# Patient Record
Sex: MALE | Race: WHITE | NOT HISPANIC OR LATINO | Employment: OTHER | URBAN - NONMETROPOLITAN AREA
[De-identification: names, ages, dates, MRNs, and addresses within clinical notes are randomized per-mention and may not be internally consistent; named-entity substitution may affect disease eponyms.]

---

## 2017-04-20 ENCOUNTER — HISTORY (OUTPATIENT)
Dept: FAMILY MEDICINE | Facility: OTHER | Age: 62
End: 2017-04-20

## 2017-04-20 ENCOUNTER — OFFICE VISIT - GICH (OUTPATIENT)
Dept: FAMILY MEDICINE | Facility: OTHER | Age: 62
End: 2017-04-20

## 2017-04-20 DIAGNOSIS — G35 MULTIPLE SCLEROSIS (H): ICD-10-CM

## 2017-04-20 DIAGNOSIS — F32.9 MAJOR DEPRESSIVE DISORDER, SINGLE EPISODE: ICD-10-CM

## 2017-04-20 ASSESSMENT — PATIENT HEALTH QUESTIONNAIRE - PHQ9: SUM OF ALL RESPONSES TO PHQ QUESTIONS 1-9: 14

## 2017-04-27 ENCOUNTER — AMBULATORY - GICH (OUTPATIENT)
Dept: RADIOLOGY | Facility: OTHER | Age: 62
End: 2017-04-27

## 2017-04-27 DIAGNOSIS — M85.80 OTHER SPECIFIED DISORDERS OF BONE DENSITY AND STRUCTURE, UNSPECIFIED SITE (CODE): ICD-10-CM

## 2017-04-27 DIAGNOSIS — E55.9 VITAMIN D DEFICIENCY: ICD-10-CM

## 2017-04-27 DIAGNOSIS — G35 MULTIPLE SCLEROSIS (H): ICD-10-CM

## 2017-05-08 ENCOUNTER — HISTORY (OUTPATIENT)
Dept: FAMILY MEDICINE | Facility: OTHER | Age: 62
End: 2017-05-08

## 2017-05-08 ENCOUNTER — HOSPITAL ENCOUNTER (OUTPATIENT)
Dept: RADIOLOGY | Facility: OTHER | Age: 62
End: 2017-05-08

## 2017-05-08 ENCOUNTER — OFFICE VISIT - GICH (OUTPATIENT)
Dept: FAMILY MEDICINE | Facility: OTHER | Age: 62
End: 2017-05-08

## 2017-05-08 DIAGNOSIS — F32.9 MAJOR DEPRESSIVE DISORDER, SINGLE EPISODE: ICD-10-CM

## 2017-05-08 DIAGNOSIS — G35 MULTIPLE SCLEROSIS (H): ICD-10-CM

## 2017-05-08 DIAGNOSIS — E55.9 VITAMIN D DEFICIENCY: ICD-10-CM

## 2017-05-08 DIAGNOSIS — M85.80 OTHER SPECIFIED DISORDERS OF BONE DENSITY AND STRUCTURE, UNSPECIFIED SITE (CODE): ICD-10-CM

## 2017-05-08 ASSESSMENT — PATIENT HEALTH QUESTIONNAIRE - PHQ9: SUM OF ALL RESPONSES TO PHQ QUESTIONS 1-9: 13

## 2017-05-11 ENCOUNTER — AMBULATORY - GICH (OUTPATIENT)
Dept: PHYSICAL THERAPY | Facility: OTHER | Age: 62
End: 2017-05-11

## 2017-05-11 DIAGNOSIS — G35 MULTIPLE SCLEROSIS (H): ICD-10-CM

## 2017-05-11 DIAGNOSIS — R29.898 OTHER SYMPTOMS AND SIGNS INVOLVING THE MUSCULOSKELETAL SYSTEM: ICD-10-CM

## 2017-05-11 DIAGNOSIS — R29.6 REPEATED FALLS: ICD-10-CM

## 2017-05-14 ENCOUNTER — TRANSFERRED RECORDS (OUTPATIENT)
Dept: HEALTH INFORMATION MANAGEMENT | Facility: OTHER | Age: 62
End: 2017-05-14

## 2017-05-16 ENCOUNTER — HOSPITAL ENCOUNTER (OUTPATIENT)
Dept: PHYSICAL THERAPY | Facility: OTHER | Age: 62
Setting detail: THERAPIES SERIES
End: 2017-05-16
Attending: GENERAL ACUTE CARE HOSPITAL

## 2017-05-16 DIAGNOSIS — R29.898 OTHER SYMPTOMS AND SIGNS INVOLVING THE MUSCULOSKELETAL SYSTEM: ICD-10-CM

## 2017-05-16 DIAGNOSIS — G35 MULTIPLE SCLEROSIS (H): ICD-10-CM

## 2017-05-16 DIAGNOSIS — R29.6 REPEATED FALLS: ICD-10-CM

## 2017-05-25 ENCOUNTER — HOSPITAL ENCOUNTER (OUTPATIENT)
Dept: PHYSICAL THERAPY | Facility: OTHER | Age: 62
Setting detail: THERAPIES SERIES
End: 2017-05-25

## 2017-05-31 ENCOUNTER — HOSPITAL ENCOUNTER (OUTPATIENT)
Dept: PHYSICAL THERAPY | Facility: OTHER | Age: 62
Setting detail: THERAPIES SERIES
End: 2017-05-31

## 2017-06-06 ENCOUNTER — HOSPITAL ENCOUNTER (OUTPATIENT)
Dept: PHYSICAL THERAPY | Facility: OTHER | Age: 62
Setting detail: THERAPIES SERIES
End: 2017-06-06

## 2017-06-06 ENCOUNTER — COMMUNICATION - GICH (OUTPATIENT)
Dept: FAMILY MEDICINE | Facility: OTHER | Age: 62
End: 2017-06-06

## 2017-06-06 ENCOUNTER — OFFICE VISIT - GICH (OUTPATIENT)
Dept: FAMILY MEDICINE | Facility: OTHER | Age: 62
End: 2017-06-06

## 2017-06-06 DIAGNOSIS — N30.00 ACUTE CYSTITIS WITHOUT HEMATURIA: ICD-10-CM

## 2017-06-06 DIAGNOSIS — R30.0 DYSURIA: ICD-10-CM

## 2017-06-06 LAB
BACTERIA URINE: ABNORMAL BACTERIA/HPF
BILIRUB UR QL: NEGATIVE
CLARITY, URINE: ABNORMAL CLARITY
COLOR UR: YELLOW COLOR
EPITHELIAL CELLS: ABNORMAL EPI/HPF
GLUCOSE URINE: NEGATIVE MG/DL
KETONES UR QL: NEGATIVE MG/DL
LEUKOCYTE ESTERASE URINE: ABNORMAL
NITRITE UR QL STRIP: POSITIVE
OCCULT BLOOD,URINE - HISTORICAL: ABNORMAL
PH UR: 6.5 [PH]
PROTEIN QUALITATIVE,URINE - HISTORICAL: NEGATIVE MG/DL
RBC - HISTORICAL: ABNORMAL /HPF
SP GR UR STRIP: 1.01
UROBILINOGEN,QUALITATIVE - HISTORICAL: NORMAL EU/DL
WBC - HISTORICAL: ABNORMAL /HPF

## 2017-06-07 ENCOUNTER — HOSPITAL ENCOUNTER (OUTPATIENT)
Dept: PHYSICAL THERAPY | Facility: OTHER | Age: 62
Setting detail: THERAPIES SERIES
End: 2017-06-07
Attending: GENERAL ACUTE CARE HOSPITAL

## 2017-06-07 DIAGNOSIS — R29.898 OTHER SYMPTOMS AND SIGNS INVOLVING THE MUSCULOSKELETAL SYSTEM: ICD-10-CM

## 2017-06-07 DIAGNOSIS — G35 MULTIPLE SCLEROSIS (H): ICD-10-CM

## 2017-06-08 ENCOUNTER — HOSPITAL ENCOUNTER (OUTPATIENT)
Dept: PHYSICAL THERAPY | Facility: OTHER | Age: 62
Setting detail: THERAPIES SERIES
End: 2017-06-08

## 2017-06-08 LAB — CULTURE - HISTORICAL: NORMAL

## 2017-06-13 ENCOUNTER — HOSPITAL ENCOUNTER (OUTPATIENT)
Dept: PHYSICAL THERAPY | Facility: OTHER | Age: 62
Setting detail: THERAPIES SERIES
End: 2017-06-13

## 2017-06-15 ENCOUNTER — COMMUNICATION - GICH (OUTPATIENT)
Dept: FAMILY MEDICINE | Facility: OTHER | Age: 62
End: 2017-06-15

## 2017-06-15 ENCOUNTER — HOSPITAL ENCOUNTER (OUTPATIENT)
Dept: PHYSICAL THERAPY | Facility: OTHER | Age: 62
Setting detail: THERAPIES SERIES
End: 2017-06-15

## 2017-06-15 DIAGNOSIS — M47.9 SPONDYLOSIS: ICD-10-CM

## 2017-06-19 ENCOUNTER — HOSPITAL ENCOUNTER (OUTPATIENT)
Dept: PHYSICAL THERAPY | Facility: OTHER | Age: 62
Setting detail: THERAPIES SERIES
End: 2017-06-19
Attending: FAMILY MEDICINE

## 2017-06-19 DIAGNOSIS — M47.9 SPONDYLOSIS: ICD-10-CM

## 2017-06-22 ENCOUNTER — HOSPITAL ENCOUNTER (OUTPATIENT)
Dept: PHYSICAL THERAPY | Facility: OTHER | Age: 62
Setting detail: THERAPIES SERIES
End: 2017-06-22

## 2017-06-26 ENCOUNTER — AMBULATORY - GICH (OUTPATIENT)
Dept: SCHEDULING | Facility: OTHER | Age: 62
End: 2017-06-26

## 2017-06-28 ENCOUNTER — HOSPITAL ENCOUNTER (OUTPATIENT)
Dept: PHYSICAL THERAPY | Facility: OTHER | Age: 62
Setting detail: THERAPIES SERIES
End: 2017-06-28

## 2017-06-30 ENCOUNTER — HOSPITAL ENCOUNTER (OUTPATIENT)
Dept: PHYSICAL THERAPY | Facility: OTHER | Age: 62
Setting detail: THERAPIES SERIES
End: 2017-06-30

## 2017-07-05 ENCOUNTER — HOSPITAL ENCOUNTER (OUTPATIENT)
Dept: PHYSICAL THERAPY | Facility: OTHER | Age: 62
Setting detail: THERAPIES SERIES
End: 2017-07-05

## 2017-07-06 ENCOUNTER — COMMUNICATION - GICH (OUTPATIENT)
Dept: FAMILY MEDICINE | Facility: OTHER | Age: 62
End: 2017-07-06

## 2017-07-07 ENCOUNTER — HOSPITAL ENCOUNTER (OUTPATIENT)
Dept: PHYSICAL THERAPY | Facility: OTHER | Age: 62
Setting detail: THERAPIES SERIES
End: 2017-07-07

## 2017-07-11 ENCOUNTER — HOSPITAL ENCOUNTER (OUTPATIENT)
Dept: PHYSICAL THERAPY | Facility: OTHER | Age: 62
Setting detail: THERAPIES SERIES
End: 2017-07-11

## 2017-07-11 ENCOUNTER — OFFICE VISIT - GICH (OUTPATIENT)
Dept: FAMILY MEDICINE | Facility: OTHER | Age: 62
End: 2017-07-11

## 2017-07-11 ENCOUNTER — COMMUNICATION - GICH (OUTPATIENT)
Dept: FAMILY MEDICINE | Facility: OTHER | Age: 62
End: 2017-07-11

## 2017-07-11 ENCOUNTER — HISTORY (OUTPATIENT)
Dept: FAMILY MEDICINE | Facility: OTHER | Age: 62
End: 2017-07-11

## 2017-07-11 DIAGNOSIS — F32.9 MAJOR DEPRESSIVE DISORDER, SINGLE EPISODE: ICD-10-CM

## 2017-07-11 DIAGNOSIS — I95.9 HYPOTENSION: ICD-10-CM

## 2017-07-11 DIAGNOSIS — I10 ESSENTIAL (PRIMARY) HYPERTENSION: ICD-10-CM

## 2017-07-11 ASSESSMENT — PATIENT HEALTH QUESTIONNAIRE - PHQ9: SUM OF ALL RESPONSES TO PHQ QUESTIONS 1-9: 12

## 2017-07-13 ENCOUNTER — HOSPITAL ENCOUNTER (OUTPATIENT)
Dept: PHYSICAL THERAPY | Facility: OTHER | Age: 62
Setting detail: THERAPIES SERIES
End: 2017-07-13

## 2017-07-18 ENCOUNTER — HOSPITAL ENCOUNTER (OUTPATIENT)
Dept: PHYSICAL THERAPY | Facility: OTHER | Age: 62
Setting detail: THERAPIES SERIES
End: 2017-07-18

## 2017-07-20 ENCOUNTER — HOSPITAL ENCOUNTER (OUTPATIENT)
Dept: PHYSICAL THERAPY | Facility: OTHER | Age: 62
Setting detail: THERAPIES SERIES
End: 2017-07-20

## 2017-07-26 ENCOUNTER — HOSPITAL ENCOUNTER (OUTPATIENT)
Dept: PHYSICAL THERAPY | Facility: OTHER | Age: 62
Setting detail: THERAPIES SERIES
End: 2017-07-26

## 2017-07-28 ENCOUNTER — HOSPITAL ENCOUNTER (OUTPATIENT)
Dept: PHYSICAL THERAPY | Facility: OTHER | Age: 62
Setting detail: THERAPIES SERIES
End: 2017-07-28

## 2017-08-01 ENCOUNTER — HOSPITAL ENCOUNTER (OUTPATIENT)
Dept: PHYSICAL THERAPY | Facility: OTHER | Age: 62
Setting detail: THERAPIES SERIES
End: 2017-08-01

## 2017-08-03 ENCOUNTER — HOSPITAL ENCOUNTER (OUTPATIENT)
Dept: PHYSICAL THERAPY | Facility: OTHER | Age: 62
Setting detail: THERAPIES SERIES
End: 2017-08-03

## 2017-08-07 ENCOUNTER — HOSPITAL ENCOUNTER (OUTPATIENT)
Dept: PHYSICAL THERAPY | Facility: OTHER | Age: 62
Setting detail: THERAPIES SERIES
End: 2017-08-07

## 2017-08-08 ENCOUNTER — OFFICE VISIT - GICH (OUTPATIENT)
Dept: FAMILY MEDICINE | Facility: OTHER | Age: 62
End: 2017-08-08

## 2017-08-08 ENCOUNTER — HISTORY (OUTPATIENT)
Dept: FAMILY MEDICINE | Facility: OTHER | Age: 62
End: 2017-08-08

## 2017-08-08 DIAGNOSIS — F32.9 MAJOR DEPRESSIVE DISORDER, SINGLE EPISODE: ICD-10-CM

## 2017-08-08 DIAGNOSIS — F33.1 MAJOR DEPRESSIVE DISORDER, RECURRENT, MODERATE (H): ICD-10-CM

## 2017-08-08 DIAGNOSIS — I10 ESSENTIAL (PRIMARY) HYPERTENSION: ICD-10-CM

## 2017-08-08 ASSESSMENT — PATIENT HEALTH QUESTIONNAIRE - PHQ9: SUM OF ALL RESPONSES TO PHQ QUESTIONS 1-9: 14

## 2017-08-14 ENCOUNTER — AMBULATORY - GICH (OUTPATIENT)
Dept: SCHEDULING | Facility: OTHER | Age: 62
End: 2017-08-14

## 2017-08-14 ENCOUNTER — COMMUNICATION - GICH (OUTPATIENT)
Dept: FAMILY MEDICINE | Facility: OTHER | Age: 62
End: 2017-08-14

## 2017-08-16 ENCOUNTER — HOSPITAL ENCOUNTER (OUTPATIENT)
Dept: PHYSICAL THERAPY | Facility: OTHER | Age: 62
Setting detail: THERAPIES SERIES
End: 2017-08-16

## 2017-08-23 ENCOUNTER — HOSPITAL ENCOUNTER (OUTPATIENT)
Dept: PHYSICAL THERAPY | Facility: OTHER | Age: 62
Setting detail: THERAPIES SERIES
End: 2017-08-23

## 2017-08-24 ENCOUNTER — AMBULATORY - GICH (OUTPATIENT)
Dept: SCHEDULING | Facility: OTHER | Age: 62
End: 2017-08-24

## 2017-08-25 ENCOUNTER — OFFICE VISIT - GICH (OUTPATIENT)
Dept: FAMILY MEDICINE | Facility: OTHER | Age: 62
End: 2017-08-25

## 2017-08-25 ENCOUNTER — HISTORY (OUTPATIENT)
Dept: FAMILY MEDICINE | Facility: OTHER | Age: 62
End: 2017-08-25

## 2017-08-25 DIAGNOSIS — N31.9 NEUROMUSCULAR DYSFUNCTION OF BLADDER: ICD-10-CM

## 2017-08-25 DIAGNOSIS — Z78.9 OTHER SPECIFIED HEALTH STATUS (CODE): ICD-10-CM

## 2017-08-25 DIAGNOSIS — R35.0 FREQUENCY OF MICTURITION: ICD-10-CM

## 2017-08-25 DIAGNOSIS — N39.0 URINARY TRACT INFECTION: ICD-10-CM

## 2017-08-25 LAB
AMORPHOUS - HISTORICAL: PRESENT
BACTERIA URINE: ABNORMAL BACTERIA/HPF
BILIRUB UR QL: NEGATIVE
CLARITY, URINE: CLEAR CLARITY
COLOR UR: YELLOW COLOR
EPITHELIAL CELLS: ABNORMAL EPI/HPF
GLUCOSE URINE: 100 MG/DL
KETONES UR QL: NEGATIVE MG/DL
LEUKOCYTE ESTERASE URINE: ABNORMAL
NITRITE UR QL STRIP: POSITIVE
OCCULT BLOOD,URINE - HISTORICAL: ABNORMAL
OTHER: ABNORMAL
PH UR: 6.5 [PH]
PROTEIN QUALITATIVE,URINE - HISTORICAL: NEGATIVE MG/DL
RBC - HISTORICAL: ABNORMAL /HPF
SP GR UR STRIP: 1.01
UROBILINOGEN,QUALITATIVE - HISTORICAL: NORMAL EU/DL
WBC - HISTORICAL: ABNORMAL /HPF

## 2017-08-27 LAB
CULTURE - HISTORICAL: ABNORMAL
CULTURE - HISTORICAL: ABNORMAL
SUSCEPTIBILITY RESULT - HISTORICAL: ABNORMAL

## 2017-09-04 ENCOUNTER — HISTORY (OUTPATIENT)
Dept: FAMILY MEDICINE | Facility: OTHER | Age: 62
End: 2017-09-04

## 2017-09-04 ENCOUNTER — HOSPITAL ENCOUNTER (OUTPATIENT)
Dept: RADIOLOGY | Facility: OTHER | Age: 62
End: 2017-09-04
Attending: NURSE PRACTITIONER

## 2017-09-04 ENCOUNTER — OFFICE VISIT - GICH (OUTPATIENT)
Dept: FAMILY MEDICINE | Facility: OTHER | Age: 62
End: 2017-09-04

## 2017-09-04 DIAGNOSIS — Z91.89 OTHER SPECIFIED PERSONAL RISK FACTORS, NOT ELSEWHERE CLASSIFIED: ICD-10-CM

## 2017-09-04 DIAGNOSIS — Z87.440 PERSONAL HISTORY OF URINARY INFECTION: ICD-10-CM

## 2017-09-04 LAB
BACTERIA URINE: ABNORMAL BACTERIA/HPF
BILIRUB UR QL: NEGATIVE
CLARITY, URINE: CLEAR CLARITY
COLOR UR: YELLOW COLOR
EPITHELIAL CELLS: ABNORMAL EPI/HPF
GLUCOSE URINE: NEGATIVE MG/DL
KETONES UR QL: NEGATIVE MG/DL
LEUKOCYTE ESTERASE URINE: NEGATIVE
NITRITE UR QL STRIP: POSITIVE
OCCULT BLOOD,URINE - HISTORICAL: NEGATIVE
PH UR: 7 [PH]
PROTEIN QUALITATIVE,URINE - HISTORICAL: NEGATIVE MG/DL
RBC - HISTORICAL: ABNORMAL /HPF
SP GR UR STRIP: 1.02
UROBILINOGEN,QUALITATIVE - HISTORICAL: NORMAL EU/DL
WBC - HISTORICAL: ABNORMAL /HPF

## 2017-09-07 ENCOUNTER — HISTORY (OUTPATIENT)
Dept: FAMILY MEDICINE | Facility: OTHER | Age: 62
End: 2017-09-07

## 2017-09-07 ENCOUNTER — OFFICE VISIT - GICH (OUTPATIENT)
Dept: FAMILY MEDICINE | Facility: OTHER | Age: 62
End: 2017-09-07

## 2017-09-07 ENCOUNTER — HOSPITAL ENCOUNTER (OUTPATIENT)
Dept: PHYSICAL THERAPY | Facility: OTHER | Age: 62
Setting detail: THERAPIES SERIES
End: 2017-09-07
Attending: NURSE PRACTITIONER

## 2017-09-07 ENCOUNTER — AMBULATORY - GICH (OUTPATIENT)
Dept: FAMILY MEDICINE | Facility: OTHER | Age: 62
End: 2017-09-07

## 2017-09-07 ENCOUNTER — COMMUNICATION - GICH (OUTPATIENT)
Dept: FAMILY MEDICINE | Facility: OTHER | Age: 62
End: 2017-09-07

## 2017-09-07 ENCOUNTER — AMBULATORY - GICH (OUTPATIENT)
Dept: PHYSICAL THERAPY | Facility: OTHER | Age: 62
End: 2017-09-07

## 2017-09-07 DIAGNOSIS — G35 MULTIPLE SCLEROSIS (H): ICD-10-CM

## 2017-09-07 DIAGNOSIS — F32.9 MAJOR DEPRESSIVE DISORDER, SINGLE EPISODE: ICD-10-CM

## 2017-09-07 DIAGNOSIS — N31.2 FLACCID NEUROPATHIC BLADDER, NOT ELSEWHERE CLASSIFIED: ICD-10-CM

## 2017-09-07 DIAGNOSIS — Z91.89 OTHER SPECIFIED PERSONAL RISK FACTORS, NOT ELSEWHERE CLASSIFIED: ICD-10-CM

## 2017-09-07 DIAGNOSIS — R06.02 SHORTNESS OF BREATH: ICD-10-CM

## 2017-09-07 DIAGNOSIS — R33.9 RETENTION OF URINE: ICD-10-CM

## 2017-09-07 LAB
ANION GAP - HISTORICAL: 9 (ref 5–18)
BUN SERPL-MCNC: 27 MG/DL (ref 7–25)
BUN/CREAT RATIO - HISTORICAL: 25
CALCIUM SERPL-MCNC: 9.7 MG/DL (ref 8.6–10.3)
CHLORIDE SERPLBLD-SCNC: 103 MMOL/L (ref 98–107)
CO2 SERPL-SCNC: 28 MMOL/L (ref 21–31)
CREAT SERPL-MCNC: 1.1 MG/DL (ref 0.7–1.3)
CULTURE - HISTORICAL: ABNORMAL
CULTURE - HISTORICAL: ABNORMAL
D-DIMER, QUANTITATIVE NG/ML - HISTORICAL: 482 NG/ML
GFR IF NOT AFRICAN AMERICAN - HISTORICAL: >60 ML/MIN/1.73M2
GLUCOSE SERPL-MCNC: 96 MG/DL (ref 70–105)
POTASSIUM SERPL-SCNC: 3.8 MMOL/L (ref 3.5–5.1)
PSA TOTAL (DIAGNOSTIC) - HISTORICAL: 0.2 NG/ML
SODIUM SERPL-SCNC: 140 MMOL/L (ref 133–143)

## 2017-09-07 ASSESSMENT — PATIENT HEALTH QUESTIONNAIRE - PHQ9: SUM OF ALL RESPONSES TO PHQ QUESTIONS 1-9: 11

## 2017-09-15 ENCOUNTER — HOSPITAL ENCOUNTER (OUTPATIENT)
Dept: RADIOLOGY | Facility: OTHER | Age: 62
End: 2017-09-15
Attending: NURSE PRACTITIONER

## 2017-09-15 DIAGNOSIS — G35 MULTIPLE SCLEROSIS (H): ICD-10-CM

## 2017-09-15 DIAGNOSIS — Z91.89 OTHER SPECIFIED PERSONAL RISK FACTORS, NOT ELSEWHERE CLASSIFIED: ICD-10-CM

## 2017-09-17 ENCOUNTER — TRANSFERRED RECORDS (OUTPATIENT)
Dept: HEALTH INFORMATION MANAGEMENT | Facility: OTHER | Age: 62
End: 2017-09-17

## 2017-09-18 ENCOUNTER — TRANSFERRED RECORDS (OUTPATIENT)
Dept: HEALTH INFORMATION MANAGEMENT | Facility: OTHER | Age: 62
End: 2017-09-18

## 2017-09-18 ENCOUNTER — AMBULATORY - GICH (OUTPATIENT)
Dept: PHYSICAL THERAPY | Facility: OTHER | Age: 62
End: 2017-09-18

## 2017-09-19 ENCOUNTER — HOSPITAL ENCOUNTER (OUTPATIENT)
Dept: PHYSICAL THERAPY | Facility: OTHER | Age: 62
Setting detail: THERAPIES SERIES
End: 2017-09-19

## 2017-09-19 DIAGNOSIS — R06.02 SHORTNESS OF BREATH: ICD-10-CM

## 2017-09-20 ENCOUNTER — HOSPITAL ENCOUNTER (OUTPATIENT)
Dept: RADIOLOGY | Facility: OTHER | Age: 62
End: 2017-09-20
Attending: FAMILY MEDICINE

## 2017-09-20 DIAGNOSIS — R06.02 SHORTNESS OF BREATH: ICD-10-CM

## 2017-09-21 ENCOUNTER — TRANSFERRED RECORDS (OUTPATIENT)
Dept: HEALTH INFORMATION MANAGEMENT | Facility: OTHER | Age: 62
End: 2017-09-21

## 2017-09-21 ENCOUNTER — AMBULATORY - GICH (OUTPATIENT)
Dept: FAMILY MEDICINE | Facility: OTHER | Age: 62
End: 2017-09-21

## 2017-09-21 ENCOUNTER — AMBULATORY - GICH (OUTPATIENT)
Dept: SCHEDULING | Facility: OTHER | Age: 62
End: 2017-09-21

## 2017-09-21 ENCOUNTER — HOSPITAL ENCOUNTER (OUTPATIENT)
Dept: PHYSICAL THERAPY | Facility: OTHER | Age: 62
Setting detail: THERAPIES SERIES
End: 2017-09-21

## 2017-09-22 ENCOUNTER — HOSPITAL ENCOUNTER (OUTPATIENT)
Dept: PHYSICAL THERAPY | Facility: OTHER | Age: 62
Setting detail: THERAPIES SERIES
End: 2017-09-22

## 2017-09-22 DIAGNOSIS — R06.02 SHORTNESS OF BREATH: ICD-10-CM

## 2017-09-25 ENCOUNTER — HOSPITAL ENCOUNTER (OUTPATIENT)
Dept: PHYSICAL THERAPY | Facility: OTHER | Age: 62
Setting detail: THERAPIES SERIES
End: 2017-09-25

## 2017-09-28 ENCOUNTER — HOSPITAL ENCOUNTER (OUTPATIENT)
Dept: PHYSICAL THERAPY | Facility: OTHER | Age: 62
Setting detail: THERAPIES SERIES
End: 2017-09-28

## 2017-10-02 ENCOUNTER — HOSPITAL ENCOUNTER (OUTPATIENT)
Dept: PHYSICAL THERAPY | Facility: OTHER | Age: 62
Setting detail: THERAPIES SERIES
End: 2017-10-02

## 2017-10-04 ENCOUNTER — TRANSFERRED RECORDS (OUTPATIENT)
Dept: HEALTH INFORMATION MANAGEMENT | Facility: OTHER | Age: 62
End: 2017-10-04

## 2017-10-04 ENCOUNTER — AMBULATORY - GICH (OUTPATIENT)
Dept: SCHEDULING | Facility: OTHER | Age: 62
End: 2017-10-04

## 2017-10-05 ENCOUNTER — HOSPITAL ENCOUNTER (OUTPATIENT)
Dept: PHYSICAL THERAPY | Facility: OTHER | Age: 62
Setting detail: THERAPIES SERIES
End: 2017-10-05

## 2017-10-05 ENCOUNTER — HISTORY (OUTPATIENT)
Dept: FAMILY MEDICINE | Facility: OTHER | Age: 62
End: 2017-10-05

## 2017-10-05 ENCOUNTER — OFFICE VISIT - GICH (OUTPATIENT)
Dept: FAMILY MEDICINE | Facility: OTHER | Age: 62
End: 2017-10-05

## 2017-10-05 DIAGNOSIS — I10 ESSENTIAL (PRIMARY) HYPERTENSION: ICD-10-CM

## 2017-10-05 DIAGNOSIS — G35 MULTIPLE SCLEROSIS (H): ICD-10-CM

## 2017-10-05 ASSESSMENT — PATIENT HEALTH QUESTIONNAIRE - PHQ9: SUM OF ALL RESPONSES TO PHQ QUESTIONS 1-9: 15

## 2017-10-09 ENCOUNTER — HOSPITAL ENCOUNTER (OUTPATIENT)
Dept: PHYSICAL THERAPY | Facility: OTHER | Age: 62
Setting detail: THERAPIES SERIES
End: 2017-10-09

## 2017-10-16 ENCOUNTER — HOSPITAL ENCOUNTER (OUTPATIENT)
Dept: PHYSICAL THERAPY | Facility: OTHER | Age: 62
Setting detail: THERAPIES SERIES
End: 2017-10-16

## 2017-10-19 ENCOUNTER — HOSPITAL ENCOUNTER (OUTPATIENT)
Dept: PHYSICAL THERAPY | Facility: OTHER | Age: 62
Setting detail: THERAPIES SERIES
End: 2017-10-19

## 2017-10-24 ENCOUNTER — HOSPITAL ENCOUNTER (OUTPATIENT)
Dept: PHYSICAL THERAPY | Facility: OTHER | Age: 62
Setting detail: THERAPIES SERIES
End: 2017-10-24

## 2017-10-24 ENCOUNTER — HISTORY (OUTPATIENT)
Dept: FAMILY MEDICINE | Facility: OTHER | Age: 62
End: 2017-10-24

## 2017-10-24 ENCOUNTER — OFFICE VISIT - GICH (OUTPATIENT)
Dept: FAMILY MEDICINE | Facility: OTHER | Age: 62
End: 2017-10-24

## 2017-10-24 DIAGNOSIS — R39.89 OTHER SYMPTOMS AND SIGNS INVOLVING THE GENITOURINARY SYSTEM: ICD-10-CM

## 2017-10-24 DIAGNOSIS — T83.511A INFECTION AND INFLAMMATORY REACTION DUE TO INDWELLING URETHRAL CATHETER, INITIAL ENCOUNTER (H): ICD-10-CM

## 2017-10-24 DIAGNOSIS — N39.0 URINARY TRACT INFECTION: ICD-10-CM

## 2017-10-24 LAB
BACTERIA URINE: ABNORMAL BACTERIA/HPF
BILIRUB UR QL: NEGATIVE
CLARITY, URINE: ABNORMAL CLARITY
COLOR UR: YELLOW COLOR
EPITHELIAL CELLS: ABNORMAL EPI/HPF
GLUCOSE URINE: NEGATIVE MG/DL
HYALINE CASTS: ABNORMAL /LPF
KETONES UR QL: NEGATIVE MG/DL
LEUKOCYTE ESTERASE URINE: NEGATIVE
NITRITE UR QL STRIP: POSITIVE
OCCULT BLOOD,URINE - HISTORICAL: ABNORMAL
PH UR: 6 [PH]
PROTEIN QUALITATIVE,URINE - HISTORICAL: NEGATIVE MG/DL
RBC - HISTORICAL: ABNORMAL /HPF
SP GR UR STRIP: 1.01
UROBILINOGEN,QUALITATIVE - HISTORICAL: NORMAL EU/DL
WBC - HISTORICAL: ABNORMAL /HPF

## 2017-10-26 LAB
CULTURE - HISTORICAL: ABNORMAL
CULTURE - HISTORICAL: ABNORMAL

## 2017-11-01 ENCOUNTER — HOSPITAL ENCOUNTER (OUTPATIENT)
Dept: PHYSICAL THERAPY | Facility: OTHER | Age: 62
Setting detail: THERAPIES SERIES
End: 2017-11-01

## 2017-11-03 ENCOUNTER — HOSPITAL ENCOUNTER (OUTPATIENT)
Dept: PHYSICAL THERAPY | Facility: OTHER | Age: 62
Setting detail: THERAPIES SERIES
End: 2017-11-03

## 2017-11-06 ENCOUNTER — HOSPITAL ENCOUNTER (OUTPATIENT)
Dept: PHYSICAL THERAPY | Facility: OTHER | Age: 62
Setting detail: THERAPIES SERIES
End: 2017-11-06

## 2017-11-08 ENCOUNTER — HOSPITAL ENCOUNTER (OUTPATIENT)
Dept: PHYSICAL THERAPY | Facility: OTHER | Age: 62
Setting detail: THERAPIES SERIES
End: 2017-11-08

## 2017-11-09 ENCOUNTER — AMBULATORY - GICH (OUTPATIENT)
Dept: SCHEDULING | Facility: OTHER | Age: 62
End: 2017-11-09

## 2017-11-09 ENCOUNTER — TRANSFERRED RECORDS (OUTPATIENT)
Dept: HEALTH INFORMATION MANAGEMENT | Facility: OTHER | Age: 62
End: 2017-11-09

## 2017-11-13 ENCOUNTER — HOSPITAL ENCOUNTER (OUTPATIENT)
Dept: PHYSICAL THERAPY | Facility: OTHER | Age: 62
Setting detail: THERAPIES SERIES
End: 2017-11-13

## 2017-11-15 ENCOUNTER — HOSPITAL ENCOUNTER (OUTPATIENT)
Dept: PHYSICAL THERAPY | Facility: OTHER | Age: 62
Setting detail: THERAPIES SERIES
End: 2017-11-15

## 2017-11-20 ENCOUNTER — HOSPITAL ENCOUNTER (OUTPATIENT)
Dept: PHYSICAL THERAPY | Facility: OTHER | Age: 62
Setting detail: THERAPIES SERIES
End: 2017-11-20

## 2017-12-27 NOTE — PROGRESS NOTES
"Patient Information     Patient Name MRN Sex Jamir Cooley [id number] Male 1955      Progress Notes by Kristy Early PT at 2017  3:52 PM     Author:  Kristy Early PT Service:  (none) Author Type:  PT- Physical Therapist     Filed:  2017  8:56 AM Date of Service:  2017  3:52 PM Status:  Signed     :  Kristy Early PT (PT- Physical Therapist)            [facility]  Outpatient PT - Re-certification Note        Date of Service: 2017    Visit #: 25 (1 of 10)     Patient Name: Jamir Gill   YOB: 1955   Referring MD/Provider: Dr. Dee Hook, Dr. John Polk  Diagnosis: Multiple sclerosis, bilateral leg weakness, osteoarthritis of cervical region  Treatment Diagnosis: MS, impaired mobility, bilateral leg weakness, impaired posture, impaired balance and endurance; impaired cervical mobility  Insurance:  Preferred One  Start of Care Date:  2017   Certification Dates: 2017    Re-Cert Due: Medicare/MA Re-Cert Due: 2017    Subjective    Patient reports he has been falling even with the walker in the house. Using the wheelchair most of the time. \"I've got 2 bruised hips\" from falling. Patient expressed frustration at his decline in independent mobility. \"I resisted the wheelchair for 25 years.\"   Doesn't drive anymore, may be interested in hand controls. \"I figured if I can't get in it, I shouldn't be driving.\"  Has OT coming to the house for safety eval and will be checking into getting the scooter.  The left leg is pretty weak now too. Not as bad as the right but definitely worse than before.    Woke up with a sore neck this morning- couldn't turn it either direction.    Pain today is 2-3 in the neck.     Objective  Patient arrived 10 mins late for appt today.    Patient using a wheelchair today.  Transfers WC to bed with min A. Takes 2-3 steps without support, then reaches for the bed.      Modified CTSIB:    Condition " 1: Trial 1= 30 seconds, Trial 2= Not tested   Condition 2: Trial 1= 10 seconds, Trial 2= Not tested   Condition 3: Trial 1= 28 seconds, Trial 2= Not tested   Condition 4: Trial 1= 0 seconds, Trial 2= Not tested      Observation/palpation:  C4-5 ERS left; right> left 1st rib hypomobility    ROM / Strength:                 Norms Left  Right Strength Left  Right   Hip Flexion 120    Hip Flexion 3+ 1   Hip Extension 15   Hip Extension     Hip Abduction 50   Hip Abduction     Hip External Rot. 60   Hip External Rot.     Hip Internal Rot. 40   Hip Internal Rot.     Knee Extension 0   Knee Extension 4 4-   Knee Flexion 135   Knee Flexion 4- 3+   Dorsiflexion 20   Dorsiflexion 3 3   Plantarflexion    Plantarflexion       Hip extension and abduction MMT deferred due to patient's difficulty rolling on treatment table.    Today's Intervention:    Supine muscle energy technique C4-5 ERS left   1st rib right muscle energy technique x3  soft tissue mobilization scalenes x5 mins  Upper trapezius stretching and cervical rotation, supine    Walk in hernandez with front wheeled walker x75 feet with contact guard assist     Home Exercise Program: *= added to HEP.    Access Code: [email or web address] URL: [email or web address] Date: 05/16/2017 Prepared by: Kristy Early   Exercises   Hooklying Clamshell with Resistance - 10-15 reps - 1-2 sets - 5 hold - 1x daily   Seated Hamstring Stretch - 1-2 sets - 30 second hold - 1x daily   Romberg Stance - 30 second hold - 2-3 sets - 1x daily   Romberg Stance with Eyes Closed - 2-3 sets - 20-30 second hold - 1x daily   Romberg Stance with Head Rotation - 2-3 sets - 30 second hold - 1x daily   Standing Romberg to 3/4 Tandem Stance - 2-3 sets - 20-30 second hold - 1x daily   Scapular Retraction with Resistance Advanced - 10-15 reps - 1-2 sets - 5 hold - 1x daily     Upper trapezius, scalene and levator stretches x30 sec each. bilateral added 6/19/2017        Assessment     Patient demonstrates moderate  decline in functional mobility compared to initial evaluation several months ago. Therapist recommends reconsidering getting a scooter for home use to prevent falls and improve mobility and energy conservation. Additional skilled physical therapy services required to improve cervical mobility, improve independent mobility and decrease fall risk. Patient has a progressive condition and is not safe with his usual mobility at this time.  Patient will benefit from continued skilled physical therapy services to address aforementioned impairments and return patient to previous level of functioning.    G Codes and Modifier taken from patient completing the Burrows and dynamic gait index: 6/7/17 (Discharged balance gcodes 6/19/2017)     Goal Primary G Code and Modifier:    The Patient's G Code Goal would be: Mobility    The Patient's Impairment, Limitation or Restriction Modifier goal would be best described as: CJ - 20% - 40% Impairment.      Discharge Primary G Code and Modifier:      The Patient's status upon Discharge is Mobility    The Patient's Impairment, Limitation or Restriction Modifier would be best described as CK - 40% - 60% Impairment.     Cervical codes updated 8/16/2017:    Current Primary G Code and Modifier:    Per the Patient's intake and/or assessment the Primary G Code is: Handling Objects .   The Patient's Impairment, Limitation or Restriction Modifier would be best described as: CI - 1% - 20% Impairment.     Goal Primary G Code and Modifier:    The Patient's G Code Goal would be: Handling Objects    The Patient's Impairment, Limitation or Restriction Modifier goal would be best described as: CI - 1% - 20% Impairment.      Goals:  Patient will be assessed for appropriate mobility device to promote maintaining his safety with mobility within one visit. Goal met 6/7/17      Short Term Goals: To be met in 4 weeks:   Following physical therapy intervention, the patient will be able to:    1. Patient will improve standing posture to minimize crouched gait pattern. In progress 9/19/2017   2. Patient will be independent in home exercise program for progression of strength and balance abilities. In progress 9/19/2017                                        3.   Patient will decrease falls to no more than 3 per week. Not met 9/19/2017       Long Term Goals: To be met in 8 weeks:   Following physical therapy intervention, the patient will be able to:   1. Increase MMT of hip flexors to 3+/5 so that pt able to lift leg independently into car. Not progressing.  2.   3.     New goals added 6/19/2017:      1.   Patient will have improved cervical range of motion by at least 5 degrees with minimal to no referred pain. 40% met 9/19/2017      2.   Patient will have less than 3/10 pain with normal activities of daily living. In progress 9/19/2017      3.   Patient will have improved postural endurance for ability to complete normal daily work routine including reaching to shoulder height without increase in symptoms. In progress 8/16/2017           5.   Patient will improve mCTSIB scores by 5 seconds for improved endurance for independent stance and improved stability. (New goal added 9/19/2017)     Plan     Treatment Plan / Targeted Outcomes:     Frequency:   16 additional visits      Duration of Treatment: 8 weeks     Plan of Care Due Date: Medicare/MA Re-Cert Due:  11/14/2017     Plan for next visit:  Gait and balance training, home exercise program development, transfers, independent mobility and strengthening. Cervical side gliding and mobilization as appropriate. Postural strengthening.        Student or PTA has been instructed in and demonstrates skills necessary to carry out above stated treatment plan: Yes     Thank you for your referral to [facility].  Please call with any questions, concerns or comments.  [phone]     Kristy Early, PT, DPT

## 2017-12-27 NOTE — PROGRESS NOTES
Patient Information     Patient Name MRN Sex     Jamir Gill [id number] Male 1955      Progress Notes signed by John Polk MD at 2017 10:03 AM      Author:  John Polk MD Service:  (none) Author Type:  Physician     Filed:  2017 10:03 AM Encounter Date:  2017 Status:  Signed     :  John Polk MD (Physician)            2017      JAMIR GILL  [address] [place] [address]      RE: JAMIR GILL  MR#: [id number]  : 1955        Dear Dr. Gibbs:    This letter is in regards to our mutual patient, Jamir Gill, date of birth 1955, who is a patient you follow with MS.      He and his wife and I had a good discussion today about his apparent downward trend in health over the last few months. His wife was quite certain that this was related to repeated urinary tract infections.  As you know, Jamir self-catheterizes. He did have, in , a urinalysis that showed multiple white cells, but culture showed probable contaminants.  He was treated at that time with Cipro, but again the culture was negative. Then on  he had a urine which showed again white cells, and he was treated again with Cipro, with Enterococcus faecalis being cultured.  He, of course, had no symptoms, other than possibly a flare in his MS that had been going on for months, but he had no improvement during this treatment. Then on  he had another urine culture, which showed a coag-neg staph, but no longer was there any enterococcus.  I thus pointed out to him and his wife that it was in the face of a possible infection with enterococcus and eradication of that bacteria that his symptoms did not improve, and thus it is unlikely, I believe, that urinary tract infections are the cause of his symptoms. We talked about colonization of the bladder with these different contaminants and bacteria that he has had without any real signs of bladder infection  other than possible worsening of his MS.      They wanted me to be sure to let you know this information. I appreciate your time and consideration, and please feel free to call me on my cellphone, [phone], if you have further questions or suggestions.    Yours truly,      MD ROSHNI HUNTLEY/izabela  D:09/07/2017 17:03:04  T:09/07/2017 23:02:08  VJID: [id number]  TJID: [id number]    cc:

## 2017-12-27 NOTE — PROGRESS NOTES
Patient Information     Patient Name MRN Sex Jamir Cooley [id number] Male 1955      Progress Notes by Holley Alvarez at 2017  8:58 AM     Author:  Holley Alvarez  Service:  (none) Author Type:  Other Clinical Staff     Filed:  2017  9:16 AM  Date of Service:  2017  8:58 AM Status:  Addendum     :  Holley Alvarez (Other Clinical Staff)        Related Notes: Original Note by Holley Alvarez (Other Clinical Staff) filed at 2017  8:58 AM            Falls Risk Criteria:    Age 65 and older or under age 4        Sensory deficits    Poor vision    Use of ambulatory aides    Impaired judgment    Unable to walk independently    Meets High Risk criteria for falls: yes               1.  Do you have dizziness or vertigo?    yes                    2.  Do you need help standing or walking?   yes                 3.  Have you fallen within the last 6 months?    yes           4.  Has the patient been fasting?      yes       If any risks are marked Yes, the following interventions are utilized:    Do not leave patient unattended     Assist patient in the dressing room and bathroom    Have ambulatory aides available throughout procedure    Involve patient s family if available

## 2017-12-27 NOTE — PROGRESS NOTES
"Patient Information     Patient Name MRN Sex Jamir Cooley [id number] Male 1955      Progress Notes by Kristy Early PT at 10/16/2017 10:33 AM     Author:  Kristy Early PT Service:  (none) Author Type:  PT- Physical Therapist     Filed:  10/16/2017 12:26 PM Date of Service:  10/16/2017 10:33 AM Status:  Signed     :  Kristy Early PT (PT- Physical Therapist)            [facility]  Outpatient PT - Daily Note        Date of Service: 10/16/2017    Visit #: 32 (8 of 10)     Patient Name: Jamir Gill   YOB: 1955   Referring MD/Provider: Dr. Dee Hook, Dr. John Polk  Diagnosis: Multiple sclerosis, bilateral leg weakness, osteoarthritis of cervical region  Treatment Diagnosis: MS, impaired mobility, bilateral leg weakness, impaired posture, impaired balance and endurance; impaired cervical mobility  Insurance:  Preferred One  Start of Care Date:  2017   Certification Dates: 2017    Re-Cert Due: Medicare/MA Re-Cert Due: 2017    Subjective    Patient reports he hopped right up into the truck today. Noticed an improvement in his mobility starting over the weekend. Did have another neck pain episode that lasted a couple hours, just worked on massaging it and it's feeling fine today.    Discussed home exercise program and progressing to the PEAK program in the next few weeks. Patient agreeable.     Objective  Patient presents ambulatory with his 4WW today.    Today's Intervention:  all exercises completed bilaterally and to fatigue. Patient instructed on all  for transition to the PEAK program.    Nustep level 6 seat 11 arms 10 x5 mins. No leaning to the left today.    Standing toe taps to 4\" box  Standing hip abduction with minimal use of railing for balance x12 bilateral     Review of new/modified home exercise program:  Seated marching hip flexion*  Seated core training: bilateral trunk rotation with arms crossed, sitting " unsupported *  Seated hamstring curls + LAQ *  Seated upper extremity overhead press no weights *  Seated upper extremity flexion overhead no weight *  Seated scapular sets + horizontal abduction with yellow theraband resistance *    Lumbar extension 30# 2 x10, legs 9, feet 11  Abduction 20# leg pads 2    Deferred:  Med x leg press 80# seat 21, back upright with pillow behind head. Patient needed assistance to get legs up on pedal    Home Exercise Program: *= added to HEP.    Access Code: [email or web address] URL: [email or web address] Date: 05/16/2017 Prepared by: Kristy Early   Exercises   Hooklying Clamshell with Resistance - 10-15 reps - 1-2 sets - 5 hold - 1x daily   Seated Hamstring Stretch - 1-2 sets - 30 second hold - 1x daily   Romberg Stance - 30 second hold - 2-3 sets - 1x daily   Romberg Stance with Eyes Closed - 2-3 sets - 20-30 second hold - 1x daily   Romberg Stance with Head Rotation - 2-3 sets - 30 second hold - 1x daily   Standing Romberg to 3/4 Tandem Stance - 2-3 sets - 20-30 second hold - 1x daily   Scapular Retraction with Resistance Advanced - 10-15 reps - 1-2 sets - 5 hold - 1x daily     Upper trapezius, scalene and levator stretches x30 sec each. bilateral added 6/19/2017   Access Code: [email or web address] URL: [email or web address] Date: 10/11/2017 Prepared by: Kristy Early   Exercises   Seated March - 10-15 reps - 1-2 sets - 5 seconds hold - 1x daily   Seated Hip Abduction with Resistance - 10-15 reps - 1-2 sets - 5 seconds hold - 1x daily   Seated Knee Flexion Slide - 15-20 reps - 1-2 sets - 5 hold - 1x daily   Seated Shoulder Flexion Full Range - 10-15 reps - 1-2 sets - 5 seconds hold - 1x daily   Seated Overhead Press - 10-15 reps - 1-2 sets - 5 seconds hold - 1x daily   Seated Shoulder Horizontal Abduction with Resistance - 10-15 reps - 1-2 sets - 5 seconds hold - 1x daily   Seated Trunk Rotation - 10-15 reps - 1-2 sets - 5 seconds hold - 1x daily          Assessment     Patient  demonstrates improved mobility with gait less crouched and good core control during therapeutic exercise today. Continues to tire quickly with low resistance, but progressing ability to perform standing tasks again. Recommend transition to partial independent exercise via the PEAK program and continuing physical therapy services to address gait and balance.    Patient will benefit from continued skilled physical therapy services to address aforementioned impairments and return patient to previous level of functioning.    G Codes and Modifier taken from patient completing the Burrows and dynamic gait index: 6/7/17 (Discharged balance gcodes 6/19/2017)     Goal Primary G Code and Modifier:    The Patient's G Code Goal would be: Mobility    The Patient's Impairment, Limitation or Restriction Modifier goal would be best described as: CJ - 20% - 40% Impairment.      Discharge Primary G Code and Modifier:      The Patient's status upon Discharge is Mobility    The Patient's Impairment, Limitation or Restriction Modifier would be best described as CK - 40% - 60% Impairment.     Cervical codes updated 8/16/2017:    Current Primary G Code and Modifier:    Per the Patient's intake and/or assessment the Primary G Code is: Handling Objects .   The Patient's Impairment, Limitation or Restriction Modifier would be best described as: CI - 1% - 20% Impairment.     Goal Primary G Code and Modifier:    The Patient's G Code Goal would be: Handling Objects    The Patient's Impairment, Limitation or Restriction Modifier goal would be best described as: CI - 1% - 20% Impairment.      Goals:  Patient will be assessed for appropriate mobility device to promote maintaining his safety with mobility within one visit. Goal met 6/7/17      Short Term Goals: To be met in 4 weeks:   Following physical therapy intervention, the patient will be able to:   1. Patient will improve standing posture to minimize crouched gait pattern.  In progress 9/19/2017   2. Patient will be independent in home exercise program for progression of strength and balance abilities. In progress 9/19/2017                                        3.   Patient will decrease falls to no more than 3 per week. Not met 9/19/2017       Long Term Goals: To be met in 8 weeks:   Following physical therapy intervention, the patient will be able to:   1. Increase MMT of hip flexors to 3+/5 so that pt able to lift leg independently into car. Not progressing.  2.   3.     New goals added 6/19/2017:      1.   Patient will have improved cervical range of motion by at least 5 degrees with minimal to no referred pain. 40% met 9/19/2017      2.   Patient will have less than 3/10 pain with normal activities of daily living. In progress 9/19/2017      3.   Patient will have improved postural endurance for ability to complete normal daily work routine including reaching to shoulder height without increase in symptoms. In progress 8/16/2017           5.   Patient will improve mCTSIB scores by 5 seconds for improved endurance for independent stance and improved stability. (New goal added 9/19/2017)     Plan     Treatment Plan / Targeted Outcomes:     Frequency:   16 additional visits      Duration of Treatment: 8 weeks     Plan of Care Due Date: Medicare/MA Re-Cert Due:  11/14/2017     Plan for next visit:  Gait and balance training, home exercise program development, transfers, independent mobility and strengthening. Cervical side gliding and mobilization as appropriate. Postural strengthening.        Student or PTA has been instructed in and demonstrates skills necessary to carry out above stated treatment plan: Yes     Thank you for your referral to [facility].  Please call with any questions, concerns or comments.  [phone]     Kristy Early, PT, DPT

## 2017-12-27 NOTE — PROGRESS NOTES
Patient Information     Patient Name MRN Sex     Jamir Gill [id number] Male 1955      Progress Notes by Mikala Grace NP at 2017  1:30 PM     Author:  Mikala Grace NP Service:  (none) Author Type:  PHYS- Nurse Practitioner     Filed:  2017  4:18 PM Encounter Date:  2017 Status:  Signed     :  Mikala Grace NP (PHYS- Nurse Practitioner)            HPI:    Jamir Gill is a 62 y.o. male who presents to clinic today for chest concern.    Feeling of rattling in lower lungs, started last night.  States this is a new sensation.  Still present today but is intermittent.  Denies any cough, no chest tightness or shortness of breath.  No pain with deep breathing.  Occasionally breathing harder after walking.  No fevers.  No nasal congestion.  Chronic fatigue, at baseline.  Difficulty swallowing at times from the MS.  Occasional choking on foods and liquids.  Globus sensation.  Currently on Cipro for UTI, last dose is tonight, requesting a repeat urine today to ensure the UTI has cleared up, states unsure if symptoms have improved or not as he has loss of sensation in the bladder and self caths.  No noted blood in urine.  No fevers or chills.  No nausea or vomiting.  Normal appetite, no change.  No diarrhea.  Struggles with constipation, take fiber pills daily.  Also takes Flomax daily.  No abdominal pain.  No back pain.  History of UTIs.   Has MS.          Past Medical History:     Diagnosis  Date     BPH (benign prostatic hypertrophy) with urinary obstruction      Cervical disc disorder      Cognitive decline      Hypertension      Multiple sclerosis (HC)      Osteoarthritis cervical spine      Osteopenia      Past Surgical History:      Procedure  Laterality Date     APPENDECTOMY      appendectomy       COLONOSCOPY      [place]- colitis       COLONOSCOPY SCREENING      with polyps resected       FRACTURE TREATMENT      ORIF left wrist fracture~Laser prostate  surgery--BPH~Colonoscopy 2010 with polyps resected       PROSTATECTOMY      Laser prostate surgery -- BPH       Social History     Substance Use Topics       Smoking status: Never Smoker     Smokeless tobacco: Never Used     Alcohol use No     Current Outpatient Prescriptions       Medication  Sig Dispense Refill     aspirin 81 mg tablet Take 81 mg by mouth once daily with a meal.         baclofen (LIORESAL) 10 mg tablet 1 am and noon, 2 pm  0     buPROPion (WELLBUTRIN SR) 200 mg Sustained-Release tablet 1 tablet 2 times daily. 180 tablet 3     cholecalciferol (VITAMIN D) 1,000 unit capsule Take 1 capsule by mouth 2 times daily.  0     ciprofloxacin HCl (CIPRO) 250 mg tablet Take 1 tablet by mouth 2 times daily for 10 days. 20 tablet 0     meclizine (ANTIVERT) 25 mg tablet Take 25 mg by mouth 2 times daily.         nortriptyline (PAMELOR) 75 mg capsule Take 150 mg by mouth once daily.  1     tamsulosin (FLOMAX) 0.4 mg capsule Take 2 capsules by mouth once daily after a meal. 2 tabs daily  0     No current facility-administered medications for this visit.      Medications have been reviewed by me and are current to the best of my knowledge and ability.    No Known Allergies    Past medical history, past surgical history, current medications and allergies reviewed and accurate to the best of my knowledge.        ROS:  Refer to HPI    /90  Pulse 88  Temp 97.1  F (36.2  C) (Tympanic)     EXAM:  General Appearance: Well appearing adult male, appropriate appearance for age. No acute distress  Head: normocephalic, atraumatic  Eyes: conjunctivae normal, no drainage  Orophayrnx: moist mucous membranes, posterior pharynx without erythema, tonsils without hypertrophy, no erythema, no exudates or petechiae, no post nasal drip seen.    Neck: supple without adenopathy  Respiratory: normal chest wall and respirations.  Normal effort.  Clear to auscultation bilaterally, no wheezing, crackles or rhonchi.  No increased work  of breathing.  No cough appreciated.  Cardiac: RRR with no murmurs  Musculoskeletal:  Ambulates well with use of walker.     Psychological: normal affect, alert and pleasant      Labs:  Results for orders placed or performed in visit on 09/04/17      URINALYSIS W REFLEX MICROSCOPIC IF POSITIVE      Result  Value Ref Range    COLOR                     Yellow Yellow Color    CLARITY                   Clear Clear Clarity    SPECIFIC GRAVITY,URINE    1.020 1.010, 1.015, 1.020, 1.025                    PH,URINE                  7.0 6.0, 7.0, 8.0, 5.5, 6.5, 7.5, 8.5                    UROBILINOGEN,QUALITATIVE  Normal Normal EU/dl    PROTEIN, URINE Negative Negative mg/dL    GLUCOSE, URINE Negative Negative mg/dL    KETONES,URINE             Negative Negative mg/dL    BILIRUBIN,URINE           Negative Negative                    OCCULT BLOOD,URINE        Negative Negative                    NITRITE                   Positive (A) Negative                    LEUKOCYTE ESTERASE        Negative Negative                   URINALYSIS MICROSCOPIC      Result  Value Ref Range    RBC None Seen 0-2, None Seen /HPF    WBC None Seen 0-2, 3-5, None Seen /HPF    BACTERIA                  Moderate (A) None Seen, Rare, Occasional, Few Bacteria/HPF    EPITHELIAL CELLS          None Seen None Seen, Few Epi/HPF         Xray:  Exam:XR CHEST 2 VIEWS PA AND LATERAL  History:  Aspiration pneumonia   Comparisons: 09/16/2009  Technique:   2 views   Findings: Is elevation right hemidiaphragm. This represents a change from 2009. There is some linear opacities at the right lung base represent atelectasis versus scarring the left lung is clear the heart is normal in size costophrenic angles are sharp  Impression: Elevated right hemidiaphragm with right basilar atelectasis or scarring  Electronically Signed By: Arnulfo Dong M.D. on 9/4/2017 3:02 PM      ASSESSMENT/PLAN:    ICD-10-CM    1. At risk for aspiration Z91.89 XR CHEST 2 VIEWS PA AND  LATERAL      AMB CONSULT TO SPEECH LANGUAGE PATHOLOGY   2. History of recurrent UTIs Z87.440 URINALYSIS W REFLEX MICROSCOPIC IF POSITIVE      URINALYSIS W REFLEX MICROSCOPIC IF POSITIVE      URINALYSIS MICROSCOPIC      URINALYSIS MICROSCOPIC      URINE CULTURE      nitrofurantoin macrocrystals/monohydrate (MACROBID) 100 mg capsule       Urinalysis - positive nitrite, moderate bacteria  Urine culture pending  Macrobid 100 mg BID x 7 days (currently on Cipro, instructed to discontinue)  Continue to straight cath as needed   Follow up if symptoms persist, worsen, or concerns    CXR completed and reviewed, slight patchiness in right lower lobe, right diaphragm higher, radiologist over read: Elevated right hemidiaphragm with right basilar atelectasis or scarring  Patient does not have any respiratory symptoms or fevers at this time so will not treat with antibiotics at this time   Referral to speech therapy for swallow eval due to aspiration risk   Follow up with PCP for further evaluation of abnormal CXR      Patient requests his visit and labs from 8/25/17 and today be sent to his neurologist.  Patient reports his neurologist - [facility] - Dr. Dee Gibbs  [address] [place] [address]      Patient Instructions   Macrobid twice daily x 7 days     Urine culture pending - will call if change in antibiotics is indicated    Will call with results of chest xray    Referral to speech therapy      Follow up with primary provider as needed

## 2017-12-27 NOTE — PROGRESS NOTES
Patient Information     Patient Name MRN Sex Jamir Cooley [id number] Male 1955      Progress Notes by Kristy Early PT at 2017  9:47 AM     Author:  Kristy Early PT  Service:  (none) Author Type:  PT- Physical Therapist     Filed:  2017  5:03 PM  Date of Service:  2017  9:47 AM Status:  Addendum     :  Kristy Early PT (PT- Physical Therapist)        Related Notes: Original Note by Kristy Early PT (PT- Physical Therapist) filed at 2017  3:51 PM            [facility]  Outpatient PT - Daily note        Date of Service: 2017    Visit #: 6     Patient Name: Jamir Gill   YOB: 1955   Referring MD/Provider: Dr. Dee Hook  Diagnosis: Multiple sclerosis, bilateral leg weakness   Treatment Diagnosis: MS, impaired mobility, bilateral leg weakness, impaired posture, impaired balance and endurance   Insurance:  Preferred One  Start of Care Date:  2017   Certification Dates: From: 17  Re-Cert Due: Medicare/[place] Re-Cert Due: 17    Visit count, certification dates and goals addended 2017   Subjective       Patient reports 1 fall this morning.     Objective  Today's Intervention:  all performed to muscle fatigue:  Nustep level 7, seat 10, arms 10 x3 mins    Toe taps to round side BOSU,Held to tolerance  Step up and over laterally foam square    Seated hamstring curls red left yellow right lower extremity to fatigue  LAQ with assist right lower extremity     Triceps extension 2#  TRX rows    Standing hip abduction, light use of rail. Improved right knee extension noted today  A/p balance on flat surface 1/2 foam with min to mod use of railing for balance. Encouraged hip strategy.    Lumbar extension 62# to fatigue  Abduction 30# to fatigue  Leg press 70# seat 22 to fatigue    Deferred:  TRX squats 2x10. Cues for nose over toes, bending at waist.  Standing hamstring curls no weight with TRX strap support 2 x10  each  Home Exercise Program: *= added to HEP.    Access Code: [email or web address] URL: [email or web address] Date: 05/16/2017 Prepared by: Kristy Early   Exercises   Hooklying Clamshell with Resistance - 10-15 reps - 1-2 sets - 5 hold - 1x daily   Seated Hamstring Stretch - 1-2 sets - 30 second hold - 1x daily   Romberg Stance - 30 second hold - 2-3 sets - 1x daily   Romberg Stance with Eyes Closed - 2-3 sets - 20-30 second hold - 1x daily   Romberg Stance with Head Rotation - 2-3 sets - 30 second hold - 1x daily   Standing Romberg to 3/4 Tandem Stance - 2-3 sets - 20-30 second hold - 1x daily   Scapular Retraction with Resistance Advanced - 10-15 reps - 1-2 sets - 5 hold - 1x daily        Assessment     Patient demonstrates difficulty with utilizing hip and ankle strategies due to coordination impairments caused by MS.     G Codes and Modifier taken from patient completing the Burrows and dynamic gait index: 6/7/17   Current Primary G Code and Modifier:                         Per the Patient's intake and/or assessment the Primary G Code is: Mobility .                        The Patient's Impairment, Limitation or Restriction Modifier would be best described as: CK - 40% - 60% Impairment.   Goal Primary G Code and Modifier:                         The Patient's G Code Goal would be: Mobility                         The Patient's Impairment, Limitation or Restriction Modifier goal would be best described as: CJ - 20% - 40% Impairment.                      Goals:  Patient will be assessed for appropriate mobility device to promote maintaining his safety with mobility within one visit. Goal met 6/7/17      Short Term Goals: To be met in 4 weeks:   Following physical therapy intervention, the patient will be able to:   1. Patient will improve standing posture to minimize crouched gait pattern. In progress 6/7/17   2. Patient will be independent in home exercise program for progression of strength and balance  abilities.                                                3.   Patient will decrease falls to no more than 3 per week. In progress 6/7/17       Long Term Goals: To be met in 8 weeks:   Following physical therapy intervention, the patient will be able to:   1. Increase MMT of hip flexors to 3+/5 so that pt able to lift leg independently into car.   2. Patient will improve dynamic gait index score by 5 points to decrease fall risk.        Plan     Treatment Plan / Targeted Outcomes:     Frequency:   16 visits     Duration of Treatment: 8 weeks     Plan of Care Due Date: Medicare/[place] Re-Cert Due:  08/02/17     Plan for next visit:  Gait and balance training, home exercise program development, floor transfer training and strengthening.        Student or PTA has been instructed in and demonstrates skills necessary to carry out above stated treatment plan: Yes     Thank you for your referral to [facility].  Please call with any questions, concerns or comments.  [phone]     Kristy Early, PT, DPT

## 2017-12-27 NOTE — PROGRESS NOTES
Patient Information     Patient Name MRN Sex Jamir Cooley [id number] Male 1955      Progress Notes by Naima Pope at 9/15/2017 12:32 PM     Author:  Naima Pope Service:  (none) Author Type:  Other Clinical Staff     Filed:  9/15/2017 12:32 PM Date of Service:  9/15/2017 12:32 PM Status:  Signed     :  Naima Pope (Other Clinical Staff)            Falls Risk Criteria:    Age 65 and older or under age 4        Sensory deficits    Poor vision    Use of ambulatory aides    Impaired judgment    Unable to walk independently    Meets High Risk criteria for falls:  Yes             1.  Do you have dizziness or vertigo?    no                    2.  Do you need help standing or walking?   yes                 3.  Have you fallen within the last 6 months?    no           4.  Has the patient been fasting?      no       If any risks are marked Yes, the following interventions are utilized:    Do not leave patient unattended     Assist patient in the dressing room and bathroom    Have ambulatory aides available throughout procedure    Involve patient s family if available

## 2017-12-27 NOTE — PROGRESS NOTES
Patient Information     Patient Name MRN Sex Jamir Cooley [id number] Male 1955      Progress Notes by Kristy Early PT at 2017 10:05 AM     Author:  Kristy Early PT Service:  (none) Author Type:  PT- Physical Therapist     Filed:  2017  3:05 PM Date of Service:  2017 10:05 AM Status:  Signed     :  Kristy Early PT (PT- Physical Therapist)            [facility]  Outpatient PT - Daily note        Date of Service: 2017    Visit #: 7     Patient Name: Jamir Gill   YOB: 1955   Referring MD/Provider: Dr. Dee Hook  Diagnosis: Multiple sclerosis, bilateral leg weakness   Treatment Diagnosis: MS, impaired mobility, bilateral leg weakness, impaired posture, impaired balance and endurance   Insurance:  [employer] One  Start of Care Date:  2017   Certification Dates: From: 17  Re-Cert Due: Medicare/MA Re-Cert Due: 17    Visit count, certification dates and goals addended 2017   Subjective       Patient reports neck issues have come back. right sided referred pain which had been fixed with prior bout of physical therapy about 4 years ago. Trying to contact his doctor to get a referral. States traction really helped take care of the pain.    Objective  Today's Intervention:  all performed to muscle fatigue:  Nustep level 6, seat 11, arms 10 x5 mins    Toe taps to round side BOSU,Held to tolerance    TRX squats 2x10. Cues for nose over toes, bending at waist.  Standing hamstring curls no weight with TRX strap support 2 x10 each  Snow angels 2# weights  Seated hamstring curls red left yellow right lower extremity to fatigue    Triceps extension 2# overhead x15 bilateral   Triceps press 60# x15    Standing hip abduction, light use of rail. Improved right knee extension noted today  A/p balance on flat surface 1/2 foam with min to mod use of railing for balance. Encouraged hip strategy.    Side stepping figure 8s around  4 cones    Lumbar extension 62# to fatigue  Abduction 30# to fatigue    Deferred:  Leg press 70# seat 22 to fatigue  LAQ with assist right lower extremity  Home Exercise Program: *= added to HEP.    Access Code: [email or web address] URL: [email or web address] Date: 05/16/2017 Prepared by: Kristy Early   Exercises   Hooklying Clamshell with Resistance - 10-15 reps - 1-2 sets - 5 hold - 1x daily   Seated Hamstring Stretch - 1-2 sets - 30 second hold - 1x daily   Romberg Stance - 30 second hold - 2-3 sets - 1x daily   Romberg Stance with Eyes Closed - 2-3 sets - 20-30 second hold - 1x daily   Romberg Stance with Head Rotation - 2-3 sets - 30 second hold - 1x daily   Standing Romberg to 3/4 Tandem Stance - 2-3 sets - 20-30 second hold - 1x daily   Scapular Retraction with Resistance Advanced - 10-15 reps - 1-2 sets - 5 hold - 1x daily        Assessment     Patient demonstrates improved knee extension during single leg or alternating lower extremity activities today.     G Codes and Modifier taken from patient completing the Burrows and dynamic gait index: 6/7/17   Current Primary G Code and Modifier:                         Per the Patient's intake and/or assessment the Primary G Code is: Mobility .                        The Patient's Impairment, Limitation or Restriction Modifier would be best described as: CK - 40% - 60% Impairment.   Goal Primary G Code and Modifier:                         The Patient's G Code Goal would be: Mobility                         The Patient's Impairment, Limitation or Restriction Modifier goal would be best described as: CJ - 20% - 40% Impairment.                      Goals:  Patient will be assessed for appropriate mobility device to promote maintaining his safety with mobility within one visit. Goal met 6/7/17      Short Term Goals: To be met in 4 weeks:   Following physical therapy intervention, the patient will be able to:   1. Patient will improve standing posture to  minimize crouched gait pattern. In progress 6/7/17   2. Patient will be independent in home exercise program for progression of strength and balance abilities.                                                3.   Patient will decrease falls to no more than 3 per week. In progress 6/7/17       Long Term Goals: To be met in 8 weeks:   Following physical therapy intervention, the patient will be able to:   1. Increase MMT of hip flexors to 3+/5 so that pt able to lift leg independently into car.   2. Patient will improve dynamic gait index score by 5 points to decrease fall risk.        Plan     Treatment Plan / Targeted Outcomes:     Frequency:   16 visits     Duration of Treatment: 8 weeks     Plan of Care Due Date: Medicare/[place] Re-Cert Due:  08/02/17     Plan for next visit:  Gait and balance training, home exercise program development, floor transfer training and strengthening.        Student or PTA has been instructed in and demonstrates skills necessary to carry out above stated treatment plan: Yes     Thank you for your referral to [facility].  Please call with any questions, concerns or comments.  [phone]     Kristy Early, PT, DPT

## 2017-12-27 NOTE — PROGRESS NOTES
Patient Information     Patient Name MRN Sex Jamir Cooley [id number] Male 1955      Progress Notes by John Polk MD at 2017  8:00 AM     Author:  John Polk MD Service:  (none) Author Type:  Physician     Filed:  2017  8:55 AM Encounter Date:  2017 Status:  Signed     :  John Polk MD (Physician)            Nursing Notes:   Antonella Julien  2017  8:23 AM  Signed  Patient presents to the clinic to talk about his medications.  He doesn't think the antidepressant is working.    He was a little dizzy walking back to the room, his BP was low when he first sat down but came back up a little after sitting for several minutes.    Antonella Julien LPN....................2017 8:17 AM    Jamir Gill is a 62 y.o. male who presents for   Chief Complaint     Patient presents with       Medication Management      Talk about changing meds     Blood Pressure      Low BP     HPI: Mr. Gill comes in for follow up depression; fluoxetine was no help. He stopped it. His neurologist suggested possibly using welbutrin as it will not interact as much with nortriptyline he takes for leg pain. He would like to try it. His BP is low today and this has happened before when on med and in summertime. His BP has been <100 systolic now and he gets very lightheaded and itn the past has been syncopal. He is encouraged to monitor BP daily. His weight is down 12 # in past month.   Past Medical History:     Diagnosis  Date     BPH (benign prostatic hypertrophy) with urinary obstruction      Cervical disc disorder      Cognitive decline      Hypertension      Multiple sclerosis (HC)      Osteoarthritis cervical spine      Osteopenia      Past Surgical History:      Procedure  Laterality Date     APPENDECTOMY      appendectomy       COLONOSCOPY      [place]- colitis       COLONOSCOPY SCREENING      with polyps resected       FRACTURE TREATMENT      ORIF left wrist  "fracture~Laser prostate surgery--BPH~Colonoscopy  with polyps resected       PROSTATECTOMY      Laser prostate surgery -- BPH       Family History       Problem   Relation Age of Onset     Cancer-prostate  Father      Other  Father      Alzheimer's/kidney failure       Other  Mother       with Parkinson's       Heart Disease  Mother      CHF for carditis       Good Health  Brother      Heart Disease  Brother      ASCAD with MI       Current Outpatient Prescriptions       Medication  Sig Dispense Refill     aspirin 81 mg tablet Take 81 mg by mouth once daily with a meal.         baclofen (LIORESAL) 10 mg tablet 1 am and noon, 2 pm  0     CALCIUM CARBONATE/VITAMIN D3 (CALCIUM 500 + D ORAL) Take 1 tablet by mouth 2 times daily.         cholecalciferol (VITAMIN D) 1,000 unit capsule Take 1 capsule by mouth 2 times daily.  0     Cranberry 400 mg capsule Take  by mouth.  0     FLUoxetine (SARAFEM) 20 mg tablet Take 1 tablet by mouth every morning. Take 1/2 tablet each krishna for 8 days- then 1 each day 30 tablet 6     fluticasone (50 mcg per actuation) nasal solution (FLONASE) USE 2 SPRAYS IN EACH NOSTRIL ONCE DAILY 1 Bottle 0     lisinopril (PRINIVIL; ZESTRIL) 10 mg tablet Take 10 mg by mouth once daily.       meclizine (ANTIVERT) 25 mg tablet Take 25 mg by mouth 2 times daily.         nortriptyline (PAMELOR) 75 mg capsule Take 150 mg by mouth once daily.  1     tamsulosin (FLOMAX) 0.4 mg capsule Take 2 capsules by mouth once daily after a meal. 2 tabs daily  0     No current facility-administered medications for this visit.      Medications have been reviewed by me and are current to the best of my knowledge and ability.    No Known Allergies      EXAM:   Vitals:      17 0817 17 0819   BP: (!) 88/52 102/70   Pulse:  96   Weight: 78.1 kg (172 lb 3.2 oz)    Height: 1.73 m (5' 8.11\")      General Appearance: Pleasant, alert, appropriate appearance for age. No acute distress  Chest/Respiratory Exam: Normal " chest wall and respirations. Clear to auscultation.  Cardiovascular Exam: Regular rate and rhythm. S1, S2, no murmur, click, gallop, or rubs.  ASSESSMENT AND PLAN:  1. Hypertension  Will stop lisinopril and have him monitor BP    2. Hypotension, unspecified hypotension type  3 depression start welbutrin  4 DJD/ spinal MRs looked at as these had been done relative to his MS- looking for plaques; 20 minute discussion of over all health related to this

## 2017-12-27 NOTE — PROGRESS NOTES
"Patient Information     Patient Name MRN Sex Jamir Cooley [id number] Male 1955      Progress Notes by Kristy Early PT at 2017  9:03 AM     Author:  Kristy Early PT Service:  (none) Author Type:  PT- Physical Therapist     Filed:  2017 12:58 PM Date of Service:  2017  9:03 AM Status:  Signed     :  Kristy Early PT (PT- Physical Therapist)            [facility]  Outpatient PT - Daily Note        Date of Service: 2017   Visit #: 23 (5 of 10)     Patient Name: Jamir Gill   YOB: 1955   Referring MD/Provider: Dr. Dee Hook, Dr. John Polk  Diagnosis: Multiple sclerosis, bilateral leg weakness, osteoarthritis of cervical region  Treatment Diagnosis: MS, impaired mobility, bilateral leg weakness, impaired posture, impaired balance and endurance; impaired cervical mobility  Insurance:  Preferred One  Start of Care Date:  2017   Certification Dates: From: 2017   Re-Cert Due: Medicare/MA Re-Cert Due: 17  Subjective    Patient reports the left knee is still healing from those falls last week. Denies getting too tired out after physical therapy sessions. \"Doesn't matter either way.\"      Objective  Patient using 4WW today.     Today's Intervention:  all performed to muscle fatigue:    Neuromuscular re-ed and therapeutic exercise:    Nustep level 6, seat 11, arms 10 x 5 minutes   Moving cones from knee height to counter on opposite side, feet on foam partial romberg    Med x:   Chest press 20# seat 3, 3 holes showing   Rows 50# chest 1  Lumbar extension 40# to fatigue 3x10 reps feet 9, knees 11    A/p balance on round surface 1/2 foam with min to mod use of railing for balance. Encouraged hip strategy. Min A by PT with gait belt x30 sec  Standing hip abduction, light to mod use of rail B x15 - more challenged to keep weight on R and lift L  Static balance 1 foot on 4\" step, held to tolerance bilateral   Foam: cone toe " taps to fatigue  Partial romberg with passing 2.2 # ball over shoulder    Deferred:  Step forward and retro on green peds wedge x2; lateral step up to the right x1  Lat pull 60# seat 3  Triceps 60# seat 4  Standing hamstring curls no weight with rail support  x10 R, x20 L  Seated and standing heel/toe raises x15 double leg   Sustained hold 1 foot on BOSU held to tolerance. Added head turns with right lower extremity up to increase challenge  Leg press 100# (seat 22 and pillow behind head) to fatigue 3x10  Snow tez overhead press 2# weights, seated 15-20 reps  Seated bent forward Y's 2# weight to fatigue  Hip rotation B each x5 IR/ER with knee hip and knee flexed at 90 deg and other leg on round boslter  LAQ B L x30 I, Rx20 with assist right lower extremity just holding upper leg up so he can freely swing lower leg  Side stepping figure 8s around 4 cones x2 each direction. modA x2 today with large LOB but improved coordination and upright stance, less use of single point cane for support  Abduction 30# to fatigue  Seated hamstring curls red left yellow right lower extremity to fatigue  Triceps extension 2# overhead x15 bilateral   Triceps press 60# x15   TRX squats 2x10. Cues for nose over toes, bending at waist.    Home Exercise Program: *= added to HEP.    Access Code: [email or web address] URL: [email or web address] Date: 05/16/2017 Prepared by: Kristy Early   Exercises   Hooklying Clamshell with Resistance - 10-15 reps - 1-2 sets - 5 hold - 1x daily   Seated Hamstring Stretch - 1-2 sets - 30 second hold - 1x daily   Romberg Stance - 30 second hold - 2-3 sets - 1x daily   Romberg Stance with Eyes Closed - 2-3 sets - 20-30 second hold - 1x daily   Romberg Stance with Head Rotation - 2-3 sets - 30 second hold - 1x daily   Standing Romberg to 3/4 Tandem Stance - 2-3 sets - 20-30 second hold - 1x daily   Scapular Retraction with Resistance Advanced - 10-15 reps - 1-2 sets - 5 hold - 1x daily     Upper trapezius,  scalene and levator stretches x30 sec each. bilateral added 6/19/2017        Assessment     Patient demonstrates progressive early fatigue in lower extremities with more frequent rest breaks needed. Able to progress upper extremity strengthening to include med x machines.    Patient will benefit from continued skilled physical therapy services to address aforementioned impairments and return patient to previous level of functioning.    G Codes and Modifier taken from patient completing the Burrows and dynamic gait index: 6/7/17 (Discharged balance gcodes 6/19/2017)     Goal Primary G Code and Modifier:    The Patient's G Code Goal would be: Mobility    The Patient's Impairment, Limitation or Restriction Modifier goal would be best described as: CJ - 20% - 40% Impairment.      Discharge Primary G Code and Modifier:      The Patient's status upon Discharge is Mobility    The Patient's Impairment, Limitation or Restriction Modifier would be best described as CK - 40% - 60% Impairment.     Cervical codes updated 7/20/2017:    Current Primary G Code and Modifier:    Per the Patient's intake and/or assessment the Primary G Code is: Handling Objects .   The Patient's Impairment, Limitation or Restriction Modifier would be best described as: CI - 1% - 20% Impairment.     Goal Primary G Code and Modifier:    The Patient's G Code Goal would be: Handling Objects    The Patient's Impairment, Limitation or Restriction Modifier goal would be best described as: CI - 1% - 20% Impairment.      Goals:  Patient will be assessed for appropriate mobility device to promote maintaining his safety with mobility within one visit. Goal met 6/7/17      Short Term Goals: To be met in 4 weeks:   Following physical therapy intervention, the patient will be able to:   1. Patient will improve standing posture to minimize crouched gait pattern. In progress 6/7/17   2. Patient will be independent in home exercise program for  progression of strength and balance abilities. In progress 7/20/2017                                                 3.   Patient will decrease falls to no more than 3 per week. In progress 6/7/17       Long Term Goals: To be met in 8 weeks:   Following physical therapy intervention, the patient will be able to:   1. Increase MMT of hip flexors to 3+/5 so that pt able to lift leg independently into car. Not progressing.  2. Patient will improve dynamic gait index score by 5 points to decrease fall risk. Not progressing    New goals added 6/19/2017:      1.   Patient will have improved cervical range of motion by at least 5 degrees with minimal to no referred pain. 80% met     2.   Patient will have less than 3/10 pain with normal activities of daily living. Met 7/20/2017      3.   Patient will have improved postural endurance for ability to complete normal daily work routine including reaching to shoulder height without increase in symptoms. 50% met     4.   Patient will obtain home cervical traction unit for self management of symptoms. - decided to hold off on obtaining unit at this time.        Plan     Treatment Plan / Targeted Outcomes:     Frequency:   16 visits      Duration of Treatment: 8 weeks     Plan of Care Due Date: Medicare/MA Re-Cert Due:  08/14/17     Plan for next visit:  Gait and balance training, home exercise program development, floor transfer training and strengthening.        Student or PTA has been instructed in and demonstrates skills necessary to carry out above stated treatment plan: Yes     Thank you for your referral to [facility].  Please call with any questions, concerns or comments.  [phone]     Kristy Early, PT, DPT

## 2017-12-27 NOTE — PROGRESS NOTES
Patient Information     Patient Name MRN Sex Jamir Cooley [id number] Male 1955      Progress Notes by Kristy Early PT at 2017  8:34 AM     Author:  Kristy Early PT  Service:  (none) Author Type:  PT- Physical Therapist     Filed:  2017 12:01 PM  Date of Service:  2017  8:34 AM Status:  Addendum     :  Kristy Early PT (PT- Physical Therapist)        Related Notes: Original Note by Kristy Early PT (PT- Physical Therapist) filed at 2017 11:54 AM            [facility]  Outpatient PT - Progress note        Date of Service: 2017    Visit #: 18 (9 of 10)     Patient Name: Jamir Gill   YOB: 1955   Referring MD/Provider: Dr. Dee Hook, Dr. John Polk  Diagnosis: Multiple sclerosis, bilateral leg weakness, osteoarthritis of cervical region  Treatment Diagnosis: MS, impaired mobility, bilateral leg weakness, impaired posture, impaired balance and endurance; impaired cervical mobility  Insurance:  Preferred One  Start of Care Date:  2017   Certification Dates: From: 2017   Re-Cert Due: Medicare/MA Re-Cert Due: 17  Subjective    Another bad day today. Patient reports significant difficulty getting into the truck. Had a mild return of right shoulder symptoms last night.    Objective  Patient using 4WW today.    Today's Intervention:  all performed to muscle fatigue:  Neuromuscular re-ed and therapeutic exercise:  Lumbar extension 40# to fatigue 3x10 reps feet 9, knees 11  Nustep level 6, seat 11, arms 10 x 5 minutes     A/p balance on round surface 1/2 foam with min to mod use of railing for balance. Encouraged hip strategy.  Partial romberg with passing 2.2 # ball over shoulder  Snow tez overhead press 2# weights, seated  Seated bent forward Y's 2# weight to fatigue  Seated scapular sets green theraband x15. Mod to max cues for form    Manual therapy:  Cervical side gliding left to right x3 mins  Soft  tissue mobility to upper trap and scalene and brachial plexus release x3 mins  muscle energy technique ERS C3 and 5 left, supine  muscle energy technique 1st rib right  left side lying levator muscle energy technique on right, reach and roll x10. Cues for scapular retraction    Deferred:  Standing hamstring curls no weight with TRX strap support 2 x10 each  Sustained hold 1 foot on BOSU held to tolerance. Added head turns with right lower extremity up to increase challenge  Toe taps to round side BOSU x20 alternating, CGA, mod use of 1 hand on rail  Abduction 30# to fatigue  LAQ with assist right lower extremity  Seated hamstring curls red left yellow right lower extremity to fatigue  Triceps extension 2# overhead x15 bilateral   Triceps press 60# x15   TRX squats 2x10. Cues for nose over toes, bending at waist.  Standing hip abduction, light to mod use of rail.  Leg press 100# (seat 22 and pillow behind head) to fatigue 3x10  Side stepping figure 8s around 4 cones x2 each direction. modA x2 today with large LOB but improved coordination and upright stance, less use of single point cane for support  Lateral step up and over airex foam, min to mod use of 1 hand on railing for support. x10 each way    Home Exercise Program: *= added to HEP.    Access Code: [email or web address] URL: [email or web address] Date: 05/16/2017 Prepared by: Kristy Early   Exercises   Hooklying Clamshell with Resistance - 10-15 reps - 1-2 sets - 5 hold - 1x daily   Seated Hamstring Stretch - 1-2 sets - 30 second hold - 1x daily   Romberg Stance - 30 second hold - 2-3 sets - 1x daily   Romberg Stance with Eyes Closed - 2-3 sets - 20-30 second hold - 1x daily   Romberg Stance with Head Rotation - 2-3 sets - 30 second hold - 1x daily   Standing Romberg to 3/4 Tandem Stance - 2-3 sets - 20-30 second hold - 1x daily   Scapular Retraction with Resistance Advanced - 10-15 reps - 1-2 sets - 5 hold - 1x daily     Upper trapezius, scalene and levator  stretches x30 sec each. bilateral added 6/19/2017        Assessment     Patient displaying increased lower extremity fatigue and lack of coordination today. Needed assistance to get into vehicle due to fatigue. Increased upper extremity muscular and joint restrictions due to more consistent use of walker with heavy upper extremity support needed recently. Cervical goals re-assessed at this time, but lacks progress on balance and lower extremity strength due to increase in MS symptoms.    Patient will benefit from continued skilled physical therapy services to address aforementioned impairments and return patient to previous level of functioning.    G Codes and Modifier taken from patient completing the Burrows and dynamic gait index: 6/7/17 (Discharged balance gcodes 6/19/2017)     Goal Primary G Code and Modifier:    The Patient's G Code Goal would be: Mobility    The Patient's Impairment, Limitation or Restriction Modifier goal would be best described as: CJ - 20% - 40% Impairment.      Discharge Primary G Code and Modifier:      The Patient's status upon Discharge is Mobility    The Patient's Impairment, Limitation or Restriction Modifier would be best described as CK - 40% - 60% Impairment.     Cervical codes updated 7/20/2017:    Current Primary G Code and Modifier:    Per the Patient's intake and/or assessment the Primary G Code is: Handling Objects .   The Patient's Impairment, Limitation or Restriction Modifier would be best described as: CI - 1% - 20% Impairment.     Goal Primary G Code and Modifier:    The Patient's G Code Goal would be: Handling Objects    The Patient's Impairment, Limitation or Restriction Modifier goal would be best described as: CI - 1% - 20% Impairment.      Goals:  Patient will be assessed for appropriate mobility device to promote maintaining his safety with mobility within one visit. Goal met 6/7/17      Short Term Goals: To be met in 4 weeks:   Following physical  therapy intervention, the patient will be able to:   1. Patient will improve standing posture to minimize crouched gait pattern. In progress 6/7/17   2. Patient will be independent in home exercise program for progression of strength and balance abilities. In progress 7/20/2017                                                 3.   Patient will decrease falls to no more than 3 per week. In progress 6/7/17       Long Term Goals: To be met in 8 weeks:   Following physical therapy intervention, the patient will be able to:   1. Increase MMT of hip flexors to 3+/5 so that pt able to lift leg independently into car. Not progressing.  2. Patient will improve dynamic gait index score by 5 points to decrease fall risk. Not progressing    New goals added 6/19/2017:      1.   Patient will have improved cervical range of motion by at least 5 degrees with minimal to no referred pain. 80% met     2.   Patient will have less than 3/10 pain with normal activities of daily living. Met 7/20/2017      3.   Patient will have improved postural endurance for ability to complete normal daily work routine including reaching to shoulder height without increase in symptoms. 50% met     4.   Patient will obtain home cervical traction unit for self management of symptoms. - decided to hold off on obtaining unit at this time.        Plan     Treatment Plan / Targeted Outcomes:     Frequency:   16 visits      Duration of Treatment: 8 weeks     Plan of Care Due Date: Medicare/[place] Re-Cert Due:  08/02/17     Plan for next visit:  Gait and balance training, home exercise program development, floor transfer training and strengthening.        Student or PTA has been instructed in and demonstrates skills necessary to carry out above stated treatment plan: Yes     Thank you for your referral to [facility].  Please call with any questions, concerns or comments.  [phone]     Kristy Early, PT, DPT

## 2017-12-27 NOTE — PROGRESS NOTES
"Patient Information     Patient Name MRN Sex Jamir Cooley [id number] Male 1955      Progress Notes by Kristy Early PT at 10/5/2017 10:33 AM     Author:  Kristy Early PT Service:  (none) Author Type:  PT- Physical Therapist     Filed:  10/5/2017 11:43 AM Date of Service:  10/5/2017 10:33 AM Status:  Signed     :  Kristy Early PT (PT- Physical Therapist)            [facility]  Outpatient PT - Daily Note        Date of Service: 10/5/2017    Visit #: 30 (6 of 10)     Patient Name: Jamir Gill   YOB: 1955   Referring MD/Provider: Dr. Dee Hook, Dr. John Polk  Diagnosis: Multiple sclerosis, bilateral leg weakness, osteoarthritis of cervical region  Treatment Diagnosis: MS, impaired mobility, bilateral leg weakness, impaired posture, impaired balance and endurance; impaired cervical mobility  Insurance:  Preferred One  Start of Care Date:  2017   Certification Dates: 2017    Re-Cert Due: Medicare/MA Re-Cert Due: 2017    Subjective    Patient reports getting into the truck was easier today and hasn't had any trouble with his neck since last visit.    Objective    Today's Intervention:  all exercises completed bilaterally and to fatigue  Nustep level 6 seat 11 arms 10 x5 mins. Less leaning to the left today.    Walk in [place] with front wheeled walker and contact guard assist 2x50 feet and 2 x20 feet. Gait became more crouched with fatigue.    Sit to stands from plinth with cues for sitting hips back and nose forward x15. Good eccentric lowering today    Seated hip abduction clams yellow theraband resistance *  Seated core training: rotation with bilateral upper extremities holding 6\" med ball; reaching forward/backward and then right and left while sitting on grey disc  Seated hamstring curls on slide board    Seated upper extremity overhead press 1# weights  Seated upper extremity triceps extension overhead 1# weights  Seated " scapular sets with yellow theraband resistance x2 sets    Med x leg press 80# seat 21, back upright with pillow behind head. Patient needed assistance to get legs up on pedal  Lumbar extension 20# 2 x10    Home Exercise Program: *= added to HEP.    Access Code: [email or web address] URL: [email or web address] Date: 05/16/2017 Prepared by: Kristy Early   Exercises   Hooklying Clamshell with Resistance - 10-15 reps - 1-2 sets - 5 hold - 1x daily   Seated Hamstring Stretch - 1-2 sets - 30 second hold - 1x daily   Romberg Stance - 30 second hold - 2-3 sets - 1x daily   Romberg Stance with Eyes Closed - 2-3 sets - 20-30 second hold - 1x daily   Romberg Stance with Head Rotation - 2-3 sets - 30 second hold - 1x daily   Standing Romberg to 3/4 Tandem Stance - 2-3 sets - 20-30 second hold - 1x daily   Scapular Retraction with Resistance Advanced - 10-15 reps - 1-2 sets - 5 hold - 1x daily     Upper trapezius, scalene and levator stretches x30 sec each. bilateral added 6/19/2017        Assessment     Patient demonstrates improved ability to self correct loss of balance when sitting unsupported today. Faster pace and improved steps during ambulation noted.    Patient will benefit from continued skilled physical therapy services to address aforementioned impairments and return patient to previous level of functioning.    G Codes and Modifier taken from patient completing the Burrows and dynamic gait index: 6/7/17 (Discharged balance gcodes 6/19/2017)     Goal Primary G Code and Modifier:    The Patient's G Code Goal would be: Mobility    The Patient's Impairment, Limitation or Restriction Modifier goal would be best described as: CJ - 20% - 40% Impairment.      Discharge Primary G Code and Modifier:      The Patient's status upon Discharge is Mobility    The Patient's Impairment, Limitation or Restriction Modifier would be best described as CK - 40% - 60% Impairment.     Cervical codes updated 8/16/2017:    Current  Primary G Code and Modifier:    Per the Patient's intake and/or assessment the Primary G Code is: Handling Objects .   The Patient's Impairment, Limitation or Restriction Modifier would be best described as: CI - 1% - 20% Impairment.     Goal Primary G Code and Modifier:    The Patient's G Code Goal would be: Handling Objects    The Patient's Impairment, Limitation or Restriction Modifier goal would be best described as: CI - 1% - 20% Impairment.      Goals:  Patient will be assessed for appropriate mobility device to promote maintaining his safety with mobility within one visit. Goal met 6/7/17      Short Term Goals: To be met in 4 weeks:   Following physical therapy intervention, the patient will be able to:   1. Patient will improve standing posture to minimize crouched gait pattern. In progress 9/19/2017   2. Patient will be independent in home exercise program for progression of strength and balance abilities. In progress 9/19/2017                                        3.   Patient will decrease falls to no more than 3 per week. Not met 9/19/2017       Long Term Goals: To be met in 8 weeks:   Following physical therapy intervention, the patient will be able to:   1. Increase MMT of hip flexors to 3+/5 so that pt able to lift leg independently into car. Not progressing.  2.   3.     New goals added 6/19/2017:      1.   Patient will have improved cervical range of motion by at least 5 degrees with minimal to no referred pain. 40% met 9/19/2017      2.   Patient will have less than 3/10 pain with normal activities of daily living. In progress 9/19/2017      3.   Patient will have improved postural endurance for ability to complete normal daily work routine including reaching to shoulder height without increase in symptoms. In progress 8/16/2017           5.   Patient will improve mCTSIB scores by 5 seconds for improved endurance for independent stance and improved stability. (New goal added  9/19/2017)     Plan     Treatment Plan / Targeted Outcomes:     Frequency:   16 additional visits      Duration of Treatment: 8 weeks     Plan of Care Due Date: Medicare/[place] Re-Cert Due:  11/14/2017     Plan for next visit:  Gait and balance training, home exercise program development, transfers, independent mobility and strengthening. Cervical side gliding and mobilization as appropriate. Postural strengthening.        Student or PTA has been instructed in and demonstrates skills necessary to carry out above stated treatment plan: Yes     Thank you for your referral to [facility].  Please call with any questions, concerns or comments.  [phone]     Kristy Early, PT, DPT

## 2017-12-27 NOTE — PROGRESS NOTES
Patient Information     Patient Name MRN Sex Jamir Cooley [id number] Male 1955      Progress Notes by Kristy Early PT at 2017 10:36 AM     Author:  Kristy Early PT Service:  (none) Author Type:  PT- Physical Therapist     Filed:  2017 12:57 PM Date of Service:  2017 10:36 AM Status:  Signed     :  Kristy Early PT (PT- Physical Therapist)            [facility]  Outpatient PT - Daily note        Date of Service: 2017     Visit # 39 (5 of 10)     Patient Name: Jamir Gill   YOB: 1955   Referring MD/Provider: Dr. Dee Hook, Dr. John Polk  Diagnosis: Multiple sclerosis, bilateral leg weakness, osteoarthritis of cervical region  Treatment Diagnosis: MS, impaired mobility, bilateral leg weakness, impaired posture, impaired balance and endurance; impaired cervical mobility  Insurance:  Preferred One  Start of Care Date:  2017   Certification Dates: 10/24/2017     Re-Cert Due: Medicare/MA Re-Cert Due: 2017    Subjective    Patient states he has had several good days in a row. Hasn't fallen that he can remember in the past 2 weeks. Using mostly his single point cane even when out in the community.    Objective    Patient presents ambulatory with his single point cane today.    Today's Intervention:  all exercises completed bilaterally and to fatigue.   Nustep level 6 seat seat 10 and arms 10 x7 mins    Seated LAQ with and without belt assist*  Seated thoracic extension black theraband resistance*    - Side stepping figure 8's around cones, no use of single point cane x1 each direction  - Sustained hold with one foot up on foam with head/body turns    1/2 foam roll:  - round surface - balancing   - flat surface - controlled rocking DF/PF with UEs as needed     Leg press 100# 3 x10 (seat back most upright, seat X22)  Lumbar extension 40# 1st set, 50# 2nd and 60# 3rd set 3 x10, knees 9, feet 11    Review of moderate  level home exercise program for transition to independence after Thanksgiving.    Deferred:  Step up on blue foam x 10 leading with each left and right, no hands, CGA  Side step up and over x8 each way, light touch on rail and CGA  Stance on Airex:  - EO, EC with manual perturbations x45-60 sec each, less pressure with EC  - hip hikes B x10 with hand on rail light touch  - alternating toe taps onto Airex     Home Exercise Program: *= added to HEP.    Access Code: [email or web address] URL: [email or web address] Date: 05/16/2017 Prepared by: Kristy Early   Exercises   Hooklying Clamshell with Resistance - 10-15 reps - 1-2 sets - 5 hold - 1x daily   Seated Hamstring Stretch - 1-2 sets - 30 second hold - 1x daily   Romberg Stance - 30 second hold - 2-3 sets - 1x daily   Romberg Stance with Eyes Closed - 2-3 sets - 20-30 second hold - 1x daily   Romberg Stance with Head Rotation - 2-3 sets - 30 second hold - 1x daily   Standing Romberg to 3/4 Tandem Stance - 2-3 sets - 20-30 second hold - 1x daily   Scapular Retraction with Resistance Advanced - 10-15 reps - 1-2 sets - 5 hold - 1x daily   Upper trapezius, scalene and levator stretches x30 sec each. bilateral added 6/19/2017   Access Code: [email or web address] URL: [email or web address] Date: 10/11/2017 Prepared by: Kristy Early   Exercises   Seated March - 10-15 reps - 1-2 sets - 5 seconds hold - 1x daily   Seated Hip Abduction with Resistance - 10-15 reps - 1-2 sets - 5 seconds hold - 1x daily   Seated Knee Flexion Slide - 15-20 reps - 1-2 sets - 5 hold - 1x daily   Seated Shoulder Flexion Full Range - 10-15 reps - 1-2 sets - 5 seconds hold - 1x daily   Seated Overhead Press - 10-15 reps - 1-2 sets - 5 seconds hold - 1x daily   Seated Shoulder Horizontal Abduction with Resistance - 10-15 reps - 1-2 sets - 5 seconds hold - 1x daily   Seated Trunk Rotation - 10-15 reps - 1-2 sets - 5 seconds hold - 1x daily   Access Code: [email or web address] URL:  [email or web address] Date: 11/10/2017 Prepared by: Kristy Early   Exercises   Standing Hip Abduction - 10-15 reps - 1-2 sets - 5 seconds hold - 1x daily   Standing March with Counter Support - 10-15 reps - 1-2 sets - 5 seconds hold - 1x daily   Seated Long Arc Quad with Strap - 10-15 reps - 1-2 sets - 5 seconds hold - 1x daily   Squat at Table - 10-15 reps - 1-2 sets - 5 seconds hold - 1x daily   Seated Thoracic Extension with Resistance - 10-15 reps - 1-2 sets - 5 seconds hold - 1x daily        Assessment     Patient demonstrates improved mobility with less restrictive assistive device. Improved posture and gait noted with less crouched posture and coordination of lower extremities during dynamic tasks. Patient would benefit from additional skilled physical therapy services to address aforementioned impairments and return patient to previous level of functioning. Patient would also benefit from obtaining a home cervical traction unit at this time to self manage recurring neck pain. He has had good resolution of his neck pain with traction in the clinic and will be transitioning to independent management within the next month.     Cervical codes updated 10/24/2017:    Goal Primary G Code and Modifier:    The Patient's G Code Goal would be: Handling Objects    The Patient's Impairment, Limitation or Restriction Modifier goal would be best described as: CI - 1% - 20% Impairment.      Discharge Primary G Code and Modifier:      The Patient's status upon Discharge is Handling Objects    The Patient's Impairment, Limitation or Restriction Modifier would be best described as CI - 1% - 20% Impairment.     G Codes and Modifier taken from patient completing the Burrows and dynamic gait index: 6/7/17 (Discharged balance gcodes 6/19/2017)     Goal Primary G Code and Modifier:    The Patient's G Code Goal would be: Mobility    The Patient's Impairment, Limitation or Restriction Modifier goal would be  best described as: CJ - 20% - 40% Impairment.      Discharge Primary G Code and Modifier:      The Patient's status upon Discharge is Mobility    The Patient's Impairment, Limitation or Restriction Modifier would be best described as CK - 40% - 60% Impairment.     G Codes and Modifier taken from patient completing the Burrows and dynamic gait index: 10/24/2017 (to be added next visit)    Current Primary G Code and Modifier:    Per the Patient's intake and/or assessment the Primary G Code is: Mobility .   The Patient's Impairment, Limitation or Restriction Modifier would be best described as: CK - 40% - 60% Impairment.     Goal Primary G Code and Modifier:    The Patient's G Code Goal would be: Mobility    The Patient's Impairment, Limitation or Restriction Modifier goal would be best described as: CJ - 20% - 40% Impairment.     Goals:  Patient will be assessed for appropriate mobility device to promote maintaining his safety with mobility within one visit. Goal met 6/7/17      Short Term Goals: To be met in 4 weeks:   Following physical therapy intervention, the patient will be able to:   1. Patient will improve standing posture to minimize crouched gait pattern. In progress 9/19/2017   2. Patient will be independent in home exercise program for progression of strength and balance abilities. In progress 9/19/2017                                        3.   Patient will decrease falls to no more than 3 per week. Not met 9/19/2017       Long Term Goals: To be met in 8 weeks:   Following physical therapy intervention, the patient will be able to:   1. Increase MMT of hip flexors to 3+/5 so that pt able to lift leg independently into car. In progress 10/24/2017   2.     New goals added 6/19/2017:      1.   Patient will have improved cervical range of motion by at least 5 degrees with minimal to no referred pain. Met 10/24/2017      2.   Patient will have less than 3/10 pain with normal activities of daily  living. Met for cervical goal 10/24/2017      3.   Patient will have improved postural endurance for ability to complete normal daily work routine including reaching to shoulder height without increase in symptoms. In progress 10/24/2017      4.   Patient will obtain home cervical traction unit for self management of symptoms. -in progress 10/24/2017      5.   Patient will improve mCTSIB scores by 5 seconds for improved endurance for independent stance and improved stability. In progress 10/24/2017      Plan     Treatment Plan / Targeted Outcomes:     Frequency:   8 additional visits      Duration of Treatment: 8 weeks     Plan of Care Due Date: Medicare/[place] Re-Cert Due:  12/19/2017     Plan for next visit:  Gait and balance training, home exercise program development, transfers, independent mobility and strengthening. Cervical side gliding and mobilization as appropriate. Postural strengthening.        Student or PTA has been instructed in and demonstrates skills necessary to carry out above stated treatment plan: Yes     Thank you for your referral to [facility].  Please call with any questions, concerns or comments.  [phone]

## 2017-12-27 NOTE — PROGRESS NOTES
"Patient Information     Patient Name MRN Sex Jamir Cooley [id number] Male 1955      Progress Notes by Urvashi Small at 11/3/2017 11:03 AM     Author:  Urvashi Small Service:  (none) Author Type:  PT- Physical Therapy Assistant     Filed:  11/3/2017  1:22 PM Date of Service:  11/3/2017 11:03 AM Status:  Signed     :  Urvashi Small (PT- Physical Therapy Assistant)            [facility]  Outpatient PT - Daily note        Date of Service: 11/3/2017     Visit # 36 (2 of 10)     Patient Name: Jamir Gill   YOB: 1955   Referring MD/Provider: Dr. Dee Hook, Dr. John Polk  Diagnosis: Multiple sclerosis, bilateral leg weakness, osteoarthritis of cervical region  Treatment Diagnosis: MS, impaired mobility, bilateral leg weakness, impaired posture, impaired balance and endurance; impaired cervical mobility  Insurance:  [employer] One  Start of Care Date:  2017   Certification Dates: 10/24/2017     Re-Cert Due: Medicare/[place] Re-Cert Due: 2017    Subjective    No pain currently. Reports that he is doing well - presents with cane, walker is in his car. Reports if he is having a good day at home, he may not use any AD.     Objective  Patient presents ambulatory with his single point cane today.    Today's Intervention:  all exercises completed bilaterally and to fatigue.   Nustep level 6 seat 11 arms 10 x5 mins progressed x6 minutes     Standing hip abduction and marching hip flexion    - PTA assist to stabilize pelvis when in R LE SLS     Seated hip abduction yellow band x15    Squats without upper extremity assist  Step up and over laterally and forward/retro blue foam   - added hip hikes on Airex    - alternating toe taps onto Airex   Sustained hold with one foot up on 4\" box with head turns   - EC without head turns     Leg press 90# 2 x10 progressed to 3 sets    Deferred:  Side stepping figure 8's around cones, no use of single point cane x1 " each direction  Lumbar extension 30# 3 x10, legs 9, feet 11       Home Exercise Program: *= added to HEP.    Access Code: [email or web address] URL: [email or web address] Date: 05/16/2017 Prepared by: Kristy Early   Exercises   Hooklying Clamshell with Resistance - 10-15 reps - 1-2 sets - 5 hold - 1x daily   Seated Hamstring Stretch - 1-2 sets - 30 second hold - 1x daily   Romberg Stance - 30 second hold - 2-3 sets - 1x daily   Romberg Stance with Eyes Closed - 2-3 sets - 20-30 second hold - 1x daily   Romberg Stance with Head Rotation - 2-3 sets - 30 second hold - 1x daily   Standing Romberg to 3/4 Tandem Stance - 2-3 sets - 20-30 second hold - 1x daily   Scapular Retraction with Resistance Advanced - 10-15 reps - 1-2 sets - 5 hold - 1x daily     Upper trapezius, scalene and levator stretches x30 sec each. bilateral added 6/19/2017   Access Code: [email or web address] URL: [email or web address] Date: 10/11/2017 Prepared by: Kristy Early   Exercises   Seated March - 10-15 reps - 1-2 sets - 5 seconds hold - 1x daily   Seated Hip Abduction with Resistance - 10-15 reps - 1-2 sets - 5 seconds hold - 1x daily   Seated Knee Flexion Slide - 15-20 reps - 1-2 sets - 5 hold - 1x daily   Seated Shoulder Flexion Full Range - 10-15 reps - 1-2 sets - 5 seconds hold - 1x daily   Seated Overhead Press - 10-15 reps - 1-2 sets - 5 seconds hold - 1x daily   Seated Shoulder Horizontal Abduction with Resistance - 10-15 reps - 1-2 sets - 5 seconds hold - 1x daily   Seated Trunk Rotation - 10-15 reps - 1-2 sets - 5 seconds hold - 1x daily          Assessment     Patient demonstrates improved mobility with less restrictive assistive device. Improved right lower extremity hip flexion strength noted today. Patient would benefit from additional skilled physical therapy services to address aforementioned impairments and return patient to previous level of functioning. Patient would also benefit from obtaining a home cervical traction unit at  this time to self manage recurring neck pain. He has had good resolution of his neck pain with traction in the clinic and will be transitioning to independent management within the next month.     Cervical codes updated 10/24/2017:    Goal Primary G Code and Modifier:    The Patient's G Code Goal would be: Handling Objects    The Patient's Impairment, Limitation or Restriction Modifier goal would be best described as: CI - 1% - 20% Impairment.      Discharge Primary G Code and Modifier:      The Patient's status upon Discharge is Handling Objects    The Patient's Impairment, Limitation or Restriction Modifier would be best described as CI - 1% - 20% Impairment.     G Codes and Modifier taken from patient completing the Burrows and dynamic gait index: 6/7/17 (Discharged balance gcodes 6/19/2017)     Goal Primary G Code and Modifier:    The Patient's G Code Goal would be: Mobility    The Patient's Impairment, Limitation or Restriction Modifier goal would be best described as: CJ - 20% - 40% Impairment.      Discharge Primary G Code and Modifier:      The Patient's status upon Discharge is Mobility    The Patient's Impairment, Limitation or Restriction Modifier would be best described as CK - 40% - 60% Impairment.     G Codes and Modifier taken from patient completing the Burrows and dynamic gait index: 10/24/2017 (to be added next visit)    Current Primary G Code and Modifier:    Per the Patient's intake and/or assessment the Primary G Code is: Mobility .   The Patient's Impairment, Limitation or Restriction Modifier would be best described as: CK - 40% - 60% Impairment.     Goal Primary G Code and Modifier:    The Patient's G Code Goal would be: Mobility    The Patient's Impairment, Limitation or Restriction Modifier goal would be best described as: CJ - 20% - 40% Impairment.     Goals:  Patient will be assessed for appropriate mobility device to promote maintaining his safety with mobility  within one visit. Goal met 6/7/17      Short Term Goals: To be met in 4 weeks:   Following physical therapy intervention, the patient will be able to:   1. Patient will improve standing posture to minimize crouched gait pattern. In progress 9/19/2017   2. Patient will be independent in home exercise program for progression of strength and balance abilities. In progress 9/19/2017                                        3.   Patient will decrease falls to no more than 3 per week. Not met 9/19/2017       Long Term Goals: To be met in 8 weeks:   Following physical therapy intervention, the patient will be able to:   1. Increase MMT of hip flexors to 3+/5 so that pt able to lift leg independently into car. In progress 10/24/2017   2.     New goals added 6/19/2017:      1.   Patient will have improved cervical range of motion by at least 5 degrees with minimal to no referred pain. Met 10/24/2017      2.   Patient will have less than 3/10 pain with normal activities of daily living. Met for cervical goal 10/24/2017      3.   Patient will have improved postural endurance for ability to complete normal daily work routine including reaching to shoulder height without increase in symptoms. In progress 10/24/2017      4.   Patient will obtain home cervical traction unit for self management of symptoms. -in progress 10/24/2017      5.   Patient will improve mCTSIB scores by 5 seconds for improved endurance for independent stance and improved stability. In progress 10/24/2017      Plan     Treatment Plan / Targeted Outcomes:     Frequency:   8 additional visits      Duration of Treatment: 8 weeks     Plan of Care Due Date: Medicare/[place] Re-Cert Due:  12/19/2017     Plan for next visit:  Gait and balance training, home exercise program development, transfers, independent mobility and strengthening. Cervical side gliding and mobilization as appropriate. Postural strengthening.        Student or PTA has been instructed in and  demonstrates skills necessary to carry out above stated treatment plan: Yes     Thank you for your referral to [facility].  Please call with any questions, concerns or comments.  [phone]

## 2017-12-27 NOTE — PROGRESS NOTES
Patient Information     Patient Name MRN Sex Jamir Cooley [id number] Male 1955      Progress Notes by Natty Castillo, PT at 2017  9:48 AM     Author:  Natty Castillo PT Service:  (none) Author Type:  PT- Physical Therapist     Filed:  2017 10:48 AM Date of Service:  2017  9:48 AM Status:  Signed     :  Natty Castillo PT (PT- Physical Therapist)            [facility]  Outpatient PT - Daily note        Date of Service: 2017    Visit #: 11 (2 of 10)     Patient Name: Jamir Gill   YOB: 1955   Referring MD/Provider: Dr. Dee Hook, Dr. John Polk  Diagnosis: Multiple sclerosis, bilateral leg weakness, osteoarthritis of cervical region  Treatment Diagnosis: MS, impaired mobility, bilateral leg weakness, impaired posture, impaired balance and endurance; impaired cervical mobility  Insurance:  Preferred One  Start of Care Date:  2017   Certification Dates: From: 2017   Re-Cert Due: Medicare/MA Re-Cert Due: 17    Subjective    Neck pain is 2-3 out 10 pain. Noticed R clavicle had come back up after last treatment, feel symmetrical by PT today.      Legs have a had a few bad days, last night they were painful, but did his best for balance during PT today.     Objective    Observation:   C3-4 left FRS; C4-5 FRS left     Today's Intervention:  all performed to muscle fatigue:  Neuromuscular re-ed and therapeutic exercise:  Toe taps to round side BOSU x20 alternating;   AirEx foam: sustained hold with head turns x1:00 minimal touches on rail, advanced to eyes closed with head turns x1:00 multiple light touches on rail  A/p balance on round surface 1/2 foam with min to mod use of railing for balance. Encouraged hip strategy.      Manual therapy:  muscle energy technique 2 x3 reps to R C3-4, C4-5, cervical FRS; supine, much improved after, no restrictions to left   Cervical side gliding left to right x3  mins  Suboccipital release x2:00  Soft tissue mobility and brachial plexus release x3 mins  Supine median nerve gliding x10 reps x2 sets, just did with R shoulder abd and L cervical side bending    Mechanical Traction: cervical traction performed 25 maximum weight, 10 minimum weight 60 seconds on, 20 seconds off for 10 minutes in supine position at 30 degrees of flexion.(4 steps up and down, rope speed 100%)    Additional Neuro/TE: (after traction as room was needed later)  now tez overhead press 2# weights, seated  Side stepping figure 8s around 4 cones x2 each direction. 2 self corrected loss of balance and 1 modA needed to prevent a fall.  lateral step up and over airex foam, min to mod use of 1 hand on railing for support. x8 each way    Nustep level 6, seat 11, arms 10 - on own at end 4-5 minutes      Deferred:  Abduction 30# to fatigue  LAQ with assist right lower extremity  Seated hamstring curls red left yellow right lower extremity to fatigue  Triceps extension 2# overhead x15 bilateral   Triceps press 60# x15  Standing hip abduction, light use of rail.   TRX squats 2x10. Cues for nose over toes, bending at waist.  Standing hamstring curls no weight with TRX strap support 2 x10 each  muscle energy technique right 1st rib, supine  Seated muscle energy technique thoracic FRS  Lumbar extension 64# to fatigue  Leg press 70# seat 22 to fatigue      Home Exercise Program: *= added to HEP.    Access Code: [email or web address] URL: [email or web address] Date: 05/16/2017 Prepared by: Kristy Early   Exercises   Hooklying Clamshell with Resistance - 10-15 reps - 1-2 sets - 5 hold - 1x daily   Seated Hamstring Stretch - 1-2 sets - 30 second hold - 1x daily   Romberg Stance - 30 second hold - 2-3 sets - 1x daily   Romberg Stance with Eyes Closed - 2-3 sets - 20-30 second hold - 1x daily   Romberg Stance with Head Rotation - 2-3 sets - 30 second hold - 1x daily   Standing Romberg to 3/4 Tandem Stance - 2-3 sets -  20-30 second hold - 1x daily   Scapular Retraction with Resistance Advanced - 10-15 reps - 1-2 sets - 5 hold - 1x daily     Upper trapezius, scalene and levator stretches x30 sec each. bilateral added 6/19/2017        Assessment     Patient demonstrates improved cervical mobility and decreased joint restrictions in Cspine and 1st rib. This improves with manual therapy and patient reports decreased pain following manual therapy and traction.    Patient will benefit from continued skilled physical therapy services to address aforementioned impairments and return patient to previous level of functioning.    G Codes and Modifier taken from patient completing the Burrows and dynamic gait index: 6/7/17 (Discharged balance gcodes 6/19/2017)     Goal Primary G Code and Modifier:    The Patient's G Code Goal would be: Mobility    The Patient's Impairment, Limitation or Restriction Modifier goal would be best described as: CJ - 20% - 40% Impairment.      Discharge Primary G Code and Modifier:      The Patient's status upon Discharge is Mobility    The Patient's Impairment, Limitation or Restriction Modifier would be best described as CK - 40% - 60% Impairment.     Cervical codes to be added next visit 6/22/2017:    Current Primary G Code and Modifier:    Per the Patient's intake and/or assessment the Primary G Code is: Handling Objects .   The Patient's Impairment, Limitation or Restriction Modifier would be best described as: CK - 40% - 60% Impairment.     Goal Primary G Code and Modifier:    The Patient's G Code Goal would be: Handling Objects    The Patient's Impairment, Limitation or Restriction Modifier goal would be best described as: CI - 1% - 20% Impairment.      Goals:  Patient will be assessed for appropriate mobility device to promote maintaining his safety with mobility within one visit. Goal met 6/7/17      Short Term Goals: To be met in 4 weeks:   Following physical therapy intervention, the  patient will be able to:   1. Patient will improve standing posture to minimize crouched gait pattern. In progress 6/7/17   2. Patient will be independent in home exercise program for progression of strength and balance abilities.                                                3.   Patient will decrease falls to no more than 3 per week. In progress 6/7/17       Long Term Goals: To be met in 8 weeks:   Following physical therapy intervention, the patient will be able to:   1. Increase MMT of hip flexors to 3+/5 so that pt able to lift leg independently into car.   2. Patient will improve dynamic gait index score by 5 points to decrease fall risk.    New goals added 6/19/2017:      1.   Patient will have improved cervical range of motion by at least 5 degrees with minimal to no referred pain.     2.   Patient will have less than 3/10 pain with normal activities of daily living.     3.   Patient will have improved postural endurance for ability to complete normal daily work routine including reaching to shoulder height without increase in symptoms.     4.   Patient will obtain home cervical traction unit for self management of symptoms.         Plan     Treatment Plan / Targeted Outcomes:     Frequency:   16 visits      Duration of Treatment: 8 weeks     Plan of Care Due Date: Medicare/MA Re-Cert Due:  08/02/17     Plan for next visit:  Gait and balance training, home exercise program development, floor transfer training and strengthening.        Student or PTA has been instructed in and demonstrates skills necessary to carry out above stated treatment plan: Yes     Thank you for your referral to [facility].  Please call with any questions, concerns or comments.  [phone]     Kristy Early, PT, DPT

## 2017-12-27 NOTE — PROGRESS NOTES
"Patient Information     Patient Name MRN Sex Jamir Cooley [id number] Male 1955      Progress Notes by Kristy Early PT at 2017  8:28 AM     Author:  Kristy Early PT Service:  (none) Author Type:  PT- Physical Therapist     Filed:  2017  4:03 PM Date of Service:  2017  8:28 AM Status:  Signed     :  Kristy Early PT (PT- Physical Therapist)            [facility]  Outpatient PT - Daily Note        Date of Service: 2017   Visit #: 24 (6 of 10)     Patient Name: Jamir Gill   YOB: 1955   Referring MD/Provider: Dr. Dee Hook, Dr. John Polk  Diagnosis: Multiple sclerosis, bilateral leg weakness, osteoarthritis of cervical region  Treatment Diagnosis: MS, impaired mobility, bilateral leg weakness, impaired posture, impaired balance and endurance; impaired cervical mobility  Insurance:  Preferred One  Start of Care Date:  2017   Certification Dates: From: 2017   Re-Cert Due: Medicare/MA Re-Cert Due: 17  Subjective    Patient reports things ar \"terrible\" today. States he fell 6 times yesterday, 4 of them he couldn't get up without crawling to a chair and needed help.     Also has had a return of neck pain, rates it 2/10 currently, this time down the left side and into arm. Had been a 4/10 pain rating the past few days. \"It's a mirror image of what I had on the right.\"      Objective  Patient using 4WW today.     Patient arrived 15 mins late for appt today.        Burrows Balance Test    1.  Sitting to Standin   4) able to stand without using hands and stabilize independently   3) able to stand independently using hands   2) able to stand using hands after several tries   1) needs minimal aid to stand or to stabilize   0) needs moderate or maximal assist to stand   2.  Standing Unsupported: 3   4) able to stand safely 2 minutes   3) able to stand 2 minutes with supervision   2) able to stand 30 seconds unsupported "    1) needs several tries to stand 30 seconds unsupported   0) unable to stand 30 seconds unassisted  3.  Sitting with Back Unsupported: 4   4) able to sit safely and securely 2 minutes   3) able to sit 2 minutes under supervision   2) able to sit 30 seconds   1) able to sit 10 seconds   0) unable to sit without support 10 seconds  4.  Standing to Sittin   4) sits safely with minimal use of hands   3) controls descent by using hands   2) uses back of legs against chair to control descent   1) sits independently but had uncontrolled descent   0) needs assistance to sit  5.  Transfers: 3   4) able to transfer safely with minor use of hands   3) able to transfer safely definite need of hands   2) able to transfer with verbal cuing and/or supervision   1) needs on person to assist   0) needs two people to assist or supervise to be safe  6.  Standing Unsupported with Eyes Closed: 3   4) able to stand 10 seconds safely   3) able to stand 10 seconds with supervision   2) able to stand 3 seconds   1) unable to keep eyes closed 3 seconds but stays safely   0) needs help to keep from falling  7.  Standing Unsupported with Feet Together: 1   4) able to pace feet together independently and stand 1 minute safely   3) able to place feet together independently and stand for 1 minute with  supervision   2) able to place feet together independently but unable to hold for 30 seconds   1) needs help to attain position but able to stand 15 seconds feet together   0) needs help to attain position and unable to hold for 15 seconds  8.  Reaching Forward: 3   4) can reach forward confidently 25 cm (10 inches)   3) can reach forward 12 cm safely (5 inches)   2) can reach forward 5 cm safely (2 inches)   1) reaches forward but needs supervision   0) loses balance while trying/requires external support  9.   Object from Floor: 0   4) able to  slipper safely and easily   3) able to  clipper but needs supervision   2)  unable to  but reaches 2-5 cm (1-2 inches) from slipper and keeps  balance    1) unable to  and needs supervision while trying   0) unable to try/needs assist to keep from losing balance or falling  10. Turning to Look Behind R & L Shoulder: 1   4) looks behind from both sides and weight shifts well   3) looks behind one side only other side shows less weight shift   2) turns sideways only but maintains balance   1) needs supervision while turning   0) needs assist to keep from losing balance or falling  11. Turn 360s: 1   4) able to turn 360 degrees safely in 4 seconds or less   3) able to turn 360 degrees safely one side only 4 seconds or less   2) able to turn 360 safely but slowly   1) needs close supervision or verbal cuing   0) needs assistance while turning  12.  Alternating Feet on Step Stool Unsupported: 0   4) able to stand independently and safely and complete 8 steps in 20 seconds   3) able to stand independently and complete 8 steps in greater than 20 seconds   2) able to complete 4 steps without aid with supervision   1) able to complete greater than 2 steps needs minimal assist   0) needs assistance to keep from falling/unable to try   13. Standing Unsupported one foot in front: 0   4) able to place foot tandem independently and hold 30 seconds   3) able to place foot ahead of other independently and hold 30 seconds   2) able to take small step independently and hold 30 seconds   1) needs help to step but can hold 15 seconds   0) loses balance while stepping or staning  14.  Standing on One Le   4) able to lift leg independently and hold for greater than 10 seconds   3) able to lift leg independently and hold 5-10 seconds   2) able to lift leg independently and hold equal or greater than 3 seconds   1) tries to lift leg unable to hold 3 seconds but remains standing independently   0) unable to try or needs assist to prevent fall  Total:        Assessment of Score:  52-56=  Normal         41-56= Low Fall Risk         21-40= Medium Fall Risk         0-20= High Fall Risk    Observation/palpation: C2 and C5 ERS left; right> left 1st rib hypomobility; good Tspine symmetry    Today's Intervention:    Supine muscle energy technique C2 and C5 ERS left   soft tissue mobilization scalenes x5 mins  Upper trapezius stretching, supine      Home Exercise Program: *= added to HEP.    Access Code: [email or web address] URL: [email or web address] Date: 05/16/2017 Prepared by: Kristy Early   Exercises   Hooklying Clamshell with Resistance - 10-15 reps - 1-2 sets - 5 hold - 1x daily   Seated Hamstring Stretch - 1-2 sets - 30 second hold - 1x daily   Romberg Stance - 30 second hold - 2-3 sets - 1x daily   Romberg Stance with Eyes Closed - 2-3 sets - 20-30 second hold - 1x daily   Romberg Stance with Head Rotation - 2-3 sets - 30 second hold - 1x daily   Standing Romberg to 3/4 Tandem Stance - 2-3 sets - 20-30 second hold - 1x daily   Scapular Retraction with Resistance Advanced - 10-15 reps - 1-2 sets - 5 hold - 1x daily     Upper trapezius, scalene and levator stretches x30 sec each. bilateral added 6/19/2017        Assessment     Patient demonstrates return of neck pain, this time on the left, likely due to repeated falls at home. Therapist recommends reconsidering getting a scooter for home use to prevent falls and improve mobility and energy conservation. Additional skilled physical therapy services required to improve cervical mobility and decrease fall risk. Patient has a progressive condition and is not safe with his usual mobility at this time.  Patient will benefit from continued skilled physical therapy services to address aforementioned impairments and return patient to previous level of functioning.    G Codes and Modifier taken from patient completing the Burrows and dynamic gait index: 6/7/17 (Discharged balance gcodes 6/19/2017)     Goal Primary G Code and Modifier:    The Patient's G Code  Goal would be: Mobility    The Patient's Impairment, Limitation or Restriction Modifier goal would be best described as: CJ - 20% - 40% Impairment.      Discharge Primary G Code and Modifier:      The Patient's status upon Discharge is Mobility    The Patient's Impairment, Limitation or Restriction Modifier would be best described as CK - 40% - 60% Impairment.     Cervical codes updated 8/16/2017:    Current Primary G Code and Modifier:    Per the Patient's intake and/or assessment the Primary G Code is: Handling Objects .   The Patient's Impairment, Limitation or Restriction Modifier would be best described as: CI - 1% - 20% Impairment.     Goal Primary G Code and Modifier:    The Patient's G Code Goal would be: Handling Objects    The Patient's Impairment, Limitation or Restriction Modifier goal would be best described as: CI - 1% - 20% Impairment.      Goals:  Patient will be assessed for appropriate mobility device to promote maintaining his safety with mobility within one visit. Goal met 6/7/17      Short Term Goals: To be met in 4 weeks:   Following physical therapy intervention, the patient will be able to:   1. Patient will improve standing posture to minimize crouched gait pattern. In progress 8/16/2017   2. Patient will be independent in home exercise program for progression of strength and balance abilities. In progress 8/16/2017                                                 3.   Patient will decrease falls to no more than 3 per week. Not met 8/16/2017       Long Term Goals: To be met in 8 weeks:   Following physical therapy intervention, the patient will be able to:   1. Increase MMT of hip flexors to 3+/5 so that pt able to lift leg independently into car. Not progressing.  2. Patient will improve Burrows balance score by 5 points to decrease fall risk. Not progressing    New goals added 6/19/2017:      1.   Patient will have improved cervical range of motion by at least 5 degrees  with minimal to no referred pain. 40% met 8/16/2017      2.   Patient will have less than 3/10 pain with normal activities of daily living. In progress 8/16/2017      3.   Patient will have improved postural endurance for ability to complete normal daily work routine including reaching to shoulder height without increase in symptoms. In progress 8/16/2017      4.   Patient will obtain home cervical traction unit for self management of symptoms. - decided to hold off on obtaining unit at this time.        Plan     Treatment Plan / Targeted Outcomes:     Frequency:   12 additional visits      Duration of Treatment: 8 weeks     Plan of Care Due Date: Medicare/MA Re-Cert Due:  10/11/17     Plan for next visit:  Gait and balance training, home exercise program development, floor transfer training and strengthening. Cervical side gliding and mobilization as appropriate. Postural strengthening.        Student or PTA has been instructed in and demonstrates skills necessary to carry out above stated treatment plan: Yes     Thank you for your referral to [facility].  Please call with any questions, concerns or comments.  [phone]     Kristy Early, PT, DPT

## 2017-12-27 NOTE — PROGRESS NOTES
Patient Information     Patient Name MRN Sex Jamir Faust [id number] Male 1955      Progress Notes by Kristy Early PT at 2017  9:54 AM     Author:  Kristy Early PT Service:  (none) Author Type:  PT- Physical Therapist     Filed:  2017  2:45 PM Date of Service:  2017  9:54 AM Status:  Signed     :  Kristy Early PT (PT- Physical Therapist)            [facility]  Outpatient PT - Daily note        Date of Service: 2017    Visit #: 4     Patient Name: Jamir Gill    YOB: 1955   Referring MD/Provider: Referring MD/Provider: Dee Hook [facility]   Diagnosis: Medical and Treatment Diagnosis:   MS (multiple sclerosis) (HC) [G35]       Bilateral leg weakness [R29.898]       Repeated falls [R29.6]       Treatment Diagnosis: impaired balance and gait, lower extremity weakness secondary to MS  Insurance:  Preferred One  Start of Care Date: Start of Service: 2017   Certification Dates: From: Start of Service: 2017    Re-Cert Due: Medicare/MA Re-Cert Due: 2017     Subjective       Patient reports nothing new.    Objective  Today's Intervention:    Nustep level 7, seat 10, arms 10 x5 mins    Toe taps to round side BOSU, then sustained hold with head turns and back foot on carpet. Held to tolerance    LAQ with belt assist right lower extremity     TRX squats 2x10. Cues for nose over toes, bending at waist.  Standing hamstring curls no weight with TRX strap support 2 x10 each    Standing hip abduction, light use of rail. Improved right knee extension noted today    A/p balance on flat surface 1/2 foam with min to mod use of railing for balance. Encouraged hip strategy.    Lumbar extension 62# to fatigue  Abduction 30# to fatigue    Deferred:  Leg press 70# seat 22 to fatigue  Home Exercise Program: *= added to HEP.    Access Code: [email or web address] URL: [email or web address] Date: 2017 Prepared by:  Kristy Early   Exercises   Hooklying Clamshell with Resistance - 10-15 reps - 1-2 sets - 5 hold - 1x daily   Seated Hamstring Stretch - 1-2 sets - 30 second hold - 1x daily   Romberg Stance - 30 second hold - 2-3 sets - 1x daily   Romberg Stance with Eyes Closed - 2-3 sets - 20-30 second hold - 1x daily   Romberg Stance with Head Rotation - 2-3 sets - 30 second hold - 1x daily   Standing Romberg to 3/4 Tandem Stance - 2-3 sets - 20-30 second hold - 1x daily   Scapular Retraction with Resistance Advanced - 10-15 reps - 1-2 sets - 5 hold - 1x daily        Assessment     Patient fatigues early and demonstrates right> left lower extremity weakness. Improved right terminal knee extension endurance noted during squats and single leg activities.    Goals:      Short Term Goals: To be met in 4 weeks:   Following physical therapy intervention, the patient will be able to:   1. Patient will improve standing posture to minimize crouched gait pattern.  2. Patient will be independent in home exercise program for progression of strength and balance abilities.        3.   Patient will decrease falls to no more than 3 per week.      Long Term Goals: To be met in 8 weeks:   Following physical therapy intervention, the patient will be able to:   1. Increase MMT of hip flexors to 3+/5 so that pt able to lift leg independently into car.   2. Patient will improve dynamic gait index score by 5 points to decrease fall risk.     G Codes and Modifier taken from patient completing the Burrows and dynamic gait index:  Done at [facility] on 5/16/2017   Initial Primary G Code and Modifier:                         Per the Patient's intake and/or assessment the Primary G Code is: Mobility .                        The Patient's Impairment, Limitation or Restriction Modifier would be best described as: CK - 40% - 60% Impairment.   Goal Primary G Code and Modifier:                         The Patient's G Code Goal would be: Mobility                          The Patient's Impairment, Limitation or Restriction Modifier goal would be best described as: CJ - 20% - 40% Impairment.         Plan     Treatment Plan / Targeted Outcomes:     Frequency:   16 visits     Duration of Treatment: 8 weeks     Plan of Care Due Date: Medicare/MA Re-Cert Due: 7/11/17     Plan for next visit:  Gait and balance training, home exercise program development, floor transfer training and strengthening.        Student or PTA has been instructed in and demonstrates skills necessary to carry out above stated treatment plan: Yes     Thank you for your referral to [facility].  Please call with any questions, concerns or comments.  [phone]     Kristy Early, PT, DPT

## 2017-12-27 NOTE — PROGRESS NOTES
"Patient Information     Patient Name MRN Sex Jamir Cooley [id number] Male 1955      Progress Notes by Kristy Early PT at 10/9/2017 10:35 AM     Author:  Kristy Early PT Service:  (none) Author Type:  PT- Physical Therapist     Filed:  10/9/2017 12:35 PM Date of Service:  10/9/2017 10:35 AM Status:  Signed     :  Kristy Early PT (PT- Physical Therapist)            [facility]  Outpatient PT - Daily Note        Date of Service: 10/9/2017    Visit #: 31 (7 of 10)     Patient Name: Jamir Gill   YOB: 1955   Referring MD/Provider: Dr. Dee Hook, Dr. John Polk  Diagnosis: Multiple sclerosis, bilateral leg weakness, osteoarthritis of cervical region  Treatment Diagnosis: MS, impaired mobility, bilateral leg weakness, impaired posture, impaired balance and endurance; impaired cervical mobility  Insurance:  Preferred One  Start of Care Date:  2017   Certification Dates: 2017    Re-Cert Due: Medicare/MA Re-Cert Due: 2017    Subjective    Patient reports getting into the truck was easier today and had one episode of pain for about an hour with his neck.     Patient wondering if coming in 2x/week is doing enough good. Discussed home exercise program and progressing to the [facility] program in the next few weeks. Patient agreeable.     Objective    Today's Intervention:  all exercises completed bilaterally and to fatigue  Nustep level 6 seat 11 arms 10 x5 mins. Less leaning to the left today.    Walk in hernandez with front wheeled walker and contact guard assist 2x50 feet and 2 x20 feet. Gait became more crouched with fatigue.    Sit to stands from plinth with cues for sitting hips back and nose forward x15. Good eccentric lowering today and no upper extremity use on walker for balance.    Standing marching with minimal use of walker for balance x12 bilateral   Seated core training: bilateral upper extremities holding 6\" med ball reaching into d2 " flexion while sitting on grey disc; marching hip flexion while sitting on disc  Seated hamstring curls yellow theraband     Seated upper extremity overhead press 1# weights  Seated upper extremity triceps extension overhead 1# weights  Seated scapular sets with yellow theraband resistance x2 sets    Med x leg press 80# seat 21, back upright with pillow behind head. Patient needed assistance to get legs up on pedal  Lumbar extension 20# 2 x10 (increase next visit)    Home Exercise Program: *= added to HEP.    Access Code: [email or web address] URL: [email or web address] Date: 05/16/2017 Prepared by: Kristy Early   Exercises   Hooklying Clamshell with Resistance - 10-15 reps - 1-2 sets - 5 hold - 1x daily   Seated Hamstring Stretch - 1-2 sets - 30 second hold - 1x daily   Romberg Stance - 30 second hold - 2-3 sets - 1x daily   Romberg Stance with Eyes Closed - 2-3 sets - 20-30 second hold - 1x daily   Romberg Stance with Head Rotation - 2-3 sets - 30 second hold - 1x daily   Standing Romberg to 3/4 Tandem Stance - 2-3 sets - 20-30 second hold - 1x daily   Scapular Retraction with Resistance Advanced - 10-15 reps - 1-2 sets - 5 hold - 1x daily     Upper trapezius, scalene and levator stretches x30 sec each. bilateral added 6/19/2017        Assessment     Patient demonstrates improved ability to self correct loss of balance when sitting unsupported today. Faster pace and improved steps during ambulation noted. Tolerates higher resistance for therapeutic exercise and increased ambulation distance with rest breaks.     Patient will benefit from continued skilled physical therapy services to address aforementioned impairments and return patient to previous level of functioning.    G Codes and Modifier taken from patient completing the Burrows and dynamic gait index: 6/7/17 (Discharged balance gcodes 6/19/2017)     Goal Primary G Code and Modifier:    The Patient's G Code Goal would be: Mobility    The Patient's  Impairment, Limitation or Restriction Modifier goal would be best described as: CJ - 20% - 40% Impairment.      Discharge Primary G Code and Modifier:      The Patient's status upon Discharge is Mobility    The Patient's Impairment, Limitation or Restriction Modifier would be best described as CK - 40% - 60% Impairment.     Cervical codes updated 8/16/2017:    Current Primary G Code and Modifier:    Per the Patient's intake and/or assessment the Primary G Code is: Handling Objects .   The Patient's Impairment, Limitation or Restriction Modifier would be best described as: CI - 1% - 20% Impairment.     Goal Primary G Code and Modifier:    The Patient's G Code Goal would be: Handling Objects    The Patient's Impairment, Limitation or Restriction Modifier goal would be best described as: CI - 1% - 20% Impairment.      Goals:  Patient will be assessed for appropriate mobility device to promote maintaining his safety with mobility within one visit. Goal met 6/7/17      Short Term Goals: To be met in 4 weeks:   Following physical therapy intervention, the patient will be able to:   1. Patient will improve standing posture to minimize crouched gait pattern. In progress 9/19/2017   2. Patient will be independent in home exercise program for progression of strength and balance abilities. In progress 9/19/2017                                        3.   Patient will decrease falls to no more than 3 per week. Not met 9/19/2017       Long Term Goals: To be met in 8 weeks:   Following physical therapy intervention, the patient will be able to:   1. Increase MMT of hip flexors to 3+/5 so that pt able to lift leg independently into car. Not progressing.  2.   3.     New goals added 6/19/2017:      1.   Patient will have improved cervical range of motion by at least 5 degrees with minimal to no referred pain. 40% met 9/19/2017      2.   Patient will have less than 3/10 pain with normal activities of daily living. In  progress 9/19/2017      3.   Patient will have improved postural endurance for ability to complete normal daily work routine including reaching to shoulder height without increase in symptoms. In progress 8/16/2017           5.   Patient will improve mCTSIB scores by 5 seconds for improved endurance for independent stance and improved stability. (New goal added 9/19/2017)     Plan     Treatment Plan / Targeted Outcomes:     Frequency:   16 additional visits      Duration of Treatment: 8 weeks     Plan of Care Due Date: Medicare/MA Re-Cert Due:  11/14/2017     Plan for next visit:  Gait and balance training, home exercise program development, transfers, independent mobility and strengthening. Cervical side gliding and mobilization as appropriate. Postural strengthening.        Student or PTA has been instructed in and demonstrates skills necessary to carry out above stated treatment plan: Yes     Thank you for your referral to [facility].  Please call with any questions, concerns or comments.  [phone]     Kristy Early, PT, DPT

## 2017-12-27 NOTE — PROGRESS NOTES
Patient Information     Patient Name MRN Sex Jamir Cooley [id number] Male 1955      Progress Notes by Kristy Early PT at 2017  8:03 AM     Author:  Kristy Early PT Service:  (none) Author Type:  PT- Physical Therapist     Filed:  2017 12:59 PM Date of Service:  2017  8:03 AM Status:  Signed     :  Kristy Early PT (PT- Physical Therapist)            [facility]  Outpatient PT - Re-certification note        Date of Service: 2017    Visit #: 9     Patient Name: Jamir Gill   YOB: 1955   Referring MD/Provider: Dr. Dee Hook, Dr. John Polk  Diagnosis: Multiple sclerosis, bilateral leg weakness, osteoarthritis of cervical region  Treatment Diagnosis: MS, impaired mobility, bilateral leg weakness, impaired posture, impaired balance and endurance; impaired cervical mobility  Insurance:  Preferred One  Start of Care Date:  2017   Certification Dates: From: 2017   Re-Cert Due: Medicare/MA Re-Cert Due: 17    Subjective       Patient reports that 4-5 years ago had been set up for neck surgery but doctor wanted to try traction therapy. Was seen here at [facility] for several visits and neck pain greatly improved until now. Now symptoms are from the neck into the shoulder and top of the arm. Pain can come on fast, like sticking a knife in it, but then the achy pain stays for up to an hour, then randomly recurs. Wakes him up at night. Always on the right side. No changes in pain with position changes.     Current pain is 2/10; at worst in last week 6/10    Objective    Cervical Range of Motion   ACTIVE Post tx: Normal   Flexion 45  45   Extension 32  60   Sidebend-R 36 38* 45   Sidebend-L 26 36 45   Rotation-R 60  60   Rotation-L 54  60   *less pain post treatment    Observation:  C2-3 left FRS; C4-5 FRS left; T1-2 FRS left   Hypomobile 1st rib right  Brachial plexus restriction, mild to moderate, right  Median nerve  restriction, mild to moderate, right     Today's Intervention:  all performed to muscle fatigue:  muscle energy technique 2 x3 reps each level as above, cervical FRS; supine    muscle energy technique 1st rib, supine    Seated muscle energy technique thoracic FRS    Intro to home exercise program neck stretches bilaterally    Nustep level 7, seat 11, arms 10 x5 mins    Advised ice for soreness    Home Exercise Program: *= added to HEP.    Access Code: [email or web address] URL: [email or web address] Date: 05/16/2017 Prepared by: Kristy Early   Exercises   Hooklying Clamshell with Resistance - 10-15 reps - 1-2 sets - 5 hold - 1x daily   Seated Hamstring Stretch - 1-2 sets - 30 second hold - 1x daily   Romberg Stance - 30 second hold - 2-3 sets - 1x daily   Romberg Stance with Eyes Closed - 2-3 sets - 20-30 second hold - 1x daily   Romberg Stance with Head Rotation - 2-3 sets - 30 second hold - 1x daily   Standing Romberg to 3/4 Tandem Stance - 2-3 sets - 20-30 second hold - 1x daily   Scapular Retraction with Resistance Advanced - 10-15 reps - 1-2 sets - 5 hold - 1x daily     Upper trapezius, scalene and levator stretches x30 sec each. bilateral added 6/19/2017        Assessment     Patient demonstrates poor posture due to MS symptoms which facilitate degenerative changes and moderate restrictions in cervical spine mobility which create referred pain into his neck and shoulder. Restrictions improve with muscle energy techniques and manual therapy. Patient reports neck feels looser and less pain with side bending. Patient would benefit from a home cervical traction unit due to chronicity of this issue. Patient will benefit from continued skilled physical therapy services to address aforementioned impairments and return patient to previous level of functioning.    G Codes and Modifier taken from patient completing the Burrows and dynamic gait index: 6/7/17 (Discharged balance gcodes 6/19/2017)     Goal Primary G Code and  Modifier:    The Patient's G Code Goal would be: Mobility    The Patient's Impairment, Limitation or Restriction Modifier goal would be best described as: CJ - 20% - 40% Impairment.      Discharge Primary G Code and Modifier:      The Patient's status upon Discharge is Mobility    The Patient's Impairment, Limitation or Restriction Modifier would be best described as CK - 40% - 60% Impairment.       Cervical codes to be added next visit    Current Primary G Code and Modifier:    Per the Patient's intake and/or assessment the Primary G Code is: Handling Objects .   The Patient's Impairment, Limitation or Restriction Modifier would be best described as: CK - 40% - 60% Impairment.     Goal Primary G Code and Modifier:    The Patient's G Code Goal would be: Handling Objects    The Patient's Impairment, Limitation or Restriction Modifier goal would be best described as: CI - 1% - 20% Impairment.      Goals:  Patient will be assessed for appropriate mobility device to promote maintaining his safety with mobility within one visit. Goal met 6/7/17      Short Term Goals: To be met in 4 weeks:   Following physical therapy intervention, the patient will be able to:   1. Patient will improve standing posture to minimize crouched gait pattern. In progress 6/7/17   2. Patient will be independent in home exercise program for progression of strength and balance abilities.                                                3.   Patient will decrease falls to no more than 3 per week. In progress 6/7/17       Long Term Goals: To be met in 8 weeks:   Following physical therapy intervention, the patient will be able to:   1. Increase MMT of hip flexors to 3+/5 so that pt able to lift leg independently into car.   2. Patient will improve dynamic gait index score by 5 points to decrease fall risk.    New goals added 6/19/2017:      1.   Patient will have improved cervical range of motion by at least 5 degrees with minimal  to no referred pain.     2.   Patient will have less than 3/10 pain with normal activities of daily living.     3.   Patient will have improved postural endurance for ability to complete normal daily work routine including reaching to shoulder height without increase in symptoms.     4.   Patient will obtain home cervical traction unit for self management of symptoms.         Plan     Treatment Plan / Targeted Outcomes:     Frequency:   16 visits      Duration of Treatment: 8 weeks     Plan of Care Due Date: Medicare/MA Re-Cert Due:  08/02/17     Plan for next visit:  Gait and balance training, home exercise program development, floor transfer training and strengthening.        Student or PTA has been instructed in and demonstrates skills necessary to carry out above stated treatment plan: Yes     Thank you for your referral to [facility].  Please call with any questions, concerns or comments.  [phone]     Kristy Early, PT, DPT

## 2017-12-27 NOTE — PROGRESS NOTES
Patient Information     Patient Name MRN Jamir Boudreaux [id number] Male 1955      Progress Notes by Natty Castillo PT at 2017  8:15 AM     Author:  Natty Castillo PT Service:  (none) Author Type:  PT- Physical Therapist     Filed:  2017  8:16 AM Date of Service:  2017  8:15 AM Status:  Signed     :  Natty Castillo PT (PT- Physical Therapist)            Pt called 10 minutes after he was suppose to be here for his appointment and said he just physically couldn't get in his truck, he cancelled his next appointments so that he can re-group and will call when he can get back into PT.

## 2017-12-27 NOTE — PROGRESS NOTES
Patient Information     Patient Name MRN Sex     Jamir Gill [id number] Male 1955      Progress Notes by John Polk MD at 2017  2:00 PM     Author:  John Polk MD Service:  (none) Author Type:  Physician     Filed:  2017  3:19 PM Encounter Date:  2017 Status:  Signed     :  John Polk MD (Physician)            Nursing Notes:   YashVirginie goins  2017  2:12 PM  Signed  He is here to follow up on his chest X-ray results and to follow up from [facility].  Virginie Berrios LPN..................2017   2:08 PM    Jamir Gill is a 62 y.o. male who presents for   Chief Complaint     Patient presents with       Follow Up      chest xray     HPI: Mr. Gill has noted a change in his breathing that has gradually started recently; he felt a certain odd sensation of noise with his breathing this last week a dn was seen in [facility] and CHEST RADIOGRAPH showed elevated R jennifer diaphragm. Afeb and no chest pain. No cough. No previous similar happenings. We discussed UTIs which look to be colonization- he has not improved with culture proven eradication. Will send letter to neurologist- Dr Dee Gibbs at [facility]/ we also discussed physical therapy, home OCCUPATIONAL THERAPY evaluation and incentive spirometer  Past Medical History:     Diagnosis  Date     BPH (benign prostatic hypertrophy) with urinary obstruction      Cervical disc disorder      Cognitive decline      Hypertension      Multiple sclerosis (HC)      Osteoarthritis cervical spine      Osteopenia      Past Surgical History:      Procedure  Laterality Date     APPENDECTOMY      appendectomy       COLONOSCOPY      [place]- colitis       COLONOSCOPY SCREENING      with polyps resected       FRACTURE TREATMENT      ORIF left wrist fracture~Laser prostate surgery--BPH~Colonoscopy  with polyps resected       PROSTATECTOMY      Laser prostate surgery -- BPH       Family  History       Problem   Relation Age of Onset     Cancer-prostate  Father      Other  Father      Alzheimer's/kidney failure       Other  Mother       with Parkinson's       Heart Disease  Mother      CHF for carditis       Good Health  Brother      Heart Disease  Brother      ASCAD with MI       Current Outpatient Prescriptions       Medication  Sig Dispense Refill     aspirin 81 mg tablet Take 81 mg by mouth once daily with a meal.         baclofen (LIORESAL) 10 mg tablet 1 am and noon, 2 pm  0     buPROPion (WELLBUTRIN SR) 200 mg Sustained-Release tablet 1 tablet 2 times daily. 180 tablet 3     calcium carbonate (CALCIUM ANTACID) 200 mg calcium (500 mg) chewable tablet Take 2.5 tablets by mouth 3 times daily with meals.  0     cholecalciferol (VITAMIN D) 1,000 unit capsule Take 1 capsule by mouth 2 times daily.  0     ferrous sulfate, 65 mg elemental, (IRON, FERROUS SULFATE,) tablet Take 0.5 tablets by mouth once daily with a meal.  0     lactobacillus comb no.10 (PROBIOTIC) 20 billion cell cap Take  by mouth.  0     meclizine (ANTIVERT) 25 mg tablet Take 25 mg by mouth 2 times daily.         nitrofurantoin macrocrystals/monohydrate (MACROBID) 100 mg capsule Take 1 capsule by mouth 2 times daily for 7 days. 14 capsule 0     nortriptyline (PAMELOR) 75 mg capsule Take 150 mg by mouth once daily.  1     psyllium (WAL-MUCIL FIBER) 0.52 gram capsule Take 3 capsules by mouth once daily.  0     tamsulosin (FLOMAX) 0.4 mg capsule Take 2 capsules by mouth once daily after a meal. 2 tabs daily  0     No current facility-administered medications for this visit.      Medications have been reviewed by me and are current to the best of my knowledge and ability.    No Known Allergies    EXAM:   Vitals:     17 1410   BP: 148/96   Pulse: 84   Weight: 75.3 kg (166 lb)     General Appearance: Pleasant, alert, appropriate appearance for age. No acute distress  Chest/Respiratory Exam: Normal chest wall and respirations.  Clear to auscultation.  Cardiovascular Exam: Regular rate and rhythm. S1, S2, no murmur, click, gallop, or rubs.  Gastrointestinal Exam: Soft, nontender, no abnormal masses or organomegaly.  ASSESSMENT AND PLAN:  1. Shortness of breath    - D-DIMER; Future  - CT CHEST W; Future     2 UTI- this is likely colonization- letter will be sent to neurologist

## 2017-12-27 NOTE — PROGRESS NOTES
Patient Information     Patient Name MRN Sex Jamir Cooley [id number] Male 1955      Progress Notes by Chante Dubois NP at 2017  2:45 PM     Author:  Chante Dubois NP Service:  (none) Author Type:  PHYS- Nurse Practitioner     Filed:  2017  5:32 PM Encounter Date:  2017 Status:  Signed     :  Chante Dubois NP (PHYS- Nurse Practitioner)            Nursing Notes:   Day Huerta  2017  2:53 PM  Signed  Patient presents to clinic with burning during urination.  Day HuertaLPN ....................  2017   2:52 PM    SUBJECTIVE:    Jamir Gill is a 62 y.o. male who presents for dysuria     Dysuria    This is a new problem. The current episode started yesterday. The problem occurs every urination. The problem has been unchanged. The quality of the pain is described as burning. The pain is moderate. There has been no fever. Associated symptoms include frequency, hesitancy and urgency. Pertinent negatives include no chills, discharge, flank pain, hematuria, nausea, sweats or vomiting. He has tried nothing for the symptoms. The treatment provided no relief. His past medical history is significant for catheterization and recurrent UTIs. HX of MS       Current Outpatient Prescriptions on File Prior to Visit       Medication  Sig Dispense Refill     aspirin 81 mg tablet Take 81 mg by mouth once daily with a meal.         baclofen (LIORESAL) 10 mg tablet 1 am and noon, 2 pm  0     CALCIUM CARBONATE/VITAMIN D3 (CALCIUM 500 + D ORAL) Take 1 tablet by mouth 2 times daily.         cholecalciferol (VITAMIN D) 1,000 unit capsule Take 1 capsule by mouth 2 times daily.  0     Cranberry 400 mg capsule Take  by mouth.  0     FLUoxetine (SARAFEM) 20 mg tablet Take 1 tablet by mouth every morning. Take 1/2 tablet each krishna for 8 days- then 1 each day 30 tablet 6     fluticasone (50 mcg per actuation) nasal solution (FLONASE) USE 2 SPRAYS IN EACH NOSTRIL ONCE DAILY 1 Bottle  "0     lisinopril (PRINIVIL; ZESTRIL) 10 mg tablet Take 10 mg by mouth once daily.       meclizine (ANTIVERT) 25 mg tablet Take 25 mg by mouth 2 times daily.         nortriptyline (PAMELOR) 75 mg capsule Take 150 mg by mouth once daily.  1     tamsulosin (FLOMAX) 0.4 mg capsule Take 2 capsules by mouth once daily after a meal. 2 tabs daily  0     No current facility-administered medications on file prior to visit.        REVIEW OF SYSTEMS:  Review of Systems   Constitutional: Negative for chills.   Gastrointestinal: Negative for nausea and vomiting.   Genitourinary: Positive for dysuria, frequency, hesitancy and urgency. Negative for flank pain and hematuria.       OBJECTIVE:  /78  Pulse 72  Temp 96.8  F (36  C) (Tympanic)  Resp 18  Ht 1.727 m (5' 8\")  Wt 83.6 kg (184 lb 3.2 oz)  BMI 28.01 kg/m2    EXAM:   Physical Exam   Constitutional: He is well-developed, well-nourished, and in no distress.   HENT:   Head: Normocephalic and atraumatic.   Eyes: Conjunctivae are normal.   Cardiovascular: Normal rate.    Pulmonary/Chest: Effort normal. No respiratory distress.   Abdominal: Soft. Bowel sounds are normal. There is no hepatosplenomegaly. There is tenderness in the suprapubic area. There is no rigidity, no rebound, no guarding and no CVA tenderness.   Skin: Skin is warm and dry. No rash noted.   Nursing note and vitals reviewed.    Results for orders placed or performed in visit on 06/06/17      URINALYSIS W REFLEX MICROSCOPIC IF POSITIVE      Result  Value Ref Range    COLOR                     Yellow Yellow Color    CLARITY                   Cloudy (A) Clear Clarity    SPECIFIC GRAVITY,URINE    1.015 1.010, 1.015, 1.020, 1.025                    PH,URINE                  6.5 6.0, 7.0, 8.0, 5.5, 6.5, 7.5, 8.5                    UROBILINOGEN,QUALITATIVE  Normal Normal EU/dl    PROTEIN, URINE Negative Negative mg/dL    GLUCOSE, URINE Negative Negative mg/dL    KETONES,URINE             Negative Negative mg/dL "    BILIRUBIN,URINE           Negative Negative                    OCCULT BLOOD,URINE        Trace (A) Negative                    NITRITE                   Positive (A) Negative                    LEUKOCYTE ESTERASE        Moderate (A) Negative                   URINALYSIS MICROSCOPIC      Result  Value Ref Range    RBC 0-2 0-2, None Seen /HPF    WBC 11-25 (A) 0-2, 3-5, None Seen /HPF    BACTERIA                  Many (A) None Seen, Rare, Occasional, Few Bacteria/HPF    EPITHELIAL CELLS          Few None Seen, Few Epi/HPF       ASSESSMENT/PLAN:    ICD-10-CM    1. Burning with urination R30.0 URINALYSIS W REFLEX MICROSCOPIC IF POSITIVE      URINALYSIS W REFLEX MICROSCOPIC IF POSITIVE      URINALYSIS MICROSCOPIC      URINALYSIS MICROSCOPIC   2. Acute cystitis without hematuria N30.00 ciprofloxacin HCl (CIPRO) 250 mg tablet      URINE CULTURE      URINE CULTURE        Plan:  Completed labs at today's visit U/A.  I personally reviewed the labs with the patient/parent at the visit. Abnormalities include + nitrates, + WBCs, + bacteria. CTX in process. Cipro as discussed. I explained my diagnostic considerations and recommendations to the patient, who voiced understanding and agreement with the treatment plan. All questions were answered. We discussed potential side effects of any prescribed or recommended therapies, as well as expectations for response to treatments. He was advised to contact our office if there is no improvement or worsening of conditions or symptoms.  If s/s worsen or persist, patient will either come back or follow up with PCP.         JESSICA QURESHI NP ....................  6/6/2017   5:01 PM

## 2017-12-27 NOTE — PROGRESS NOTES
Patient Information     Patient Name MRN Sex Jamir Cooley [id number] Male 1955      Progress Notes by Kristy Early PT at 2017  8:17 AM     Author:  Kristy Early PT Service:  (none) Author Type:  PT- Physical Therapist     Filed:  2017 10:30 AM Date of Service:  2017  8:17 AM Status:  Signed     :  Kristy Early PT (PT- Physical Therapist)            [facility]  Outpatient PT - Daily Note        Date of Service: 2017   Visit #: 21 (3 of 10)     Patient Name: Jamir Gill   YOB: 1955   Referring MD/Provider: Dr. Dee Hook, Dr. John Polk  Diagnosis: Multiple sclerosis, bilateral leg weakness, osteoarthritis of cervical region  Treatment Diagnosis: MS, impaired mobility, bilateral leg weakness, impaired posture, impaired balance and endurance; impaired cervical mobility  Insurance:  [employer]  Start of Care Date:  2017   Certification Dates: From: 2017   Re-Cert Due: Medicare/[place] Re-Cert Due: 17  Subjective      Jamir had a fall onto the left knee several times to the point of having a good scrape going there. Has been using the walker consistently. Fell twice in the house the past two days. Gets short of breath very easily now.    Discussed patient's mobility needs with both in- and outdoor ambulation. Patient is hesitant to get a scooter because he can't use it outside, and then once he gets to where he needs to go, I[place]e. Cabins or the dock, he wouldn't have the walker to use due to inability to transport it on the scooter. Discussed getting an indoor scooter to prevent falls inside the house, then possibly a golf cart for his outdoor activities.      Objective  Patient using 4WW today. Bandaid present over left knee.    Today's Intervention:  all performed to muscle fatigue:    Neuromuscular re-ed and therapeutic exercise:    Nustep level 6, seat 11, arms 10 x 6 minutes   Lateral step up and over airex foam,  min to mod use of 1 hand on railing for support. x10 each way  Leg press 100# (seat 22 and pillow behind head) to fatigue 3x10  Snow tez overhead press 2# weights, seated 15-20 reps  Lumbar extension 40# to fatigue 3x10 reps feet 9, knees 11  Seated bent forward Y's 2# weight to fatigue  Seated abduction green theraband around knees to fatigue    Standing hamstring curls no weight with rail support  x10 R, x20 L  Standing hip abduction, light to mod use of rail B x15 - more challenged to keep weight on R and lift L  Seated and standing heel/toe raises x15 double leg     Sustained hold 1 foot on BOSU held to tolerance. Added head turns with right lower extremity up to increase challenge  Toe taps to round side BOSU x20 alternating, CGA, mod use of 1 hand on rail    Deferred:  Partial romberg with passing 2.2 # ball over shoulder  Hip rotation B each x5 IR/ER with knee hip and knee flexed at 90 deg and other leg on round boslter  LAQ B L x30 I, Rx20 with assist right lower extremity just holding upper leg up so he can freely swing lower leg    Abduction 30# to fatigue  Seated hamstring curls red left yellow right lower extremity to fatigue  Triceps extension 2# overhead x15 bilateral   Triceps press 60# x15   TRX squats 2x10. Cues for nose over toes, bending at waist.  Side stepping figure 8s around 4 cones x2 each direction. modA x2 today with large LOB but improved coordination and upright stance, less use of single point cane for support  A/p balance on round surface 1/2 foam with min to mod use of railing for balance. Encouraged hip strategy. Min A by PT with gait belt x30 sec   - controlled DF/PF rocking on flat surface of 1/2 foam roll with light UE support and mod A by PT with gait belt x30 sec    Home Exercise Program: *= added to HEP.    Access Code: [email or web address] URL: [email or web address] Date: 05/16/2017 Prepared by: Kristy Early   Exercises   Hooklying Clamshell with Resistance - 10-15 reps -  1-2 sets - 5 hold - 1x daily   Seated Hamstring Stretch - 1-2 sets - 30 second hold - 1x daily   Romberg Stance - 30 second hold - 2-3 sets - 1x daily   Romberg Stance with Eyes Closed - 2-3 sets - 20-30 second hold - 1x daily   Romberg Stance with Head Rotation - 2-3 sets - 30 second hold - 1x daily   Standing Romberg to 3/4 Tandem Stance - 2-3 sets - 20-30 second hold - 1x daily   Scapular Retraction with Resistance Advanced - 10-15 reps - 1-2 sets - 5 hold - 1x daily     Upper trapezius, scalene and levator stretches x30 sec each. bilateral added 6/19/2017        Assessment     Patient demonstrates progressive early fatigue in lower extremities with more frequent rest breaks needed. Recommendations made for use of equipment such as getting a scooter for safety and energy conservation, but patient very hesitant to use a scooter.    Patient will benefit from continued skilled physical therapy services to address aforementioned impairments and return patient to previous level of functioning.    G Codes and Modifier taken from patient completing the Burrows and dynamic gait index: 6/7/17 (Discharged balance gcodes 6/19/2017)     Goal Primary G Code and Modifier:    The Patient's G Code Goal would be: Mobility    The Patient's Impairment, Limitation or Restriction Modifier goal would be best described as: CJ - 20% - 40% Impairment.      Discharge Primary G Code and Modifier:      The Patient's status upon Discharge is Mobility    The Patient's Impairment, Limitation or Restriction Modifier would be best described as CK - 40% - 60% Impairment.     Cervical codes updated 7/20/2017:    Current Primary G Code and Modifier:    Per the Patient's intake and/or assessment the Primary G Code is: Handling Objects .   The Patient's Impairment, Limitation or Restriction Modifier would be best described as: CI - 1% - 20% Impairment.     Goal Primary G Code and Modifier:    The Patient's G Code Goal would be: Handling  Objects    The Patient's Impairment, Limitation or Restriction Modifier goal would be best described as: CI - 1% - 20% Impairment.      Goals:  Patient will be assessed for appropriate mobility device to promote maintaining his safety with mobility within one visit. Goal met 6/7/17      Short Term Goals: To be met in 4 weeks:   Following physical therapy intervention, the patient will be able to:   1. Patient will improve standing posture to minimize crouched gait pattern. In progress 6/7/17   2. Patient will be independent in home exercise program for progression of strength and balance abilities. In progress 7/20/2017                                                 3.   Patient will decrease falls to no more than 3 per week. In progress 6/7/17       Long Term Goals: To be met in 8 weeks:   Following physical therapy intervention, the patient will be able to:   1. Increase MMT of hip flexors to 3+/5 so that pt able to lift leg independently into car. Not progressing.  2. Patient will improve dynamic gait index score by 5 points to decrease fall risk. Not progressing    New goals added 6/19/2017:      1.   Patient will have improved cervical range of motion by at least 5 degrees with minimal to no referred pain. 80% met     2.   Patient will have less than 3/10 pain with normal activities of daily living. Met 7/20/2017      3.   Patient will have improved postural endurance for ability to complete normal daily work routine including reaching to shoulder height without increase in symptoms. 50% met     4.   Patient will obtain home cervical traction unit for self management of symptoms. - decided to hold off on obtaining unit at this time.        Plan     Treatment Plan / Targeted Outcomes:     Frequency:   16 visits      Duration of Treatment: 8 weeks     Plan of Care Due Date: Medicare/MA Re-Cert Due:  08/14/17     Plan for next visit:  Gait and balance training, home exercise program development, floor  transfer training and strengthening.        Student or PTA has been instructed in and demonstrates skills necessary to carry out above stated treatment plan: Yes     Thank you for your referral to [facility].  Please call with any questions, concerns or comments.  [phone]     Kristy Early, PT, DPT

## 2017-12-27 NOTE — PROGRESS NOTES
Patient Information     Patient Name MRN Sex     Jamir Gill [id number] Male 1955      Progress Notes by John Polk MD at 10/5/2017  2:15 PM     Author:  John Polk MD Service:  (none) Author Type:  Physician     Filed:  10/5/2017  3:31 PM Encounter Date:  10/5/2017 Status:  Signed     :  John Polk MD (Physician)            Nursing Notes:   Antonella Julien  10/5/2017  2:30 PM  Signed  Patient presents to the clinic for a follow up from a CT of his chest and MRI.  Antonella Julien LPN....................10/5/2017 2:19 PM    Jamir Gill is a 62 y.o. male who presents for   Chief Complaint     Patient presents with       Follow Up      CT and x-rays on chest     HPI: Mr. Gill comes in for follow up abnormal CT chest and MRI; each showed some pleural effusion which has been present on previous scan as well as ? Nodule . He also is interested in getting his own wheelchair and needs assessment for type of wheelchair that would best suit him. He has had chest 8 mm and 2 mm nodule that will be watched in 6 month; he has pleural effusion. We discussed deeper insp/ exp and using incentive spirometer and consider pummeling  Past Medical History:     Diagnosis  Date     BPH (benign prostatic hypertrophy) with urinary obstruction      Cervical disc disorder      Cognitive decline      Hypertension      Multiple sclerosis (HC)      Osteoarthritis cervical spine      Osteopenia      Past Surgical History:      Procedure  Laterality Date     APPENDECTOMY      appendectomy       COLONOSCOPY      [place]- colitis       COLONOSCOPY SCREENING      with polyps resected       FRACTURE TREATMENT      ORIF left wrist fracture~Laser prostate surgery--BPH~Colonoscopy  with polyps resected       PROSTATECTOMY      Laser prostate surgery -- BPH       Family History       Problem   Relation Age of Onset     Cancer-prostate  Father      Other  Father      Alzheimer's/kidney failure        Other  Mother       with Parkinson's       Heart Disease  Mother      CHF for carditis       Good Health  Brother      Heart Disease  Brother      ASCAD with MI       Current Outpatient Prescriptions       Medication  Sig Dispense Refill     aspirin 81 mg tablet Take 81 mg by mouth once daily with a meal.         baclofen (LIORESAL) 10 mg tablet 1 am and noon, 2 pm  0     buPROPion (WELLBUTRIN SR) 200 mg Sustained-Release tablet Take 200 mg by mouth once daily.       calcium carbonate (CALCIUM ANTACID) 200 mg calcium (500 mg) chewable tablet Take 2.5 tablets by mouth 3 times daily with meals.  0     calcium polycarbophil (FIBERCON) 625 mg tablet Take 625 mg by mouth once daily.       cholecalciferol (VITAMIN D) 1,000 unit capsule Take 1 capsule by mouth 2 times daily.  0     ferrous sulfate, 65 mg elemental, (IRON, FERROUS SULFATE,) tablet Take 0.5 tablets by mouth once daily with a meal.  0     lactobacillus comb no.10 (PROBIOTIC) 20 billion cell cap Take  by mouth.  0     meclizine (ANTIVERT) 25 mg tablet Take 25 mg by mouth 2 times daily.         nortriptyline (PAMELOR) 75 mg capsule Take 150 mg by mouth once daily.  1     psyllium (WAL-MUCIL FIBER) 0.52 gram capsule Take 3 capsules by mouth once daily.  0     tamsulosin (FLOMAX) 0.4 mg capsule Take 2 capsules by mouth once daily after a meal. 2 tabs daily  0     venlafaxine (EFFEXOR XR) 150 mg Extended-Release capsule Take 1 capsule by mouth once daily with a meal. 90 capsule 0     No current facility-administered medications for this visit.      Medications have been reviewed by me and are current to the best of my knowledge and ability.    No Known Allergies     EXAM:   Vitals:     10/05/17 1427   BP: (!) 154/102   Pulse: 67   SpO2: 92%       ASSESSMENT AND PLAN:  1. MULTIPLE SCLEROSIS    - PT EVAL AND TREAT; Future  - Wheel Chair; Wheelchair: unsure type, to be determined by P.T.  Dispense: 1 Device; Refill: 0    2. Hypertension  watch     3 chest-  repeat CT in 6 mo

## 2017-12-28 NOTE — PROGRESS NOTES
Patient Information     Patient Name MRN Sex Jamir Cooley [id number] Male 1955      Progress Notes by Kristy Early PT at 2017  9:33 AM     Author:  Kristy Early PT Service:  (none) Author Type:  PT- Physical Therapist     Filed:  2017 12:16 PM Date of Service:  2017  9:33 AM Status:  Signed     :  Kristy Early PT (PT- Physical Therapist)            [facility]  Outpatient PT - Daily note        Date of Service: 2017    Visit #: 17 (8 of 10)     Patient Name: Jamir Gill   YOB: 1955   Referring MD/Provider: Dr. Dee Hook, Dr. John Polk  Diagnosis: Multiple sclerosis, bilateral leg weakness, osteoarthritis of cervical region  Treatment Diagnosis: MS, impaired mobility, bilateral leg weakness, impaired posture, impaired balance and endurance; impaired cervical mobility  Insurance:  Preferred One  Start of Care Date:  2017   Certification Dates: From: 2017   Re-Cert Due: Medicare/MA Re-Cert Due: 17  Subjective    Bad leg day today. The past 3 days have been the worst he's had so far. The neck has been feeling good. Much better sleeping. 80-90% better overall with that. No issues with upper extremity pain since last visit.    Objective  Patient using 4WW today.    Today's Intervention:  all performed to muscle fatigue:  Neuromuscular re-ed and therapeutic exercise:  Lumbar extension 40# to fatigue 3x10 reps feet 9, knees 11  Nustep level 6, seat 11, arms 10 x 5 minutes   UBE level 60 x2 mins, forward and retro    Partial tandem with passing 2.2 # ball over shoulder  Snow tez overhead press 2# weights, seated  Side stepping figure 8s around 4 cones x2 each direction. modA x2 today with large LOB but improved coordination and upright stance, less use of single point cane for support  Lateral step up and over airex foam, min to mod use of 1 hand on railing for support. x10 each way    Manual therapy:  Cervical  side gliding left to right x3 mins  Soft tissue mobility to upper trap and scalene and brachial plexus release x3 mins  muscle energy technique ERS C4 left, C0-1 extension and left to right glide, supine  Suboccipital release x30 sec    Deferred:  Standing hamstring curls no weight with TRX strap support 2 x10 each  Sustained hold 1 foot on BOSU held to tolerance. Added head turns with right lower extremity up to increase challenge  A/p balance on round surface 1/2 foam with min to mod use of railing for balance. Encouraged hip strategy.  Toe taps to round side BOSU x20 alternating, CGA, mod use of 1 hand on rail  Abduction 30# to fatigue  LAQ with assist right lower extremity  Seated hamstring curls red left yellow right lower extremity to fatigue  Triceps extension 2# overhead x15 bilateral   Triceps press 60# x15   TRX squats 2x10. Cues for nose over toes, bending at waist.  Standing hip abduction, light to mod use of rail.  Leg press 100# (seat 22 and pillow behind head) to fatigue 3x10    Home Exercise Program: *= added to HEP.    Access Code: [email or web address] URL: [email or web address] Date: 05/16/2017 Prepared by: Kristy Early   Exercises   Hooklying Clamshell with Resistance - 10-15 reps - 1-2 sets - 5 hold - 1x daily   Seated Hamstring Stretch - 1-2 sets - 30 second hold - 1x daily   Romberg Stance - 30 second hold - 2-3 sets - 1x daily   Romberg Stance with Eyes Closed - 2-3 sets - 20-30 second hold - 1x daily   Romberg Stance with Head Rotation - 2-3 sets - 30 second hold - 1x daily   Standing Romberg to 3/4 Tandem Stance - 2-3 sets - 20-30 second hold - 1x daily   Scapular Retraction with Resistance Advanced - 10-15 reps - 1-2 sets - 5 hold - 1x daily     Upper trapezius, scalene and levator stretches x30 sec each. bilateral added 6/19/2017        Assessment     Patient demonstrates improved cervical and soft tissue restrictions and what is present does improve very rapidly with manual therapy.  Patient has had resolution of upper extremity pain. lower extremity endurance very limited today, but patient does well compensating for coordination issues with feet catching.    Patient will benefit from continued skilled physical therapy services to address aforementioned impairments and return patient to previous level of functioning.    G Codes and Modifier taken from patient completing the Burrows and dynamic gait index: 6/7/17 (Discharged balance gcodes 6/19/2017)     Goal Primary G Code and Modifier:    The Patient's G Code Goal would be: Mobility    The Patient's Impairment, Limitation or Restriction Modifier goal would be best described as: CJ - 20% - 40% Impairment.      Discharge Primary G Code and Modifier:      The Patient's status upon Discharge is Mobility    The Patient's Impairment, Limitation or Restriction Modifier would be best described as CK - 40% - 60% Impairment.     Cervical codes to be added next visit 6/22/2017:    Current Primary G Code and Modifier:    Per the Patient's intake and/or assessment the Primary G Code is: Handling Objects .   The Patient's Impairment, Limitation or Restriction Modifier would be best described as: CK - 40% - 60% Impairment.     Goal Primary G Code and Modifier:    The Patient's G Code Goal would be: Handling Objects    The Patient's Impairment, Limitation or Restriction Modifier goal would be best described as: CI - 1% - 20% Impairment.      Goals:  Patient will be assessed for appropriate mobility device to promote maintaining his safety with mobility within one visit. Goal met 6/7/17      Short Term Goals: To be met in 4 weeks:   Following physical therapy intervention, the patient will be able to:   1. Patient will improve standing posture to minimize crouched gait pattern. In progress 6/7/17   2. Patient will be independent in home exercise program for progression of strength and balance abilities.                                                 3.   Patient will decrease falls to no more than 3 per week. In progress 6/7/17       Long Term Goals: To be met in 8 weeks:   Following physical therapy intervention, the patient will be able to:   1. Increase MMT of hip flexors to 3+/5 so that pt able to lift leg independently into car.   2. Patient will improve dynamic gait index score by 5 points to decrease fall risk.    New goals added 6/19/2017:      1.   Patient will have improved cervical range of motion by at least 5 degrees with minimal to no referred pain.     2.   Patient will have less than 3/10 pain with normal activities of daily living.     3.   Patient will have improved postural endurance for ability to complete normal daily work routine including reaching to shoulder height without increase in symptoms.     4.   Patient will obtain home cervical traction unit for self management of symptoms. - decided to hold off on obtaining unit at this time.        Plan     Treatment Plan / Targeted Outcomes:     Frequency:   16 visits      Duration of Treatment: 8 weeks     Plan of Care Due Date: Medicare/MA Re-Cert Due:  08/02/17     Plan for next visit:  Gait and balance training, home exercise program development, floor transfer training and strengthening.        Student or PTA has been instructed in and demonstrates skills necessary to carry out above stated treatment plan: Yes     Thank you for your referral to [facility].  Please call with any questions, concerns or comments.  [phone]     Kristy Early PT, DPT/ ANGELIQUE IgnacioP

## 2017-12-28 NOTE — TELEPHONE ENCOUNTER
Patient Information     Patient Name MRN Sex Jamir Cooley [id number] Male 1955      Telephone Encounter by Berta Duff RN at 2017  1:25 PM     Author:  Berta Duff RN Service:  (none) Author Type:  NURS- Registered Nurse     Filed:  2017  1:30 PM Encounter Date:  2017 Status:  Signed     :  Berta Duff RN (NURS- Registered Nurse)            Patient PHQ is above goal. Limited refill and will route to PCP for follow up recommendation.      Depression-in adults 18 and over  Serotonin/Norepinephrine Reuptake Inhibitors    Office visit in the past 12 months or as indicated in chart.  Should have clinic visit 1-2 months after initial prescription.    Last visit with KEVEN GILLIAM was on: 2017 in [facility]  Next visit with KEVEN GILLIAM is on: No future appointment listed with this provider  Next visit with Family Practice is on: No future appointment listed in this department    Max refills 12 months from last office visit or per providers notes.    PHQ Depression Screening 2017   Date of PHQ exam (doc flow) 2017   1. Lack of interest/pleasure 3 - Nearly every day 1 - Several days   2. Feeling down/depressed 3 - Nearly every day 3 - Nearly every day   PHQ-2 TOTAL SCORE 6 4   3. Trouble sleeping 0 - Not at all 0 - Not at all   4. Decreased energy 3 - Nearly every day 1 - Several days   5. Appetite change 0 - Not at all 0 - Not at all   6. Feelings of failure 3 - Nearly every day 2 - More than half the days   7. Trouble concentrating 0 - Not at all 1 - Several days   8. Activity level 2 - More than half the days 3 - Nearly every day   9. Hurting yourself 0 - Not at all 0 - Not at all   PHQ-9 TOTAL SCORE 14 11   PHQ-9 Severity Level moderate moderate   Functional Impairment - extremely difficult   Some recent data might be hidden       Prescription refilled per RN Medication Refill Policy....................  Berta Duff RN ....................  9/11/2017   1:28 PM

## 2017-12-28 NOTE — PROGRESS NOTES
Patient Information     Patient Name MRN Sex Jamir Cooley [id number] Male 1955      Progress Notes by Urvashi Small at 2017 10:35 AM     Author:  Urvashi Small  Service:  (none) Author Type:  PT- Physical Therapy Assistant     Filed:  2017 11:54 AM  Date of Service:  2017 10:35 AM Status:  Addendum     :  Urvashi Small (PT- Physical Therapy Assistant)        Related Notes: Original Note by Urvashi Small (PT- Physical Therapy Assistant) filed at 2017 11:53 AM            [facility]  Outpatient PT - Daily note        Date of Service: 2017     Visit # 37 (3 of 10)     Patient Name: Jamir Gill   YOB: 1955   Referring MD/Provider: Dr. Dee Hook, Dr. John Polk  Diagnosis: Multiple sclerosis, bilateral leg weakness, osteoarthritis of cervical region  Treatment Diagnosis: MS, impaired mobility, bilateral leg weakness, impaired posture, impaired balance and endurance; impaired cervical mobility  Insurance:  Preferred One  Start of Care Date:  2017   Certification Dates: 10/24/2017     Re-Cert Due: Medicare/MA Re-Cert Due: 2017    Subjective    Hasn't had any neck pain since last PT appointment       Objective    Patient presents ambulatory with his single point cane today.    Today's Intervention:  all exercises completed bilaterally and to fatigue.   Nustep level 6 seat 11 arms 10 x6 minutes    - Jamir adjusted seat to #10    Standing hip abduction and marching hip flexion    - PTA assist to stabilize pelvis when in R LE SLS   Squats without UE support x15 - cues for R hip/knee alignment     Seated hip abduction yellow band x15  Seated hip flexion with yellow band B x15 - minimal ROM on R LE   Seated hip flexion/abduction with yellow band B x3     Step up and over laterally and forward/retro blue foam   - added hip hikes on Airex    - alternating toe taps onto Airex     Stance on Airex:  - EO, EC, EO with  "manual perturbations     Sustained hold with one foot up on 4\" box with head turns   - EC without head turns     Leg press 90# 2 x10 progressed to 3 sets  Lumbar extension 30# 3 x10, knees 9, feet 11    Sidestepping B x15' without AD     1/2 foam roll:  - round surface - balancing   - flat surface - controlled rocking DF/PF with UEs as needed     Deferred:  Side stepping figure 8's around cones, no use of single point cane x1 each direction         Home Exercise Program: *= added to HEP.    Access Code: [email or web address] URL: [email or web address] Date: 05/16/2017 Prepared by: Kristy Early   Exercises   Hooklying Clamshell with Resistance - 10-15 reps - 1-2 sets - 5 hold - 1x daily   Seated Hamstring Stretch - 1-2 sets - 30 second hold - 1x daily   Romberg Stance - 30 second hold - 2-3 sets - 1x daily   Romberg Stance with Eyes Closed - 2-3 sets - 20-30 second hold - 1x daily   Romberg Stance with Head Rotation - 2-3 sets - 30 second hold - 1x daily   Standing Romberg to 3/4 Tandem Stance - 2-3 sets - 20-30 second hold - 1x daily   Scapular Retraction with Resistance Advanced - 10-15 reps - 1-2 sets - 5 hold - 1x daily     Upper trapezius, scalene and levator stretches x30 sec each. bilateral added 6/19/2017   Access Code: [email or web address] URL: [email or web address] Date: 10/11/2017 Prepared by: Kristy Early   Exercises   Seated March - 10-15 reps - 1-2 sets - 5 seconds hold - 1x daily   Seated Hip Abduction with Resistance - 10-15 reps - 1-2 sets - 5 seconds hold - 1x daily   Seated Knee Flexion Slide - 15-20 reps - 1-2 sets - 5 hold - 1x daily   Seated Shoulder Flexion Full Range - 10-15 reps - 1-2 sets - 5 seconds hold - 1x daily   Seated Overhead Press - 10-15 reps - 1-2 sets - 5 seconds hold - 1x daily   Seated Shoulder Horizontal Abduction with Resistance - 10-15 reps - 1-2 sets - 5 seconds hold - 1x daily   Seated Trunk Rotation - 10-15 reps - 1-2 sets - 5 seconds hold - 1x daily "          Assessment     Patient demonstrates improved mobility with less restrictive assistive device. Improved right lower extremity hip flexion strength noted today. Patient would benefit from additional skilled physical therapy services to address aforementioned impairments and return patient to previous level of functioning. Patient would also benefit from obtaining a home cervical traction unit at this time to self manage recurring neck pain. He has had good resolution of his neck pain with traction in the clinic and will be transitioning to independent management within the next month.     Cervical codes updated 10/24/2017:    Goal Primary G Code and Modifier:    The Patient's G Code Goal would be: Handling Objects    The Patient's Impairment, Limitation or Restriction Modifier goal would be best described as: CI - 1% - 20% Impairment.      Discharge Primary G Code and Modifier:      The Patient's status upon Discharge is Handling Objects    The Patient's Impairment, Limitation or Restriction Modifier would be best described as CI - 1% - 20% Impairment.     G Codes and Modifier taken from patient completing the Burrows and dynamic gait index: 6/7/17 (Discharged balance gcodes 6/19/2017)     Goal Primary G Code and Modifier:    The Patient's G Code Goal would be: Mobility    The Patient's Impairment, Limitation or Restriction Modifier goal would be best described as: CJ - 20% - 40% Impairment.      Discharge Primary G Code and Modifier:      The Patient's status upon Discharge is Mobility    The Patient's Impairment, Limitation or Restriction Modifier would be best described as CK - 40% - 60% Impairment.     G Codes and Modifier taken from patient completing the Burrows and dynamic gait index: 10/24/2017 (to be added next visit)    Current Primary G Code and Modifier:    Per the Patient's intake and/or assessment the Primary G Code is: Mobility .   The Patient's Impairment, Limitation or  Restriction Modifier would be best described as: CK - 40% - 60% Impairment.     Goal Primary G Code and Modifier:    The Patient's G Code Goal would be: Mobility    The Patient's Impairment, Limitation or Restriction Modifier goal would be best described as: CJ - 20% - 40% Impairment.     Goals:  Patient will be assessed for appropriate mobility device to promote maintaining his safety with mobility within one visit. Goal met 6/7/17      Short Term Goals: To be met in 4 weeks:   Following physical therapy intervention, the patient will be able to:   1. Patient will improve standing posture to minimize crouched gait pattern. In progress 9/19/2017   2. Patient will be independent in home exercise program for progression of strength and balance abilities. In progress 9/19/2017                                        3.   Patient will decrease falls to no more than 3 per week. Not met 9/19/2017       Long Term Goals: To be met in 8 weeks:   Following physical therapy intervention, the patient will be able to:   1. Increase MMT of hip flexors to 3+/5 so that pt able to lift leg independently into car. In progress 10/24/2017   2.     New goals added 6/19/2017:      1.   Patient will have improved cervical range of motion by at least 5 degrees with minimal to no referred pain. Met 10/24/2017      2.   Patient will have less than 3/10 pain with normal activities of daily living. Met for cervical goal 10/24/2017      3.   Patient will have improved postural endurance for ability to complete normal daily work routine including reaching to shoulder height without increase in symptoms. In progress 10/24/2017      4.   Patient will obtain home cervical traction unit for self management of symptoms. -in progress 10/24/2017      5.   Patient will improve mCTSIB scores by 5 seconds for improved endurance for independent stance and improved stability. In progress 10/24/2017      Plan     Treatment Plan / Targeted Outcomes:      Frequency:   8 additional visits      Duration of Treatment: 8 weeks     Plan of Care Due Date: Medicare/MA Re-Cert Due:  12/19/2017     Plan for next visit:  Gait and balance training, home exercise program development, transfers, independent mobility and strengthening. Cervical side gliding and mobilization as appropriate. Postural strengthening.        Student or PTA has been instructed in and demonstrates skills necessary to carry out above stated treatment plan: Yes     Thank you for your referral to [facility].  Please call with any questions, concerns or comments.  [phone]

## 2017-12-28 NOTE — TELEPHONE ENCOUNTER
Patient Information     Patient Name MRN Jamir Boudreaux [id number] Male 1955      Telephone Encounter by Antonella Julien at 2017  7:49 AM     Author:  Antonella Julien Service:  (none) Author Type:  (none)     Filed:  2017  7:50 AM Encounter Date:  2017 Status:  Signed     :  Antonella Julien            Question answered in 2nd [employer] message.  Antonella Julien LPN....................2017 7:50 AM

## 2017-12-28 NOTE — TELEPHONE ENCOUNTER
Patient Information     Patient Name MRN Sex Jamir Cooley [id number] Male 1955      Telephone Encounter by Antonella Julien at 6/15/2017 11:33 AM     Author:  Antonella Julien Service:  (none) Author Type:  (none)     Filed:  6/15/2017 11:34 AM Encounter Date:  6/15/2017 Status:  Signed     :  Antonella Julien            Patient wants an order for P.T for neck traction.  He has done this in the past and has gotten relief.    The order is ready for signature if okay.    Antonella Julien LPN....................6/15/2017 11:34 AM

## 2017-12-28 NOTE — PROGRESS NOTES
"Patient Information     Patient Name MRN Sex Jamir Cooley [id number] Male 1955      Progress Notes by Kristy Early PT at 10/2/2017  4:43 PM     Author:  Kristy Early PT Service:  (none) Author Type:  PT- Physical Therapist     Filed:  10/2/2017  4:49 PM Date of Service:  10/2/2017  4:43 PM Status:  Signed     :  Kristy Early PT (PT- Physical Therapist)            [facility]  Outpatient PT - Daily Note        Date of Service: 10/2/2017    Visit #: 29 (5 of 10)     Patient Name: Jamir Gill   YOB: 1955   Referring MD/Provider: Dr. Dee Hook, Dr. John Polk  Diagnosis: Multiple sclerosis, bilateral leg weakness, osteoarthritis of cervical region  Treatment Diagnosis: MS, impaired mobility, bilateral leg weakness, impaired posture, impaired balance and endurance; impaired cervical mobility  Insurance:  Preferred One  Start of Care Date:  2017   Certification Dates: 2017    Re-Cert Due: Medicare/MA Re-Cert Due: 2017    Subjective    Patient reports improvement in neck pain but did have one episode of both sides tightening up and had to massage them to get the soreness and stiffness to improve. No pain currently and having a little tougher day today.    Objective    Today's Intervention:  all exercises completed bilaterally and to fatigue  Nustep level 6 seat 11 arms 10 x5 mins. At 4 mins patient had core fatigue resulting in leaning to the left, had to keep self correcting.    Walk in hernandez with front wheeled walker and contact guard assist 2x30 feet. Leaning more to the left during ambulation     Supine soft tissue mobilization to neck and upper trapezius x10 mins   Supine hip abduction clams red theraband resistance  Supine SAQ over bolster, manual assist right lower extremity    Seated core training: rotation with bilateral upper extremities holding 6\" med ball; reaching right and left    Standing hip abduction    Seated LAQ " "(light manual assist for RLE)  Seated hamstring curls on slide board  Seated hip flexion toe taps to 2\" box    Seated upper extremity overhead press 1# weights  Seated scapular sets + external rotation with yellow theraband resistance    Deferred:  Med x leg press 50# seat 21, back upright with pillow behind head. Patient needed assistance to get legs up on pedal  Lumbar extension 20# 2 x10    Home Exercise Program: *= added to HEP.    Access Code: [email or web address] URL: [email or web address] Date: 05/16/2017 Prepared by: Kristy Early   Exercises   Hooklying Clamshell with Resistance - 10-15 reps - 1-2 sets - 5 hold - 1x daily   Seated Hamstring Stretch - 1-2 sets - 30 second hold - 1x daily   Romberg Stance - 30 second hold - 2-3 sets - 1x daily   Romberg Stance with Eyes Closed - 2-3 sets - 20-30 second hold - 1x daily   Romberg Stance with Head Rotation - 2-3 sets - 30 second hold - 1x daily   Standing Romberg to 3/4 Tandem Stance - 2-3 sets - 20-30 second hold - 1x daily   Scapular Retraction with Resistance Advanced - 10-15 reps - 1-2 sets - 5 hold - 1x daily     Upper trapezius, scalene and levator stretches x30 sec each. bilateral added 6/19/2017        Assessment     Patient demonstrates core weakness and early fatigue of all extremities.    Patient will benefit from continued skilled physical therapy services to address aforementioned impairments and return patient to previous level of functioning.    G Codes and Modifier taken from patient completing the Burrows and dynamic gait index: 6/7/17 (Discharged balance gcodes 6/19/2017)     Goal Primary G Code and Modifier:    The Patient's G Code Goal would be: Mobility    The Patient's Impairment, Limitation or Restriction Modifier goal would be best described as: CJ - 20% - 40% Impairment.      Discharge Primary G Code and Modifier:      The Patient's status upon Discharge is Mobility    The Patient's Impairment, Limitation or Restriction Modifier " would be best described as CK - 40% - 60% Impairment.     Cervical codes updated 8/16/2017:    Current Primary G Code and Modifier:    Per the Patient's intake and/or assessment the Primary G Code is: Handling Objects .   The Patient's Impairment, Limitation or Restriction Modifier would be best described as: CI - 1% - 20% Impairment.     Goal Primary G Code and Modifier:    The Patient's G Code Goal would be: Handling Objects    The Patient's Impairment, Limitation or Restriction Modifier goal would be best described as: CI - 1% - 20% Impairment.      Goals:  Patient will be assessed for appropriate mobility device to promote maintaining his safety with mobility within one visit. Goal met 6/7/17      Short Term Goals: To be met in 4 weeks:   Following physical therapy intervention, the patient will be able to:   1. Patient will improve standing posture to minimize crouched gait pattern. In progress 9/19/2017   2. Patient will be independent in home exercise program for progression of strength and balance abilities. In progress 9/19/2017                                        3.   Patient will decrease falls to no more than 3 per week. Not met 9/19/2017       Long Term Goals: To be met in 8 weeks:   Following physical therapy intervention, the patient will be able to:   1. Increase MMT of hip flexors to 3+/5 so that pt able to lift leg independently into car. Not progressing.  2.   3.     New goals added 6/19/2017:      1.   Patient will have improved cervical range of motion by at least 5 degrees with minimal to no referred pain. 40% met 9/19/2017      2.   Patient will have less than 3/10 pain with normal activities of daily living. In progress 9/19/2017      3.   Patient will have improved postural endurance for ability to complete normal daily work routine including reaching to shoulder height without increase in symptoms. In progress 8/16/2017           5.   Patient will improve mCTSIB scores by 5  seconds for improved endurance for independent stance and improved stability. (New goal added 9/19/2017)     Plan     Treatment Plan / Targeted Outcomes:     Frequency:   16 additional visits      Duration of Treatment: 8 weeks     Plan of Care Due Date: Medicare/MA Re-Cert Due:  11/14/2017     Plan for next visit:  Gait and balance training, home exercise program development, transfers, independent mobility and strengthening. Cervical side gliding and mobilization as appropriate. Postural strengthening.        Student or PTA has been instructed in and demonstrates skills necessary to carry out above stated treatment plan: Yes     Thank you for your referral to [facility]  Please call with any questions, concerns or comments.  [phone]     Kristy Early, PT, DPT

## 2017-12-28 NOTE — PROGRESS NOTES
Patient Information     Patient Name MRN Sex Jamir Cooley [id number] Male 1955      Progress Notes by Kristy Early PT at 2017  1:01 PM     Author:  Kristy Early PT Service:  (none) Author Type:  PT- Physical Therapist     Filed:  2017  3:02 PM Date of Service:  2017  1:01 PM Status:  Signed     :  Kristy Early PT (PT- Physical Therapist)            [facility]  Outpatient PT - Daily Note        Date of Service: 2017    Visit #: 26 (2 of 10)     Patient Name: Jamir Gill   YOB: 1955   Referring MD/Provider: Dr. Dee Hook, Dr. John Polk  Diagnosis: Multiple sclerosis, bilateral leg weakness, osteoarthritis of cervical region  Treatment Diagnosis: MS, impaired mobility, bilateral leg weakness, impaired posture, impaired balance and endurance; impaired cervical mobility  Insurance:  Preferred One  Start of Care Date:  2017   Certification Dates: 2017    Re-Cert Due: Medicare/MA Re-Cert Due: 2017    Subjective    Just came from a doctor's appointment in [place].     Pain today is about a 3/10 in the neck. Only hurts with turning it and didn't get too sore after last visit.     Objective    Observation/palpation: C3 ERS L C4-5 ERS left; right> left 1st rib hypomobility    Today's Intervention:  all exercises completed bilaterally and to fatigue  Nustep level 4 seat 10 arms 10 x5 mins    Supine muscle energy technique C spine  soft tissue mobilization scalenes x5 mins  Upper trapezius stretching and cervical rotation, supine    Supine SAQ over bolster x10   Supine heel slides with mod A for right lower extremity  Supine hip abduction on slide board with modA for right lower extremity     Seated upper extremity overhead press 1# weights  Seated scapular sets and external rotation with yellow theraband resistance    Walk in hernandez with front wheeled walker 2 x75 feet with contact guard assist       Home Exercise  Program: *= added to HEP.    Access Code: [email or web address] URL: [email or web address] Date: 05/16/2017 Prepared by: Kristy Early   Exercises   Hooklying Clamshell with Resistance - 10-15 reps - 1-2 sets - 5 hold - 1x daily   Seated Hamstring Stretch - 1-2 sets - 30 second hold - 1x daily   Romberg Stance - 30 second hold - 2-3 sets - 1x daily   Romberg Stance with Eyes Closed - 2-3 sets - 20-30 second hold - 1x daily   Romberg Stance with Head Rotation - 2-3 sets - 30 second hold - 1x daily   Standing Romberg to 3/4 Tandem Stance - 2-3 sets - 20-30 second hold - 1x daily   Scapular Retraction with Resistance Advanced - 10-15 reps - 1-2 sets - 5 hold - 1x daily     Upper trapezius, scalene and levator stretches x30 sec each. bilateral added 6/19/2017        Assessment     Patient demonstrates very early fatigue of all extremities during therapeutic exercise and difficulty with trunk control in unsupported sitting with upper extremity movement.    Patient will benefit from continued skilled physical therapy services to address aforementioned impairments and return patient to previous level of functioning.    G Codes and Modifier taken from patient completing the Burrows and dynamic gait index: 6/7/17 (Discharged balance gcodes 6/19/2017)     Goal Primary G Code and Modifier:    The Patient's G Code Goal would be: Mobility    The Patient's Impairment, Limitation or Restriction Modifier goal would be best described as: CJ - 20% - 40% Impairment.      Discharge Primary G Code and Modifier:      The Patient's status upon Discharge is Mobility    The Patient's Impairment, Limitation or Restriction Modifier would be best described as CK - 40% - 60% Impairment.     Cervical codes updated 8/16/2017:    Current Primary G Code and Modifier:    Per the Patient's intake and/or assessment the Primary G Code is: Handling Objects .   The Patient's Impairment, Limitation or Restriction Modifier would be best  described as: CI - 1% - 20% Impairment.     Goal Primary G Code and Modifier:    The Patient's G Code Goal would be: Handling Objects    The Patient's Impairment, Limitation or Restriction Modifier goal would be best described as: CI - 1% - 20% Impairment.      Goals:  Patient will be assessed for appropriate mobility device to promote maintaining his safety with mobility within one visit. Goal met 6/7/17      Short Term Goals: To be met in 4 weeks:   Following physical therapy intervention, the patient will be able to:   1. Patient will improve standing posture to minimize crouched gait pattern. In progress 9/19/2017   2. Patient will be independent in home exercise program for progression of strength and balance abilities. In progress 9/19/2017                                        3.   Patient will decrease falls to no more than 3 per week. Not met 9/19/2017       Long Term Goals: To be met in 8 weeks:   Following physical therapy intervention, the patient will be able to:   1. Increase MMT of hip flexors to 3+/5 so that pt able to lift leg independently into car. Not progressing.  2.   3.     New goals added 6/19/2017:      1.   Patient will have improved cervical range of motion by at least 5 degrees with minimal to no referred pain. 40% met 9/19/2017      2.   Patient will have less than 3/10 pain with normal activities of daily living. In progress 9/19/2017      3.   Patient will have improved postural endurance for ability to complete normal daily work routine including reaching to shoulder height without increase in symptoms. In progress 8/16/2017           5.   Patient will improve mCTSIB scores by 5 seconds for improved endurance for independent stance and improved stability. (New goal added 9/19/2017)     Plan     Treatment Plan / Targeted Outcomes:     Frequency:   16 additional visits      Duration of Treatment: 8 weeks     Plan of Care Due Date: Medicare/MA Re-Cert Due:  11/14/2017     Plan for  next visit:  Gait and balance training, home exercise program development, transfers, independent mobility and strengthening. Cervical side gliding and mobilization as appropriate. Postural strengthening.        Student or PTA has been instructed in and demonstrates skills necessary to carry out above stated treatment plan: Yes     Thank you for your referral to [facility].  Please call with any questions, concerns or comments.  [phone]     Kristy Early, PT, DPT

## 2017-12-28 NOTE — PROGRESS NOTES
Patient Information     Patient Name MRN Sex Jamir Cooley [id number] Male 1955      Progress Notes by Natty Castillo, PT at 2017 10:34 AM     Author:  Natty Castillo PT Service:  (none) Author Type:  PT- Physical Therapist     Filed:  2017 11:47 AM Date of Service:  2017 10:34 AM Status:  Signed     :  Natty Castillo PT (PT- Physical Therapist)            [facility]  Outpatient PT - Daily note        Date of Service: 2017     Visit # 38 (4 of 10)     Patient Name: Jamir Gill   YOB: 1955   Referring MD/Provider: Dr. Dee Hook, Dr. John Polk  Diagnosis: Multiple sclerosis, bilateral leg weakness, osteoarthritis of cervical region  Treatment Diagnosis: MS, impaired mobility, bilateral leg weakness, impaired posture, impaired balance and endurance; impaired cervical mobility  Insurance:  Preferred One  Start of Care Date:  2017   Certification Dates: 10/24/2017     Re-Cert Due: Medicare/MA Re-Cert Due: 2017    Subjective    Hasn't had any neck pain since last PT appointment.  Hoping to leave for [place] right after Thanksgiving.  Has had PT there, will look into working out there.  Going good with HEP otherwise.    Objective    Patient presents ambulatory with his single point cane today.    Today's Intervention:  all exercises completed bilaterally and to fatigue.   Nustep level 6 seat 11 arms 10 x6 minutes    - Jamir adjusted seat to #10 and arms to #10    Squats with light UE support x10 - cues for R hip/knee alignment   SLS - L 20 sec max, R 2 sec max without hands on rail  Standing hip abduction L with hands on rail to support R LE SLS x10, R x10 with no hands on rail for support, CGA  Standing marching hip flexion with hands on rail B x15    Seated hip abduction yellow band x30  Seated hip flexion with yellow band B x15 - decreased ROM on R LE   Seated hip flexion/abduction with yellow band B x3  "    Sidestepping B x15' without AD     Step up on blue foam x 10 leading with each left and right, no hands, CGA  Side step up and over x8 each way, light touch on rail and CGA    Stance on Airex:  - EO, EC with manual perturbations x45-60 sec each, less pressure with EC  - hip hikes B x10 with hand on rail light touch    Leg press 90# 3 x10 (seat back most upright, seat X21)  Lumbar extension 30# 1st set, 40# 2nd and 3rd set3 x10, knees 9, feet 11      Deferred:  - Side stepping figure 8's around cones, no use of single point cane x1 each direction  - alternating toe taps onto Airex   - Sustained hold with one foot up on 4\" box with head turns   - EC without head turns   1/2 foam roll:  - round surface - balancing   - flat surface - controlled rocking DF/PF with UEs as needed          Home Exercise Program: *= added to HEP.    Access Code: [email or web address] URL: [email or web address] Date: 05/16/2017 Prepared by: Kristy Early   Exercises   Hooklying Clamshell with Resistance - 10-15 reps - 1-2 sets - 5 hold - 1x daily   Seated Hamstring Stretch - 1-2 sets - 30 second hold - 1x daily   Romberg Stance - 30 second hold - 2-3 sets - 1x daily   Romberg Stance with Eyes Closed - 2-3 sets - 20-30 second hold - 1x daily   Romberg Stance with Head Rotation - 2-3 sets - 30 second hold - 1x daily   Standing Romberg to 3/4 Tandem Stance - 2-3 sets - 20-30 second hold - 1x daily   Scapular Retraction with Resistance Advanced - 10-15 reps - 1-2 sets - 5 hold - 1x daily     Upper trapezius, scalene and levator stretches x30 sec each. bilateral added 6/19/2017   Access Code: [email or web address] URL: [email or web address] Date: 10/11/2017 Prepared by: Kristy Early   Exercises   Seated March - 10-15 reps - 1-2 sets - 5 seconds hold - 1x daily   Seated Hip Abduction with Resistance - 10-15 reps - 1-2 sets - 5 seconds hold - 1x daily   Seated Knee Flexion Slide - 15-20 reps - 1-2 sets - 5 hold - 1x daily   Seated Shoulder " Flexion Full Range - 10-15 reps - 1-2 sets - 5 seconds hold - 1x daily   Seated Overhead Press - 10-15 reps - 1-2 sets - 5 seconds hold - 1x daily   Seated Shoulder Horizontal Abduction with Resistance - 10-15 reps - 1-2 sets - 5 seconds hold - 1x daily   Seated Trunk Rotation - 10-15 reps - 1-2 sets - 5 seconds hold - 1x daily          Assessment     Patient demonstrates improved mobility with less restrictive assistive device. Improved right lower extremity hip flexion strength noted today. Patient would benefit from additional skilled physical therapy services to address aforementioned impairments and return patient to previous level of functioning. Patient would also benefit from obtaining a home cervical traction unit at this time to self manage recurring neck pain. He has had good resolution of his neck pain with traction in the clinic and will be transitioning to independent management within the next month.     Cervical codes updated 10/24/2017:    Goal Primary G Code and Modifier:    The Patient's G Code Goal would be: Handling Objects    The Patient's Impairment, Limitation or Restriction Modifier goal would be best described as: CI - 1% - 20% Impairment.      Discharge Primary G Code and Modifier:      The Patient's status upon Discharge is Handling Objects    The Patient's Impairment, Limitation or Restriction Modifier would be best described as CI - 1% - 20% Impairment.     G Codes and Modifier taken from patient completing the Burrows and dynamic gait index: 6/7/17 (Discharged balance gcodes 6/19/2017)     Goal Primary G Code and Modifier:    The Patient's G Code Goal would be: Mobility    The Patient's Impairment, Limitation or Restriction Modifier goal would be best described as: CJ - 20% - 40% Impairment.      Discharge Primary G Code and Modifier:      The Patient's status upon Discharge is Mobility    The Patient's Impairment, Limitation or Restriction Modifier would be best  described as CK - 40% - 60% Impairment.     G Codes and Modifier taken from patient completing the Burrows and dynamic gait index: 10/24/2017 (to be added next visit)    Current Primary G Code and Modifier:    Per the Patient's intake and/or assessment the Primary G Code is: Mobility .   The Patient's Impairment, Limitation or Restriction Modifier would be best described as: CK - 40% - 60% Impairment.     Goal Primary G Code and Modifier:    The Patient's G Code Goal would be: Mobility    The Patient's Impairment, Limitation or Restriction Modifier goal would be best described as: CJ - 20% - 40% Impairment.     Goals:  Patient will be assessed for appropriate mobility device to promote maintaining his safety with mobility within one visit. Goal met 6/7/17      Short Term Goals: To be met in 4 weeks:   Following physical therapy intervention, the patient will be able to:   1. Patient will improve standing posture to minimize crouched gait pattern. In progress 9/19/2017   2. Patient will be independent in home exercise program for progression of strength and balance abilities. In progress 9/19/2017                                        3.   Patient will decrease falls to no more than 3 per week. Not met 9/19/2017       Long Term Goals: To be met in 8 weeks:   Following physical therapy intervention, the patient will be able to:   1. Increase MMT of hip flexors to 3+/5 so that pt able to lift leg independently into car. In progress 10/24/2017   2.     New goals added 6/19/2017:      1.   Patient will have improved cervical range of motion by at least 5 degrees with minimal to no referred pain. Met 10/24/2017      2.   Patient will have less than 3/10 pain with normal activities of daily living. Met for cervical goal 10/24/2017      3.   Patient will have improved postural endurance for ability to complete normal daily work routine including reaching to shoulder height without increase in symptoms. In progress  10/24/2017      4.   Patient will obtain home cervical traction unit for self management of symptoms. -in progress 10/24/2017      5.   Patient will improve mCTSIB scores by 5 seconds for improved endurance for independent stance and improved stability. In progress 10/24/2017      Plan     Treatment Plan / Targeted Outcomes:     Frequency:   8 additional visits      Duration of Treatment: 8 weeks     Plan of Care Due Date: Medicare/MA Re-Cert Due:  12/19/2017     Plan for next visit:  Gait and balance training, home exercise program development, transfers, independent mobility and strengthening. Cervical side gliding and mobilization as appropriate. Postural strengthening.        Student or PTA has been instructed in and demonstrates skills necessary to carry out above stated treatment plan: Yes     Thank you for your referral to [facility].  Please call with any questions, concerns or comments.  [phone]

## 2017-12-28 NOTE — PROGRESS NOTES
Patient Information     Patient Name MRN Sex     Jamir Gill [id number] Male 1955      Progress Notes by Mikala Grace NP at 2017 12:45 PM     Author:  Mikala Grace NP Service:  (none) Author Type:  PHYS- Nurse Practitioner     Filed:  2017  6:59 PM Encounter Date:  2017 Status:  Signed     :  Mikala Grace NP (PHYS- Nurse Practitioner)            Nursing Notes:   Kacey Cardona  2017 12:56 PM  Signed  Patient presents to the clinic for possible UTI. States he has been experiencing s/sx for the past couple days and include burning and frequency.   Kacey Cardona LPN............................ 2017 12:38 PM       HPI:   Jamir Gill is a 62 y.o. male who presents for bladder concerns.  History of MS, self caths his bladder twice daily.  Symptoms for the past 4-5 days.  Symptoms include dysuria and frequency.  No noted blood in urine.  No fevers or chills.  No nausea or vomiting.  Normal appetite, no change.  No diarrhea.  Struggles with constipation, take fiber pills daily.  Also takes Flomax daily.  No abdominal pain.  No back pain.  History of UTIs, last about 2 months ago.          Past Medical History:     Diagnosis  Date     BPH (benign prostatic hypertrophy) with urinary obstruction      Cervical disc disorder      Cognitive decline      Hypertension      Multiple sclerosis (HC)      Osteoarthritis cervical spine      Osteopenia        Past Surgical History:      Procedure  Laterality Date     APPENDECTOMY      appendectomy       COLONOSCOPY      [place]- colitis       COLONOSCOPY SCREENING      with polyps resected       FRACTURE TREATMENT      ORIF left wrist fracture~Laser prostate surgery--BPH~Colonoscopy  with polyps resected       PROSTATECTOMY      Laser prostate surgery -- BPH         Social History     Substance Use Topics       Smoking status: Never Smoker     Smokeless tobacco: Never Used     Alcohol use No       Current  "Outpatient Prescriptions       Medication  Sig Dispense Refill     aspirin 81 mg tablet Take 81 mg by mouth once daily with a meal.         baclofen (LIORESAL) 10 mg tablet 1 am and noon, 2 pm  0     buPROPion (WELLBUTRIN SR) 200 mg Sustained-Release tablet 1 tablet 2 times daily. 180 tablet 3     CALCIUM CARBONATE/VITAMIN D3 (CALCIUM 500 + D ORAL) Take 1 tablet by mouth 2 times daily.         cholecalciferol (VITAMIN D) 1,000 unit capsule Take 1 capsule by mouth 2 times daily.  0     FLUoxetine (SARAFEM) 20 mg tablet Take 1 tablet by mouth every morning. Take 1/2 tablet each krishna for 8 days- then 1 each day 30 tablet 6     fluticasone (50 mcg per actuation) nasal solution (FLONASE) USE 2 SPRAYS IN EACH NOSTRIL ONCE DAILY 1 Bottle 0     meclizine (ANTIVERT) 25 mg tablet Take 25 mg by mouth 2 times daily.         nortriptyline (PAMELOR) 75 mg capsule Take 150 mg by mouth once daily.  1     tamsulosin (FLOMAX) 0.4 mg capsule Take 2 capsules by mouth once daily after a meal. 2 tabs daily  0     No current facility-administered medications for this visit.      Medications have been reviewed by me and are current to the best of my knowledge and ability.      No Known Allergies    REVIEW OF SYSTEMS:  Refer to HPI.      EXAM:   Vitals:    Pulse 74  Temp 97.1  F (36.2  C) (Tympanic)  Resp 20  Ht 1.727 m (5' 8\")  Wt 76.2 kg (168 lb)  BMI 25.54 kg/m2    General Appearance: Pleasant, alert, appropriate appearance for age. No acute distress  Chest/Respiratory Exam: Normal chest wall and respirations. Clear to auscultation.  Cardiovascular Exam: Regular rate and rhythm. S1, S2, no murmur  Gastrointestinal Exam: Soft, no masses or organomegaly. Abdomen non-tender. Normal BS x 4. Suprapubic tenderness. No CVA tenderness to palpation. No rebound tenderness or guarding.  Psychiatric Exam: Alert and oriented - appropriate affect.      Labs:   Results for orders placed or performed in visit on 08/25/17      URINALYSIS W REFLEX " MICROSCOPIC IF POSITIVE      Result  Value Ref Range    COLOR                     Yellow Yellow Color    CLARITY                   Clear Clear Clarity    SPECIFIC GRAVITY,URINE    1.010 1.010, 1.015, 1.020, 1.025                    PH,URINE                  6.5 6.0, 7.0, 8.0, 5.5, 6.5, 7.5, 8.5                    UROBILINOGEN,QUALITATIVE  Normal Normal EU/dl    PROTEIN, URINE Negative Negative mg/dL    GLUCOSE, URINE 100 (A) Negative mg/dL    KETONES,URINE             Negative Negative mg/dL    BILIRUBIN,URINE           Negative Negative                    OCCULT BLOOD,URINE        Trace (A) Negative                    NITRITE                   Positive (A) Negative                    LEUKOCYTE ESTERASE        Moderate (A) Negative                   URINALYSIS MICROSCOPIC      Result  Value Ref Range    RBC 0-2 0-2, None Seen /HPF    WBC 11-25 (A) 0-2, 3-5, None Seen /HPF    BACTERIA                  Many (A) None Seen, Rare, Occasional, Few Bacteria/HPF    EPITHELIAL CELLS          None Seen None Seen, Few Epi/HPF    OTHER Mucus Present     AMORPHOUS                 Present (A) (none)                           ASSESSMENT AND PLAN:      ICD-10-CM    1. Urinary frequency R35.0 URINALYSIS W REFLEX MICROSCOPIC IF POSITIVE      URINALYSIS W REFLEX MICROSCOPIC IF POSITIVE      URINALYSIS MICROSCOPIC      URINALYSIS MICROSCOPIC   2. Self-catheterizes urinary bladder Z78.9 URINE CULTURE      URINE CULTURE   3. Neurogenic bladder disorder N31.9 URINE CULTURE      URINE CULTURE   4. Urinary tract infection without hematuria, site unspecified N39.0 ciprofloxacin HCl (CIPRO) 250 mg tablet      URINE CULTURE      URINE CULTURE         Urinalysis - positive nitrite, many bacteria  Urine culture pending  Cipro 250 mg BID x 10 days   Encouraged fluids and frequent bladder emptying.  May use Pyridium OTC PRN.   Call or return to clinic PRN if these symptoms worsen or fail to improve as anticipated. Will call if culture warrants  change of abx.         Patient Instructions   Antibiotic has been sent to pharmacy. Please take full course of antibiotic even if symptoms have completely resolved. This helps prevent against antibiotic resistance.     We will culture the urine to see what bacteria grows out of the urine.  We will call the patient if a change of antibiotic is necessary per the culture.      Patient was instructed in increase fluids including water and cranberry juice.      Follow up with any worsening symptoms or concerns       Oral: Advise patient to take drug 2 hours before or 6 hours after magnesium or aluminum-containing antacids or products containing calcium, iron, or zinc    Oral: Warn patient to avoid taking drug alone with dairy products (milk, yogurt) or calcium-fortified juice. The drug may be taken with meals that contain these items     Advise patient to report hallucinations, depression, suicidal thoughts, or convulsions      Instruct patient to report symptoms of tendonitis or tendon rupture, especially if elderly and/or using concomitant steroids    Tell patient to report symptoms of peripheral neuropathy, including pain, burning, tingling, or numbness     Advise patient to use sunscreen, avoid tanning beds, and avoid excessive exposure to sunlight as drug causes phototoxicity          ANDREA JOHNSON NP..................8/25/2017 12:55 PM

## 2017-12-28 NOTE — PROGRESS NOTES
"Patient Information     Patient Name MRN Sex Jamir Cooley [id number] Male 1955      Progress Notes by Kristy Early PT at 6/15/2017  9:52 AM     Author:  Kristy Early PT Service:  (none) Author Type:  PT- Physical Therapist     Filed:  6/15/2017 12:19 PM Date of Service:  6/15/2017  9:52 AM Status:  Signed     :  Kristy Early PT (PT- Physical Therapist)            [facility]  Outpatient PT - Daily note        Date of Service: 6/15/2017    Visit #: 8     Patient Name: Jamir Gill   YOB: 1955   Referring MD/Provider: Dr. Dee Hook  Diagnosis: Multiple sclerosis, bilateral leg weakness   Treatment Diagnosis: MS, impaired mobility, bilateral leg weakness, impaired posture, impaired balance and endurance   Insurance:  Preferred One  Start of Care Date:  2017   Certification Dates: From: 17  Re-Cert Due: Medicare/[place] Re-Cert Due: 17    Subjective       Patient reports neck issues have come back. right sided referred pain which had been fixed with prior bout of physical therapy about 4 years ago. Trying to contact his doctor to get a referral. States traction really helped take care of the pain.    Objective  Today's Intervention:  all performed to muscle fatigue:  Nustep level 7, seat 11, arms 10 x5 mins    Toe taps to 4\" alyx x1 min  Step up and over laterally on foam square x10 each  Side stepping over 4\" alyx x10    Chest press 30# 2 x10  Triceps press 60# 3 x10  Leg press 80# seat 22 to fatigue  Lumbar extension 62# to fatigue    A/p balance on flat surface 1/2 foam and tandem stance on round surface with min to mod use of railing for balance. Encouraged hip strategy.    Sustained hold 1 foot on Blue foam with head turns    Deferred:  Triceps extension 2# overhead x15 bilateral   Standing hip abduction, light use of rail. Improved right knee extension noted today  LAQ with assist right lower extremity  Abduction 30# to " fatigue  Side stepping figure 8s around 4 cones  TRX squats 2x10. Cues for nose over toes, bending at waist.  Standing hamstring curls no weight with TRX strap support 2 x10 each  Snow angels 2# weights  Seated hamstring curls red left yellow right lower extremity to fatigue  Home Exercise Program: *= added to HEP.    Access Code: [email or web address] URL: [email or web address] Date: 05/16/2017 Prepared by: Kristy Early   Exercises   Hooklying Clamshell with Resistance - 10-15 reps - 1-2 sets - 5 hold - 1x daily   Seated Hamstring Stretch - 1-2 sets - 30 second hold - 1x daily   Romberg Stance - 30 second hold - 2-3 sets - 1x daily   Romberg Stance with Eyes Closed - 2-3 sets - 20-30 second hold - 1x daily   Romberg Stance with Head Rotation - 2-3 sets - 30 second hold - 1x daily   Standing Romberg to 3/4 Tandem Stance - 2-3 sets - 20-30 second hold - 1x daily   Scapular Retraction with Resistance Advanced - 10-15 reps - 1-2 sets - 5 hold - 1x daily        Assessment     Patient demonstrates improved endurance for upright gait pattern following today's session.    G Codes and Modifier taken from patient completing the Burrows and dynamic gait index: 6/7/17   Current Primary G Code and Modifier:                         Per the Patient's intake and/or assessment the Primary G Code is: Mobility .                        The Patient's Impairment, Limitation or Restriction Modifier would be best described as: CK - 40% - 60% Impairment.   Goal Primary G Code and Modifier:                         The Patient's G Code Goal would be: Mobility                         The Patient's Impairment, Limitation or Restriction Modifier goal would be best described as: CJ - 20% - 40% Impairment.                      Goals:  Patient will be assessed for appropriate mobility device to promote maintaining his safety with mobility within one visit. Goal met 6/7/17      Short Term Goals: To be met in 4 weeks:   Following physical  therapy intervention, the patient will be able to:   1. Patient will improve standing posture to minimize crouched gait pattern. In progress 6/7/17   2. Patient will be independent in home exercise program for progression of strength and balance abilities.                                                3.   Patient will decrease falls to no more than 3 per week. In progress 6/7/17       Long Term Goals: To be met in 8 weeks:   Following physical therapy intervention, the patient will be able to:   1. Increase MMT of hip flexors to 3+/5 so that pt able to lift leg independently into car.   2. Patient will improve dynamic gait index score by 5 points to decrease fall risk.        Plan     Treatment Plan / Targeted Outcomes:     Frequency:   16 visits     Duration of Treatment: 8 weeks     Plan of Care Due Date: Medicare/MA Re-Cert Due:  08/02/17     Plan for next visit:  Gait and balance training, home exercise program development, floor transfer training and strengthening.        Student or PTA has been instructed in and demonstrates skills necessary to carry out above stated treatment plan: Yes     Thank you for your referral to [facility].  Please call with any questions, concerns or comments.  [phone]     Kristy Early, PT, DPT

## 2017-12-28 NOTE — TELEPHONE ENCOUNTER
Patient Information     Patient Name MRN Sex Jamir Cooley [id number] Male 1955      Telephone Encounter by Daniella Sethi at 2017  3:14 PM     Author:  Daniella Sethi Service:  (none) Author Type:  (none)     Filed:  2017  3:14 PM Encounter Date:  2017 Status:  Signed     :  Daniella Sethi            Patient wanted the name of counselor , Located within [facility] and phone number [phone].  Daniella Sethi LPN ....................2017  3:14 PM

## 2017-12-28 NOTE — PROGRESS NOTES
"Patient Information     Patient Name MRN Sex Jamir Cooley [id number] Male 1955      Progress Notes by Kristy Early PT at 2017 10:46 AM     Author:  Kristy Early PT Service:  (none) Author Type:  PT- Physical Therapist     Filed:  2017 12:34 PM Date of Service:  2017 10:46 AM Status:  Signed     :  Kristy Early PT (PT- Physical Therapist)            [facility]  Outpatient PT - Daily note        Date of Service: 2017    Visit #: 10 (1 of 10)     Patient Name: Jamir Gill   YOB: 1955   Referring MD/Provider: Dr. Dee Hook, Dr. John Polk  Diagnosis: Multiple sclerosis, bilateral leg weakness, osteoarthritis of cervical region  Treatment Diagnosis: MS, impaired mobility, bilateral leg weakness, impaired posture, impaired balance and endurance; impaired cervical mobility  Insurance:  Preferred One  Start of Care Date:  2017   Certification Dates: From: 2017   Re-Cert Due: Medicare/MA Re-Cert Due: 17    Subjective    Current pain is 2-3 out 10 pain. When it [the neck] hurts, it hurts less than it had before. \"Better.\" yesterday was a bad day for the legs, had no stamina.     Objective    Observation: cervical mobility improved since last visit.  C3-4 left FRS; C4-5 FRS left   T12 FRS right   Hypomobile 1st rib right  Brachial plexus restriction, mild to moderate, right  Median nerve restriction, mild to moderate, right     Today's Intervention:  all performed to muscle fatigue:  Neuromuscular re-ed and therapeutic exercise:  Toe taps to round side BOSU x15 alternating; progressed to sustained hold with head turns  lateral step up and over airex foam, min to mod use of 1 hand on railing for support.    Snow tez overhead press 2# weights, seated  A/p balance on round surface 1/2 foam with min to mod use of railing for balance. Encouraged hip strategy.  Side stepping figure 8s around 4 cones x2 each direction. 2 " self corrected loss of balance and 1 modA needed to prevent a fall.     Lumbar extension 64# to fatigue  Leg press 70# seat 22 to fatigue  Nustep level 6, seat 11, arms 10 x5 mins    Manual therapy:  muscle energy technique 2 x3 reps each level as above, cervical FRS; supine  Cervical side gliding left to right x5 mins  Soft tissue mobility and brachial plexus release x3 mins  Supine median nerve gliding x10 reps  muscle energy technique right 1st rib, supine  Seated muscle energy technique thoracic FRS    Mechanical Traction: cervical traction performed 20 maximum weight, 10 minimum weight 60 seconds on, 20 seconds off for 10 minutes in supine position at 30 degrees of flexion.    Deferred:  Abduction 30# to fatigue  LAQ with assist right lower extremity  Seated hamstring curls red left yellow right lower extremity to fatigue  Triceps extension 2# overhead x15 bilateral   Triceps press 60# x15  Standing hip abduction, light use of rail.   TRX squats 2x10. Cues for nose over toes, bending at waist.  Standing hamstring curls no weight with TRX strap support 2 x10 each    Home Exercise Program: *= added to HEP.    Access Code: [email or web address] URL: [email or web address] Date: 05/16/2017 Prepared by: Kristy Early   Exercises   Hooklying Clamshell with Resistance - 10-15 reps - 1-2 sets - 5 hold - 1x daily   Seated Hamstring Stretch - 1-2 sets - 30 second hold - 1x daily   Romberg Stance - 30 second hold - 2-3 sets - 1x daily   Romberg Stance with Eyes Closed - 2-3 sets - 20-30 second hold - 1x daily   Romberg Stance with Head Rotation - 2-3 sets - 30 second hold - 1x daily   Standing Romberg to 3/4 Tandem Stance - 2-3 sets - 20-30 second hold - 1x daily   Scapular Retraction with Resistance Advanced - 10-15 reps - 1-2 sets - 5 hold - 1x daily     Upper trapezius, scalene and levator stretches x30 sec each. bilateral added 6/19/2017        Assessment     Patient demonstrates improved cervical mobility and decreased  joint restrictions in Cspine and 1st rib. This improves with manual therapy and patient reports decreased pain following manual therapy and traction.    Patient will benefit from continued skilled physical therapy services to address aforementioned impairments and return patient to previous level of functioning.    G Codes and Modifier taken from patient completing the Burrows and dynamic gait index: 6/7/17 (Discharged balance gcodes 6/19/2017)     Goal Primary G Code and Modifier:    The Patient's G Code Goal would be: Mobility    The Patient's Impairment, Limitation or Restriction Modifier goal would be best described as: CJ - 20% - 40% Impairment.      Discharge Primary G Code and Modifier:      The Patient's status upon Discharge is Mobility    The Patient's Impairment, Limitation or Restriction Modifier would be best described as CK - 40% - 60% Impairment.     Cervical codes to be added next visit 6/22/2017:    Current Primary G Code and Modifier:    Per the Patient's intake and/or assessment the Primary G Code is: Handling Objects .   The Patient's Impairment, Limitation or Restriction Modifier would be best described as: CK - 40% - 60% Impairment.     Goal Primary G Code and Modifier:    The Patient's G Code Goal would be: Handling Objects    The Patient's Impairment, Limitation or Restriction Modifier goal would be best described as: CI - 1% - 20% Impairment.      Goals:  Patient will be assessed for appropriate mobility device to promote maintaining his safety with mobility within one visit. Goal met 6/7/17      Short Term Goals: To be met in 4 weeks:   Following physical therapy intervention, the patient will be able to:   1. Patient will improve standing posture to minimize crouched gait pattern. In progress 6/7/17   2. Patient will be independent in home exercise program for progression of strength and balance abilities.                                                3.   Patient will  decrease falls to no more than 3 per week. In progress 6/7/17       Long Term Goals: To be met in 8 weeks:   Following physical therapy intervention, the patient will be able to:   1. Increase MMT of hip flexors to 3+/5 so that pt able to lift leg independently into car.   2. Patient will improve dynamic gait index score by 5 points to decrease fall risk.    New goals added 6/19/2017:      1.   Patient will have improved cervical range of motion by at least 5 degrees with minimal to no referred pain.     2.   Patient will have less than 3/10 pain with normal activities of daily living.     3.   Patient will have improved postural endurance for ability to complete normal daily work routine including reaching to shoulder height without increase in symptoms.     4.   Patient will obtain home cervical traction unit for self management of symptoms.         Plan     Treatment Plan / Targeted Outcomes:     Frequency:   16 visits      Duration of Treatment: 8 weeks     Plan of Care Due Date: Medicare/[place] Re-Cert Due:  08/02/17     Plan for next visit:  Gait and balance training, home exercise program development, floor transfer training and strengthening.        Student or PTA has been instructed in and demonstrates skills necessary to carry out above stated treatment plan: Yes     Thank you for your referral to [facility].  Please call with any questions, concerns or comments.  [phone]     Kristy Early, PT, DPT

## 2017-12-28 NOTE — PROGRESS NOTES
Patient Information     Patient Name MRN Sex Jamir Cooley [id number] Male 1955      Progress Notes by Urvashi Small at 2017  1:57 PM     Author:  Urvashi Small Service:  (none) Author Type:  PT- Physical Therapy Assistant     Filed:  2017  4:33 PM Date of Service:  2017  1:57 PM Status:  Signed     :  Urvashi Small (PT- Physical Therapy Assistant)            [facility]  Outpatient PT - Daily Note        Date of Service: 2017   Visit #: 19 (1 of 10)     Patient Name: Jamir Gill   YOB: 1955   Referring MD/Provider: Dr. Dee Hook, Dr. John Polk  Diagnosis: Multiple sclerosis, bilateral leg weakness, osteoarthritis of cervical region  Treatment Diagnosis: MS, impaired mobility, bilateral leg weakness, impaired posture, impaired balance and endurance; impaired cervical mobility  Insurance:  Preferred One  Start of Care Date:  2017   Certification Dates: From: 2017   Re-Cert Due: Medicare/MA Re-Cert Due: 17  Subjective      Jamir reports about 8 falls since last PT appointment with a number of them being indoor. Reports that he can get up from the floor after a fall as long as he can get it accomplished on the first attempt. Feels like most of his falls have been due to placement of walker - either placed incorrectly or kicking walker.      Objective  Patient using 4WW today.    Today's Intervention:  all performed to muscle fatigue:    Neuromuscular re-ed and therapeutic exercise:    Nustep level 6, seat 11, arms 10 x 5 minutes   Lumbar extension 40# to fatigue 3x10 reps feet 9, knees 11    A/p balance on round surface 1/2 foam with min to mod use of railing for balance. Encouraged hip strategy.   - controlled DF/PF rocking on flat surface of 1/2 foam roll with light UE support   Partial romberg with passing 2.2 # ball over shoulder  Snow tez overhead press 2# weights, seated  Seated bent forward Y's 2#  weight to fatigue  Seated scapular sets green theraband x15. Mod to max cues for form  LAQ with assist right lower extremity B x10    Deferred:  Manual therapy:  Cervical side gliding left to right x3 mins  Soft tissue mobility to upper trap and scalene and brachial plexus release x3 mins  muscle energy technique ERS C3 and 5 left, supine  muscle energy technique 1st rib right  left side lying levator muscle energy technique on right, reach and roll x10. Cues for scapular retraction    Standing hamstring curls no weight with TRX strap support 2 x10 each  Sustained hold 1 foot on BOSU held to tolerance. Added head turns with right lower extremity up to increase challenge  Toe taps to round side BOSU x20 alternating, CGA, mod use of 1 hand on rail  Abduction 30# to fatigue    Seated hamstring curls red left yellow right lower extremity to fatigue  Triceps extension 2# overhead x15 bilateral   Triceps press 60# x15   TRX squats 2x10. Cues for nose over toes, bending at waist.  Standing hip abduction, light to mod use of rail.  Leg press 100# (seat 22 and pillow behind head) to fatigue 3x10  Side stepping figure 8s around 4 cones x2 each direction. modA x2 today with large LOB but improved coordination and upright stance, less use of single point cane for support  Lateral step up and over airex foam, min to mod use of 1 hand on railing for support. x10 each way    Home Exercise Program: *= added to HEP.    Access Code: [email or web address] URL: [email or web address] Date: 05/16/2017 Prepared by: Kristy Early   Exercises   Hooklying Clamshell with Resistance - 10-15 reps - 1-2 sets - 5 hold - 1x daily   Seated Hamstring Stretch - 1-2 sets - 30 second hold - 1x daily   Romberg Stance - 30 second hold - 2-3 sets - 1x daily   Romberg Stance with Eyes Closed - 2-3 sets - 20-30 second hold - 1x daily   Romberg Stance with Head Rotation - 2-3 sets - 30 second hold - 1x daily   Standing Romberg to 3/4 Tandem Stance - 2-3 sets  - 20-30 second hold - 1x daily   Scapular Retraction with Resistance Advanced - 10-15 reps - 1-2 sets - 5 hold - 1x daily     Upper trapezius, scalene and levator stretches x30 sec each. bilateral added 6/19/2017        Assessment     Patient displaying increased lower extremity fatigue and lack of coordination today. Needed assistance to get into vehicle due to fatigue. Increased upper extremity muscular and joint restrictions due to more consistent use of walker with heavy upper extremity support needed recently. Cervical goals re-assessed at this time, but lacks progress on balance and lower extremity strength due to increase in MS symptoms.    Patient will benefit from continued skilled physical therapy services to address aforementioned impairments and return patient to previous level of functioning.    G Codes and Modifier taken from patient completing the Burrows and dynamic gait index: 6/7/17 (Discharged balance gcodes 6/19/2017)     Goal Primary G Code and Modifier:    The Patient's G Code Goal would be: Mobility    The Patient's Impairment, Limitation or Restriction Modifier goal would be best described as: CJ - 20% - 40% Impairment.      Discharge Primary G Code and Modifier:      The Patient's status upon Discharge is Mobility    The Patient's Impairment, Limitation or Restriction Modifier would be best described as CK - 40% - 60% Impairment.     Cervical codes updated 7/20/2017:    Current Primary G Code and Modifier:    Per the Patient's intake and/or assessment the Primary G Code is: Handling Objects .   The Patient's Impairment, Limitation or Restriction Modifier would be best described as: CI - 1% - 20% Impairment.     Goal Primary G Code and Modifier:    The Patient's G Code Goal would be: Handling Objects    The Patient's Impairment, Limitation or Restriction Modifier goal would be best described as: CI - 1% - 20% Impairment.      Goals:  Patient will be assessed for appropriate  mobility device to promote maintaining his safety with mobility within one visit. Goal met 6/7/17      Short Term Goals: To be met in 4 weeks:   Following physical therapy intervention, the patient will be able to:   1. Patient will improve standing posture to minimize crouched gait pattern. In progress 6/7/17   2. Patient will be independent in home exercise program for progression of strength and balance abilities. In progress 7/20/2017                                                 3.   Patient will decrease falls to no more than 3 per week. In progress 6/7/17       Long Term Goals: To be met in 8 weeks:   Following physical therapy intervention, the patient will be able to:   1. Increase MMT of hip flexors to 3+/5 so that pt able to lift leg independently into car. Not progressing.  2. Patient will improve dynamic gait index score by 5 points to decrease fall risk. Not progressing    New goals added 6/19/2017:      1.   Patient will have improved cervical range of motion by at least 5 degrees with minimal to no referred pain. 80% met     2.   Patient will have less than 3/10 pain with normal activities of daily living. Met 7/20/2017      3.   Patient will have improved postural endurance for ability to complete normal daily work routine including reaching to shoulder height without increase in symptoms. 50% met     4.   Patient will obtain home cervical traction unit for self management of symptoms. - decided to hold off on obtaining unit at this time.        Plan     Treatment Plan / Targeted Outcomes:     Frequency:   16 visits      Duration of Treatment: 8 weeks     Plan of Care Due Date: Medicare/[place] Re-Cert Due:  08/02/17     Plan for next visit:  Gait and balance training, home exercise program development, floor transfer training and strengthening.        Student or PTA has been instructed in and demonstrates skills necessary to carry out above stated treatment plan: Yes     Thank you for your referral to  [facility].  Please call with any questions, concerns or comments.  [phone]     Kristy Early, PT, DPT

## 2017-12-28 NOTE — PROGRESS NOTES
Patient Information     Patient Name MRN Sex Jamir Cooley [id number] Male 1955      Progress Notes by Holley Alvarez at 2017  8:58 AM     Author:  Holley Alvarez Service:  (none) Author Type:  Other Clinical Staff     Filed:  2017  8:58 AM Date of Service:  2017  8:58 AM Status:  Signed     :  Holley Alvarez (Other Clinical Staff)            IV Contrast- Discharge Instructions After Your CT Scan      The IV contrast you received today will be filtered from your bloodstream by your kidneys during the next 24 hours and pass from the body in urine.  You will not be aware of this process and your urine will not change in color.  To help this process you should drink at least 4 additional glasses of water or juice today.  This reduces stress on your kidneys.    Most contrast reactions are immediate.  Should you develop symptoms of concern after discharge, contact the department at the number below.  After hours you should contact your personal physician.  If you develop breathing distress or wheezing, call 911.

## 2017-12-28 NOTE — PROGRESS NOTES
Patient Information     Patient Name MRN Sex Jamir Cooley [id number] Male 1955      Progress Notes by Kristy Early PT at 10/19/2017 10:33 AM     Author:  Kristy Early PT Service:  (none) Author Type:  PT- Physical Therapist     Filed:  10/19/2017  2:50 PM Date of Service:  10/19/2017 10:33 AM Status:  Signed     :  Kristy Early PT (PT- Physical Therapist)            [facility]  Outpatient PT - Daily Note        Date of Service: 10/19/2017    Visit #: 33 (9 of 10)     Patient Name: Jamir Gill   YOB: 1955   Referring MD/Provider: Dr. Dee Hook, Dr. John Polk  Diagnosis: Multiple sclerosis, bilateral leg weakness, osteoarthritis of cervical region  Treatment Diagnosis: MS, impaired mobility, bilateral leg weakness, impaired posture, impaired balance and endurance; impaired cervical mobility  Insurance:  Preferred One  Start of Care Date:  2017   Certification Dates: 2017    Re-Cert Due: Medicare/MA Re-Cert Due: 2017    Subjective    Patient states he is doing well today. Continues to do fine getting into the vehicle. Notes that with one episode of neck pain, tried lifting his head to mimic the traction machine and the pain into the right side stopped with this, but returned when he let go. Did get it to go away with massage eventually.     Discussed home exercise program and progressing to the PEAK program in the next few weeks. Patient agreeable.     Objective  Patient presents ambulatory with his 4WW today.    Today's Intervention:  all exercises completed bilaterally and to fatigue. Patient instructed on all  for transition to the PEAK program.    Nustep level 6 seat 11 arms 10 x7 mins. No leaning to the left today.    Chest press seat 3 30# 3rd hole 2 x10  Triceps seat 3 50# 2 x10  UBC level 60 x5 mins forward and retro  Med x leg press 80# seat 21, back upright with pillow behind head.     Standing marching  hip flexion  Standing hip abduction with minimal use of railing for balance x12 bilateral   Feet together- eyes open, eyes closed, head turns    Deferred:  Lumbar extension 30# 2 x10, legs 9, feet 11  Abduction 20# leg pads 2      Home Exercise Program: *= added to HEP.    Access Code: [email or web address] URL: [email or web address] Date: 05/16/2017 Prepared by: Kristy Early   Exercises   Hooklying Clamshell with Resistance - 10-15 reps - 1-2 sets - 5 hold - 1x daily   Seated Hamstring Stretch - 1-2 sets - 30 second hold - 1x daily   Romberg Stance - 30 second hold - 2-3 sets - 1x daily   Romberg Stance with Eyes Closed - 2-3 sets - 20-30 second hold - 1x daily   Romberg Stance with Head Rotation - 2-3 sets - 30 second hold - 1x daily   Standing Romberg to 3/4 Tandem Stance - 2-3 sets - 20-30 second hold - 1x daily   Scapular Retraction with Resistance Advanced - 10-15 reps - 1-2 sets - 5 hold - 1x daily     Upper trapezius, scalene and levator stretches x30 sec each. bilateral added 6/19/2017   Access Code: [email or web address] URL: [email or web address] Date: 10/11/2017 Prepared by: Kristy Early   Exercises   Seated March - 10-15 reps - 1-2 sets - 5 seconds hold - 1x daily   Seated Hip Abduction with Resistance - 10-15 reps - 1-2 sets - 5 seconds hold - 1x daily   Seated Knee Flexion Slide - 15-20 reps - 1-2 sets - 5 hold - 1x daily   Seated Shoulder Flexion Full Range - 10-15 reps - 1-2 sets - 5 seconds hold - 1x daily   Seated Overhead Press - 10-15 reps - 1-2 sets - 5 seconds hold - 1x daily   Seated Shoulder Horizontal Abduction with Resistance - 10-15 reps - 1-2 sets - 5 seconds hold - 1x daily   Seated Trunk Rotation - 10-15 reps - 1-2 sets - 5 seconds hold - 1x daily          Assessment     Patient demonstrates improved mobility with gait less crouched and good core control during therapeutic exercise today. Continues to tire quickly with low resistance, but progressing ability to perform standing tasks  again. Recommend transition to partial independent exercise via the PEAK program and continuing physical therapy services to address gait and balance.    Patient will benefit from continued skilled physical therapy services to address aforementioned impairments and return patient to previous level of functioning.    G Codes and Modifier taken from patient completing the Burrows and dynamic gait index: 6/7/17 (Discharged balance gcodes 6/19/2017)     Goal Primary G Code and Modifier:    The Patient's G Code Goal would be: Mobility    The Patient's Impairment, Limitation or Restriction Modifier goal would be best described as: CJ - 20% - 40% Impairment.      Discharge Primary G Code and Modifier:      The Patient's status upon Discharge is Mobility    The Patient's Impairment, Limitation or Restriction Modifier would be best described as CK - 40% - 60% Impairment.     Cervical codes updated 8/16/2017:    Current Primary G Code and Modifier:    Per the Patient's intake and/or assessment the Primary G Code is: Handling Objects .   The Patient's Impairment, Limitation or Restriction Modifier would be best described as: CI - 1% - 20% Impairment.     Goal Primary G Code and Modifier:    The Patient's G Code Goal would be: Handling Objects    The Patient's Impairment, Limitation or Restriction Modifier goal would be best described as: CI - 1% - 20% Impairment.      Goals:  Patient will be assessed for appropriate mobility device to promote maintaining his safety with mobility within one visit. Goal met 6/7/17      Short Term Goals: To be met in 4 weeks:   Following physical therapy intervention, the patient will be able to:   1. Patient will improve standing posture to minimize crouched gait pattern. In progress 9/19/2017   2. Patient will be independent in home exercise program for progression of strength and balance abilities. In progress 9/19/2017                                        3.   Patient will  decrease falls to no more than 3 per week. Not met 9/19/2017       Long Term Goals: To be met in 8 weeks:   Following physical therapy intervention, the patient will be able to:   1. Increase MMT of hip flexors to 3+/5 so that pt able to lift leg independently into car. Not progressing.  2.   3.     New goals added 6/19/2017:      1.   Patient will have improved cervical range of motion by at least 5 degrees with minimal to no referred pain. 40% met 9/19/2017      2.   Patient will have less than 3/10 pain with normal activities of daily living. In progress 9/19/2017      3.   Patient will have improved postural endurance for ability to complete normal daily work routine including reaching to shoulder height without increase in symptoms. In progress 8/16/2017           5.   Patient will improve mCTSIB scores by 5 seconds for improved endurance for independent stance and improved stability. (New goal added 9/19/2017)     Plan     Treatment Plan / Targeted Outcomes:     Frequency:   16 additional visits      Duration of Treatment: 8 weeks     Plan of Care Due Date: Medicare/MA Re-Cert Due:  11/14/2017     Plan for next visit:  Gait and balance training, home exercise program development, transfers, independent mobility and strengthening. Cervical side gliding and mobilization as appropriate. Postural strengthening.        Student or PTA has been instructed in and demonstrates skills necessary to carry out above stated treatment plan: Yes     Thank you for your referral to [facility].  Please call with any questions, concerns or comments.  [phone]     Kristy Early, PT, DPT

## 2017-12-28 NOTE — TELEPHONE ENCOUNTER
Patient Information     Patient Name MRN Sex Jamir Cooley [id number] Male 1955      Telephone Encounter by Daniella Sethi at 2017  1:58 PM     Author:  Daniella Sethi Service:  (none) Author Type:  (none)     Filed:  2017  1:59 PM Encounter Date:  6/15/2017 Status:  Signed     :  Daniella Sethi            PT has been ordered for patient . Patient has set up appointment for this.  Daniella Sethi LPN ....................2017  1:58 PM

## 2017-12-28 NOTE — PROGRESS NOTES
Patient Information     Patient Name MRN Sex Jamir Cooley [id number] Male 1955      Progress Notes by Holley Alvarez at 2017  8:58 AM     Author:  Holley Alvarez Service:  (none) Author Type:  Other Clinical Staff     Filed:  2017  8:58 AM Date of Service:  2017  8:58 AM Status:  Signed     :  Holley Alvarez (Other Clinical Staff)            1.  Has the patient had a previous reaction to IV contrast? No    2.  Does the patient have kidney disease? No    3.  Is the patient on dialysis? No    If YES to any of these questions, exam will be reviewed with a Radiologist before administering contrast.

## 2017-12-28 NOTE — PROGRESS NOTES
Patient Information     Patient Name MRN Sex Jamir Faust [id number] Male 1955      Progress Notes by John Polk MD at 2017  9:00 AM     Author:  John Polk MD Service:  (none) Author Type:  Physician     Filed:  2017 10:00 AM Encounter Date:  2017 Status:  Signed     :  John Polk MD (Physician)            Nursing Notes:   Antonella Julien  2017  9:23 AM  Signed  Patient presents to the clinic to talk about memory issues related to MS.  He feels it is getting worse lately.  He would like to talk about changing some medications.  Antonella Julien LPN....................2017 9:18 AM    Jamir Gill is a 62 y.o. male who presents for   Chief Complaint    Patient presents with      Medication Management     Memory Loss     HPI: Mr. Gill has noted that his BP is good without lisinopril. He has noted no elevations. He does not feel the wellbutrin has helped yet at 100 mg bid. He has noted some dry mouth symptoms but these are tolerable; he is interested in increasing the dose. He also has concerns that he has word finding difficulty/ he has had MMSE twice in the past with Neurology- as well as more extensive testing without conclusion of AD. H eis concerned about his memory  Past Medical History:     Diagnosis  Date     BPH (benign prostatic hypertrophy) with urinary obstruction      Cervical disc disorder      Cognitive decline      Hypertension      Multiple sclerosis (HC)      Osteoarthritis cervical spine      Osteopenia      Past Surgical History:      Procedure  Laterality Date     APPENDECTOMY      appendectomy       COLONOSCOPY      [place]- colitis       COLONOSCOPY SCREENING      with polyps resected       FRACTURE TREATMENT      ORIF left wrist fracture~Laser prostate surgery--BPH~Colonoscopy  with polyps resected       PROSTATECTOMY      Laser prostate surgery -- BPH       Family History       Problem   Relation Age of Onset  "    Cancer-prostate  Father      Other  Father      Alzheimer's/kidney failure       Other  Mother       with Parkinson's       Heart Disease  Mother      CHF for carditis       Good Health  Brother      Heart Disease  Brother      ASCAD with MI       Current Outpatient Prescriptions       Medication  Sig Dispense Refill     aspirin 81 mg tablet Take 81 mg by mouth once daily with a meal.         baclofen (LIORESAL) 10 mg tablet 1 am and noon, 2 pm  0     buPROPion (WELLBUTRIN SR) 100 mg Sustained-Release tablet TAKE 1 TABLET BY MOUTH TWICE DAILY 180 tablet 0     CALCIUM CARBONATE/VITAMIN D3 (CALCIUM 500 + D ORAL) Take 1 tablet by mouth 2 times daily.         cholecalciferol (VITAMIN D) 1,000 unit capsule Take 1 capsule by mouth 2 times daily.  0     FLUoxetine (SARAFEM) 20 mg tablet Take 1 tablet by mouth every morning. Take 1/2 tablet each krishna for 8 days- then 1 each day 30 tablet 6     fluticasone (50 mcg per actuation) nasal solution (FLONASE) USE 2 SPRAYS IN EACH NOSTRIL ONCE DAILY 1 Bottle 0     meclizine (ANTIVERT) 25 mg tablet Take 25 mg by mouth 2 times daily.         nortriptyline (PAMELOR) 75 mg capsule Take 150 mg by mouth once daily.  1     tamsulosin (FLOMAX) 0.4 mg capsule Take 2 capsules by mouth once daily after a meal. 2 tabs daily  0     No current facility-administered medications for this visit.      Medications have been reviewed by me and are current to the best of my knowledge and ability.    No Known Allergies     EXAM:   Vitals:     17 0921   BP: 132/82   TempSrc: Temporal   Weight: 76.2 kg (168 lb)   Height: 1.73 m (5' 8.11\")     General Appearance: Pleasant, alert, appropriate appearance for age. No acute distress  Psychiatric Exam: Alert and oriented, appropriate affect.  ASSESSMENT AND PLAN:  1. Moderate episode of recurrent major depressive disorder (HC)  wellbutrin has not yet helped but will increase dose as high as 400 with increase every week to 10 days    2. " Hypertension  Controlled without med   3 memory decline- word finding especially- did well except math with MMSE- no dementia noted

## 2017-12-28 NOTE — PROGRESS NOTES
Patient Information     Patient Name MRN Sex Jamir Cooley [id number] Male 1955      Progress Notes by Natty Castillo PT at 2017  9:45 AM     Author:  Natty Castillo PT Service:  (none) Author Type:  PT- Physical Therapist     Filed:  2017 12:09 PM Date of Service:  2017  9:45 AM Status:  Signed     :  Natty Castillo PT (PT- Physical Therapist)            [facility]  Outpatient PT - Daily note        Date of Service: 2017    Visit #: [id number] (5 of 10)     Patient Name: Jamir Gill   YOB: 1955   Referring MD/Provider: Dr. Dee Hook, Dr. John Polk  Diagnosis: Multiple sclerosis, bilateral leg weakness, osteoarthritis of cervical region  Treatment Diagnosis: MS, impaired mobility, bilateral leg weakness, impaired posture, impaired balance and endurance; impaired cervical mobility  Insurance:  [employer]  Start of Care Date:  2017   Certification Dates: From: 2017   Re-Cert Due: Medicare/MA Re-Cert Due: 17    Subjective    Neck pain down to 2/10, doing really good, legs better today with morning appointment again, comes in with SPC and still legs flex more as he gets more fatigued.  Deferred traction today since neck was feeling so good and only 45 minute appointment.         Objective    Today's Intervention:  all performed to muscle fatigue:  Neuromuscular re-ed and therapeutic exercise:  Lateral step up and over airex foam, min to mod use of 1 hand on railing for support. x10 each way  Toe taps to round side BOSU x20 alternating, CGA, mod use of 1 hand on rail  Sitting HS stretch B 2x30 sec with foot on floor  A/p balance on round surface 1/2 foam with min to mod use of railing for balance. Encouraged hip strategy.  Gastroc stretch, needed manual cue to straighten knee out in back on each side, he thought it was straight until cue given, B 2x30 sec  TKE standing with red band behind knees   AirEx foam:  sustained hold with head turns x1:00 minimal touches on rail, advanced to eyes closed with head turns x1:00 multiple light touches on rail  Marching with traveling x40 ft, Eric  Leg press 100# (seat 22 and pillow behind head) to fatigue 3x10  Side stepping B x30 ft  Lumbar extension 40# to fatigue 3x10 reps  Quad stretch with one foot on stool B 2x30 sec holding rail  Nustep level 6, seat 11, arms 10 x 5 minutes - finished on own    Manual therapy:  Cervical side gliding left to right x3 mins  Soft tissue mobility to upper trap and scalene and brachial plexus release x3 mins  Upper trap stretch holding down shoulder B 2x30 sec      Deferred:  Abduction 30# to fatigue  LAQ with assist right lower extremity  Seated hamstring curls red left yellow right lower extremity to fatigue  Triceps extension 2# overhead x15 bilateral   Triceps press 60# x15  Standing hip abduction, light use of rail.   TRX squats 2x10. Cues for nose over toes, bending at waist.  Standing hamstring curls no weight with TRX strap support 2 x10 each  muscle energy technique right 1st rib, supine  Seated muscle energy technique thoracic FRS  Snow tez overhead press 2# weights, seated  Suboccipital release x2:00  Supine median nerve gliding x10 reps x2 sets, just did with R shoulder abd and L cervical side bending  C3-4 left FRS; C4-5 FRS left - much improved, only mild limitation 7/5/2017   muscle energy technique 2 x3 reps to R C3-4, C4-5, cervical FRS; supine, much improved after, no restrictions to left   Side stepping figure 8s around 4 cones x1 each direction. modA x2 today with large LOB     Mechanical Traction: cervical traction performed 25 maximum weight, 10 minimum weight 60 seconds on, 20 seconds off for 10 minutes in supine position at 30 degrees of flexion.(4 steps up and down, rope speed 100%)      Home Exercise Program: *= added to HEP.    Access Code: [email or web address] URL: [email or web address] Date: 05/16/2017 Prepared by:  Kristy Early   Exercises   Hooklying Clamshell with Resistance - 10-15 reps - 1-2 sets - 5 hold - 1x daily   Seated Hamstring Stretch - 1-2 sets - 30 second hold - 1x daily   Romberg Stance - 30 second hold - 2-3 sets - 1x daily   Romberg Stance with Eyes Closed - 2-3 sets - 20-30 second hold - 1x daily   Romberg Stance with Head Rotation - 2-3 sets - 30 second hold - 1x daily   Standing Romberg to 3/4 Tandem Stance - 2-3 sets - 20-30 second hold - 1x daily   Scapular Retraction with Resistance Advanced - 10-15 reps - 1-2 sets - 5 hold - 1x daily     Upper trapezius, scalene and levator stretches x30 sec each. bilateral added 6/19/2017        Assessment     Patient demonstrates improved cervical mobility and decreased joint restrictions in Cspine and 1st rib. This improves with manual therapy and patient reports decreased pain following manual therapy and traction.    Patient will benefit from continued skilled physical therapy services to address aforementioned impairments and return patient to previous level of functioning.    G Codes and Modifier taken from patient completing the Burrows and dynamic gait index: 6/7/17 (Discharged balance gcodes 6/19/2017)     Goal Primary G Code and Modifier:    The Patient's G Code Goal would be: Mobility    The Patient's Impairment, Limitation or Restriction Modifier goal would be best described as: CJ - 20% - 40% Impairment.      Discharge Primary G Code and Modifier:      The Patient's status upon Discharge is Mobility    The Patient's Impairment, Limitation or Restriction Modifier would be best described as CK - 40% - 60% Impairment.     Cervical codes to be added next visit 6/22/2017:    Current Primary G Code and Modifier:    Per the Patient's intake and/or assessment the Primary G Code is: Handling Objects .   The Patient's Impairment, Limitation or Restriction Modifier would be best described as: CK - 40% - 60% Impairment.     Goal Primary G Code and Modifier:     The Patient's G Code Goal would be: Handling Objects    The Patient's Impairment, Limitation or Restriction Modifier goal would be best described as: CI - 1% - 20% Impairment.      Goals:  Patient will be assessed for appropriate mobility device to promote maintaining his safety with mobility within one visit. Goal met 6/7/17      Short Term Goals: To be met in 4 weeks:   Following physical therapy intervention, the patient will be able to:   1. Patient will improve standing posture to minimize crouched gait pattern. In progress 6/7/17   2. Patient will be independent in home exercise program for progression of strength and balance abilities.                                                3.   Patient will decrease falls to no more than 3 per week. In progress 6/7/17       Long Term Goals: To be met in 8 weeks:   Following physical therapy intervention, the patient will be able to:   1. Increase MMT of hip flexors to 3+/5 so that pt able to lift leg independently into car.   2. Patient will improve dynamic gait index score by 5 points to decrease fall risk.    New goals added 6/19/2017:      1.   Patient will have improved cervical range of motion by at least 5 degrees with minimal to no referred pain.     2.   Patient will have less than 3/10 pain with normal activities of daily living.     3.   Patient will have improved postural endurance for ability to complete normal daily work routine including reaching to shoulder height without increase in symptoms.     4.   Patient will obtain home cervical traction unit for self management of symptoms.         Plan     Treatment Plan / Targeted Outcomes:     Frequency:   16 visits      Duration of Treatment: 8 weeks     Plan of Care Due Date: Medicare/MA Re-Cert Due:  08/02/17     Plan for next visit:  Gait and balance training, home exercise program development, floor transfer training and strengthening.        Student or PTA has been instructed in and  demonstrates skills necessary to carry out above stated treatment plan: Yes     Thank you for your referral to [facility].  Please call with any questions, concerns or comments.  [phone]     Kristy Early PT, DPT/ [facility]

## 2017-12-28 NOTE — PROGRESS NOTES
Patient Information     Patient Name MRN Sex     Jamir Gill [id number] Male 1955      Progress Notes by Jade Simons NP at 10/24/2017 11:15 AM     Author:  Jade Simons NP Service:  (none) Author Type:  PHYS- Nurse Practitioner     Filed:  10/24/2017 12:45 PM Encounter Date:  10/24/2017 Status:  Signed     :  Jade Simons NP (PHYS- Nurse Practitioner)            HPI:  Nursing Notes:   Kacey Rodriguez.  10/24/2017 11:40 AM  Signed  Patient presents to the clinic for possible UTI. States his s/sx started last night and include burning and feeling like he constantly has to go. Patient has not bladder control and self caths.   Kacey Rodriguez LPN............................ 10/24/2017 11:28 AM      Jamir Gill is a 62 y.o. male who presents to clinic today for possible uti. Places self catheter twice a day, occasionally gets UTIs. Urine looks orange and cloudy, burning and frequency that started yesterday. Denies fever, blood in urine, odor to urine. Hasn't treated symptoms. Has MS and trying to treat symptoms early because it effects MS symptoms.    Past Medical History:     Diagnosis  Date     BPH (benign prostatic hypertrophy) with urinary obstruction      Cervical disc disorder      Cognitive decline      Hypertension      Multiple sclerosis (HC)      Osteoarthritis cervical spine      Osteopenia      Past Surgical History:      Procedure  Laterality Date     APPENDECTOMY      appendectomy       COLONOSCOPY      [place]- colitis       COLONOSCOPY SCREENING      with polyps resected       FRACTURE TREATMENT      ORIF left wrist fracture~Laser prostate surgery--BPH~Colonoscopy  with polyps resected       PROSTATECTOMY      Laser prostate surgery -- BPH       Social History     Substance Use Topics       Smoking status: Never Smoker     Smokeless tobacco: Never Used     Alcohol use No     Current Outpatient Prescriptions        Medication  Sig Dispense Refill     aspirin 81 mg tablet Take 81 mg by mouth once daily with a meal.         baclofen (LIORESAL) 10 mg tablet 1 am and noon, 2 pm  0     buPROPion (WELLBUTRIN SR) 200 mg Sustained-Release tablet Take 200 mg by mouth once daily.       calcium carbonate (CALCIUM ANTACID) 200 mg calcium (500 mg) chewable tablet Take 2.5 tablets by mouth 3 times daily with meals.  0     calcium polycarbophil (FIBERCON) 625 mg tablet Take 625 mg by mouth once daily.       cholecalciferol (VITAMIN D) 1,000 unit capsule Take 1 capsule by mouth 2 times daily.  0     donepezil (ARICEPT) 5 mg tablet TK 1 T PO HS FOR 1 WEEK THEN INCREASE TO 2 TS  2     ferrous sulfate, 65 mg elemental, (IRON, FERROUS SULFATE,) tablet Take 0.5 tablets by mouth once daily with a meal.  0     lactobacillus comb no.10 (PROBIOTIC) 20 billion cell cap Take  by mouth.  0     meclizine (ANTIVERT) 25 mg tablet Take 25 mg by mouth 2 times daily.         nortriptyline (PAMELOR) 25 mg capsule TK 3 CS PO BID THEN DECREASE DOSE BY 25MG EACH WEEK. THEN STOP MEDICATION  1     nortriptyline (PAMELOR) 75 mg capsule Take 150 mg by mouth once daily.  1     psyllium (WAL-MUCIL FIBER) 0.52 gram capsule Take 3 capsules by mouth once daily.  0     tamsulosin (FLOMAX) 0.4 mg capsule Take 2 capsules by mouth once daily after a meal. 2 tabs daily  0     venlafaxine (EFFEXOR XR) 150 mg Extended-Release capsule Take 1 capsule by mouth once daily with a meal. 90 capsule 0     No current facility-administered medications for this visit.      Medications have been reviewed by me and are current to the best of my knowledge and ability.    No Known Allergies    ROS:  Refer to HPI    /86  Pulse 70  Temp 98.1  F (36.7  C) (Tympanic)   Resp 18  Wt 75.8 kg (167 lb)  BMI 25.39 kg/m2    EXAM:  General Appearance: Well appearing male, appropriate appearance for age. No acute distress  Musculoskeletal: No CVA tenderness  Respiratory: normal chest wall and  respirations.  Normal effort.  Clear to auscultation bilaterally  Cardiac: RRR   Psychological: normal affect, alert and pleasant    ASSESSMENT/PLAN:    ICD-10-CM    1. Urinary tract infection associated with catheterization of urinary tract, unspecified indwelling urinary catheter type, initial encounter (HC) T83.511A ciprofloxacin HCl (CIPRO) 500 mg tablet     N39.0 URINE CULTURE      URINE CULTURE   2. Urinary problem R39.89 URINALYSIS W REFLEX MICROSCOPIC IF POSITIVE      URINALYSIS W REFLEX MICROSCOPIC IF POSITIVE      URINALYSIS MICROSCOPIC      URINALYSIS MICROSCOPIC   Possible UTI  On exam: well appearing male without fever or CVA tenderness  Results for orders placed or performed in visit on 10/24/17      URINALYSIS W REFLEX MICROSCOPIC IF POSITIVE      Result  Value Ref Range    COLOR                     Yellow Yellow Color    CLARITY                   Cloudy (A) Clear Clarity    SPECIFIC GRAVITY,URINE    1.015 1.010, 1.015, 1.020, 1.025                    PH,URINE                  6.0 6.0, 7.0, 8.0, 5.5, 6.5, 7.5, 8.5                    UROBILINOGEN,QUALITATIVE  Normal Normal EU/dl    PROTEIN, URINE Negative Negative mg/dL    GLUCOSE, URINE Negative Negative mg/dL    KETONES,URINE             Negative Negative mg/dL    BILIRUBIN,URINE           Negative Negative                    OCCULT BLOOD,URINE        Moderate (A) Negative                    NITRITE                   Positive (A) Negative                    LEUKOCYTE ESTERASE        Negative Negative                   URINALYSIS MICROSCOPIC      Result  Value Ref Range    RBC 6-10 (A) 0-2, None Seen /HPF    WBC 0-2 0-2, 3-5, None Seen /HPF    BACTERIA                  Many (A) None Seen, Rare, Occasional, Few Bacteria/HPF    EPITHELIAL CELLS          None Seen None Seen, Few Epi/HPF    HYALINE CASTS 0-2 0-2, 3-5 /LPF              Urine sent for culture  Diagnosis: UTI  Treat with Cipro 500 mgs PO BID 7 days  Follow up if symptoms persist or  worsen    Patient Instructions      Index Citizen of [place]   Bladder Infection: Brief Version   ________________________________________________________________________  KEY POINTS    The bladder is the part of your body that stores urine. When bacteria get into the bladder, it can get infected.    Your provider will give you antibiotics to treat the infection.    Drink lots of water and take your medicine for as long as your healthcare provider prescribes, even when you feel better.  ________________________________________________________________________  What is a bladder infection?   The bladder is the part of your body that stores urine. When bacteria get into the bladder, it can get infected.  What is the cause?  A bladder infection happens when bacteria from the skin get into the bladder.    Bacteria can spread to the bladder from the rectal area or vagina.    Sometimes the bladder gets infected when something is blocking the flow of urine. For example, in men the problem can be caused by an enlarged prostate gland. In pregnant women pressure from the baby might cause the problem.  Women get bladder infections more often than men.  What are the symptoms?     You may feel the need to urinate a lot.    You may feel a burning or stinging when you urinate.    You may have cramps in your lower belly or back.    Your urine may be cloudy and smell bad.    Your urine may look pink or red.    You may have a fever or chills.  How is it treated?   If tests by your healthcare provider show that you have a bladder infection, your provider will prescribe antibiotics. You may also need pain medicine.  How can I take care of myself?     Take the antibiotic medicine for as long as your healthcare provider prescribes, even when you feel better.    Drink more water than you usually do.    Make sure you know when you should come back for a checkup.    Call your provider if your symptoms aren t better in 2 days, or if you have worse fever  or pain.  How can I help prevent bladder infection?   Urinate often during the day. You should also urinate after you have sex.  If you are a woman, it is important to:     Keep the area around your vagina clean.    Wipe from front to back after you go to the bathroom.    Gently wash the area around your vagina when you bathe or shower.    Wear cotton underwear and use pantyhose with cotton crotches.    Avoid tight clothing. Wear loose pants.    Take wet bathing suits off right away.  Talk to your healthcare provider if you have bladder infections often. You may need tests to find out why. Your provider may prescribe medicine that helps prevent bladder infections.  If you are a man, remember to:    Always wash your penis when you bathe or shower. If you are not circumcised, gently pull back the foreskin and wash the tip of the penis when you bathe.  Developed by [employer].  Adult Advisor 2016.3 published by [employer].  Last modified: 2015-04-29  Last reviewed: 2015-04-28  This content is reviewed periodically and is subject to change as new health information becomes available. The information is intended to inform and educate and is not a replacement for medical evaluation, advice, diagnosis or treatment by a healthcare professional.  References   Adult Advisor 2016.3 Index    Copyright   2016 [employer], a division of [employer] All rights reserved.

## 2017-12-28 NOTE — PROGRESS NOTES
Patient Information     Patient Name MRN Sex Jamir Cooley [id number] Male 1955      Progress Notes by Kristy Early PT at 2017 10:16 AM     Author:  Kristy Early PT Service:  (none) Author Type:  PT- Physical Therapist     Filed:  2017  1:00 PM Date of Service:  2017 10:16 AM Status:  Signed     :  Kristy Early PT (PT- Physical Therapist)            [facility]  Outpatient PT - Daily note        Date of Service: 2017    Visit #: 16 (7 of 10)     Patient Name: Jamir Gill   YOB: 1955   Referring MD/Provider: Dr. Dee Hook, Dr. John Polk  Diagnosis: Multiple sclerosis, bilateral leg weakness, osteoarthritis of cervical region  Treatment Diagnosis: MS, impaired mobility, bilateral leg weakness, impaired posture, impaired balance and endurance; impaired cervical mobility  Insurance:  Preferred One  Start of Care Date:  2017   Certification Dates: From: 2017   Re-Cert Due: Medicare/MA Re-Cert Due: 17    Patient arrived 10 mins late for appointment today.  Subjective    Denies current neck pain, persisting achiness at night. Can't seem to get the hang of sleeping on his opposite side. Blood pressure issue has resolved.    Objective    Today's Intervention:  all performed to muscle fatigue:  Neuromuscular re-ed and therapeutic exercise:  Sustained hold 1 foot on BOSU held to tolerance. Added head turns with right lower extremity up to increase challenge  A/p balance on round surface 1/2 foam with min to mod use of railing for balance. Encouraged hip strategy.  Lumbar extension 40# to fatigue 3x10 reps feet 9, knees 11  Nustep level 6, seat 11, arms 10 x 5 minutes - finished on own    Standing hamstring curls no weight with TRX strap support 2 x10 each  Snow tez overhead press 2# weights, seated  Side stepping figure 8s around 4 cones x2 each direction. modA x2 today with large LOB but improved coordination and  upright stance, less use of single point cane for support  Lateral step up and over airex foam, min to mod use of 1 hand on railing for support. x10 each way  Foam feet apart with marching in place to fatigue    Manual therapy:  Cervical side gliding left to right x3 mins  Soft tissue mobility to upper trap and scalene and brachial plexus release x3 mins  muscle energy technique ERS C3 left, supine  1st rib muscle energy technique, supine  Suboccipital release x30 sec  Side lying muscle energy technique levator stretch x2 bilateral     Deferred:  Toe taps to round side BOSU x20 alternating, CGA, mod use of 1 hand on rail  Abduction 30# to fatigue  LAQ with assist right lower extremity  Seated hamstring curls red left yellow right lower extremity to fatigue  Triceps extension 2# overhead x15 bilateral   Triceps press 60# x15   TRX squats 2x10. Cues for nose over toes, bending at waist.  Standing hip abduction, light to mod use of rail.  Leg press 100# (seat 22 and pillow behind head) to fatigue 3x10    Home Exercise Program: *= added to HEP.    Access Code: [email or web address] URL: [email or web address] Date: 05/16/2017 Prepared by: Kristy Early   Exercises   Hooklying Clamshell with Resistance - 10-15 reps - 1-2 sets - 5 hold - 1x daily   Seated Hamstring Stretch - 1-2 sets - 30 second hold - 1x daily   Romberg Stance - 30 second hold - 2-3 sets - 1x daily   Romberg Stance with Eyes Closed - 2-3 sets - 20-30 second hold - 1x daily   Romberg Stance with Head Rotation - 2-3 sets - 30 second hold - 1x daily   Standing Romberg to 3/4 Tandem Stance - 2-3 sets - 20-30 second hold - 1x daily   Scapular Retraction with Resistance Advanced - 10-15 reps - 1-2 sets - 5 hold - 1x daily     Upper trapezius, scalene and levator stretches x30 sec each. bilateral added 6/19/2017        Assessment     Patient demonstrates improved cervical and soft tissue restrictions. Lower extremities continue to tire quickly.    Patient will  benefit from continued skilled physical therapy services to address aforementioned impairments and return patient to previous level of functioning.    G Codes and Modifier taken from patient completing the Burrows and dynamic gait index: 6/7/17 (Discharged balance gcodes 6/19/2017)     Goal Primary G Code and Modifier:    The Patient's G Code Goal would be: Mobility    The Patient's Impairment, Limitation or Restriction Modifier goal would be best described as: CJ - 20% - 40% Impairment.      Discharge Primary G Code and Modifier:      The Patient's status upon Discharge is Mobility    The Patient's Impairment, Limitation or Restriction Modifier would be best described as CK - 40% - 60% Impairment.     Cervical codes to be added next visit 6/22/2017:    Current Primary G Code and Modifier:    Per the Patient's intake and/or assessment the Primary G Code is: Handling Objects .   The Patient's Impairment, Limitation or Restriction Modifier would be best described as: CK - 40% - 60% Impairment.     Goal Primary G Code and Modifier:    The Patient's G Code Goal would be: Handling Objects    The Patient's Impairment, Limitation or Restriction Modifier goal would be best described as: CI - 1% - 20% Impairment.      Goals:  Patient will be assessed for appropriate mobility device to promote maintaining his safety with mobility within one visit. Goal met 6/7/17      Short Term Goals: To be met in 4 weeks:   Following physical therapy intervention, the patient will be able to:   1. Patient will improve standing posture to minimize crouched gait pattern. In progress 6/7/17   2. Patient will be independent in home exercise program for progression of strength and balance abilities.                                                3.   Patient will decrease falls to no more than 3 per week. In progress 6/7/17       Long Term Goals: To be met in 8 weeks:   Following physical therapy intervention, the patient will  be able to:   1. Increase MMT of hip flexors to 3+/5 so that pt able to lift leg independently into car.   2. Patient will improve dynamic gait index score by 5 points to decrease fall risk.    New goals added 6/19/2017:      1.   Patient will have improved cervical range of motion by at least 5 degrees with minimal to no referred pain.     2.   Patient will have less than 3/10 pain with normal activities of daily living.     3.   Patient will have improved postural endurance for ability to complete normal daily work routine including reaching to shoulder height without increase in symptoms.     4.   Patient will obtain home cervical traction unit for self management of symptoms.         Plan     Treatment Plan / Targeted Outcomes:     Frequency:   16 visits      Duration of Treatment: 8 weeks     Plan of Care Due Date: Medicare/MA Re-Cert Due:  08/02/17     Plan for next visit:  Gait and balance training, home exercise program development, floor transfer training and strengthening.        Student or PTA has been instructed in and demonstrates skills necessary to carry out above stated treatment plan: Yes     Thank you for your referral to [facility].  Please call with any questions, concerns or comments.  [phone]     Kristy Early, PT, DPT/ [facility] [facility]

## 2017-12-28 NOTE — PROGRESS NOTES
Patient Information     Patient Name MRN Sex Jamir Cooley [id number] Male 1955      Progress Notes by Kristy Early PT at 8/3/2017  9:46 AM     Author:  Kristy Early PT Service:  (none) Author Type:  PT- Physical Therapist     Filed:  8/3/2017 12:39 PM Date of Service:  8/3/2017  9:46 AM Status:  Signed     :  Kristy Early PT (PT- Physical Therapist)            [facility]  Outpatient PT - Daily Note        Date of Service: 8/3/2017   Visit #: 22 (4 of 10)     Patient Name: Jamir Gill   YOB: 1955   Referring MD/Provider: Dr. Dee Hook, Dr. John Polk  Diagnosis: Multiple sclerosis, bilateral leg weakness, osteoarthritis of cervical region  Treatment Diagnosis: MS, impaired mobility, bilateral leg weakness, impaired posture, impaired balance and endurance; impaired cervical mobility  Insurance:  Preferred One  Start of Care Date:  2017   Certification Dates: From: 2017   Re-Cert Due: Medicare/MA Re-Cert Due: 17  Subjective      Jamir reports yesterday was a better day for the legs. Only had two falls that were controlled.       Objective  Patient using 4WW today.     Today's Intervention:  all performed to muscle fatigue:    Neuromuscular re-ed and therapeutic exercise:    Nustep level 6, seat 11, arms 10 x 6 minutes   Med x:   Chest press 20# seat 3   Lat pull 60# seat 3   Triceps 60# seat 4   Rows 50# chest 1  Step forward and retro on green peds wedge x2; lateral step up to the right x1    A/p balance on round surface 1/2 foam with min to mod use of railing for balance. Encouraged hip strategy. Min A by PT with gait belt x30 sec   - controlled DF/PF rocking on flat surface of 1/2 foam roll with light UE support and mod A by PT with gait belt x30 sec    Partial romberg with passing 2.2 # ball over shoulder    Lateral step over round side 1/2 foam, min to mod use of 1 hand on railing for support. x10 each way  Lumbar extension  40# to fatigue 3x10 reps feet 9, knees 11    Seated abduction green theraband around knees to fatigue    Toe taps to round side BOSU x20 alternating, CGA, mod use of 1 hand on rail    Deferred:  Standing hamstring curls no weight with rail support  x10 R, x20 L  Standing hip abduction, light to mod use of rail B x15 - more challenged to keep weight on R and lift L  Seated and standing heel/toe raises x15 double leg   Sustained hold 1 foot on BOSU held to tolerance. Added head turns with right lower extremity up to increase challenge  Leg press 100# (seat 22 and pillow behind head) to fatigue 3x10  Snow tez overhead press 2# weights, seated 15-20 reps  Seated bent forward Y's 2# weight to fatigue  Hip rotation B each x5 IR/ER with knee hip and knee flexed at 90 deg and other leg on round boslter  LAQ B L x30 I, Rx20 with assist right lower extremity just holding upper leg up so he can freely swing lower leg  Side stepping figure 8s around 4 cones x2 each direction. modA x2 today with large LOB but improved coordination and upright stance, less use of single point cane for support  Abduction 30# to fatigue  Seated hamstring curls red left yellow right lower extremity to fatigue  Triceps extension 2# overhead x15 bilateral   Triceps press 60# x15   TRX squats 2x10. Cues for nose over toes, bending at waist.    Home Exercise Program: *= added to HEP.    Access Code: [email or web address] URL: [email or web address] Date: 05/16/2017 Prepared by: Kristy Early   Exercises   Hooklying Clamshell with Resistance - 10-15 reps - 1-2 sets - 5 hold - 1x daily   Seated Hamstring Stretch - 1-2 sets - 30 second hold - 1x daily   Romberg Stance - 30 second hold - 2-3 sets - 1x daily   Romberg Stance with Eyes Closed - 2-3 sets - 20-30 second hold - 1x daily   Romberg Stance with Head Rotation - 2-3 sets - 30 second hold - 1x daily   Standing Romberg to 3/4 Tandem Stance - 2-3 sets - 20-30 second hold - 1x daily   Scapular Retraction  with Resistance Advanced - 10-15 reps - 1-2 sets - 5 hold - 1x daily     Upper trapezius, scalene and levator stretches x30 sec each. bilateral added 6/19/2017        Assessment     Patient demonstrates progressive early fatigue in lower extremities with more frequent rest breaks needed. Able to progress upper extremity strengthening to include med x machines.    Patient will benefit from continued skilled physical therapy services to address aforementioned impairments and return patient to previous level of functioning.    G Codes and Modifier taken from patient completing the Burrows and dynamic gait index: 6/7/17 (Discharged balance gcodes 6/19/2017)     Goal Primary G Code and Modifier:    The Patient's G Code Goal would be: Mobility    The Patient's Impairment, Limitation or Restriction Modifier goal would be best described as: CJ - 20% - 40% Impairment.      Discharge Primary G Code and Modifier:      The Patient's status upon Discharge is Mobility    The Patient's Impairment, Limitation or Restriction Modifier would be best described as CK - 40% - 60% Impairment.     Cervical codes updated 7/20/2017:    Current Primary G Code and Modifier:    Per the Patient's intake and/or assessment the Primary G Code is: Handling Objects .   The Patient's Impairment, Limitation or Restriction Modifier would be best described as: CI - 1% - 20% Impairment.     Goal Primary G Code and Modifier:    The Patient's G Code Goal would be: Handling Objects    The Patient's Impairment, Limitation or Restriction Modifier goal would be best described as: CI - 1% - 20% Impairment.      Goals:  Patient will be assessed for appropriate mobility device to promote maintaining his safety with mobility within one visit. Goal met 6/7/17      Short Term Goals: To be met in 4 weeks:   Following physical therapy intervention, the patient will be able to:   1. Patient will improve standing posture to minimize crouched gait  pattern. In progress 6/7/17   2. Patient will be independent in home exercise program for progression of strength and balance abilities. In progress 7/20/2017                                                 3.   Patient will decrease falls to no more than 3 per week. In progress 6/7/17       Long Term Goals: To be met in 8 weeks:   Following physical therapy intervention, the patient will be able to:   1. Increase MMT of hip flexors to 3+/5 so that pt able to lift leg independently into car. Not progressing.  2. Patient will improve dynamic gait index score by 5 points to decrease fall risk. Not progressing    New goals added 6/19/2017:      1.   Patient will have improved cervical range of motion by at least 5 degrees with minimal to no referred pain. 80% met     2.   Patient will have less than 3/10 pain with normal activities of daily living. Met 7/20/2017      3.   Patient will have improved postural endurance for ability to complete normal daily work routine including reaching to shoulder height without increase in symptoms. 50% met     4.   Patient will obtain home cervical traction unit for self management of symptoms. - decided to hold off on obtaining unit at this time.        Plan     Treatment Plan / Targeted Outcomes:     Frequency:   16 visits      Duration of Treatment: 8 weeks     Plan of Care Due Date: Medicare/MA Re-Cert Due:  08/14/17     Plan for next visit:  Gait and balance training, home exercise program development, floor transfer training and strengthening.        Student or PTA has been instructed in and demonstrates skills necessary to carry out above stated treatment plan: Yes     Thank you for your referral to [facility].  Please call with any questions, concerns or comments.  [phone]     Kristy Early, PT, DPT

## 2017-12-28 NOTE — PROGRESS NOTES
Patient Information     Patient Name MRN Sex Jamir Cooley [id number] Male 1955      Progress Notes by Natty Castillo PT at 2017  2:34 PM     Author:  Natty Castillo PT Service:  (none) Author Type:  PT- Physical Therapist     Filed:  2017  3:42 PM Date of Service:  2017  2:34 PM Status:  Signed     :  Natty Castillo PT (PT- Physical Therapist)            [facility]  Outpatient PT - Daily note        Date of Service: 2017    Visit #: 13 (4 of 10)     Patient Name: Jamir Gill   YOB: 1955   Referring MD/Provider: Dr. Dee Hook, Dr. John Polk  Diagnosis: Multiple sclerosis, bilateral leg weakness, osteoarthritis of cervical region  Treatment Diagnosis: MS, impaired mobility, bilateral leg weakness, impaired posture, impaired balance and endurance; impaired cervical mobility  Insurance:  Preferred One  Start of Care Date:  2017   Certification Dates: From: 2017   Re-Cert Due: Medicare/MA Re-Cert Due: 17    Subjective    Neck pain was 3/10 last night but pretty much nothing today, much improved.  Leg pain 4/10, but bad because not working well, everything is an effort.     Last night had some neck soreness but otherwise it's been feeling good.  Legs are pretty tired with a later afternoon appointment.  Usually walking with a walker by later afternoon but still comes in with single point cane, reports it gets shoulders sore with too much use of walker.          Objective    Observation:   C3-4 left FRS; C4-5 FRS left - much improved, only mild limitation 2017     Today's Intervention:  all performed to muscle fatigue:  Neuromuscular re-ed and therapeutic exercise:  Lateral step up and over airex foam, min to mod use of 1 hand on railing for support. x8 each way  Sitting HS stretch B 2x30 sec with foot on floor  Toe taps to round side BOSU x20 alternating, CGA  Gastroc stretch, needed manual cue to  straighten knee out in back on each side, he thought it was straight until cue given, B 2x30 sec  Quad stretch with one foot on stool B 2x30 sec holding rail  Side stepping figure 8s around 4 cones x1 each direction. modA x2 today with large LOB   Nustep level 6, seat 11, arms 10 x 5 minutes     Manual therapy:  muscle energy technique 2 x3 reps to R C3-4, C4-5, cervical FRS; supine, much improved after, no restrictions to left   Cervical side gliding left to right x3 mins  Soft tissue mobility to upper trap and scalene and brachial plexus release x3 mins  Upper trap stretch holding down shoulder B 2x30 sec    Mechanical Traction: cervical traction performed 25 maximum weight, 10 minimum weight 60 seconds on, 20 seconds off for 10 minutes in supine position at 30 degrees of flexion.(4 steps up and down, rope speed 100%)    Deferred:  Abduction 30# to fatigue  LAQ with assist right lower extremity  Seated hamstring curls red left yellow right lower extremity to fatigue  Triceps extension 2# overhead x15 bilateral   Triceps press 60# x15  Standing hip abduction, light use of rail.   TRX squats 2x10. Cues for nose over toes, bending at waist.  Standing hamstring curls no weight with TRX strap support 2 x10 each  muscle energy technique right 1st rib, supine  Seated muscle energy technique thoracic FRS  Lumbar extension 64# to fatigue  Leg press 70# seat 22 to fatigue  AirEx foam: sustained hold with head turns x1:00 minimal touches on rail, advanced to eyes closed with head turns x1:00 multiple light touches on rail  Snow tez overhead press 2# weights, seated  Suboccipital release x2:00  Supine median nerve gliding x10 reps x2 sets, just did with R shoulder abd and L cervical side bending  A/p balance on round surface 1/2 foam with min to mod use of railing for balance. Encouraged hip strategy.      Home Exercise Program: *= added to HEP.    Access Code: [email or web address] URL: [email or web address] Date:  05/16/2017 Prepared by: Kristy Early   Exercises   Hooklying Clamshell with Resistance - 10-15 reps - 1-2 sets - 5 hold - 1x daily   Seated Hamstring Stretch - 1-2 sets - 30 second hold - 1x daily   Romberg Stance - 30 second hold - 2-3 sets - 1x daily   Romberg Stance with Eyes Closed - 2-3 sets - 20-30 second hold - 1x daily   Romberg Stance with Head Rotation - 2-3 sets - 30 second hold - 1x daily   Standing Romberg to 3/4 Tandem Stance - 2-3 sets - 20-30 second hold - 1x daily   Scapular Retraction with Resistance Advanced - 10-15 reps - 1-2 sets - 5 hold - 1x daily     Upper trapezius, scalene and levator stretches x30 sec each. bilateral added 6/19/2017        Assessment     Patient demonstrates improved cervical mobility and decreased joint restrictions in Cspine and 1st rib. This improves with manual therapy and patient reports decreased pain following manual therapy and traction.    Patient will benefit from continued skilled physical therapy services to address aforementioned impairments and return patient to previous level of functioning.    G Codes and Modifier taken from patient completing the Burrows and dynamic gait index: 6/7/17 (Discharged balance gcodes 6/19/2017)     Goal Primary G Code and Modifier:    The Patient's G Code Goal would be: Mobility    The Patient's Impairment, Limitation or Restriction Modifier goal would be best described as: CJ - 20% - 40% Impairment.      Discharge Primary G Code and Modifier:      The Patient's status upon Discharge is Mobility    The Patient's Impairment, Limitation or Restriction Modifier would be best described as CK - 40% - 60% Impairment.     Cervical codes to be added next visit 6/22/2017:    Current Primary G Code and Modifier:    Per the Patient's intake and/or assessment the Primary G Code is: Handling Objects .   The Patient's Impairment, Limitation or Restriction Modifier would be best described as: CK - 40% - 60% Impairment.     Goal  Primary G Code and Modifier:    The Patient's G Code Goal would be: Handling Objects    The Patient's Impairment, Limitation or Restriction Modifier goal would be best described as: CI - 1% - 20% Impairment.      Goals:  Patient will be assessed for appropriate mobility device to promote maintaining his safety with mobility within one visit. Goal met 6/7/17      Short Term Goals: To be met in 4 weeks:   Following physical therapy intervention, the patient will be able to:   1. Patient will improve standing posture to minimize crouched gait pattern. In progress 6/7/17   2. Patient will be independent in home exercise program for progression of strength and balance abilities.                                                3.   Patient will decrease falls to no more than 3 per week. In progress 6/7/17       Long Term Goals: To be met in 8 weeks:   Following physical therapy intervention, the patient will be able to:   1. Increase MMT of hip flexors to 3+/5 so that pt able to lift leg independently into car.   2. Patient will improve dynamic gait index score by 5 points to decrease fall risk.    New goals added 6/19/2017:      1.   Patient will have improved cervical range of motion by at least 5 degrees with minimal to no referred pain.     2.   Patient will have less than 3/10 pain with normal activities of daily living.     3.   Patient will have improved postural endurance for ability to complete normal daily work routine including reaching to shoulder height without increase in symptoms.     4.   Patient will obtain home cervical traction unit for self management of symptoms.         Plan     Treatment Plan / Targeted Outcomes:     Frequency:   16 visits      Duration of Treatment: 8 weeks     Plan of Care Due Date: Medicare/MA Re-Cert Due:  08/02/17     Plan for next visit:  Gait and balance training, home exercise program development, floor transfer training and strengthening.        Student or PTA has been  instructed in and demonstrates skills necessary to carry out above stated treatment plan: Yes     Thank you for your referral to [facility].  Please call with any questions, concerns or comments.  [phone]     Kristy Early PT, DPT/ [employer] [facility]

## 2017-12-28 NOTE — PROGRESS NOTES
Patient Information     Patient Name MRN Sex Jamir Cooley [id number] Male 1955      Progress Notes by Natty Castillo PT at 2017  2:40 PM     Author:  Natyt Castillo PT Service:  (none) Author Type:  PT- Physical Therapist     Filed:  2017  3:30 PM Date of Service:  2017  2:40 PM Status:  Signed     :  Natty Castillo PT (PT- Physical Therapist)            [facility]  Outpatient PT - Daily Note        Date of Service: 2017   Visit #: 20 (2 of 10)     Patient Name: Jamir Gill   YOB: 1955   Referring MD/Provider: Dr. Dee Hook, Dr. John Polk  Diagnosis: Multiple sclerosis, bilateral leg weakness, osteoarthritis of cervical region  Treatment Diagnosis: MS, impaired mobility, bilateral leg weakness, impaired posture, impaired balance and endurance; impaired cervical mobility  Insurance:  Preferred One  Start of Care Date:  2017   Certification Dates: From: 2017   Re-Cert Due: Medicare/MA Re-Cert Due: 17  Subjective      Jamir reports yesterday everything was a struggle, when tried to sit on walker, didn't turn around far enough before legs gave out and he ended up on ground, one fall today also.  Needed help getting up.  Can get up only if pushing up on right chair and on first attempt.  Kena, wife, or bother or father in law help him up.  Neck is doing well, oddly had some of pain in L neck, no pain today.  Wants to still get aerobic workout because he feels that he isn't getting enough exercise and gets SOB easily.        Objective  Patient using 4WW today.    Today's Intervention:  all performed to muscle fatigue:    Neuromuscular re-ed and therapeutic exercise:    Nustep level 5, seat 11, arms 10 x 5 minutes     A/p balance on round surface 1/2 foam with min to mod use of railing for balance. Encouraged hip strategy. Min A by PT with gait belt x30 sec   - controlled DF/PF rocking on flat surface of 1/2  foam roll with light UE support and mod A by PT with gait belt x30 sec  Snow tez overhead press 2# weights, seated 15-20 reps  LAQ B L x30 I, Rx20 with assist right lower extremity just holding upper leg up so he can freely swing lower leg  Standing hamstring curls no weight with rail support  x10 R, x20 L  Standing hip abduction, light to mod use of rail B x15 - more challenged to keep weight on R and lift L  Supine HS stretch by PT B 2x30 sec  Supine knee to chest stretch by PT B 2x30 sec  Hip rotation B each x5 IR/ER with knee hip and knee flexed at 90 deg and other leg on round boslter  Side bending stretch with shoulder stabilized B 2x30 sec  Full ROM with rotation and side glides but felt a little pull with side glides in cervical flex to lower cervical level     Manual therapy:  Cervical side gliding left to right x3 mins  Soft tissue mobility to upper trap and scalene and brachial plexus release x3 mins      Deferred:  Partial romberg with passing 2.2 # ball over shoulder  Lumbar extension 40# to fatigue 3x10 reps feet 9, knees 11  Seated bent forward Y's 2# weight to fatigue  Seated scapular sets green theraband x15. Mod to max cues for form  muscle energy technique ERS C3 and 5 left, supine  muscle energy technique 1st rib right  left side lying levator muscle energy technique on right, reach and roll x10. Cues for scapular retraction  Sustained hold 1 foot on BOSU held to tolerance. Added head turns with right lower extremity up to increase challenge  Toe taps to round side BOSU x20 alternating, CGA, mod use of 1 hand on rail  Abduction 30# to fatigue  Seated hamstring curls red left yellow right lower extremity to fatigue  Triceps extension 2# overhead x15 bilateral   Triceps press 60# x15   TRX squats 2x10. Cues for nose over toes, bending at waist.  Leg press 100# (seat 22 and pillow behind head) to fatigue 3x10  Side stepping figure 8s around 4 cones x2 each direction. modA x2 today with large LOB  but improved coordination and upright stance, less use of single point cane for support  Lateral step up and over airex foam, min to mod use of 1 hand on railing for support. x10 each way    Home Exercise Program: *= added to HEP.    Access Code: [email or web address] URL: [email or web address] Date: 05/16/2017 Prepared by: Kristy Early   Exercises   Hooklying Clamshell with Resistance - 10-15 reps - 1-2 sets - 5 hold - 1x daily   Seated Hamstring Stretch - 1-2 sets - 30 second hold - 1x daily   Romberg Stance - 30 second hold - 2-3 sets - 1x daily   Romberg Stance with Eyes Closed - 2-3 sets - 20-30 second hold - 1x daily   Romberg Stance with Head Rotation - 2-3 sets - 30 second hold - 1x daily   Standing Romberg to 3/4 Tandem Stance - 2-3 sets - 20-30 second hold - 1x daily   Scapular Retraction with Resistance Advanced - 10-15 reps - 1-2 sets - 5 hold - 1x daily     Upper trapezius, scalene and levator stretches x30 sec each. bilateral added 6/19/2017        Assessment     Patient displaying increased lower extremity fatigue and lack of coordination today. Needed assistance to get into vehicle due to fatigue. Increased upper extremity muscular and joint restrictions due to more consistent use of walker with heavy upper extremity support needed recently. Cervical goals re-assessed at this time, but lacks progress on balance and lower extremity strength due to increase in MS symptoms.    Patient will benefit from continued skilled physical therapy services to address aforementioned impairments and return patient to previous level of functioning.    G Codes and Modifier taken from patient completing the Burrows and dynamic gait index: 6/7/17 (Discharged balance gcodes 6/19/2017)     Goal Primary G Code and Modifier:    The Patient's G Code Goal would be: Mobility    The Patient's Impairment, Limitation or Restriction Modifier goal would be best described as: CJ - 20% - 40% Impairment.      Discharge Primary G  Code and Modifier:      The Patient's status upon Discharge is Mobility    The Patient's Impairment, Limitation or Restriction Modifier would be best described as CK - 40% - 60% Impairment.     Cervical codes updated 7/20/2017:    Current Primary G Code and Modifier:    Per the Patient's intake and/or assessment the Primary G Code is: Handling Objects .   The Patient's Impairment, Limitation or Restriction Modifier would be best described as: CI - 1% - 20% Impairment.     Goal Primary G Code and Modifier:    The Patient's G Code Goal would be: Handling Objects    The Patient's Impairment, Limitation or Restriction Modifier goal would be best described as: CI - 1% - 20% Impairment.      Goals:  Patient will be assessed for appropriate mobility device to promote maintaining his safety with mobility within one visit. Goal met 6/7/17      Short Term Goals: To be met in 4 weeks:   Following physical therapy intervention, the patient will be able to:   1. Patient will improve standing posture to minimize crouched gait pattern. In progress 6/7/17   2. Patient will be independent in home exercise program for progression of strength and balance abilities. In progress 7/20/2017                                                 3.   Patient will decrease falls to no more than 3 per week. In progress 6/7/17       Long Term Goals: To be met in 8 weeks:   Following physical therapy intervention, the patient will be able to:   1. Increase MMT of hip flexors to 3+/5 so that pt able to lift leg independently into car. Not progressing.  2. Patient will improve dynamic gait index score by 5 points to decrease fall risk. Not progressing    New goals added 6/19/2017:      1.   Patient will have improved cervical range of motion by at least 5 degrees with minimal to no referred pain. 80% met     2.   Patient will have less than 3/10 pain with normal activities of daily living. Met 7/20/2017      3.   Patient will have  improved postural endurance for ability to complete normal daily work routine including reaching to shoulder height without increase in symptoms. 50% met     4.   Patient will obtain home cervical traction unit for self management of symptoms. - decided to hold off on obtaining unit at this time.        Plan     Treatment Plan / Targeted Outcomes:     Frequency:   16 visits      Duration of Treatment: 8 weeks     Plan of Care Due Date: Medicare/MA Re-Cert Due:  08/02/17     Plan for next visit:  Gait and balance training, home exercise program development, floor transfer training and strengthening.        Student or PTA has been instructed in and demonstrates skills necessary to carry out above stated treatment plan: Yes     Thank you for your referral to [facility].  Please call with any questions, concerns or comments.  [phone]     Kristy Early, PT, DPT / ISABELL BeattyT

## 2017-12-28 NOTE — PROGRESS NOTES
Patient Information     Patient Name MRN Sex Jamir Cooley [id number] Male 1955      Progress Notes by Kristy Early PT at 10/24/2017  9:44 AM     Author:  Kristy Early PT Service:  (none) Author Type:  PT- Physical Therapist     Filed:  10/24/2017  6:34 PM Date of Service:  10/24/2017  9:44 AM Status:  Signed     :  Kristy Early PT (PT- Physical Therapist)            [facility]  Outpatient PT - Re-certification Note        Date of Service: 10/24/2017    Visit #: 34 (10 of 10)     Patient Name: Jamir Gill   YOB: 1955   Referring MD/Provider: Dr. Dee Hook, Dr. John Polk  Diagnosis: Multiple sclerosis, bilateral leg weakness, osteoarthritis of cervical region  Treatment Diagnosis: MS, impaired mobility, bilateral leg weakness, impaired posture, impaired balance and endurance; impaired cervical mobility  Insurance:  Preferred One  Start of Care Date:  2017   Certification Dates: 10/24/2017     Re-Cert Due: Medicare/MA Re-Cert Due: 2017    Subjective    Patient states his legs are sore today. Has not had any trouble with the neck pain recently, but still interested in the cervical traction unit due to its frequent recurrence. Will be switching over to Medicare on .     Objective  Patient presents ambulatory with his 4WW today.    Modified CTSIB:    Condition 1: Trial 1= 30 seconds, Trial 2= Not tested   Condition 2: Trial 1= 14 seconds, Trial 2= Not tested   Condition 3: Trial 1= 30 seconds, Trial 2= Not tested   Condition 4: Trial 1= 16 seconds, Trial 2= Not tested        Burrows Balance Test    1.  Sitting to Standin   4) able to stand without using hands and stabilize independently   3) able to stand independently using hands   2) able to stand using hands after several tries   1) needs minimal aid to stand or to stabilize   0) needs moderate or maximal assist to stand   2.  Standing Unsupported: 3   4) able to stand  safely 2 minutes   3) able to stand 2 minutes with supervision   2) able to stand 30 seconds unsupported    1) needs several tries to stand 30 seconds unsupported   0) unable to stand 30 seconds unassisted  3.  Sitting with Back Unsupported: 4   4) able to sit safely and securely 2 minutes   3) able to sit 2 minutes under supervision   2) able to sit 30 seconds   1) able to sit 10 seconds   0) unable to sit without support 10 seconds  4.  Standing to Sittin   4) sits safely with minimal use of hands   3) controls descent by using hands   2) uses back of legs against chair to control descent   1) sits independently but had uncontrolled descent   0) needs assistance to sit  5.  Transfers: 4   4) able to transfer safely with minor use of hands   3) able to transfer safely definite need of hands   2) able to transfer with verbal cuing and/or supervision   1) needs on person to assist   0) needs two people to assist or supervise to be safe  6.  Standing Unsupported with Eyes Closed: 3   4) able to stand 10 seconds safely   3) able to stand 10 seconds with supervision   2) able to stand 3 seconds   1) unable to keep eyes closed 3 seconds but stays safely   0) needs help to keep from falling  7.  Standing Unsupported with Feet Together: 3   4) able to pace feet together independently and stand 1 minute safely   3) able to place feet together independently and stand for 1 minute with supervision   2) able to place feet together independently but unable to hold for 30 seconds   1) needs help to attain position but able to stand 15 seconds feet together   0) needs help to attain position and unable to hold for 15 seconds  8.  Reaching Forward: 3   4) can reach forward confidently 25 cm (10 inches)   3) can reach forward 12 cm safely (5 inches)   2) can reach forward 5 cm safely (2 inches)   1) reaches forward but needs supervision   0) loses balance while trying/requires external support  9.   Object from Floor:  3   4) able to  slipper safely and easily   3) able to  clipper but needs supervision   2) unable to  but reaches 2-5 cm (1-2 inches) from slipper and keeps balance    1) unable to  and needs supervision while trying   0) unable to try/needs assist to keep from losing balance or falling  10. Turning to Look Behind R & L Shoulder: 3   4) looks behind from both sides and weight shifts well   3) looks behind one side only other side shows less weight shift   2) turns sideways only but maintains balance   1) needs supervision while turning   0) needs assist to keep from losing balance or falling  11. Turn 360s: 0   4) able to turn 360 degrees safely in 4 seconds or less   3) able to turn 360 degrees safely one side only 4 seconds or less   2) able to turn 360 safely but slowly   1) needs close supervision or verbal cuing   0) needs assistance while turning  12.  Alternating Feet on Step Stool Unsupported: 2   4) able to stand independently and safely and complete 8 steps in 20 seconds   3) able to stand independently and complete 8 steps in greater than 20 seconds   2) able to complete 4 steps without aid with supervision   1) able to complete greater than 2 steps needs minimal assist   0) needs assistance to keep from falling/unable to try   13. Standing Unsupported one foot in front: 0   4) able to place foot tandem independently and hold 30 seconds   3) able to place foot ahead of other independently and hold 30 seconds   2) able to take small step independently and hold 30 seconds   1) needs help to step but can hold 15 seconds   0) loses balance while stepping or staning  14.  Standing on One Le   4) able to lift leg independently and hold for greater than 10 seconds   3) able to lift leg independently and hold 5-10 seconds   2) able to lift leg independently and hold equal or greater than 3 seconds   1) tries to lift leg unable to hold 3 seconds but remains standing  independently   0) unable to try or needs assist to prevent fall  Total: 36/56       Assessment of Score:  52-56= Normal         41-56= Low Fall Risk         21-40= Medium Fall Risk         0-20= High Fall Risk      Today's Intervention:  all exercises completed bilaterally and to fatigue. Patient instructed on all  for transition to the PEAK program.    Chest press seat 3 30# 3rd hole 2 x10  Triceps seat 3 50# 2 x10  Lumbar extension 20# 2 x10, legs 9, feet 11    Seated core:   On gray disc- upper extremity perturbations   On blue foam- marching hip flexion, reaching left and right and forward with feet flat on floor     Seated abduction flies and bicep curls 1# weights     Home Exercise Program: *= added to HEP.    Access Code: [email or web address] URL: [email or web address] Date: 05/16/2017 Prepared by: Kristy Early   Exercises   Hooklying Clamshell with Resistance - 10-15 reps - 1-2 sets - 5 hold - 1x daily   Seated Hamstring Stretch - 1-2 sets - 30 second hold - 1x daily   Romberg Stance - 30 second hold - 2-3 sets - 1x daily   Romberg Stance with Eyes Closed - 2-3 sets - 20-30 second hold - 1x daily   Romberg Stance with Head Rotation - 2-3 sets - 30 second hold - 1x daily   Standing Romberg to 3/4 Tandem Stance - 2-3 sets - 20-30 second hold - 1x daily   Scapular Retraction with Resistance Advanced - 10-15 reps - 1-2 sets - 5 hold - 1x daily     Upper trapezius, scalene and levator stretches x30 sec each. bilateral added 6/19/2017   Access Code: [email or web address] URL: [email or web address] Date: 10/11/2017 Prepared by: Kristy Early   Exercises   Seated March - 10-15 reps - 1-2 sets - 5 seconds hold - 1x daily   Seated Hip Abduction with Resistance - 10-15 reps - 1-2 sets - 5 seconds hold - 1x daily   Seated Knee Flexion Slide - 15-20 reps - 1-2 sets - 5 hold - 1x daily   Seated Shoulder Flexion Full Range - 10-15 reps - 1-2 sets - 5 seconds hold - 1x daily   Seated Overhead Press - 10-15  reps - 1-2 sets - 5 seconds hold - 1x daily   Seated Shoulder Horizontal Abduction with Resistance - 10-15 reps - 1-2 sets - 5 seconds hold - 1x daily   Seated Trunk Rotation - 10-15 reps - 1-2 sets - 5 seconds hold - 1x daily          Assessment     Patient demonstrates improved mobility compared to several weeks ago when he experienced a significant decline in his function due to MS. His lower extremity strength, gait and balance are slowly returning to baseline, but he continues to have core weakness and is at a higher fall risk due to his impairments. Patient would benefit from additional skilled physical therapy services to address aforementioned impairments and return patient to previous level of functioning. Patient would also benefit from obtaining a home cervical traction unit at this time to self manage recurring neck pain. He has had good resolution of his neck pain with traction in the clinic and will be transitioning to independent management within the next month.     Cervical codes updated 10/24/2017:    Goal Primary G Code and Modifier:    The Patient's G Code Goal would be: Handling Objects    The Patient's Impairment, Limitation or Restriction Modifier goal would be best described as: CI - 1% - 20% Impairment.      Discharge Primary G Code and Modifier:      The Patient's status upon Discharge is Handling Objects    The Patient's Impairment, Limitation or Restriction Modifier would be best described as CI - 1% - 20% Impairment.     G Codes and Modifier taken from patient completing the Burrows and dynamic gait index: 6/7/17 (Discharged balance gcodes 6/19/2017)     Goal Primary G Code and Modifier:    The Patient's G Code Goal would be: Mobility    The Patient's Impairment, Limitation or Restriction Modifier goal would be best described as: CJ - 20% - 40% Impairment.      Discharge Primary G Code and Modifier:      The Patient's status upon Discharge is Mobility    The Patient's  Impairment, Limitation or Restriction Modifier would be best described as CK - 40% - 60% Impairment.     G Codes and Modifier taken from patient completing the Burrows and dynamic gait index: 10/24/2017 (to be added next visit)    Current Primary G Code and Modifier:    Per the Patient's intake and/or assessment the Primary G Code is: Mobility .   The Patient's Impairment, Limitation or Restriction Modifier would be best described as: CK - 40% - 60% Impairment.     Goal Primary G Code and Modifier:    The Patient's G Code Goal would be: Mobility    The Patient's Impairment, Limitation or Restriction Modifier goal would be best described as: CJ - 20% - 40% Impairment.     Goals:  Patient will be assessed for appropriate mobility device to promote maintaining his safety with mobility within one visit. Goal met 6/7/17      Short Term Goals: To be met in 4 weeks:   Following physical therapy intervention, the patient will be able to:   1. Patient will improve standing posture to minimize crouched gait pattern. In progress 9/19/2017   2. Patient will be independent in home exercise program for progression of strength and balance abilities. In progress 9/19/2017                                        3.   Patient will decrease falls to no more than 3 per week. Not met 9/19/2017       Long Term Goals: To be met in 8 weeks:   Following physical therapy intervention, the patient will be able to:   1. Increase MMT of hip flexors to 3+/5 so that pt able to lift leg independently into car. In progress 10/24/2017   2.     New goals added 6/19/2017:      1.   Patient will have improved cervical range of motion by at least 5 degrees with minimal to no referred pain. Met 10/24/2017      2.   Patient will have less than 3/10 pain with normal activities of daily living. Met for cervical goal 10/24/2017      3.   Patient will have improved postural endurance for ability to complete normal daily work routine including reaching  to shoulder height without increase in symptoms. In progress 10/24/2017      4.   Patient will obtain home cervical traction unit for self management of symptoms. -in progress 10/24/2017      5.   Patient will improve mCTSIB scores by 5 seconds for improved endurance for independent stance and improved stability. In progress 10/24/2017      Plan     Treatment Plan / Targeted Outcomes:     Frequency:   8 additional visits      Duration of Treatment: 8 weeks     Plan of Care Due Date: Medicare/MA Re-Cert Due:  12/19/2017     Plan for next visit:  Gait and balance training, home exercise program development, transfers, independent mobility and strengthening. Cervical side gliding and mobilization as appropriate. Postural strengthening.        Student or PTA has been instructed in and demonstrates skills necessary to carry out above stated treatment plan: Yes     Thank you for your referral to [facility].  Please call with any questions, concerns or comments.  [phone]     Kristy Early, PT, DPT

## 2017-12-28 NOTE — TELEPHONE ENCOUNTER
Patient Information     Patient Name MRN Sex Jamir Cooley [id number] Male 1955      Telephone Encounter by Berta Duff RN at 2017  1:40 PM     Author:  Berta Duff RN Service:  (none) Author Type:  NURS- Registered Nurse     Filed:  2017  1:42 PM Encounter Date:  2017 Status:  Signed     :  Berta Duff RN (NURS- Registered Nurse)            Depression-in adults 18 and over    Office visit in the past 12 months or as indicated in chart.  Should have clinic visit 1-2 months after initial prescription.    [facility]  Next visit with KEVEN GILLIAM is on: No future appointment listed with this provider  Next visit with Family Practice is on: No future appointment listed in this department    Max refills 12 months from last office visit or per providers notes.    PHQ Depression Screening 2017   Date of PHQ exam (doc flow) 2017   1. Lack of interest/pleasure 1 - Several days 2 - More than half the days   2. Feeling down/depressed 1 - Several days 3 - Nearly every day   PHQ-2 TOTAL SCORE 2 5   3. Trouble sleeping 0 - Not at all 0 - Not at all   4. Decreased energy 3 - Nearly every day 3 - Nearly every day   5. Appetite change 3 - Nearly every day 0 - Not at all   6. Feelings of failure 2 - More than half the days 2 - More than half the days   7. Trouble concentrating 3 - Nearly every day 1 - Several days   8. Activity level 0 - Not at all 1 - Several days   9. Hurting yourself 0 - Not at all 0 - Not at all   PHQ-9 TOTAL SCORE 13 12   PHQ-9 Severity Level moderate moderate      Patient should follow up in September.    Prescription refilled per RN Medication Refill Policy.................... Berta Duff RN ....................  2017   1:42 PM

## 2017-12-28 NOTE — TELEPHONE ENCOUNTER
Patient Information     Patient Name MRN Jamir Boudreaux [id number] Male 1955      Telephone Encounter by Kacey Cardona at 2017 10:40 AM     Author:  Kacey Cardona Service:  (none) Author Type:  NURS- Student Practical Nurse     Filed:  2017 10:43 AM Encounter Date:  2017 Status:  Signed     :  Kacey Cardona (NURS- Student Practical Nurse)            Called and spoke with patient. Patient states that he is out of Fluoxitene and requested a refill. This writer informed patient that he received rx on 17 with 6 refills. Patient states he has been taking two tabs instead of one and that isn't helping. This writer suggested making apt with PCP to go over medication. Patient transferred to scheduling line and apt made.   Kacey Cardona ....................  2017   10:42 AM

## 2017-12-28 NOTE — PATIENT INSTRUCTIONS
Patient Information     Patient Name MRN Jamir Boudreaux [id number] Male 1955      Patient Instructions by Chante Dubois NP at 2017  2:45 PM     Author:  Chante Dubois NP Service:  (none) Author Type:  PHYS- Nurse Practitioner     Filed:  2017  3:16 PM Encounter Date:  2017 Status:  Signed     :  Chante Dubois NP (PHYS- Nurse Practitioner)            Thank you for choosing [facility] for your care.     You are advised to contact our office if there is no improvement or if there is worsening of conditions or symptoms, either come back or follow up with your primary care provider.     You were seen in the [facility]. This is for urgent care needs. If you have other questions or concerns please see your primary care provider.     You will need to be evaluated if you start to experience:  Fever higher than 102.5 F (39.2 C)   Trouble seeing or seeing double   Trouble thinking clearly   Trouble breathing or a stiff neck    * If you are a smoker, try to quit *    Call  or go to the emergency room if you:  Have trouble breathing   Chest pain  Abdominal pain    Chante Dubois RN, MSN, FNP  [facility]

## 2017-12-28 NOTE — TELEPHONE ENCOUNTER
Patient Information     Patient Name MRN Sex Jamir Cooley [id number] Male 1955      Telephone Encounter by Antonella Julien at 2017 10:05 AM     Author:  Antonella Julien Service:  (none) Author Type:  (none)     Filed:  2017 10:06 AM Encounter Date:  2017 Status:  Signed     :  Antonella Julien            Patient informed that Dr. Polk is out of the office until  so patient should be seen in the clinic if he thinks he has a UTI.  Antonella Julien LPN....................2017 10:06 AM

## 2017-12-28 NOTE — TELEPHONE ENCOUNTER
Patient Information     Patient Name MRN Sex Jamir Cooley [id number] Male 1955      Telephone Encounter by Holley Lacey at 2017  8:42 AM     Author:  Holley Lacey Service:  (none) Author Type:  (none)     Filed:  2017  8:44 AM Encounter Date:  2017 Status:  Signed     :  Holley Lacey            SDG- Patient states that he is almost sure he has a UTI and would like orders to have urinalysis done. He states he has a cath and this happens frequently. Thanks

## 2017-12-28 NOTE — PROGRESS NOTES
"Patient Information     Patient Name MRN Sex Jamir Cooley [id number] Male 1955      Progress Notes by Kristy Early PT at 2017 11:20 AM     Author:  Kristy Early PT Service:  (none) Author Type:  PT- Physical Therapist     Filed:  2017  2:56 PM Date of Service:  2017 11:20 AM Status:  Signed     :  Kristy Early PT (PT- Physical Therapist)            [facility]  Outpatient PT - Daily Note        Date of Service: 2017    Visit #: 28 (4 of 10)     Patient Name: Jamir Gill   YOB: 1955   Referring MD/Provider: Dr. Dee Hook, Dr. John Polk  Diagnosis: Multiple sclerosis, bilateral leg weakness, osteoarthritis of cervical region  Treatment Diagnosis: MS, impaired mobility, bilateral leg weakness, impaired posture, impaired balance and endurance; impaired cervical mobility  Insurance:  [employer]erred One  Start of Care Date:  2017   Certification Dates: 2017    Re-Cert Due: Medicare/[place] Re-Cert Due: 2017    Subjective    Pain today is about 1/10 in the neck right> left but last night was a 4/10. At some point during the night it went away. Getting into the truck the past few days has been better.    Objective    Today's Intervention:  all exercises completed bilaterally and to fatigue  Nustep level 4 seat 11 arms 10 x5 mins. At 4 mins patient had core fatigue resulting in leaning to the left, had to keep self correcting.    Walk in hernandez with front wheeled walker and contact guard assist x50 feet. Leaning more to the left during ambulation     Supine soft tissue mobilization to neck and upper trapezius, 1st rib muscle energy technique right   Median nerve gliding x10 bilateral     Supine SAQ over bolster, manual assist right lower extremity  Supine hip abduction and heel slides on slide board to fatigue each     Seated core training: rotation with bilateral upper extremities holding 6\" med ball; reaching right, left " "and forward    Walk in hernandez with front wheeled walker and contact guard assist x75 feet.    Deferred:  Mini squats at railing with chair behind   Standing balance, w/ no use of railing  Standing hip abduction and marching flexion    Seated LAQ (light manual assist for RLE)  Seated hamstring curls on slide board  Seated hip abduction clams red theraband resistance  Seated hip flexion toe taps to 4\" box    Seated upper extremity overhead press 1# weights  Seated scapular sets + external rotation with yellow theraband resistance  Biceps curls 2#     Med x leg press 50# seat 21, back upright with pillow behind head. Patient needed assistance to get legs up on pedal  Lumbar extension 20# 2 x10    Home Exercise Program: *= added to HEP.    Access Code: [email or web address] URL: [email or web address] Date: 05/16/2017 Prepared by: Kristy Early   Exercises   Hooklying Clamshell with Resistance - 10-15 reps - 1-2 sets - 5 hold - 1x daily   Seated Hamstring Stretch - 1-2 sets - 30 second hold - 1x daily   Romberg Stance - 30 second hold - 2-3 sets - 1x daily   Romberg Stance with Eyes Closed - 2-3 sets - 20-30 second hold - 1x daily   Romberg Stance with Head Rotation - 2-3 sets - 30 second hold - 1x daily   Standing Romberg to 3/4 Tandem Stance - 2-3 sets - 20-30 second hold - 1x daily   Scapular Retraction with Resistance Advanced - 10-15 reps - 1-2 sets - 5 hold - 1x daily     Upper trapezius, scalene and levator stretches x30 sec each. bilateral added 6/19/2017        Assessment     Patient demonstrates core weakness and return of neck pain due to excessive upper trapezius use. No appreciable change in cervical facet mobility but increased soft tissue restrictions perhaps related to increased upper extremity use with propelling wheelchair.    Patient will benefit from continued skilled physical therapy services to address aforementioned impairments and return patient to previous level of functioning.    G Codes and " Modifier taken from patient completing the Burrows and dynamic gait index: 6/7/17 (Discharged balance gcodes 6/19/2017)     Goal Primary G Code and Modifier:    The Patient's G Code Goal would be: Mobility    The Patient's Impairment, Limitation or Restriction Modifier goal would be best described as: CJ - 20% - 40% Impairment.      Discharge Primary G Code and Modifier:      The Patient's status upon Discharge is Mobility    The Patient's Impairment, Limitation or Restriction Modifier would be best described as CK - 40% - 60% Impairment.     Cervical codes updated 8/16/2017:    Current Primary G Code and Modifier:    Per the Patient's intake and/or assessment the Primary G Code is: Handling Objects .   The Patient's Impairment, Limitation or Restriction Modifier would be best described as: CI - 1% - 20% Impairment.     Goal Primary G Code and Modifier:    The Patient's G Code Goal would be: Handling Objects    The Patient's Impairment, Limitation or Restriction Modifier goal would be best described as: CI - 1% - 20% Impairment.      Goals:  Patient will be assessed for appropriate mobility device to promote maintaining his safety with mobility within one visit. Goal met 6/7/17      Short Term Goals: To be met in 4 weeks:   Following physical therapy intervention, the patient will be able to:   1. Patient will improve standing posture to minimize crouched gait pattern. In progress 9/19/2017   2. Patient will be independent in home exercise program for progression of strength and balance abilities. In progress 9/19/2017                                        3.   Patient will decrease falls to no more than 3 per week. Not met 9/19/2017       Long Term Goals: To be met in 8 weeks:   Following physical therapy intervention, the patient will be able to:   1. Increase MMT of hip flexors to 3+/5 so that pt able to lift leg independently into car. Not progressing.  2.   3.     New goals added 6/19/2017:       1.   Patient will have improved cervical range of motion by at least 5 degrees with minimal to no referred pain. 40% met 9/19/2017      2.   Patient will have less than 3/10 pain with normal activities of daily living. In progress 9/19/2017      3.   Patient will have improved postural endurance for ability to complete normal daily work routine including reaching to shoulder height without increase in symptoms. In progress 8/16/2017           5.   Patient will improve mCTSIB scores by 5 seconds for improved endurance for independent stance and improved stability. (New goal added 9/19/2017)     Plan     Treatment Plan / Targeted Outcomes:     Frequency:   16 additional visits      Duration of Treatment: 8 weeks     Plan of Care Due Date: Medicare/MA Re-Cert Due:  11/14/2017     Plan for next visit:  Gait and balance training, home exercise program development, transfers, independent mobility and strengthening. Cervical side gliding and mobilization as appropriate. Postural strengthening.        Student or PTA has been instructed in and demonstrates skills necessary to carry out above stated treatment plan: Yes     Thank you for your referral to [facility].  Please call with any questions, concerns or comments.  [phone]     Kristy Early, PT, DPT

## 2017-12-28 NOTE — PROGRESS NOTES
"Patient Information     Patient Name MRN Sex Jamir Cooley [id number] Male 1955      Progress Notes by Kristy Early PT at 11/15/2017 10:49 AM     Author:  Kristy Early PT Service:  (none) Author Type:  PT- Physical Therapist     Filed:  11/15/2017 11:19 AM Date of Service:  11/15/2017 10:49 AM Status:  Signed     :  Kristy Early PT (PT- Physical Therapist)            [facility]  Outpatient PT - Daily note        Date of Service: 11/15/2017     Visit # 40 (6 of 10)     Patient Name: Jamir Gill   YOB: 1955   Referring MD/Provider: Dr. Dee Hook, Dr. John Polk  Diagnosis: Multiple sclerosis, bilateral leg weakness, osteoarthritis of cervical region  Treatment Diagnosis: MS, impaired mobility, bilateral leg weakness, impaired posture, impaired balance and endurance; impaired cervical mobility  Insurance:  Preferred One  Start of Care Date:  2017   Certification Dates: 10/24/2017     Re-Cert Due: Medicare/MA Re-Cert Due: 2017    Subjective    Patient states the legs are still doing well. Walking around the house without the cane sometimes. Has not had any falls in a long time.    Objective    Patient presents ambulatory with his single point cane today.    Today's Intervention:  all exercises completed bilaterally and to fatigue.     Side step up and over x10 each way, light touch on rail only once and CGA    Dynamic balance: x30 feet each  - Side stepping with occasional use of cane, bilateral directions  - high knees  -ambulation with head turns    - Sustained hold with one foot up on foam + 4\" step with head/body turns    1/2 foam roll:  - round surface - balancing A/P and tandem with each foot forward x1 min each    Leg press 100# 3 x10 (seat back most upright, seat 19)  Lumbar extension 60# 3 x10, knees 9, feet 11      Deferred:  Nustep level 6 seat seat 10 and arms 10 x7 mins  Step up on blue foam x 10 leading with each left " and right, no hands, CGA  Stance on Airex:  - EO, EC with manual perturbations x45-60 sec each, less pressure with EC  - hip hikes B x10 with hand on rail light touch  - alternating toe taps onto Airex     Home Exercise Program: *= added to HEP.    Access Code: [email or web address] URL: [email or web address] Date: 05/16/2017 Prepared by: Kristy Early   Exercises   Hooklying Clamshell with Resistance - 10-15 reps - 1-2 sets - 5 hold - 1x daily   Seated Hamstring Stretch - 1-2 sets - 30 second hold - 1x daily   Romberg Stance - 30 second hold - 2-3 sets - 1x daily   Romberg Stance with Eyes Closed - 2-3 sets - 20-30 second hold - 1x daily   Romberg Stance with Head Rotation - 2-3 sets - 30 second hold - 1x daily   Standing Romberg to 3/4 Tandem Stance - 2-3 sets - 20-30 second hold - 1x daily   Scapular Retraction with Resistance Advanced - 10-15 reps - 1-2 sets - 5 hold - 1x daily   Upper trapezius, scalene and levator stretches x30 sec each. bilateral added 6/19/2017   Access Code: [email or web address] URL: [email or web address] Date: 10/11/2017 Prepared by: Kristy Early   Exercises   Seated March - 10-15 reps - 1-2 sets - 5 seconds hold - 1x daily   Seated Hip Abduction with Resistance - 10-15 reps - 1-2 sets - 5 seconds hold - 1x daily   Seated Knee Flexion Slide - 15-20 reps - 1-2 sets - 5 hold - 1x daily   Seated Shoulder Flexion Full Range - 10-15 reps - 1-2 sets - 5 seconds hold - 1x daily   Seated Overhead Press - 10-15 reps - 1-2 sets - 5 seconds hold - 1x daily   Seated Shoulder Horizontal Abduction with Resistance - 10-15 reps - 1-2 sets - 5 seconds hold - 1x daily   Seated Trunk Rotation - 10-15 reps - 1-2 sets - 5 seconds hold - 1x daily   Access Code: [email or web address] URL: [email or web address] Date: 11/10/2017 Prepared by: Kristy Early   Exercises   Standing Hip Abduction - 10-15 reps - 1-2 sets - 5 seconds hold - 1x daily   Standing March with Counter Support - 10-15 reps - 1-2 sets - 5  seconds hold - 1x daily   Seated Long Arc Quad with Strap - 10-15 reps - 1-2 sets - 5 seconds hold - 1x daily   Squat at Table - 10-15 reps - 1-2 sets - 5 seconds hold - 1x daily   Seated Thoracic Extension with Resistance - 10-15 reps - 1-2 sets - 5 seconds hold - 1x daily        Assessment     Patient demonstrates significant improvement in lower extremity endurance and postural endurance during ambulation with single point cane and without assistive device for short distances. Self corrects mostly all losses of balance during session today.    Cervical codes updated 10/24/2017:    Goal Primary G Code and Modifier:    The Patient's G Code Goal would be: Handling Objects    The Patient's Impairment, Limitation or Restriction Modifier goal would be best described as: CI - 1% - 20% Impairment.      Discharge Primary G Code and Modifier:      The Patient's status upon Discharge is Handling Objects    The Patient's Impairment, Limitation or Restriction Modifier would be best described as CI - 1% - 20% Impairment.     G Codes and Modifier taken from patient completing the Burrows and dynamic gait index: 6/7/17 (Discharged balance gcodes 6/19/2017)     Goal Primary G Code and Modifier:    The Patient's G Code Goal would be: Mobility    The Patient's Impairment, Limitation or Restriction Modifier goal would be best described as: CJ - 20% - 40% Impairment.      Discharge Primary G Code and Modifier:      The Patient's status upon Discharge is Mobility    The Patient's Impairment, Limitation or Restriction Modifier would be best described as CK - 40% - 60% Impairment.     G Codes and Modifier taken from patient completing the Burrows and dynamic gait index: 10/24/2017 (to be added next visit)    Current Primary G Code and Modifier:    Per the Patient's intake and/or assessment the Primary G Code is: Mobility .   The Patient's Impairment, Limitation or Restriction Modifier would be best described as: CK -  40% - 60% Impairment.     Goal Primary G Code and Modifier:    The Patient's G Code Goal would be: Mobility    The Patient's Impairment, Limitation or Restriction Modifier goal would be best described as: CJ - 20% - 40% Impairment.     Goals:  Patient will be assessed for appropriate mobility device to promote maintaining his safety with mobility within one visit. Goal met 6/7/17      Short Term Goals: To be met in 4 weeks:   Following physical therapy intervention, the patient will be able to:   1. Patient will improve standing posture to minimize crouched gait pattern. In progress 9/19/2017   2. Patient will be independent in home exercise program for progression of strength and balance abilities. In progress 9/19/2017                                        3.   Patient will decrease falls to no more than 3 per week. Not met 9/19/2017       Long Term Goals: To be met in 8 weeks:   Following physical therapy intervention, the patient will be able to:   1. Increase MMT of hip flexors to 3+/5 so that pt able to lift leg independently into car. In progress 10/24/2017   2.     New goals added 6/19/2017:      1.   Patient will have improved cervical range of motion by at least 5 degrees with minimal to no referred pain. Met 10/24/2017      2.   Patient will have less than 3/10 pain with normal activities of daily living. Met for cervical goal 10/24/2017      3.   Patient will have improved postural endurance for ability to complete normal daily work routine including reaching to shoulder height without increase in symptoms. In progress 10/24/2017      4.   Patient will obtain home cervical traction unit for self management of symptoms. -in progress 10/24/2017      5.   Patient will improve mCTSIB scores by 5 seconds for improved endurance for independent stance and improved stability. In progress 10/24/2017      Plan     Treatment Plan / Targeted Outcomes:     Frequency:   8 additional visits      Duration of  Treatment: 8 weeks     Plan of Care Due Date: Medicare/[place] Re-Cert Due:  12/19/2017     Plan for next visit:  Gait and balance training, home exercise program development, transfers, independent mobility and strengthening. Cervical side gliding and mobilization as appropriate. Postural strengthening.        Student or PTA has been instructed in and demonstrates skills necessary to carry out above stated treatment plan: Yes     Thank you for your referral to [facility].  Please call with any questions, concerns or comments.  [phone]

## 2017-12-28 NOTE — PROGRESS NOTES
"Patient Information     Patient Name MRN Sex Jamir Cooley [id number] Male 1955      Progress Notes by Kristy Early PT at 2017 10:25 AM     Author:  Kristy Early PT Service:  (none) Author Type:  PT- Physical Therapist     Filed:  2017 12:52 PM Date of Service:  2017 10:25 AM Status:  Signed     :  Kristy Early PT (PT- Physical Therapist)            [facility]  Outpatient PT - Daily note        Date of Service: 2017    Visit #: 15 (6 of 10)     Patient Name: Jamir Gill   YOB: 1955   Referring MD/Provider: Dr. Dee Hook, Dr. John Polk  Diagnosis: Multiple sclerosis, bilateral leg weakness, osteoarthritis of cervical region  Treatment Diagnosis: MS, impaired mobility, bilateral leg weakness, impaired posture, impaired balance and endurance; impaired cervical mobility  Insurance:  Preferred One  Start of Care Date:  2017   Certification Dates: From: 2017   Re-Cert Due: Medicare/MA Re-Cert Due: 17    Patient arrived 10 mins late for appointment today.  Subjective    Neck pain \"not much of anything\" today, still some trouble at night with sleeping. Reports having decent support from his pillow, but usually lays on his right side and will maybe try laying the other side to see if that helps.     Reports his blood pressure was very low when walking into Dr's office this morning, \"around 75 or 80\" and \"almost passed out\". Still a little dizzy.    Objective    Today's Intervention:  all performed to muscle fatigue:  Neuromuscular re-ed and therapeutic exercise:  Toe taps to round side BOSU x20 alternating, CGA, mod use of 1 hand on rail  Sustained hold   A/p balance on round surface 1/2 foam with min to mod use of railing for balance. Encouraged hip strategy.  Leg press 100# (seat 22 and pillow behind head) to fatigue 3x10  Lumbar extension 40# to fatigue 3x10 reps  Nustep level 6, seat 11, arms 10 x 5 minutes - " finished on own  Standing hip abduction, light to mod use of rail.    Manual therapy:  Cervical side gliding left to right x3 mins  Soft tissue mobility to upper trap and scalene and brachial plexus release x3 mins  muscle energy technique ERS C5 left, supine  1st rib muscle energy technique, supine  Supine median nerve gliding x10 reps x2 sets    Deferred:  Lateral step up and over airex foam, min to mod use of 1 hand on railing for support. x10 each way  Abduction 30# to fatigue  LAQ with assist right lower extremity  Seated hamstring curls red left yellow right lower extremity to fatigue  Triceps extension 2# overhead x15 bilateral   Triceps press 60# x15   TRX squats 2x10. Cues for nose over toes, bending at waist.  Standing hamstring curls no weight with TRX strap support 2 x10 each  Snow tez overhead press 2# weights, seated  Side stepping figure 8s around 4 cones x1 each direction. modA x2 today with large [facility]     Home Exercise Program: *= added to HEP.    Access Code: [email or web address] URL: [email or web address] Date: 05/16/2017 Prepared by: Kristy Early   Exercises   Hooklying Clamshell with Resistance - 10-15 reps - 1-2 sets - 5 hold - 1x daily   Seated Hamstring Stretch - 1-2 sets - 30 second hold - 1x daily   Romberg Stance - 30 second hold - 2-3 sets - 1x daily   Romberg Stance with Eyes Closed - 2-3 sets - 20-30 second hold - 1x daily   Romberg Stance with Head Rotation - 2-3 sets - 30 second hold - 1x daily   Standing Romberg to 3/4 Tandem Stance - 2-3 sets - 20-30 second hold - 1x daily   Scapular Retraction with Resistance Advanced - 10-15 reps - 1-2 sets - 5 hold - 1x daily     Upper trapezius, scalene and levator stretches x30 sec each. bilateral added 6/19/2017        Assessment     Patient demonstrates improved cervical and soft tissue restrictions. Recommend changing sleeping position to improve this, and drinking plenty of fluids to raise blood pressure.    Patient will benefit from  continued skilled physical therapy services to address aforementioned impairments and return patient to previous level of functioning.    G Codes and Modifier taken from patient completing the Burrows and dynamic gait index: 6/7/17 (Discharged balance gcodes 6/19/2017)     Goal Primary G Code and Modifier:    The Patient's G Code Goal would be: Mobility    The Patient's Impairment, Limitation or Restriction Modifier goal would be best described as: CJ - 20% - 40% Impairment.      Discharge Primary G Code and Modifier:      The Patient's status upon Discharge is Mobility    The Patient's Impairment, Limitation or Restriction Modifier would be best described as CK - 40% - 60% Impairment.     Cervical codes to be added next visit 6/22/2017:    Current Primary G Code and Modifier:    Per the Patient's intake and/or assessment the Primary G Code is: Handling Objects .   The Patient's Impairment, Limitation or Restriction Modifier would be best described as: CK - 40% - 60% Impairment.     Goal Primary G Code and Modifier:    The Patient's G Code Goal would be: Handling Objects    The Patient's Impairment, Limitation or Restriction Modifier goal would be best described as: CI - 1% - 20% Impairment.      Goals:  Patient will be assessed for appropriate mobility device to promote maintaining his safety with mobility within one visit. Goal met 6/7/17      Short Term Goals: To be met in 4 weeks:   Following physical therapy intervention, the patient will be able to:   1. Patient will improve standing posture to minimize crouched gait pattern. In progress 6/7/17   2. Patient will be independent in home exercise program for progression of strength and balance abilities.                                                3.   Patient will decrease falls to no more than 3 per week. In progress 6/7/17       Long Term Goals: To be met in 8 weeks:   Following physical therapy intervention, the patient will be able to:    1. Increase MMT of hip flexors to 3+/5 so that pt able to lift leg independently into car.   2. Patient will improve dynamic gait index score by 5 points to decrease fall risk.    New goals added 6/19/2017:      1.   Patient will have improved cervical range of motion by at least 5 degrees with minimal to no referred pain.     2.   Patient will have less than 3/10 pain with normal activities of daily living.     3.   Patient will have improved postural endurance for ability to complete normal daily work routine including reaching to shoulder height without increase in symptoms.     4.   Patient will obtain home cervical traction unit for self management of symptoms.         Plan     Treatment Plan / Targeted Outcomes:     Frequency:   16 visits      Duration of Treatment: 8 weeks     Plan of Care Due Date: Medicare/MA Re-Cert Due:  08/02/17     Plan for next visit:  Gait and balance training, home exercise program development, floor transfer training and strengthening.        Student or PTA has been instructed in and demonstrates skills necessary to carry out above stated treatment plan: Yes     Thank you for your referral to [facility].  Please call with any questions, concerns or comments.  [phone]     Kristy Early, PT, DPT/ [employer] [facility]

## 2017-12-28 NOTE — PROGRESS NOTES
"Patient Information     Patient Name MRN Sex Jamir Cooley [id number] Male 1955      Progress Notes by Kristy Early PT at 2017 10:43 AM     Author:  Kristy Early PT Service:  (none) Author Type:  PT- Physical Therapist     Filed:  2017  3:08 PM Date of Service:  2017 10:43 AM Status:  Signed     :  Kristy Early PT (PT- Physical Therapist)            [facility]  Outpatient PT - Daily note        Date of Service: 2017     Visit #: 35 (1 of 10)     Patient Name: Jamir Gill   YOB: 1955   Referring MD/Provider: Dr. Dee Hook, Dr. John Polk  Diagnosis: Multiple sclerosis, bilateral leg weakness, osteoarthritis of cervical region  Treatment Diagnosis: MS, impaired mobility, bilateral leg weakness, impaired posture, impaired balance and endurance; impaired cervical mobility  Insurance:  Preferred One  Start of Care Date:  2017   Certification Dates: 10/24/2017     Re-Cert Due: Medicare/MA Re-Cert Due: 2017    Subjective    Patient states he has been using his cane more around the house. Brought his walker along today but left it in the car due to the snow.    Objective  Patient presents ambulatory with his single point cane today.    Today's Intervention:  all exercises completed bilaterally and to fatigue.   Nustep level 6 seat 11 arms 10 x5 mins    Standing hip abduction and marching hip flexion     Seated hip abduction yellow band x15    Squats without upper extremity assist  Step up and over laterally and forward/retro blue foam  Sustained hold with one foot up on 4\" box with head turns    Side stepping figure 8's around cones, no use of single point cane x1 each direction    Lumbar extension 30# 3 x10, legs 9, feet 11  Leg pres 80#,10, 90@ 2 x10    Home Exercise Program: *= added to HEP.    Access Code: [email or web address] URL: [email or web address] Date: 2017 Prepared by: Kristy Early   Exercises "   Hooklying Clamshell with Resistance - 10-15 reps - 1-2 sets - 5 hold - 1x daily   Seated Hamstring Stretch - 1-2 sets - 30 second hold - 1x daily   Romberg Stance - 30 second hold - 2-3 sets - 1x daily   Romberg Stance with Eyes Closed - 2-3 sets - 20-30 second hold - 1x daily   Romberg Stance with Head Rotation - 2-3 sets - 30 second hold - 1x daily   Standing Romberg to 3/4 Tandem Stance - 2-3 sets - 20-30 second hold - 1x daily   Scapular Retraction with Resistance Advanced - 10-15 reps - 1-2 sets - 5 hold - 1x daily     Upper trapezius, scalene and levator stretches x30 sec each. bilateral added 6/19/2017   Access Code: [email or web address] URL: [email or web address] Date: 10/11/2017 Prepared by: Kristy Early   Exercises   Seated March - 10-15 reps - 1-2 sets - 5 seconds hold - 1x daily   Seated Hip Abduction with Resistance - 10-15 reps - 1-2 sets - 5 seconds hold - 1x daily   Seated Knee Flexion Slide - 15-20 reps - 1-2 sets - 5 hold - 1x daily   Seated Shoulder Flexion Full Range - 10-15 reps - 1-2 sets - 5 seconds hold - 1x daily   Seated Overhead Press - 10-15 reps - 1-2 sets - 5 seconds hold - 1x daily   Seated Shoulder Horizontal Abduction with Resistance - 10-15 reps - 1-2 sets - 5 seconds hold - 1x daily   Seated Trunk Rotation - 10-15 reps - 1-2 sets - 5 seconds hold - 1x daily          Assessment     Patient demonstrates improved mobility with less restrictive assistive device. Improved right lower extremity hip flexion strength noted today. Patient would benefit from additional skilled physical therapy services to address aforementioned impairments and return patient to previous level of functioning. Patient would also benefit from obtaining a home cervical traction unit at this time to self manage recurring neck pain. He has had good resolution of his neck pain with traction in the clinic and will be transitioning to independent management within the next month.     Cervical codes updated  10/24/2017:    Goal Primary G Code and Modifier:    The Patient's G Code Goal would be: Handling Objects    The Patient's Impairment, Limitation or Restriction Modifier goal would be best described as: CI - 1% - 20% Impairment.      Discharge Primary G Code and Modifier:      The Patient's status upon Discharge is Handling Objects    The Patient's Impairment, Limitation or Restriction Modifier would be best described as CI - 1% - 20% Impairment.     G Codes and Modifier taken from patient completing the Burrows and dynamic gait index: 6/7/17 (Discharged balance gcodes 6/19/2017)     Goal Primary G Code and Modifier:    The Patient's G Code Goal would be: Mobility    The Patient's Impairment, Limitation or Restriction Modifier goal would be best described as: CJ - 20% - 40% Impairment.      Discharge Primary G Code and Modifier:      The Patient's status upon Discharge is Mobility    The Patient's Impairment, Limitation or Restriction Modifier would be best described as CK - 40% - 60% Impairment.     G Codes and Modifier taken from patient completing the Burrows and dynamic gait index: 10/24/2017 (to be added next visit)    Current Primary G Code and Modifier:    Per the Patient's intake and/or assessment the Primary G Code is: Mobility .   The Patient's Impairment, Limitation or Restriction Modifier would be best described as: CK - 40% - 60% Impairment.     Goal Primary G Code and Modifier:    The Patient's G Code Goal would be: Mobility    The Patient's Impairment, Limitation or Restriction Modifier goal would be best described as: CJ - 20% - 40% Impairment.     Goals:  Patient will be assessed for appropriate mobility device to promote maintaining his safety with mobility within one visit. Goal met 6/7/17      Short Term Goals: To be met in 4 weeks:   Following physical therapy intervention, the patient will be able to:   1. Patient will improve standing posture to minimize crouched gait  pattern. In progress 9/19/2017   2. Patient will be independent in home exercise program for progression of strength and balance abilities. In progress 9/19/2017                                        3.   Patient will decrease falls to no more than 3 per week. Not met 9/19/2017       Long Term Goals: To be met in 8 weeks:   Following physical therapy intervention, the patient will be able to:   1. Increase MMT of hip flexors to 3+/5 so that pt able to lift leg independently into car. In progress 10/24/2017   2.     New goals added 6/19/2017:      1.   Patient will have improved cervical range of motion by at least 5 degrees with minimal to no referred pain. Met 10/24/2017      2.   Patient will have less than 3/10 pain with normal activities of daily living. Met for cervical goal 10/24/2017      3.   Patient will have improved postural endurance for ability to complete normal daily work routine including reaching to shoulder height without increase in symptoms. In progress 10/24/2017      4.   Patient will obtain home cervical traction unit for self management of symptoms. -in progress 10/24/2017      5.   Patient will improve mCTSIB scores by 5 seconds for improved endurance for independent stance and improved stability. In progress 10/24/2017      Plan     Treatment Plan / Targeted Outcomes:     Frequency:   8 additional visits      Duration of Treatment: 8 weeks     Plan of Care Due Date: Medicare/MA Re-Cert Due:  12/19/2017     Plan for next visit:  Gait and balance training, home exercise program development, transfers, independent mobility and strengthening. Cervical side gliding and mobilization as appropriate. Postural strengthening.        Student or PTA has been instructed in and demonstrates skills necessary to carry out above stated treatment plan: Yes     Thank you for your referral to [facility].  Please call with any questions, concerns or comments.  [phone]     Kristy  Nneka, PT, DPT

## 2017-12-28 NOTE — PROGRESS NOTES
"Patient Information     Patient Name MRN Sex Jamir Cooley [id number] Male 1955      Progress Notes by Natty Castillo, PT at 2017  8:51 AM     Author:  Natty Castilol PT Service:  (none) Author Type:  PT- Physical Therapist     Filed:  2017 11:39 AM Date of Service:  2017  8:51 AM Status:  Signed     :  Natty Castillo PT (PT- Physical Therapist)            [facility]  Outpatient PT - Daily note        Date of Service: 2017    Visit #: 12 (3 of 10)     Patient Name: Jamir Gill   YOB: 1955   Referring MD/Provider: Dr. Dee Hook, Dr. John Polk  Diagnosis: Multiple sclerosis, bilateral leg weakness, osteoarthritis of cervical region  Treatment Diagnosis: MS, impaired mobility, bilateral leg weakness, impaired posture, impaired balance and endurance; impaired cervical mobility  Insurance:  Preferred One  Start of Care Date:  2017   Certification Dates: From: 2017   Re-Cert Due: Medicare/MA Re-Cert Due: 17    Subjective    Neck pain is 1/10, much improved.  Leg pain L 4/10, R no pain until he exerts himself    Last night L \"good\" leg started hurting like crazy last night, took 3 Aleve this morning, he said he doesn't take it very often, reminded him dose for Aleve is usually 2 tabs at most.  He can tell that his aerobic endurance is decreased and asked how to work on this, discussed increasing time on Stepper, using UBE or stationary bike, etc. He couldn't get leg into truck on first attempt today and ended up on the ground, he reports no injuries and it more of a slow \"plop\" vs a \"fall\".      Objective    Observation:   C3-4 left FRS; C4-5 FRS left - much improved, only mild limitation 2017     Today's Intervention:  all performed to muscle fatigue:  Neuromuscular re-ed and therapeutic exercise:  Sitting HS stretch B 2x30 sec with foot on floor  Toe taps to round side BOSU x20 alternating, CGA  Gastroc " stretch, needed manual cue to straighten knee out in back on each side, he thought it was straight until cue given, B 2x30 sec  A/p balance on round surface 1/2 foam with min to mod use of railing for balance. Encouraged hip strategy.  Lateral step up and over airex foam, min to mod use of 1 hand on railing for support. x8 each way  Side stepping figure 8s around 4 cones x2 each direction. CGA only today      Manual therapy:  muscle energy technique 2 x3 reps to R C3-4, C4-5, cervical FRS; supine, much improved after, no restrictions to left   Cervical side gliding left to right x3 mins  Soft tissue mobility and brachial plexus release x3 mins    Mechanical Traction: cervical traction performed 25 maximum weight, 10 minimum weight 60 seconds on, 20 seconds off for 10 minutes in supine position at 30 degrees of flexion.(4 steps up and down, rope speed 100%)    Nustep level 6, seat 11, arms 10 - on own at end 4-5 minutes - encouraged him do 4-5 minutes, take a short break and try for a few more minutes       Deferred:  Abduction 30# to fatigue  LAQ with assist right lower extremity  Seated hamstring curls red left yellow right lower extremity to fatigue  Triceps extension 2# overhead x15 bilateral   Triceps press 60# x15  Standing hip abduction, light use of rail.   TRX squats 2x10. Cues for nose over toes, bending at waist.  Standing hamstring curls no weight with TRX strap support 2 x10 each  muscle energy technique right 1st rib, supine  Seated muscle energy technique thoracic FRS  Lumbar extension 64# to fatigue  Leg press 70# seat 22 to fatigue  AirEx foam: sustained hold with head turns x1:00 minimal touches on rail, advanced to eyes closed with head turns x1:00 multiple light touches on rail  Snow tez overhead press 2# weights, seated  Suboccipital release x2:00  Supine median nerve gliding x10 reps x2 sets, just did with R shoulder abd and L cervical side bending      Home Exercise Program: *= added to HEP.     Access Code: [email or web address] URL: [email or web address] Date: 05/16/2017 Prepared by: Kristy Early   Exercises   Hooklying Clamshell with Resistance - 10-15 reps - 1-2 sets - 5 hold - 1x daily   Seated Hamstring Stretch - 1-2 sets - 30 second hold - 1x daily   Romberg Stance - 30 second hold - 2-3 sets - 1x daily   Romberg Stance with Eyes Closed - 2-3 sets - 20-30 second hold - 1x daily   Romberg Stance with Head Rotation - 2-3 sets - 30 second hold - 1x daily   Standing Romberg to 3/4 Tandem Stance - 2-3 sets - 20-30 second hold - 1x daily   Scapular Retraction with Resistance Advanced - 10-15 reps - 1-2 sets - 5 hold - 1x daily     Upper trapezius, scalene and levator stretches x30 sec each. bilateral added 6/19/2017        Assessment     Patient demonstrates improved cervical mobility and decreased joint restrictions in Cspine and 1st rib. This improves with manual therapy and patient reports decreased pain following manual therapy and traction.    Patient will benefit from continued skilled physical therapy services to address aforementioned impairments and return patient to previous level of functioning.    G Codes and Modifier taken from patient completing the Burrows and dynamic gait index: 6/7/17 (Discharged balance gcodes 6/19/2017)     Goal Primary G Code and Modifier:    The Patient's G Code Goal would be: Mobility    The Patient's Impairment, Limitation or Restriction Modifier goal would be best described as: CJ - 20% - 40% Impairment.      Discharge Primary G Code and Modifier:      The Patient's status upon Discharge is Mobility    The Patient's Impairment, Limitation or Restriction Modifier would be best described as CK - 40% - 60% Impairment.     Cervical codes to be added next visit 6/22/2017:    Current Primary G Code and Modifier:    Per the Patient's intake and/or assessment the Primary G Code is: Handling Objects .   The Patient's Impairment, Limitation or Restriction  Modifier would be best described as: CK - 40% - 60% Impairment.     Goal Primary G Code and Modifier:    The Patient's G Code Goal would be: Handling Objects    The Patient's Impairment, Limitation or Restriction Modifier goal would be best described as: CI - 1% - 20% Impairment.      Goals:  Patient will be assessed for appropriate mobility device to promote maintaining his safety with mobility within one visit. Goal met 6/7/17      Short Term Goals: To be met in 4 weeks:   Following physical therapy intervention, the patient will be able to:   1. Patient will improve standing posture to minimize crouched gait pattern. In progress 6/7/17   2. Patient will be independent in home exercise program for progression of strength and balance abilities.                                                3.   Patient will decrease falls to no more than 3 per week. In progress 6/7/17       Long Term Goals: To be met in 8 weeks:   Following physical therapy intervention, the patient will be able to:   1. Increase MMT of hip flexors to 3+/5 so that pt able to lift leg independently into car.   2. Patient will improve dynamic gait index score by 5 points to decrease fall risk.    New goals added 6/19/2017:      1.   Patient will have improved cervical range of motion by at least 5 degrees with minimal to no referred pain.     2.   Patient will have less than 3/10 pain with normal activities of daily living.     3.   Patient will have improved postural endurance for ability to complete normal daily work routine including reaching to shoulder height without increase in symptoms.     4.   Patient will obtain home cervical traction unit for self management of symptoms.         Plan     Treatment Plan / Targeted Outcomes:     Frequency:   16 visits      Duration of Treatment: 8 weeks     Plan of Care Due Date: Medicare/MA Re-Cert Due:  08/02/17     Plan for next visit:  Gait and balance training, home exercise program development,  floor transfer training and strengthening.        Student or PTA has been instructed in and demonstrates skills necessary to carry out above stated treatment plan: Yes     Thank you for your referral to [facility].  Please call with any questions, concerns or comments.  [phone]     Kristy Early PT, DPT/ [employer] [place]

## 2017-12-28 NOTE — PROGRESS NOTES
"Patient Information     Patient Name MRN Sex Jamir Cooley [id number] Male 1955      Progress Notes by Kristy Early PT at 2017 11:15 AM     Author:  Kristy Early PT Service:  (none) Author Type:  PT- Physical Therapist     Filed:  2017 12:10 PM Date of Service:  2017 11:15 AM Status:  Signed     :  Kristy Early PT (PT- Physical Therapist)            [facility]  Outpatient PT - Daily Note        Date of Service: 2017    Visit #: 27 (3 of 10)     Patient Name: Jamir Gill   YOB: 1955   Referring MD/Provider: Dr. Dee Hook, Dr. John Polk  Diagnosis: Multiple sclerosis, bilateral leg weakness, osteoarthritis of cervical region  Treatment Diagnosis: MS, impaired mobility, bilateral leg weakness, impaired posture, impaired balance and endurance; impaired cervical mobility  Insurance:  Preferred One  Start of Care Date:  2017   Certification Dates: 2017    Re-Cert Due: Medicare/MA Re-Cert Due: 2017    Subjective    Pain today is about a 0/10 in the neck. This has felt pretty good since last visit. No falls either. \"Hard to fall out of the wheelchair.\"     Objective    Today's Intervention:  all exercises completed bilaterally and to fatigue  Nustep level 4 seat 11 arms 10 x5 mins    Mini squats at railing with chair behind   Standing balance, w/ no use of railing  Standing hip abduction and marching flexion    Seated LAQ (light manual assist for RLE)  Seated hamstring curls on slide board  Seated hip abduction clams red theraband resistance  Seated hip flexion toe taps to 4\" box    Seated upper extremity overhead press 1# weights  Seated scapular sets + external rotation with yellow theraband resistance  Biceps curls 2#     Med x leg press 50# seat 21, back upright with pillow behind head. Patient needed assistance to get legs up on pedal  Lumbar extension 20# 2 x10    Home Exercise Program: *= added to HEP.  "   Access Code: [email or web address] URL: [email or web address] Date: 05/16/2017 Prepared by: Kristy Early   Exercises   Hooklying Clamshell with Resistance - 10-15 reps - 1-2 sets - 5 hold - 1x daily   Seated Hamstring Stretch - 1-2 sets - 30 second hold - 1x daily   Romberg Stance - 30 second hold - 2-3 sets - 1x daily   Romberg Stance with Eyes Closed - 2-3 sets - 20-30 second hold - 1x daily   Romberg Stance with Head Rotation - 2-3 sets - 30 second hold - 1x daily   Standing Romberg to 3/4 Tandem Stance - 2-3 sets - 20-30 second hold - 1x daily   Scapular Retraction with Resistance Advanced - 10-15 reps - 1-2 sets - 5 hold - 1x daily     Upper trapezius, scalene and levator stretches x30 sec each. bilateral added 6/19/2017        Assessment     Patient demonstrates very early fatigue of all extremities during therapeutic exercise but better tolerance today. Neck pain has improved with manual therapy. Will reassess as needed.    Patient will benefit from continued skilled physical therapy services to address aforementioned impairments and return patient to previous level of functioning.    G Codes and Modifier taken from patient completing the Burrows and dynamic gait index: 6/7/17 (Discharged balance gcodes 6/19/2017)     Goal Primary G Code and Modifier:    The Patient's G Code Goal would be: Mobility    The Patient's Impairment, Limitation or Restriction Modifier goal would be best described as: CJ - 20% - 40% Impairment.      Discharge Primary G Code and Modifier:      The Patient's status upon Discharge is Mobility    The Patient's Impairment, Limitation or Restriction Modifier would be best described as CK - 40% - 60% Impairment.     Cervical codes updated 8/16/2017:    Current Primary G Code and Modifier:    Per the Patient's intake and/or assessment the Primary G Code is: Handling Objects .   The Patient's Impairment, Limitation or Restriction Modifier would be best described as: CI - 1% -  20% Impairment.     Goal Primary G Code and Modifier:    The Patient's G Code Goal would be: Handling Objects    The Patient's Impairment, Limitation or Restriction Modifier goal would be best described as: CI - 1% - 20% Impairment.      Goals:  Patient will be assessed for appropriate mobility device to promote maintaining his safety with mobility within one visit. Goal met 6/7/17      Short Term Goals: To be met in 4 weeks:   Following physical therapy intervention, the patient will be able to:   1. Patient will improve standing posture to minimize crouched gait pattern. In progress 9/19/2017   2. Patient will be independent in home exercise program for progression of strength and balance abilities. In progress 9/19/2017                                        3.   Patient will decrease falls to no more than 3 per week. Not met 9/19/2017       Long Term Goals: To be met in 8 weeks:   Following physical therapy intervention, the patient will be able to:   1. Increase MMT of hip flexors to 3+/5 so that pt able to lift leg independently into car. Not progressing.  2.   3.     New goals added 6/19/2017:      1.   Patient will have improved cervical range of motion by at least 5 degrees with minimal to no referred pain. 40% met 9/19/2017      2.   Patient will have less than 3/10 pain with normal activities of daily living. In progress 9/19/2017      3.   Patient will have improved postural endurance for ability to complete normal daily work routine including reaching to shoulder height without increase in symptoms. In progress 8/16/2017           5.   Patient will improve mCTSIB scores by 5 seconds for improved endurance for independent stance and improved stability. (New goal added 9/19/2017)     Plan     Treatment Plan / Targeted Outcomes:     Frequency:   16 additional visits      Duration of Treatment: 8 weeks     Plan of Care Due Date: Medicare/MA Re-Cert Due:  11/14/2017     Plan for next visit:  Gait and  balance training, home exercise program development, transfers, independent mobility and strengthening. Cervical side gliding and mobilization as appropriate. Postural strengthening.        Student or PTA has been instructed in and demonstrates skills necessary to carry out above stated treatment plan: Yes     Thank you for your referral to [facility].  Please call with any questions, concerns or comments.  [phone]     Kristy Early, PT, DPT

## 2017-12-28 NOTE — PATIENT INSTRUCTIONS
Patient Information     Patient Name MRN Jamir Boudreaux [id number] Male 1955      Patient Instructions by Jade Simons NP at 10/24/2017 11:15 AM     Author:  Jade Simons NP Service:  (none) Author Type:  PHYS- Nurse Practitioner     Filed:  10/24/2017 12:21 PM Encounter Date:  10/24/2017 Status:  Signed     :  Jade Simons NP (PHYS- Nurse Practitioner)               Index Lao   Bladder Infection: Brief Version   ________________________________________________________________________  KEY POINTS    The bladder is the part of your body that stores urine. When bacteria get into the bladder, it can get infected.    Your provider will give you antibiotics to treat the infection.    Drink lots of water and take your medicine for as long as your healthcare provider prescribes, even when you feel better.  ________________________________________________________________________  What is a bladder infection?   The bladder is the part of your body that stores urine. When bacteria get into the bladder, it can get infected.  What is the cause?  A bladder infection happens when bacteria from the skin get into the bladder.    Bacteria can spread to the bladder from the rectal area or vagina.    Sometimes the bladder gets infected when something is blocking the flow of urine. For example, in men the problem can be caused by an enlarged prostate gland. In pregnant women pressure from the baby might cause the problem.  Women get bladder infections more often than men.  What are the symptoms?     You may feel the need to urinate a lot.    You may feel a burning or stinging when you urinate.    You may have cramps in your lower belly or back.    Your urine may be cloudy and smell bad.    Your urine may look pink or red.    You may have a fever or chills.  How is it treated?   If tests by your healthcare provider show that you have a bladder infection, your provider  will prescribe antibiotics. You may also need pain medicine.  How can I take care of myself?     Take the antibiotic medicine for as long as your healthcare provider prescribes, even when you feel better.    Drink more water than you usually do.    Make sure you know when you should come back for a checkup.    Call your provider if your symptoms aren t better in 2 days, or if you have worse fever or pain.  How can I help prevent bladder infection?   Urinate often during the day. You should also urinate after you have sex.  If you are a woman, it is important to:     Keep the area around your vagina clean.    Wipe from front to back after you go to the bathroom.    Gently wash the area around your vagina when you bathe or shower.    Wear cotton underwear and use pantyhose with cotton crotches.    Avoid tight clothing. Wear loose pants.    Take wet bathing suits off right away.  Talk to your healthcare provider if you have bladder infections often. You may need tests to find out why. Your provider may prescribe medicine that helps prevent bladder infections.  If you are a man, remember to:    Always wash your penis when you bathe or shower. If you are not circumcised, gently pull back the foreskin and wash the tip of the penis when you bathe.  Developed by [employer].  Adult Advisor 2016.3 published by [employer].  Last modified: 2015-04-29  Last reviewed: 2015-04-28  This content is reviewed periodically and is subject to change as new health information becomes available. The information is intended to inform and educate and is not a replacement for medical evaluation, advice, diagnosis or treatment by a healthcare professional.  References   Adult Advisor 2016.3 Index    Copyright   2016 [employer], a division of [employer] All rights reserved.

## 2017-12-29 NOTE — PATIENT INSTRUCTIONS
Patient Information     Patient Name MRN Sex Jamir Cooley [id number] Male 1955      Patient Instructions by Mikala Grace NP at 2017  1:30 PM     Author:  Mikala Grace NP  Service:  (none) Author Type:  PHYS- Nurse Practitioner     Filed:  2017  4:17 PM  Encounter Date:  2017 Status:  Addendum     :  Mikala Grace NP (PHYS- Nurse Practitioner)        Related Notes: Original Note by Mikala Grace NP (PHYS- Nurse Practitioner) filed at 2017  2:41 PM            Macrobid twice daily x 7 days     Urine culture pending - will call if change in antibiotics is indicated    Referral to speech therapy      Follow up with primary provider as needed

## 2017-12-29 NOTE — PATIENT INSTRUCTIONS
Patient Information     Patient Name MRN Jamir Boudreaux [id number] Male 1955      Patient Instructions by Mikala Grace NP at 2017 12:45 PM     Author:  Mikala Grace NP Service:  (none) Author Type:  PHYS- Nurse Practitioner     Filed:  2017  1:03 PM Encounter Date:  2017 Status:  Signed     :  Mikala Grace NP (PHYS- Nurse Practitioner)            Antibiotic has been sent to pharmacy. Please take full course of antibiotic even if symptoms have completely resolved. This helps prevent against antibiotic resistance.     We will culture the urine to see what bacteria grows out of the urine.  We will call the patient if a change of antibiotic is necessary per the culture.      Patient was instructed in increase fluids including water and cranberry juice.      Follow up with any worsening symptoms or concerns       Oral: Advise patient to take drug 2 hours before or 6 hours after magnesium or aluminum-containing antacids or products containing calcium, iron, or zinc    Oral: Warn patient to avoid taking drug alone with dairy products (milk, yogurt) or calcium-fortified juice. The drug may be taken with meals that contain these items     Advise patient to report hallucinations, depression, suicidal thoughts, or convulsions      Instruct patient to report symptoms of tendonitis or tendon rupture, especially if elderly and/or using concomitant steroids    Tell patient to report symptoms of peripheral neuropathy, including pain, burning, tingling, or numbness     Advise patient to use sunscreen, avoid tanning beds, and avoid excessive exposure to sunlight as drug causes phototoxicity

## 2017-12-30 NOTE — INITIAL ASSESSMENTS
Patient Information     Patient Name MRN Sex Jamir Cooley [id number] Male 1955      Initial Assessments by Raghav Olivo SLP at 2017 10:05 AM     Author:  Raghav Olivo SLP Service:  (none) Author Type:  SLP- Speech Language Pathologist     Filed:  2017  1:12 PM Date of Service:  2017 10:05 AM Status:  Signed     :  Raghav Olivo SLP (SLP- Speech Language Pathologist)            Speech-Language Pathology   [facility]  Outpatient Initial Swallow Assessment  2017  Date of Service: 2017     Visit #:  10    Patient Name: Jamir Gill   YOB: 1955   Referring MD/Provider: Mikala Grace NP  Medical and Treatment Diagnosis: At risk for aspiration, MS, Dysphagia   SLP Treatment Diagnosis: Dysphagia   Insurance: Other: Preferred   Start of Service: 2017    Living Situations:  Independent in Living Situation, Wife assists as needed her reports      Preadmission Functional Mobility: Independent with four wheeled walker   Precautions:  Aspiration   Cognition:  Oriented to Person, Place, and Time.     Were cultural / age or other special adaptations needed? No      Patient is a vulnerable adult: No      Patient is aware of diagnosis: Yes      Risks and benefits explained: Yes    Name: Jamir KELLY Gill  Physician:  Mikala Grace MD   Medical Diagnosis:  At risk for aspiration; Multiple sclerosis  Speech Pathology Diagnosis:  Dysphagia   Onset Date and Minutes/Units of Time for this Session are documented on the flowsheet    Past Medical History, including information regarding previous hospital stay if pertinent:    Patient Active Problem List     Diagnosis  Code     MULTIPLE SCLEROSIS G35     CERVICAL DISC DISORDER M50.90     OSTEOARTHRITIS, CERVICAL SPINE M47.9     BENIGN PROSTATIC HYPERTROPHY, WITH OBSTRUCTION N40.1     Hypertension I10     Osteopenia M85.80     Social and Medical history reviewed (see scanned documents for  "patient intake information):  Yes                                       Impressions:   Speech-Language Diagnosis: Dysphagia    Pt is a 63 yo male reporting with difficulties with swallow. He states that food gets stuck in his throat but is unaware the cause.     He has no overt oral motor difficulties.     States he has been dry since a change of medication. Continues that he has recently had an increase in numerous symptoms \"I think because of the MS.\"     He was able to tolerate all PO without difficulties, then several minutes following, reports \"the pears are stuck. I can feel them down here (points to lower neck).\"     Attempted strategies to clear that initially were stated to be successful, then the globus reappeared.    Videofluoroscopic swallow study ordered to determine if sensation or residue.    Education to patient. In agreement with POC.       Recommendations:    Diet: Regular with Thin Liquids      Plan: Follow up Video Swallow Evaluation      Next Diet Upgrades:  Per Results of Video Swallow Study     Swallowing Instructions/Precautions:     Swallow Techniques:      Alternate liquids with solids       Liquids by cup-sip       Liquids one sip at a time       Chew carefully       Take your time, eat slowly     Discharge Destination Recommendation:  Home with Family    Anticipated Discharge Needs:  Swallowing Instructions for Patient and Caregiver and Diet Modifications         Analysis of Performance    ICD-10-CM    1. At risk for aspiration Z91.89 AMB CONSULT TO SPEECH LANGUAGE PATHOLOGY      AMB CONSULT TO SPEECH LANGUAGE PATHOLOGY       Precautions:  Aspiration     Past Medical History, including information regarding previous hospital stay if pertinent:    Past Medical History:     Diagnosis  Date     BPH (benign prostatic hypertrophy) with urinary obstruction      Cervical disc disorder      Cognitive decline      Hypertension      Multiple sclerosis (HC)      Osteoarthritis cervical spine      " Osteopenia         Social and Medical History Reviewed: Yes    Were cultural / age or other special adaptations needed?  No      Patient is a vulnerable adult: Yes, due to patient hospitalization     Patient is aware of diagnosis: Yes      Risks and benefits explained: Yes     Initial Pain Rating / Description:      0 = no pain, comfortable / None    Location:     Not applicable    Acceptable Level of Pain:  0 = no pain, comfortable     Learning Barriers:     None, None    Patient's Readiness to Learn:  Willing    Learning Needs were Addressed by Providing:  Verbal Information, Written Information and Pictures    Primary Limiting Factors for Achieving Core Outcomes:                                    Impairment(s):  Swallowing    Functional Impairment(s): Swallowing: Impaired ability to safely swallow foods and liquids.     Suggestions for Facilitating Communication:  Allow time for a response    Prognosis for Achieving Desired Outcome:  Fair    Current Status:    Language(s) Spoken:  English    Hearing:  Hearing is Adequate for Conversational Speech     Handedness: right    Visual Acuity:  Unknown    Dentition:  Normal/full set of teeth  Evaluation Results    Pertinent Information:      Respiratory Status:  Normal      Level of Consciousness:  Alert     Cognitive/Communication Status: Within Functional Limits    Current Diet:  Regular with Thin Liquids    Patient Position:  Upright in chair    Manages Secretions Independently:  yes    Oral Mechanism Examination:     Labial Function-Cranial Nerve VII      Lip Compression / Strength:  Within Functional Limits      Lip Retraction / Smile: Within Functional Limits      Lip Pucker:  Within Functional Limits      Lingual Function-Cranial Nerve XII      Tongue Protrusion:  Within Functional Limits      Tongue Elevation:  Within Functional Limits      Tongue Depression: Within Functional Limits      Rapid Lateral Movements (corner to corner):  Within Functional Limits       Lateral Tongue Strength:  Within Functional Limits            Voice-Cranial Nerve X      Phonation of /a/ for several seconds:  Within Functional Limits      Soft Palate Function-Cranial Nerve X      Resonance:  Within Functional Limits      Palatal Movement (patient repeats /a-a-a/):  Within Functional Limits    Swallow Examination:     Sharp Cough: Normal    Strong Throat Clear:  Normal    Vocal Quality Prior to Food/Liquid Trials: WFL     Hyolaryngeal Excursion:  WFL     Cervical Auscultation was used: yes      Oral Feeding Trials:      Thin Liquid by Cup:        Within Functional Limits     Thin Liquid by Straw:        Did Not Assess this Texture      Consecutive Sips of Thin Liquid:        Within Functional Limits      Nectar-Thick Liquid:        Did Not Assess this Texture      Honey- Thick Liquid:       Did Not Assess this Texture      Pureed Consistency:        Within Functional Limits      Soft Solids:       Within Functional Limits      Solids:        Within Functional Limits    Home Program: Strategies to address residue     Response to cues/compensatory strategies: Strategies reduce but do not eliminate aspiration risk    National Outcome Measurement System:  Functional Communication Measures Tool:  The Functional Communication Measures (FCMs) are a series of seven-point rating scales, ranging from least functional (level 1) to most functional (level 7). They have been developed by the American Speech-Language Hearing Association (JACQUELYN) to describe the different aspects of patients functional communication and swallowing abilities throughout the course of Speech-Language Pathology Treatment.      FCM Activity Scoring (score one number for each activity):     Activity Score (0-7)  0= Least Functional  7 = Most functional   1. Swallowing 5/7     Functional Communication Measure Scale Conversion: (patient's severity that correlates with pt's score on FCM): LEVEL 5-CJ (20% to 39%  "Impaired).    Initial/Current Primary G Code and Modifier:   Per the Patient's intake and/or assessment the Primary G Code is Swallowing .    The Patient's Impairment, Limitation or Restriction Modifier would be best described as CJ - 20% - 40% Impairment.     Goal Primary G Code and Modifier:   The Patient's G Code Goal would be Swallowing     The Patient's Impairment, Limitation or Restriction Modifier would be best described as CI - 1% - 20% Impairment.     Discharge Primary G Code Modifer:  The Patient's status upon Discharge is Swallowing     The Patient's Impairment, Limitation or Restriction Modifier would be best described as TBD.     Plan     Treatment Plan/Targeted Outcomes:     Type of Session(s) Planned: None      Frequency:  Evaluation only      Duration of Treatment:  Length of stay or until goals met.     Anticipate core outcome goals to be achieved within length of stay     Interventions (completed during the eval):       Eval Oral/Phar Swallowing Diet modification and Swallowing precautions and techniques    Planned Interventions (for subsequent tx sessions):        None. Evaluation only.     SLP Goals:         None, evaluation only      Patient and/or Family Goal(s):  \"I want to determine what is causing this. Should I be worried or not.\"       Patient and/or Family were included in goal selection    Plan for Next Treatment:  Patient is at/near baseline for communication and swallowing. No further treatment indicated.    Student has been instructed in and demonstrates skills necessary to carry out above stated treatment plan as applicable.     Thank you for this referral. If you have any questions or concerns, please contact [facility] Speech and Language Department at [phone].    AMITA Masters ....................  9/7/2017   1:12 PM        "

## 2017-12-30 NOTE — NURSING NOTE
Patient Information     Patient Name MRN Sex Jamir Cooley [id number] Male 1955      Nursing Note by Antonella Julien at 10/5/2017  2:15 PM     Author:  Antonella Julien Service:  (none) Author Type:  (none)     Filed:  10/5/2017  2:30 PM Encounter Date:  10/5/2017 Status:  Signed     :  Antonella Julien            Patient presents to the clinic for a follow up from a CT of his chest and MRI.  Antonella Julien LPN....................10/5/2017 2:19 PM

## 2017-12-30 NOTE — NURSING NOTE
Patient Information     Patient Name MRN Jamir Boudreaux [id number] Male 1955      Nursing Note by Antonella Julien at 2017  9:00 AM     Author:  Anotnella Julien Service:  (none) Author Type:  (none)     Filed:  2017  9:23 AM Encounter Date:  2017 Status:  Signed     :  Antonella Julien            Patient presents to the clinic to talk about memory issues related to MS.  He feels it is getting worse lately.  He would like to talk about changing some medications.  Antonella Julien LPN....................2017 9:18 AM

## 2017-12-30 NOTE — NURSING NOTE
Patient Information     Patient Name MRN Sex Jamir Cooley [id number] Male 1955      Nursing Note by Kacey Cardona at 2017 12:45 PM     Author:  Kacey Cardona Service:  (none) Author Type:  NURS- Student Practical Nurse     Filed:  2017 12:56 PM Encounter Date:  2017 Status:  Signed     :  Kacey Cardona (NURS- Student Practical Nurse)            Patient presents to the clinic for possible UTI. States he has been experiencing s/sx for the past couple days and include burning and frequency.   Kacey Cardona LPN............................ 2017 12:38 PM

## 2017-12-30 NOTE — INITIAL ASSESSMENTS
Patient Information     Patient Name MRN Jamir Boudreaux [id number] Male 1955      Initial Assessments by Jud Campos OT at 2017  1:45 PM     Author:  Jud Campos OT Service:  (none) Author Type:  OT- Occupational Therapist     Filed:  10/4/2017  3:01 PM Date of Service:  2017  1:45 PM Status:  Signed     :  Jud Campos OT (OT- Occupational Therapist)            [facility]  Home Safety Evaluation      Date of Service: 2017  Visit #: 1    Patient Name: Jamir Gill  Referring MD/Provider: Dr. Polk  Diagnosis: Multiple Sclerosis, falls  Treatment Diagnosis: Impaired safety in the home with ADLs  Insurance: [employer]  Onset date: 2017 exacerbation of MS symptoms  Start of Care Date: 2017  Certification Dates: From: 2017   To: 2017                    Re-Cert Due:   2017    Subjective:        Summary of injury/illness/exacerbation:     Pain Rating:    Reports pain is tolerable at this time    Prior to injury/illness/exacerbation: Patient was managing in home modified independent until mid  when he experienced exacerbation of MS and was experiencing multiple falls a day. Patient was ambulating in the home with a walker and scooter outside.    Since injury/illness/exacerbation, patient has the following functional limitations: Patient has been receiving assistance for all ADLs/IADLs and is currently trialing a wheelchair in the home for improved safety due to falls. Increased assistance required due to weight of current wheelchair. Patient is managing in the home with showering, dressing, and transfers with decreased safety due to lack of accessible set up.    Living Situation:   Style of Home: 1 Story         First Level            Bathroom:  Step in shower without grab bars  Living Situation: Lives with Spouse    Exterior:    -Terrain: gravel entrance    Entrances:    -ramped entrance to swing out  door. No grab bar or railing near doorway to assist when opening door. Recently removed throw rug by door as patient reports several falls in the entry. 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    Door: Difficulty with opening? Yes       Personal safety:    -telephone access: Yes   -lifeline or alert button: Declines   -fall emergency plan: Has family/friend assist       Other:    -Pets: Two large dogs who do lay in pathways in the home; however are able to be moved with verbal commands. Do not appear to jump on patient.    Precautions:  Fall risk, MS  Cognition:  Oriented to Person, Place, and Time. Patient is forgetful and repetition of information required at times. Compensatory tools such as writing information down recommended.      Were cultural / age or other special adaptations needed? No      Patient is a vulnerable adult: No      Patient is aware of diagnosis: Yes      Risks and benefits explained: Yes    Past Medical History:  Past Medical History:     Diagnosis  Date     BPH (benign prostatic hypertrophy) with urinary obstruction      Cervical disc disorder      Cognitive decline      Hypertension      Multiple sclerosis (HC)      Osteoarthritis cervical spine      Osteopenia          Objective:  Bathroom:    -floor coverings: linoleum   -lighting: Fair, small space. One main overhead light.    -night light: Does not currently use. Patient reports he does not get up to use the bathroom in the night.    -toilet height: Low, patient usually sits on walker in front of toilet to self-cath.   -grab bars: No grab bar near toilet. Increased effort and decreased safety with sit to stand from toilet.    Tub/shower: Step in shower.    -grab bars: Currently one grab bar inside of shower.   -non-slip liner: No   -shower bench: Has adjustable shower chair. Recommend raising for increased sit to stand.   Overall shower is small with limited space. Patient  is just able to turn walker around in bathroom; however, is unsafe in doing so. Patient and OTR practiced shower transfer with assistance required from OTR to get walker unstuck from corner due to tight space. Training provided with returned demonstration for alternative shower transfer technique. Ideal transfer technique not able to be completed secondary to small space and bathroom set up. Bathroom has a large door which affects patient's ability to safely maneuver in the bathroom as well as creates difficulty navigating in and out.     Bedroom:    -floor coverings carpet   -lighting quality, location of switches: able to access light switch    -night light: Does not currently have/utilize.   -Bed: Height Bed is currently high causing patient to slide forward when seated edge of bed. Patient and spouse report plan to lower bed. Measurements taken and recommend 2-3 inch lowering for patient height., access Good, patient has room to turn walker.    -closet access: Good, from seated position. Patient uses open face shelving unit so that he can sit to gather clothing items. Currently uses a chair without arms and noted to lose balance with reaching task. Chair with arms recommended.    Kitchen Safety:        Gather Supplies: Minimal Assistance      Kitchen Mobility: Assistive Device Used: Yes - walker and wheelchair, Minimal Assistance    Education provided on kitchen safety including location of most used objects, positioning of wheelchair to appliances/counters, and body mechanics when lifting items. Returned verbal understanding from both patient and spouse.    Self/Home Management:        Medication Management (Able to Read/Dispense Pills Correctly): Supervision, Set-Up or Standby Prompting  Does complete independently at baseline.    Rating for Level of Supervision:         Part-Time Supervision-Patient may be left alone for short periods of time during the day, is supervised overnight as patient and spouse  reside in the same home and wife is primary caregiver.    Preadmission Functional Mobility:  Patient had been utilizing a four wheeled walker until mid summer when he began experiencing a reported 15+ falls a day, several days a week. Patient has a power scooter for out of the home. He is currently renting a standard wheelchair for safety in the home. Patient demonstrates difficulty managing wheelchair in the home.    Bathroom Safety:    Functional Mobility:        Retrieve Item from Floor: Minimal Assistance      Path-finding Ability to Exit Home: Modified [place]       Shower/Tub Transfer: Minimal Assistance      Toileting: Minimal Assistance     Bed Mobility:      Supine-Sit:  Moderate Assistance      Sit-Supine:  Moderate Assistance      Gait:     Unsafe Gait on Level Surfaces, Recommend Assistance.     Unsafe Gait on Stairs, Recommend Assistance.      Unsafe Transfers     Patient has to navigate down one step to bedroom with narrow space and small bathroom.     On Level Surfaces and Stairs:          Distance - 30 feet with Rolling Walker    Balance:     Static Standing Balance:  Fair     Dynamic Standing Balance:  Poor        Balance Characteristics:  Unable to Handle Challenge     Endurance:      Endurance:  Poor      Complicating Factors: Fatigue and Weakness    Sensation:  Not formally assessed      Range of Motion:     Screened with-in functional limits    Strength:     Screened with-in functional limits    Today's Interventions:    THERAPEUTIC ACTIVITIES / FUNCTIONAL TRAINING:    Posture and Body Mechanics     Family / Caregiver Instruction:  Home safety modifications, transfer and ADL assistance      Functional Mobility     Transfer Training    SELF CARE / HOME MANAGEMENT:     ADL Training      compensatory techniques for improved ADL safety and independence,    Training in Use of Medical Equipment / Assistive & Supportive Devices:  walker, wheelchair, grab bars, reacher, bed rail    Recommendations:     -Patient/caregivers advised regarding recommendations for environmental safety to prevent falls. Bathroom safety recommendations include angled grab bar near toilet, reposition of bathroom door for outward swinging as this is main safety concern in bathroom causing falls due to bathroom size, hand held shower head, and angling walker towards shower vs. Positioning straight towards shower. Bedroom: [place] safety recommendation would be for entrance to other side of wall to allow wheelchair access as there is not ability to with current set up. Currently there is a step down to the bedroom from the hallway which is right near the bedroom doorway. Patient does have a portable ramp here, but this covers the door when in use. Current technique is for spouse to position wheelchair at edge of step and support it from behind as patient stands with walker to ambulate in bedroom. This is a safety concern as wheelchair is positioned in between patient and spouse. Vertical grab bar recommended for steadying and transfer assistance near step. Recommend lowering bed 2-3 inches for improved seated safety. Bed rail recommended for increased safety with supine <> sit. Spouse currently assists with LEs. Log roll technique attempted this date; however, patient would require further training and strengthening to be able to complete. Chair with arms recommended near shelving unit as well as reacher to improve safety with dressing due to decreased dynamic seated balance. Training provided on keeping walker close during transfers as he tends to leave it in the middle of the room. Kitchen safety training completed for improved patient access to items from wheelchair base. After assessment of patient mobility within home with standard wheelchair, a lightweight wheelchair is recommended to allow patient to maneuver in home as he requires frequent rest breaks and assistance due to muscle fatigue. This creates increased caregiver burden and  decreased patient independence. 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 A lightweight wheelchair would allow greater independence with ADLs and IADLs within the home. Outpatient OT evaluation recommended for upper body strength and coordination. At this time, patient wishes to wait on seeking OT eval and states he feels comfortable requesting from physician when ready.  Patient/caregiver verbalized understanding. Yes           Assessment:  Primary Limiting Factors for Achieving Core Outcomes Include:     Physical Impairment(s):      CARDIOVASCULAR-PULMONARY:  Endurance       MUSCULOSKELETAL:  Balance Deficits and Weakness       POSTURE AND FUNCTIONAL BODY MECHANICS DEFICITS     Functional Impairment(s):  Decreased Activity Tolerance  Decreased Bed Mobility  Difficult Transfers  Difficulty with Ambulation  Balance  Impaired ADL's  Loss of Functional Mobility  Difficulty with Stairs  Ambulation Endurance    Patient s goal:    GOALS/Targeted Outcomes:  Patient and spouse will return verbal understanding of recommended home modifications for improved patient safety. GOAL MET.    Patient and Spouse included in goal selection: Yes    Patient Potential for Achieving Desired Outcome:  Fair  Limiting factors to achieving desired outcomes:  multiple complex deficits and decreased ability to complete home modifications      Risk, benefits, and alternatives were discussed with patient. Patient agrees with the plan of care.    Response to intervention:    Patient Specific Functional and Pain Scales (PSFS): taken on 10/4/2017   Clinician Instructions: Complete after the history and before the exam.    Initial Assessment: We want to know what 3 activities in your life you are unable to perform, or are having the most difficulty performing, as a result of your chief  problem. Please list and score at least 3 activities that you are unable to perform, or having the most difficulty performing, because of your chief problem.   Patient Specific Activity Scoring Scheme (score one number for each activity):   Activity Score (0-10)  0= Unable to perform activity  10= Able to perform activity at same level as before injury or problem   1. Getting in/out of shower 4/10   2. Moving around in the home 5/10   3. Getting in/out of bedroom 4/10   4. Putting pants on 6/10       Totals:  19/40 = 47.5 % ability which relates to 52.5% impairment    Patient verbally states that they understand that the information they have provided above is current and complete to the best of their knowledge.    Patient Specific Functional Scale Modifier Scale Conversion: (patient's modifier that correlates with pt's score on PSFS): 5-CK (50% Impaired).    G codes and Modifier taken from patient completing the PSFS:   Initial Primary G Code and Modifier:    Per the Patient's intake and/or assessment the Primary G Code is: Self Care .   The Patient's Impairment, Limitation or Restriction Modifier would be best described as: CK - 40% - 60% Impairment.   Goal Primary G Code and Modifier:    The Patient's G Code Goal would be: Self Care    The Patient's Impairment, Limitation or Restriction Modifier goal would be best described as: CK - 40% - 60% Impairment.   Discharge Primary G Code and Modifier:    The Patient's status upon Discharge is Self Care    The Patient's Impairment, Limitation or Restriction Modifier would be best described as CK - 40% - 60% Impairment.           Plan:    Treatment Plan: One time home safety evaluation and home modification recommendations to patient and spouse. Returned verbal understanding. No further visits warranted at this time.    Frequency/Duration: Evaluation and education only.     Plan for next visit: No further visits planned.     Student has been instructed in  and demonstrates skills necessary to carry out above stated treatment plan.     Thank you for your referral to [facility]. Please call with any questions, concerns or comments [phone].      Jud Campos OTR/L

## 2017-12-30 NOTE — NURSING NOTE
Patient Information     Patient Name MRN Sex Jamir Cooley [id number] Male 1955      Nursing Note by Falguni Devries at 2017  1:30 PM     Author:  Falguni Devries Service:  (none) Author Type:  NURS- Student Practical Nurse     Filed:  2017  1:41 PM Encounter Date:  2017 Status:  Signed     :  Falguni Devries (NURS- Student Practical Nurse)            Patient presents with chest congestion starting yesterday. Patient states it feels like a rattle and that there is phlegm in throat that he cannot cough up. Falguni Devries LPN .............2017  1:09 PM

## 2017-12-30 NOTE — NURSING NOTE
Patient Information     Patient Name MRN Sex Jamir Cooley [id number] Male 1955      Nursing Note by Antonella Julien at 2017  8:00 AM     Author:  Antonella Julien Service:  (none) Author Type:  (none)     Filed:  2017  8:23 AM Encounter Date:  2017 Status:  Signed     :  Antonella Julien            Patient presents to the clinic to talk about his medications.  He doesn't think the antidepressant is working.    He was a little dizzy walking back to the room, his BP was low when he first sat down but came back up a little after sitting for several minutes.    Antonella Julien LPN....................2017 8:17 AM

## 2017-12-30 NOTE — DISCHARGE SUMMARY
Patient Information     Patient Name MRN Sex Jamir Cooley [id number] Male 1955      Discharge Summaries by Kristy Early PT at 2017 10:35 AM     Author:  Kristy Early PT Service:  (none) Author Type:  PT- Physical Therapist     Filed:  2017 12:22 PM Date of Service:  2017 10:35 AM Status:  Signed     :  Kristy Early PT (PT- Physical Therapist)            [facility]  Outpatient PT - Discharge note        Date of Service: 2017     Visit # 41 (7 of 10)     Patient Name: Jamir Gill   YOB: 1955   Referring MD/Provider: Dr. Dee Hook, Dr. John Polk  Diagnosis: Multiple sclerosis, bilateral leg weakness, osteoarthritis of cervical region  Treatment Diagnosis: MS, impaired mobility, bilateral leg weakness, impaired posture, impaired balance and endurance; impaired cervical mobility  Insurance:  [employer]  Start of Care Date:  2017   Certification Dates: 10/24/2017     Re-Cert Due: Medicare/MA Re-Cert Due: 2017    Subjective    Patient states the legs are still doing well. Walking around the house without the cane sometimes. Has not had any falls since last week. Feels confident in his home exercise program and will see about other options (PT vs gym work outs) when he gets down to [place]. States the heat and humidity hasn't had a big impact on his MS in prior years.     Objective      Patient presents ambulatory without his single point cane today. States he hasn't been using it around the house, so he just forgot it today.          Burrows Balance Test    1.  Sitting to Standin   4) able to stand without using hands and stabilize independently   3) able to stand independently using hands   2) able to stand using hands after several tries   1) needs minimal aid to stand or to stabilize   0) needs moderate or maximal assist to stand   2.  Standing Unsupported: 4   4) able to stand safely 2 minutes   3) able  to stand 2 minutes with supervision   2) able to stand 30 seconds unsupported    1) needs several tries to stand 30 seconds unsupported   0) unable to stand 30 seconds unassisted  3.  Sitting with Back Unsupported: 4   4) able to sit safely and securely 2 minutes   3) able to sit 2 minutes under supervision   2) able to sit 30 seconds   1) able to sit 10 seconds   0) unable to sit without support 10 seconds  4.  Standing to Sittin   4) sits safely with minimal use of hands   3) controls descent by using hands   2) uses back of legs against chair to control descent   1) sits independently but had uncontrolled descent   0) needs assistance to sit  5.  Transfers: 4   4) able to transfer safely with minor use of hands   3) able to transfer safely definite need of hands   2) able to transfer with verbal cuing and/or supervision   1) needs on person to assist   0) needs two people to assist or supervise to be safe  6.  Standing Unsupported with Eyes Closed: 4   4) able to stand 10 seconds safely   3) able to stand 10 seconds with supervision   2) able to stand 3 seconds   1) unable to keep eyes closed 3 seconds but stays safely   0) needs help to keep from falling  7.  Standing Unsupported with Feet Together: 4   4) able to pace feet together independently and stand 1 minute safely   3) able to place feet together independently and stand for 1 minute with supervision   2) able to place feet together independently but unable to hold for 30 seconds   1) needs help to attain position but able to stand 15 seconds feet together   0) needs help to attain position and unable to hold for 15 seconds  8.  Reaching Forward: 4   4) can reach forward confidently 25 cm (10 inches)   3) can reach forward 12 cm safely (5 inches)   2) can reach forward 5 cm safely (2 inches)   1) reaches forward but needs supervision   0) loses balance while trying/requires external support  9.   Object from Floor: 4   4) able to   slipper safely and easily   3) able to  clipper but needs supervision   2) unable to  but reaches 2-5 cm (1-2 inches) from slipper and keeps balance    1) unable to  and needs supervision while trying   0) unable to try/needs assist to keep from losing balance or falling  10. Turning to Look Behind R & L Shoulder: 4   4) looks behind from both sides and weight shifts well   3) looks behind one side only other side shows less weight shift   2) turns sideways only but maintains balance   1) needs supervision while turning   0) needs assist to keep from losing balance or falling  11. Turn 360s: 4   4) able to turn 360 degrees safely in 4 seconds or less   3) able to turn 360 degrees safely one side only 4 seconds or less   2) able to turn 360 safely but slowly   1) needs close supervision or verbal cuing   0) needs assistance while turning  12.  Alternating Feet on Step Stool Unsupported: 4   4) able to stand independently and safely and complete 8 steps in 20 seconds   3) able to stand independently and complete 8 steps in greater than 20 seconds   2) able to complete 4 steps without aid with supervision   1) able to complete greater than 2 steps needs minimal assist   0) needs assistance to keep from falling/unable to try   13. Standing Unsupported one foot in front: 4   4) able to place foot tandem independently and hold 30 seconds   3) able to place foot ahead of other independently and hold 30 seconds   2) able to take small step independently and hold 30 seconds   1) needs help to step but can hold 15 seconds   0) loses balance while stepping or staning  14.  Standing on One Leg: 3   4) able to lift leg independently and hold for greater than 10 seconds   3) able to lift leg independently and hold 5-10 seconds   2) able to lift leg independently and hold equal or greater than 3 seconds   1) tries to lift leg unable to hold 3 seconds but remains standing independently   0) unable to try or  needs assist to prevent fall  Total: 55/56       Assessment of Score:  52-56= Normal         41-56= Low Fall Risk         21-40= Medium Fall Risk         0-20= High Fall Risk    Modified CTSIB:    Condition 1: Trial 1= 30 seconds, Trial 2= Not tested   Condition 2: Trial 1= 30 seconds, Trial 2= Not tested   Condition 3: Trial 1= 30 seconds, Trial 2= Not tested   Condition 4: Trial 1= 5 seconds, Trial 2=8      Today's Intervention:  all exercises completed bilaterally and to fatigue.     Nustep level 6 seat seat 10 and arms 10 x7 mins    Leg press 100# 3 x10 (seat back most upright, seat 20)  Lumbar extension 60# 3 x10, knees 9, feet 11  Abduction 30# 2 x10    Home Exercise Program: *= added to HEP.    Access Code: [email or web address] URL: [email or web address] Date: 05/16/2017 Prepared by: Kristy Early   Exercises   Hooklying Clamshell with Resistance - 10-15 reps - 1-2 sets - 5 hold - 1x daily   Seated Hamstring Stretch - 1-2 sets - 30 second hold - 1x daily   Romberg Stance - 30 second hold - 2-3 sets - 1x daily   Romberg Stance with Eyes Closed - 2-3 sets - 20-30 second hold - 1x daily   Romberg Stance with Head Rotation - 2-3 sets - 30 second hold - 1x daily   Standing Romberg to 3/4 Tandem Stance - 2-3 sets - 20-30 second hold - 1x daily   Scapular Retraction with Resistance Advanced - 10-15 reps - 1-2 sets - 5 hold - 1x daily   Upper trapezius, scalene and levator stretches x30 sec each. bilateral added 6/19/2017   Access Code: [email or web address] URL: [email or web address] Date: 10/11/2017 Prepared by: Kristy Early   Exercises   Seated March - 10-15 reps - 1-2 sets - 5 seconds hold - 1x daily   Seated Hip Abduction with Resistance - 10-15 reps - 1-2 sets - 5 seconds hold - 1x daily   Seated Knee Flexion Slide - 15-20 reps - 1-2 sets - 5 hold - 1x daily   Seated Shoulder Flexion Full Range - 10-15 reps - 1-2 sets - 5 seconds hold - 1x daily   Seated Overhead Press - 10-15 reps - 1-2 sets - 5 seconds hold  - 1x daily   Seated Shoulder Horizontal Abduction with Resistance - 10-15 reps - 1-2 sets - 5 seconds hold - 1x daily   Seated Trunk Rotation - 10-15 reps - 1-2 sets - 5 seconds hold - 1x daily   Access Code: [email or web address] URL: [email or web address] Date: 11/10/2017 Prepared by: Kristy Early   Exercises   Standing Hip Abduction - 10-15 reps - 1-2 sets - 5 seconds hold - 1x daily   Standing March with Counter Support - 10-15 reps - 1-2 sets - 5 seconds hold - 1x daily   Seated Long Arc Quad with Strap - 10-15 reps - 1-2 sets - 5 seconds hold - 1x daily   Squat at Table - 10-15 reps - 1-2 sets - 5 seconds hold - 1x daily   Seated Thoracic Extension with Resistance - 10-15 reps - 1-2 sets - 5 seconds hold - 1x daily        Assessment     Patient demonstrates significant improvement in lower extremity endurance and postural endurance during ambulation with single point cane and without assistive device. He has met all goals but one at this time, and has progressed from a medium fall risk in October, to low to normal fall risk with today's testing. His posture during gait has greatly improved to minimally crouched, although he does continue to fatigue within expectations of his MS. Patient has not had a single fall in over a month and is able to walk without any assistive device for short durations within his home. Patient will be discharged from further skilled physical therapy services.    Cervical codes updated 10/24/2017:    Goal Primary G Code and Modifier:    The Patient's G Code Goal would be: Handling Objects    The Patient's Impairment, Limitation or Restriction Modifier goal would be best described as: CI - 1% - 20% Impairment.      Discharge Primary G Code and Modifier:      The Patient's status upon Discharge is Handling Objects    The Patient's Impairment, Limitation or Restriction Modifier would be best described as CI - 1% - 20% Impairment.     G Codes and Modifier taken from patient  completing the Burrows and dynamic gait index: 6/7/17 (Discharged balance gcodes 6/19/2017)     Goal Primary G Code and Modifier:    The Patient's G Code Goal would be: Mobility    The Patient's Impairment, Limitation or Restriction Modifier goal would be best described as: CJ - 20% - 40% Impairment.      Discharge Primary G Code and Modifier:      The Patient's status upon Discharge is Mobility    The Patient's Impairment, Limitation or Restriction Modifier would be best described as CK - 40% - 60% Impairment.     G Codes and Modifier taken from patient completing the Burrows and dynamic gait index: 10/24/2017     Current Primary G Code and Modifier:    Per the Patient's intake and/or assessment the Primary G Code is: Mobility .   The Patient's Impairment, Limitation or Restriction Modifier would be best described as: CK - 40% - 60% Impairment.     Goal Primary G Code and Modifier:    The Patient's G Code Goal would be: Mobility    The Patient's Impairment, Limitation or Restriction Modifier goal would be best described as: CJ - 20% - 40% Impairment.       G codes and Modifier based on patient's presentation and clinical judgement:     Goal Primary G Code and Modifier:    The Patient's G Code Goal would be: Mobility    The Patient's Impairment, Limitation or Restriction Modifier goal would be best described as: CJ - 20% - 40% Impairment.      Discharge Primary G Code and Modifier:      The Patient's status upon Discharge is Mobility    The Patient's Impairment, Limitation or Restriction Modifier would be best described as CJ - 20% - 40% Impairment.       Goals:  Patient will be assessed for appropriate mobility device to promote maintaining his safety with mobility within one visit. Goal met 6/7/17      Short Term Goals: To be met in 4 weeks:   Following physical therapy intervention, the patient will be able to:   1. Patient will improve standing posture to minimize crouched gait pattern.  Met 11/20/2017   2. Patient will be independent in home exercise program for progression of strength and balance abilities. met 11/20/2017                                        3.   Patient will decrease falls to no more than 3 per week. Met 11/20/2017       Long Term Goals: To be met in 8 weeks:   Following physical therapy intervention, the patient will be able to:   1. Increase MMT of hip flexors to 3+/5 so that pt able to lift leg independently into car. Met 11/20/2017   2. Patient will improve Burrows balance score by 5 points to decrease fall risk. Met 11/20/2017     New goals added 6/19/2017:      1.   Patient will have improved cervical range of motion by at least 5 degrees with minimal to no referred pain. Met 10/24/2017      2.   Patient will have less than 3/10 pain with normal activities of daily living. Met for cervical goal 10/24/2017      3.   Patient will have improved postural endurance for ability to complete normal daily work routine including reaching to shoulder height without increase in symptoms. Met     4.   Patient will obtain home cervical traction unit for self management of symptoms. -in progress, unit not obtained prior to leaving for [place]. 11/20/2017      5.   Patient will improve mCTSIB scores by 5 seconds for improved endurance for independent stance and improved stability. Not met condition 4, improved all other conditions. 11/20/2017      Plan     Treatment Plan / Targeted Outcomes:     Frequency:   8 additional visits      Duration of Treatment: 8 weeks     Plan of Care Due Date: Medicare/MA Re-Cert Due:  12/19/2017     Plan: Patient to discharge to independent home exercise program at this time.        Student or PTA has been instructed in and demonstrates skills necessary to carry out above stated treatment plan: Yes     Thank you for your referral to [facility].  Please call with any questions, concerns or comments.  [phone]   Kristy Early, PT, DPT

## 2017-12-30 NOTE — NURSING NOTE
Patient Information     Patient Name MRN Sex Jamir Cooley [id number] Male 1955      Nursing Note by Kacey Rodriguez at 10/24/2017 11:15 AM     Author:  Kacey Rodriguez Service:  (none) Author Type:  NURS- Student Practical Nurse     Filed:  10/24/2017 11:40 AM Encounter Date:  10/24/2017 Status:  Signed     :  Kacey Rodriguez (NURS- Student Practical Nurse)            Patient presents to the clinic for possible UTI. States his s/sx started last night and include burning and feeling like he constantly has to go. Patient has not bladder control and self caths.   Kacey Rodriguez LPN............................ 10/24/2017 11:28 AM

## 2017-12-30 NOTE — INITIAL ASSESSMENTS
Patient Information     Patient Name MRN Sex Jamir Cooley [id number] Male 1955      Initial Assessments by Mayra Muir PT at 2017  4:00 PM     Author:  Mayra Muir PT  Service:  (none) Author Type:  PT- Physical Therapist     Filed:  2017  9:12 AM  Date of Service:  2017  4:00 PM Status:  Addendum     :  Mayra Muir PT (PT- Physical Therapist)        Related Notes: Original Note by Mayra Muir PT (PT- Physical Therapist) filed at 2017  8:34 AM            [facility]  Outpatient PT - Wheelchair Evaluation/Re-certification       Date of Service: 17   Visit #: 5 (of 10 for PSFS)   Patient Name: Jamir Gill   YOB: 1955   Referring MD/Provider: Dr. Dee Hook  Diagnosis: Multiple sclerosis, bilateral leg weakness   Treatment Diagnosis: MS, impaired mobility, bilateral leg weakness, impaired posture, impaired balance and endurance   Insurance:  [employer]  Start of Care Date:  2017  Certification Dates: From: 17  Re-Cert Due: Medicare/MA Re-Cert Due: 17    Subjective    Symptoms/Pain Ratin = Mild Pain, (Bothersome, Annoying, Irritating, Nagging),  2 = Mild Pain, (Bothersome, Annoying, Irritating, Nagging) and  5 = Moderate Pain, (Aggravating, Grueling, Upsetting, Frustrating) / Location:  B LE's throughout lateral thighs to his knees. Reports he also has DDD, OA in his neck and back.     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 Currently uses an ATV, and still has to walk, difficulty getting on and off ATV due to balance issues and impaired strength, pain in back with lack of support on ATV.   Reports he falls frequently, has fractured his wrist and has had a compression fracture in his spine. 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 States mornings are better for him, states when he is tired he cannot get up when he falls. Reports he does fall daily-wife reports it is multiple times a  day. Reports constant low grade pain in his legs, trouble getting up from chairs, trouble walking across terrain and he falls more. States he has declined over the last year. Reports he was diagnosed in 1993 or 1994, was stable for years, then about 4 years ago he started to decline.     Reports they winter in [place], their house is larger and one level, tiled floor. Home here is smaller, one step down into the bedroom, 3 steps up to back door. Railing to backdoor. Patient uses the wall to hold onto for this step into the bedroom. States in order for him to work, he needs powered mobility to be able to get around the resort, needs higher ground clearance to get around the resort, suspension for support and to cover mildly uneven terrain and maintain stability.     Preadmission Functional Mobility: Independent with Assistive Device,       Walkers,  Type:  4 Wheeled Walker with Seat and Brakes Uses his cane and walker in the home, falls mostly outside. Uses ATV. Cannot go grocery shopping without falling.   Precautions:  Fall risk   Oriented to Person, Place, and Time. yes    Were cultural / age or other special adaptations needed? No     Patient is a vulnerable adult: No      Patient is aware of diagnosis: Yes      Risks and benefits explained: Yes    Medical History: Reviewed,   Patient Active Problem List     Diagnosis  Code     MULTIPLE SCLEROSIS G35     CERVICAL DISC DISORDER M50.90     OSTEOARTHRITIS, CERVICAL SPINE M47.9     BENIGN PROSTATIC HYPERTROPHY, WITH OBSTRUCTION N40.1     Hypertension I10     Osteopenia M85.80     Medications: Reviewed.    Previous falls: frequently. Multiple times a day    Current Mobility: SEC and walker, uses power scooter with grocery shopping. ATV for mobility outside the house for work.     Prior level of function: Prior to 2 years ago patient was ambulating with SEC, did not need a walker, was not falling frequently.     Social History:   Living environment: house, one step to  bedroom, 3 up to back door with railing    Home environment: house   PCA assistance:none   ADL's: slow pace, modified independence with increased time allowance, sitting to perform activities, wife assists with cooking and cleaning and patient assists as he is able   Occupation: full time     Transportation: Truck   Bracing / Equipment:  SEC, walker, shower chair    Patient/caregiver concerns: Patient reports he is concerned that he will not be able to continue working if he does not have something to get him back and forth around the 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     Patient/caregiver goals: Obtain powered mobility to prevent injuries and falls.      Physical Therapy Comments:  Patient was initially upset at beginning of appointment, admitted that he was upset by having to think about getting powered mobility and give up some of his independence.     Objective  Oculomotor screen: Saccades: impaired, smooth pursuit: impaired, convergence/divergence: impaired    Vision/hearing: patient wears glasses    Communication/cognition: WFL    ROM/Strength:   Manual Muscle Testing  All measurements are out of a highest possible score of 5 (normal muscle strength), and a lowest possible score of 0 (no palpable contraction).      Moderate tightness in hamstrings B      RIGHT LEFT   HIP FLEXION 3- 4+   HIP EXTENSION 3 3+   HIP ABDUCTION 2+ 4   KNEE FLEXION 4 5-   KNEE EXTENSION 4+ 5-   DORSIFLEXION 4+ 5-      RIGHT LEFT   SHOULDER FLEXION 140 140   SHOULDER ABDUCTION 150 150   SHOULDER INT ROT 4- 4-   SHOULDER EX ROT 4- 4-   ELBOW FLEXION 4- 4-   ELBOW EXTENSION 4- 4-   WRIST FLEXION 4- 4-   WRIST EXTENSION 4- 4-     Right hand dominant,  strength fair B    Coordination:  Impaired and slowed B and throughout     Sensation/Proprioception: WFL    Posture: moderate thoracic kyphosis, forward head and rounded  shoulders in seated, crouched posture in stance with hip and knee flexion.     Balance:     Stance: wide base of support, knees and hips flexed, leans on SEC   Bench sitting: independent     Special Tests:    Burrows Balance Test      1.  Sitting to Standing: 3                        4) able to stand without using hands and stabilize independently                        3) able to stand independently using hands                        2) able to stand using hands after several tries                        1) needs minimal aid to stand or to stabilize                        0) needs moderate or maximal assist to stand                      2.  Standing Unsupported: 4                        4) able to stand safely 2 minutes                        3) able to stand 2 minutes with supervision                        2) able to stand 30 seconds unsupported                         1) needs several tries to stand 30 seconds unsupported                        0) unable to stand 30 seconds unassisted  3.  Sitting with Back Unsupported: 4                        4) able to sit safely and securely 2 minutes                        3) able to sit 2 minutes under supervision                        2) able to sit 30 seconds                        1) able to sit 10 seconds                        0) unable to sit without support 10 seconds  4.  Standing to Sittin                        4) sits safely with minimal use of hands                        3) controls descent by using hands                        2) uses back of legs against chair to control descent                        1) sits independently but had uncontrolled descent                        0) needs assistance to sit  5.  Transfers: 4                        4) able to transfer safely with minor use of hands                        3) able to transfer safely definite need of hands                        2) able to transfer with verbal cuing and/or  supervision                        1) needs on person to assist                        0) needs two people to assist or supervise to be safe  6.  Standing Unsupported with Eyes Closed: 4                        4) able to stand 10 seconds safely                        3) able to stand 10 seconds with supervision                        2) able to stand 3 seconds                        1) unable to keep eyes closed 3 seconds but stays safely                        0) needs help to keep from falling  7.  Standing Unsupported with Feet Together: 4                        4) able to pace feet together independently and stand 1 minute safely                        3) able to place feet together independently and stand for 1 minute with supervision                        2) able to place feet together independently but unable to hold for 30 seconds                        1) needs help to attain position but able to stand 15 seconds feet together                        0) needs help to attain position and unable to hold for 15 seconds  8.  Reaching Forward: 3                        4) can reach forward confidently 25 cm (10 inches)                        3) can reach forward 12 cm safely (5 inches)                        2) can reach forward 5 cm safely (2 inches)                        1) reaches forward but needs supervision                        0) loses balance while trying/requires external support  9.   Object from Floor: 4                        4) able to  slipper safely and easily                        3) able to  clipper but needs supervision                        2) unable to  but reaches 2-5 cm (1-2 inches) from slipper and keeps balance                         1) unable to  and needs supervision while trying                        0) unable to try/needs assist to keep from losing balance or falling  10. Turning to Look Behind R & L Shoulder: 2                        4) looks  behind from both sides and weight shifts well                        3) looks behind one side only other side shows less weight shift                        2) turns sideways only but maintains balance                        1) needs supervision while turning                        0) needs assist to keep from losing balance or falling  11. Turn 360s: 1                        4) able to turn 360 degrees safely in 4 seconds or less                        3) able to turn 360 degrees safely one side only 4 seconds or less                        2) able to turn 360 safely but slowly                        1) needs close supervision or verbal cuing                        0) needs assistance while turning  12.  Alternating Feet on Step Stool Unsupported: 1                        4) able to stand independently and safely and complete 8 steps in 20 seconds                        3) able to stand independently and complete 8 steps in greater than 20 seconds                        2) able to complete 4 steps without aid with supervision                        1) able to complete greater than 2 steps needs minimal assist                        0) needs assistance to keep from falling/unable to try   13. Standing Unsupported one foot in front: 1 (14 sec left foot, 4 sec right right forward)                        4) able to place foot tandem independently and hold 30 seconds                        3) able to place foot ahead of other independently and hold 30 seconds                        2) able to take small step independently and hold 30 seconds                        1) needs help to step but can hold 15 seconds                        0) loses balance while stepping or staning  14.  Standing on One Le                        4) able to lift leg independently and hold for greater than 10 seconds                        3) able to lift leg independently and hold 5-10 seconds                        2) able to lift leg  independently and hold equal or greater than 3 seconds                        1) tries to lift leg unable to hold 3 seconds but remains standing independently                        0) unable to try or needs assist to prevent fall  Total: 41/56                                                                        Assessment of Score:                       52-56= Normal                                                                                                                                                            41-56= Low Fall Risk                                                                                                                                                            21-40= Medium Fall Risk                                                                                                                                                            0-20= High Fall Risk      Balance:                         dynamic gait index score: 14/24 (<19 indicates increased risk of falls): abnormal gait pattern with change in speed and head turns as well as uses single point cane; stops to step over obstacle; uses rail for stairs; slows around cones      Fall Risk Screening:  History of a fall in the last year  Current neurological diagnosis  Patient reported fear or concerns of falling  Yes:  Burrows Score 40 or lower indicates a fall risk and Functional Gait Assessment: not tested Score of  22 or lower Indicates a fall risk     Transfers: UE assist, increased time allowance, must stand to gain balance before he can start walking, wide base of support     Bed Mobility: MOD I                                       Gait / Stairs: Patient ambulates with slow paced gait, B knee and hip flexion throughout gait, downward gaze, using SEC 17, feet not consistently passing each other, flat foot contact.     Wheelchair mobility:   Manual wheelchair: Patient is not a candidate for a manual wheelchair due to  fatigue issues secondary to MS   Scooter: A power scooter may meet patient's mobility needs dependent on his ability to navigate within his small home.    Power wheelchair: Patient may require a power wheelchair in the event that a power scooter does not fit within his home, this will be determined with home visit completed by DME vendor.     Today's Intervention: Evaluation completed in tandem with local DME vendor, [facility].      Long discussion with patient and his wife that obtaining powered mobility will facilitate maintaining his independence and prevet injuries due to falls versus causing decline in independence.     Assessment:  Problem list: impaired strength, posture, endurance, balance and coordination     Response to Intervention:  Significant fatigue noted     Potential for improvement: fair-    Summary and recommended equipment: Jamir Gill is a 62 y.o. male who presents for a wheelchair evaluation with his wife. 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 Reports he does use his walker in the home for more stability. He also suffers from multiple spine diagnoses including DDD and history of a compression fracture due to a fall. Jamir has fractured his left wrist requiring ORIF due to another fall. He admits to falling multiple times a day and reports that once he is fatigued he cannot get up by himself anymore. 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     Jamir continues to have decline in mobility, is now tripping in his home more frequently while using his walker and having more falls per his verbal report 7/26/2017. At this point he does require power mobility to include a power scooter to facilitate safety  within the home for mobility, conserve his energy during mobility so that he can transfer safely and complete ADL's with less fatigue. A power scooter will allow him to get to and from the bathroom in a timely and safe manner as well as get to and from the kitchen andsit for meal prep and clean up again to conserve his energy due to MS and fatigue. Without a power scooter Jamir will continue to have falls, significant fatigue issues and is at high risk for injuries including fractures due to frequency of falls.     Jamir is no longer a candidate for a walker as he is falling during gait with walker due to fatigue and imbalance caused by his MS. He is not a candidate for a cane or crutches as these offer less support than a walker and require more energy. A manual wheelchair will not meet Jamir's needs as it will not allow him to conserve his energy due to the effort it takes to self propel across his home. A power scooter will meet his mobility needs.     G Codes and Modifier taken from patient completing the Burrows and dynamic gait index: 6/7/17   Initial Primary G Code and Modifier:                         Per the Patient's intake and/or assessment the Primary G Code is: Mobility .                        The Patient's Impairment, Limitation or Restriction Modifier would be best described as: CK - 40% - 60% Impairment.   Goal Primary G Code and Modifier:                         The Patient's G Code Goal would be: Mobility                         The Patient's Impairment, Limitation or Restriction Modifier goal would be best described as: CJ - 20% - 40% Impairment.      Goals:  Patient will be assessed for appropriate mobility device to promote maintaining his safety with mobility within one visit. Goal met 6/7/17     Short Term Goals: To be met in 4 weeks:   Following physical therapy intervention, the patient will be able to:   1. Patient will improve standing posture to minimize crouched gait pattern. In  progress 6/7/17   2. Patient will be independent in home exercise program for progression of strength and balance abilities.           3.   Patient will decrease falls to no more than 3 per week. In progress 6/7/17       Long Term Goals: To be met in 8 weeks:   Following physical therapy intervention, the patient will be able to:   1. Increase MMT of hip flexors to 3+/5 so that pt able to lift leg independently into car.   2. Patient will improve dynamic gait index score by 5 points to decrease fall risk.    Plan  Treatment Plan / Targeted Outcomes:     Frequency:   16 visits     Duration of Treatment: 8 weeks    Planned Interventions:    Home Exercise Program development  Wheelchair Management  Therapeutic Activities  Therapeutic Exercise (ROM & Strengthening)  Manual Therapy  Neuromuscular Re-education  Ultrasound  Electrical Stimulation  Gait Training    Plan for next visit:  Continue outpatient physical therapy per primary PT's POC.     Student or PTA has been instructed in and demonstrates skills necessary to carry out above stated treatment plan: No    Thank you for your referral to [facility].  Please call with any questions, concerns or comments.  [phone]    The signature, of the referring medical provider, on this document indicates certification of the above prescribed plan of care and is medically necessary.      X____________________________________________________    Physician Signature                     Date  Time

## 2017-12-30 NOTE — NURSING NOTE
Patient Information     Patient Name MRN Jamir Boudreaux [id number] Male 1955      Nursing Note by Virginie Berrios at 2017  2:00 PM     Author:  Virginie Berrios Service:  (none) Author Type:  (none)     Filed:  2017  2:12 PM Encounter Date:  2017 Status:  Signed     :  Virginie Berrios            He is here to follow up on his chest X-ray results and to follow up from [facility].  Virginie Berrios LPN..................2017   2:08 PM

## 2017-12-30 NOTE — INITIAL ASSESSMENTS
Patient Information     Patient Name MRN Sex Jamir Cooley [id number] Male 1955      Initial Assessments by Raghav Olivo SLP at 9/15/2017  1:24 PM     Author:  Raghav Olivo SLP Service:  (none) Author Type:  SLP- Speech Language Pathologist     Filed:  9/15/2017  2:27 PM Date of Service:  9/15/2017  1:24 PM Status:  Signed     :  Raghav Olivo SLP (SLP- Speech Language Pathologist)            Speech-Language Pathology  Outpatient Videofluoroscopic Swallow Study (VFSS)   [facility]   9/15/2017  Name: Jamir Gill  Date:   9/15/2017  Referring Physician: John Polk MD       Radiologist: Shira Chang MD  Diagnosis: Multiple sclerosis; At risk for aspiration     History: Pt is a 63 yo male who reports following beside swallow with reports of globus and possible aspiration. He requested a VFSS to rule out aspiration and determine globus. He states that he is using strategies but not exercises and has not had difficulties since evaluation.     Impressions/Assessment:          Oral Dysphagia Severity: Mild         Pharyngeal Dysphagia Severity: Mild         Aspiration/Laryngeal Penetration:  None     Recommendations:    Normal swallow, No follow-up necessary      Diet:  Regular with Thin Liquids        Next Diet Upgrades:  Per Results of Repeat Video Swallow Study      Swallowing Instructions / Precautions:       Swallow Techniques:      Small amounts: 1/1  teaspoon, bite, sip       Alternate liquids with solids       Liquids one sip at a time       Chew carefully     Pertinent Information:  Past Medical History:     Diagnosis  Date     BPH (benign prostatic hypertrophy) with urinary obstruction      Cervical disc disorder      Cognitive decline      Hypertension      Multiple sclerosis (HC)      Osteoarthritis cervical spine      Osteopenia           Speech-Language Pathology Diagnosis:  Dysphagia    Onset Date and Minutes/Units of Time for this Session are  documented on the flowsheet    Evaluation  Initial Observations:     Respiratory Status:  Normal      Level of Consciousness:  Alert     Cognitive/Communication Status: Within Functional Limits     Diet Prior to:  Evaluation:  Regular with Thin Liquids     Ability to Manage Secretions:  Yes     Dentition:  Normal/full set of teeth    Initial Pain Rating / Description:   0 = no pain, comfortable / None    Language(s) Spoken:  English  Hearing:  Hearing is Adequate for Conversational Speech    Were cultural / age or other special adaptations needed?:  No  Patient is aware of diagnosis/deficits:  Yes   Risks and benefits explained:  Yes     Learning Barriers:  None     Patient's Readiness to Learn: Willing     Family/Caregiver's Readiness to Learn:  Willing     Learning Needs were Addressed by Providing:  Visual Information, Verbal Information, Cueing, Written Information, Gestures and Pictures    Oral Mechanism / Speech:    Oral Mechanism:  Weakness reported. No difficulties with ROM    Speech / Voice:  Not Assessed    Videofluoroscopic Procedures:     Presentation:        By spoon, size of bolus:  1 teaspoon        Cup        Consecutive Swallows      Consistencies Tested  (Barium Contrast):  Thin liquid, Puree, Solid and Mixed consistency      Views:        Lateral      Patient Position:  Upright in chair    Results:     Treatment Techniques Used During Evaluation:                            Multiple Swallows  effective         Oral Phase Findings:        Bolus Formation and Control functional.  Oral Residue is present in mild amounts.  Mastication impaired.  Transit Time functional.  Premature Spillage to the level of the Valleculae and Pyriform Sinuses.  Velopharyngeal closure functional.                 Pharyngeal Phase Findings:           Residue at level of Valleculae, Pyriform Sinuses, Tongue Base        Residue is mild        Residue occurs with Thin liquid, Solid and Mixed consistency        Tongue Base  "Retraction functional        Hypoid protraction functional        Epiglottic Inversion functional        Thyroid approximation functional        Cricopharyngeal: Decreased Movement        Penetration/Aspiration: Contrast does not enter the airway.        Esophageal Phase Disorder: Not Assessed    Potential for Expectorating Material        Sharp Cough:  Normal        Strong Throat Clear:  Normal    Patient Potential for Achieving Desired Outcome (Prognosis):  Good    Home Program: Strategies and exercises sent home    Response to cues/compensatory strategies: Strategies reduce aspiration risk    Plan     Treatment Plan/Targeted Outcomes:     Type of Session(s) Planned: None      Frequency:  Evaluation only      Duration of Treatment:  Evaluation only     Anticipate core outcome goals to be achieved within:     Interventions:  Eval Video Swallow Diet modification, Swallowing precautions and techniques and Oral-motor exericses and Tx Swallow Dys/Oral Func Feeding Diet modification, Swallowing precautions and techniques and Oral-motor exericses    Goals:     Long Term:     Swallowing:       Patient will maintain adequate nutrition and hydration without signs or symptoms of aspiration     Short Term:     Swallowing:      Pt will tolerate teaspoon trials of thin liquid consistency without signs or symptoms of aspiration in 90% of trials.        Pt will tolerate teaspoon trials of regular diet without signs or symptoms of aspiration in 90% of trials.     Patient Personal and/or Family Goal(s):  \"I want to be sure everything is ok.\"      Patient and/or Family were included in goal selection:  Yes     Results/ Recommendations discussed with/or copies sent to:  Patient and Family    Thank you for this referral. If you have any questions or concerns, please contact [facility] Speech and Language Department at [phone].       Raghav Olivo, AMITA ....................  9/15/2017   2:27 PM        "

## 2017-12-30 NOTE — NURSING NOTE
Patient Information     Patient Name MRN Sex Jamir Cooley [id number] Male 1955      Nursing Note by Day Huerta at 2017  2:45 PM     Author:  Day Huerta Service:  (none) Author Type:  (none)     Filed:  2017  2:53 PM Encounter Date:  2017 Status:  Signed     :  Day Huerta            Patient presents to clinic with burning during urination.  Day Baugh ....................  2017   2:52 PM

## 2018-01-04 NOTE — PROGRESS NOTES
Patient Information     Patient Name MRN Sex Jamir Faust [id number] Male 1955      Progress Notes by John Polk MD at 2017  2:15 PM     Author:  John Polk MD Service:  (none) Author Type:  Physician     Filed:  2017  3:01 PM Encounter Date:  2017 Status:  Signed     :  John Polk MD (Physician)            Nursing Notes:   Antonella Julien  2017  2:43 PM  Signed  Patient presents to the clinic for a consult for depression.  Antonella Julien LPN....................2017 2:23 PM    Jamir Gill is a 61 y.o. male who presents for   Chief Complaint     Patient presents with       Depression      Consult     HPI: Mr. Gill comes in to discuss diagnosis of depression; he feels he has developed vegetative signs- motivation is low and there is anhedonism. There is less emma; he denies suicidal thoughts. Appetite is OK; MS has taken a lot away from him. H eknows it likely is progressive; he has seen a counselor and did not find it helpful. He is not interested in support group.   Past Medical History:     Diagnosis  Date     BPH (benign prostatic hypertrophy) with urinary obstruction      Cervical disc disorder      Cognitive decline      Hypertension      Multiple sclerosis (HC)      Osteoarthritis cervical spine      Osteopenia      Past Surgical History:      Procedure  Laterality Date     APPENDECTOMY      appendectomy       COLONOSCOPY      [place]- colitis       COLONOSCOPY SCREENING      with polyps resected       FRACTURE TREATMENT      ORIF left wrist fracture~Laser prostate surgery--BPH~Colonoscopy  with polyps resected       PROSTATECTOMY      Laser prostate surgery -- BPH       Family History       Problem   Relation Age of Onset     Cancer-prostate  Father      Other  Father      Alzheimer's/kidney failure       Other  Mother       with Parkinson's       Heart Disease  Mother      CHF for carditis       Good Health   "Brother      Heart Disease  Brother      ASCAD with MI       Current Outpatient Prescriptions       Medication  Sig Dispense Refill     alendronate (FOSAMAX) 70 mg tablet Take 1 tablet by mouth once a week in the morning. Take on empty stomach with full glass of water. Do not lie down for 1 hr. 12 tablet 3     aspirin 81 mg tablet Take 81 mg by mouth once daily with a meal.         baclofen (LIORESAL) 10 mg tablet 1 am and noon, 2 pm  0     CALCIUM CARBONATE/VITAMIN D3 (CALCIUM 500 + D ORAL) Take 1 tablet by mouth 2 times daily.         cholecalciferol (VITAMIN D) 1,000 unit capsule Take 1 capsule by mouth 2 times daily.  0     Cranberry 400 mg capsule Take  by mouth.  0     fluticasone (50 mcg per actuation) nasal solution (FLONASE) USE 2 SPRAYS IN EACH NOSTRIL ONCE DAILY 1 Bottle 0     lisinopril (PRINIVIL; ZESTRIL) 10 mg tablet Take 10 mg by mouth once daily.       meclizine (ANTIVERT) 25 mg tablet Take 25 mg by mouth 2 times daily.         nortriptyline (PAMELOR) 25 mg capsule Take 2 capsules by mouth at bedtime.  0     tamsulosin (FLOMAX) 0.4 mg capsule Take 2 capsules by mouth once daily after a meal. 2 tabs daily  0     No current facility-administered medications for this visit.      Medications have been reviewed by me and are current to the best of my knowledge and ability.    No Known Allergies     EXAM:   Vitals:     04/20/17 1426   BP: 152/92   Weight: 83.8 kg (184 lb 12.8 oz)   Height: 1.79 m (5' 10.47\")     General Appearance: Pleasant, alert, appropriate appearance for age. No acute distress  Psychiatric Exam: Alert and oriented, appropriate affect.  ASSESSMENT AND PLAN:  1. MULTIPLE SCLEROSIS  progressive    2. Reactive depression  Trial fluoxetine                 "

## 2018-01-04 NOTE — NURSING NOTE
Patient Information     Patient Name MRN Sex Jamir Cooley [id number] Male 1955      Nursing Note by Antonella Julien at 2017  1:45 PM     Author:  Antonella Julien Service:  (none) Author Type:  (none)     Filed:  2017  1:53 PM Encounter Date:  2017 Status:  Signed     :  Antonella Julien            Patient presents to the clinic for a follow up on depression.  He doesn't feel that it's really made any difference.    Antonella Julien LPN....................2017 1:47 PM

## 2018-01-04 NOTE — PROGRESS NOTES
Patient Information     Patient Name MRN Sex Jamir Faust [id number] Male 1955      Progress Notes by John Polk MD at 2017  1:45 PM     Author:  John Polk MD Service:  (none) Author Type:  Physician     Filed:  2017  2:18 PM Encounter Date:  2017 Status:  Signed     :  John Polk MD (Physician)            Nursing Notes:   Antonella Julien  2017  1:53 PM  Signed  Patient presents to the clinic for a follow up on depression.  He doesn't feel that it's really made any difference.    Antonella Julien LPN....................2017 1:47 PM    Jamir Gill is a 61 y.o. male who presents for   Chief Complaint     Patient presents with       Depression      Follow up     HPI: Mr. Gill comes in for recheck of depression which has not improved yet as he has started the fluoxetine; he has had no side effects.   Past Medical History:     Diagnosis  Date     BPH (benign prostatic hypertrophy) with urinary obstruction      Cervical disc disorder      Cognitive decline      Hypertension      Multiple sclerosis (HC)      Osteoarthritis cervical spine      Osteopenia      Past Surgical History:      Procedure  Laterality Date     APPENDECTOMY      appendectomy       COLONOSCOPY      [place]- colitis       COLONOSCOPY SCREENING      with polyps resected       FRACTURE TREATMENT      ORIF left wrist fracture~Laser prostate surgery--BPH~Colonoscopy  with polyps resected       PROSTATECTOMY      Laser prostate surgery -- BPH       Family History       Problem   Relation Age of Onset     Cancer-prostate  Father      Other  Father      Alzheimer's/kidney failure       Other  Mother       with Parkinson's       Heart Disease  Mother      CHF for carditis       Good Health  Brother      Heart Disease  Brother      ASCAD with MI       Current Outpatient Prescriptions       Medication  Sig Dispense Refill     aspirin 81 mg tablet Take 81 mg by mouth once  "daily with a meal.         baclofen (LIORESAL) 10 mg tablet 1 am and noon, 2 pm  0     CALCIUM CARBONATE/VITAMIN D3 (CALCIUM 500 + D ORAL) Take 1 tablet by mouth 2 times daily.         cholecalciferol (VITAMIN D) 1,000 unit capsule Take 1 capsule by mouth 2 times daily.  0     Cranberry 400 mg capsule Take  by mouth.  0     FLUoxetine (SARAFEM) 20 mg tablet Take 1 tablet by mouth every morning. Take 1/2 tablet each krishna for 8 days- then 1 each day 30 tablet 6     fluticasone (50 mcg per actuation) nasal solution (FLONASE) USE 2 SPRAYS IN EACH NOSTRIL ONCE DAILY 1 Bottle 0     lisinopril (PRINIVIL; ZESTRIL) 10 mg tablet Take 10 mg by mouth once daily.       meclizine (ANTIVERT) 25 mg tablet Take 25 mg by mouth 2 times daily.         nortriptyline (PAMELOR) 75 mg capsule Take 150 mg by mouth once daily.  1     tamsulosin (FLOMAX) 0.4 mg capsule Take 2 capsules by mouth once daily after a meal. 2 tabs daily  0     No current facility-administered medications for this visit.      Medications have been reviewed by me and are current to the best of my knowledge and ability.    No Known Allergies    EXAM:   Vitals:     05/08/17 1348   BP: 122/82   Weight: 84.8 kg (187 lb)   Height: 1.74 m (5' 8.5\")     General Appearance: Pleasant, alert, appropriate appearance for age. No acute distress  Psychiatric Exam: Alert and oriented, appropriate affect.  ASSESSMENT AND PLAN:  1. Reactive depression  Stable/ will increase med if not effective after a month                 "

## 2018-01-04 NOTE — NURSING NOTE
Patient Information     Patient Name MRN Sex Jamir Cooley [id number] Male 1955      Nursing Note by Antonella Julien at 2017  2:15 PM     Author:  Antonella Julien Service:  (none) Author Type:  (none)     Filed:  2017  2:43 PM Encounter Date:  2017 Status:  Signed     :  Antonella Julien            Patient presents to the clinic for a consult for depression.  Antonella Julien LPN....................2017 2:23 PM

## 2018-01-05 NOTE — INITIAL ASSESSMENTS
Patient Information     Patient Name MRN Sex     Jamir Gill [id number] Male 1955      Initial Assessments by Kristy Early PT at 2017  3:19 PM     Author:  Kristy Early PT  Service:  (none) Author Type:  PT- Physical Therapist     Filed:  2017  4:54 PM  Date of Service:  2017  3:19 PM Status:  Addendum     :  Krsity Early PT (PT- Physical Therapist)        Related Notes: Original Note by Kristy Early PT (PT- Physical Therapist) filed at 2017  4:52 PM            [facility]  Outpatient PT - Initial   Neurological Evaluation        Date of Service: 2017    Visit #: 1     Patient Name: Jamir Gill    YOB: 1955   Referring MD/Provider: Referring MD/Provider: [facility] [place] [facility] (addended)  Diagnosis: Medical and Treatment Diagnosis:   MS (multiple sclerosis) (HC) [G35]       Bilateral leg weakness [R29.898]       Repeated falls [R29.6]       Treatment Diagnosis: impaired balance and gait, lower extremity weakness secondary to MS  Insurance:  Preferred One  Start of Care Date: Start of Service: 2017   Certification Dates: From: Start of Service: 2017    Re-Cert Due: Medicare/MA Re-Cert Due: 2017     Subjective       Current Complaints/Reason for Referral: Jamir Gill  is a 61 y.o. male  who comes to physical therapy today with a chief complaint of leg weakness, trips and falls due to coordination problems. There was a definite decline over the winter while in [place] and as a result was less active and less mobile.  More involvement of the right side, difficulty with determining where the leg is in space and feeling it. About 1-2 falls per day if he's doing something.      Pain:    Patient reports current level of pain of  1-2/10 knees and thighs.      Prior Level of Function: independent with single point cane and right AFO (not wearing currently)      Preadmission Functional  Mobility: Independent with Assistive Device,       Cane, Type:  Single Point Cane, 4WW      Functional Limitations/Difficulty: Patient reports difficulty with tasks such as: walking on uneven surfaces.     Social History:   Patient reports that they currently live in a single story.  Patient states they live with wife.  States friends and family do help.  States there are 2 steps to get into the home.  States the home is 1 levels.    Bathroom set up is as follows: small bathroom with objects to grab onto   Additional Assistive Devices include: 4wheel walker  Patient reports they currently work at [employer] and works as a manager which involves ambulating out doors, lifting, etc.      Falls: Pt reports lots of falls.                          Frequency of falls: 1-2 per day                        Injuries Associated with the Falls: bumps and bruises     Past Medical History: See patient s PMH in electronic medical record (EMR) and below.  This was reviewed with patient.  Patient also reports: compression fracture of spine.    Patient reports that they do not have a pacemaker or defibrillator.      Past Medical History from EMR:    Past Medical History    Past Medical History   Diagnosis Date     BPH (benign prostatic hypertrophy) with urinary obstruction       Osteoarthritis cervical spine       Cervical disc disorder       Multiple sclerosis       Cognitive decline       Hypertension       Osteopenia           Past Surgical History from EMR:    Past Surgical History           Past Surgical History   Procedure Laterality Date     Appendectomy           appendectomy     Fracture treatment           ORIF left wrist fracture~Laser prostate surgery--BPH~Colonoscopy 2010 with polyps resected     Prostatectomy           Laser prostate surgery -- BPH     Colonoscopy screening   2010       with polyps resected         Previous Treatment: did physical therapy in [place] in December following his fall, last year here at  [facility]     Latex Allergy: No      Medications: See patient s list of medications in electronic medical record.  These were reviewed with patient.       Diagnostics:            Imaging:                                                                ?      Goals: Following physical therapy intervention, the patient hopes to be able to: better balance and strength, standing and walking.       Were cultural / age or other special adaptations needed? No      Patient is a vulnerable adult: No      Patient is aware of diagnosis: Yes      Risks and benefits explained: Yes  Objective     Items left blank indicate that the test was inappropriate or not meaningful at the time of evaluation and therefore not performed.     Observation: Patient comes into physical therapy clinic today alone.       Cognition:  Oriented to Person, Place, and Time     Posture:                         Seated Posture: normal                        Standing Posture: crouched, hip and knee flexion     Gait: Patient walks with single point cane in right upper extremity.  Does not fully extend hips or knees when standing and walking, occasionally scissoring gait and lack of right hip and knee flexion during swing phase. Trendelenburg with right stance     ROM / Strength:              Lower Extremities:                                                                           WFL  Except hamstring length                                                               Norms Left  Right Strength Left  Right   Hip Flexion 120      Hip Flexion 4+/5 3-/5   Hip Extension 15     Hip Extension 3+ 3   Hip Abduction 50     Hip Abduction 4/5 2+/5   Hip Adduction 15     Hip Adduction       Hip External Rot. 60     Hip External Rot. 5-/5 4/5   Hip Internal Rot. 40     Hip Internal Rot. 5-/5 4+/5   Knee Extension 0     Knee Extension 5-/5 4+/5   Knee Flexion 135     Knee Flexion 5-/5 4/5   Dorsiflexion 20     Dorsiflexion 5-/5 4+/5      hamstring length in 90 degrees hip  flexion: moderate limitation bilateral      Coordination: deferred  Sensory Assessment: deferred  Tonicity/Reflexes: no abnormal tone                                                                                             Burrows Balance Test     1.  Sitting to Standing: 3                        4) able to stand without using hands and stabilize independently                        3) able to stand independently using hands                        2) able to stand using hands after several tries                        1) needs minimal aid to stand or to stabilize                        0) needs moderate or maximal assist to stand                      2.  Standing Unsupported: 4                        4) able to stand safely 2 minutes                        3) able to stand 2 minutes with supervision                        2) able to stand 30 seconds unsupported                         1) needs several tries to stand 30 seconds unsupported                        0) unable to stand 30 seconds unassisted  3.  Sitting with Back Unsupported: 4                        4) able to sit safely and securely 2 minutes                        3) able to sit 2 minutes under supervision                        2) able to sit 30 seconds                        1) able to sit 10 seconds                        0) unable to sit without support 10 seconds  4.  Standing to Sittin                        4) sits safely with minimal use of hands                        3) controls descent by using hands                        2) uses back of legs against chair to control descent                        1) sits independently but had uncontrolled descent                        0) needs assistance to sit  5.  Transfers: 4                        4) able to transfer safely with minor use of hands                        3) able to transfer safely definite need of hands                        2) able to transfer with verbal cuing and/or  supervision                        1) needs on person to assist                        0) needs two people to assist or supervise to be safe  6.  Standing Unsupported with Eyes Closed: 4                        4) able to stand 10 seconds safely                        3) able to stand 10 seconds with supervision                        2) able to stand 3 seconds                        1) unable to keep eyes closed 3 seconds but stays safely                        0) needs help to keep from falling  7.  Standing Unsupported with Feet Together: 4                        4) able to pace feet together independently and stand 1 minute safely                        3) able to place feet together independently and stand for 1 minute with supervision                        2) able to place feet together independently but unable to hold for 30 seconds                        1) needs help to attain position but able to stand 15 seconds feet together                        0) needs help to attain position and unable to hold for 15 seconds  8.  Reaching Forward: 3                        4) can reach forward confidently 25 cm (10 inches)                        3) can reach forward 12 cm safely (5 inches)                        2) can reach forward 5 cm safely (2 inches)                        1) reaches forward but needs supervision                        0) loses balance while trying/requires external support  9.   Object from Floor: 4                        4) able to  slipper safely and easily                        3) able to  clipper but needs supervision                        2) unable to  but reaches 2-5 cm (1-2 inches) from slipper and keeps balance                         1) unable to  and needs supervision while trying                        0) unable to try/needs assist to keep from losing balance or falling  10. Turning to Look Behind R & L Shoulder: 2                        4) looks  behind from both sides and weight shifts well                        3) looks behind one side only other side shows less weight shift                        2) turns sideways only but maintains balance                        1) needs supervision while turning                        0) needs assist to keep from losing balance or falling  11. Turn 360s: 1                        4) able to turn 360 degrees safely in 4 seconds or less                        3) able to turn 360 degrees safely one side only 4 seconds or less                        2) able to turn 360 safely but slowly                        1) needs close supervision or verbal cuing                        0) needs assistance while turning  12.  Alternating Feet on Step Stool Unsupported: 1                        4) able to stand independently and safely and complete 8 steps in 20 seconds                        3) able to stand independently and complete 8 steps in greater than 20 seconds                        2) able to complete 4 steps without aid with supervision                        1) able to complete greater than 2 steps needs minimal assist                        0) needs assistance to keep from falling/unable to try   13. Standing Unsupported one foot in front: 1 (14 sec left foot, 4 sec right right forward)                        4) able to place foot tandem independently and hold 30 seconds                        3) able to place foot ahead of other independently and hold 30 seconds                        2) able to take small step independently and hold 30 seconds                        1) needs help to step but can hold 15 seconds                        0) loses balance while stepping or staning  14.  Standing on One Le                        4) able to lift leg independently and hold for greater than 10 seconds                        3) able to lift leg independently and hold 5-10 seconds                        2) able to lift leg  independently and hold equal or greater than 3 seconds                        1) tries to lift leg unable to hold 3 seconds but remains standing independently                        0) unable to try or needs assist to prevent fall  Total: 41/56                                                                       Assessment of Score:                       52-56= Normal                                                                                                                                                            41-56= Low Fall Risk                                                                                                                                                            21-40= Medium Fall Risk                                                                                                                                                            0-20= High Fall Risk     Balance:                         dynamic gait index score: 14/24 (<19 indicates increased risk of falls): abnormal gait pattern with change in speed and head turns as well as uses single point cane; stops to step over obstacle; uses rail for stairs; slows around cones     Fall Risk Screening:  History of a fall in the last year  Current neurological diagnosis  Patient reported fear or concerns of falling  Yes:  Burrows Score 40 or lower indicates a fall risk and Functional Gait Assessment: not tested Score of  22 or lower Indicates a fall risk      Today's Intervention:    Evaluation and patient education regarding goals and expectations  Pt education on fall risk management.  Educated on the importance of safety and energy conservation.       Patient did have a fall onto his knees in waiting room in the process of getting up from chair quickly and turning, before therapist was at his side to assist with preventing fall. He denies any injury.     Reviewed home exercise program:  Home Exercise Program: *= added to HEP.     Access Code: [email or web address] URL: [email or web address] Date: 05/16/2017 Prepared by: Kristy Early   Exercises   Hooklying Clamshell with Resistance - 10-15 reps - 1-2 sets - 5 hold - 1x daily   Seated Hamstring Stretch - 1-2 sets - 30 second hold - 1x daily   Romberg Stance - 30 second hold - 2-3 sets - 1x daily   Romberg Stance with Eyes Closed - 2-3 sets - 20-30 second hold - 1x daily   Romberg Stance with Head Rotation - 2-3 sets - 30 second hold - 1x daily   Standing Romberg to 3/4 Tandem Stance - 2-3 sets - 20-30 second hold - 1x daily   Scapular Retraction with Resistance Advanced - 10-15 reps - 1-2 sets - 5 hold - 1x daily        Assessment     Clinical Impression:  Patient presents with signs and symptoms consistent with gait and balance impairments secondary to MS with a flare up occurring over the winter months and period of inactivity contributing to increased weakness and frequency of falls. Patient will benefit from continued skilled physical therapy services to address aforementioned impairments and to decrease fall risk and improve gait pattern.       Functional Limitations/Problems List:   1. Balance  2. Gait impairment  3. Frequent falls  4. lower extremity weakness     Goals:      Short Term Goals: To be met in 4 weeks:   Following physical therapy intervention, the patient will be able to:   1. Patient will improve standing posture to minimize crouched gait pattern.  2. Patient will be independent in home exercise program for progression of strength and balance abilities.        3.   Patient will decrease falls to no more than 3 per week.      Long Term Goals: To be met in 8 weeks:   Following physical therapy intervention, the patient will be able to:   1. Increase MMT of hip flexors to 3+/5 so that pt able to lift leg independently into car.   2. Patient will improve dynamic gait index score by 5 points to decrease fall risk.     G Codes and Modifier taken from patient completing the  Burrows and dynamic gait index:  Done at [facility] on 5/16/2017   Initial Primary G Code and Modifier:                         Per the Patient's intake and/or assessment the Primary G Code is: Mobility .                        The Patient's Impairment, Limitation or Restriction Modifier would be best described as: CK - 40% - 60% Impairment.   Goal Primary G Code and Modifier:                         The Patient's G Code Goal would be: Mobility                         The Patient's Impairment, Limitation or Restriction Modifier goal would be best described as: CJ - 20% - 40% Impairment.      Rehab Potential: Patient Potential for Achieving Desired Outcome:  Fair due to progressive nature of MS     Barriers to Learning: There are mild barriers to learning present with this patient due to memory loss.      Patient participated in goal selection and understand(s) the plan of care: Yes      Therapist Assessment of Today's Intervention:  Definite fatigue with balance testing and stair climbing     Plan     Treatment Plan / Targeted Outcomes:     Frequency:   16 visits     Duration of Treatment: 8 weeks     Plan of Care Due Date: Medicare/MA Re-Cert Due: 7/11/17     Planned Interventions:  Possible physical therapy interventions include but are not limited to:   Home Exercise Program development  Therapeutic Exercise (ROM & Strengthening)  Manual Therapy  Neuromuscular Re-education  Ultrasound  Electrical Stimulation  Therapeutic Activities  Gait Training     Patient will follow up with physician as needed during course of physical therapy intervention.  At time of discharge, patient will be given a home exercise program to continue to maintain strength and ROM achieved during therapy.       Plan for next visit:  Gait and balance training, home exercise program development, floor transfer training and strengthening.        Student or PTA has been instructed in and demonstrates skills necessary to carry out above stated  treatment plan: Yes     Thank you for your referral to [facility] & [facility].  Please call with any questions, concerns or comments.  [phone]     Kristy Early PT, DPT     The signature, of the referring medical provider, on this document indicates certification of the above prescribed plan of care and is medically necessary.        X____________________________________________________    Physician Signature                     Date                                         Time

## 2018-01-05 NOTE — PROGRESS NOTES
Patient Information     Patient Name MRN Sex Jamir Cooley [id number] Male 1955      Progress Notes by Kristy Early PT at 2017  9:15 AM     Author:  Kristy Eraly PT Service:  (none) Author Type:  PT- Physical Therapist     Filed:  2017 12:55 PM Date of Service:  2017  9:15 AM Status:  Signed     :  Kristy Early PT (PT- Physical Therapist)            [facility]  Outpatient PT - Daily note        Date of Service: 2017    Visit #: 2     Patient Name: Jamir Gill    YOB: 1955   Referring MD/Provider: Referring MD/Provider: [facility] [place] [facility] (addended)  Diagnosis: Medical and Treatment Diagnosis:   MS (multiple sclerosis) (HC) [G35]       Bilateral leg weakness [R29.898]       Repeated falls [R29.6]       Treatment Diagnosis: impaired balance and gait, lower extremity weakness secondary to MS  Insurance:  Preferred One  Start of Care Date: Start of Service: 2017   Certification Dates: From: Start of Service: 2017    Re-Cert Due: Medicare/MA Re-Cert Due: 2017     Subjective       Patient reports having increased difficulty with walking and leg weakness over the past week. Unable to get up from the ground with falls unless he has an appropriately sized height in the chair. Is able to crawl on hands and knees to access this.    Objective       Today's Intervention:    Nustep level 5, seat 10, arms 10 x7 mins  Step overs: forward/retro and laterally on foam   Standing hip abduction and marching hip flexion, light use of rail  Standing heel and toe raises, light to no use of rail    2 kneel to sit transfers from floor using chair.    Seated hamstring curls (red theraband left and yellow right lower extremity) 2 x15 each and LAQ x15    Toe taps to round side BOSU  Static balance one foot on foam, one foot on round side BOSU x2 each. Held to tolerance    Lumbar extension 40# to fatigue  Leg press  60# seat 22 to fatigue    Home Exercise Program: *= added to HEP.    Access Code: [email or web address] URL: [email or web address] Date: 05/16/2017 Prepared by: Kristy Early   Exercises   Hooklying Clamshell with Resistance - 10-15 reps - 1-2 sets - 5 hold - 1x daily   Seated Hamstring Stretch - 1-2 sets - 30 second hold - 1x daily   Romberg Stance - 30 second hold - 2-3 sets - 1x daily   Romberg Stance with Eyes Closed - 2-3 sets - 20-30 second hold - 1x daily   Romberg Stance with Head Rotation - 2-3 sets - 30 second hold - 1x daily   Standing Romberg to 3/4 Tandem Stance - 2-3 sets - 20-30 second hold - 1x daily   Scapular Retraction with Resistance Advanced - 10-15 reps - 1-2 sets - 5 hold - 1x daily        Assessment     Patient fatigues early and demonstrates right> left lower extremity weakness.    Goals:      Short Term Goals: To be met in 4 weeks:   Following physical therapy intervention, the patient will be able to:   1. Patient will improve standing posture to minimize crouched gait pattern.  2. Patient will be independent in home exercise program for progression of strength and balance abilities.        3.   Patient will decrease falls to no more than 3 per week.      Long Term Goals: To be met in 8 weeks:   Following physical therapy intervention, the patient will be able to:   1. Increase MMT of hip flexors to 3+/5 so that pt able to lift leg independently into car.   2. Patient will improve dynamic gait index score by 5 points to decrease fall risk.     G Codes and Modifier taken from patient completing the Burrows and dynamic gait index:  Done at [facility] on 5/16/2017   Initial Primary G Code and Modifier:                         Per the Patient's intake and/or assessment the Primary G Code is: Mobility .                        The Patient's Impairment, Limitation or Restriction Modifier would be best described as: CK - 40% - 60% Impairment.   Goal Primary G Code and Modifier:                          The Patient's G Code Goal would be: Mobility                         The Patient's Impairment, Limitation or Restriction Modifier goal would be best described as: CJ - 20% - 40% Impairment.         Plan     Treatment Plan / Targeted Outcomes:     Frequency:   16 visits     Duration of Treatment: 8 weeks     Plan of Care Due Date: Medicare/MA Re-Cert Due: 7/11/17     Plan for next visit:  Gait and balance training, home exercise program development, floor transfer training and strengthening.        Student or PTA has been instructed in and demonstrates skills necessary to carry out above stated treatment plan: Yes     Thank you for your referral to [facility].  Please call with any questions, concerns or comments.  [phone]     Kristy Early, PT, DPT

## 2018-01-05 NOTE — PROGRESS NOTES
"Patient Information     Patient Name MRN Sex Jamir Cooley [id number] Male 1955      Progress Notes by Kristy Early PT at 2017  9:48 AM     Author:  Kristy Early PT Service:  (none) Author Type:  PT- Physical Therapist     Filed:  2017  2:05 PM Date of Service:  2017  9:48 AM Status:  Signed     :  Kristy Early PT (PT- Physical Therapist)            [facility]  Outpatient PT - Daily note        Date of Service: 2017    Visit #: 3     Patient Name: Jamir Gill    YOB: 1955   Referring MD/Provider: Referring MD/Provider: [facility]   Diagnosis: Medical and Treatment Diagnosis:   MS (multiple sclerosis) (HC) [G35]       Bilateral leg weakness [R29.898]       Repeated falls [R29.6]       Treatment Diagnosis: impaired balance and gait, lower extremity weakness secondary to MS  Insurance:  [employer]  Start of Care Date: Start of Service: 2017   Certification Dates: From: Start of Service: 2017    Re-Cert Due: Medicare/MA Re-Cert Due: 2017     Subjective       Patient reports persisting issues with falls and leg weakness (worse than usual).    Objective  Today's Intervention:    Toe taps to alyx 4\"  Step forward and retro 4\" step  Nustep level 6, seat 10, arms 10 x6 mins    Standing hip abduction, light use of rail  Standing heel and toe raises, light to no use of rail    Seated hamstring curls (red theraband left and yellow right lower extremity) x15 each and LAQ x15 (manual assist for RLE w/ patient performing eccentric lowering)    Static balance one foot on foam, one foot on round side BOSU x2 each. Held to tolerance    Lumbar extension 60# to fatigue  Leg press 70# seat 22 to fatigue  Abduction 30# to fatigue    Home Exercise Program: *= added to HEP.    Access Code: [email or web address] URL: [email or web address] Date: 2017 Prepared by: Kristy Early   Exercises "   Hooklying Clamshell with Resistance - 10-15 reps - 1-2 sets - 5 hold - 1x daily   Seated Hamstring Stretch - 1-2 sets - 30 second hold - 1x daily   Romberg Stance - 30 second hold - 2-3 sets - 1x daily   Romberg Stance with Eyes Closed - 2-3 sets - 20-30 second hold - 1x daily   Romberg Stance with Head Rotation - 2-3 sets - 30 second hold - 1x daily   Standing Romberg to 3/4 Tandem Stance - 2-3 sets - 20-30 second hold - 1x daily   Scapular Retraction with Resistance Advanced - 10-15 reps - 1-2 sets - 5 hold - 1x daily        Assessment     Patient fatigues early and demonstrates right> left lower extremity weakness. Was able to increase resistance for lower extremity and trunk extension strengthening.    Goals:      Short Term Goals: To be met in 4 weeks:   Following physical therapy intervention, the patient will be able to:   1. Patient will improve standing posture to minimize crouched gait pattern.  2. Patient will be independent in home exercise program for progression of strength and balance abilities.        3.   Patient will decrease falls to no more than 3 per week.      Long Term Goals: To be met in 8 weeks:   Following physical therapy intervention, the patient will be able to:   1. Increase MMT of hip flexors to 3+/5 so that pt able to lift leg independently into car.   2. Patient will improve dynamic gait index score by 5 points to decrease fall risk.     G Codes and Modifier taken from patient completing the Burrows and dynamic gait index:  Done at [facility] on 5/16/2017   Initial Primary G Code and Modifier:                         Per the Patient's intake and/or assessment the Primary G Code is: Mobility .                        The Patient's Impairment, Limitation or Restriction Modifier would be best described as: CK - 40% - 60% Impairment.   Goal Primary G Code and Modifier:                         The Patient's G Code Goal would be: Mobility                         The Patient's Impairment,  Limitation or Restriction Modifier goal would be best described as: CJ - 20% - 40% Impairment.         Plan     Treatment Plan / Targeted Outcomes:     Frequency:   16 visits     Duration of Treatment: 8 weeks     Plan of Care Due Date: Medicare/MA Re-Cert Due: 7/11/17     Plan for next visit:  Gait and balance training, home exercise program development, floor transfer training and strengthening.        Student or PTA has been instructed in and demonstrates skills necessary to carry out above stated treatment plan: Yes     Thank you for your referral to [facility].  Please call with any questions, concerns or comments.  [phone]     Kristy Early, PT, DPT

## 2018-01-26 ENCOUNTER — DOCUMENTATION ONLY (OUTPATIENT)
Dept: FAMILY MEDICINE | Facility: OTHER | Age: 63
End: 2018-01-26

## 2018-01-27 VITALS
BODY MASS INDEX: 25.46 KG/M2 | RESPIRATION RATE: 20 BRPM | HEART RATE: 74 BPM | TEMPERATURE: 97.1 F | HEIGHT: 68 IN | WEIGHT: 168 LBS

## 2018-01-27 VITALS
BODY MASS INDEX: 25.31 KG/M2 | RESPIRATION RATE: 18 BRPM | DIASTOLIC BLOOD PRESSURE: 70 MMHG | TEMPERATURE: 98.1 F | HEART RATE: 70 BPM | DIASTOLIC BLOOD PRESSURE: 86 MMHG | SYSTOLIC BLOOD PRESSURE: 136 MMHG | SYSTOLIC BLOOD PRESSURE: 102 MMHG | BODY MASS INDEX: 26.1 KG/M2 | WEIGHT: 167 LBS | HEART RATE: 96 BPM | HEIGHT: 68 IN | WEIGHT: 172.2 LBS

## 2018-01-27 VITALS
BODY MASS INDEX: 25.46 KG/M2 | DIASTOLIC BLOOD PRESSURE: 82 MMHG | SYSTOLIC BLOOD PRESSURE: 132 MMHG | HEIGHT: 68 IN | WEIGHT: 168 LBS

## 2018-01-27 VITALS
RESPIRATION RATE: 18 BRPM | SYSTOLIC BLOOD PRESSURE: 120 MMHG | WEIGHT: 184.2 LBS | BODY MASS INDEX: 27.92 KG/M2 | HEART RATE: 72 BPM | DIASTOLIC BLOOD PRESSURE: 78 MMHG | TEMPERATURE: 96.8 F | HEIGHT: 68 IN

## 2018-01-27 VITALS
HEIGHT: 69 IN | SYSTOLIC BLOOD PRESSURE: 152 MMHG | BODY MASS INDEX: 25.16 KG/M2 | HEART RATE: 84 BPM | SYSTOLIC BLOOD PRESSURE: 122 MMHG | WEIGHT: 184.8 LBS | SYSTOLIC BLOOD PRESSURE: 148 MMHG | DIASTOLIC BLOOD PRESSURE: 82 MMHG | WEIGHT: 187 LBS | BODY MASS INDEX: 26.45 KG/M2 | WEIGHT: 166 LBS | TEMPERATURE: 97.1 F | BODY MASS INDEX: 27.7 KG/M2 | SYSTOLIC BLOOD PRESSURE: 132 MMHG | HEART RATE: 88 BPM | HEIGHT: 70 IN | DIASTOLIC BLOOD PRESSURE: 90 MMHG | DIASTOLIC BLOOD PRESSURE: 92 MMHG | DIASTOLIC BLOOD PRESSURE: 96 MMHG

## 2018-01-27 VITALS — OXYGEN SATURATION: 92 % | SYSTOLIC BLOOD PRESSURE: 166 MMHG | HEART RATE: 67 BPM | DIASTOLIC BLOOD PRESSURE: 104 MMHG

## 2018-01-30 ASSESSMENT — PATIENT HEALTH QUESTIONNAIRE - PHQ9
SUM OF ALL RESPONSES TO PHQ QUESTIONS 1-9: 11
SUM OF ALL RESPONSES TO PHQ QUESTIONS 1-9: 14
SUM OF ALL RESPONSES TO PHQ QUESTIONS 1-9: 13
SUM OF ALL RESPONSES TO PHQ QUESTIONS 1-9: 12
SUM OF ALL RESPONSES TO PHQ QUESTIONS 1-9: 14
SUM OF ALL RESPONSES TO PHQ QUESTIONS 1-9: 15

## 2018-04-03 ENCOUNTER — APPOINTMENT (OUTPATIENT)
Dept: GENERAL RADIOLOGY | Facility: OTHER | Age: 63
End: 2018-04-03
Attending: UROLOGY
Payer: MEDICARE

## 2018-04-03 ENCOUNTER — NURSE TRIAGE (OUTPATIENT)
Dept: FAMILY MEDICINE | Facility: OTHER | Age: 63
End: 2018-04-03

## 2018-04-03 ENCOUNTER — HOSPITAL ENCOUNTER (OUTPATIENT)
Facility: OTHER | Age: 63
Setting detail: OBSERVATION
Discharge: HOME | End: 2018-04-04
Attending: EMERGENCY MEDICINE | Admitting: INTERNAL MEDICINE
Payer: MEDICARE

## 2018-04-03 ENCOUNTER — ANESTHESIA EVENT (OUTPATIENT)
Dept: EMERGENCY MEDICINE | Facility: OTHER | Age: 63
End: 2018-04-03
Payer: MEDICARE

## 2018-04-03 ENCOUNTER — APPOINTMENT (OUTPATIENT)
Dept: CT IMAGING | Facility: OTHER | Age: 63
End: 2018-04-03
Attending: EMERGENCY MEDICINE
Payer: MEDICARE

## 2018-04-03 ENCOUNTER — TELEPHONE (OUTPATIENT)
Dept: FAMILY MEDICINE | Facility: OTHER | Age: 63
End: 2018-04-03

## 2018-04-03 ENCOUNTER — ANESTHESIA (OUTPATIENT)
Dept: EMERGENCY MEDICINE | Facility: OTHER | Age: 63
End: 2018-04-03
Payer: MEDICARE

## 2018-04-03 ENCOUNTER — SURGERY (OUTPATIENT)
Age: 63
End: 2018-04-03

## 2018-04-03 ENCOUNTER — APPOINTMENT (OUTPATIENT)
Dept: ULTRASOUND IMAGING | Facility: OTHER | Age: 63
End: 2018-04-03
Attending: EMERGENCY MEDICINE
Payer: MEDICARE

## 2018-04-03 DIAGNOSIS — N20.0 KIDNEY STONE ON LEFT SIDE: ICD-10-CM

## 2018-04-03 DIAGNOSIS — N10 ACUTE PYELONEPHRITIS: ICD-10-CM

## 2018-04-03 PROBLEM — N20.1 URETEROLITHIASIS: Status: ACTIVE | Noted: 2018-04-03

## 2018-04-03 LAB
ALBUMIN SERPL-MCNC: 3.8 G/DL (ref 3.5–5.7)
ALBUMIN UR-MCNC: ABNORMAL MG/DL
ALP SERPL-CCNC: 71 U/L (ref 34–104)
ALT SERPL W P-5'-P-CCNC: 15 U/L (ref 7–52)
ANION GAP SERPL CALCULATED.3IONS-SCNC: 10 MMOL/L (ref 3–14)
APPEARANCE UR: ABNORMAL
AST SERPL W P-5'-P-CCNC: 16 U/L (ref 13–39)
BACTERIA #/AREA URNS HPF: ABNORMAL /HPF
BASOPHILS # BLD AUTO: 0 10E9/L (ref 0–0.2)
BASOPHILS NFR BLD AUTO: 0.2 %
BILIRUB SERPL-MCNC: 0.6 MG/DL (ref 0.3–1)
BILIRUB UR QL STRIP: NEGATIVE
BUN SERPL-MCNC: 31 MG/DL (ref 7–25)
CALCIUM SERPL-MCNC: 10.3 MG/DL (ref 8.6–10.3)
CHLORIDE SERPL-SCNC: 100 MMOL/L (ref 98–107)
CO2 SERPL-SCNC: 29 MMOL/L (ref 21–31)
COLOR UR AUTO: YELLOW
CREAT SERPL-MCNC: 1.34 MG/DL (ref 0.7–1.3)
DIFFERENTIAL METHOD BLD: ABNORMAL
EOSINOPHIL # BLD AUTO: 0 10E9/L (ref 0–0.7)
EOSINOPHIL NFR BLD AUTO: 0 %
ERYTHROCYTE [DISTWIDTH] IN BLOOD BY AUTOMATED COUNT: 12.4 % (ref 10–15)
GFR SERPL CREATININE-BSD FRML MDRD: 54 ML/MIN/1.7M2
GLUCOSE SERPL-MCNC: 106 MG/DL (ref 70–105)
GLUCOSE UR STRIP-MCNC: NEGATIVE MG/DL
HCT VFR BLD AUTO: 43.5 % (ref 40–53)
HGB BLD-MCNC: 14.8 G/DL (ref 13.3–17.7)
HGB UR QL STRIP: ABNORMAL
IMM GRANULOCYTES # BLD: 0.1 10E9/L (ref 0–0.4)
IMM GRANULOCYTES NFR BLD: 0.6 %
KETONES UR STRIP-MCNC: ABNORMAL MG/DL
LACTATE SERPL-SCNC: 1.5 MMOL/L (ref 0.5–2.2)
LEUKOCYTE ESTERASE UR QL STRIP: ABNORMAL
LIPASE SERPL-CCNC: 7 U/L (ref 11–82)
LYMPHOCYTES # BLD AUTO: 0.7 10E9/L (ref 0.8–5.3)
LYMPHOCYTES NFR BLD AUTO: 3.9 %
MCH RBC QN AUTO: 28.8 PG (ref 26.5–33)
MCHC RBC AUTO-ENTMCNC: 34 G/DL (ref 31.5–36.5)
MCV RBC AUTO: 85 FL (ref 78–100)
MONOCYTES # BLD AUTO: 0.8 10E9/L (ref 0–1.3)
MONOCYTES NFR BLD AUTO: 4.7 %
NEUTROPHILS # BLD AUTO: 15.9 10E9/L (ref 1.6–8.3)
NEUTROPHILS NFR BLD AUTO: 90.6 %
NITRATE UR QL: NEGATIVE
NON-SQ EPI CELLS #/AREA URNS LPF: ABNORMAL /LPF
PH UR STRIP: 7.5 PH (ref 5–7)
PLATELET # BLD AUTO: 305 10E9/L (ref 150–450)
POTASSIUM SERPL-SCNC: 4.3 MMOL/L (ref 3.5–5.1)
PROT SERPL-MCNC: 6.9 G/DL (ref 6.4–8.9)
RBC # BLD AUTO: 5.14 10E12/L (ref 4.4–5.9)
RBC #/AREA URNS AUTO: >100 /HPF
SODIUM SERPL-SCNC: 139 MMOL/L (ref 134–144)
SOURCE: ABNORMAL
SP GR UR STRIP: 1.01 (ref 1–1.03)
UROBILINOGEN UR STRIP-ACNC: 0.2 EU/DL (ref 0.2–1)
WBC # BLD AUTO: 17.6 10E9/L (ref 4–11)
WBC #/AREA URNS AUTO: ABNORMAL /HPF

## 2018-04-03 PROCEDURE — 25000128 H RX IP 250 OP 636: Performed by: UROLOGY

## 2018-04-03 PROCEDURE — 85025 COMPLETE CBC W/AUTO DIFF WBC: CPT | Performed by: EMERGENCY MEDICINE

## 2018-04-03 PROCEDURE — 83605 ASSAY OF LACTIC ACID: CPT | Performed by: EMERGENCY MEDICINE

## 2018-04-03 PROCEDURE — G0378 HOSPITAL OBSERVATION PER HR: HCPCS

## 2018-04-03 PROCEDURE — 36000058 ZZH SURGERY LEVEL 3 EA 15 ADDTL MIN: Performed by: UROLOGY

## 2018-04-03 PROCEDURE — 99285 EMERGENCY DEPT VISIT HI MDM: CPT | Performed by: EMERGENCY MEDICINE

## 2018-04-03 PROCEDURE — 74420 UROGRAPHY RTRGR +-KUB: CPT | Mod: 26 | Performed by: UROLOGY

## 2018-04-03 PROCEDURE — 81001 URINALYSIS AUTO W/SCOPE: CPT | Performed by: EMERGENCY MEDICINE

## 2018-04-03 PROCEDURE — 52332 CYSTOSCOPY AND TREATMENT: CPT | Performed by: UROLOGY

## 2018-04-03 PROCEDURE — 71000014 ZZH RECOVERY PHASE 1 LEVEL 2 FIRST HR: Performed by: UROLOGY

## 2018-04-03 PROCEDURE — 83690 ASSAY OF LIPASE: CPT | Performed by: EMERGENCY MEDICINE

## 2018-04-03 PROCEDURE — 25000132 ZZH RX MED GY IP 250 OP 250 PS 637: Mod: GY | Performed by: INTERNAL MEDICINE

## 2018-04-03 PROCEDURE — 36000060 ZZH SURGERY LEVEL 3 W FLUORO 1ST 30 MIN: Performed by: UROLOGY

## 2018-04-03 PROCEDURE — 76870 US EXAM SCROTUM: CPT

## 2018-04-03 PROCEDURE — 25000131 ZZH RX MED GY IP 250 OP 636 PS 637: Mod: GY | Performed by: INTERNAL MEDICINE

## 2018-04-03 PROCEDURE — 36415 COLL VENOUS BLD VENIPUNCTURE: CPT | Performed by: EMERGENCY MEDICINE

## 2018-04-03 PROCEDURE — 25000128 H RX IP 250 OP 636: Performed by: NURSE ANESTHETIST, CERTIFIED REGISTERED

## 2018-04-03 PROCEDURE — C1769 GUIDE WIRE: HCPCS | Performed by: UROLOGY

## 2018-04-03 PROCEDURE — 25000125 ZZHC RX 250: Performed by: NURSE ANESTHETIST, CERTIFIED REGISTERED

## 2018-04-03 PROCEDURE — 87040 BLOOD CULTURE FOR BACTERIA: CPT | Mod: 91 | Performed by: EMERGENCY MEDICINE

## 2018-04-03 PROCEDURE — 99219 ZZC INITIAL OBSERVATION CARE,LEVL II: CPT | Mod: 25 | Performed by: INTERNAL MEDICINE

## 2018-04-03 PROCEDURE — 25000128 H RX IP 250 OP 636: Performed by: EMERGENCY MEDICINE

## 2018-04-03 PROCEDURE — 80053 COMPREHEN METABOLIC PANEL: CPT | Performed by: EMERGENCY MEDICINE

## 2018-04-03 PROCEDURE — 25000125 ZZHC RX 250: Mod: GY

## 2018-04-03 PROCEDURE — C2617 STENT, NON-COR, TEM W/O DEL: HCPCS | Performed by: UROLOGY

## 2018-04-03 PROCEDURE — 74177 CT ABD & PELVIS W/CONTRAST: CPT

## 2018-04-03 PROCEDURE — A9270 NON-COVERED ITEM OR SERVICE: HCPCS | Mod: GY | Performed by: INTERNAL MEDICINE

## 2018-04-03 PROCEDURE — 99214 OFFICE O/P EST MOD 30 MIN: CPT | Mod: 25 | Performed by: UROLOGY

## 2018-04-03 PROCEDURE — 25000132 ZZH RX MED GY IP 250 OP 250 PS 637: Mod: GY | Performed by: EMERGENCY MEDICINE

## 2018-04-03 PROCEDURE — A9270 NON-COVERED ITEM OR SERVICE: HCPCS | Mod: GY | Performed by: EMERGENCY MEDICINE

## 2018-04-03 PROCEDURE — 27210794 ZZH OR GENERAL SUPPLY STERILE: Performed by: UROLOGY

## 2018-04-03 PROCEDURE — 52332 CYSTOSCOPY AND TREATMENT: CPT | Performed by: NURSE ANESTHETIST, CERTIFIED REGISTERED

## 2018-04-03 PROCEDURE — 87086 URINE CULTURE/COLONY COUNT: CPT | Performed by: EMERGENCY MEDICINE

## 2018-04-03 DEVICE — IMPLANTABLE DEVICE
Type: IMPLANTABLE DEVICE | Site: URETER | Status: NON-FUNCTIONAL
Removed: 2018-04-10

## 2018-04-03 RX ORDER — ONDANSETRON 2 MG/ML
4 INJECTION INTRAMUSCULAR; INTRAVENOUS EVERY 6 HOURS PRN
Status: DISCONTINUED | OUTPATIENT
Start: 2018-04-03 | End: 2018-04-04 | Stop reason: HOSPADM

## 2018-04-03 RX ORDER — MECLIZINE HCL 12.5 MG 12.5 MG/1
25 TABLET ORAL 2 TIMES DAILY
Status: DISCONTINUED | OUTPATIENT
Start: 2018-04-03 | End: 2018-04-04 | Stop reason: HOSPADM

## 2018-04-03 RX ORDER — LIDOCAINE HYDROCHLORIDE 20 MG/ML
INJECTION, SOLUTION INFILTRATION; PERINEURAL PRN
Status: DISCONTINUED | OUTPATIENT
Start: 2018-04-03 | End: 2018-04-03

## 2018-04-03 RX ORDER — PROPOFOL 10 MG/ML
INJECTION, EMULSION INTRAVENOUS CONTINUOUS PRN
Status: DISCONTINUED | OUTPATIENT
Start: 2018-04-03 | End: 2018-04-03

## 2018-04-03 RX ORDER — NALOXONE HYDROCHLORIDE 0.4 MG/ML
.1-.4 INJECTION, SOLUTION INTRAMUSCULAR; INTRAVENOUS; SUBCUTANEOUS
Status: CANCELLED | OUTPATIENT
Start: 2018-04-03 | End: 2018-04-04

## 2018-04-03 RX ORDER — MORPHINE SULFATE 2 MG/ML
1-2 INJECTION, SOLUTION INTRAMUSCULAR; INTRAVENOUS EVERY 4 HOURS PRN
Status: DISCONTINUED | OUTPATIENT
Start: 2018-04-03 | End: 2018-04-04 | Stop reason: HOSPADM

## 2018-04-03 RX ORDER — ACETAMINOPHEN 650 MG/1
650 SUPPOSITORY RECTAL EVERY 4 HOURS PRN
Status: DISCONTINUED | OUTPATIENT
Start: 2018-04-03 | End: 2018-04-04 | Stop reason: HOSPADM

## 2018-04-03 RX ORDER — EPHEDRINE SULFATE 50 MG/ML
INJECTION, SOLUTION INTRAMUSCULAR; INTRAVENOUS; SUBCUTANEOUS PRN
Status: DISCONTINUED | OUTPATIENT
Start: 2018-04-03 | End: 2018-04-03

## 2018-04-03 RX ORDER — NALOXONE HYDROCHLORIDE 0.4 MG/ML
.1-.4 INJECTION, SOLUTION INTRAMUSCULAR; INTRAVENOUS; SUBCUTANEOUS
Status: DISCONTINUED | OUTPATIENT
Start: 2018-04-03 | End: 2018-04-03

## 2018-04-03 RX ORDER — ACETAMINOPHEN 325 MG/1
650 TABLET ORAL EVERY 4 HOURS PRN
Status: DISCONTINUED | OUTPATIENT
Start: 2018-04-03 | End: 2018-04-04 | Stop reason: HOSPADM

## 2018-04-03 RX ORDER — FENTANYL CITRATE 50 UG/ML
INJECTION, SOLUTION INTRAMUSCULAR; INTRAVENOUS PRN
Status: DISCONTINUED | OUTPATIENT
Start: 2018-04-03 | End: 2018-04-03

## 2018-04-03 RX ORDER — TAMSULOSIN HYDROCHLORIDE 0.4 MG/1
0.8 CAPSULE ORAL DAILY
COMMUNITY
End: 2018-12-06

## 2018-04-03 RX ORDER — NALOXONE HYDROCHLORIDE 0.4 MG/ML
.1-.4 INJECTION, SOLUTION INTRAMUSCULAR; INTRAVENOUS; SUBCUTANEOUS
Status: DISCONTINUED | OUTPATIENT
Start: 2018-04-03 | End: 2018-04-04 | Stop reason: HOSPADM

## 2018-04-03 RX ORDER — PROPOFOL 10 MG/ML
INJECTION, EMULSION INTRAVENOUS PRN
Status: DISCONTINUED | OUTPATIENT
Start: 2018-04-03 | End: 2018-04-03

## 2018-04-03 RX ORDER — DEXAMETHASONE SODIUM PHOSPHATE 4 MG/ML
4 INJECTION, SOLUTION INTRA-ARTICULAR; INTRALESIONAL; INTRAMUSCULAR; INTRAVENOUS; SOFT TISSUE
Status: DISCONTINUED | OUTPATIENT
Start: 2018-04-03 | End: 2018-04-03 | Stop reason: HOSPADM

## 2018-04-03 RX ORDER — FENTANYL CITRATE 50 UG/ML
25-50 INJECTION, SOLUTION INTRAMUSCULAR; INTRAVENOUS
Status: DISCONTINUED | OUTPATIENT
Start: 2018-04-03 | End: 2018-04-03 | Stop reason: HOSPADM

## 2018-04-03 RX ORDER — IODIXANOL 320 MG/ML
100 INJECTION, SOLUTION INTRAVASCULAR ONCE
Status: COMPLETED | OUTPATIENT
Start: 2018-04-03 | End: 2018-04-03

## 2018-04-03 RX ORDER — MECLIZINE HCL 25MG 25 MG/1
25 TABLET, CHEWABLE ORAL PRN
COMMUNITY

## 2018-04-03 RX ORDER — ONDANSETRON 2 MG/ML
INJECTION INTRAMUSCULAR; INTRAVENOUS PRN
Status: DISCONTINUED | OUTPATIENT
Start: 2018-04-03 | End: 2018-04-03

## 2018-04-03 RX ORDER — ONDANSETRON 4 MG/1
4 TABLET, ORALLY DISINTEGRATING ORAL EVERY 30 MIN PRN
Status: DISCONTINUED | OUTPATIENT
Start: 2018-04-03 | End: 2018-04-03 | Stop reason: HOSPADM

## 2018-04-03 RX ORDER — BACLOFEN 10 MG/1
10 TABLET ORAL 2 TIMES DAILY
Status: DISCONTINUED | OUTPATIENT
Start: 2018-04-03 | End: 2018-04-04 | Stop reason: HOSPADM

## 2018-04-03 RX ORDER — IBUPROFEN 600 MG/1
600 TABLET, FILM COATED ORAL EVERY 6 HOURS PRN
Status: DISCONTINUED | OUTPATIENT
Start: 2018-04-03 | End: 2018-04-04 | Stop reason: HOSPADM

## 2018-04-03 RX ORDER — IRON POLYSACCHARIDE COMPLEX 150 MG
150 CAPSULE ORAL EVERY MORNING
COMMUNITY
End: 2018-12-06

## 2018-04-03 RX ORDER — CEFTRIAXONE SODIUM 1 G/50ML
1 INJECTION, SOLUTION INTRAVENOUS ONCE
Status: COMPLETED | OUTPATIENT
Start: 2018-04-03 | End: 2018-04-03

## 2018-04-03 RX ORDER — SODIUM CHLORIDE, SODIUM LACTATE, POTASSIUM CHLORIDE, CALCIUM CHLORIDE 600; 310; 30; 20 MG/100ML; MG/100ML; MG/100ML; MG/100ML
INJECTION, SOLUTION INTRAVENOUS CONTINUOUS
Status: DISCONTINUED | OUTPATIENT
Start: 2018-04-03 | End: 2018-04-03 | Stop reason: HOSPADM

## 2018-04-03 RX ORDER — ONDANSETRON 2 MG/ML
4 INJECTION INTRAMUSCULAR; INTRAVENOUS EVERY 30 MIN PRN
Status: DISCONTINUED | OUTPATIENT
Start: 2018-04-03 | End: 2018-04-03 | Stop reason: HOSPADM

## 2018-04-03 RX ORDER — SULFAMETHOXAZOLE/TRIMETHOPRIM 800-160 MG
1 TABLET ORAL 2 TIMES DAILY
Status: DISCONTINUED | OUTPATIENT
Start: 2018-04-03 | End: 2018-04-04 | Stop reason: HOSPADM

## 2018-04-03 RX ORDER — ONDANSETRON 4 MG/1
4 TABLET, ORALLY DISINTEGRATING ORAL EVERY 6 HOURS PRN
Status: DISCONTINUED | OUTPATIENT
Start: 2018-04-03 | End: 2018-04-04 | Stop reason: HOSPADM

## 2018-04-03 RX ORDER — SODIUM CHLORIDE, SODIUM LACTATE, POTASSIUM CHLORIDE, CALCIUM CHLORIDE 600; 310; 30; 20 MG/100ML; MG/100ML; MG/100ML; MG/100ML
INJECTION, SOLUTION INTRAVENOUS CONTINUOUS PRN
Status: DISCONTINUED | OUTPATIENT
Start: 2018-04-03 | End: 2018-04-03

## 2018-04-03 RX ADMIN — IOHEXOL 4 ML: 300 INJECTION, SOLUTION INTRAVENOUS at 18:53

## 2018-04-03 RX ADMIN — PHENYLEPHRINE HYDROCHLORIDE 100 MCG: 10 INJECTION INTRAVENOUS at 18:42

## 2018-04-03 RX ADMIN — CEFTRIAXONE SODIUM 1 G: 1 INJECTION, SOLUTION INTRAVENOUS at 17:24

## 2018-04-03 RX ADMIN — IODIXANOL 100 ML: 320 INJECTION, SOLUTION INTRAVASCULAR at 16:34

## 2018-04-03 RX ADMIN — PHENYLEPHRINE HYDROCHLORIDE 200 MCG: 10 INJECTION INTRAVENOUS at 18:41

## 2018-04-03 RX ADMIN — LIDOCAINE HYDROCHLORIDE 40 MG: 20 INJECTION, SOLUTION INFILTRATION; PERINEURAL at 18:37

## 2018-04-03 RX ADMIN — MECLIZINE HCL 25 MG: 12.5 TABLET ORAL at 22:00

## 2018-04-03 RX ADMIN — PHENYLEPHRINE HYDROCHLORIDE 200 MCG: 10 INJECTION INTRAVENOUS at 18:48

## 2018-04-03 RX ADMIN — SULFAMETHOXAZOLE AND TRIMETHOPRIM 1 TABLET: 800; 160 TABLET ORAL at 22:01

## 2018-04-03 RX ADMIN — BACLOFEN 10 MG: 10 TABLET ORAL at 22:00

## 2018-04-03 RX ADMIN — IBUPROFEN 600 MG: 400 TABLET ORAL at 15:25

## 2018-04-03 RX ADMIN — PROPOFOL 140 MCG/KG/MIN: 10 INJECTION, EMULSION INTRAVENOUS at 18:37

## 2018-04-03 RX ADMIN — PROPOFOL 120 MG: 10 INJECTION, EMULSION INTRAVENOUS at 18:37

## 2018-04-03 RX ADMIN — SODIUM CHLORIDE, SODIUM LACTATE, POTASSIUM CHLORIDE, AND CALCIUM CHLORIDE: 600; 310; 30; 20 INJECTION, SOLUTION INTRAVENOUS at 19:15

## 2018-04-03 RX ADMIN — FENTANYL CITRATE 25 MCG: 50 INJECTION, SOLUTION INTRAMUSCULAR; INTRAVENOUS at 18:35

## 2018-04-03 RX ADMIN — ONDANSETRON 4 MG: 2 INJECTION INTRAMUSCULAR; INTRAVENOUS at 18:38

## 2018-04-03 RX ADMIN — MIDAZOLAM 2 MG: 1 INJECTION INTRAMUSCULAR; INTRAVENOUS at 18:36

## 2018-04-03 RX ADMIN — IBUPROFEN 600 MG: 600 TABLET, FILM COATED ORAL at 22:01

## 2018-04-03 RX ADMIN — EPHEDRINE SULFATE 15 MG: 50 INJECTION, SOLUTION INTRAMUSCULAR; INTRAVENOUS; SUBCUTANEOUS at 18:51

## 2018-04-03 RX ADMIN — SODIUM CHLORIDE, POTASSIUM CHLORIDE, SODIUM LACTATE AND CALCIUM CHLORIDE: 600; 310; 30; 20 INJECTION, SOLUTION INTRAVENOUS at 18:31

## 2018-04-03 RX ADMIN — SODIUM CHLORIDE 1000 ML: 900 INJECTION, SOLUTION INTRAVENOUS at 16:45

## 2018-04-03 ASSESSMENT — ACTIVITIES OF DAILY LIVING (ADL)
RETIRED_COMMUNICATION: 0-->UNDERSTANDS/COMMUNICATES WITHOUT DIFFICULTY
AMBULATION: 0-->INDEPENDENT
TRANSFERRING: 0-->INDEPENDENT
DRESS: 1 - ASSISTIVE EQUIPMENT
FALL_HISTORY_WITHIN_LAST_SIX_MONTHS: YES
BATHING: 2 - ASSISTIVE PERSON
EATING: 0 - INDEPENDENT
CHANGE_IN_FUNCTIONAL_STATUS_SINCE_ONSET_OF_CURRENT_ILLNESS/INJURY: NO
COMMUNICATION: 0 - UNDERSTANDS/COMMUNICATES WITHOUT DIFFICULTY
SWALLOWING: 0-->SWALLOWS FOODS/LIQUIDS WITHOUT DIFFICULTY
AMBULATION: 1 - ASSISTIVE EQUIPMENT
RETIRED_EATING: 0-->INDEPENDENT
TRANSFERRING: 1 - ASSISTIVE EQUIPMENT
TOILETING: 1 - ASSISTIVE EQUIPMENT
BATHING: 1-->ASSISTIVE EQUIPMENT
TOILETING: 1-->ASSISTIVE EQUIPMENT
SWALLOWING: 0 - SWALLOWS FOODS/LIQUIDS WITHOUT DIFFICULTY
NUMBER_OF_TIMES_PATIENT_HAS_FALLEN_WITHIN_LAST_SIX_MONTHS: 3
DRESS: 0-->INDEPENDENT
COGNITION: 0 - NO COGNITION ISSUES REPORTED
WHICH_OF_THE_ABOVE_FUNCTIONAL_RISKS_HAD_A_RECENT_ONSET_OR_CHANGE?: EATING

## 2018-04-03 NOTE — ED NOTES
"Pt reports abdominal pain in LLQ that radiates into his back on the same side.  Pain is constant, worse at times, it \"breaks loose and shoots down my leg\" then the cycle starts all over again.  Pt has MS, walked his dog outside yesterday in the snow and might have overdone it.  Pt self caths, has hx of UTI's & would like to be tested for it.  UTI's make his MS worse.  Pt also reports left testicular pain last NOC which went away this AM.  Similar sx happened 3 weeks ago, lasted 48 hours then went away.  He's concerned about what's going on since this is the 2nd time in 3 weeks that he's had these sx.  Pt reports feeling weak & shaky and needs to use his walker today (yesterday when he walked the dogs he was stronger and didn't need to use a cane or walker)   "

## 2018-04-03 NOTE — TELEPHONE ENCOUNTER
Patient called today stating he is experiencing abdominal pain and back pain. Along with left leg spasms. He would like to get in today if possible to see .     Attempted to return call to Patient. Left message on machine to call back.    Shira Laboy RN .............. 4/3/2018  12:04 PM

## 2018-04-03 NOTE — TELEPHONE ENCOUNTER
Abd, back, gut balls up, pop loose, shoots electric shock down leg, pain constant just gets worse  Can't sleep, current pain 2 shoots up to a 7, hasn't taken anything for pain  Tolerates pain well

## 2018-04-03 NOTE — IP AVS SNAPSHOT
[facility]    [address]    [place] [address]    Phone:  [phone]    Fax:  [phone]                                       After Visit Summary   4/3/2018    Jamir Gill    MRN: [id number]           After Visit Summary Signature Page     I have received my discharge instructions, and my questions have been answered. I have discussed any challenges I see with this plan with the nurse or doctor.    ..........................................................................................................................................  Patient/Patient Representative Signature      ..........................................................................................................................................  Patient Representative Print Name and Relationship to Patient    ..................................................               ................................................  Date                                            Time    ..........................................................................................................................................  Reviewed by Signature/Title    ...................................................              ..............................................  Date                                                            Time

## 2018-04-03 NOTE — ANESTHESIA PREPROCEDURE EVALUATION
Anesthesia Evaluation     .             ROS/MED HX    ENT/Pulmonary:  - neg pulmonary ROS     Neurologic:     (+)Multiple Sclerosis     Cardiovascular:     (+) hypertension----. : . . . :. .       METS/Exercise Tolerance:     Hematologic:  - neg hematologic  ROS       Musculoskeletal:   (+) , , other musculoskeletal- spondylosis of cervical region      GI/Hepatic:  - neg GI/hepatic ROS       Renal/Genitourinary:     (+) Nephrolithiasis ,       Endo:  - neg endo ROS       Psychiatric:  - neg psychiatric ROS       Infectious Disease:  - neg infectious disease ROS       Malignancy:      - no malignancy   Other:    (+) No chance of pregnancy C-spine cleared: N/A, no H/O Chronic Pain,no other significant disability                    Physical Exam  Normal systems: cardiovascular, pulmonary and dental    Airway   Mallampati: II  TM distance: <3 FB  Neck ROM: full  Comment: Recessed chin    Dental     Cardiovascular   Rhythm and rate: regular and normal      Pulmonary    breath sounds clear to auscultation                    Anesthesia Plan      History & Physical Review      ASA Status:  2 .    NPO Status:  > 8 hours    Plan for MAC with Intravenous and Propofol induction.          Postoperative Care      Consents  Anesthetic plan, risks, benefits and alternatives discussed with:  Patient..                          .

## 2018-04-03 NOTE — TELEPHONE ENCOUNTER
Please refer to triage encounter #[id number].    Shira Laboy RN .............. 4/3/2018  12:06 PM

## 2018-04-03 NOTE — OP NOTE
Preoperative diagnosis  Left hydronephrosis, obstructing ureteral stone and UTI    Postoperative diagnosis  Left hydronephrosis, obstructing ureteral stone and UTI    Procedure performed  Cystoscopy with left retrograde pyelogram and ureteral stent placement, 6 x 28  Interpretation of retrograde    Surgeon  Olu Saldivar MD    Surgeon(s)/Proceduralist(s) and Assistants (if any)  Surgeon(s):  Olu Saldivar MD  Circulator: Kayla Koehler RN  Scrub Person: Richy Brower  X-Ray Technologist: Laura Larkin  2nd Scrub: Susannah Regan    Specimen(s)  Yes - urine culture    (EBL) Estimated blood loss (ml)  0    Anesthesia  General    Complications  None    Findings  Cystoscopy revealed no tumors, stones or other mucosal abnormalities.  Left retrograde pyelogram revealed a partially duplicated dilated system with no filling defects.    Indications  62 year old male agreed to undergo the above named procedure after discussion of the alternatives, risks and benefits.  Informed consent was obtained.      Procedure  The patient was taken to the operating room and placed supine on the operating table.  Pre-operative antibiotics were administered.  Bilateral lower extremity SCDs were placed.  After induction of general anesthesia the patient was positioned in dorsal lithotomy, prepped and draped in a sterile fashion.  A time-out was performed.      I passed a [id number] Trinidadian flexible cystoscope via urethra into the bladder.  A Sensor wire was passed retrograde through the left ureteral orifice.  A 5-Trinidadian open-ended catheter was passed over the wire and urine collected for urine culture.  A retrograde pyelogram was performed by slowly injecting 3mL of Omnipaque contrast via the 5 Trinidadian catheter with findings described above. A superstiff wire was then placed into the upper pole and a 6 x 28 Trinidadian ureteral stent was placed in retrograde fashion under fluoroscopic guidance with good coil confirmed to be in the upper  pole of the kidney and in the bladder. The patient tolerated the procedure well.    Plan  Admit overnight and discharge when afebrile overnight

## 2018-04-03 NOTE — ED PROVIDER NOTES
Patient:  Jamir Gill  MRN: [id number] : 1955  Date of Service:  4/3/18      Subjective:    HPI:  Jamir Gill is a 62 year old male w/ PMH pertinent for MS presenting to the ED with cc of left abdominal and back pain. States he has had pain for last 3 days. Reports it feels like a spasm that worsens to the point that his left leg kicks off the bed. Pain is located in the entire left side of the abdomen and back. Denies any nausea or vomiting. No fever. He straight caths for urine due to his MS. Denies any blood in urine or feeling of burning but he is concerned for UTI. He denies cough. No chest pain or SOB.  His MS is well-controlled.        ROS:  A 10 point review of systems was conducted; pertinent positives and negatives as per HPI.    PMH:   Past Medical History:   Diagnosis Date     Cervical disc disorder     No Comments Provided     Enlarged prostate with lower urinary tract symptoms (LUTS)     No Comments Provided     Essential (primary) hypertension     No Comments Provided     Multiple sclerosis (H)     No Comments Provided     Other specified disorders of bone density and structure, unspecified site (CODE)     No Comments Provided     Other symptoms and signs involving cognitive functions and awareness     No Comments Provided     Spondylosis of cervical region without myelopathy or radiculopathy     No Comments Provided       PSH:   Past Surgical History:   Procedure Laterality Date     APPENDECTOMY OPEN      appendectomy     COLONOSCOPY      ,with polyps resected     COLONOSCOPY      ,[place]- colitis     FRACTURE SURGERY      ORIF left wrist fracture~Laser prostate surgery--BPH~Colonoscopy  with polyps resected     OTHER SURGICAL HISTORY      ,PROSTATECTOMY,Laser prostate surgery -- BPH       SOC:   Social History   Substance Use Topics     Smoking status: Never Smoker     Smokeless tobacco: Never Used     Alcohol use No        FAM:   Family History   Problem Relation  "Age of Onset     Prostate Cancer Father      Cancer-prostate     Other - See Comments Father      Alzheimer's/kidney failure     Other - See Comments Mother       with Parkinson's     HEART DISEASE Mother      Heart Disease,CHF for carditis     Family History Negative Brother      Good Health     HEART DISEASE Brother      Heart Disease,ASCAD with MI       ALL:  No Known Allergies    Meds:   No current facility-administered medications for this encounter.      Current Outpatient Prescriptions   Medication     tamsulosin (FLOMAX) 0.4 MG capsule     meclizine 25 MG CHEW     ASPIRIN ADULT LOW STRENGTH PO     VITAMIN D, CHOLECALCIFEROL, PO     calcium acetate (PHOSLO) 667 MG CAPS capsule     BACLOFEN PO     iron polysaccharides (NIFEREX) 150 MG capsule     biotin 2.5 mg/mL SUSP     NONFORMULARY     NONFORMULARY     Docusate Sodium (COLACE PO)     psyllium 0.52 G capsule           Objective:  VS:   /53  Temp 99.3  F (37.4  C) (Tympanic)  Resp 16  Ht 1.778 m (5' 10\")  Wt 71.7 kg (158 lb)  SpO2 95%  BMI 22.67 kg/m2       Physical Exam:     Gen:  Alert, nontoxic, NAD.     HEENT:  Normocephalic    Neck:  Trachea midline, supple   Lungs:  CTAB, no obvious w/c/r.    Cardio:  Regular, s1/s2, no obvious m/r/g   Abd:  Soft, nontender, spleen non-palpable.  No hepatomegaly.  No CVA tenderness. LLQ tenderness.             Back: Left lower paraspinal tenderness to palpation   Ext:  No peripheral edema.     Neuro:  A&Ox4, CN II-XII grossly intact.     MSK: no obvious areas of cellulitis.     Skin: warm, dry, no obvious rash.      Orders Place This Encounter:     Orders Placed This Encounter   Procedures     CT Abdomen Pelvis w Contrast     US Testicular & Scrotum w Doppler Ltd     XR Surgery PARTHA L/T 5 Min Fluoro     CBC with platelets differential     Comprehensive metabolic panel     Lactic acid     *UA reflex to Microscopic     Lipase     Urine Microscopic     Basic metabolic panel     CBC with platelets     " Observation goals     Nursing observations and interventions     Notify Provider -Pain Management     Measure height and weight     Vital signs     Notify Physician to consider change to inpatient status IF     Notify Physician to consider change to inpatient status IF     Full Code     [place] to Observation     ED Bed Request     Labs Ordered and Resulted from Time of ED Arrival Up to the Time of Departure from the ED   CBC WITH PLATELETS DIFFERENTIAL - Abnormal; Notable for the following:        Result Value    WBC 17.6 (*)     Absolute Neutrophil 15.9 (*)     Absolute Lymphocytes 0.7 (*)     All other components within normal limits   COMPREHENSIVE METABOLIC PANEL - Abnormal; Notable for the following:     Glucose 106 (*)     Urea Nitrogen 31 (*)     Creatinine 1.34 (*)     GFR Estimate 54 (*)     All other components within normal limits   UA MACROSCOPIC WITH REFLEX TO MICRO - Abnormal; Notable for the following:     Ketones Urine Trace (*)     Blood Urine Large (*)     pH Urine 7.5 (*)     Protein Albumin Urine Trace (*)     Leukocyte Esterase Urine Trace (*)     All other components within normal limits   LIPASE - Abnormal; Notable for the following:     Lipase 7 (*)     All other components within normal limits   URINE MICROSCOPIC - Abnormal; Notable for the following:     WBC Urine 10-25 (*)     RBC Urine >100 (*)     Bacteria Urine Few (*)     All other components within normal limits   LACTIC ACID   BLOOD CULTURE   BLOOD CULTURE     XR Surgery PARTHA L/T 5 Min Fluoro   Final Result      CT Abdomen Pelvis w Contrast   Final Result   IMPRESSION:  Moderate left hydronephrosis secondary to a 6 mm calculus   in the distal left ureter. Partially duplicated left ureter.   Otherwise, no acute findings.      JOANN THORNTON MD      US Testicular & Scrotum w Doppler Ltd   Final Result   IMPRESSION:    1. Normal testicles.   2. Questionable inguinal hernia containing loops of bowel.      JOANN THORNTON MD               RN & Ancillary Notes:   I have reviewed nursing & ancillary notes, and agree with protocol as initiated.        Medical Decision Makin year old male presenting to ED with left flank and abdominal pain. Hx of MS.   Patient has had spasm pain for past three days. No n/v/d or fever.  Arrives nontoxic and well-appearing.  Exam with left flank pain and abdominal pain.  Vitals on arrival with some mild hypotension and tachycardia.  Diff dx included abscess, PNA, UTI, pyelo, kidney stone, SBO, volvulus, appendicitis, biliary pathology, pancreatitis, testicular torsion.  IV access was established.   Labs drawn. CT abdomen/pelvis obtained as well as testicular ultrasound to rule out torsion.  Labs returned significant for an elevated WBC to 17. His UA shows evidence of possible infection although difficult to interpret due to his chronic straight cathing.  CT scan returned showing 6 mm renal calculus with hydronephrosis and perinephric stranding.  These findings are concerning for infected kidney stone.  Dr. Saldivar with urology was consulted who examined patient and will take to OR tonight to place stent.  The patient was given 1L of IV fluids with improvement in blood pressure and resolution of tachycardia.  He was given 1 gram of ceftriaxone for kidney infection.  He continued to be vitally stable in ED.  Admitted to Dr. Hill with medicine with urology Dr. Saldivar consulting. Transported to OR in stable condition.     Final Clinical Impression:  Kidney Stone  UTI      Nae Cummings MD 4/3/2018  2:13 PM  Emergency Medicine Physician        Nae Cummings MD  [phone]

## 2018-04-03 NOTE — CONSULTS
I was asked to see this patient by Dr Cummings and provide my opinion about the following:  Ureteral stone with UTI    Type of Visit  Consult    Chief Complaint  Ureteral stone with UTI    HPI  Mr. Gill is a 62 year old male w/h/o MS who presents with symptoms of UTI.   Patient underwent CT scan revealing an obstructing left  ureteral stone  The patient initially presented to the ED earlier today.  Patient denies fevers and chills.  Denies nausea, vomiting.  The patient has not undergone surgery in the past for stones.  He does self cath 3 times daily.    Pain ROS  Location:  Left flank  Quality:    Sharp and Dull  Provocative factors:  Nothing makes it worse  Palliative factors:   Narcotics make it better  Radiation:   None  Severity:   2/10 currently and 8/10 at its worst  Time:     Pain started 2 days ago      Past Medical History  He  has a past medical history of Cervical disc disorder; Enlarged prostate with lower urinary tract symptoms (LUTS); Essential (primary) hypertension; Multiple sclerosis (H); Other specified disorders of bone density and structure, unspecified site (CODE); Other symptoms and signs involving cognitive functions and awareness; and Spondylosis of cervical region without myelopathy or radiculopathy.  There is no problem list on file for this patient.      Past Surgical History  He  has a past surgical history that includes Appendectomy open; fracture surgery; other surgical history; Colonoscopy; and Colonoscopy.    Medications    Current Facility-Administered Medications:      iohexol (OMNIPAQUE) 240 mg/mL solution 50 mL, 50 mL, Oral, Once, Nae Cummings MD     0.9% sodium chloride BOLUS, 1,000 mL, Intravenous, Once, Nae Cummings MD, Last Rate: 1,000 mL/hr at 04/03/18 1645, 1,000 mL at 04/03/18 1645     cefTRIAXone in d5w (ROCEPHIN) intermittent infusion 1 g, 1 g, Intravenous, Once, Nae Cummings MD, 1 g at 04/03/18 1724    Current Outpatient Prescriptions:       tamsulosin (FLOMAX) 0.4 MG capsule, Take 0.8 mg by mouth daily, Disp: , Rfl:      meclizine 25 MG CHEW, Take 25 mg by mouth 2 times daily, Disp: , Rfl:      ASPIRIN ADULT LOW STRENGTH PO, Take 81 mg by mouth daily, Disp: , Rfl:      VITAMIN D, CHOLECALCIFEROL, PO, Take 1,400 Units by mouth 2 times daily, Disp: , Rfl:      calcium acetate (PHOSLO) 667 MG CAPS capsule, Take 1,200 mg by mouth 2 times daily, Disp: , Rfl:      BACLOFEN PO, Take 10 mg by mouth 2 times daily 10 mg QAM, 20 mg QHS, Disp: , Rfl:      iron polysaccharides (NIFEREX) 150 MG capsule, Take 150 mg by mouth every morning, Disp: , Rfl:      biotin 2.5 mg/mL SUSP, Take 300 mg by mouth daily, Disp: , Rfl:      NONFORMULARY, 500 mg by Oral or Feeding Tube route daily Cranberry, Disp: , Rfl:      NONFORMULARY, daily Probiotics, Disp: , Rfl:      Docusate Sodium (COLACE PO), Take 100 mg by mouth daily, Disp: , Rfl:      psyllium 0.52 G capsule, Take 3 capsules by mouth 2 times daily, Disp: , Rfl:     Allergies  No Known Allergies    Social History  He  reports that he has never smoked. He has never used smokeless tobacco. He reports that he does not drink alcohol or use illicit drugs.  No drug abuse.    Family History  Family History   Problem Relation Age of Onset     Prostate Cancer Father      Cancer-prostate     Other - See Comments Father      Alzheimer's/kidney failure     Other - See Comments Mother       with Parkinson's     HEART DISEASE Mother      Heart Disease,CHF for carditis     Family History Negative Brother      Good Health     HEART DISEASE Brother      Heart Disease,ASCAD with MI       Review of Systems  I personally reviewed the ROS with the patient.    Weight loss: No   Recent fever/chills: No  Night sweats: No   Current skin rash: No   Recent hair loss: No   Heat intolerance: No   Cold intolerance: No   Chest pain: No   Palpitations: No   Shortness of breath: No  Wheezing: No   Constipation: No   Diarrhea: No   Nausea: No     Vomiting: No   Kidney/side pain: Yes   Back pain: No  Frequent headaches: No  Dizziness: No   Leg swelling: No   Calf pain: No      Physical Exam  Vitals:    04/03/18 1500 04/03/18 1530 04/03/18 1600 04/03/18 1700   BP: 125/73 119/77 117/69 120/69   Resp:       Temp:    98.6  F (37  C)   TempSrc:    Tympanic   SpO2: 95% 95%  95%   Weight:       Height:         Constitutional:  Alert and cooperative   Head: NCAT  Eyes: Conjunctivae normal  Cardiovascular: Regular rate.  Pulmonary/Chest: Respirations are even and non-labored bilaterally, no audible wheezing  Abdominal: Soft. No distension, tenderness, masses or guarding.   Neurological: A + O x 3.  Cranial Nerves II-XII grossly intact.  Extremities: PARUL x 4, Warm. No clubbing.  No cyanosis.   Ambulates with walker  Skin: Pink, warm and dry.  No visible rashes noted.  Psychiatric:  Normal mood and affect  Back:  + left CVA tenderness.  - right CVA tenderness.  Genitourinary: nonpalpable bladder    Labs  Results for JOHNNY LEWIS (MRN [id number]) as of 4/3/2018 17:41   Ref. Range 4/3/2018 14:38   Color Urine Unknown Yellow   Appearance Urine Unknown Slightly Cloudy   Glucose Urine Latest Ref Range: NEG^Negative mg/dL Negative   Bilirubin Urine Latest Ref Range: NEG^Negative  Negative   Ketones Urine Latest Ref Range: NEG^Negative mg/dL Trace (A)   Specific Gravity Urine Latest Ref Range: 1.003 - 1.035  1.015   pH Urine Latest Ref Range: 5.0 - 7.0 pH 7.5 (H)   Protein Albumin Urine Latest Ref Range: NEG^Negative mg/dL Trace (A)   Urobilinogen Urine Latest Ref Range: 0.2 - 1.0 EU/dL 0.2   Nitrite Urine Latest Ref Range: NEG^Negative  Negative   Blood Urine Latest Ref Range: NEG^Negative  Large (A)   Leukocyte Esterase Urine Latest Ref Range: NEG^Negative  Trace (A)   Source Unknown Midstream Urine   WBC Urine Latest Ref Range: OTO5^0 - 5 /HPF 10-25 (A)       Results for JOHNNY LEWIS (MRN [id number]) as of 4/3/2018 17:41   Ref. Range 4/3/2018 14:25   Sodium  Latest Ref Range: 134 - 144 mmol/L 139   Potassium Latest Ref Range: 3.5 - 5.1 mmol/L 4.3   Chloride Latest Ref Range: 98 - 107 mmol/L 100   Carbon Dioxide Latest Ref Range: 21 - 31 mmol/L 29   Urea Nitrogen Latest Ref Range: 7 - 25 mg/dL 31 (H)   Creatinine Latest Ref Range: 0.70 - 1.30 mg/dL 1.34 (H)   GFR Estimate Latest Ref Range: >60 mL/min/1.7m2 54 (L)   GFR Estimate If Black Latest Ref Range: >60 mL/min/1.7m2 65   Calcium Latest Ref Range: 8.6 - 10.3 mg/dL 10.3   Anion Gap Latest Ref Range: 3 - 14 mmol/L 10   Albumin Latest Ref Range: 3.5 - 5.7 g/dL 3.8   Protein Total Latest Ref Range: 6.4 - 8.9 g/dL 6.9   Bilirubin Total Latest Ref Range: 0.3 - 1.0 mg/dL 0.6   Alkaline Phosphatase Latest Ref Range: 34 - 104 U/L 71   ALT Latest Ref Range: 7 - 52 U/L 15   AST Latest Ref Range: 13 - 39 U/L 16   Lactic Acid Latest Ref Range: 0.5 - 2.2 mmol/L 1.5   Lipase Latest Ref Range: 11 - 82 U/L 7 (L)   Glucose Latest Ref Range: 70 - 105 mg/dL 106 (H)   WBC Latest Ref Range: 4.0 - 11.0 10e9/L 17.6 (H)   Hemoglobin Latest Ref Range: 13.3 - 17.7 g/dL 14.8   Hematocrit Latest Ref Range: 40.0 - 53.0 % 43.5   Platelet Count Latest Ref Range: 150 - 450 10e9/L 305   RBC Count Latest Ref Range: 4.4 - 5.9 10e12/L 5.14   MCV Latest Ref Range: 78 - 100 fl 85   MCH Latest Ref Range: 26.5 - 33.0 pg 28.8   MCHC Latest Ref Range: 31.5 - 36.5 g/dL 34.0   RDW Latest Ref Range: 10.0 - 15.0 % 12.4   Diff Method Unknown Automated Method   % Neutrophils Latest Units: % 90.6   % Lymphocytes Latest Units: % 3.9   % Monocytes Latest Units: % 4.7   % Eosinophils Latest Units: % 0.0   % Basophils Latest Units: % 0.2   % Immature Granulocytes Latest Units: % 0.6   Absolute Neutrophil Latest Ref Range: 1.6 - 8.3 10e9/L 15.9 (H)   Absolute Lymphocytes Latest Ref Range: 0.8 - 5.3 10e9/L 0.7 (L)   Absolute Monocytes Latest Ref Range: 0.0 - 1.3 10e9/L 0.8   Absolute Eosinophils Latest Ref Range: 0.0 - 0.7 10e9/L 0.0   Absolute Basophils Latest Ref Range:  0.0 - 0.2 10e9/L 0.0   Abs Immature Granulocytes Latest Ref Range: 0 - 0.4 10e9/L 0.1     Historical UCx  6/24/2015   RESULT     Culture - Historical 06/24/2015  9:13 AM [facility]     STAPHYLOCOCCUS HAEMOLYTICUS     Comment:     10,000-50,000 CFU/mL Staphylococcus haemolyticus     Culture - Historical 06/24/2015  9:13 AM [facility]     ENTEROCOCCUS  FAECALIS     Comment:     10,000-50,000 CFU/mL Enterococcus  faecalis     Susceptibility Result - Historical  06/24/2015  9:13 AM [facility]     STAPHYLOCOCCUS HAEMOLYTICUS     Comment:     CIPROFLOXACIN                     >2                 Resistant   GENTAMICIN                        <=4                Sensitive   LEVOFLOXACIN                      >4                 Resistant   LINEZOLID                         <=2                Sensitive   OXACILLIN                         >2                 Resistant   PENICILLIN                        >8                 Resistant   RIFAMPIN                          <=1                Sensitive   TETRACYCLINE                      <=4                Sensitive   TRIMETHOPRIM/SULF                 <=2                Sensitive   VANCOMYCIN                        <=2                Sensitive   NITROFURANTOIN                    <=32               Sensitive        Susceptibility Result - Historical  06/24/2015  9:13 AM [facility]     ENTEROCOCCUS  FAECALIS     Comment:     AMPICILLIN                        2                  Sensitive   CIPROFLOXACIN                     2                  Intermediate   LEVOFLOXACIN                      <=2                Sensitive   LINEZOLID                         <=2                Sensitive   PENICILLIN                        2                  Sensitive   TETRACYCLINE                      <=4                Sensitive   VANCOMYCIN                        <=2                Sensitive   NITROFURANTOIN                    <=32               Sensitive        Imaging  CT stone study   4/3/2018  I  personally reviewed and interpreted the images and report.  IMPRESSION:  Moderate left hydronephrosis secondary to a 6 mm calculus  in the distal left ureter. Partially duplicated left ureter.  Otherwise, no acute findings.    Assessment  Mr. Gill is a 62 year old male who presents with obstructing left  ureteral stone in the presence of clinical infection of the urinary tract.  Explained that this situation needs to be managed urgently due to risks of progression of the infection.  Explained that options include stents and/or nephrostomy tubes.  I recommend urgent placement of a ureteral stent    Plan  Urgent placement of a left  ureteral stent in the OR under MAC sedation.  The stone will be treated definitively on a delayed timeframe after culture appropriate antibiotics have been administered.

## 2018-04-03 NOTE — TELEPHONE ENCOUNTER
Patient called today stating he is experiencing abdominal pain and back pain. Along with left leg spasms. Patient triaged. Per triage protocol, Patient was directed to come to the ED. Patient verbalized agreement with this plan and states he has someone who will drive him in. Shira Laboy RN .............. 4/3/2018  12:42 PM    Reason for Disposition    [1] Abdominal pain AND [2] age > 60    [1] MODERATE back pain (e.g., interferes with normal activities) AND [2] present > 3 days    [1] Pain radiates into the thigh or further down the leg AND [2] one leg     Pain shoots down left leg. All pain is on left: left lower abdomen, wrapping around to left lower back, shooting down left upper leg and left testicular pain (off and on last night).    Additional Information    Negative: Passed out (i.e., lost consciousness, collapsed and was not responding)    Negative: Shock suspected (e.g., cold/pale/clammy skin, too weak to stand, low BP, rapid pulse)    Negative: Sounds like a life-threatening emergency to the triager    Negative: Major injury to the back (e.g., MVA, fall > 10 feet or 3 meters, penetrating injury, etc.)    Negative: Followed a tailbone injury    Negative: [1] Pain in the upper back over the ribs (rib cage) AND [2] radiates (travels, goes) into chest    Negative: [1] Pain in the upper back over the ribs (rib cage) AND [2] worsened by coughing (or clearly increases with breathing)    Negative: Back pain during pregnancy    Negative: Pain mainly in flank (i.e., in the side, over the lower ribs or just below the ribs)    Negative: [1] Urinary or bowel incontinence (i.e., loss of bladder or bowel control) AND [2] new onset    Negative: [1] Unable to urinate (or only a few drops) > 4 hours AND     [2] bladder feels very full (e.g., palpable bladder or strong urge to urinate)     Patient has MS and straight caths 3x daily.    Negative: [1] SEVERE back pain (e.g., excruciating) AND [2] sudden onset AND [3] age >  "60     Patient states pain has been constant around 2/10 and \"shoots to 7/10\" without notice. Patient states he has a strong pain tolerance.    Negative: [1] SEVERE abdominal pain AND [2] present > 1 hour    Negative: [1] SEVERE back pain (e.g., excruciating, unable to do any normal activities) AND [2] not improved 2 hours after pain medicine    Negative: [1] Pain or burning with urination AND [2] flank pain (i.e., in side, below ribs and above hip)    Negative: [1] Fever > 100.5 F (38.1 C) AND [2] flank pain (i.e., in side, below ribs and above hip)    Negative: Numbness in groin or rectal area (i.e., loss of sensation)     Yes, but unchanged from normal  Left testical was in pain, came and went    Negative: Blood in urine (red, pink, or tea-colored)    Negative: [1] Fever AND [2] no symptoms of UTI  (Exception: has generalized muscle pains, not localized back pain)    Negative: Numbness in a leg or foot (i.e., loss of sensation)    Negative: [1] Pain radiates into the thigh or further down the leg AND [2] both legs    Commented on: Weakness of a leg or foot (e.g., unable to bear weight, dragging foot)     Patient states he is much weaker today. Yesterday he was walking without a cane and today he needs his walker.    Answer Assessment - Initial Assessment Questions  1. ONSET: \"When did the pain begin?\"   Pain started last night, around bed time.  A similar pain happened one other time, about 3 weeks ago, but wasn't as severe. This pain went away in about 2.5 days, but was constant and less severe.    2. LOCATION: \"Where does it hurt?\" (upper, mid or lower back)  Patient states his left lower torso/abdomen, wrapping around 1/3 way to lower left back (location of constant pain that feels muscular). Per Patient , \"it knots up in front, pain increases until there is a pop or something breaking loose and then there is an instant spasm down leg (top of leg).    3. SEVERITY: \"How bad is the pain?\"  (e.g., Scale 1-10; " "mild, moderate, or severe)    - MILD (1-3): doesn't interfere with normal activities     - MODERATE (4-7): interferes with normal activities or awakens from sleep     - SEVERE (8-10): excruciating pain, unable to do any normal activities   Constant pain 2/10, increases suddenly to 7/10. Patient states he has a strong pain threshold.    4. PATTERN: \"Is the pain constant?\" (e.g., yes, no; constant, intermittent)   Constant pain in lower left back/abdomen, sudden shooting pain into left upper leg, intermittent pain in left testicle.    5. RADIATION: \"Does the pain shoot into your legs or elsewhere?\"  Left upper leg.    6. CAUSE:  \"What do you think is causing the back pain?\"   Unsure.    7. BACK OVERUSE:  \"Any recent lifting of heavy objects, strenuous work or exercise?\"  Patient denies.    8. MEDICATIONS: \"What have you taken so far for the pain?\" (e.g., nothing, acetaminophen, NSAIDS)  Patient denies.    9. NEUROLOGIC SYMPTOMS: \"Do you have any weakness, numbness, or problems with bowel/bladder control?\"  Patient states he has MS- underlying, changes every day, has been under control. Patient states he had the best writing yesterday he has had in years and today, he is weak, shaky and cannot read his handwriting. Yesterday he was walking without a cane, today he needs his walker.    Patient added that he self-caths 3x day and takes flomax; there have been no new changes. This puts patient at risk for UTI. Patient has had chronic constipation in the past, but has been normal for last 3 days. Patient takes fiber supplements and stool softeners.    10. OTHER SYMPTOMS: \"Do you have any other symptoms?\" (e.g., fever, abdominal pain, burning with urination, blood in urine)  Moderate (7/10) abdominal pain.  Patient denies burning with urination-Patient self- caths 3x daily (prone to UTI)- wants a urine sample. Patient denies fever or blood in urine.    Protocols used: BACK PAIN-ADULT-    Shira Laboy RN .............. " 4/3/2018  12:43 PM

## 2018-04-03 NOTE — IP AVS SNAPSHOT
MRN:[id number]                      After Visit Summary   4/3/2018    Jamir Gill    MRN: [id number]           Thank you!     Thank you for choosing [facility] for your care. Our goal is always to provide you with excellent care. Hearing back from our patients is one way we can continue to improve our services. Please take a few minutes to complete the written survey that you may receive in the mail after you visit with us. Thank you!        Patient Information     Date Of Birth          1955        Designated Caregiver       Most Recent Value    Caregiver    Will someone help with your care after discharge? yes    Name of designated caregiver WifeAllan Escudero    Phone number of caregiver [phone]    Caregiver address see demographics      About your hospital stay     You were admitted on:  April 3, 2018 You last received care in the:  [facility]    You were discharged on:  April 4, 2018        Reason for your hospital stay       Kidney stone                  Who to Call     For medical emergencies, please call 911.  For non-urgent questions about your medical care, please call your primary care provider or clinic, [phone]  For questions related to your surgery, please call your surgery clinic        Attending Provider     Provider Specialty    Nae Cummings MD --    Anderson Hill MD Internal Medicine       Primary Care Provider Office Phone # Fax #    John Polk [phone]      After Care Instructions     Activity       Your activity upon discharge: activity as tolerated            Diet       Follow this diet upon discharge: Orders Placed This Encounter      Regular Diet Adult                    Follow-up Appointments     Follow-up and recommended labs and tests       Follow up with Dr. Saldivar on 4/10 for surgery                  Your next 10 appointments already scheduled     Apr 05, 2018  9:00 AM CDT   Office Visit with John MENDOZA  "MD Jam   [facility]    [address]  [place] [address] [phone]           Bring a current list of meds and any records pertaining to this visit. For Physicals, please bring immunization records and any forms needing to be filled out. Please arrive 10 minutes early to complete paperwork.              Pending Results     Date and Time Order Name Status Description    4/3/2018 1514 Blood culture Preliminary     4/3/2018 1514 Blood culture Preliminary     4/3/2018 1505 Urine Culture Aerobic Bacterial In process             Statement of Approval     Ordered          18 1011  I have reviewed and agree with all the recommendations and orders detailed in this document.  EFFECTIVE NOW     Approved and electronically signed by:  Anderson Hill MD             Admission Information     Date & Time Provider Department Dept. Phone    4/3/2018 Anderson Hill MD [facility] [phone]      Your Vitals Were     Blood Pressure Temperature Respirations Height Weight Pulse Oximetry    104/63 (BP Location: Right arm) 97.1  F (36.2  C) (Tympanic) 16 1.778 m (5' 10\") 68.7 kg (151 lb 7.3 oz) 96%    BMI (Body Mass Index)                   21.73 kg/m2           [employer] Information     [employer] lets you send messages to your doctor, view your test results, renew your prescriptions, schedule appointments and more. To sign up, go to [email or web address] . Click on \"Log in\" on the left side of the screen, which will take you to the Welcome page. Then click on \"Sign up Now\" on the right side of the page.     You will be asked to enter the access code listed below, as well as some personal information. Please follow the directions to create your username and password.     Your access code is: [email or web address]  Expires: 7/3/2018 10:30 AM     Your access code will  in 90 days. If you need help or a new code, please call your " [facility] or [phone].        Care EveryWhere ID     This is your Care EveryWhere ID. This could be used by other organizations to access your [place] medical records  [id number]        Equal Access to Services     ANU MONTOYA : Hadii aad ku hadaricruy Mona, waaxda luqadaha, qaybta kaalmada adepaty, radha maryin hayaavishal madrigal johnjun miller. So [facility] [phone].    ATENCIÓN: Si habla español, tiene a harding disposición servicios gratuitos de asistencia lingüística. Llame al [phone].    We comply with applicable federal civil rights laws and [place] laws. We do not discriminate on the basis of race, color, national origin, age, disability, sex, sexual orientation, or gender identity.               Review of your medicines      START taking        Dose / Directions    sulfamethoxazole-trimethoprim 800-160 MG per tablet   Commonly known as:  BACTRIM DS/SEPTRA DS   Indication:  Urinary Tract Infection   Used for:  Kidney stone on left side        Dose:  1 tablet   Take 1 tablet by mouth 2 times daily for 7 days   Quantity:  14 tablet   Refills:  0         CONTINUE these medicines which have NOT CHANGED        Dose / Directions    BACLOFEN PO        Dose:  10 mg   Take 10 mg by mouth 2 times daily 10 mg QAM, 20 mg QHS   Refills:  0       biotin 2.5 mg/mL Susp        Dose:  300 mg   Take 300 mg by mouth daily   Refills:  0       calcium acetate 667 MG Caps capsule   Commonly known as:  PHOSLO        Dose:  1200 mg   Take 1,200 mg by mouth 2 times daily   Refills:  0       COLACE PO        Dose:  100 mg   Take 100 mg by mouth daily   Refills:  0       FLOMAX 0.4 MG capsule   Generic drug:  tamsulosin        Dose:  0.8 mg   Take 0.8 mg by mouth daily   Refills:  0       iron polysaccharides 150 MG capsule   Commonly known as:  NIFEREX        Dose:  150 mg   Take 150 mg by mouth every morning   Refills:  0       meclizine 25 MG Chew        Dose:  25 mg   Take 25 mg by mouth 2 times daily   Refills:  0        NONFORMULARY        Dose:  500 mg   500 mg by Oral or Feeding Tube route daily Cranberry   Refills:  0       NONFORMULARY        daily Probiotics   Refills:  0       psyllium 0.52 G capsule        Dose:  3 capsule   Take 3 capsules by mouth 2 times daily   Refills:  0       VITAMIN D (CHOLECALCIFEROL) PO        Dose:  1400 Units   Take 1,400 Units by mouth 2 times daily   Refills:  0         STOP taking     ASPIRIN ADULT LOW STRENGTH PO                Where to get your medicines      These medications were sent to [facility] - [place], [place] - [address]  [address] [facility] [address]     Phone:  [phone]     sulfamethoxazole-trimethoprim 800-160 MG per tablet                Protect others around you: Learn how to safely use, store and throw away your medicines at www.disposemymeds.org.        ANTIBIOTIC INSTRUCTION     You've Been Prescribed an Antibiotic - Now What?  Your healthcare team thinks that you or your loved one might have an infection. Some infections can be treated with antibiotics, which are powerful, life-saving drugs. Like all medications, antibiotics have side effects and should only be used when necessary. There are some important things you should know about your antibiotic treatment.      Your healthcare team may run tests before you start taking an antibiotic.    Your team may take samples (e.g., from your blood, urine or other areas) to run tests to look for bacteria. These test can be important to determine if you need an antibiotic at all and, if you do, which antibiotic will work best.      Within a few days, your healthcare team might change or even stop your antibiotic.    Your team may start you on an antibiotic while they are working to find out what is making you sick.    Your team might change your antibiotic because test results show that a different antibiotic would be better to treat your infection.    In some cases, once your team has  more information, they learn that you do not need an antibiotic at all. They may find out that you don't have an infection, or that the antibiotic you're taking won't work against your infection. For example, an infection caused by a virus can't be treated with antibiotics. Staying on an antibiotic when you don't need it is more likely to be harmful than helpful.      You may experience side effects from your antibiotic.    Like all medications, antibiotics have side effects. Some of these can be serious.    Let you healthcare team know if you have any known allergies when you are admitted to the hospital.    One significant side effect of nearly all antibiotics is the risk of severe and sometimes deadly diarrhea caused by Clostridium difficile (C. Difficile). This occurs when a person takes antibiotics because some good germs are destroyed. Antibiotic use allows C. diificile to take over, putting patients at high risk for this serious infection.    As a patient or caregiver, it is important to understand your or your loved one's antibiotic treatment. It is especially important for caregivers to speak up when patients can't speak for themselves. Here are some important questions to ask your healthcare team.    What infection is this antibiotic treating and how do you know I have that infection?    What side effects might occur from this antibiotic?    How long will I need to take this antibiotic?    Is it safe to take this antibiotic with other medications or supplements (e.g., vitamins) that I am taking?     Are there any special directions I need to know about taking this antibiotic? For example, should I take it with food?    How will I be monitored to know whether my infection is responding to the antibiotic?    What tests may help to make sure the right antibiotic is prescribed for me?      Information provided by:  www.cdc.gov/getsmart  U.S. Department of Health and Human Services  Centers for disease Control  and Prevention  National Center for Emerging and Zoonotic Infectious Diseases  Division of Healthcare Quality Promotion             Medication List: This is a list of all your medications and when to take them. Check marks below indicate your daily home schedule. Keep this list as a reference.      Medications           Morning Afternoon Evening Bedtime As Needed    BACLOFEN PO   Take 10 mg by mouth 2 times daily 10 mg QAM, 20 mg QHS   Last time this was given:  10 mg on 4/4/2018 10:48 AM                                biotin 2.5 mg/mL Susp   Take 300 mg by mouth daily                                calcium acetate 667 MG Caps capsule   Commonly known as:  PHOSLO   Take 1,200 mg by mouth 2 times daily                                COLACE PO   Take 100 mg by mouth daily                                FLOMAX 0.4 MG capsule   Take 0.8 mg by mouth daily   Generic drug:  tamsulosin                                iron polysaccharides 150 MG capsule   Commonly known as:  NIFEREX   Take 150 mg by mouth every morning                                meclizine 25 MG Chew   Take 25 mg by mouth 2 times daily                                NONFORMULARY   500 mg by Oral or Feeding Tube route daily Cranberry                                NONFORMULARY   daily Probiotics                                psyllium 0.52 G capsule   Take 3 capsules by mouth 2 times daily                                sulfamethoxazole-trimethoprim 800-160 MG per tablet   Commonly known as:  BACTRIM DS/SEPTRA DS   Take 1 tablet by mouth 2 times daily for 7 days   Last time this was given:  1 tablet on 4/4/2018 10:48 AM                                VITAMIN D (CHOLECALCIFEROL) PO   Take 1,400 Units by mouth 2 times daily

## 2018-04-04 VITALS
HEIGHT: 70 IN | DIASTOLIC BLOOD PRESSURE: 63 MMHG | BODY MASS INDEX: 21.68 KG/M2 | WEIGHT: 151.46 LBS | RESPIRATION RATE: 16 BRPM | TEMPERATURE: 97.1 F | OXYGEN SATURATION: 96 % | SYSTOLIC BLOOD PRESSURE: 104 MMHG

## 2018-04-04 LAB
ANION GAP SERPL CALCULATED.3IONS-SCNC: 6 MMOL/L (ref 3–14)
BUN SERPL-MCNC: 28 MG/DL (ref 7–25)
CALCIUM SERPL-MCNC: 8.7 MG/DL (ref 8.6–10.3)
CHLORIDE SERPL-SCNC: 105 MMOL/L (ref 98–107)
CO2 SERPL-SCNC: 29 MMOL/L (ref 21–31)
CREAT SERPL-MCNC: 1.11 MG/DL (ref 0.7–1.3)
ERYTHROCYTE [DISTWIDTH] IN BLOOD BY AUTOMATED COUNT: 12.7 % (ref 10–15)
GFR SERPL CREATININE-BSD FRML MDRD: 67 ML/MIN/1.7M2
GLUCOSE SERPL-MCNC: 101 MG/DL (ref 70–105)
HCT VFR BLD AUTO: 34.4 % (ref 40–53)
HGB BLD-MCNC: 11.3 G/DL (ref 13.3–17.7)
MCH RBC QN AUTO: 29.2 PG (ref 26.5–33)
MCHC RBC AUTO-ENTMCNC: 32.8 G/DL (ref 31.5–36.5)
MCV RBC AUTO: 89 FL (ref 78–100)
PLATELET # BLD AUTO: 229 10E9/L (ref 150–450)
POTASSIUM SERPL-SCNC: 3.9 MMOL/L (ref 3.5–5.1)
RBC # BLD AUTO: 3.87 10E12/L (ref 4.4–5.9)
SODIUM SERPL-SCNC: 140 MMOL/L (ref 134–144)
WBC # BLD AUTO: 9.2 10E9/L (ref 4–11)

## 2018-04-04 PROCEDURE — A9270 NON-COVERED ITEM OR SERVICE: HCPCS | Mod: GY | Performed by: INTERNAL MEDICINE

## 2018-04-04 PROCEDURE — 99285 EMERGENCY DEPT VISIT HI MDM: CPT | Mod: Z6 | Performed by: EMERGENCY MEDICINE

## 2018-04-04 PROCEDURE — 25000131 ZZH RX MED GY IP 250 OP 636 PS 637: Mod: GY | Performed by: INTERNAL MEDICINE

## 2018-04-04 PROCEDURE — 36415 COLL VENOUS BLD VENIPUNCTURE: CPT | Performed by: INTERNAL MEDICINE

## 2018-04-04 PROCEDURE — 80048 BASIC METABOLIC PNL TOTAL CA: CPT | Performed by: INTERNAL MEDICINE

## 2018-04-04 PROCEDURE — 99217 ZZC OBSERVATION CARE DISCHARGE: CPT | Performed by: INTERNAL MEDICINE

## 2018-04-04 PROCEDURE — 85027 COMPLETE CBC AUTOMATED: CPT | Performed by: INTERNAL MEDICINE

## 2018-04-04 PROCEDURE — G0378 HOSPITAL OBSERVATION PER HR: HCPCS

## 2018-04-04 PROCEDURE — 25000132 ZZH RX MED GY IP 250 OP 250 PS 637: Mod: GY | Performed by: INTERNAL MEDICINE

## 2018-04-04 PROCEDURE — 99285 EMERGENCY DEPT VISIT HI MDM: CPT | Mod: 25 | Performed by: EMERGENCY MEDICINE

## 2018-04-04 PROCEDURE — 96365 THER/PROPH/DIAG IV INF INIT: CPT | Performed by: EMERGENCY MEDICINE

## 2018-04-04 RX ORDER — SULFAMETHOXAZOLE/TRIMETHOPRIM 800-160 MG
1 TABLET ORAL 2 TIMES DAILY
Qty: 14 TABLET | Refills: 0 | Status: SHIPPED | OUTPATIENT
Start: 2018-04-04 | End: 2018-04-11

## 2018-04-04 RX ADMIN — MECLIZINE HCL 25 MG: 12.5 TABLET ORAL at 10:48

## 2018-04-04 RX ADMIN — BACLOFEN 10 MG: 10 TABLET ORAL at 10:48

## 2018-04-04 RX ADMIN — SULFAMETHOXAZOLE AND TRIMETHOPRIM 1 TABLET: 800; 160 TABLET ORAL at 10:48

## 2018-04-04 RX ADMIN — IBUPROFEN 600 MG: 600 TABLET, FILM COATED ORAL at 10:48

## 2018-04-04 NOTE — PLAN OF CARE
Problem: Patient Care Overview  Goal: Plan of Care/Patient Progress Review  Reassessment completed at 0405. No changes since 2015 assessment. Refer to flow sheet. VSS. Denies pain.   Fani Leary RN on 4/4/2018 at 5:14 AM

## 2018-04-04 NOTE — OR NURSING
PACU Transfer Note    Jamir Gill was transferred to [facility] via cart.  Equipment used for transport:  none.  Accompanied by:  Antonella Luu RN  Hand off report given to Fani Leary RN    PACU Respiratory Event Documentation     1) Episodes of Apnea greater than or equal to 10 seconds: no    2) Bradypnea - less than 8 breaths per minute: no    3) Pain score on 0 to 10 scale: 0    4) Pain-sedation mismatch (yes or no): no    5) Repeated 02 desaturation less than 90% (yes or no): no    Anesthesia notified? (yes or no): no    Any of the above events occuring repeatedly in separate 30 minute intervals may be considered recurrent PACU respiratory events.    Patient stable and meets phase 1 discharge criteria for transport from PACU.

## 2018-04-04 NOTE — DISCHARGE SUMMARY
[facility]    Discharge Summary  Hospitalist    Date of Admission:  4/3/2018  Date of Discharge:  4/4/2018  Discharging Provider: Anderson Hill  Date of Service (when I saw the patient): 04/04/18    Discharge Diagnoses   Active Problems:    Ureterolithiasis (4/3/2018)    Multiple sclerosis    Hypertension     History of Present Illness   Jamir Gill is an 62 year old male who presented with left flank pain.     Hospital Course   Jamir Gill was admitted on 4/3/2018.  The following problems were addressed during his hospitalization:    Ureterolithiasis  Patient underwent cystoscopy with stent placement.  He tolerated this well.  He had no fevers post procedure.  He was tolerating regular food.  Will be sent home on Bactrim and has a procedure scheduled with Dr. Saldivar on April 10 for stent removal.    # Discharge Pain Plan:   - Patient currently has NO PAIN and is not being prescribed pain medications on discharge.    Anderson Hill MD      Code Status   Full Code       Primary Care Physician   KEVEN GILLIAM    Physical Exam   Temp: 97.1  F (36.2  C) Temp src: Tympanic BP: 104/63   Heart Rate: 69 Resp: 16 SpO2: 96 % O2 Device: None (Room air) Oxygen Delivery: 4 LPM  Vitals:    04/03/18 1320 04/03/18 1958   Weight: 71.7 kg (158 lb) 68.7 kg (151 lb 7.3 oz)     Vital Signs with Ranges  Temp:  [95.7  F (35.4  C)-99.3  F (37.4  C)] 97.1  F (36.2  C)  Heart Rate:  [] 69  Resp:  [16-22] 16  BP: ()/(46-88) 104/63  SpO2:  [95 %-100 %] 96 %  I/O last 3 completed shifts:  In: 3100 [P.O.:800; I.V.:2300]  Out: 475 [Urine:475]  GENERAL: Comfortable, no apparent distress.  CARDIOVASCULAR: regular rate and rhythm, no murmur. No lower extremity edema   RESPIRATORY: Clear to auscultation bilaterally, no wheezes or crackles.  GI: non-tender, non-distended, normal bowel sounds.   SKIN: warm periphery, no rashes      Discharge Disposition   Discharged to home  Condition at discharge:  Stable    Consultations This Hospital Stay   None    Time Spent on this Encounter   I, Anderson Hill, personally saw the patient today and spent greater than 30 minutes discharging this patient.    Discharge Orders     Reason for your hospital stay   Kidney stone     Follow-up and recommended labs and tests   Follow up with Dr. Saldivar on 4/10 for surgery     Activity   Your activity upon discharge: activity as tolerated     Full Code     Diet   Follow this diet upon discharge: Orders Placed This Encounter     Regular Diet Adult         Discharge Medications   Current Discharge Medication List      START taking these medications    Details   sulfamethoxazole-trimethoprim (BACTRIM DS/SEPTRA DS) 800-160 MG per tablet Take 1 tablet by mouth 2 times daily for 7 days  Qty: 14 tablet, Refills: 0    Associated Diagnoses: Kidney stone on left side         CONTINUE these medications which have NOT CHANGED    Details   tamsulosin (FLOMAX) 0.4 MG capsule Take 0.8 mg by mouth daily      meclizine 25 MG CHEW Take 25 mg by mouth 2 times daily      VITAMIN D, CHOLECALCIFEROL, PO Take 1,400 Units by mouth 2 times daily      calcium acetate (PHOSLO) 667 MG CAPS capsule Take 1,200 mg by mouth 2 times daily      BACLOFEN PO Take 10 mg by mouth 2 times daily 10 mg QAM, 20 mg QHS      iron polysaccharides (NIFEREX) 150 MG capsule Take 150 mg by mouth every morning      biotin 2.5 mg/mL SUSP Take 300 mg by mouth daily      !! NONFORMULARY 500 mg by Oral or Feeding Tube route daily Cranberry      !! NONFORMULARY daily Probiotics      Docusate Sodium (COLACE PO) Take 100 mg by mouth daily      psyllium 0.52 G capsule Take 3 capsules by mouth 2 times daily       !! - Potential duplicate medications found. Please discuss with provider.      STOP taking these medications       ASPIRIN ADULT LOW STRENGTH PO Comments:   Reason for Stopping:             Allergies   No Known Allergies  Data   Most Recent 3 CBC's:  Recent Labs   Lab Test   04/04/18   0454  04/03/18   1425  07/22/16   1007   WBC  9.2  17.6*   --    HGB  11.3*  14.8  13.4*   MCV  89  85  89   PLT  229  305  215      Most Recent 3 BMP's:  Recent Labs   Lab Test  04/04/18   0454  04/03/18   1425  09/07/17   1604   NA  140  139  140   POTASSIUM  3.9  4.3  3.8   CHLORIDE  105  100  103   CO2  29  29  28   BUN  28*  31*  27*   CR  1.11  1.34*  1.10   ANIONGAP  6  10   --    ОЛЬГА  8.7  10.3  9.7   GLC  101  106*  96     Most Recent 2 LFT's:  Recent Labs   Lab Test  04/03/18   1425  07/22/16   1112   AST  16   --    ALT  15   --    ALKPHOS  71  80   BILITOTAL  0.6  0.6     Most Recent INR's and Anticoagulation Dosing History:  Anticoagulation Dose History     There is no flowsheet data to display.        Most Recent 3 Troponin's:No lab results found.  Most Recent Cholesterol Panel:  Recent Labs   Lab Test  07/22/16   1049   LDL  98   HDL  36   TRIG  76     Most Recent 6 Bacteria Isolates From Any Culture (See EPIC Reports for Culture Details):  Recent Labs   Lab Test  04/03/18   1514   CULT  No growth after 15 hours  No growth after 15 hours     Most Recent TSH, T4 and A1c Labs:No lab results found.  Results for orders placed or performed during the hospital encounter of 04/03/18   CT Abdomen Pelvis w Contrast    Narrative    PROCEDURE:  CT ABDOMEN PELVIS W CONTRAST    HISTORY:  left sided back and abdominal pain, hx of MS;  left lower  quadrant pain radiating to the back    TECHNIQUE:  Helical CT of the abdomen and pelvis was performed  following injection of intravenous contrast.     Sagittal and coronal reformatted images were reviewed.    COMPARISON:  CT chest 9/20/2017    FINDINGS:      The lung bases are clear.    The liver, spleen, pancreas and adrenal glands are unremarkable. The  gallbladder is present.    There is moderate left hydronephrosis and perinephric stranding. This  is secondary to a 6 mm calculus in the distal left ureter near the  ureterovesical junction. There is delayed  enhancement of the left  kidney compared to the right. 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 The right kidney is intact. Mild inflammation in the left  pericolic gutter is likely related to the left renal calculus.    The bowel is normal in caliber. There has been prior appendectomy.     There is no abdominal aortic aneurysm.    No free fluid, free air or adenopathy is present.      No suspicious osseous lesions are identified. There is scoliosis and  multilevel degenerative disease in the spine. Compression fracture of  T12 is unchanged from a prior chest CT.      Impression    IMPRESSION:  Moderate left hydronephrosis secondary to a 6 mm calculus  in the distal left ureter. Partially duplicated left ureter.  Otherwise, no acute findings.    JOANN THORNTON MD   US Testicular & Scrotum w Doppler Ltd    Narrative    PROCEDURE: US TESTICULAR AND SCROTUM WITH DOPPLER LIMITED    HISTORY: left testicular pain, r/o torsion;     TECHNIQUE: Ultrasound of the scrotum was performed.    COMPARISON: None.    FINDINGS:     MEASUREMENTS:   Right testis: 5.1 x 2.1 x 3.0 cm  Left testis: 5.5 x 2.6 x 2.3 cm     TESTES:  The testes are normal in size and echogenicity.  No  intratesticular mass is seen. Normal flow is present bilaterally.    EPIDIDYMIDES:  The epididymides are not enlarged.     OTHER: There is a potential inguinal hernia containing bowel. There is  no hydrocele.      Impression    IMPRESSION:   1. Normal testicles.  2. Questionable inguinal hernia containing loops of bowel.    JOANN THORNTON MD   XR Surgery PARTHA L/T 5 Min Fluoro    Narrative    This exam was marked as non-reportable because it will not be read by a   radiologist or a [place] non-radiologist provider.

## 2018-04-04 NOTE — H&P
[facility]    History and Physical  Hospitalist       Date of Admission:  4/3/2018    Assessment & Plan   Jamir Gill is a 62 year old male who presents with left flank pain.     Ureterolithiasis  Assessment: postoperative day # 0 Cystoscopy with left retrograde pyelogram and ureteral stent placement, 6 x 28. symptoms controlled at this time. Urinalysis shows no obvious source for infection.   Plan: pain control. Will treat with empiric Bactrim    Multiple sclerosis  Assessment: chronic, controlled          # Pain Assessment:  Current Pain Score 4/4/2018   Patient currently in pain? -   Pain score (0-10) 2   Pain location -   Jamir pearson pain level was assessed and he currently denies pain.      DVT Prophylaxis: Pneumatic Compression Devices  Code Status: Prior    [place]    Primary Care Physician   KEVEN GILLIAM    Chief Complaint   Left flank pain    History is obtained from the patient and chart review.    History of Present Illness   Jamir Gill is a 62 year old male who presents with 3 days of left flank pain.  These are new symptoms for him.  He denies fevers or chills.  He has MS and has to straight cath himself.  He has no diarrhea or vomiting.  He has had no sick contacts.  He spent the winter in [place].  He has had frequent urinary tract infections.  In the emergency department a CT of the abdomen and pelvis shows a 6 mm ureteral stone on the left.  Dr. Saldivar was consulted from the emergency department, and took the patient for cystoscopy and stent placement.  He is currently on the medical floor and quite comfortable.    Past Medical History    I have reviewed this patient's medical history and updated it with pertinent information if needed.   Past Medical History:   Diagnosis Date     Cervical disc disorder     No Comments Provided     Enlarged prostate with lower urinary tract symptoms (LUTS)     No Comments Provided     Essential (primary) hypertension     No  Comments Provided     Multiple sclerosis (H)     No Comments Provided     Other specified disorders of bone density and structure, unspecified site (CODE)     No Comments Provided     Other symptoms and signs involving cognitive functions and awareness     No Comments Provided     Spondylosis of cervical region without myelopathy or radiculopathy     No Comments Provided       Past Surgical History   I have reviewed this patient's surgical history and updated it with pertinent information if needed.  Past Surgical History:   Procedure Laterality Date     APPENDECTOMY OPEN      appendectomy     COLONOSCOPY      ,with polyps resected     COLONOSCOPY      ,[place]- colitis     FRACTURE SURGERY      ORIF left wrist fracture~Laser prostate surgery--BPH~Colonoscopy  with polyps resected     OTHER SURGICAL HISTORY      ,PROSTATECTOMY,Laser prostate surgery -- BPH       Prior to Admission Medications   Prior to Admission Medications   Prescriptions Last Dose Informant Patient Reported? Taking?   ASPIRIN ADULT LOW STRENGTH PO 2018 at pm  Yes No   Sig: Take 81 mg by mouth daily   BACLOFEN PO 4/3/2018 at am  Yes Yes   Sig: Take 10 mg by mouth 2 times daily 10 mg QAM, 20 mg QHS   Docusate Sodium (COLACE PO) 4/3/2018 at am  Yes Yes   Sig: Take 100 mg by mouth daily   NONFORMULARY 4/3/2018 at am  Yes Yes   Si mg by Oral or Feeding Tube route daily Cranberry   NONFORMULARY 4/3/2018 at am  Yes Yes   Sig: daily Probiotics   VITAMIN D, CHOLECALCIFEROL, PO 4/3/2018 at am  Yes Yes   Sig: Take 1,400 Units by mouth 2 times daily   biotin 2.5 mg/mL SUSP 4/3/2018 at am  Yes Yes   Sig: Take 300 mg by mouth daily   calcium acetate (PHOSLO) 667 MG CAPS capsule 4/3/2018 at am  Yes Yes   Sig: Take 1,200 mg by mouth 2 times daily   iron polysaccharides (NIFEREX) 150 MG capsule 4/3/2018 at am  Yes Yes   Sig: Take 150 mg by mouth every morning   meclizine 25 MG CHEW 4/3/2018 at am  Yes Yes   Sig: Take 25 mg by mouth 2  times daily   psyllium 0.52 G capsule 4/3/2018 at am  Yes Yes   Sig: Take 3 capsules by mouth 2 times daily   tamsulosin (FLOMAX) 0.4 MG capsule 4/3/2018 at am  Yes Yes   Sig: Take 0.8 mg by mouth daily      Facility-Administered Medications: None     Allergies   No Known Allergies    Social History   I have reviewed this patient's social history and updated it with pertinent information if needed. Jamir Gill  reports that he has never smoked. He has never used smokeless tobacco. He reports that he does not drink alcohol or use illicit drugs.    Family History   I have reviewed this patient's family history and updated it with pertinent information if needed.   Family History   Problem Relation Age of Onset     Prostate Cancer Father      Cancer-prostate     Other - See Comments Father      Alzheimer's/kidney failure     Other - See Comments Mother       with Parkinson's     HEART DISEASE Mother      Heart Disease,CHF for carditis     Family History Negative Brother      Good Health     HEART DISEASE Brother      Heart Disease,ASCAD with MI       Review of Systems   The 10 point Review of Systems is negative other than noted in the HPI or here.     Physical Exam   Temp: 97.1  F (36.2  C) Temp src: Tympanic BP: 104/63   Heart Rate: 69 Resp: 16 SpO2: 96 % O2 Device: None (Room air) Oxygen Delivery: 4 LPM  Vital Signs with Ranges  Temp:  [95.7  F (35.4  C)-98.6  F (37  C)] 97.1  F (36.2  C)  Heart Rate:  [] 69  Resp:  [16-22] 16  BP: ()/(46-88) 104/63  SpO2:  [95 %-100 %] 96 %  151 lbs 7.3 oz    GENERAL: Comfortable, talkative, in no apparent distress.  HEENT: Anicteric, non-injected sclera, mouth moist.   NECK: No JVD.  CARDIOVASCULAR: regular rate and rhythm, no murmur. No lower extremity edema   RESPIRATORY: Clear to auscultation bilaterally, no wheezes, no crackles.  GI: Non-distended, normal bowel sounds, soft, non-tender.  SKIN: No rashes, sores.   NEUROLOGY: Alert and oriented x3, follows  commands, speech and language normal.       Data   Data reviewed today:  I personally reviewed the abdominal CT image(s) showing 6 mm stone and hydronephrosis.    Recent Labs  Lab 04/04/18  0454 04/03/18  1425   WBC 9.2 17.6*   HGB 11.3* 14.8   MCV 89 85    305    139   POTASSIUM 3.9 4.3   CHLORIDE 105 100   CO2 29 29   BUN 28* 31*   CR 1.11 1.34*   ANIONGAP 6 10   ОЛЬГА 8.7 10.3    106*   ALBUMIN  --  3.8   PROTTOTAL  --  6.9   BILITOTAL  --  0.6   ALKPHOS  --  71   ALT  --  15   AST  --  16   LIPASE  --  7*       Recent Results (from the past 24 hour(s))   US Testicular & Scrotum w Doppler Ltd    Narrative    PROCEDURE: US TESTICULAR AND SCROTUM WITH DOPPLER LIMITED    HISTORY: left testicular pain, r/o torsion;     TECHNIQUE: Ultrasound of the scrotum was performed.    COMPARISON: None.    FINDINGS:     MEASUREMENTS:   Right testis: 5.1 x 2.1 x 3.0 cm  Left testis: 5.5 x 2.6 x 2.3 cm     TESTES:  The testes are normal in size and echogenicity.  No  intratesticular mass is seen. Normal flow is present bilaterally.    EPIDIDYMIDES:  The epididymides are not enlarged.     OTHER: There is a potential inguinal hernia containing bowel. There is  no hydrocele.      Impression    IMPRESSION:   1. Normal testicles.  2. Questionable inguinal hernia containing loops of bowel.    JOANN THORNTON MD   CT Abdomen Pelvis w Contrast    Narrative    PROCEDURE:  CT ABDOMEN PELVIS W CONTRAST    HISTORY:  left sided back and abdominal pain, hx of MS;  left lower  quadrant pain radiating to the back    TECHNIQUE:  Helical CT of the abdomen and pelvis was performed  following injection of intravenous contrast.     Sagittal and coronal reformatted images were reviewed.    COMPARISON:  CT chest 9/20/2017    FINDINGS:      The lung bases are clear.    The liver, spleen, pancreas and adrenal glands are unremarkable. The  gallbladder is present.    There is moderate left hydronephrosis and perinephric stranding. This  is  secondary to a 6 mm calculus in the distal left ureter near the  ureterovesical junction. There is delayed enhancement of the left  kidney compared to the right. 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 The right kidney is intact. Mild inflammation in the left  pericolic gutter is likely related to the left renal calculus.    The bowel is normal in caliber. There has been prior appendectomy.     There is no abdominal aortic aneurysm.    No free fluid, free air or adenopathy is present.      No suspicious osseous lesions are identified. There is scoliosis and  multilevel degenerative disease in the spine. Compression fracture of  T12 is unchanged from a prior chest CT.      Impression    IMPRESSION:  Moderate left hydronephrosis secondary to a 6 mm calculus  in the distal left ureter. Partially duplicated left ureter.  Otherwise, no acute findings.    JOANN THORNTON MD   XR Surgery PARTHA L/T 5 Min Fluoro    Narrative    This exam was marked as non-reportable because it will not be read by a   radiologist or a [facility] non-radiologist provider.

## 2018-04-04 NOTE — PLAN OF CARE
Problem: Pain, Acute (Adult)  Goal: Identify Related Risk Factors and Signs and Symptoms  Related risk factors and signs and symptoms are identified upon initiation of Human [employer] Clinical Practice Guideline (CPG).  Patient with minimal pain in penis due to catheter placement. Rating 2/10 discomfort. Requesting Ibuprofen. Medication administered and pain was relieved. Resting comfortably with no complaints.   Fani Leary RN on 4/3/2018 at 2246

## 2018-04-04 NOTE — PROGRESS NOTES
Patient discharged home with wife. CNA accompanied patient in wheelchair.  Elizabeth Denney RN on 4/4/2018 at 12:07 PM

## 2018-04-04 NOTE — PHARMACY - DISCHARGE MEDICATION RECONCILIATION
[facility]  Part of [facility]  [address] [place] [id number]    April 4, 2018    Dear Pharmacist,    Your customer, Jamir Gill, born on 1955, was recently discharged from [facility].  We have updated his medication list and want to alert you to the following:       Review of your medicines      START taking       Dose / Directions    sulfamethoxazole-trimethoprim 800-160 MG per tablet   Commonly known as:  BACTRIM DS/SEPTRA DS   Indication:  Urinary Tract Infection   Used for:  Kidney stone on left side        Dose:  1 tablet   Take 1 tablet by mouth 2 times daily for 7 days   Quantity:  14 tablet   Refills:  0         CONTINUE these medicines which have NOT CHANGED       Dose / Directions    BACLOFEN PO        Dose:  10 mg   Take 10 mg by mouth 2 times daily 10 mg QAM, 20 mg QHS   Refills:  0       biotin 2.5 mg/mL Susp        Dose:  300 mg   Take 300 mg by mouth daily   Refills:  0       calcium acetate 667 MG Caps capsule   Commonly known as:  PHOSLO        Dose:  1200 mg   Take 1,200 mg by mouth 2 times daily   Refills:  0       COLACE PO        Dose:  100 mg   Take 100 mg by mouth daily   Refills:  0       FLOMAX 0.4 MG capsule   Generic drug:  tamsulosin        Dose:  0.8 mg   Take 0.8 mg by mouth daily   Refills:  0       iron polysaccharides 150 MG capsule   Commonly known as:  NIFEREX        Dose:  150 mg   Take 150 mg by mouth every morning   Refills:  0       meclizine 25 MG Chew        Dose:  25 mg   Take 25 mg by mouth 2 times daily   Refills:  0       NONFORMULARY        Dose:  500 mg   500 mg by Oral or Feeding Tube route daily Cranberry   Refills:  0       NONFORMULARY        daily Probiotics   Refills:  0       psyllium 0.52 G capsule        Dose:  3 capsule   Take 3 capsules by mouth 2 times daily   Refills:  0       VITAMIN D (CHOLECALCIFEROL) PO        Dose:  1400 Units   Take 1,400 Units by mouth 2 times daily   Refills:   0         STOP taking          ASPIRIN ADULT LOW STRENGTH PO                Where to get your medicines      These medications were sent to [facility] [address] [facility] - [place], [place] - [address]  [address] [facility] [address]     Phone:  [phone]      sulfamethoxazole-trimethoprim 800-160 MG per tablet             We also reviewed Jamir Gill's allergy list and updated it as needed:  Allergies: Review of patient's allergies indicates no known allergies.    Thank you for continuing to care for Jamir Gill.  We look forward to working together with you in the future.    Sincerely,  Margie Basurto, [facility]

## 2018-04-04 NOTE — PHARMACY - DISCHARGE MEDICATION RECONCILIATION
Pharmacy:  Discharge Counseling and Medication Reconciliation    Jamir Gill  [address]  [place] [address]  [phone] (home)   62 year old male  PCP: John Polk    Allergies: Review of patient's allergies indicates no known allergies.    Discharge Counseling:    Pharmacist met with patient and wife today to review the medication portion of the After Visit Summary (with an emphasis on NEW medications) and to address patient's questions/concerns.    Summary of Education: Reviewed sulfamethoxazole/trimethoprim verbally and in print    Materials Provided:  MedCounselor sheets printed from Clinical Pharmacology on: sulfamethoxazole/trimethoprim    Discharge Medication Reconciliation:    Marige Basurto [facility] has reviewed the patient's discharge medication orders and has compared them to the inpatient medication administration record and to what the patient was taking prior to admission - any discrepancies have been resolved.    It has been determined that the patient has an adequate supply of medications available or which can be obtained from the patient's preferred pharmacy, which they has confirmed as: [facility]. An updated medication list will be faxed to the patient's pharmacy.    Thank you for the consult.    Margie Basurto [facility]........April 4, 2018 2:29 PM

## 2018-04-04 NOTE — ANESTHESIA POSTPROCEDURE EVALUATION
Patient: Jamir Gill    * No procedures listed *    Diagnosis:* No pre-op diagnosis entered *  Diagnosis Additional Information: No value filed.    Anesthesia Type:  MAC    Note:  Anesthesia Post Evaluation    Patient location during evaluation: PACU  Patient participation: Able to fully participate in evaluation  Level of consciousness: awake and alert  Pain management: adequate  Airway patency: patent  Cardiovascular status: blood pressure returned to baseline  Respiratory status: acceptable  Hydration status: acceptable  PONV: none     Anesthetic complications: None          Last vitals:  Vitals:    04/03/18 1920 04/03/18 1925 04/03/18 1930   BP: (!) 88/52 (!) 85/54 100/62   Resp: 22 (P) 16 (P) 16   Temp:   (P) 98.1  F (36.7  C)   SpO2: 100% 100% 98%         Electronically Signed By: CORIE Sidhu CRNA  April 3, 2018  7:37 PM

## 2018-04-04 NOTE — PROGRESS NOTES
" NS ADMISSION NOTE    Patient admitted to room 333 at approximately 1958 via bed from surgery. Patient was accompanied by spouse.     Verbal SBAR report received from JEREMIE Berman prior to patient arrival.     Patient trasferred to bed via air vitor. Patient alert and oriented X 3. The patient is not having any pain. 0-10 Pain Scale: 3. Admission vital signs: Blood pressure 110/65, temperature 95.7  F (35.4  C), temperature source Tympanic, resp. rate 16, height 1.778 m (5' 10\"), weight 68.7 kg (151 lb 7.3 oz), SpO2 99 %. Patient was oriented to plan of care, call light, bed controls, tv, telephone, bathroom and visiting hours.     Risk Assessment    The following safety risks were identified during admission: fall. Yellow risk band applied: YES.     Skin Initial Assessment    This writer admitted this patient and completed a full skin assessment and Pradip score in the Adult PCS flowsheet. Appropriate interventions initiated as needed.    Skin  Inspection of bony prominences: Procedural focused assessment (identify areas inspected)  Skin WDL: WDL    Pradip Risk Assessment  Sensory Perception: 3-->slightly limited  Moisture: 4-->rarely moist  Activity: 3-->walks occasionally  Mobility: 3-->slightly limited  Nutrition: 3-->adequate  Friction and Shear: 3-->no apparent problem  Pradip Score: 19    Fani Leary RN on 4/3/2018 at 8:16 PM      "

## 2018-04-04 NOTE — ANESTHESIA CARE TRANSFER NOTE
Patient: Jamir Gill    * No procedures listed *    Diagnosis: * No pre-op diagnosis entered *  Diagnosis Additional Information: No value filed.    Anesthesia Type:   MAC     Note:  Airway :Nasal Cannula  Patient transferred to:PACU  Handoff Report: Identifed the Patient, Identified the Reponsible Provider, Reviewed the pertinent medical history, Discussed the surgical course, Reviewed Intra-OP anesthesia mangement and issues during anesthesia, Set expectations for post-procedure period and Allowed opportunity for questions and acknowledgement of understanding      Vitals: (Last set prior to Anesthesia Care Transfer)              Electronically Signed By: CORIE Sidhu CRNA  April 3, 2018  7:04 PM

## 2018-04-05 ENCOUNTER — OFFICE VISIT (OUTPATIENT)
Dept: FAMILY MEDICINE | Facility: OTHER | Age: 63
End: 2018-04-05
Attending: FAMILY MEDICINE
Payer: COMMERCIAL

## 2018-04-05 VITALS
SYSTOLIC BLOOD PRESSURE: 122 MMHG | DIASTOLIC BLOOD PRESSURE: 80 MMHG | TEMPERATURE: 98 F | HEIGHT: 71 IN | HEART RATE: 68 BPM | BODY MASS INDEX: 22.18 KG/M2 | WEIGHT: 158.4 LBS

## 2018-04-05 DIAGNOSIS — N20.0 RENAL LITHIASIS: Primary | ICD-10-CM

## 2018-04-05 PROCEDURE — G0463 HOSPITAL OUTPT CLINIC VISIT: HCPCS

## 2018-04-05 PROCEDURE — 99213 OFFICE O/P EST LOW 20 MIN: CPT | Performed by: FAMILY MEDICINE

## 2018-04-05 ASSESSMENT — PAIN SCALES - GENERAL: PAINLEVEL: MILD PAIN (2)

## 2018-04-05 NOTE — PROGRESS NOTES
"  SUBJECTIVE:   Jamir Gill is a 62 year old male who presents to clinic today for the following health issues:comes in after hospital stay for renal lithiasis; he is doing well w stent but does have some pain. He is on tamsulosin and will have stone blasted next week. He takes calcium BID and wonders if he should continue; he has not had enough water intake so will increase it.   I encouraged him to get shingrix vaccine and he will talk to his pharmacy.   We discussed his MS- he had flared last year to the point of being wheelchair bound and amazingly recovered wo any intervention (as none was felt to be potentially helpful) to a point equal to his function about 2 years ago.   He has lost wt so this may be part of the help.              Problem list and histories reviewed & adjusted, as indicated.  Additional history: as documented        Reviewed and updated as needed this visit by clinical staff  Tobacco  Allergies  Meds  Med Hx  Surg Hx  Fam Hx  Soc Hx      Reviewed and updated as needed this visit by Provider             OBJECTIVE:     /80 (BP Location: Right arm, Patient Position: Sitting, Cuff Size: Adult Regular)  Pulse 68  Temp 98  F (36.7  C) (Tympanic)  Ht 5' 10.5\" (1.791 m)  Wt 158 lb 6.4 oz (71.8 kg)  BMI 22.41 kg/m2  Body mass index is 22.41 kg/(m^2).  GENERAL: healthy, alert and no distress        ASSESSMENT/PLAN:       1. Renal lithiasis  Stable/ he will discuss calcium w Dr Saldivar and increase water intake daily indefinitely      See Patient Instructions    KEVEN GILLIAM MD  [facility]  "

## 2018-04-05 NOTE — MR AVS SNAPSHOT
"              After Visit Summary   4/5/2018    Jamir Gill    MRN: [id number]           Patient Information     Date Of Birth          1955        Visit Information        Provider Department      4/5/2018 9:00 AM John Polk MD [facility]        Today's Diagnoses     Renal lithiasis    -  1       Follow-ups after your visit        Your next 10 appointments already scheduled     Apr 10, 2018   Procedure with Olu Saldivar MD   [facility] ([facility])    [address]  [place] [address] [phone]              Who to contact     If you have questions or need follow up information about today's clinic visit or your schedule please contact [facility] directly at [phone].  Normal or non-critical lab and imaging results will be communicated to you by MyChart, letter or phone within 4 business days after the clinic has received the results. If you do not hear from us within 7 days, please contact the clinic through MyChart or phone. If you have a critical or abnormal lab result, we will notify you by phone as soon as possible.  Submit refill requests through [employer] or call your pharmacy and they will forward the refill request to us. Please allow 3 business days for your refill to be completed.          Additional Information About Your Visit        MyChart Information     [employer] lets you send messages to your doctor, view your test results, renew your prescriptions, schedule appointments and more. To sign up, go to [email or web address] . Click on \"Log in\" on the left side of the screen, which will take you to the Welcome page. Then click on \"Sign up Now\" on the right side of the page.     You will be asked to enter the access code listed below, as well as some personal information. Please follow the directions to create your username and password.     Your access code is: " [email or web address]  Expires: 7/3/2018 10:30 AM     Your access code will  in 90 days. If you need help or a new code, please call your [facility] clinic or [phone].        Care EveryWhere ID     This is your Care EveryWhere ID. This could be used by other organizations to access your [facility] medical records  [id number]        Your Vitals Were     Pulse Temperature Height BMI (Body Mass Index)          68 98  F (36.7  C) (Tympanic) 5' 10.5\" (1.791 m) 22.41 kg/m2         Blood Pressure from Last 3 Encounters:   18 122/80   18 104/63   10/24/17 136/86    Weight from Last 3 Encounters:   18 158 lb 6.4 oz (71.8 kg)   18 151 lb 7.3 oz (68.7 kg)   10/24/17 167 lb (75.8 kg)              Today, you had the following     No orders found for display       Primary Care Provider Office Phone # Fax #    John Polk [phone] [address] [facility] [address]        Equal Access to Services     [facility] Center[facility] AH: Hadii wanda piek hadarico Sosuzi, waaxda luqadaha, qaybta kaalmada adeegyada, radha miller. So [facility] [phone].    ATENCIÓN: Si habla español, tiene a harding disposición servicios gratuitos de asistencia lingüística. Llame al [phone].    We comply with applicable federal civil rights laws and [place] laws. We do not discriminate on the basis of race, color, national origin, age, disability, sex, sexual orientation, or gender identity.            Thank you!     Thank you for choosing [facility]  for your care. Our goal is always to provide you with excellent care. Hearing back from our patients is one way we can continue to improve our services. Please take a few minutes to complete the written survey that you may receive in the mail after your visit with us. Thank you!             Your Updated Medication List - Protect others around you: Learn how to safely use, store and throw away your medicines at " www.disposemymeds.org.          This list is accurate as of 4/5/18  2:34 PM.  Always use your most recent med list.                   Brand Name Dispense Instructions for use Diagnosis    BACLOFEN PO      Take 10 mg by mouth 2 times daily 10 mg QAM, 20 mg QHS        biotin 2.5 mg/mL Susp      Take 300 mg by mouth daily        calcium acetate 667 MG Caps capsule    PHOSLO     Take 1,200 mg by mouth 2 times daily        COLACE PO      Take 100 mg by mouth daily        FLOMAX 0.4 MG capsule   Generic drug:  tamsulosin      Take 0.8 mg by mouth daily        iron polysaccharides 150 MG capsule    NIFEREX     Take 150 mg by mouth every morning        meclizine 25 MG Chew      Take 25 mg by mouth 2 times daily        NONFORMULARY      500 mg by Oral or Feeding Tube route daily Cranberry        NONFORMULARY      daily Probiotics        psyllium 0.52 G capsule      Take 3 capsules by mouth 2 times daily        sulfamethoxazole-trimethoprim 800-160 MG per tablet    BACTRIM DS/SEPTRA DS    14 tablet    Take 1 tablet by mouth 2 times daily for 7 days    Kidney stone on left side       VITAMIN D (CHOLECALCIFEROL) PO      Take 1,400 Units by mouth 2 times daily

## 2018-04-05 NOTE — NURSING NOTE
Patient presents in the clinic for a ED and hospital follow up. Patient was seen in the ED on 4/3/18 for kidney stones and was discharged yesterday morning.   Page Carey LPN 4/5/2018 8:55 AM

## 2018-04-06 ENCOUNTER — TELEPHONE (OUTPATIENT)
Dept: FAMILY MEDICINE | Facility: OTHER | Age: 63
End: 2018-04-06

## 2018-04-06 LAB
BACTERIA SPEC CULT: ABNORMAL
BACTERIA SPEC CULT: ABNORMAL
SPECIMEN SOURCE: ABNORMAL

## 2018-04-06 NOTE — TELEPHONE ENCOUNTER
Jamir was placed on antibiotics after culture of urine he is asking for results and wants to have some one call him , he is also concerned if he is on an appropriate antibiotic Thank you

## 2018-04-06 NOTE — TELEPHONE ENCOUNTER
Spoke with patient who confirmed his last name and birth date. Informed patient of message below.  Page Carey LPN 4/6/2018 2:24 PM

## 2018-04-09 ENCOUNTER — ANESTHESIA EVENT (OUTPATIENT)
Dept: SURGERY | Facility: OTHER | Age: 63
End: 2018-04-09
Payer: MEDICARE

## 2018-04-09 LAB
BACTERIA SPEC CULT: NORMAL
BACTERIA SPEC CULT: NORMAL
SPECIMEN SOURCE: NORMAL
SPECIMEN SOURCE: NORMAL

## 2018-04-10 ENCOUNTER — HOSPITAL ENCOUNTER (OUTPATIENT)
Facility: OTHER | Age: 63
Discharge: HOME | End: 2018-04-10
Attending: UROLOGY | Admitting: UROLOGY
Payer: MEDICARE

## 2018-04-10 ENCOUNTER — SURGERY (OUTPATIENT)
Age: 63
End: 2018-04-10

## 2018-04-10 ENCOUNTER — ANESTHESIA (OUTPATIENT)
Dept: SURGERY | Facility: OTHER | Age: 63
End: 2018-04-10
Payer: MEDICARE

## 2018-04-10 ENCOUNTER — HOSPITAL ENCOUNTER (OUTPATIENT)
Dept: GENERAL RADIOLOGY | Facility: OTHER | Age: 63
End: 2018-04-10
Attending: UROLOGY | Admitting: UROLOGY
Payer: MEDICARE

## 2018-04-10 VITALS
OXYGEN SATURATION: 97 % | DIASTOLIC BLOOD PRESSURE: 75 MMHG | BODY MASS INDEX: 20.68 KG/M2 | WEIGHT: 146.2 LBS | TEMPERATURE: 97.4 F | HEART RATE: 87 BPM | RESPIRATION RATE: 14 BRPM | SYSTOLIC BLOOD PRESSURE: 118 MMHG

## 2018-04-10 DIAGNOSIS — N20.0 KIDNEY STONE ON LEFT SIDE: ICD-10-CM

## 2018-04-10 PROCEDURE — 52356 CYSTO/URETERO W/LITHOTRIPSY: CPT | Performed by: NURSE ANESTHETIST, CERTIFIED REGISTERED

## 2018-04-10 PROCEDURE — 25000128 H RX IP 250 OP 636: Performed by: UROLOGY

## 2018-04-10 PROCEDURE — 25000125 ZZHC RX 250: Performed by: NURSE ANESTHETIST, CERTIFIED REGISTERED

## 2018-04-10 PROCEDURE — 82365 CALCULUS SPECTROSCOPY: CPT | Performed by: UROLOGY

## 2018-04-10 PROCEDURE — 36000060 ZZH SURGERY LEVEL 3 W FLUORO 1ST 30 MIN: Performed by: UROLOGY

## 2018-04-10 PROCEDURE — 37000008 ZZH ANESTHESIA TECHNICAL FEE, 1ST 30 MIN: Performed by: UROLOGY

## 2018-04-10 PROCEDURE — 74420 UROGRAPHY RTRGR +-KUB: CPT | Mod: 26 | Performed by: UROLOGY

## 2018-04-10 PROCEDURE — C2617 STENT, NON-COR, TEM W/O DEL: HCPCS | Performed by: UROLOGY

## 2018-04-10 PROCEDURE — 36000058 ZZH SURGERY LEVEL 3 EA 15 ADDTL MIN: Performed by: UROLOGY

## 2018-04-10 PROCEDURE — C1769 GUIDE WIRE: HCPCS | Performed by: UROLOGY

## 2018-04-10 PROCEDURE — 25000125 ZZHC RX 250

## 2018-04-10 PROCEDURE — 71000014 ZZH RECOVERY PHASE 1 LEVEL 2 FIRST HR: Performed by: UROLOGY

## 2018-04-10 PROCEDURE — 25000128 H RX IP 250 OP 636: Performed by: ANESTHESIOLOGY

## 2018-04-10 PROCEDURE — 25000128 H RX IP 250 OP 636: Performed by: NURSE ANESTHETIST, CERTIFIED REGISTERED

## 2018-04-10 PROCEDURE — 27210794 ZZH OR GENERAL SUPPLY STERILE: Performed by: UROLOGY

## 2018-04-10 PROCEDURE — 52356 CYSTO/URETERO W/LITHOTRIPSY: CPT | Performed by: UROLOGY

## 2018-04-10 PROCEDURE — 25800025 ZZH RX 258: Performed by: UROLOGY

## 2018-04-10 PROCEDURE — 71000027 ZZH RECOVERY PHASE 2 EACH 15 MINS: Performed by: UROLOGY

## 2018-04-10 PROCEDURE — 52356 CYSTO/URETERO W/LITHOTRIPSY: CPT | Performed by: ANESTHESIOLOGY

## 2018-04-10 PROCEDURE — 40000306 ZZH STATISTIC PRE PROC ASSESS II: Performed by: UROLOGY

## 2018-04-10 PROCEDURE — 37000009 ZZH ANESTHESIA TECHNICAL FEE, EACH ADDTL 15 MIN: Performed by: UROLOGY

## 2018-04-10 DEVICE — IMPLANTABLE DEVICE: Type: IMPLANTABLE DEVICE | Site: URETER | Status: FUNCTIONAL

## 2018-04-10 RX ORDER — HYDROMORPHONE HYDROCHLORIDE 1 MG/ML
.3-.5 INJECTION, SOLUTION INTRAMUSCULAR; INTRAVENOUS; SUBCUTANEOUS EVERY 10 MIN PRN
Status: DISCONTINUED | OUTPATIENT
Start: 2018-04-10 | End: 2018-04-10 | Stop reason: HOSPADM

## 2018-04-10 RX ORDER — MEPERIDINE HYDROCHLORIDE 50 MG/ML
12.5 INJECTION INTRAMUSCULAR; INTRAVENOUS; SUBCUTANEOUS
Status: DISCONTINUED | OUTPATIENT
Start: 2018-04-10 | End: 2018-04-10 | Stop reason: HOSPADM

## 2018-04-10 RX ORDER — HYDRALAZINE HYDROCHLORIDE 20 MG/ML
2.5-5 INJECTION INTRAMUSCULAR; INTRAVENOUS EVERY 10 MIN PRN
Status: DISCONTINUED | OUTPATIENT
Start: 2018-04-10 | End: 2018-04-10 | Stop reason: HOSPADM

## 2018-04-10 RX ORDER — NALOXONE HYDROCHLORIDE 0.4 MG/ML
.1-.4 INJECTION, SOLUTION INTRAMUSCULAR; INTRAVENOUS; SUBCUTANEOUS
Status: DISCONTINUED | OUTPATIENT
Start: 2018-04-10 | End: 2018-04-10 | Stop reason: HOSPADM

## 2018-04-10 RX ORDER — ONDANSETRON 4 MG/1
4 TABLET, ORALLY DISINTEGRATING ORAL EVERY 30 MIN PRN
Status: DISCONTINUED | OUTPATIENT
Start: 2018-04-10 | End: 2018-04-10 | Stop reason: HOSPADM

## 2018-04-10 RX ORDER — ONDANSETRON 2 MG/ML
4 INJECTION INTRAMUSCULAR; INTRAVENOUS EVERY 30 MIN PRN
Status: DISCONTINUED | OUTPATIENT
Start: 2018-04-10 | End: 2018-04-10 | Stop reason: HOSPADM

## 2018-04-10 RX ORDER — PROPOFOL 10 MG/ML
INJECTION, EMULSION INTRAVENOUS CONTINUOUS PRN
Status: DISCONTINUED | OUTPATIENT
Start: 2018-04-10 | End: 2018-04-10

## 2018-04-10 RX ORDER — DEXAMETHASONE SODIUM PHOSPHATE 4 MG/ML
4 INJECTION, SOLUTION INTRA-ARTICULAR; INTRALESIONAL; INTRAMUSCULAR; INTRAVENOUS; SOFT TISSUE EVERY 10 MIN PRN
Status: DISCONTINUED | OUTPATIENT
Start: 2018-04-10 | End: 2018-04-10 | Stop reason: HOSPADM

## 2018-04-10 RX ORDER — ONDANSETRON 2 MG/ML
INJECTION INTRAMUSCULAR; INTRAVENOUS PRN
Status: DISCONTINUED | OUTPATIENT
Start: 2018-04-10 | End: 2018-04-10

## 2018-04-10 RX ORDER — LIDOCAINE 40 MG/G
CREAM TOPICAL
Status: DISCONTINUED | OUTPATIENT
Start: 2018-04-10 | End: 2018-04-10 | Stop reason: HOSPADM

## 2018-04-10 RX ORDER — ALBUTEROL SULFATE 0.83 MG/ML
2.5 SOLUTION RESPIRATORY (INHALATION) EVERY 4 HOURS PRN
Status: DISCONTINUED | OUTPATIENT
Start: 2018-04-10 | End: 2018-04-10 | Stop reason: HOSPADM

## 2018-04-10 RX ORDER — OXYCODONE HYDROCHLORIDE 5 MG/1
5 TABLET ORAL EVERY 4 HOURS PRN
Status: DISCONTINUED | OUTPATIENT
Start: 2018-04-10 | End: 2018-04-10

## 2018-04-10 RX ORDER — EPHEDRINE SULFATE 50 MG/ML
INJECTION, SOLUTION INTRAMUSCULAR; INTRAVENOUS; SUBCUTANEOUS PRN
Status: DISCONTINUED | OUTPATIENT
Start: 2018-04-10 | End: 2018-04-10

## 2018-04-10 RX ORDER — FENTANYL CITRATE 50 UG/ML
INJECTION, SOLUTION INTRAMUSCULAR; INTRAVENOUS PRN
Status: DISCONTINUED | OUTPATIENT
Start: 2018-04-10 | End: 2018-04-10

## 2018-04-10 RX ORDER — FENTANYL CITRATE 50 UG/ML
50 INJECTION, SOLUTION INTRAMUSCULAR; INTRAVENOUS
Status: DISCONTINUED | OUTPATIENT
Start: 2018-04-10 | End: 2018-04-10 | Stop reason: HOSPADM

## 2018-04-10 RX ORDER — PROPOFOL 10 MG/ML
INJECTION, EMULSION INTRAVENOUS PRN
Status: DISCONTINUED | OUTPATIENT
Start: 2018-04-10 | End: 2018-04-10

## 2018-04-10 RX ORDER — DEXAMETHASONE SODIUM PHOSPHATE 4 MG/ML
INJECTION, SOLUTION INTRA-ARTICULAR; INTRALESIONAL; INTRAMUSCULAR; INTRAVENOUS; SOFT TISSUE PRN
Status: DISCONTINUED | OUTPATIENT
Start: 2018-04-10 | End: 2018-04-10

## 2018-04-10 RX ORDER — OXYCODONE AND ACETAMINOPHEN 5; 325 MG/1; MG/1
1-2 TABLET ORAL EVERY 4 HOURS PRN
Status: DISCONTINUED | OUTPATIENT
Start: 2018-04-10 | End: 2018-04-10 | Stop reason: HOSPADM

## 2018-04-10 RX ORDER — FENTANYL CITRATE 50 UG/ML
25-50 INJECTION, SOLUTION INTRAMUSCULAR; INTRAVENOUS EVERY 5 MIN PRN
Status: DISCONTINUED | OUTPATIENT
Start: 2018-04-10 | End: 2018-04-10 | Stop reason: HOSPADM

## 2018-04-10 RX ORDER — ACETAMINOPHEN 10 MG/ML
INJECTION, SOLUTION INTRAVENOUS PRN
Status: DISCONTINUED | OUTPATIENT
Start: 2018-04-10 | End: 2018-04-10

## 2018-04-10 RX ORDER — KETAMINE HYDROCHLORIDE 10 MG/ML
INJECTION INTRAMUSCULAR; INTRAVENOUS PRN
Status: DISCONTINUED | OUTPATIENT
Start: 2018-04-10 | End: 2018-04-10

## 2018-04-10 RX ORDER — SODIUM CHLORIDE, SODIUM LACTATE, POTASSIUM CHLORIDE, CALCIUM CHLORIDE 600; 310; 30; 20 MG/100ML; MG/100ML; MG/100ML; MG/100ML
INJECTION, SOLUTION INTRAVENOUS CONTINUOUS
Status: DISCONTINUED | OUTPATIENT
Start: 2018-04-10 | End: 2018-04-10 | Stop reason: HOSPADM

## 2018-04-10 RX ORDER — CEFAZOLIN SODIUM 2 G/100ML
2 INJECTION, SOLUTION INTRAVENOUS
Status: COMPLETED | OUTPATIENT
Start: 2018-04-10 | End: 2018-04-10

## 2018-04-10 RX ORDER — LIDOCAINE HYDROCHLORIDE 20 MG/ML
INJECTION, SOLUTION INFILTRATION; PERINEURAL PRN
Status: DISCONTINUED | OUTPATIENT
Start: 2018-04-10 | End: 2018-04-10

## 2018-04-10 RX ADMIN — LIDOCAINE HYDROCHLORIDE 100 MG: 20 INJECTION, SOLUTION INFILTRATION; PERINEURAL at 10:49

## 2018-04-10 RX ADMIN — SODIUM CHLORIDE, SODIUM LACTATE, POTASSIUM CHLORIDE, AND CALCIUM CHLORIDE: 600; 310; 30; 20 INJECTION, SOLUTION INTRAVENOUS at 11:27

## 2018-04-10 RX ADMIN — DEXAMETHASONE SODIUM PHOSPHATE 4 MG: 4 INJECTION, SOLUTION INTRA-ARTICULAR; INTRALESIONAL; INTRAMUSCULAR; INTRAVENOUS; SOFT TISSUE at 10:49

## 2018-04-10 RX ADMIN — PHENYLEPHRINE HYDROCHLORIDE 200 MCG: 10 INJECTION, SOLUTION INTRAMUSCULAR; INTRAVENOUS; SUBCUTANEOUS at 10:58

## 2018-04-10 RX ADMIN — IOHEXOL 4 MG: 300 INJECTION, SOLUTION INTRAVENOUS at 11:33

## 2018-04-10 RX ADMIN — ACETAMINOPHEN 1000 MG: 10 INJECTION, SOLUTION INTRAVENOUS at 11:01

## 2018-04-10 RX ADMIN — ONDANSETRON 4 MG: 2 INJECTION INTRAMUSCULAR; INTRAVENOUS at 10:49

## 2018-04-10 RX ADMIN — FENTANYL CITRATE 50 MCG: 50 INJECTION, SOLUTION INTRAMUSCULAR; INTRAVENOUS at 10:48

## 2018-04-10 RX ADMIN — EPHEDRINE SULFATE 15 MG: 50 INJECTION, SOLUTION INTRAMUSCULAR; INTRAVENOUS; SUBCUTANEOUS at 10:59

## 2018-04-10 RX ADMIN — PROPOFOL 100 MCG/KG/MIN: 10 INJECTION, EMULSION INTRAVENOUS at 10:54

## 2018-04-10 RX ADMIN — CEFAZOLIN SODIUM 2 G: 2 INJECTION, SOLUTION INTRAVENOUS at 10:44

## 2018-04-10 RX ADMIN — PHENYLEPHRINE HYDROCHLORIDE 200 MCG: 10 INJECTION, SOLUTION INTRAMUSCULAR; INTRAVENOUS; SUBCUTANEOUS at 10:51

## 2018-04-10 RX ADMIN — EPHEDRINE SULFATE 10 MG: 50 INJECTION, SOLUTION INTRAMUSCULAR; INTRAVENOUS; SUBCUTANEOUS at 11:10

## 2018-04-10 RX ADMIN — EPHEDRINE SULFATE 15 MG: 50 INJECTION, SOLUTION INTRAMUSCULAR; INTRAVENOUS; SUBCUTANEOUS at 11:30

## 2018-04-10 RX ADMIN — MIDAZOLAM 2 MG: 1 INJECTION INTRAMUSCULAR; INTRAVENOUS at 10:45

## 2018-04-10 RX ADMIN — SODIUM CHLORIDE, SODIUM LACTATE, POTASSIUM CHLORIDE, AND CALCIUM CHLORIDE: 600; 310; 30; 20 INJECTION, SOLUTION INTRAVENOUS at 09:05

## 2018-04-10 RX ADMIN — Medication 30 MG: at 10:47

## 2018-04-10 RX ADMIN — SODIUM CHLORIDE 750 ML: 900 IRRIGANT IRRIGATION at 11:34

## 2018-04-10 RX ADMIN — PROPOFOL 140 MG: 10 INJECTION, EMULSION INTRAVENOUS at 10:49

## 2018-04-10 NOTE — ANESTHESIA POSTPROCEDURE EVALUATION
Patient: Jamir Gill    Procedure(s):  Left Ureteroscopy with Holmium Laser Lithotripsy & Stent Placement. - Wound Class: II-Clean Contaminated    Diagnosis:kidney / Ureteral ,stone  Diagnosis Additional Information: No value filed.    Anesthesia Type:  General, LMA    Note:  Anesthesia Post Evaluation    Patient location during evaluation: PACU  Patient participation: Able to fully participate in evaluation  Level of consciousness: awake and alert  Pain management: adequate  Airway patency: patent  Cardiovascular status: acceptable  Respiratory status: acceptable  Hydration status: acceptable  PONV: none     Anesthetic complications: None          Last vitals:  Vitals:    04/10/18 1205 04/10/18 1210 04/10/18 1215   BP: 118/76  129/76   Pulse:      Resp: 14     Temp: 97.1  F (36.2  C)  97.4  F (36.3  C)   SpO2: 95% 95% 97%         Electronically Signed By: Ramon Francis DO  April 10, 2018  12:22 PM

## 2018-04-10 NOTE — IP AVS SNAPSHOT
MRN:[id number]                      After Visit Summary   4/10/2018    Jamir Gill    MRN: [id number]           Thank you!     Thank you for choosing [facility] for your care. Our goal is always to provide you with excellent care. Hearing back from our patients is one way we can continue to improve our services. Please take a few minutes to complete the written survey that you may receive in the mail after you visit with us. Thank you!        Patient Information     Date Of Birth          1955        Designated Caregiver       Most Recent Value    Caregiver    Will someone help with your care after discharge? yes      About your hospital stay     You were admitted on:  April 10, 2018 You last received care in the:  [facility]    You were discharged on:  April 10, 2018       Who to Call     For medical emergencies, please call 911.  For non-urgent questions about your medical care, please call your primary care provider or clinic, [phone]  For questions related to your surgery, please call your surgery clinic        Attending Provider     Provider Specialty    Olu Saldivar MD Urology       Primary Care Provider Office Phone # Fax #    John Polk [phone]      After Care Instructions     Diet Instructions       Resume pre procedure diet            Discharge Instructions       Patient to arrange for follow up appointment in about 1 week for stent pull            Encourage fluids       Encourage fluids at home to keep urine clear to light pink            No Alcohol       for 24 hours post procedure            No driving or operating machinery        until the day after procedure                  Your next 10 appointments already scheduled     Apr 18, 2018  8:00 AM CDT   Cystoscopy with Olu Saldivar MD   [facility] ([facility])    [address]  [place] [id number] [phone]               Further instructions from your care team       Follow-up Appointment  A follow-up appointment has been scheduled with Dr Saldivar at [facility] on 4/18 at ***.      If you have any questions or concerns related to your procedure please contact your surgeon's nurse at [phone].    If you need to change your scheduled appointment time please call [facility]'s Scheduling Department at [phone].    Pending Results     Date and Time Order Name Status Description    4/10/2018 1113 Stone analysis In process             Admission Information     Date & Time Provider Department Dept. Phone    4/10/2018 Olu Saldivar MD [facility] [phone]      Your Vitals Were     Blood Pressure Pulse Temperature Respirations Weight Pulse Oximetry    124/79 87 97.4  F (36.3  C) 14 66.3 kg (146 lb 3.2 oz) 95%    BMI (Body Mass Index)                   20.68 kg/m2           MyChart Information     [employer] gives you secure access to your electronic health record. If you see a primary care provider, you can also send messages to your care team and make appointments. If you have questions, please call your primary care clinic.  If you do not have a primary care provider, please call [phone] and they will assist you.        Care EveryWhere ID     This is your Care EveryWhere ID. This could be used by other organizations to access your [facility] medical records  [id number]        Equal Access to Services     ANU MONTOYA : Hadii wanda lópezo Sosuzi, waaxda luqadaha, qaybta kaalmada adepaty, radha miller. So [facility] [phone].    ATENCIÓN: Si habla español, tiene a harding disposición servicios gratuitos de asistencia lingüística. Llame al [phone].    We comply with applicable federal civil rights laws and [place] laws. We do not discriminate on the basis of race, color, national origin, age, disability, sex, sexual orientation, or gender identity.               Review  of your medicines      UNREVIEWED medicines. Ask your doctor about these medicines        Dose / Directions    BACLOFEN PO        Dose:  10 mg   Take 10 mg by mouth 2 times daily 10 mg QAM, 20 mg QHS   Refills:  0       calcium acetate 667 MG Caps capsule   Commonly known as:  PHOSLO        Dose:  1200 mg   Take 1,200 mg by mouth 2 times daily   Refills:  0       COLACE PO        Dose:  100 mg   Take 100 mg by mouth daily   Refills:  0       FLOMAX 0.4 MG capsule   Generic drug:  tamsulosin        Dose:  0.8 mg   Take 0.8 mg by mouth daily   Refills:  0       iron polysaccharides 150 MG capsule   Commonly known as:  NIFEREX        Dose:  150 mg   Take 150 mg by mouth every morning   Refills:  0       meclizine 25 MG Chew        Dose:  25 mg   Take 25 mg by mouth 2 times daily   Refills:  0       NONFORMULARY        Dose:  500 mg   500 mg by Oral or Feeding Tube route daily Cranberry   Refills:  0       NONFORMULARY        daily Probiotics   Refills:  0       psyllium 0.52 g capsule        Dose:  3 capsule   Take 3 capsules by mouth 2 times daily   Refills:  0       sulfamethoxazole-trimethoprim 800-160 MG per tablet   Commonly known as:  BACTRIM DS/SEPTRA DS   Indication:  Urinary Tract Infection   Used for:  Kidney stone on left side        Dose:  1 tablet   Take 1 tablet by mouth 2 times daily for 7 days   Quantity:  14 tablet   Refills:  0       VITAMIN D (CHOLECALCIFEROL) PO        Dose:  1400 Units   Take 1,400 Units by mouth 2 times daily   Refills:  0                Protect others around you: Learn how to safely use, store and throw away your medicines at www.disposemymeds.org.             Medication List: This is a list of all your medications and when to take them. Check marks below indicate your daily home schedule. Keep this list as a reference.      Medications           Morning Afternoon Evening Bedtime As Needed    BACLOFEN PO   Take 10 mg by mouth 2 times daily 10 mg QAM, 20 mg QHS                                 calcium acetate 667 MG Caps capsule   Commonly known as:  PHOSLO   Take 1,200 mg by mouth 2 times daily                                COLACE PO   Take 100 mg by mouth daily                                FLOMAX 0.4 MG capsule   Take 0.8 mg by mouth daily   Generic drug:  tamsulosin                                iron polysaccharides 150 MG capsule   Commonly known as:  NIFEREX   Take 150 mg by mouth every morning                                meclizine 25 MG Chew   Take 25 mg by mouth 2 times daily                                NONFORMULARY   500 mg by Oral or Feeding Tube route daily Cranberry                                NONFORMULARY   daily Probiotics                                psyllium 0.52 g capsule   Take 3 capsules by mouth 2 times daily                                sulfamethoxazole-trimethoprim 800-160 MG per tablet   Commonly known as:  BACTRIM DS/SEPTRA DS   Take 1 tablet by mouth 2 times daily for 7 days                                VITAMIN D (CHOLECALCIFEROL) PO   Take 1,400 Units by mouth 2 times daily

## 2018-04-10 NOTE — ANESTHESIA CARE TRANSFER NOTE
Patient: Jamir Gill    Procedure(s):  Left Ureteroscopy with Holmium Laser Lithotripsy & Stent Placement. - Wound Class: II-Clean Contaminated    Diagnosis: kidney / Ureteral ,stone  Diagnosis Additional Information: No value filed.    Anesthesia Type:   General, LMA     Note:  Airway :LMA  Patient transferred to:PACU  Handoff Report: Identifed the Patient, Identified the Reponsible Provider, Reviewed the pertinent medical history, Discussed the surgical course, Reviewed Intra-OP anesthesia mangement and issues during anesthesia, Set expectations for post-procedure period and Allowed opportunity for questions and acknowledgement of understanding      Vitals: (Last set prior to Anesthesia Care Transfer)              Electronically Signed By: CORIE Sidhu CRNA  April 10, 2018  11:43 AM

## 2018-04-10 NOTE — IP AVS SNAPSHOT
[facility]    [address]    [place] [address]    Phone:  [phone]    Fax:  [phone]                                       After Visit Summary   4/10/2018    Jamir Gill    MRN: [id number]           After Visit Summary Signature Page     I have received my discharge instructions, and my questions have been answered. I have discussed any challenges I see with this plan with the nurse or doctor.    ..........................................................................................................................................  Patient/Patient Representative Signature      ..........................................................................................................................................  Patient Representative Print Name and Relationship to Patient    ..................................................               ................................................  Date                                            Time    ..........................................................................................................................................  Reviewed by Signature/Title    ...................................................              ..............................................  Date                                                            Time

## 2018-04-10 NOTE — OP NOTE
Preoperative diagnosis  Left ureteral stone    Postoperative diagnosis  Left ureteral stone    Procedure performed  Left ureteroscopy with holmium-YAG laser lithotripsy and basket extraction of stone fragments  Cystoscopy with left retrograde pyelogram and ureteral stent placement  Interpretation of retrograde    Surgeon  Olu Saldivar MD    Surgeon(s)/Proceduralist(s) and Assistants (if any)  Surgeon(s):  Olu Saldivar MD  Circulator: Charlie Garza RN  : Steffanie Ma Scrub: Kayla Koehler RN  Scrub Person: Dee Horton  X-Ray Technologist: Laura Larkin  Pre-Op Nurse: Anju Donaldson RN  Post-Op Nurse: Dena Espana RN    Specimen(s)  Yes  Stone fragments for biochemical analysis    (EBL) Estimated blood loss (ml)  5    Anesthesia  General    Complications  None    Findings  Cystoscopy revealed no tumors, stones or other mucosal abnormalities.  Retrograde pyelogram revealed a  moderately dilated system with identification of the stone seen on imaging.    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    Indications  62 year old male agreed to undergo the above named procedure after discussion of the alternatives, risks and benefits.      Informed consent was obtained.      Procedure  The patient was taken to the operating room and placed supine on the operating table.  Pre-operative antibiotics were administered.  Bilateral lower extremity SCDs were placed.  After induction of general anesthesia the patient was positioned in dorsal lithotomy, prepped and draped in a sterile fashion.  A time-out was performed.      I passed a 14 Yakut flexible cystoscope carefully via urethra into the bladder.  The left ureteral orifice was identified and a Sensor wire was passed retrograde to the level of the kidney and confirmed by fluoroscopy.  The flexible scope was off-loaded and the bladder emptied with a straight catheter.   An 8-10 coaxial dilator was passed without difficulty and then removed.  The MR6A semirigid ureteroscope was passed carefully along the Sensor wire through the urethra and into the distal ureter.  The stone was encountered and fragmented with a 200-micron holmium YAG laser fiber at laser settings of 0.8 joules and a frequency of 8 Hz.  The fragments were basket extracted.  At the completion of the procedure, all clinically significant fragments were removed from the ureter and only dust-like fragments remained.  The ureteroscope was withdrawn.  A 5-East [place]orese open-ended was passed over the wire and the wire removed.  A retrograde pyelogram was performed by slowly injecting 5 mL of 50% Omnipaque contrast via the 5 [place]se catheter with findings described above.  An Amplatz super-stiff was placed to the level of the renal pelvis confirmed by fluoroscopy.  A 6 x 28 [place]orese stent was positioned with the upper end in the upper pole and the lower in the bladder confirmed by fluoroscopy. The bladder was emptied and the stones drained with rigid cystoscope and the procedure was complete. The patient tolerated the procedure well and was stable throughout.    Plan  Follow up in clinic for stent pull in the next week or so.

## 2018-04-10 NOTE — INTERVAL H&P NOTE
The History and Physical has been reviewed, the patient has been examined and no changes have occurred in the patient's condition since the H & P was completed.

## 2018-04-10 NOTE — OR NURSING
PACU Transfer Note    Jamir Gill was transferred to DSU via 1211.  Equipment used for transport:  none.  Accompanied by:  Mónica    PACU Respiratory Event Documentation     1) Episodes of Apnea greater than or equal to 10 seconds: no    2) Bradypnea - less than 8 breaths per minute: 16    3) Pain score on 0 to 10 scale: 0    4) Pain-sedation mismatch (yes or no): no    5) Repeated 02 desaturation less than 90% (yes or no): no    Anesthesia notified? (yes or no): no    Any of the above events occuring repeatedly in separate 30 minute intervals may be considered recurrent PACU respiratory events.    Patient stable and meets phase 1 discharge criteria for transport from PACU.  Report given to RN at bedside upon arrival. Mónica Siddiqi RN on 4/10/2018 at 12:13 PM

## 2018-04-10 NOTE — DISCHARGE INSTRUCTIONS
Follow-up Appointment  A follow-up appointment has been scheduled with Dr Saldivar at [facility] on 4/18 at ***.      If you have any questions or concerns related to your procedure please contact your surgeon's nurse at [phone].    If you need to change your scheduled appointment time please call [facility]'s Scheduling Department at [phone].

## 2018-04-10 NOTE — ANESTHESIA PREPROCEDURE EVALUATION
Anesthesia Evaluation     . Pt has had prior anesthetic. Type: General and MAC           ROS/MED HX    ENT/Pulmonary:  - neg pulmonary ROS     Neurologic:     (+)Multiple Sclerosis     Cardiovascular:     (+) hypertension----. : . . . :. .       METS/Exercise Tolerance:     Hematologic:  - neg hematologic  ROS       Musculoskeletal:         GI/Hepatic:  - neg GI/hepatic ROS       Renal/Genitourinary:  - ROS Renal section negative       Endo:  - neg endo ROS       Psychiatric:  - neg psychiatric ROS       Infectious Disease:  - neg infectious disease ROS       Malignancy:      - no malignancy   Other:    (+) No chance of pregnancy C-spine cleared: N/A, no H/O Chronic Pain,no other significant disability                    Physical Exam  Normal systems: cardiovascular, pulmonary and dental    Airway   Mallampati: II  TM distance: >3 FB  Neck ROM: full    Dental     Cardiovascular       Pulmonary                     Anesthesia Plan      History & Physical Review      ASA Status:  2 .        Plan for General and LMA with Intravenous and Propofol induction. Maintenance will be TIVA.    PONV prophylaxis:  Ondansetron (or other 5HT-3) and Dexamethasone or Solumedrol       Postoperative Care  Postoperative pain management:  IV analgesics.      Consents  Anesthetic plan, risks, benefits and alternatives discussed with: .  Use of blood products discussed: No .   .                         .

## 2018-04-10 NOTE — H&P (VIEW-ONLY)
"  SUBJECTIVE:   Jamir Glil is a 62 year old male who presents to clinic today for the following health issues:comes in after hospital stay for renal lithiasis; he is doing well w stent but does have some pain. He is on tamsulosin and will have stone blasted next week. He takes calcium BID and wonders if he should continue; he has not had enough water intake so will increase it.   I encouraged him to get shingrix vaccine and he will talk to his pharmacy.   We discussed his MS- he had flared last year to the point of being wheelchair bound and amazingly recovered wo any intervention (as none was felt to be potentially helpful) to a point equal to his function about 2 years ago.   He has lost wt so this may be part of the help.              Problem list and histories reviewed & adjusted, as indicated.  Additional history: as documented        Reviewed and updated as needed this visit by clinical staff  Tobacco  Allergies  Meds  Med Hx  Surg Hx  Fam Hx  Soc Hx      Reviewed and updated as needed this visit by Provider             OBJECTIVE:     /80 (BP Location: Right arm, Patient Position: Sitting, Cuff Size: Adult Regular)  Pulse 68  Temp 98  F (36.7  C) (Tympanic)  Ht 5' 10.5\" (1.791 m)  Wt 158 lb 6.4 oz (71.8 kg)  BMI 22.41 kg/m2  Body mass index is 22.41 kg/(m^2).  GENERAL: healthy, alert and no distress        ASSESSMENT/PLAN:       1. Renal lithiasis  Stable/ he will discuss calcium w Dr Saldivar and increase water intake daily indefinitely      See Patient Instructions    KEVEN GILLIAM MD  [facility]  "

## 2018-04-13 ENCOUNTER — MYC MEDICAL ADVICE (OUTPATIENT)
Dept: UROLOGY | Facility: OTHER | Age: 63
End: 2018-04-13

## 2018-04-15 LAB
APPEARANCE STONE: NORMAL
COMPN STONE: NORMAL
NUMBER STONE: 6
SIZE STONE: NORMAL MM
WT STONE: 42 MG

## 2018-04-17 ENCOUNTER — TRANSFERRED RECORDS (OUTPATIENT)
Dept: HEALTH INFORMATION MANAGEMENT | Facility: OTHER | Age: 63
End: 2018-04-17

## 2018-04-18 ENCOUNTER — OFFICE VISIT (OUTPATIENT)
Dept: UROLOGY | Facility: OTHER | Age: 63
End: 2018-04-18
Attending: UROLOGY
Payer: MEDICARE

## 2018-04-18 VITALS — BODY MASS INDEX: 21.78 KG/M2 | TEMPERATURE: 97.2 F | WEIGHT: 154 LBS | RESPIRATION RATE: 16 BRPM | HEART RATE: 88 BPM

## 2018-04-18 DIAGNOSIS — N20.1 URETEROLITHIASIS: Primary | ICD-10-CM

## 2018-04-18 PROCEDURE — 52310 CYSTOSCOPY AND TREATMENT: CPT | Performed by: UROLOGY

## 2018-04-18 PROCEDURE — G0463 HOSPITAL OUTPT CLINIC VISIT: HCPCS | Mod: 25

## 2018-04-18 ASSESSMENT — PAIN SCALES - GENERAL: PAINLEVEL: NO PAIN (0)

## 2018-04-18 NOTE — PATIENT INSTRUCTIONS
Home Care after Cystoscopy  Follow these guidelines for your care after your procedure.    Activity  No limitations    Bathing or showering  No limitations    Symptoms  You may notice some burning with urination but this usually resolves after 1-2 days.  You may also notice small amounts of blood in your urine.  Please increase water intake for the next few days to help with these symptoms.    Contacts  General Questions: [phone]  Appointments:  [phone]  Emergencies:  [phone]    When to call the clinic  If you develop any of the following symptoms please call the clinic immediately.  If the clinic is closed please be seen at an urgent care clinic or the Emergency Department.  - Burning with urination that worsens after 2 days  - Unable to urinate causing severe pelvic pain  - Fevers of greater than 101 degrees F  - Flank pain that is not responding to pain medication    Follow up  Please follow up as discussed at the appointment.    Please follow the below generic recommendations to help prevent stones.    If you are interested in undergoing a work up (including blood and urine tests) to determine your patient specific factors for why you form stones and ways to specifically prevent stones in the future, ask Dr Saldivar if he hasn't already discussed this with you.      Fluids (Increase)  Please increase your fluid intake   Recommend about ten 10-ounce glasses of fluid per day (avoid dark yashira).  Please try and keep your urine clear or pale yellow.    If it is dark yellow or cloudy it is concentrated and you need to drink more fluid.  Try drinking a tall glass of water prior to every snack/meal.    Citrate (Increase)  Citrate prevents stone formation and is naturally found in urine.    It can be increased by adding concentrated lemon juice (4 ounces daily) and/or drinking diluted orange juice (50/50 with water).    Both MinuteMaid Light and Crystal Light also contain citrate and can be helpful for stone  prevention.    If you plan to drink sodas, I would recommend Diet/Sunkist and/or Diet/7UP as they contain the most citrate (which is good) compared to the yashira such as Coke/Pepsi.      Salt (Decrease)  Try to limit salt intake.  Total daily sodium intake should be less than 1500mg.  If you use salt with cooking don't add any additional salt at the table.    Protein  Limit protein intake to small to moderate portions.  For instance, a steak should be no larger than about the size of a deck of cards.  High protein intake leads to stone formation.

## 2018-04-18 NOTE — NURSING NOTE
Here for left stent removal.  Juanita De Santiago LPN on 4/18/2018 at 7:53 AM    Cipro 500 mg ordered by Dr. Saldivar.  Medication administered per verbal order   Lot # [id number]  Exp. Oct 2019  Patient tolerated well.  Juanita De Santiago..................4/18/2018  7:59 AM

## 2018-04-18 NOTE — NURSING NOTE
Patient positioned in supine position, perineum area prepped with chlorhexidene Gluconate and patient draped per sterile technique. Per verbal order read back by Olu Saldivar MD, Urojet 10mL 2% lidocaine jelly to be instilled into urethra.  Urojet- 10ml 2% Lidocaine jelly instilled into the urethra.    Urojet 2%  Lot#: [id number]  Expiration date: 2019  : [employer]  NDC: [address]-9    [place] Protocol    A. Pre-procedure verification complete Yes  1-relevant information / documentation available, reviewed and properly matched to the patient; 2-consent accurate and complete, 3-equipment and supplies available    B. Site marking complete N/A  Site marked if not in continuous attendance with patient    C. TIME OUT completed Yes  Time Out was conducted just prior to starting procedure to verify the eight required elements: 1-patient identity, 2-consent accurate and complete, 3-position, 4-correct side/site marked (if applicable), 5-procedure, 6-relevant images / results properly labeled and displayed (if applicable), 7-antibiotics / irrigation fluids (if applicable), 8-safety precautions.    After procedure perineum area rinsed. Discharge instructions reviewed with patient. Patient verbalized understanding of discharge instructions and discharged ambulatory.

## 2018-04-18 NOTE — MR AVS SNAPSHOT
After Visit Summary   4/18/2018    Jamir Gill    MRN: [id number]           Patient Information     Date Of Birth          1955        Visit Information        Provider Department      4/18/2018 8:00 AM Olu Saldivar MD [facility]        Care Instructions    Home Care after Cystoscopy  Follow these guidelines for your care after your procedure.    Activity  No limitations    Bathing or showering  No limitations    Symptoms  You may notice some burning with urination but this usually resolves after 1-2 days.  You may also notice small amounts of blood in your urine.  Please increase water intake for the next few days to help with these symptoms.    Contacts  General Questions: [phone]  Appointments:  [phone]  Emergencies:  [phone]    When to call the clinic  If you develop any of the following symptoms please call the clinic immediately.  If the clinic is closed please be seen at an urgent care clinic or the Emergency Department.  - Burning with urination that worsens after 2 days  - Unable to urinate causing severe pelvic pain  - Fevers of greater than 101 degrees F  - Flank pain that is not responding to pain medication    Follow up  Please follow up as discussed at the appointment.    Please follow the below generic recommendations to help prevent stones.    If you are interested in undergoing a work up (including blood and urine tests) to determine your patient specific factors for why you form stones and ways to specifically prevent stones in the future, ask Dr Saldivar if he hasn't already discussed this with you.      Fluids (Increase)  Please increase your fluid intake   Recommend about ten 10-ounce glasses of fluid per day (avoid dark yashira).  Please try and keep your urine clear or pale yellow.    If it is dark yellow or cloudy it is concentrated and you need to drink more fluid.  Try drinking a tall glass of water prior to every snack/meal.    Citrate  (Increase)  Citrate prevents stone formation and is naturally found in urine.    It can be increased by adding concentrated lemon juice (4 ounces daily) and/or drinking diluted orange juice (50/50 with water).    Both MinuteMaid Light and Crystal Light also contain citrate and can be helpful for stone prevention.    If you plan to drink sodas, I would recommend Diet/Sunkist and/or Diet/7UP as they contain the most citrate (which is good) compared to the yashira such as Coke/Pepsi.      Salt (Decrease)  Try to limit salt intake.  Total daily sodium intake should be less than 1500mg.  If you use salt with cooking don't add any additional salt at the table.    Protein  Limit protein intake to small to moderate portions.  For instance, a steak should be no larger than about the size of a deck of cards.  High protein intake leads to stone formation.            Follow-ups after your visit        Who to contact     If you have questions or need follow up information about today's clinic visit or your schedule please contact [facility] directly at [phone].  Normal or non-critical lab and imaging results will be communicated to you by Babel[employer]um[employer], letter or phone within 4 business days after the clinic has received the results. If you do not hear from us within 7 days, please contact the clinic through [employer] or phone. If you have a critical or abnormal lab result, we will notify you by phone as soon as possible.  Submit refill requests through [employer] or call your pharmacy and they will forward the refill request to us. Please allow 3 business days for your refill to be completed.          Additional Information About Your Visit        [employer] Information     [employer] gives you secure access to your electronic health record. If you see a primary care provider, you can also send messages to your care team and make appointments. If you have questions, please call your primary care clinic.  If you do not  have a primary care provider, please call [phone] and they will assist you.        Care EveryWhere ID     This is your Care EveryWhere ID. This could be used by other organizations to access your [place] medical records  [id number]        Your Vitals Were     Pulse Temperature Respirations BMI (Body Mass Index)          88 97.2  F (36.2  C) (Temporal) 16 21.78 kg/m2         Blood Pressure from Last 3 Encounters:   04/10/18 118/75   04/05/18 122/80   04/04/18 104/63    Weight from Last 3 Encounters:   04/18/18 69.9 kg (154 lb)   04/10/18 66.3 kg (146 lb 3.2 oz)   04/05/18 71.8 kg (158 lb 6.4 oz)              Today, you had the following     No orders found for display       Primary Care Provider Office Phone # Fax #    John Polk [phone]       [address] [facility] [address]        Equal Access to Services     [facility]: Hadii aad ku hadasho Soomaali, waaxda luqadaha, qaybta kaalmada adeegyada, waxay maryin haytammien rohan thayer . So [facility] [phone].    ATENCIÓN: Si habla español, tiene a harding disposición servicios gratuitos de asistencia lingüística. Llame al [phone].    We comply with applicable federal civil rights laws and [place] laws. We do not discriminate on the basis of race, color, national origin, age, disability, sex, sexual orientation, or gender identity.            Thank you!     Thank you for choosing [facility] AND [facility]  for your care. Our goal is always to provide you with excellent care. Hearing back from our patients is one way we can continue to improve our services. Please take a few minutes to complete the written survey that you may receive in the mail after your visit with us. Thank you!             Your Updated Medication List - Protect others around you: Learn how to safely use, store and throw away your medicines at www.disposemymeds.org.          This list is accurate as of 4/18/18  8:24 AM.  Always use your  most recent med list.                   Brand Name Dispense Instructions for use Diagnosis    BACLOFEN PO      Take 10 mg by mouth 2 times daily 10 mg QAM, 20 mg QHS        calcium acetate 667 MG Caps capsule    PHOSLO     Take 1,200 mg by mouth 2 times daily        COLACE PO      Take 100 mg by mouth daily        FLOMAX 0.4 MG capsule   Generic drug:  tamsulosin      Take 0.8 mg by mouth daily        iron polysaccharides 150 MG capsule    NIFEREX     Take 150 mg by mouth every morning        meclizine 25 MG Chew      Take 25 mg by mouth 2 times daily        NONFORMULARY      500 mg by Oral or Feeding Tube route daily Cranberry        NONFORMULARY      daily Probiotics        psyllium 0.52 g capsule      Take 3 capsules by mouth 2 times daily        VITAMIN D (CHOLECALCIFEROL) PO      Take 1,400 Units by mouth 2 times daily

## 2018-04-18 NOTE — PROGRESS NOTES
Preoperative diagnosis  Nephrolithiasis    Postoperative diagnosis  Nephrolithiasis    Procedure  Flexible cystourethroscopy with stent removal    Surgeon  Olu Saldivar MD    Anesthesia  2% lidocaine jelly intraurethrally    Complications  None    Indications  62 year old male who is status post ureteroscopy with holmium laser lithotripsy who presents for stent removal.    Procedure  The patient was given one dose of antibiotics. The patient was placed in supine position and was prepped and draped in sterile fashion.  2% lidocaine jelly was bluntly injected per urethra without difficulty. I passed the 14 Sudanese flexible cystoscope through the urethra and into the bladder.  With the aid of a stent grasper I grasped and removed the stent in its entirety.  The patient tolerated the procedure well.    Plan  L[employer]olink work up planned

## 2018-07-23 NOTE — PROGRESS NOTES
Patient Information     Patient Name  Jamir Gill MRN  [id number] Sex  Male   1955      Letter by John Polk MD at      Author:  John Polk MD Service:  (none) Author Type:  (none)    Filed:   Date of Service:   Status:  (Other)           Jamir Gill  [address]  [facility] [address]          2017    Dear Mr. Gill:    Your recent chest CT scan shows a very small spot-8 mm- that we cannot tell what it is. The majority of this type abnormality are not cancerous but a rescan in 6 months is recommended to look for growth. There is no other safe way to evaluate this at this time.  John Polk MD ....................  2017   8:25 AM

## 2018-07-24 NOTE — PROGRESS NOTES
Patient Information     Patient Name  Jamir Gill MRN  [id number] Sex  Male   1955      Letter by Raghav Olivo at      Author:  Raghav Olivo Service:  (none) Author Type:  (none)    Filed:   Date of Service:   Status:  (Other)           Jamir Gill  [address] [facility] [address]          2017    Dear Mr. Gill:    Your swallow study showed no aspiration. Great news.  John Polk MD ....................  2017   8:29 AM

## 2018-08-09 ENCOUNTER — TRANSFERRED RECORDS (OUTPATIENT)
Dept: HEALTH INFORMATION MANAGEMENT | Facility: OTHER | Age: 63
End: 2018-08-09

## 2018-11-04 ENCOUNTER — OFFICE VISIT (OUTPATIENT)
Dept: FAMILY MEDICINE | Facility: OTHER | Age: 63
End: 2018-11-04
Payer: MEDICARE

## 2018-11-04 VITALS
SYSTOLIC BLOOD PRESSURE: 132 MMHG | TEMPERATURE: 96.9 F | DIASTOLIC BLOOD PRESSURE: 88 MMHG | HEART RATE: 80 BPM | BODY MASS INDEX: 22.73 KG/M2 | WEIGHT: 162.4 LBS | HEIGHT: 71 IN

## 2018-11-04 DIAGNOSIS — R39.89 URINARY PROBLEM: ICD-10-CM

## 2018-11-04 DIAGNOSIS — N30.01 ACUTE CYSTITIS WITH HEMATURIA: Primary | ICD-10-CM

## 2018-11-04 LAB
ALBUMIN UR-MCNC: NEGATIVE MG/DL
APPEARANCE UR: CLEAR
BACTERIA #/AREA URNS HPF: ABNORMAL /HPF
BILIRUB UR QL STRIP: NEGATIVE
COLOR UR AUTO: YELLOW
GLUCOSE UR STRIP-MCNC: NEGATIVE MG/DL
HGB UR QL STRIP: ABNORMAL
KETONES UR STRIP-MCNC: NEGATIVE MG/DL
LEUKOCYTE ESTERASE UR QL STRIP: ABNORMAL
NITRATE UR QL: NEGATIVE
PH UR STRIP: 6.5 PH (ref 5–9)
RBC #/AREA URNS AUTO: ABNORMAL /HPF
SOURCE: ABNORMAL
SP GR UR STRIP: 1.01 (ref 1–1.03)
UROBILINOGEN UR STRIP-ACNC: 0.2 EU/DL (ref 0.2–1)
WBC #/AREA URNS AUTO: ABNORMAL /HPF

## 2018-11-04 PROCEDURE — 81001 URINALYSIS AUTO W/SCOPE: CPT

## 2018-11-04 PROCEDURE — 87088 URINE BACTERIA CULTURE: CPT | Performed by: FAMILY MEDICINE

## 2018-11-04 PROCEDURE — 87086 URINE CULTURE/COLONY COUNT: CPT | Performed by: FAMILY MEDICINE

## 2018-11-04 PROCEDURE — G0463 HOSPITAL OUTPT CLINIC VISIT: HCPCS

## 2018-11-04 PROCEDURE — 99213 OFFICE O/P EST LOW 20 MIN: CPT | Performed by: FAMILY MEDICINE

## 2018-11-04 RX ORDER — CALCIUM CARBONATE 500 MG/1
1250 TABLET, CHEWABLE ORAL
COMMUNITY
Start: 2017-09-04 | End: 2022-09-02

## 2018-11-04 RX ORDER — SULFAMETHOXAZOLE/TRIMETHOPRIM 800-160 MG
1 TABLET ORAL 2 TIMES DAILY
Qty: 14 TABLET | Refills: 0 | Status: SHIPPED | OUTPATIENT
Start: 2018-11-04 | End: 2018-11-29

## 2018-11-04 RX ORDER — OXYBUTYNIN CHLORIDE 5 MG/1
5 TABLET, EXTENDED RELEASE ORAL
COMMUNITY
Start: 2018-08-09 | End: 2019-08-09

## 2018-11-04 ASSESSMENT — PAIN SCALES - GENERAL: PAINLEVEL: MILD PAIN (2)

## 2018-11-04 NOTE — NURSING NOTE
"Patient presents to clinic for urinary infection symptoms x 2 days, patient self caths, and has had history of uti.  Goldie Balbuena LPN....................  11/4/2018   10:51 AM    Chief Complaint   Patient presents with     Urinary Problem       Initial There were no vitals taken for this visit. Estimated body mass index is 21.78 kg/(m^2) as calculated from the following:    Height as of 4/5/18: 5' 10.5\" (1.791 m).    Weight as of 4/18/18: 154 lb (69.9 kg).  Medication Reconciliation: complete    Goldie Balbuena LPN    "

## 2018-11-04 NOTE — PROGRESS NOTES
SUBJECTIVE:   Jamir Gill is a 63 year old male who presents to clinic today for the following health issues: urinary problems     UTI   This is a new (pt has ms and self caths and patient reports he usually gets one UTI per year) problem. The current episode started in the past 7 days. The problem occurs constantly. Associated symptoms include urinary symptoms. Associated symptoms comments: freq urgency cloudy and smell. The treatment provided no relief.         Patient Active Problem List    Diagnosis Date Noted     Multiple sclerosis (H)      Priority: Medium     No Comments Provided       Essential (primary) hypertension      Priority: Medium     No Comments Provided       Ureterolithiasis 04/03/2018     Priority: Medium     Past Medical History:   Diagnosis Date     Cervical disc disorder     No Comments Provided     Enlarged prostate with lower urinary tract symptoms (LUTS)     No Comments Provided     Essential (primary) hypertension     No Comments Provided     Multiple sclerosis (H)     No Comments Provided     Other specified disorders of bone density and structure, unspecified site (CODE)     No Comments Provided     Other symptoms and signs involving cognitive functions and awareness     No Comments Provided     Spondylosis of cervical region without myelopathy or radiculopathy     No Comments Provided      Past Surgical History:   Procedure Laterality Date     APPENDECTOMY OPEN      appendectomy     COLONOSCOPY      2010,with polyps resected     COLONOSCOPY      2015,[place]- colitis     COMBINED CYSTOSCOPY, URETEROSCOPY, LASER HOLMIUM LITHOTRIPSY URETER(S) Left 4/10/2018    Procedure: COMBINED CYSTOSCOPY, URETEROSCOPY, LASER HOLMIUM LITHOTRIPSY URETER(S);  Left Ureteroscopy with Holmium Laser Lithotripsy & Stent Placement.;  Surgeon: Olu Saldivar MD;  Location:  OR     CYSTOSCOPY, RETROGRADES, INSERT STENT URETER(S), COMBINED Left 4/3/2018    Procedure: COMBINED CYSTOSCOPY, RETROGRADES,  "INSERT STENT URETER(S);;  Surgeon: Olu Saldivar MD;  Location: [facility] OR     FRACTURE SURGERY       PROSTATE SURGERY      ,PROSTATECTOMY,Laser prostate surgery -- BPH       Review of Systems     OBJECTIVE:     /88 (BP Location: Right arm, Patient Position: Sitting, Cuff Size: Adult Regular)  Pulse 80  Temp 96.9  F (36.1  C) (Tympanic)  Ht 5' 10.5\" (1.791 m)  Wt 162 lb 6.4 oz (73.7 kg)  BMI 22.97 kg/m2  Body mass index is 22.97 kg/(m^2).  Physical Exam   Constitutional: He appears well-developed and well-nourished.   Abdominal: Soft. He exhibits no distension.   Musculoskeletal:   No cv tenderness     Neurological: He is alert.   Psychiatric: He has a normal mood and affect.       Diagnostic Test Results:  No results found for this or any previous visit (from the past 24 hour(s)).    ASSESSMENT/PLAN:         1. Acute cystitis with hematuria  We will culture the urine  - sulfamethoxazole-trimethoprim (BACTRIM DS/SEPTRA DS) 800-160 MG per tablet; Take 1 tablet by mouth 2 times daily  Dispense: 14 tablet; Refill: 0  - Urine Culture Aerobic Bacterial    2. Urinary problem    - Urinalysis w Reflex Microscopic If Positive  - Urine Microscopic      [facility] NURSE  [facility] AND [facility]  "

## 2018-11-04 NOTE — MR AVS SNAPSHOT
"              After Visit Summary   11/4/2018    Jamir Gill    MRN: [id number]           Patient Information     Date Of Birth          1955        Visit Information        Provider Department      11/4/2018 10:45 AM Rigoberto Anderson MD [facility]        Today's Diagnoses     Acute cystitis with hematuria    -  1    Urinary problem           Follow-ups after your visit        Who to contact     If you have questions or need follow up information about today's clinic visit or your schedule please contact [facility] directly at [phone].  Normal or non-critical lab and imaging results will be communicated to you by [employer], letter or phone within 4 business days after the clinic has received the results. If you do not hear from us within 7 days, please contact the clinic through [employer] or phone. If you have a critical or abnormal lab result, we will notify you by phone as soon as possible.  Submit refill requests through [employer] or call your pharmacy and they will forward the refill request to us. Please allow 3 business days for your refill to be completed.          Additional Information About Your Visit        MyChart Information     [employer] gives you secure access to your electronic health record. If you see a primary care provider, you can also send messages to your care team and make appointments. If you have questions, please call your primary care clinic.  If you do not have a primary care provider, please call [phone] and they will assist you.        Care EveryWhere ID     This is your Care EveryWhere ID. This could be used by other organizations to access your [place] medical records  [id number]        Your Vitals Were     Pulse Temperature Height BMI (Body Mass Index)          80 96.9  F (36.1  C) (Tympanic) 5' 10.5\" (1.791 m) 22.97 kg/m2         Blood Pressure from Last 3 Encounters:   11/04/18 132/88   04/10/18 118/75   04/05/18 122/80    " Weight from Last 3 Encounters:   11/04/18 162 lb 6.4 oz (73.7 kg)   04/18/18 154 lb (69.9 kg)   04/10/18 146 lb 3.2 oz (66.3 kg)              We Performed the Following     Urinalysis w Reflex Microscopic If Positive     Urine Culture Aerobic Bacterial     Urine Microscopic          Today's Medication Changes          These changes are accurate as of 11/4/18 11:36 AM.  If you have any questions, ask your nurse or doctor.               Start taking these medicines.        Dose/Directions    sulfamethoxazole-trimethoprim 800-160 MG per tablet   Commonly known as:  BACTRIM DS/SEPTRA DS   Used for:  Acute cystitis with hematuria   Started by:  Rigoberto Anderson MD        Dose:  1 tablet   Take 1 tablet by mouth 2 times daily   Quantity:  14 tablet   Refills:  0            Where to get your medicines      These medications were sent to [facility] [address] [facility] - [place], [place] - [address]  [address] [facility] [address]     Phone:  [phone]     sulfamethoxazole-trimethoprim 800-160 MG per tablet                Primary Care Provider Fax #    Physician No Ref-Primary [phone]       No address on file        Equal Access to Services     [facility]: Hadii wanda lópezo Sosuzi, waaxda luqadaha, qaybta kaalmada adeegyada, radha miller. So [facility] [phone].    ATENCIÓN: Si habla español, tiene a harding disposición servicios gratuitos de asistencia lingüística. Llame al [phone].    We comply with applicable federal civil rights laws and [place] laws. We do not discriminate on the basis of race, color, national origin, age, disability, sex, sexual orientation, or gender identity.            Thank you!     Thank you for choosing [facility]  for your care. Our goal is always to provide you with excellent care. Hearing back from our patients is one way we can continue to improve our services. Please take a few minutes to complete the written  survey that you may receive in the mail after your visit with us. Thank you!             Your Updated Medication List - Protect others around you: Learn how to safely use, store and throw away your medicines at www.disposemymeds.org.          This list is accurate as of 11/4/18 11:36 AM.  Always use your most recent med list.                   Brand Name Dispense Instructions for use Diagnosis    BACLOFEN PO      Take 10 mg by mouth 2 times daily 10 mg QAM, 20 mg QHS        CALCIUM ANTACID 500 MG chewable tablet   Generic drug:  calcium carbonate      Take 1,250 mg by mouth        COLACE PO      Take 100 mg by mouth daily        FLOMAX 0.4 MG capsule   Generic drug:  tamsulosin      Take 0.8 mg by mouth daily        iron polysaccharides 150 MG capsule    NIFEREX     Take 150 mg by mouth every morning        meclizine 25 MG Chew      Take 25 mg by mouth 2 times daily        NONFORMULARY      500 mg by Oral or Feeding Tube route daily Cranberry        NONFORMULARY      daily Probiotics        oxybutynin 5 MG 24 hr tablet    DITROPAN-XL     Take 5 mg by mouth        psyllium 0.52 g capsule      Take 3 capsules by mouth 2 times daily        sulfamethoxazole-trimethoprim 800-160 MG per tablet    BACTRIM DS/SEPTRA DS    14 tablet    Take 1 tablet by mouth 2 times daily    Acute cystitis with hematuria       VITAMIN D (CHOLECALCIFEROL) PO      Take 1,400 Units by mouth 2 times daily

## 2018-11-06 ENCOUNTER — TRANSFERRED RECORDS (OUTPATIENT)
Dept: HEALTH INFORMATION MANAGEMENT | Facility: OTHER | Age: 63
End: 2018-11-06

## 2018-11-06 LAB
BACTERIA SPEC CULT: ABNORMAL
SPECIMEN SOURCE: ABNORMAL

## 2018-11-29 ENCOUNTER — HOSPITAL ENCOUNTER (OUTPATIENT)
Dept: CT IMAGING | Facility: OTHER | Age: 63
Discharge: HOME | End: 2018-11-29
Attending: NURSE PRACTITIONER | Admitting: NURSE PRACTITIONER
Payer: MEDICARE

## 2018-11-29 ENCOUNTER — OFFICE VISIT (OUTPATIENT)
Dept: FAMILY MEDICINE | Facility: OTHER | Age: 63
End: 2018-11-29
Attending: NURSE PRACTITIONER
Payer: MEDICARE

## 2018-11-29 VITALS
DIASTOLIC BLOOD PRESSURE: 112 MMHG | SYSTOLIC BLOOD PRESSURE: 170 MMHG | WEIGHT: 162.5 LBS | HEART RATE: 80 BPM | BODY MASS INDEX: 22.99 KG/M2 | TEMPERATURE: 97.2 F

## 2018-11-29 DIAGNOSIS — T83.511A URINARY TRACT INFECTION ASSOCIATED WITH CATHETERIZATION OF URINARY TRACT, UNSPECIFIED INDWELLING URINARY CATHETER TYPE, INITIAL ENCOUNTER (H): Primary | ICD-10-CM

## 2018-11-29 DIAGNOSIS — I10 ESSENTIAL HYPERTENSION: ICD-10-CM

## 2018-11-29 DIAGNOSIS — R31.0 GROSS HEMATURIA: ICD-10-CM

## 2018-11-29 DIAGNOSIS — N20.0 CALCULUS OF KIDNEY: ICD-10-CM

## 2018-11-29 DIAGNOSIS — N39.0 URINARY TRACT INFECTION ASSOCIATED WITH CATHETERIZATION OF URINARY TRACT, UNSPECIFIED INDWELLING URINARY CATHETER TYPE, INITIAL ENCOUNTER (H): Primary | ICD-10-CM

## 2018-11-29 LAB
ALBUMIN SERPL-MCNC: 4.2 G/DL (ref 3.5–5.7)
ALBUMIN UR-MCNC: NEGATIVE MG/DL
ALP SERPL-CCNC: 81 U/L (ref 34–104)
ALT SERPL W P-5'-P-CCNC: 22 U/L (ref 7–52)
ANION GAP SERPL CALCULATED.3IONS-SCNC: 4 MMOL/L (ref 3–14)
APPEARANCE UR: ABNORMAL
AST SERPL W P-5'-P-CCNC: 19 U/L (ref 13–39)
BACTERIA #/AREA URNS HPF: ABNORMAL /HPF
BASOPHILS # BLD AUTO: 0 10E9/L (ref 0–0.2)
BASOPHILS NFR BLD AUTO: 0.7 %
BILIRUB SERPL-MCNC: 0.5 MG/DL (ref 0.3–1)
BILIRUB UR QL STRIP: NEGATIVE
BUN SERPL-MCNC: 23 MG/DL (ref 7–25)
CALCIUM SERPL-MCNC: 10 MG/DL (ref 8.6–10.3)
CHLORIDE SERPL-SCNC: 101 MMOL/L (ref 98–107)
CO2 SERPL-SCNC: 34 MMOL/L (ref 21–31)
COLOR UR AUTO: YELLOW
CREAT SERPL-MCNC: 1 MG/DL (ref 0.7–1.3)
DIFFERENTIAL METHOD BLD: NORMAL
EOSINOPHIL # BLD AUTO: 0.2 10E9/L (ref 0–0.7)
EOSINOPHIL NFR BLD AUTO: 3.1 %
ERYTHROCYTE [DISTWIDTH] IN BLOOD BY AUTOMATED COUNT: 12.7 % (ref 10–15)
GFR SERPL CREATININE-BSD FRML MDRD: 75 ML/MIN/1.7M2
GLUCOSE SERPL-MCNC: 84 MG/DL (ref 70–105)
GLUCOSE UR STRIP-MCNC: NEGATIVE MG/DL
HCT VFR BLD AUTO: 47 % (ref 40–53)
HGB BLD-MCNC: 15.4 G/DL (ref 13.3–17.7)
HGB UR QL STRIP: ABNORMAL
IMM GRANULOCYTES # BLD: 0 10E9/L (ref 0–0.4)
IMM GRANULOCYTES NFR BLD: 0.2 %
KETONES UR STRIP-MCNC: NEGATIVE MG/DL
LEUKOCYTE ESTERASE UR QL STRIP: ABNORMAL
LYMPHOCYTES # BLD AUTO: 1.6 10E9/L (ref 0.8–5.3)
LYMPHOCYTES NFR BLD AUTO: 27.3 %
MCH RBC QN AUTO: 29 PG (ref 26.5–33)
MCHC RBC AUTO-ENTMCNC: 32.8 G/DL (ref 31.5–36.5)
MCV RBC AUTO: 89 FL (ref 78–100)
MONOCYTES # BLD AUTO: 0.5 10E9/L (ref 0–1.3)
MONOCYTES NFR BLD AUTO: 8.7 %
NEUTROPHILS # BLD AUTO: 3.5 10E9/L (ref 1.6–8.3)
NEUTROPHILS NFR BLD AUTO: 60 %
NITRATE UR QL: POSITIVE
PH UR STRIP: 7 PH (ref 5–9)
PLATELET # BLD AUTO: 217 10E9/L (ref 150–450)
POTASSIUM SERPL-SCNC: 4 MMOL/L (ref 3.5–5.1)
PROT SERPL-MCNC: 7 G/DL (ref 6.4–8.9)
RBC # BLD AUTO: 5.31 10E12/L (ref 4.4–5.9)
RBC #/AREA URNS AUTO: ABNORMAL /HPF
SODIUM SERPL-SCNC: 139 MMOL/L (ref 134–144)
SOURCE: ABNORMAL
SP GR UR STRIP: 1.01 (ref 1–1.03)
UROBILINOGEN UR STRIP-ACNC: 0.2 EU/DL (ref 0.2–1)
WBC # BLD AUTO: 5.8 10E9/L (ref 4–11)
WBC #/AREA URNS AUTO: ABNORMAL /HPF

## 2018-11-29 PROCEDURE — G0463 HOSPITAL OUTPT CLINIC VISIT: HCPCS | Mod: 25

## 2018-11-29 PROCEDURE — 36415 COLL VENOUS BLD VENIPUNCTURE: CPT | Performed by: NURSE PRACTITIONER

## 2018-11-29 PROCEDURE — 99214 OFFICE O/P EST MOD 30 MIN: CPT | Performed by: NURSE PRACTITIONER

## 2018-11-29 PROCEDURE — 80053 COMPREHEN METABOLIC PANEL: CPT | Performed by: NURSE PRACTITIONER

## 2018-11-29 PROCEDURE — 81001 URINALYSIS AUTO W/SCOPE: CPT | Performed by: NURSE PRACTITIONER

## 2018-11-29 PROCEDURE — 74176 CT ABD & PELVIS W/O CONTRAST: CPT

## 2018-11-29 PROCEDURE — 85025 COMPLETE CBC W/AUTO DIFF WBC: CPT | Performed by: NURSE PRACTITIONER

## 2018-11-29 PROCEDURE — 87086 URINE CULTURE/COLONY COUNT: CPT | Performed by: NURSE PRACTITIONER

## 2018-11-29 PROCEDURE — 87088 URINE BACTERIA CULTURE: CPT | Performed by: NURSE PRACTITIONER

## 2018-11-29 RX ORDER — CIPROFLOXACIN 500 MG/1
500 TABLET, FILM COATED ORAL 2 TIMES DAILY
Qty: 14 TABLET | Refills: 0 | Status: SHIPPED | OUTPATIENT
Start: 2018-11-29 | End: 2020-06-26

## 2018-11-29 ASSESSMENT — ENCOUNTER SYMPTOMS
MUSCULOSKELETAL NEGATIVE: 1
WEAKNESS: 0
FEVER: 0

## 2018-11-29 ASSESSMENT — PAIN SCALES - GENERAL: PAINLEVEL: NO PAIN (0)

## 2018-11-29 NOTE — NURSING NOTE
"Chief Complaint   Patient presents with     Urinary Problem     Pt present to clinic today for a possible UTi, he states he has just treated on 11/4/18 and he is still having symptoms, cloudy urine and an odor.  Initial BP (!) 180/110 (BP Location: Left arm, Patient Position: Sitting, Cuff Size: Adult Regular)  Pulse 80  Temp 97.2  F (36.2  C) (Tympanic)  Wt 162 lb 8 oz (73.7 kg)  BMI 22.99 kg/m2 Estimated body mass index is 22.99 kg/(m^2) as calculated from the following:    Height as of 11/4/18: 5' 10.5\" (1.791 m).    Weight as of this encounter: 162 lb 8 oz (73.7 kg).  Medication Reconciliation: complete    Lisa Johns LPN  "

## 2018-11-29 NOTE — MR AVS SNAPSHOT
After Visit Summary   11/29/2018    Jamir Gill    MRN: [id number]           Patient Information     Date Of Birth          1955        Visit Information        Provider Department      11/29/2018 10:30 AM Ghazala Meza NP [facility]        Today's Diagnoses     Urinary tract infection associated with catheterization of urinary tract, unspecified indwelling urinary catheter type, initial encounter (H)    -  1    Urinary problem        Gross hematuria        Essential hypertension          Care Instructions      Bladder Infection, Male (Adult)    You have a bladder infection.  Urine is normally free of bacteria. But bacteria can get into the urinary tract from the skin around the rectum or it may travel in the blood from elsewhere in the body.  This is called a urinary tract infection (UTI). An infection can occur anywhere in the urinary tract. It could be in a kidney (pyelonephritis)or in the bladder (cystitis) and urethra (urethritis). The urethra is the tube that drains the urine from the bladder through the tip of the penis.  The most common place for a UTI is in the bladder. This is called a bladder infection. Most bladder infections are easily treated. They are not serious unless the infection spreads up to the kidney.  The terms bladder infection, UTI, and cystitis are often used to describe the same thing, but they aren t always the same. Cystitis is an inflammation of the bladder. The most common cause of cystitis is an infection.   Keep in mind:    Infections in the urine are called UTIs.    Cystitis is usually caused by a UTI.    Not all UTIs and cases of cystitis are bladder infections.    Bladder infections are the most common type of cystitis.  Symptoms of a bladder infection  The infection causes inflammation in the urethra and bladder. This inflammation causes many of the symptoms. The most common symptoms of a bladder infection are:    Pain or  burning when urinating    Having to go more often than usual    Feeling like you need to go right away    Only a small amount comes out    Blood in urine    Discomfort in your belly (abdomen), usually in the lower abdomen, above the pubic bone    Cloudy, strong, or bad smelling urine    Unable to urinate (retention)    Urinary incontinence    Fever    Loss of appetite  Older adults may also feel confused.  Causes of a bladder infection  Bladder infections are not contagious. You can't get one from someone else, from a toilet seat, or from sharing a bath.  The most common cause of bladder infections is bacteria from the bowels. The bacteria get onto the skin around the opening of the urethra. From there they can get into the urine and travel up to the bladder. This causes inflammation and an infection. This usually happens because of:    An enlarged prostate    Poor cleaning of the genitals    Procedures that put a tube in your bladder, like a Rossi catheter    Bowel incontinence    Older age    Not emptying your bladder (The urine stays there, giving the bacteria a chance to grow.)    Dehydration (This allows urine to stay in the bladder longer.)    Constipation (This can cause the bowels to push on the bladder or urethra and keep the bladder from emptying.)  Treatment  Bladder infections are treated with antibiotics. They usually clear up quickly without complications. Treatment helps prevent a more serious kidney infection.  Medicines  Medicines can help in the treatment of a bladder infection:    You may have been given phenazopyridine to ease burning when you urinate. It will cause your urine to be bright orange. It can stain clothing.    You may have been prescribed antibiotics. Take this medicine until you have finished it, even if you feel better. Taking all of the medicine will make sure the infection has cleared.  You can use acetaminophen or ibuprofen for pain, fever, or discomfort, unless another medicine  was prescribed. You can also alternate them, or use both together. They work differently and are a different class of medicines, so taking them together is not an overdose. If you have chronic liver or kidney disease, talk with your healthcare provider before using these medicines. Also talk with your provider if you ve had a stomach ulcer or GI bleeding or are taking blood thinner medicines.  Home care  Here are some guidelines to help you care for yourself at home:    Drink plenty of fluids, unless your healthcare provider told you not to. Fluids will prevent dehydration and flush out your bladder.    Use good personal hygiene. Wipe from front to back after using the toilet, and clean your penis regularly. If you aren t circumcised, retract the foreskin when cleaning.    Urinate more frequently, and don t try to hold it in for long periods of time, if possible.    Wear loose-fitting clothes and cotton underwear. Avoid tight-fitting pants. This helps keep you clean and dry.    Change your diet to prevent constipation. This means eating more fresh foods and more fiber, and less junk and fatty foods.    Avoid sex until your symptoms are gone.    Avoid caffeine, alcohol, and spicy foods. These can irritate the bladder.  Follow-up care  Follow up with your healthcare provider, or as advised if all symptoms have not cleared up within 5 days. It is important to keep your follow-up appointment. You can talk with your provider to see if you need more tests of the urinary tract. This is especially important if you have infections that keep coming back.  If a culture was done, you will be told if your treatment needs to be changed. If directed, you can call to find out the results.  If X-rays were taken, you will be told of any findings that may affect your care.  Call 911  Call 911 if any of these occur:    Trouble breathing    Difficulty waking up    Feeling confused    Fainting or loss of consciousness    Rapid heart  rate  When to seek medical advice  Call your healthcare provider right away if any of these occur:    Fever of 100.4 F (38 C) or higher, or as directed by your healthcare provider    Your symptoms don t improve after 2 days of treatment    Back or abdominal pain that gets worse    Repeated vomiting, or you aren t able to keep medicine down    Weakness or dizziness  Date Last Reviewed: 10/1/2016    3001-7038 [employer]. [address] [place] [address], [place] [address]. All rights reserved. This information is not intended as a substitute for professional medical care. Always follow your healthcare professional's instructions.                Follow-ups after your visit        Follow-up notes from your care team     Return in about 5 days (around 12/4/2018) for BP Recheck and re-evaluation.      Who to contact     If you have questions or need follow up information about today's clinic visit or your schedule please contact [facility] directly at [phone].  Normal or non-critical lab and imaging results will be communicated to you by Workablehart, letter or phone within 4 business days after the clinic has received the results. If you do not hear from us within 7 days, please contact the clinic through Qvolvet or phone. If you have a critical or abnormal lab result, we will notify you by phone as soon as possible.  Submit refill requests through Glowpoint or call your pharmacy and they will forward the refill request to us. Please allow 3 business days for your refill to be completed.          Additional Information About Your Visit        Workablehart Information     G[employer]point gives you secure access to your electronic health record. If you see a primary care provider, you can also send messages to your care team and make appointments. If you have questions, please call your primary care clinic.  If you do not have a primary care provider, please call [phone] and they will assist you.         Care EveryWhere ID     This is your Care EveryWhere ID. This could be used by other organizations to access your [place] medical records  [id number]        Your Vitals Were     Pulse Temperature BMI (Body Mass Index)             80 97.2  F (36.2  C) (Tympanic) 22.99 kg/m2          Blood Pressure from Last 3 Encounters:   11/29/18 (!) 170/112   11/04/18 132/88   04/10/18 118/75    Weight from Last 3 Encounters:   11/29/18 162 lb 8 oz (73.7 kg)   11/04/18 162 lb 6.4 oz (73.7 kg)   04/18/18 154 lb (69.9 kg)              We Performed the Following     CBC and Differential     Comprehensive Metabolic Panel     CT Abdomen Pelvis w/o Contrast     Urinalysis w Reflex Microscopic If Positive     Urine Culture Aerobic Bacterial     Urine Microscopic          Today's Medication Changes          These changes are accurate as of 11/29/18 12:18 PM.  If you have any questions, ask your nurse or doctor.               Start taking these medicines.        Dose/Directions    ciprofloxacin 500 MG tablet   Commonly known as:  CIPRO   Used for:  Urinary tract infection associated with catheterization of urinary tract, unspecified indwelling urinary catheter type, initial encounter (H), Gross hematuria   Started by:  Ghazala Meza NP        Dose:  500 mg   Take 1 tablet (500 mg) by mouth 2 times daily   Quantity:  14 tablet   Refills:  0            Where to get your medicines      These medications were sent to [id number] - [place], [place] - [address]  [address] [facility] [address]     Phone:  [phone]     ciprofloxacin 500 MG tablet                Primary Care Provider Fax #    Physician No Ref-Primary [phone]       No address on file        Equal Access to Services     DEANA MONTOYA : Usama Dunn, joaquín harris, radha kothari So [facility] [phone].    ATENCIÓN: Si habla español, tiene a harding disposición  servicios gratuitos de asistencia lingüística. Vasyl larson [phone].    We comply with applicable federal civil rights laws and [place] laws. We do not discriminate on the basis of race, color, national origin, age, disability, sex, sexual orientation, or gender identity.            Thank you!     Thank you for choosing [facility] [place]  for your care. Our goal is always to provide you with excellent care. Hearing back from our patients is one way we can continue to improve our services. Please take a few minutes to complete the written survey that you may receive in the mail after your visit with us. Thank you!             Your Updated Medication List - Protect others around you: Learn how to safely use, store and throw away your medicines at www.disposemymeds.org.          This list is accurate as of 11/29/18 12:18 PM.  Always use your most recent med list.                   Brand Name Dispense Instructions for use Diagnosis    BACLOFEN PO      Take 10 mg by mouth 2 times daily 10 mg QAM, 20 mg QHS        CALCIUM ANTACID 500 MG chewable tablet   Generic drug:  calcium carbonate      Take 1,250 mg by mouth        ciprofloxacin 500 MG tablet    CIPRO    14 tablet    Take 1 tablet (500 mg) by mouth 2 times daily    Urinary tract infection associated with catheterization of urinary tract, unspecified indwelling urinary catheter type, initial encounter (H), Gross hematuria       COLACE PO      Take 100 mg by mouth daily        FLOMAX 0.4 MG capsule   Generic drug:  tamsulosin      Take 0.8 mg by mouth daily        iron polysaccharides 150 MG capsule    NIFEREX     Take 150 mg by mouth every morning        meclizine 25 MG Chew      Take 25 mg by mouth 2 times daily        NONFORMULARY      500 mg by Oral or Feeding Tube route daily Cranberry        NONFORMULARY      daily Probiotics        oxybutynin ER 5 MG 24 hr tablet    DITROPAN-XL     Take 5 mg by mouth        psyllium 0.52 g capsule     METAMUCIL/KONSYL     Take 3 capsules by mouth 2 times daily        VITAMIN D (CHOLECALCIFEROL) PO      Take 1,400 Units by mouth 2 times daily

## 2018-11-29 NOTE — PROGRESS NOTES
"UTI   This is a recurrent problem. The current episode started 1 to 4 weeks ago. Pertinent negatives include no fever or weakness. Associated symptoms comments: Odorous, cloudy, dark colored urine. . Nothing aggravates the symptoms. He has tried nothing for the symptoms.   Nursing Notes:   Lisa Johns LPN  11/29/2018 10:49 AM  Signed  Chief Complaint   Patient presents with     Urinary Problem     Pt present to clinic today for a possible UTi, he states he has just treated on 11/4/18 and he is still having symptoms, cloudy urine and an odor.  Initial BP (!) 180/110 (BP Location: Left arm, Patient Position: Sitting, Cuff Size: Adult Regular)  Pulse 80  Temp 97.2  F (36.2  C) (Tympanic)  Wt 162 lb 8 oz (73.7 kg)  BMI 22.99 kg/m2 Estimated body mass index is 22.99 kg/(m^2) as calculated from the following:    Height as of 11/4/18: 5' 10.5\" (1.791 m).    Weight as of this encounter: 162 lb 8 oz (73.7 kg).  Medication Reconciliation: complete    Lisa Johns LPN     No Known Allergies  Patient Active Problem List   Diagnosis     Ureterolithiasis     Multiple sclerosis (H)     Essential (primary) hypertension     Current Outpatient Prescriptions   Medication     ciprofloxacin (CIPRO) 500 MG tablet     BACLOFEN PO     calcium carbonate (CALCIUM ANTACID) 500 MG chewable tablet     Docusate Sodium (COLACE PO)     iron polysaccharides (NIFEREX) 150 MG capsule     meclizine 25 MG CHEW     NONFORMULARY     NONFORMULARY     oxybutynin (DITROPAN-XL) 5 MG 24 hr tablet     psyllium 0.52 G capsule     tamsulosin (FLOMAX) 0.4 MG capsule     VITAMIN D, CHOLECALCIFEROL, PO     No current facility-administered medications for this visit.      Past Medical History:   Diagnosis Date     Cervical disc disorder     No Comments Provided     Enlarged prostate with lower urinary tract symptoms (LUTS)     No Comments Provided     Essential (primary) hypertension     No Comments Provided     Multiple sclerosis (H)     No Comments " Provided     Other specified disorders of bone density and structure, unspecified site (CODE)     No Comments Provided     Other symptoms and signs involving cognitive functions and awareness     No Comments Provided     Spondylosis of cervical region without myelopathy or radiculopathy     No Comments Provided     Past Surgical History:   Procedure Laterality Date     APPENDECTOMY OPEN      appendectomy     COLONOSCOPY      2010,with polyps resected     COLONOSCOPY      2015,[place]- colitis     COMBINED CYSTOSCOPY, URETEROSCOPY, LASER HOLMIUM LITHOTRIPSY URETER(S) Left 4/10/2018    Procedure: COMBINED CYSTOSCOPY, URETEROSCOPY, LASER HOLMIUM LITHOTRIPSY URETER(S);  Left Ureteroscopy with Holmium Laser Lithotripsy & Stent Placement.;  Surgeon: Olu Saldivar MD;  Location: [facility] OR     CYSTOSCOPY, RETROGRADES, INSERT STENT URETER(S), COMBINED Left 4/3/2018    Procedure: COMBINED CYSTOSCOPY, RETROGRADES, INSERT STENT URETER(S);;  Surgeon: Olu Saldivar MD;  Location: [facility] OR     FRACTURE SURGERY       PROSTATE SURGERY      ,PROSTATECTOMY,Laser prostate surgery -- BPH     Social History     Social History     Marital status:      Spouse name: N/A     Number of children: N/A     Years of education: N/A     Occupational History     Not on file.     Social History Main Topics     Smoking status: Never Smoker     Smokeless tobacco: Never Used     Alcohol use No     Drug use: No      Comment: Drug use: No     Sexual activity: Not on file     Other Topics Concern     Not on file     Social History Narrative    Lives in [place].  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     Review of Systems   Constitutional: Negative for fever.   Genitourinary:        Odorous, foul smelling, dark urine   Musculoskeletal: Negative.    Skin: Negative.    Neurological: Negative for weakness.       Physical Exam   Constitutional: He is oriented to person, place, and time and well-developed, well-nourished, and in no  distress. No distress.   HENT:   Head: Normocephalic and atraumatic.   Right Ear: External ear normal.   Left Ear: External ear normal.   Nose: Nose normal.   Eyes: Conjunctivae are normal. Right eye exhibits no discharge. Left eye exhibits no discharge. No scleral icterus.   Cardiovascular: Normal rate, regular rhythm and normal heart sounds.    Pulmonary/Chest: Effort normal and breath sounds normal.   Neurological: He is alert and oriented to person, place, and time. Coordination abnormal.   Skin: Skin is warm and dry. He is not diaphoretic.   Psychiatric: Affect and judgment normal.   Nursing note and vitals reviewed.     Results for orders placed or performed in visit on 11/29/18   CT Abdomen Pelvis w/o Contrast    Narrative    PROCEDURE: CT ABDOMEN PELVIS W/O CONTRAST 11/29/2018 11:42 AM    HISTORY: stone protocol; Urinary tract infection associated with  catheterization of urinary tract, unspecified indwelling urinary  catheter type, initial encounter (H); Urinary tract infection  associated with catheterization of urinary tract, unspecified  indwelling urinary catheter type, initial encounter (H); Gross  hematuria    COMPARISONS: 4/3/2018.    Meds/Dose Given: None.    TECHNIQUE: Axial noncontrast enhanced images with coronal and sagittal  reformatted images.    FINDINGS: Lung bases are clear.    No focal abnormality is seen in the liver, adrenal glands or pancreas.  There are a few benign calcifications in the spleen.    No renal mass is seen. There are a few very small bilateral renal  calculi. There is no ureteral stone or hydronephrosis. Hydronephrosis  seen on the prior exam has resolved and the stone previously seen  adjacent to the left ureterovesical junction is no longer visualized.    There is no bowel abnormality. There is no inflammatory change or  focal fluid collection. There is some diffuse thickening of the wall  of the urinary bladder    There is no aneurysm of the abdominal aorta. There is  multilevel  degenerative change with right convex scoliosis. There is a chronic  compression deformity at T12 level.         Impression    IMPRESSION:   1. Tiny renal calculi.  2. No ureteral stone or hydronephrosis.  3. Thickening of the wall of the urinary bladder which may be due to  cystitis given the history.    JACKSON MANCIA MD   Urinalysis w Reflex Microscopic If Positive   Result Value Ref Range    Color Urine Yellow     Appearance Urine Cloudy     Glucose Urine Negative NEG^Negative mg/dL    Bilirubin Urine Negative NEG^Negative    Ketones Urine Negative NEG^Negative mg/dL    Specific Gravity Urine 1.015 1.000 - 1.030    Blood Urine Trace (A) NEG^Negative    pH Urine 7.0 5.0 - 9.0 pH    Protein Albumin Urine Negative NEG^Negative mg/dL    Urobilinogen Urine 0.2 0.2 - 1.0 EU/dL    Nitrite Urine Positive (A) NEG^Negative    Leukocyte Esterase Urine Small (A) NEG^Negative    Source Midstream Urine    Urine Microscopic   Result Value Ref Range    WBC Urine 5-10 (A) OTO5^0 - 5 /HPF    RBC Urine O - 2 OTO2^O - 2 /HPF    Bacteria Urine Many (A) NEG^Negative /HPF   CBC and Differential   Result Value Ref Range    WBC 5.8 4.0 - 11.0 10e9/L    RBC Count 5.31 4.4 - 5.9 10e12/L    Hemoglobin 15.4 13.3 - 17.7 g/dL    Hematocrit 47.0 40.0 - 53.0 %    MCV 89 78 - 100 fl    MCH 29.0 26.5 - 33.0 pg    MCHC 32.8 31.5 - 36.5 g/dL    RDW 12.7 10.0 - 15.0 %    Platelet Count 217 150 - 450 10e9/L    Diff Method Automated Method     % Neutrophils 60.0 %    % Lymphocytes 27.3 %    % Monocytes 8.7 %    % Eosinophils 3.1 %    % Basophils 0.7 %    % Immature Granulocytes 0.2 %    Absolute Neutrophil 3.5 1.6 - 8.3 10e9/L    Absolute Lymphocytes 1.6 0.8 - 5.3 10e9/L    Absolute Monocytes 0.5 0.0 - 1.3 10e9/L    Absolute Eosinophils 0.2 0.0 - 0.7 10e9/L    Absolute Basophils 0.0 0.0 - 0.2 10e9/L    Abs Immature Granulocytes 0.0 0 - 0.4 10e9/L   Comprehensive Metabolic Panel   Result Value Ref Range    Sodium 139 134 - 144 mmol/L     Potassium 4.0 3.5 - 5.1 mmol/L    Chloride 101 98 - 107 mmol/L    Carbon Dioxide 34 (H) 21 - 31 mmol/L    Anion Gap 4 3 - 14 mmol/L    Glucose 84 70 - 105 mg/dL    Urea Nitrogen 23 7 - 25 mg/dL    Creatinine 1.00 0.70 - 1.30 mg/dL    GFR Estimate 75 >60 mL/min/1.7m2    GFR Estimate If Black >90 >60 mL/min/1.7m2    Calcium 10.0 8.6 - 10.3 mg/dL    Bilirubin Total 0.5 0.3 - 1.0 mg/dL    Albumin 4.2 3.5 - 5.7 g/dL    Protein Total 7.0 6.4 - 8.9 g/dL    Alkaline Phosphatase 81 34 - 104 U/L    ALT 22 7 - 52 U/L    AST 19 13 - 39 U/L         A/P:  63-year-old male patient presents today with a history of foul-smelling dark urine, concerned with UTI.  Patient has a history of MS, self caths 3 times a day.  Blood pressure in office today is running 170s-180s/110, reports taking his prescription medications.  Repeat blood pressure continues to be elevated, patient has a history of kidney stones.  History of lithotripsy and stent placement with Dr. Saldivar in April 2018.  Labs and CT ordered to evaluate for kidney stone.  UA is positive for nitrates and leukocytes, trace amount of blood.  CBC and CMP are essentially benign.  CT is positive for renal calculi, no hydronephrosis or ureteral stones.  Will treat for UTI with Cipro and advised follow-up for hypertension with PCP.  Urine culture ordered.    (T83.511A,  N39.0) Urinary tract infection associated with catheterization of urinary tract, unspecified indwelling urinary catheter type, initial encounter (H)  (primary encounter diagnosis)  Plan: Urine Culture Aerobic Bacterial, CBC and         Differential, Comprehensive Metabolic Panel, CT        Abdomen Pelvis w/o Contrast, ciprofloxacin         (CIPRO) 500 MG tablet    (N20.0) Calculus of kidney    (I10) Essential hypertension    CORIE Urbina, NP-C  [facility]

## 2018-11-29 NOTE — PATIENT INSTRUCTIONS
Bladder Infection, Male (Adult)    You have a bladder infection.  Urine is normally free of bacteria. But bacteria can get into the urinary tract from the skin around the rectum or it may travel in the blood from elsewhere in the body.  This is called a urinary tract infection (UTI). An infection can occur anywhere in the urinary tract. It could be in a kidney (pyelonephritis)or in the bladder (cystitis) and urethra (urethritis). The urethra is the tube that drains the urine from the bladder through the tip of the penis.  The most common place for a UTI is in the bladder. This is called a bladder infection. Most bladder infections are easily treated. They are not serious unless the infection spreads up to the kidney.  The terms bladder infection, UTI, and cystitis are often used to describe the same thing, but they aren t always the same. Cystitis is an inflammation of the bladder. The most common cause of cystitis is an infection.   Keep in mind:    Infections in the urine are called UTIs.    Cystitis is usually caused by a UTI.    Not all UTIs and cases of cystitis are bladder infections.    Bladder infections are the most common type of cystitis.  Symptoms of a bladder infection  The infection causes inflammation in the urethra and bladder. This inflammation causes many of the symptoms. The most common symptoms of a bladder infection are:    Pain or burning when urinating    Having to go more often than usual    Feeling like you need to go right away    Only a small amount comes out    Blood in urine    Discomfort in your belly (abdomen), usually in the lower abdomen, above the pubic bone    Cloudy, strong, or bad smelling urine    Unable to urinate (retention)    Urinary incontinence    Fever    Loss of appetite  Older adults may also feel confused.  Causes of a bladder infection  Bladder infections are not contagious. You can't get one from someone else, from a toilet seat, or from sharing a bath.  The most  common cause of bladder infections is bacteria from the bowels. The bacteria get onto the skin around the opening of the urethra. From there they can get into the urine and travel up to the bladder. This causes inflammation and an infection. This usually happens because of:    An enlarged prostate    Poor cleaning of the genitals    Procedures that put a tube in your bladder, like a Rossi catheter    Bowel incontinence    Older age    Not emptying your bladder (The urine stays there, giving the bacteria a chance to grow.)    Dehydration (This allows urine to stay in the bladder longer.)    Constipation (This can cause the bowels to push on the bladder or urethra and keep the bladder from emptying.)  Treatment  Bladder infections are treated with antibiotics. They usually clear up quickly without complications. Treatment helps prevent a more serious kidney infection.  Medicines  Medicines can help in the treatment of a bladder infection:    You may have been given phenazopyridine to ease burning when you urinate. It will cause your urine to be bright orange. It can stain clothing.    You may have been prescribed antibiotics. Take this medicine until you have finished it, even if you feel better. Taking all of the medicine will make sure the infection has cleared.  You can use acetaminophen or ibuprofen for pain, fever, or discomfort, unless another medicine was prescribed. You can also alternate them, or use both together. They work differently and are a different class of medicines, so taking them together is not an overdose. If you have chronic liver or kidney disease, talk with your healthcare provider before using these medicines. Also talk with your provider if you ve had a stomach ulcer or GI bleeding or are taking blood thinner medicines.  Home care  Here are some guidelines to help you care for yourself at home:    Drink plenty of fluids, unless your healthcare provider told you not to. Fluids will prevent  dehydration and flush out your bladder.    Use good personal hygiene. Wipe from front to back after using the toilet, and clean your penis regularly. If you aren t circumcised, retract the foreskin when cleaning.    Urinate more frequently, and don t try to hold it in for long periods of time, if possible.    Wear loose-fitting clothes and cotton underwear. Avoid tight-fitting pants. This helps keep you clean and dry.    Change your diet to prevent constipation. This means eating more fresh foods and more fiber, and less junk and fatty foods.    Avoid sex until your symptoms are gone.    Avoid caffeine, alcohol, and spicy foods. These can irritate the bladder.  Follow-up care  Follow up with your healthcare provider, or as advised if all symptoms have not cleared up within 5 days. It is important to keep your follow-up appointment. You can talk with your provider to see if you need more tests of the urinary tract. This is especially important if you have infections that keep coming back.  If a culture was done, you will be told if your treatment needs to be changed. If directed, you can call to find out the results.  If X-rays were taken, you will be told of any findings that may affect your care.  Call 911  Call 911 if any of these occur:    Trouble breathing    Difficulty waking up    Feeling confused    Fainting or loss of consciousness    Rapid heart rate  When to seek medical advice  Call your healthcare provider right away if any of these occur:    Fever of 100.4 F (38 C) or higher, or as directed by your healthcare provider    Your symptoms don t improve after 2 days of treatment    Back or abdominal pain that gets worse    Repeated vomiting, or you aren t able to keep medicine down    Weakness or dizziness  Date Last Reviewed: 10/1/2016    [id number] [employer]. [address] [facility], [place] [address]. All rights reserved. This information is not intended as a substitute for professional  medical care. Always follow your healthcare professional's instructions.

## 2018-12-01 LAB
BACTERIA SPEC CULT: ABNORMAL
SPECIMEN SOURCE: ABNORMAL

## 2018-12-06 ENCOUNTER — OFFICE VISIT (OUTPATIENT)
Dept: FAMILY MEDICINE | Facility: OTHER | Age: 63
End: 2018-12-06
Attending: NURSE PRACTITIONER
Payer: COMMERCIAL

## 2018-12-06 VITALS
WEIGHT: 166.2 LBS | SYSTOLIC BLOOD PRESSURE: 150 MMHG | DIASTOLIC BLOOD PRESSURE: 88 MMHG | HEART RATE: 80 BPM | BODY MASS INDEX: 23.51 KG/M2 | TEMPERATURE: 97.7 F

## 2018-12-06 DIAGNOSIS — G35 MULTIPLE SCLEROSIS (H): ICD-10-CM

## 2018-12-06 DIAGNOSIS — Z79.899 ENCOUNTER FOR MEDICATION REVIEW: ICD-10-CM

## 2018-12-06 DIAGNOSIS — N31.9 NEUROGENIC BLADDER: ICD-10-CM

## 2018-12-06 DIAGNOSIS — I15.8 OTHER SECONDARY HYPERTENSION: Primary | ICD-10-CM

## 2018-12-06 PROCEDURE — G0463 HOSPITAL OUTPT CLINIC VISIT: HCPCS

## 2018-12-06 PROCEDURE — 99213 OFFICE O/P EST LOW 20 MIN: CPT | Performed by: NURSE PRACTITIONER

## 2018-12-06 RX ORDER — LISINOPRIL 10 MG/1
10 TABLET ORAL DAILY
Qty: 90 TABLET | Refills: 0 | Status: SHIPPED | OUTPATIENT
Start: 2018-12-06 | End: 2019-03-04

## 2018-12-06 ASSESSMENT — PAIN SCALES - GENERAL: PAINLEVEL: NO PAIN (0)

## 2018-12-06 NOTE — PROGRESS NOTES
"Nursing Notes:   Mary Wilder LPN  12/6/2018  1:14 PM  Signed  Chief Complaint   Patient presents with     Hypertension     blood pressure check up        Initial BP (!) 180/100 (BP Location: Right arm, Patient Position: Sitting, Cuff Size: Adult Large)  Pulse 80  Temp 97.7  F (36.5  C) (Temporal)  Wt 166 lb 3.2 oz (75.4 kg)  BMI 23.51 kg/m2 Estimated body mass index is 23.51 kg/(m^2) as calculated from the following:    Height as of 11/4/18: 5' 10.5\" (1.791 m).    Weight as of this encounter: 166 lb 3.2 oz (75.4 kg).  Medication Reconciliation: complete    Mary Wilder LPN       Nursing note reviewed with patient.  Accurracy and completeness verified.   Mr. Gill is a 63 year old male who:  Patient presents with:  Hypertension: blood pressure check up       ICD-10-CM    1. Other secondary hypertension I15.8 lisinopril (PRINIVIL/ZESTRIL) 10 MG tablet   2. Multiple sclerosis (H) G35    3. Neurogenic bladder N31.9      HPI     Presents for follow-up from recent office visit for UTI --- had negative labs and CT scan at office visit  Found hypertension--here for follow-up reports a past history of hypertension was on lisinopril reviewed notes 2015  Has MS with neurogenic bladder sometimes self caths has had intermittent issues with UTIs  He is on Detrol for bladder spasms which is worked well for him this was started in September  Reviewing past blood pressures--elevation started beginning of November  He will obtain a home cuff--- plan to leave for [place] next week for the winter  Knows what his target blood pressure should be and how to monitor        Review of Systems   Genitourinary: Positive for urgency.   All other systems reviewed and are negative.     All other systems reviewed and negative.     PEYTON:   No flowsheet data found.  PHQ9:  PHQ-9 SCORE 8/8/2017 9/7/2017 10/5/2017   PHQ-9 Total Score 14 11 15       I have personally reviewed the past medical history, past surgical history, " medications, allergies, family and social history as listed below, on 12/6/2018.    No Known Allergies    Current Outpatient Prescriptions   Medication Sig Dispense Refill     BACLOFEN PO Take 10 mg by mouth 2 times daily 10 mg QAM, 20 mg QHS       calcium carbonate (CALCIUM ANTACID) 500 MG chewable tablet Take 1,250 mg by mouth       ciprofloxacin (CIPRO) 500 MG tablet Take 1 tablet (500 mg) by mouth 2 times daily 14 tablet 0     Docusate Sodium (COLACE PO) Take 100 mg by mouth daily       lisinopril (PRINIVIL/ZESTRIL) 10 MG tablet Take 1 tablet (10 mg) by mouth daily 90 tablet 0     meclizine 25 MG CHEW Take 25 mg by mouth 2 times daily       oxybutynin (DITROPAN-XL) 5 MG 24 hr tablet Take 5 mg by mouth       psyllium 0.52 G capsule Take 3 capsules by mouth 2 times daily          Patient Active Problem List    Diagnosis Date Noted     Neurogenic bladder 12/06/2018     Priority: Medium     Multiple sclerosis (H)      Priority: Medium     No Comments Provided       Essential (primary) hypertension      Priority: Medium     No Comments Provided       Ureterolithiasis 04/03/2018     Priority: Medium     Past Medical History:   Diagnosis Date     Cervical disc disorder     No Comments Provided     Enlarged prostate with lower urinary tract symptoms (LUTS)     No Comments Provided     Essential (primary) hypertension     No Comments Provided     Multiple sclerosis (H)     No Comments Provided     Other specified disorders of bone density and structure, unspecified site (CODE)     No Comments Provided     Other symptoms and signs involving cognitive functions and awareness     No Comments Provided     Spondylosis of cervical region without myelopathy or radiculopathy     No Comments Provided     Past Surgical History:   Procedure Laterality Date     APPENDECTOMY OPEN      appendectomy     COLONOSCOPY      2010,with polyps resected     COLONOSCOPY      2015,[place]- colitis     COMBINED CYSTOSCOPY, URETEROSCOPY, LASER  HOLMIUM LITHOTRIPSY URETER(S) Left 4/10/2018    Procedure: COMBINED CYSTOSCOPY, URETEROSCOPY, LASER HOLMIUM LITHOTRIPSY URETER(S);  Left Ureteroscopy with Holmium Laser Lithotripsy & Stent Placement.;  Surgeon: Olu Saldivar MD;  Location: [facility] OR     CYSTOSCOPY, RETROGRADES, INSERT STENT URETER(S), COMBINED Left 4/3/2018    Procedure: COMBINED CYSTOSCOPY, RETROGRADES, INSERT STENT URETER(S);;  Surgeon: Olu Saldivar MD;  Location: [facility] OR     FRACTURE SURGERY       PROSTATE SURGERY      ,PROSTATECTOMY,Laser prostate surgery -- BPH     Social History     Social History     Marital status:      Spouse name: N/A     Number of children: N/A     Years of education: N/A     Social History Main Topics     Smoking status: Never Smoker     Smokeless tobacco: Never Used     Alcohol use No     Drug use: No      Comment: Drug use: No     Sexual activity: Not Asked     Other Topics Concern     None     Social History Narrative    Lives in [facility], [facility].  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     Family History   Problem Relation Age of Onset     Prostate Cancer Father      Cancer-prostate     Other - See Comments Father      Alzheimer's/kidney failure     Other - See Comments Mother       with Parkinson's     Heart Disease Mother      Heart Disease,CHF for carditis     Family History Negative Brother      Good Health     Heart Disease Brother      Heart Disease,ASCAD with MI       EXAM:   Vitals:    18 1300 18 1305 18 1321 18 1322   BP: (!) 180/100 (!) 182/100 160/88 150/88   BP Location: Right arm Right arm Right arm Left arm   Patient Position: Sitting Sitting Sitting Sitting   Cuff Size: Adult Large Adult Large     Pulse: 80      Temp: 97.7  F (36.5  C)      TempSrc: Temporal      Weight: 166 lb 3.2 oz (75.4 kg)          Current Pain Score: No Pain (0)     BP Readings from Last 3 Encounters:   18 150/88   18 (!) 170/112   18 132/88      Wt Readings from Last 3  "Encounters:   12/06/18 166 lb 3.2 oz (75.4 kg)   11/29/18 162 lb 8 oz (73.7 kg)   11/04/18 162 lb 6.4 oz (73.7 kg)      Estimated body mass index is 23.51 kg/(m^2) as calculated from the following:    Height as of 11/4/18: 5' 10.5\" (1.791 m).    Weight as of this encounter: 166 lb 3.2 oz (75.4 kg).     Physical Exam   Constitutional: He is oriented to person, place, and time. He appears well-developed and well-nourished.   Cardiovascular: Normal rate.    Pulmonary/Chest: Effort normal.   Musculoskeletal: Normal range of motion.   Neurological: He is alert and oriented to person, place, and time.   Skin: Skin is warm and dry.   Psychiatric: He has a normal mood and affect. His behavior is normal. Judgment and thought content normal.   Nursing note and vitals reviewed.       INVESTIGATIONS:  Results for orders placed or performed in visit on 11/29/18   CT Abdomen Pelvis w/o Contrast    Narrative    PROCEDURE: CT ABDOMEN PELVIS W/O CONTRAST 11/29/2018 11:42 AM    HISTORY: stone protocol; Urinary tract infection associated with  catheterization of urinary tract, unspecified indwelling urinary  catheter type, initial encounter (H); Urinary tract infection  associated with catheterization of urinary tract, unspecified  indwelling urinary catheter type, initial encounter (H); Gross  hematuria    COMPARISONS: 4/3/2018.    Meds/Dose Given: None.    TECHNIQUE: Axial noncontrast enhanced images with coronal and sagittal  reformatted images.    FINDINGS: Lung bases are clear.    No focal abnormality is seen in the liver, adrenal glands or pancreas.  There are a few benign calcifications in the spleen.    No renal mass is seen. There are a few very small bilateral renal  calculi. There is no ureteral stone or hydronephrosis. Hydronephrosis  seen on the prior exam has resolved and the stone previously seen  adjacent to the left ureterovesical junction is no longer visualized.    There is no bowel abnormality. There is no " inflammatory change or  focal fluid collection. There is some diffuse thickening of the wall  of the urinary bladder    There is no aneurysm of the abdominal aorta. There is multilevel  degenerative change with right convex scoliosis. There is a chronic  compression deformity at T12 level.         Impression    IMPRESSION:   1. Tiny renal calculi.  2. No ureteral stone or hydronephrosis.  3. Thickening of the wall of the urinary bladder which may be due to  cystitis given the history.    JACKSON MANCIA MD   Urinalysis w Reflex Microscopic If Positive   Result Value Ref Range    Color Urine Yellow     Appearance Urine Cloudy     Glucose Urine Negative NEG^Negative mg/dL    Bilirubin Urine Negative NEG^Negative    Ketones Urine Negative NEG^Negative mg/dL    Specific Gravity Urine 1.015 1.000 - 1.030    Blood Urine Trace (A) NEG^Negative    pH Urine 7.0 5.0 - 9.0 pH    Protein Albumin Urine Negative NEG^Negative mg/dL    Urobilinogen Urine 0.2 0.2 - 1.0 EU/dL    Nitrite Urine Positive (A) NEG^Negative    Leukocyte Esterase Urine Small (A) NEG^Negative    Source Midstream Urine    Urine Microscopic   Result Value Ref Range    WBC Urine 5-10 (A) OTO5^0 - 5 /HPF    RBC Urine O - 2 OTO2^O - 2 /HPF    Bacteria Urine Many (A) NEG^Negative /HPF   CBC and Differential   Result Value Ref Range    WBC 5.8 4.0 - 11.0 10e9/L    RBC Count 5.31 4.4 - 5.9 10e12/L    Hemoglobin 15.4 13.3 - 17.7 g/dL    Hematocrit 47.0 40.0 - 53.0 %    MCV 89 78 - 100 fl    MCH 29.0 26.5 - 33.0 pg    MCHC 32.8 31.5 - 36.5 g/dL    RDW 12.7 10.0 - 15.0 %    Platelet Count 217 150 - 450 10e9/L    Diff Method Automated Method     % Neutrophils 60.0 %    % Lymphocytes 27.3 %    % Monocytes 8.7 %    % Eosinophils 3.1 %    % Basophils 0.7 %    % Immature Granulocytes 0.2 %    Absolute Neutrophil 3.5 1.6 - 8.3 10e9/L    Absolute Lymphocytes 1.6 0.8 - 5.3 10e9/L    Absolute Monocytes 0.5 0.0 - 1.3 10e9/L    Absolute Eosinophils 0.2 0.0 - 0.7 10e9/L     Absolute Basophils 0.0 0.0 - 0.2 10e9/L    Abs Immature Granulocytes 0.0 0 - 0.4 10e9/L   Comprehensive Metabolic Panel   Result Value Ref Range    Sodium 139 134 - 144 mmol/L    Potassium 4.0 3.5 - 5.1 mmol/L    Chloride 101 98 - 107 mmol/L    Carbon Dioxide 34 (H) 21 - 31 mmol/L    Anion Gap 4 3 - 14 mmol/L    Glucose 84 70 - 105 mg/dL    Urea Nitrogen 23 7 - 25 mg/dL    Creatinine 1.00 0.70 - 1.30 mg/dL    GFR Estimate 75 >60 mL/min/1.7m2    GFR Estimate If Black >90 >60 mL/min/1.7m2    Calcium 10.0 8.6 - 10.3 mg/dL    Bilirubin Total 0.5 0.3 - 1.0 mg/dL    Albumin 4.2 3.5 - 5.7 g/dL    Protein Total 7.0 6.4 - 8.9 g/dL    Alkaline Phosphatase 81 34 - 104 U/L    ALT 22 7 - 52 U/L    AST 19 13 - 39 U/L   Urine Culture Aerobic Bacterial   Result Value Ref Range    Specimen Description Midstream Urine     Culture Micro >100,000 colonies/mL  Escherichia coli   (A)        Susceptibility    Escherichia coli - CARLI     AMPICILLIN <=8 Sensitive ug/mL     AZTREONAM <=8 Sensitive ug/mL     CEFEPIME <=8 Sensitive ug/mL     CEFTRIAXONE <=8 Sensitive ug/mL     CEFTAZIDIME <=1 Sensitive ug/mL     CEFOTAXIME <=2 Sensitive ug/mL     CIPROFLOXACIN <=1 Sensitive ug/mL     CEFUROXIME 8 Sensitive ug/mL     NITROFURANTOIN <=32 Sensitive ug/mL     GENTAMICIN <=4 Sensitive ug/mL     IMIPENEM <=1 Sensitive ug/mL     LEVOFLOXACIN <=2 Sensitive ug/mL     Piperacillin/Tazo <=16 Sensitive ug/mL     Trimethoprim/Sulfa <=2/38 Sensitive ug/mL     TETRACYCLINE <=4 Sensitive ug/mL       ASSESSMENT AND PLAN:  Problem List Items Addressed This Visit        Nervous and Auditory    Multiple sclerosis (H)       Urinary    Neurogenic bladder      Other Visit Diagnoses     Other secondary hypertension    -  Primary    Relevant Medications    lisinopril (PRINIVIL/ZESTRIL) 10 MG tablet      Did review his urology notes from [facility] in September he is due for follow-up next spring  He denies any bladder issues at this time--antibiotic working well    We  will restart his lisinopril as he was on this previously--he is comfortable with watching for target BPs--directed to hold if systolic below 110 consistently  He will call for problems--he will send BP mychart while in [place]  If his blood pressure is staying high he will need to go in and be seen and he understands this      -- Expected clinical course discussed    -- Medications and their side effects discussed    Patient Instructions   Get omeron BP cuff--check daily--start lisinopril 10 mg daily--  If below 110 consistent --check 3 x---if low stop and call    followup if too low or not getting control--    Kristina Payton CNP  [facility]     Portions of this note were dictated using speech recognition software. The note has been proofread but errors in the text may have been overlooked. Please contact me if there are any concerns regarding the accuracy of the dictation.

## 2018-12-06 NOTE — PATIENT INSTRUCTIONS
Get omeron BP cuff--check daily--start lisinopril 10 mg daily--  If below 110 consistent --check 3 x---if low stop and call    followup if too low or not getting control--

## 2018-12-06 NOTE — MR AVS SNAPSHOT
After Visit Summary   12/6/2018    Jamir Gill    MRN: [id number]           Patient Information     Date Of Birth          1955        Visit Information        Provider Department      12/6/2018 12:45 PM Kristina Payton APRN CNP [facility]        Today's Diagnoses     Other secondary hypertension    -  1    Multiple sclerosis (H)          Care Instructions    Get omeron BP cuff--check daily--start lisinopril 10 mg daily--  If below 110 consistent --check 3 x---if low stop and call    followup if too low or not getting control--          Follow-ups after your visit        Follow-up notes from your care team     Return if symptoms worsen or fail to improve.      Who to contact     If you have questions or need follow up information about today's clinic visit or your schedule please contact [facility] directly at [phone].  Normal or non-critical lab and imaging results will be communicated to you by PrimeSensehart, letter or phone within 4 business days after the clinic has received the results. If you do not hear from us within 7 days, please contact the clinic through PrimeSensehart or phone. If you have a critical or abnormal lab result, we will notify you by phone as soon as possible.  Submit refill requests through Played or call your pharmacy and they will forward the refill request to us. Please allow 3 business days for your refill to be completed.          Additional Information About Your Visit        MyChart Information     Played gives you secure access to your electronic health record. If you see a primary care provider, you can also send messages to your care team and make appointments. If you have questions, please call your primary care clinic.  If you do not have a primary care provider, please call [phone] and they will assist you.        Care EveryWhere ID     This is your Care EveryWhere ID. This could be used by other organizations to  access your [facility] medical records  [id number]        Your Vitals Were     Pulse Temperature BMI (Body Mass Index)             80 97.7  F (36.5  C) (Temporal) 23.51 kg/m2          Blood Pressure from Last 3 Encounters:   12/06/18 150/88   11/29/18 (!) 170/112   11/04/18 132/88    Weight from Last 3 Encounters:   12/06/18 166 lb 3.2 oz (75.4 kg)   11/29/18 162 lb 8 oz (73.7 kg)   11/04/18 162 lb 6.4 oz (73.7 kg)              Today, you had the following     No orders found for display         Today's Medication Changes          These changes are accurate as of 12/6/18  1:33 PM.  If you have any questions, ask your nurse or doctor.               Start taking these medicines.        Dose/Directions    lisinopril 10 MG tablet   Commonly known as:  PRINIVIL/ZESTRIL   Used for:  Other secondary hypertension   Started by:  Kristina Payton APRN CNP        Dose:  10 mg   Take 1 tablet (10 mg) by mouth daily   Quantity:  90 tablet   Refills:  0            Where to get your medicines      These medications were sent to [facility] [address] [facility] - [place], [place] - [address]  [address] [facility] [address]     Phone:  [phone]     lisinopril 10 MG tablet                Primary Care Provider Fax #    Physician No Ref-Primary [phone]       No address on file        Equal Access to Services     ANU MONTOYA AH: Usama Dunn, wajoseda lusilvestre, qaybta kaalmaclau kelly, radha miller. So [facility] [phone].    ATENCIÓN: Si habla español, tiene a harding disposición servicios gratuitos de asistencia lingüística. Llame al [phone].    We comply with applicable federal civil rights laws and [place] laws. We do not discriminate on the basis of race, color, national origin, age, disability, sex, sexual orientation, or gender identity.            Thank you!     Thank you for choosing [facility]  for your care. Our goal is always to provide  you with excellent care. Hearing back from our patients is one way we can continue to improve our services. Please take a few minutes to complete the written survey that you may receive in the mail after your visit with us. Thank you!             Your Updated Medication List - Protect others around you: Learn how to safely use, store and throw away your medicines at www.disposemymeds.org.          This list is accurate as of 12/6/18  1:33 PM.  Always use your most recent med list.                   Brand Name Dispense Instructions for use Diagnosis    BACLOFEN PO      Take 10 mg by mouth 2 times daily 10 mg QAM, 20 mg QHS        CALCIUM ANTACID 500 MG chewable tablet   Generic drug:  calcium carbonate      Take 1,250 mg by mouth        ciprofloxacin 500 MG tablet    CIPRO    14 tablet    Take 1 tablet (500 mg) by mouth 2 times daily    Urinary tract infection associated with catheterization of urinary tract, unspecified indwelling urinary catheter type, initial encounter (H), Gross hematuria       COLACE PO      Take 100 mg by mouth daily        lisinopril 10 MG tablet    PRINIVIL/ZESTRIL    90 tablet    Take 1 tablet (10 mg) by mouth daily    Other secondary hypertension       meclizine 25 MG Chew      Take 25 mg by mouth 2 times daily        oxybutynin ER 5 MG 24 hr tablet    DITROPAN-XL     Take 5 mg by mouth        psyllium 0.52 g capsule    METAMUCIL/KONSYL     Take 3 capsules by mouth 2 times daily

## 2018-12-06 NOTE — NURSING NOTE
"Chief Complaint   Patient presents with     Hypertension     blood pressure check up        Initial BP (!) 180/100 (BP Location: Right arm, Patient Position: Sitting, Cuff Size: Adult Large)  Pulse 80  Temp 97.7  F (36.5  C) (Temporal)  Wt 166 lb 3.2 oz (75.4 kg)  BMI 23.51 kg/m2 Estimated body mass index is 23.51 kg/(m^2) as calculated from the following:    Height as of 11/4/18: 5' 10.5\" (1.791 m).    Weight as of this encounter: 166 lb 3.2 oz (75.4 kg).  Medication Reconciliation: complete    Mary Wilder LPN       "

## 2019-03-04 DIAGNOSIS — I15.8 OTHER SECONDARY HYPERTENSION: ICD-10-CM

## 2019-03-05 RX ORDER — LISINOPRIL 10 MG/1
TABLET ORAL
Qty: 90 TABLET | Refills: 3 | Status: SHIPPED | OUTPATIENT
Start: 2019-03-05 | End: 2020-03-18

## 2019-03-05 NOTE — TELEPHONE ENCOUNTER
Routing refill request to provider for review/approval because:  Labs out of range:  BP: 150/88  LOV: 12/6/18  Charlene Rand RN on 3/5/2019 at 2:24 PM

## 2019-05-20 ENCOUNTER — TRANSFERRED RECORDS (OUTPATIENT)
Dept: HEALTH INFORMATION MANAGEMENT | Facility: OTHER | Age: 64
End: 2019-05-20

## 2019-08-19 ENCOUNTER — OFFICE VISIT (OUTPATIENT)
Dept: UROLOGY | Facility: OTHER | Age: 64
End: 2019-08-19
Attending: UROLOGY
Payer: COMMERCIAL

## 2019-08-19 VITALS
RESPIRATION RATE: 16 BRPM | DIASTOLIC BLOOD PRESSURE: 78 MMHG | SYSTOLIC BLOOD PRESSURE: 126 MMHG | WEIGHT: 165 LBS | BODY MASS INDEX: 23.34 KG/M2 | HEART RATE: 80 BPM

## 2019-08-19 DIAGNOSIS — N31.9 NEUROGENIC BLADDER: Primary | ICD-10-CM

## 2019-08-19 DIAGNOSIS — N52.9 ED (ERECTILE DYSFUNCTION) OF ORGANIC ORIGIN: ICD-10-CM

## 2019-08-19 PROCEDURE — G0463 HOSPITAL OUTPT CLINIC VISIT: HCPCS

## 2019-08-19 PROCEDURE — 99204 OFFICE O/P NEW MOD 45 MIN: CPT | Performed by: UROLOGY

## 2019-08-19 RX ORDER — OXYBUTYNIN CHLORIDE 5 MG/1
TABLET, EXTENDED RELEASE ORAL
COMMUNITY
Start: 2019-08-14 | End: 2020-06-26

## 2019-08-19 RX ORDER — OXYBUTYNIN CHLORIDE 5 MG/1
5 TABLET, EXTENDED RELEASE ORAL DAILY
Qty: 90 TABLET | Refills: 3 | Status: SHIPPED | OUTPATIENT
Start: 2019-08-19 | End: 2020-08-31

## 2019-08-19 RX ORDER — SILDENAFIL CITRATE 20 MG/1
TABLET ORAL
Qty: 30 TABLET | Refills: 11 | Status: SHIPPED | OUTPATIENT
Start: 2019-08-19 | End: 2022-04-28

## 2019-08-19 ASSESSMENT — PAIN SCALES - GENERAL: PAINLEVEL: MILD PAIN (2)

## 2019-08-19 NOTE — PROGRESS NOTES
Type of Visit  Established    Chief Complaint  Neurogenic bladder  ED  History of stones    HPI  Mr. Gill is a 64 year old male w/h/o MS who follows up with neurogenic bladder, ED and history of stones.  The patient was previously seeing a urologist in [place] and would like to transfer his care closer to home.    Because of his neurogenic bladder he has been undergoing renal surveillance in the form of annual renal ultrasound, serum creatinine and a clinic visit.  He was diagnosed with MS about 5 years ago.  At this time he started self catheterizing and he performs this 3 times a day without issue.  He denies leaking between catheterization episodes and he denies volitional voiding.    ED has been progressively worsening over the last 5 to 10 years.  He has failed PDE5 inhibitors.  He has also used intracavernosal injections.  He became tired of the procedure and discontinued.      Review of Systems  I reviewed the ROS with the patient.    Nursing Notes:   Dior Tamayo LPN  2019  2:25 PM  Signed  Pt presents to clinic to discuss current urology care    Review of Systems:    Weight loss:    No     Recent fever/chills:  No   Night sweats:   No  Current skin rash:  No   Recent hair loss:  No  Heat intolerance:  No   Cold intolerance:  No  Chest pain:   No   Palpitations:   No  Shortness of breath:  No   Wheezing:   No  Constipation:    Yes   Diarrhea:   No   Nausea:   No   Vomiting:   No   Kidney/side pain:  No   Back pain:   Yes  Frequent headaches:  No   Dizziness:     No  Leg swelling:   No   Calf pain:    No          Family History  Family History   Problem Relation Age of Onset     Prostate Cancer Father         Cancer-prostate     Other - See Comments Father         Alzheimer's/kidney failure     Other - See Comments Mother          with Parkinson's     Heart Disease Mother         Heart Disease,CHF for carditis     Family History Negative Brother         Good Health     Heart Disease  Brother         Heart Disease,ASCAD with MI       Physical Exam  Vitals:    08/19/19 1416   BP: 126/78   BP Location: Left arm   Patient Position: Sitting   Cuff Size: Adult Regular   Pulse: 80   Resp: 16   Weight: 74.8 kg (165 lb)     Constitutional: NAD, WDWN.  Cardiovascular: Regular rate.  Pulmonary/Chest: Respirations are even and non-labored bilaterally.  Abdominal: Soft. No distension, tenderness, masses or guarding. No CVA tenderness.  Extremities: PARUL x 4, Warm. No clubbing.  No cyanosis.    Skin: Pink, warm and dry.  No rashes noted.  Genitourinary: nonpalpable bladder    Labs  Results for orders placed or performed in visit on 11/29/18   CT Abdomen Pelvis w/o Contrast    Narrative    PROCEDURE: CT ABDOMEN PELVIS W/O CONTRAST 11/29/2018 11:42 AM    HISTORY: stone protocol; Urinary tract infection associated with  catheterization of urinary tract, unspecified indwelling urinary  catheter type, initial encounter (H); Urinary tract infection  associated with catheterization of urinary tract, unspecified  indwelling urinary catheter type, initial encounter (H); Gross  hematuria    COMPARISONS: 4/3/2018.    Meds/Dose Given: None.    TECHNIQUE: Axial noncontrast enhanced images with coronal and sagittal  reformatted images.    FINDINGS: Lung bases are clear.    No focal abnormality is seen in the liver, adrenal glands or pancreas.  There are a few benign calcifications in the spleen.    No renal mass is seen. There are a few very small bilateral renal  calculi. There is no ureteral stone or hydronephrosis. Hydronephrosis  seen on the prior exam has resolved and the stone previously seen  adjacent to the left ureterovesical junction is no longer visualized.    There is no bowel abnormality. There is no inflammatory change or  focal fluid collection. There is some diffuse thickening of the wall  of the urinary bladder    There is no aneurysm of the abdominal aorta. There is multilevel  degenerative change with  right convex scoliosis. There is a chronic  compression deformity at T12 level.         Impression    IMPRESSION:   1. Tiny renal calculi.  2. No ureteral stone or hydronephrosis.  3. Thickening of the wall of the urinary bladder which may be due to  cystitis given the history.    JACKSON MANCIA MD   Urinalysis w Reflex Microscopic If Positive   Result Value Ref Range    Color Urine Yellow     Appearance Urine Cloudy     Glucose Urine Negative NEG^Negative mg/dL    Bilirubin Urine Negative NEG^Negative    Ketones Urine Negative NEG^Negative mg/dL    Specific Gravity Urine 1.015 1.000 - 1.030    Blood Urine Trace (A) NEG^Negative    pH Urine 7.0 5.0 - 9.0 pH    Protein Albumin Urine Negative NEG^Negative mg/dL    Urobilinogen Urine 0.2 0.2 - 1.0 EU/dL    Nitrite Urine Positive (A) NEG^Negative    Leukocyte Esterase Urine Small (A) NEG^Negative    Source Midstream Urine    Urine Microscopic   Result Value Ref Range    WBC Urine 5-10 (A) OTO5^0 - 5 /HPF    RBC Urine O - 2 OTO2^O - 2 /HPF    Bacteria Urine Many (A) NEG^Negative /HPF   CBC and Differential   Result Value Ref Range    WBC 5.8 4.0 - 11.0 10e9/L    RBC Count 5.31 4.4 - 5.9 10e12/L    Hemoglobin 15.4 13.3 - 17.7 g/dL    Hematocrit 47.0 40.0 - 53.0 %    MCV 89 78 - 100 fl    MCH 29.0 26.5 - 33.0 pg    MCHC 32.8 31.5 - 36.5 g/dL    RDW 12.7 10.0 - 15.0 %    Platelet Count 217 150 - 450 10e9/L    Diff Method Automated Method     % Neutrophils 60.0 %    % Lymphocytes 27.3 %    % Monocytes 8.7 %    % Eosinophils 3.1 %    % Basophils 0.7 %    % Immature Granulocytes 0.2 %    Absolute Neutrophil 3.5 1.6 - 8.3 10e9/L    Absolute Lymphocytes 1.6 0.8 - 5.3 10e9/L    Absolute Monocytes 0.5 0.0 - 1.3 10e9/L    Absolute Eosinophils 0.2 0.0 - 0.7 10e9/L    Absolute Basophils 0.0 0.0 - 0.2 10e9/L    Abs Immature Granulocytes 0.0 0 - 0.4 10e9/L   Comprehensive Metabolic Panel   Result Value Ref Range    Sodium 139 134 - 144 mmol/L    Potassium 4.0 3.5 - 5.1 mmol/L     Chloride 101 98 - 107 mmol/L    Carbon Dioxide 34 (H) 21 - 31 mmol/L    Anion Gap 4 3 - 14 mmol/L    Glucose 84 70 - 105 mg/dL    Urea Nitrogen 23 7 - 25 mg/dL    Creatinine 1.00 0.70 - 1.30 mg/dL    GFR Estimate 75 >60 mL/min/1.7m2    GFR Estimate If Black >90 >60 mL/min/1.7m2    Calcium 10.0 8.6 - 10.3 mg/dL    Bilirubin Total 0.5 0.3 - 1.0 mg/dL    Albumin 4.2 3.5 - 5.7 g/dL    Protein Total 7.0 6.4 - 8.9 g/dL    Alkaline Phosphatase 81 34 - 104 U/L    ALT 22 7 - 52 U/L    AST 19 13 - 39 U/L   Urine Culture Aerobic Bacterial   Result Value Ref Range    Specimen Description Midstream Urine     Culture Micro >100,000 colonies/mL  Escherichia coli   (A)        Susceptibility    Escherichia coli - CARLI     AMPICILLIN <=8 Sensitive ug/mL     AZTREONAM <=8 Sensitive ug/mL     CEFEPIME <=8 Sensitive ug/mL     CEFTRIAXONE <=8 Sensitive ug/mL     CEFTAZIDIME <=1 Sensitive ug/mL     CEFOTAXIME <=2 Sensitive ug/mL     CIPROFLOXACIN <=1 Sensitive ug/mL     CEFUROXIME 8 Sensitive ug/mL     NITROFURANTOIN <=32 Sensitive ug/mL     GENTAMICIN <=4 Sensitive ug/mL     IMIPENEM <=1 Sensitive ug/mL     LEVOFLOXACIN <=2 Sensitive ug/mL     Piperacillin/Tazo <=16 Sensitive ug/mL     Trimethoprim/Sulfa <=2/38 Sensitive ug/mL     TETRACYCLINE <=4 Sensitive ug/mL     Radiographic Studies  11/29/2018  CT a/p  IMPRESSION:   1. Tiny renal calculi.  2. No ureteral stone or hydronephrosis.  3. Thickening of the wall of the urinary bladder which may be due to  cystitis given the history.    Assessment  Mr. Gill is a 64 year old male w/h/o MS and neurogenic bladder follows up with ED and self catheterizations.  We reviewed his history and our plans going forward with annual surveillance.  We discussed neurogenic bladder surveillance as well as erectile dysfunction management.  He is very satisfied with the current management.    Plan  JESSICA this week  Annual JESSICA, BMP  Continue oxybutynin XL 5mg daily with self cath TID

## 2019-08-19 NOTE — NURSING NOTE
Pt presents to clinic to discuss current urology care    Review of Systems:    Weight loss:    No     Recent fever/chills:  No   Night sweats:   No  Current skin rash:  No   Recent hair loss:  No  Heat intolerance:  No   Cold intolerance:  No  Chest pain:   No   Palpitations:   No  Shortness of breath:  No   Wheezing:   No  Constipation:    Yes   Diarrhea:   No   Nausea:   No   Vomiting:   No   Kidney/side pain:  No   Back pain:   Yes  Frequent headaches:  No   Dizziness:     No  Leg swelling:   No   Calf pain:    No

## 2019-08-20 ENCOUNTER — HOSPITAL ENCOUNTER (OUTPATIENT)
Dept: ULTRASOUND IMAGING | Facility: OTHER | Age: 64
Discharge: HOME | End: 2019-08-20
Attending: UROLOGY | Admitting: UROLOGY
Payer: MEDICARE

## 2019-08-20 DIAGNOSIS — N31.9 NEUROGENIC BLADDER: ICD-10-CM

## 2019-08-20 PROCEDURE — 76770 US EXAM ABDO BACK WALL COMP: CPT

## 2019-11-18 ENCOUNTER — TRANSFERRED RECORDS (OUTPATIENT)
Dept: HEALTH INFORMATION MANAGEMENT | Facility: OTHER | Age: 64
End: 2019-11-18

## 2019-11-29 ENCOUNTER — HOSPITAL ENCOUNTER (OUTPATIENT)
Dept: GENERAL RADIOLOGY | Facility: OTHER | Age: 64
Discharge: HOME | End: 2019-11-29
Attending: FAMILY MEDICINE | Admitting: FAMILY MEDICINE
Payer: MEDICARE

## 2019-11-29 ENCOUNTER — OFFICE VISIT (OUTPATIENT)
Dept: FAMILY MEDICINE | Facility: OTHER | Age: 64
End: 2019-11-29
Attending: FAMILY MEDICINE
Payer: MEDICARE

## 2019-11-29 VITALS
RESPIRATION RATE: 16 BRPM | SYSTOLIC BLOOD PRESSURE: 120 MMHG | OXYGEN SATURATION: 94 % | HEIGHT: 69 IN | BODY MASS INDEX: 25.03 KG/M2 | DIASTOLIC BLOOD PRESSURE: 70 MMHG | WEIGHT: 169 LBS | TEMPERATURE: 96.1 F | HEART RATE: 57 BPM

## 2019-11-29 DIAGNOSIS — Z83.42 FAMILY HISTORY OF HIGH CHOLESTEROL: ICD-10-CM

## 2019-11-29 DIAGNOSIS — Z00.00 ANNUAL PHYSICAL EXAM: Primary | ICD-10-CM

## 2019-11-29 DIAGNOSIS — R06.00 DYSPNEA, UNSPECIFIED TYPE: ICD-10-CM

## 2019-11-29 DIAGNOSIS — M81.0 AGE-RELATED OSTEOPOROSIS WITHOUT CURRENT PATHOLOGICAL FRACTURE: ICD-10-CM

## 2019-11-29 DIAGNOSIS — R91.8 PULMONARY NODULES: ICD-10-CM

## 2019-11-29 DIAGNOSIS — Z13.6 SCREENING FOR AAA (AORTIC ABDOMINAL ANEURYSM): ICD-10-CM

## 2019-11-29 DIAGNOSIS — M85.80 OSTEOPENIA, UNSPECIFIED LOCATION: ICD-10-CM

## 2019-11-29 DIAGNOSIS — Z11.59 ENCOUNTER FOR HEPATITIS C SCREENING TEST FOR LOW RISK PATIENT: ICD-10-CM

## 2019-11-29 DIAGNOSIS — S32.010D COMPRESSION FRACTURE OF L1 VERTEBRA WITH ROUTINE HEALING, SUBSEQUENT ENCOUNTER: ICD-10-CM

## 2019-11-29 DIAGNOSIS — I73.00 RAYNAUD'S DISEASE WITHOUT GANGRENE: ICD-10-CM

## 2019-11-29 DIAGNOSIS — Z12.5 SCREENING FOR PROSTATE CANCER: ICD-10-CM

## 2019-11-29 LAB
CHOLEST SERPL-MCNC: 163 MG/DL
HDLC SERPL-MCNC: 40 MG/DL (ref 23–92)
LDLC SERPL CALC-MCNC: 105 MG/DL
NONHDLC SERPL-MCNC: 123 MG/DL
PSA SERPL-ACNC: 0.21 NG/ML
TRIGL SERPL-MCNC: 88 MG/DL

## 2019-11-29 PROCEDURE — G0103 PSA SCREENING: HCPCS | Mod: ZL | Performed by: FAMILY MEDICINE

## 2019-11-29 PROCEDURE — 71046 X-RAY EXAM CHEST 2 VIEWS: CPT

## 2019-11-29 PROCEDURE — 90715 TDAP VACCINE 7 YRS/> IM: CPT

## 2019-11-29 PROCEDURE — 90471 IMMUNIZATION ADMIN: CPT

## 2019-11-29 PROCEDURE — 99396 PREV VISIT EST AGE 40-64: CPT | Performed by: FAMILY MEDICINE

## 2019-11-29 PROCEDURE — 86803 HEPATITIS C AB TEST: CPT | Mod: ZL | Performed by: FAMILY MEDICINE

## 2019-11-29 PROCEDURE — G0472 HEP C SCREEN HIGH RISK/OTHER: HCPCS | Mod: ZL | Performed by: FAMILY MEDICINE

## 2019-11-29 PROCEDURE — 36415 COLL VENOUS BLD VENIPUNCTURE: CPT | Mod: ZL | Performed by: FAMILY MEDICINE

## 2019-11-29 PROCEDURE — G0463 HOSPITAL OUTPT CLINIC VISIT: HCPCS

## 2019-11-29 PROCEDURE — 93010 ELECTROCARDIOGRAM REPORT: CPT | Performed by: INTERNAL MEDICINE

## 2019-11-29 PROCEDURE — 87389 HIV-1 AG W/HIV-1&-2 AB AG IA: CPT | Mod: ZL | Performed by: FAMILY MEDICINE

## 2019-11-29 PROCEDURE — 80061 LIPID PANEL: CPT | Mod: ZL | Performed by: FAMILY MEDICINE

## 2019-11-29 PROCEDURE — G0463 HOSPITAL OUTPT CLINIC VISIT: HCPCS | Mod: 25

## 2019-11-29 PROCEDURE — 99213 OFFICE O/P EST LOW 20 MIN: CPT | Mod: 25 | Performed by: FAMILY MEDICINE

## 2019-11-29 PROCEDURE — 93005 ELECTROCARDIOGRAM TRACING: CPT

## 2019-11-29 ASSESSMENT — PAIN SCALES - GENERAL: PAINLEVEL: MILD PAIN (2)

## 2019-11-29 ASSESSMENT — ENCOUNTER SYMPTOMS
FEVER: 0
NECK PAIN: 0
COUGH: 0
SLEEP DISTURBANCE: 0
SHORTNESS OF BREATH: 1
STRIDOR: 0
APNEA: 0
PALPITATIONS: 0
NUMBNESS: 0
WHEEZING: 0
POLYDIPSIA: 0
ARTHRALGIAS: 0
SORE THROAT: 0
CONFUSION: 0
DIZZINESS: 0
HEADACHES: 0
ABDOMINAL PAIN: 0
CHEST TIGHTNESS: 0
POLYPHAGIA: 0
MYALGIAS: 0
TROUBLE SWALLOWING: 0
CHOKING: 0
FATIGUE: 0
JOINT SWELLING: 0
LIGHT-HEADEDNESS: 0

## 2019-11-29 ASSESSMENT — MIFFLIN-ST. JEOR: SCORE: 1550.92

## 2019-11-29 ASSESSMENT — PATIENT HEALTH QUESTIONNAIRE - PHQ9: SUM OF ALL RESPONSES TO PHQ QUESTIONS 1-9: 0

## 2019-11-29 NOTE — PATIENT INSTRUCTIONS
Check labs today    Tdap vaccine  Recommend Shingrix (new shingles vaccine)    Colonoscopy in 12/2020    Schedule CT chest to screen for AAA and pulmonary nodule    Check EKG, Chest Xray and Echo for shortness of breath    Raynauds: could consider calcium channel blocker medication, this is a blood pressure medication which means it could lower BP as well. This could replace Lisinopril and treat both things. Would rather wait to make medication change when you are able to follow-up more closely

## 2019-11-29 NOTE — LETTER
November 29, 2019      Jamir Gill  [address] [facility] [address]        Dear ,    We are writing to inform you of your test results.    Your test results fall within the expected range(s) or remain unchanged from previous results.  Please continue with current treatment plan.    Resulted Orders   PSA Screen GH   Result Value Ref Range    PSA Screen 0.212 <3.100 ng/mL      Comment:      The DXI Access PSAS WHO assay is a two site immunoenzymatic assay. Assay   values obtained with different assay methods cannot be used interchangeably   due to differences in assay methods and reagent specificity.     Lipid Profile   Result Value Ref Range    Cholesterol 163 <200 mg/dL    Triglycerides 88 <150 mg/dL    HDL Cholesterol 40 23 - 92 mg/dL    LDL Cholesterol Calculated 105 (H) <100 mg/dL      Comment:      Above desirable:  100-129 mg/dl  Borderline High:  130-159 mg/dL  High:             160-189 mg/dL  Very high:       >189 mg/dl      Non HDL Cholesterol 123 <130 mg/dL       If you have any questions or concerns, please call the clinic at the number listed above.       Sincerely,        Kayla Pineda MD

## 2019-11-29 NOTE — PROGRESS NOTES
"  SUBJECTIVE:   Jamir iGll is a 64 year old male who presents to clinic today for the following health issues:  Nursing Notes:   Gosselin, Norma J., LPN  11/29/2019  8:37 AM  Signed  Chief Complaint   Patient presents with     Physical         Initial /70   Pulse 57   Temp 96.1  F (35.6  C) (Temporal)   Resp 16   Ht 1.759 m (5' 9.25\")   Wt 76.7 kg (169 lb)   SpO2 (!) 69%   BMI 24.78 kg/m    Estimated body mass index is 24.78 kg/m  as calculated from the following:    Height as of this encounter: 1.759 m (5' 9.25\").    Weight as of this encounter: 76.7 kg (169 lb).    Medication Reconciliation: complete      Norma J. Gosselin, LPN    HPI    63 yo male [facility] care and annual physical.    Patient was diagnosed with multiple sclerosis in 1993.  Predominantly impacting his lower extremities.  Also has neurogenic bladder followed by urology.  As lower extremity weakness has gotten more pronounced, he is less active and feels as is infected is conditioning.  He is experiencing some shortness of breath with minimal physical activity.  No associated chest pain, palpitations, edema.  No history of heart disease.  No family history of early onset coronary artery disease.  He is a non-smoker.  Last LDL was less than 100.    Multiple concerns:    -He reports having a strong family history of aortic aneurysm. Father, Paternal GF, Paternal Uncle.  He does not believe he has had AAA screening in the past.  -Patient reports Raynauds, progressive symptoms with higher temperature.  Blanching of all fingers and toes.  Rewarming process seems to be taking longer.    -Wondering about physical therapy.  They will be leaving for [place] in 10 days.  He has had some benefit from participating in physical therapy.    -Due for PSA screen.      Patient Active Problem List    Diagnosis Date Noted     Neurogenic bladder 12/06/2018     Priority: Medium     Multiple sclerosis (H)      Priority: Medium     No " Comments Provided       Essential (primary) hypertension      Priority: Medium     No Comments Provided       Ureterolithiasis 04/03/2018     Priority: Medium     Past Medical History:   Diagnosis Date     Cervical disc disorder     No Comments Provided     Enlarged prostate with lower urinary tract symptoms (LUTS)     No Comments Provided     Essential (primary) hypertension     No Comments Provided     Multiple sclerosis (H)     No Comments Provided     Other specified disorders of bone density and structure, unspecified site (CODE)     No Comments Provided     Other symptoms and signs involving cognitive functions and awareness     No Comments Provided     Spondylosis of cervical region without myelopathy or radiculopathy     No Comments Provided      Current Outpatient Medications   Medication Sig Dispense Refill     BACLOFEN PO Take 10 mg by mouth 2 times daily 10 mg QAM, 20 mg QHS       calcium carbonate (CALCIUM ANTACID) 500 MG chewable tablet Take 1,250 mg by mouth       ciprofloxacin (CIPRO) 500 MG tablet Take 1 tablet (500 mg) by mouth 2 times daily 14 tablet 0     Docusate Sodium (COLACE PO) Take 100 mg by mouth daily       lisinopril (PRINIVIL/ZESTRIL) 10 MG tablet TAKE 1 TABLET BY MOUTH EVERY DAY 90 tablet 3     meclizine 25 MG CHEW Take 25 mg by mouth 2 times daily       oxybutynin ER (DITROPAN-XL) 5 MG 24 hr tablet        oxybutynin ER (DITROPAN-XL) 5 MG 24 hr tablet Take 1 tablet (5 mg) by mouth daily 90 tablet 3     psyllium 0.52 G capsule Take 3 capsules by mouth 2 times daily       sildenafil (REVATIO) 20 MG tablet Take 3-5 (60-100mg) tablets by mouth 1 hour prior to sexual activity 30 tablet 11       Review of Systems   Constitutional: Negative for fatigue and fever.   HENT: Negative for sore throat, tinnitus and trouble swallowing.    Respiratory: Positive for shortness of breath. Negative for apnea, cough, choking, chest tightness, wheezing and stridor.    Cardiovascular: Negative for chest  "pain, palpitations and peripheral edema.   Gastrointestinal: Negative for abdominal pain.   Endocrine: Negative for cold intolerance, heat intolerance, polydipsia and polyphagia.   Musculoskeletal: Positive for gait problem. Negative for arthralgias, joint swelling, myalgias and neck pain.   Neurological: Negative for dizziness, light-headedness, numbness and headaches.   Psychiatric/Behavioral: Negative for confusion and sleep disturbance.        OBJECTIVE:     /70   Pulse 57   Temp 96.1  F (35.6  C) (Temporal)   Resp 16   Ht 1.759 m (5' 9.25\")   Wt 76.7 kg (169 lb)   SpO2 94%   BMI 24.78 kg/m    Body mass index is 24.78 kg/m .  Physical Exam  HENT:      Head: Normocephalic.   Neck:      Musculoskeletal: Normal range of motion.   Cardiovascular:      Rate and Rhythm: Normal rate and regular rhythm.      Heart sounds: No murmur.   Pulmonary:      Effort: Pulmonary effort is normal. No respiratory distress.      Breath sounds: Normal breath sounds. No wheezing.   Abdominal:      General: Abdomen is flat. Bowel sounds are normal.      Palpations: There is no mass.      Tenderness: There is no abdominal tenderness. There is no guarding.   Skin:     Comments: Blanching of all fingers, DIP   Neurological:      Mental Status: He is alert and oriented to person, place, and time.      Cranial Nerves: No cranial nerve deficit.      Sensory: No sensory deficit.      Motor: Weakness present.      Coordination: Coordination normal.      Gait: Gait abnormal.      Deep Tendon Reflexes: Reflexes normal.      Comments: Lower extremity spasticity.  Upper extremity not involved.             Diagnostic Test Results:  none   EKG shows a normal sinus rhythm without ST-T changes.  ASSESSMENT/PLAN:         1. Dyspnea, unspecified type  Likely related to smoking.  We will proceed with echocardiogram and chest x-ray for further evaluation.  Has no cardiac risk factors.  Recommend nonweightbearing activities such as stationary " bike or arm bike.  Would also benefit from water therapy.  - EKG 12-lead complete w/read - Clinics  - Echocardiogram Complete; Future  - XR Chest 2 Views; Future    2. Screening for prostate cancer    - PSA Screen GH; Future  - PSA Screen GH    3. Family history of high cholesterol  Proceed with lipid panel  - Lipid Profile; Future  - Lipid Profile    4. Annual physical exam  Reviewed preventive cares.  Recommend repeat colonoscopy in December 2020.  Patient declined influenza vaccine.  Will start pneumococcal vaccine at age 65.  Recommend Shingrix.  PSA screen was done today.    5. Pulmonary nodules  CT scan from 2017 showed a small 2 mm pulmonary nodule.  It appears he has not had any follow-up and recommend proceeding with repeat chest CT.  - CT Chest w/o Contrast; Future    6. Screening for AAA (aortic abdominal aneurysm)  Strong family history of AAA.  Recommend ultrasound.  - CT Chest w/o Contrast; Future    7. Encounter for hepatitis C screening test for low risk patient    - HIV Antigen Antibody Combo; Future  - Hepatitis C Screen Reflex to HCV RNA Quant and Genotype; Future  - Hepatitis C Screen Reflex to HCV RNA Quant and Genotype  - HIV Antigen Antibody Combo    8. Raynaud's disease without gangrene  Discussed treatment options including calcium channel blockers.  He is currently on lisinopril for his blood pressure.  Could consider switching her Norvasc 5 to 10 mg daily.  Unfortunately he is leaving for [place] and I am uncomfortable making medication changes without proper follow-up.    Patient Instructions   Check labs today    Tdap vaccine  Recommend Shingrix (new shingles vaccine)    Colonoscopy in 12/2020    Schedule CT chest to screen for AAA and pulmonary nodule    Check EKG, Chest Xray and Echo for shortness of breath    Raynauds: could consider calcium channel blocker medication, this is a blood pressure medication which means it could lower BP as well. This could replace Lisinopril and treat  both things. Would rather wait to make medication change when you are able to follow-up more closely          Kayla Pineda MD  [facility]

## 2019-11-29 NOTE — NURSING NOTE
"Chief Complaint   Patient presents with     Physical         Initial /70   Pulse 57   Temp 96.1  F (35.6  C) (Temporal)   Resp 16   Ht 1.759 m (5' 9.25\")   Wt 76.7 kg (169 lb)   SpO2 (!) 69%   BMI 24.78 kg/m   Estimated body mass index is 24.78 kg/m  as calculated from the following:    Height as of this encounter: 1.759 m (5' 9.25\").    Weight as of this encounter: 76.7 kg (169 lb).    Medication Reconciliation: complete      Norma J. Gosselin, LPN  "

## 2019-12-02 LAB
HCV AB SERPL QL IA: NONREACTIVE
HIV 1+2 AB+HIV1 P24 AG SERPL QL IA: NONREACTIVE

## 2019-12-06 ENCOUNTER — HOSPITAL ENCOUNTER (OUTPATIENT)
Dept: BONE DENSITY | Facility: OTHER | Age: 64
End: 2019-12-06
Attending: FAMILY MEDICINE
Payer: MEDICARE

## 2019-12-06 ENCOUNTER — HOSPITAL ENCOUNTER (OUTPATIENT)
Dept: CT IMAGING | Facility: OTHER | Age: 64
Discharge: HOME | End: 2019-12-06
Attending: FAMILY MEDICINE | Admitting: FAMILY MEDICINE
Payer: MEDICARE

## 2019-12-06 ENCOUNTER — HOSPITAL ENCOUNTER (OUTPATIENT)
Dept: CARDIOLOGY | Facility: OTHER | Age: 64
End: 2019-12-06
Attending: FAMILY MEDICINE
Payer: MEDICARE

## 2019-12-06 DIAGNOSIS — M81.0 AGE-RELATED OSTEOPOROSIS WITHOUT CURRENT PATHOLOGICAL FRACTURE: ICD-10-CM

## 2019-12-06 DIAGNOSIS — R91.8 PULMONARY NODULES: ICD-10-CM

## 2019-12-06 DIAGNOSIS — S32.010D COMPRESSION FRACTURE OF L1 VERTEBRA WITH ROUTINE HEALING, SUBSEQUENT ENCOUNTER: ICD-10-CM

## 2019-12-06 DIAGNOSIS — R06.00 DYSPNEA, UNSPECIFIED TYPE: ICD-10-CM

## 2019-12-06 DIAGNOSIS — Z13.6 SCREENING FOR AAA (AORTIC ABDOMINAL ANEURYSM): ICD-10-CM

## 2019-12-06 PROCEDURE — 93306 TTE W/DOPPLER COMPLETE: CPT

## 2019-12-06 PROCEDURE — 93306 TTE W/DOPPLER COMPLETE: CPT | Mod: 26 | Performed by: INTERNAL MEDICINE

## 2019-12-06 PROCEDURE — 71250 CT THORAX DX C-: CPT

## 2019-12-06 PROCEDURE — 77080 DXA BONE DENSITY AXIAL: CPT

## 2019-12-30 ENCOUNTER — MYC MEDICAL ADVICE (OUTPATIENT)
Dept: FAMILY MEDICINE | Facility: OTHER | Age: 64
End: 2019-12-30

## 2020-03-11 ENCOUNTER — HEALTH MAINTENANCE LETTER (OUTPATIENT)
Age: 65
End: 2020-03-11

## 2020-05-19 DIAGNOSIS — I15.8 OTHER SECONDARY HYPERTENSION: ICD-10-CM

## 2020-05-19 RX ORDER — LISINOPRIL 10 MG/1
10 TABLET ORAL DAILY
Qty: 90 TABLET | Refills: 1 | Status: SHIPPED | OUTPATIENT
Start: 2020-05-19 | End: 2020-10-01

## 2020-05-19 NOTE — TELEPHONE ENCOUNTER
Routing refill request to provider for review/approval because:  Labs not current:      Protocol Details    Normal serum creatinine on file in past 12 months     Normal serum potassium on file in past 12 months        LOV: 11/29/19 to establish care with Dr. Miriam Pineda out of office will route to covering team let for review and consideration.    Charlene Rand RN on 5/19/2020 at 11:38 AM

## 2020-05-27 ENCOUNTER — TELEPHONE (OUTPATIENT)
Dept: UROLOGY | Facility: OTHER | Age: 65
End: 2020-05-27

## 2020-05-27 NOTE — TELEPHONE ENCOUNTER
"Patient last saw Dr. Saldivar on 08/19/2019 with note stating:   \"Assessment  Mr. Gill is a 64 year old male w/h/o MS and neurogenic bladder follows up with ED and self catheterizations.  We reviewed his history and our plans going forward with annual surveillance.  We discussed neurogenic bladder surveillance as well as erectile dysfunction management.  He is very satisfied with the current management.     Plan  JESSICA this week  Annual JESSICA, BMP   Continue oxybutynin XL 5mg daily with self cath TID.\"     Prescription sent for catheters  for patient as requested after calling to determine size and type used.     Steffanie Gilman RN on 5/27/2020 at 3:10 PM       "

## 2020-05-27 NOTE — TELEPHONE ENCOUNTER
Patient called and left message stating he needs a new prescription for his catheters. Please advise. Does he need to be seen?    Kena Carrion on 5/27/2020 at 1:38 PM

## 2020-06-01 DIAGNOSIS — R29.898 RIGHT LEG WEAKNESS: ICD-10-CM

## 2020-06-01 DIAGNOSIS — G35 MS (MULTIPLE SCLEROSIS) (H): Primary | ICD-10-CM

## 2020-06-09 ENCOUNTER — HOSPITAL ENCOUNTER (OUTPATIENT)
Dept: PHYSICAL THERAPY | Facility: OTHER | Age: 65
Setting detail: THERAPIES SERIES
End: 2020-06-09
Attending: PSYCHIATRY & NEUROLOGY
Payer: MEDICARE

## 2020-06-09 PROCEDURE — 97110 THERAPEUTIC EXERCISES: CPT | Mod: GP

## 2020-06-09 PROCEDURE — 97161 PT EVAL LOW COMPLEX 20 MIN: CPT | Mod: GP

## 2020-06-09 NOTE — PROGRESS NOTES
[facility]        OUTPATIENT PHYSICAL THERAPY FUNCTIONAL EVALUATION  PLAN OF TREATMENT FOR OUTPATIENT REHABILITATION  (COMPLETE FOR INITIAL CLAIMS ONLY)  Patient's Last Name, First Name, M.I.  YOB: 1955  Jamir Gill     Provider's Name   [facility]   Medical Record No.  [id number]     Start of Care Date:  06/09/20   Onset Date:      Type:     _X__PT   ____OT  ____SLP Medical Diagnosis:  MS, right leg weakness     PT Diagnosis:  impaired mobility Visits from SOC:  1                              __________________________________________________________________________________  Plan of Treatment/Functional Goals:  balance training, bed mobility training, gait training, joint mobilization, neuromuscular re-education, ROM, strengthening, stretching, transfer training, manual therapy     Cryotherapy, Thermotherapy: Hydrocollator Packs     GOALS  strength  Pt will have improved hip abduction to 2+/5 to allow improved gait mechanics and decreased knee buckling.  08/04/20    transfers  Pt will have improved score on 30 second sit to stand to 10 reps in order to promote improved endurance and safety.  07/21/20    balance  Pt will have improved balance score on condition 5 of mCTSIB to 10 seconds for decreased fall risk.  08/04/20    HEP  Pt will be independent and consistent with performance of a customized home exercise program.  09/01/20                    Therapy Frequency:  2 times/Week   Predicted Duration of Therapy Intervention:  12 weeks    Kristy Cardenas, PT                                    I CERTIFY THE NEED FOR THESE SERVICES FURNISHED UNDER        THIS PLAN OF TREATMENT AND WHILE UNDER MY CARE .             Physician Signature               Date    X_____________________________________________________                      Certification Date From:   06/09/20   Certification Date To:  09/01/20    Referring Provider:  Dee Gibbs MD    Initial Assessment  See Epic Evaluation- Start of Care Date: 06/09/20

## 2020-06-09 NOTE — PROGRESS NOTES
"   06/09/20 0800   Quick Adds   Quick Adds Certification   Type of Visit Initial OP PT Evaluation   General Information   Start of Care Date 06/09/20   Referring Physician Dee Gibbs MD   Orders Evaluate and Treat as Indicated   Medical Diagnosis RRMS, R leg weakness   Surgical/Medical history reviewed Yes   Pertinent history of current problem (include personal factors and/or comorbidities that impact the POC) Chronic leg weakness, right greater than left and the muscles are starting to atrophy noticably again. Can't get the cardio he needs because he can't walk very far before the leg gets worn out. 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 Increased falls more \"melting falls\" rather than balance. Occasionally foot catches behind the other. Wears a carbon fiber AFO on the right foot. Feels balance is actually pretty good. The neck issue worked on last episode was really bad again in March, but has since become pretty minimal.    Prior level of functional mobility Ambulation   Ambulation SPC   Prior level of function comment occasional need for 4WW   Previous/Current Treatment Physical Therapy   Improvement after PT Moderate   Current Community Support Family/friend caregiver   Patient role/Employment history Employed   Living environment House[facility]   Home/Community Accessibility Comments [place] step [place]   Current Assistive Devices Standard Cane;Scooter;Four Wheeled Walker   ADL Devices Shower/Tub Chair   Patient/Family Goals Statement improving strength   Fall Risk Screen   Fall screen completed by PT   Have you fallen 2 or more times in the past year? Yes   Have you fallen and had an injury in the past year? No   Timed Up and Go score (seconds) 12   Is patient a fall risk? Yes   Abuse Screen (yes response referral indicated)   Feels Unsafe at Home or Work/School no   Feels Threatened by Someone no   Does Anyone Try to Keep You From Having Contact with Others or " "Doing Things Outside Your Home? no   Physical Signs of Abuse Present no   Strength   Manual Muscle Testing Quick Adds MMT: Hip;MMT: Knee   MMT: Hip, Rehab Eval   Hip Flexion - Left Side (4-/5) good minus, left   Hip Extension - Left Side (2+/5) poor plus, left   Hip ABduction - Left Side (3+/5) fair plus, left   Hip Flexion - Right Side (3-/5) fair minus, right   Hip Extension - Right Side (2+/5) poor plus, right   Hip ABduction - Right Side (1+/5) trace plus, right   MMT: Knee, Rehab Eval   Knee Flexion - Left Side (4/5) good, left   Knee Extension - Left Side (4/5) good, left   Knee Flexion - Right Side (3+/5) fair plus, right   Knee Extension - Right Side (3/5) fair, right   Musculoskeletal Special Tests   Musculoskeletal Special Tests 8 reps 30 sec sit to stand from 19\" height   Balance Special Tests   Balance Special Tests Modified CTSIB Conditions   Balance Special Tests Modified CTSIB Conditions   Condition 1, seconds 30 Seconds   Condition 2, seconds 30 Seconds   Condition 4, seconds 30 Seconds   Condition 5, seconds 4 Seconds   Modality Interventions   Planned Modality Interventions Cryotherapy;Thermotherapy: Hydrocollator Packs   Planned Therapy Interventions   Planned Therapy Interventions balance training;bed mobility training;gait training;joint mobilization;neuromuscular re-education;ROM;strengthening;stretching;transfer training;manual therapy   Clinical Impression   Criteria for Skilled Therapeutic Interventions Met yes, treatment indicated   PT Diagnosis impaired mobility   Influenced by the following impairments strength, endurance, balance   Functional limitations due to impairments walking, work duties, stairs, transfers   Clinical Presentation Stable/Uncomplicated   Clinical Decision Making (Complexity) Low complexity   Therapy Frequency 2 times/Week   Predicted Duration of Therapy Intervention (days/wks) 12 weeks   Risk & Benefits of therapy have been explained Yes   Patient, Family & other " staff in agreement with plan of care Yes   Clinical Impression Comments Pt demonstrates poor hip strength and endurance causing knee buckling during gait. He will benefit from skilled PT services.   Education Assessment   Preferred Learning Style Pictures/video;Demonstration  ([id number])   GOALS   PT Eval Goals 1;2;3;4   Goal 1   Goal Identifier strength   Goal Description Pt will have improved hip abduction to 2+/5 to allow improved gait mechanics and decreased knee buckling.   Target Date 08/04/20   Goal 2   Goal Identifier transfers   Goal Description Pt will have improved score on 30 second sit to stand to 10 reps in order to promote improved endurance and safety.   Target Date 07/21/20   Goal 3   Goal Identifier balance   Goal Description Pt will have improved balance score on condition 5 of mCTSIB to 10 seconds for decreased fall risk.   Target Date 08/04/20   Goal 4   Goal Identifier HEP   Goal Description Pt will be independent and consistent with performance of a customized home exercise program.   Target Date 09/01/20   Total Evaluation Time   PT Eval, Low Complexity Minutes (91642) 45   Therapy Certification   Certification date from 06/09/20   Certification date to 09/01/20   Medical Diagnosis MS, right leg weakness

## 2020-06-12 ENCOUNTER — HOSPITAL ENCOUNTER (OUTPATIENT)
Dept: PHYSICAL THERAPY | Facility: OTHER | Age: 65
Setting detail: THERAPIES SERIES
End: 2020-06-12
Attending: PSYCHIATRY & NEUROLOGY
Payer: MEDICARE

## 2020-06-12 PROCEDURE — 97110 THERAPEUTIC EXERCISES: CPT | Mod: GP

## 2020-06-17 ENCOUNTER — HOSPITAL ENCOUNTER (OUTPATIENT)
Dept: PHYSICAL THERAPY | Facility: OTHER | Age: 65
Setting detail: THERAPIES SERIES
End: 2020-06-17
Attending: PSYCHIATRY & NEUROLOGY
Payer: MEDICARE

## 2020-06-17 PROCEDURE — 97110 THERAPEUTIC EXERCISES: CPT | Mod: GP,CQ

## 2020-06-19 ENCOUNTER — HOSPITAL ENCOUNTER (OUTPATIENT)
Dept: PHYSICAL THERAPY | Facility: OTHER | Age: 65
Setting detail: THERAPIES SERIES
End: 2020-06-19
Attending: PSYCHIATRY & NEUROLOGY
Payer: MEDICARE

## 2020-06-19 PROCEDURE — 97110 THERAPEUTIC EXERCISES: CPT | Mod: GP,CQ

## 2020-06-23 ENCOUNTER — HOSPITAL ENCOUNTER (OUTPATIENT)
Dept: PHYSICAL THERAPY | Facility: OTHER | Age: 65
Setting detail: THERAPIES SERIES
End: 2020-06-23
Attending: PSYCHIATRY & NEUROLOGY
Payer: MEDICARE

## 2020-06-23 PROCEDURE — 97110 THERAPEUTIC EXERCISES: CPT | Mod: GP

## 2020-06-25 ENCOUNTER — TELEPHONE (OUTPATIENT)
Dept: UROLOGY | Facility: OTHER | Age: 65
End: 2020-06-25

## 2020-06-25 NOTE — TELEPHONE ENCOUNTER
"\"Appointment (Patient has possible UTI and urologist out of office and patient has MS)\"    Contacted Pt and they state that they have noticed that their MS symptoms are exasperated when they develop a UTI.  Pt states that he self caths due to MS and has experienced UTI's in the past and has just presented to diagnostic window to submit sample.      Also states he has increased frequency urination and urine is cloudy.      LOV with PCP 11/29/2019 and LOV with URO 8/19/2019.    Advised Pt to present to [facility] today     The patient indicates understanding of these issues and agrees with the plan.    Jessica Holm RN  ....................  6/25/2020   10:36 AM        "

## 2020-06-26 ENCOUNTER — OFFICE VISIT (OUTPATIENT)
Dept: FAMILY MEDICINE | Facility: OTHER | Age: 65
End: 2020-06-26
Attending: FAMILY MEDICINE
Payer: COMMERCIAL

## 2020-06-26 ENCOUNTER — HOSPITAL ENCOUNTER (OUTPATIENT)
Dept: PHYSICAL THERAPY | Facility: OTHER | Age: 65
Setting detail: THERAPIES SERIES
End: 2020-06-26
Attending: PSYCHIATRY & NEUROLOGY
Payer: MEDICARE

## 2020-06-26 VITALS
SYSTOLIC BLOOD PRESSURE: 120 MMHG | TEMPERATURE: 96.8 F | HEART RATE: 65 BPM | OXYGEN SATURATION: 98 % | RESPIRATION RATE: 18 BRPM | WEIGHT: 165.6 LBS | HEIGHT: 69 IN | DIASTOLIC BLOOD PRESSURE: 64 MMHG | BODY MASS INDEX: 24.53 KG/M2

## 2020-06-26 DIAGNOSIS — R35.0 URINE FREQUENCY: Primary | ICD-10-CM

## 2020-06-26 LAB
ALBUMIN UR-MCNC: NEGATIVE MG/DL
APPEARANCE UR: CLEAR
BILIRUB UR QL STRIP: NEGATIVE
COLOR UR AUTO: ABNORMAL
GLUCOSE UR STRIP-MCNC: NEGATIVE MG/DL
HGB UR QL STRIP: NEGATIVE
KETONES UR STRIP-MCNC: NEGATIVE MG/DL
LEUKOCYTE ESTERASE UR QL STRIP: ABNORMAL
MUCOUS THREADS #/AREA URNS LPF: PRESENT /LPF
NITRATE UR QL: NEGATIVE
PH UR STRIP: 5.5 PH (ref 5–7)
RBC #/AREA URNS AUTO: 2 /HPF (ref 0–2)
SOURCE: ABNORMAL
SP GR UR STRIP: 1.03 (ref 1–1.03)
UROBILINOGEN UR STRIP-MCNC: NORMAL MG/DL (ref 0–2)
WBC #/AREA URNS AUTO: 4 /HPF (ref 0–5)

## 2020-06-26 PROCEDURE — 97110 THERAPEUTIC EXERCISES: CPT | Mod: GP

## 2020-06-26 PROCEDURE — 99213 OFFICE O/P EST LOW 20 MIN: CPT | Performed by: PHYSICIAN ASSISTANT

## 2020-06-26 PROCEDURE — G0463 HOSPITAL OUTPT CLINIC VISIT: HCPCS

## 2020-06-26 PROCEDURE — 81001 URINALYSIS AUTO W/SCOPE: CPT | Mod: ZL | Performed by: FAMILY MEDICINE

## 2020-06-26 ASSESSMENT — PAIN SCALES - GENERAL: PAINLEVEL: MILD PAIN (2)

## 2020-06-26 ASSESSMENT — MIFFLIN-ST. JEOR: SCORE: 1526.54

## 2020-06-26 NOTE — PROGRESS NOTES
SUBJECTIVE: Jamir Gill is a 65 year old male who complains of urinary frequency, worsening of MS symptoms which happens when he has a UTI. He self caths usually twice daily and he has had to increase this to 3-4 times daily.   Onset 2 weeks ago, course is gradually worsening  Associated symptoms - otherwise is well, no fever    Treatments - nothing tried  History of UTI - He gets about 1 per year  History of kidney stone, kidney infection, kidney disease - he has had a kidney stone about 1-2 years ago.   Bowel - he has chronic constipation with MS    Patient Active Problem List   Diagnosis     Ureterolithiasis     Multiple sclerosis (H)     Essential (primary) hypertension     Neurogenic bladder     Past Medical History:   Diagnosis Date     Cervical disc disorder     No Comments Provided     Enlarged prostate with lower urinary tract symptoms (LUTS)     No Comments Provided     Essential (primary) hypertension     No Comments Provided     Multiple sclerosis (H)     No Comments Provided     Other specified disorders of bone density and structure, unspecified site (CODE)     No Comments Provided     Other symptoms and signs involving cognitive functions and awareness     No Comments Provided     Spondylosis of cervical region without myelopathy or radiculopathy     No Comments Provided     Current Outpatient Medications   Medication     Aspirin Buf,CaCarb-MgCarb-MgO, 81 MG TABS     BACLOFEN PO     calcium carbonate (CALCIUM ANTACID) 500 MG chewable tablet     Docusate Sodium (COLACE PO)     lisinopril (ZESTRIL) 10 MG tablet     oxybutynin ER (DITROPAN-XL) 5 MG 24 hr tablet     psyllium 0.52 G capsule     meclizine 25 MG CHEW     sildenafil (REVATIO) 20 MG tablet     No current facility-administered medications for this visit.       No Known Allergies      ROS  General: feels well, no fever  Abdomen: negative  : POSITIVE - per HPI    OBJECTIVE:   Vitals:    06/26/20 0944   BP: 120/64   BP Location: Right arm  "  Patient Position: Sitting   Cuff Size: Adult Regular   Pulse: 65   Resp: 18   Temp: 96.8  F (36  C)   TempSrc: Tympanic   SpO2: 98%   Weight: 75.1 kg (165 lb 9.6 oz)   Height: 1.753 m (5' 9\")     Appears well, in no apparent distress.  Vital signs are normal.  Cardiac: normal RR, no murmur  Respiratory: normal respiration, clear to ausculation  Abdomen: soft,  non tender, No rigidity, no rebound. No guarding. No CVAT    Results for orders placed or performed in visit on 06/26/20   UA reflex to Microscopic and Culture     Status: Abnormal    Specimen: Midstream Urine   Result Value Ref Range    Color Urine Light Yellow     Appearance Urine Clear     Glucose Urine Negative NEG^Negative mg/dL    Bilirubin Urine Negative NEG^Negative    Ketones Urine Negative NEG^Negative mg/dL    Specific Gravity Urine 1.026 1.003 - 1.035    Blood Urine Negative NEG^Negative    pH Urine 5.5 5.0 - 7.0 pH    Protein Albumin Urine Negative NEG^Negative mg/dL    Urobilinogen mg/dL Normal 0.0 - 2.0 mg/dL    Nitrite Urine Negative NEG^Negative    Leukocyte Esterase Urine Trace (A) NEG^Negative    Source Midstream Urine     RBC Urine 2 0 - 2 /HPF    WBC Urine 4 0 - 5 /HPF    Mucous Urine Present (A) NEG^Negative /LPF         ASSESSMENT:    Diagnosis Comments   1. Urine frequency  UA reflex to Microscopic and Culture         PLAN:  History of urine frequency and worsening MS symptoms. Patient would like UTI rule out  He self cath's  Urinalysis trace LE. Does not appear to be an infections. Will wait for culture.   Return to clinic if symptoms persist or worsen    Hannah Burrows PA-C on 6/26/2020 at 10:04 AM        "

## 2020-06-26 NOTE — PATIENT INSTRUCTIONS
Urine frequency, patient would like rule out for UTI  Urine is not definitive for UTI, urine culture pending  We will call if we need to add an antibiotic.   Return to clinic if symptoms persist or worsen

## 2020-06-26 NOTE — NURSING NOTE
"Chief Complaint   Patient presents with     UTI     Patient presented to the clinic with symptoms of a UTI. Patient reports that he started having frequency a few weeks ago and that his urine looked cloudy. It has not gotten any better since than.    Initial /64 (BP Location: Right arm, Patient Position: Sitting, Cuff Size: Adult Regular)   Pulse 65   Temp 96.8  F (36  C) (Tympanic)   Resp 18   Ht 1.753 m (5' 9\")   Wt 75.1 kg (165 lb 9.6 oz)   SpO2 98%   BMI 24.45 kg/m   Estimated body mass index is 24.45 kg/m  as calculated from the following:    Height as of this encounter: 1.753 m (5' 9\").    Weight as of this encounter: 75.1 kg (165 lb 9.6 oz).    Medication Reconciliation: complete      Ellyn Perez LPN  "

## 2020-06-30 ENCOUNTER — TRANSFERRED RECORDS (OUTPATIENT)
Dept: HEALTH INFORMATION MANAGEMENT | Facility: OTHER | Age: 65
End: 2020-06-30

## 2020-06-30 ENCOUNTER — HOSPITAL ENCOUNTER (OUTPATIENT)
Dept: PHYSICAL THERAPY | Facility: OTHER | Age: 65
Setting detail: THERAPIES SERIES
End: 2020-06-30
Attending: PSYCHIATRY & NEUROLOGY
Payer: MEDICARE

## 2020-06-30 PROCEDURE — 97110 THERAPEUTIC EXERCISES: CPT | Mod: GP,CQ

## 2020-07-01 ENCOUNTER — TELEPHONE (OUTPATIENT)
Dept: FAMILY MEDICINE | Facility: OTHER | Age: 65
End: 2020-07-01

## 2020-07-01 DIAGNOSIS — E53.8 VITAMIN B12 DEFICIENCY (NON ANEMIC): ICD-10-CM

## 2020-07-01 DIAGNOSIS — E55.9 VITAMIN D DEFICIENCY: ICD-10-CM

## 2020-07-01 DIAGNOSIS — G35 MULTIPLE SCLEROSIS (H): Primary | ICD-10-CM

## 2020-07-01 DIAGNOSIS — Z20.822 SUSPECTED COVID-19 VIRUS INFECTION: ICD-10-CM

## 2020-07-01 DIAGNOSIS — E78.2 MIXED HYPERLIPIDEMIA: ICD-10-CM

## 2020-07-01 DIAGNOSIS — M85.80 OSTEOPENIA, UNSPECIFIED LOCATION: ICD-10-CM

## 2020-07-01 NOTE — TELEPHONE ENCOUNTER
Does he need to have a telephone visit to schedule Covid test?  If I order it, is there a mechanism to have him get it otherwise?  Esau Anaya MD on 7/1/2020 at 5:56 PM

## 2020-07-01 NOTE — TELEPHONE ENCOUNTER
Visit with Dr. Gibbs scanned in from yesterday in patients chart with recommendations for lab orders and covid test.  Donna Lemos LPN ...... 7/1/2020 4:14 PM

## 2020-07-01 NOTE — TELEPHONE ENCOUNTER
Patient called stating DR. Gibbs was going to contact [facility] regarding getting lab and covid orders. He is wondering when the orders will be placed.     Please contact patient at [phone]    Stacey Langston on 7/1/2020 at 9:33 AM

## 2020-07-02 NOTE — TELEPHONE ENCOUNTER
If you order, I can call him to schedule a curbside appointment to get swabbed, the nurses will see the order.  Donna Lemos LPN ...... 7/2/2020 8:17 AM

## 2020-07-02 NOTE — TELEPHONE ENCOUNTER
Orders placed.  He will need a lab appointment and curbside swab.  Esau Anaya MD on 7/2/2020 at 8:57 AM

## 2020-07-03 ENCOUNTER — ALLIED HEALTH/NURSE VISIT (OUTPATIENT)
Dept: FAMILY MEDICINE | Facility: OTHER | Age: 65
End: 2020-07-03
Attending: FAMILY MEDICINE
Payer: MEDICARE

## 2020-07-03 DIAGNOSIS — Z20.822 SUSPECTED COVID-19 VIRUS INFECTION: ICD-10-CM

## 2020-07-03 DIAGNOSIS — M85.80 OSTEOPENIA, UNSPECIFIED LOCATION: ICD-10-CM

## 2020-07-03 DIAGNOSIS — E78.2 MIXED HYPERLIPIDEMIA: ICD-10-CM

## 2020-07-03 DIAGNOSIS — E55.9 VITAMIN D DEFICIENCY: ICD-10-CM

## 2020-07-03 DIAGNOSIS — G35 MULTIPLE SCLEROSIS (H): ICD-10-CM

## 2020-07-03 DIAGNOSIS — E53.8 VITAMIN B12 DEFICIENCY (NON ANEMIC): ICD-10-CM

## 2020-07-03 LAB
ALBUMIN SERPL-MCNC: 4 G/DL (ref 3.5–5.7)
ALP SERPL-CCNC: 69 U/L (ref 34–104)
ALT SERPL W P-5'-P-CCNC: 14 U/L (ref 7–52)
ANION GAP SERPL CALCULATED.3IONS-SCNC: 4 MMOL/L (ref 3–14)
AST SERPL W P-5'-P-CCNC: 16 U/L (ref 13–39)
BASOPHILS # BLD AUTO: 0 10E9/L (ref 0–0.2)
BASOPHILS NFR BLD AUTO: 0.5 %
BILIRUB SERPL-MCNC: 0.4 MG/DL (ref 0.3–1)
BUN SERPL-MCNC: 33 MG/DL (ref 7–25)
CALCIUM SERPL-MCNC: 9.9 MG/DL (ref 8.6–10.3)
CHLORIDE SERPL-SCNC: 104 MMOL/L (ref 98–107)
CO2 SERPL-SCNC: 33 MMOL/L (ref 21–31)
CREAT SERPL-MCNC: 1.1 MG/DL (ref 0.7–1.3)
DEPRECATED CALCIDIOL+CALCIFEROL SERPL-MC: 82.2 NG/ML
DIFFERENTIAL METHOD BLD: NORMAL
EOSINOPHIL # BLD AUTO: 0.3 10E9/L (ref 0–0.7)
EOSINOPHIL NFR BLD AUTO: 5.4 %
ERYTHROCYTE [DISTWIDTH] IN BLOOD BY AUTOMATED COUNT: 12.7 % (ref 10–15)
GFR SERPL CREATININE-BSD FRML MDRD: 67 ML/MIN/{1.73_M2}
GLUCOSE SERPL-MCNC: 90 MG/DL (ref 70–105)
HCT VFR BLD AUTO: 43.4 % (ref 40–53)
HGB BLD-MCNC: 13.8 G/DL (ref 13.3–17.7)
IMM GRANULOCYTES # BLD: 0 10E9/L (ref 0–0.4)
IMM GRANULOCYTES NFR BLD: 0.2 %
LYMPHOCYTES # BLD AUTO: 1.4 10E9/L (ref 0.8–5.3)
LYMPHOCYTES NFR BLD AUTO: 25.9 %
MCH RBC QN AUTO: 29 PG (ref 26.5–33)
MCHC RBC AUTO-ENTMCNC: 31.8 G/DL (ref 31.5–36.5)
MCV RBC AUTO: 91 FL (ref 78–100)
MONOCYTES # BLD AUTO: 0.4 10E9/L (ref 0–1.3)
MONOCYTES NFR BLD AUTO: 7.6 %
NEUTROPHILS # BLD AUTO: 3.4 10E9/L (ref 1.6–8.3)
NEUTROPHILS NFR BLD AUTO: 60.4 %
PLATELET # BLD AUTO: 198 10E9/L (ref 150–450)
POTASSIUM SERPL-SCNC: 4.2 MMOL/L (ref 3.5–5.1)
PROT SERPL-MCNC: 6.1 G/DL (ref 6.4–8.9)
RBC # BLD AUTO: 4.76 10E12/L (ref 4.4–5.9)
SODIUM SERPL-SCNC: 141 MMOL/L (ref 134–144)
TSH SERPL DL<=0.05 MIU/L-ACNC: 1.35 IU/ML (ref 0.34–5.6)
VIT B12 SERPL-MCNC: 269 PG/ML (ref 180–914)
WBC # BLD AUTO: 5.6 10E9/L (ref 4–11)

## 2020-07-03 PROCEDURE — 84443 ASSAY THYROID STIM HORMONE: CPT | Mod: ZL | Performed by: FAMILY MEDICINE

## 2020-07-03 PROCEDURE — 85025 COMPLETE CBC W/AUTO DIFF WBC: CPT | Mod: ZL | Performed by: FAMILY MEDICINE

## 2020-07-03 PROCEDURE — U0003 INFECTIOUS AGENT DETECTION BY NUCLEIC ACID (DNA OR RNA); SEVERE ACUTE RESPIRATORY SYNDROME CORONAVIRUS 2 (SARS-COV-2) (CORONAVIRUS DISEASE [COVID-19]), AMPLIFIED PROBE TECHNIQUE, MAKING USE OF HIGH THROUGHPUT TECHNOLOGIES AS DESCRIBED BY CMS-2020-01-R: HCPCS | Mod: ZL | Performed by: FAMILY MEDICINE

## 2020-07-03 PROCEDURE — 99207 ZZC NO CHARGE NURSE ONLY: CPT

## 2020-07-03 PROCEDURE — 82607 VITAMIN B-12: CPT | Mod: ZL | Performed by: FAMILY MEDICINE

## 2020-07-03 PROCEDURE — C9803 HOPD COVID-19 SPEC COLLECT: HCPCS

## 2020-07-03 PROCEDURE — 80053 COMPREHEN METABOLIC PANEL: CPT | Mod: ZL | Performed by: FAMILY MEDICINE

## 2020-07-03 PROCEDURE — 82306 VITAMIN D 25 HYDROXY: CPT | Mod: ZL | Performed by: FAMILY MEDICINE

## 2020-07-03 PROCEDURE — 36415 COLL VENOUS BLD VENIPUNCTURE: CPT | Mod: ZL | Performed by: FAMILY MEDICINE

## 2020-07-03 NOTE — LETTER
July 6, 2020        Jamir MENDOZA Jairo  [address] [facility]  [place] [address]    This letter provides a written record that you were tested for COVID-19 on 7/3/20.       Your result was negative. This means that we didn t find the virus that causes COVID-19 in your sample. A test may show negative when you do actually have the virus. This can happen when the virus is in the early stages of infection, before you feel illness symptoms.    If you have symptoms   Stay home and away from others (self-isolate) until you meet ALL of the guidelines below:    You ve had no fever--and no medicine that reduces fever--for 3 full days (72 hours). And      Your other symptoms have gotten better. For example, your cough or breathing has improved. And     At least 10 days have passed since your symptoms started.    During this time:    Stay home. Don t go to work, school or anywhere else.     Stay in your own room, including for meals. Use your own bathroom if you can.    Stay away from others in your home. No hugging, kissing or shaking hands. No visitors.    Clean  high touch  surfaces often (doorknobs, counters, handles, etc.). Use a household cleaning spray or wipes. You can find a full list on the EPA website at www.epa.gov/pesticide-registration/list-n-disinfectants-use-against-sars-cov-2.    Cover your mouth and nose with a mask, tissue or washcloth to avoid spreading germs.    Wash your hands and face often with soap and water.    Going back to work  Check with your employer for any guidelines to follow for going back to work.    Employers: This document serves as formal notice that your employee tested negative for COVID-19, as of the testing date shown above.

## 2020-07-04 LAB
SARS-COV-2 RNA SPEC QL NAA+PROBE: NOT DETECTED
SPECIMEN SOURCE: NORMAL

## 2020-07-07 ENCOUNTER — HOSPITAL ENCOUNTER (OUTPATIENT)
Dept: PHYSICAL THERAPY | Facility: OTHER | Age: 65
Setting detail: THERAPIES SERIES
End: 2020-07-07
Attending: PSYCHIATRY & NEUROLOGY
Payer: MEDICARE

## 2020-07-07 PROCEDURE — 97110 THERAPEUTIC EXERCISES: CPT | Mod: GP

## 2020-07-10 ENCOUNTER — HOSPITAL ENCOUNTER (OUTPATIENT)
Dept: PHYSICAL THERAPY | Facility: OTHER | Age: 65
Setting detail: THERAPIES SERIES
End: 2020-07-10
Attending: PSYCHIATRY & NEUROLOGY
Payer: MEDICARE

## 2020-07-10 PROCEDURE — 97110 THERAPEUTIC EXERCISES: CPT | Mod: GP

## 2020-07-15 ENCOUNTER — HOSPITAL ENCOUNTER (OUTPATIENT)
Dept: PHYSICAL THERAPY | Facility: OTHER | Age: 65
Setting detail: THERAPIES SERIES
End: 2020-07-15
Attending: PSYCHIATRY & NEUROLOGY
Payer: MEDICARE

## 2020-07-15 PROCEDURE — 97110 THERAPEUTIC EXERCISES: CPT | Mod: GP

## 2020-07-15 NOTE — PROGRESS NOTES
"Outpatient Physical Therapy Progress Note     Patient: Jamir Gill  : 1955    Beginning/End Dates of Reporting Period:  2020 to 7/15/2020    Referring Provider: Dr. Dee Gibbs    Therapy Diagnosis: MS; R leg weakness     Client Self Report: Jamir reports nothing new, keeping busy. Pain in the legs 1-2/10 \"the usual\". More tired in the afternoons and has been on his feet all day.    Objective Measurements:  Objective Measure: Subjective fatigue rating @ end of tx   Details: 6/10     Objective Measure: 30 second sit to stand  Details: 11 reps today (8 reps at eval)      Goals:  Goal Identifier strength   Goal Description Pt will have improved hip abduction to 2+/5 to allow improved gait mechanics and decreased knee buckling.   Target Date 20   Date Met      Progress: Progressing. Pt has improved gait mechanics and ability to walk with standard cane however still has significant adduction of hip during all phases of gait.     Goal Identifier transfers   Goal Description Pt will have improved score on 30 second sit to stand to 10 reps in order to promote improved endurance and safety.   Target Date 20   Date Met  07/15/20   Progress: GOAL MET     Goal Identifier balance   Goal Description Pt will have improved balance score on condition 5 of mCTSIB to 10 seconds for decreased fall risk.   Target Date 20   Date Met      Progress: Progressing.     Goal Identifier HEP   Goal Description Pt will be independent and consistent with performance of a customized home exercise program.   Target Date 20   Date Met      Progress: Progressing. Pt has demonstrated consistent improvements suggesting adherence to home program.     Progress Toward Goals:   Progress this reporting period: This is the first session I have seen the patient but he appears to be progressing based on the goals established at evaluation. His gait has improved to where he is not using his 4WW anymore and his " transfer abilities have greatly improved.    Plan:  Continue therapy per current plan of care.    Discharge:  No

## 2020-07-20 ENCOUNTER — HOSPITAL ENCOUNTER (OUTPATIENT)
Dept: PHYSICAL THERAPY | Facility: OTHER | Age: 65
Setting detail: THERAPIES SERIES
End: 2020-07-20
Attending: PSYCHIATRY & NEUROLOGY
Payer: MEDICARE

## 2020-07-20 PROCEDURE — 97110 THERAPEUTIC EXERCISES: CPT | Mod: GP,CQ

## 2020-07-23 ENCOUNTER — HOSPITAL ENCOUNTER (OUTPATIENT)
Dept: PHYSICAL THERAPY | Facility: OTHER | Age: 65
Setting detail: THERAPIES SERIES
End: 2020-07-23
Attending: FAMILY MEDICINE
Payer: MEDICARE

## 2020-07-23 PROCEDURE — 97110 THERAPEUTIC EXERCISES: CPT | Mod: GP,CQ

## 2020-07-30 ENCOUNTER — HOSPITAL ENCOUNTER (OUTPATIENT)
Dept: PHYSICAL THERAPY | Facility: OTHER | Age: 65
Setting detail: THERAPIES SERIES
End: 2020-07-30
Attending: PSYCHIATRY & NEUROLOGY
Payer: MEDICARE

## 2020-07-30 PROCEDURE — 97110 THERAPEUTIC EXERCISES: CPT | Mod: GP

## 2020-08-04 ENCOUNTER — HOSPITAL ENCOUNTER (OUTPATIENT)
Dept: PHYSICAL THERAPY | Facility: OTHER | Age: 65
Setting detail: THERAPIES SERIES
End: 2020-08-04
Attending: PSYCHIATRY & NEUROLOGY
Payer: MEDICARE

## 2020-08-04 PROCEDURE — 97110 THERAPEUTIC EXERCISES: CPT | Mod: GP,CQ

## 2020-08-07 ENCOUNTER — HOSPITAL ENCOUNTER (OUTPATIENT)
Dept: PHYSICAL THERAPY | Facility: OTHER | Age: 65
Setting detail: THERAPIES SERIES
End: 2020-08-07
Attending: PSYCHIATRY & NEUROLOGY
Payer: MEDICARE

## 2020-08-07 PROCEDURE — 97110 THERAPEUTIC EXERCISES: CPT | Mod: GP,CQ

## 2020-08-11 ENCOUNTER — HOSPITAL ENCOUNTER (OUTPATIENT)
Dept: PHYSICAL THERAPY | Facility: OTHER | Age: 65
Setting detail: THERAPIES SERIES
End: 2020-08-11
Attending: PSYCHIATRY & NEUROLOGY
Payer: MEDICARE

## 2020-08-11 PROCEDURE — 97110 THERAPEUTIC EXERCISES: CPT | Mod: GP,CQ

## 2020-08-13 ENCOUNTER — HOSPITAL ENCOUNTER (OUTPATIENT)
Dept: PHYSICAL THERAPY | Facility: OTHER | Age: 65
Setting detail: THERAPIES SERIES
End: 2020-08-13
Attending: PSYCHIATRY & NEUROLOGY
Payer: MEDICARE

## 2020-08-13 PROCEDURE — 97110 THERAPEUTIC EXERCISES: CPT | Mod: GP

## 2020-08-17 ENCOUNTER — HOSPITAL ENCOUNTER (OUTPATIENT)
Dept: PHYSICAL THERAPY | Facility: OTHER | Age: 65
Setting detail: THERAPIES SERIES
End: 2020-08-17
Attending: PSYCHIATRY & NEUROLOGY
Payer: MEDICARE

## 2020-08-17 PROCEDURE — 97110 THERAPEUTIC EXERCISES: CPT | Mod: GP,CQ

## 2020-08-20 ENCOUNTER — HOSPITAL ENCOUNTER (OUTPATIENT)
Dept: PHYSICAL THERAPY | Facility: OTHER | Age: 65
Setting detail: THERAPIES SERIES
End: 2020-08-20
Attending: PSYCHIATRY & NEUROLOGY
Payer: MEDICARE

## 2020-08-20 ENCOUNTER — TELEPHONE (OUTPATIENT)
Dept: UROLOGY | Facility: OTHER | Age: 65
End: 2020-08-20

## 2020-08-20 DIAGNOSIS — N31.9 NEUROGENIC BLADDER: Primary | ICD-10-CM

## 2020-08-20 PROCEDURE — 97110 THERAPEUTIC EXERCISES: CPT | Mod: GP

## 2020-08-20 NOTE — PROGRESS NOTES
"Per office visit 8/19/19 with Olu Saldivar MD \" Plan  JESSICA this week  Annual JESSICA, BMP  Continue oxybutynin XL 5mg daily with self cath TID  Orders Placed  "

## 2020-08-20 NOTE — TELEPHONE ENCOUNTER
Patient went to reorder his cath supplies yesterday and [employer] state they need a note from the provider stating the patient needs them. Please fax to [employer] at [phone].   Contact patient with any questions.  Patient is wondering if it can be done today so he can  the supplies tomorrow before he is out.    Kena Carrion on 8/20/2020 at 8:04 AM

## 2020-08-25 ENCOUNTER — HOSPITAL ENCOUNTER (OUTPATIENT)
Dept: PHYSICAL THERAPY | Facility: OTHER | Age: 65
Setting detail: THERAPIES SERIES
End: 2020-08-25
Attending: PSYCHIATRY & NEUROLOGY
Payer: MEDICARE

## 2020-08-25 PROCEDURE — 97110 THERAPEUTIC EXERCISES: CPT | Mod: GP,CQ

## 2020-08-25 NOTE — PROGRESS NOTES
Outpatient Physical Therapy Progress Note     Patient: Jamir Gill  : 1955    Beginning/End Dates of Reporting Period:  2020 to 2020    Referring Provider: Dr. Dee Gibbs MD    Therapy Diagnosis: MS, right leg weakness     Client Self Report: Pt is very happy with his progress but feels there is more room to improve.    Objective Measurements:  Objective Measure: Subjective fatigue rating @ end of tx   Details: 5/10 - legs   Objective Measure: 30 second sit to stand  Details: 12 reps with use of UEs     Goals:  Goal Identifier strength   Goal Description Pt will have improved hip abduction to 2+/5 to allow improved gait mechanics and decreased knee buckling.   Target Date 20   Date Met      Progress: IMPROVING     Goal Identifier transfers   Goal Description Pt will have improved score on 30 second sit to stand to 10 reps in order to promote improved endurance and safety.   Target Date 20   Date Met  07/15/20   Progress:     Goal Identifier balance   Goal Description Pt will have improved balance score on condition 5 of mCTSIB to 10 seconds for decreased fall risk.   Target Date 20   Date Met  (Pt able to stand for 30 seconds with EC and on foam)   Progress: GOAL MET     Goal Identifier HEP   Goal Description Pt will be independent and consistent with performance of a customized home exercise program.   Target Date 20   Date Met      Progress:     Progress Toward Goals:   Progress this reporting period: Pt has noted improvement in his strength and has decreased his WB through his cane. He is using his legs more during gait instead of relying on the device. His balance is getting better as well evidenced by his improved time during CTSIB trial conditions. His R leg although improving, is still quite weak. Pt would continue to benefit from skilled rehab services to normalize gait as much as possible, strengthening and endurance training, and continued balance  work for fall prevention.    Plan:  Continue therapy per current plan of care.    Discharge:  No

## 2020-08-27 ENCOUNTER — HOSPITAL ENCOUNTER (OUTPATIENT)
Dept: PHYSICAL THERAPY | Facility: OTHER | Age: 65
Setting detail: THERAPIES SERIES
End: 2020-08-27
Attending: PSYCHIATRY & NEUROLOGY
Payer: MEDICARE

## 2020-08-27 PROCEDURE — 97110 THERAPEUTIC EXERCISES: CPT | Mod: GP

## 2020-08-31 ENCOUNTER — MEDICAL CORRESPONDENCE (OUTPATIENT)
Dept: HEALTH INFORMATION MANAGEMENT | Facility: OTHER | Age: 65
End: 2020-08-31

## 2020-08-31 ENCOUNTER — HOSPITAL ENCOUNTER (OUTPATIENT)
Dept: ULTRASOUND IMAGING | Facility: OTHER | Age: 65
End: 2020-08-31
Attending: UROLOGY
Payer: MEDICARE

## 2020-08-31 ENCOUNTER — OFFICE VISIT (OUTPATIENT)
Dept: UROLOGY | Facility: OTHER | Age: 65
End: 2020-08-31
Attending: UROLOGY
Payer: MEDICARE

## 2020-08-31 VITALS
WEIGHT: 161 LBS | RESPIRATION RATE: 18 BRPM | HEART RATE: 74 BPM | DIASTOLIC BLOOD PRESSURE: 64 MMHG | BODY MASS INDEX: 23.78 KG/M2 | SYSTOLIC BLOOD PRESSURE: 124 MMHG

## 2020-08-31 DIAGNOSIS — N31.9 NEUROGENIC BLADDER: ICD-10-CM

## 2020-08-31 LAB
ANION GAP SERPL CALCULATED.3IONS-SCNC: 5 MMOL/L (ref 3–14)
BUN SERPL-MCNC: 28 MG/DL (ref 7–25)
CALCIUM SERPL-MCNC: 10.2 MG/DL (ref 8.6–10.3)
CHLORIDE SERPL-SCNC: 102 MMOL/L (ref 98–107)
CO2 SERPL-SCNC: 33 MMOL/L (ref 21–31)
CREAT SERPL-MCNC: 1.23 MG/DL (ref 0.7–1.3)
GFR SERPL CREATININE-BSD FRML MDRD: 59 ML/MIN/{1.73_M2}
GLUCOSE SERPL-MCNC: 94 MG/DL (ref 70–105)
POTASSIUM SERPL-SCNC: 4.3 MMOL/L (ref 3.5–5.1)
SODIUM SERPL-SCNC: 140 MMOL/L (ref 134–144)

## 2020-08-31 PROCEDURE — G0463 HOSPITAL OUTPT CLINIC VISIT: HCPCS | Mod: 25

## 2020-08-31 PROCEDURE — 76770 US EXAM ABDO BACK WALL COMP: CPT

## 2020-08-31 PROCEDURE — G0463 HOSPITAL OUTPT CLINIC VISIT: HCPCS

## 2020-08-31 PROCEDURE — 99214 OFFICE O/P EST MOD 30 MIN: CPT | Performed by: UROLOGY

## 2020-08-31 PROCEDURE — 80048 BASIC METABOLIC PNL TOTAL CA: CPT | Mod: ZL | Performed by: UROLOGY

## 2020-08-31 PROCEDURE — 36415 COLL VENOUS BLD VENIPUNCTURE: CPT | Mod: ZL | Performed by: UROLOGY

## 2020-08-31 RX ORDER — OXYBUTYNIN CHLORIDE 5 MG/1
5 TABLET, EXTENDED RELEASE ORAL DAILY
Qty: 90 TABLET | Refills: 3 | Status: SHIPPED | OUTPATIENT
Start: 2020-08-31 | End: 2021-04-15

## 2020-08-31 ASSESSMENT — PAIN SCALES - GENERAL: PAINLEVEL: NO PAIN (0)

## 2020-08-31 NOTE — NURSING NOTE
Pt presents to clinic for a one year neurogenic bladder follow up    Review of Systems:    Weight loss:    No     Recent fever/chills:  No   Night sweats:   No  Current skin rash:  No   Recent hair loss:  No  Heat intolerance:  No   Cold intolerance:  No  Chest pain:   No   Palpitations:   No  Shortness of breath:  No   Wheezing:   No  Constipation:    Yes   Diarrhea:   No   Nausea:   No   Vomiting:   No   Kidney/side pain:  No   Back pain:   No  Frequent headaches:  No   Dizziness:     No  Leg swelling:   No   Calf pain:    No

## 2020-08-31 NOTE — PROGRESS NOTES
Type of Visit  Established    Chief Complaint  Neurogenic bladder  ED  History of stones    HPI  Mr. Gill is a 65 year old male w/h/o MS who follows up with neurogenic bladder, ED and history of stones.  The patient was previously seeing a urologist in [place] but has transferred care closer to home.    Because of his neurogenic bladder he has been undergoing renal surveillance in the form of annual renal ultrasound and serum creatinine.  He was diagnosed with MS about 7 years ago.  At this time he started self catheterizing and he performs this 3 times a day without issue.  He continues to take oxybutynin XL 5 mg once daily for urgency symptoms.  He has continued this plan and reports no issues in the last year.  His MS has progressed somewhat since last visit.  He continues to deny leaking between catheterization episodes and he denies volitional voiding.    ED has been progressively worsening over the last 5 to 10 years.  He has failed PDE5 inhibitors.  He has also used intracavernosal injections.  Today he is asking about restarting intracavernosal injections.  He voices an understanding of how to perform the procedure.  He would prefer to skip a teaching visit.      Review of Systems  I reviewed the ROS with the patient.    Nursing Notes:   Dior Tamayo LPN  8/31/2020 10:15 AM  Signed  Pt presents to clinic for a one year neurogenic bladder follow up    Review of Systems:    Weight loss:    No     Recent fever/chills:  No   Night sweats:   No  Current skin rash:  No   Recent hair loss:  No  Heat intolerance:  No   Cold intolerance:  No  Chest pain:   No   Palpitations:   No  Shortness of breath:  No   Wheezing:   No  Constipation:    Yes   Diarrhea:   No   Nausea:   No   Vomiting:   No   Kidney/side pain:  No   Back pain:   No  Frequent headaches:  No   Dizziness:     No  Leg swelling:   No   Calf pain:    No      Family History  Family History   Problem Relation Age of Onset     Prostate Cancer  Father         Cancer-prostate     Other - See Comments Father         Alzheimer's/kidney failure     Other - See Comments Mother          with Parkinson's     Heart Disease Mother         Heart Disease,CHF for carditis     Family History Negative Brother         Good Health     Heart Disease Brother         Heart Disease,ASCAD with MI       Physical Exam  Vitals:    20 0951   BP: 124/64   BP Location: Left arm   Patient Position: Sitting   Cuff Size: Adult Regular   Pulse: 74   Resp: 18   Weight: 73 kg (161 lb)     Constitutional: NAD, WDWN.  Cardiovascular: Regular rate.  Pulmonary/Chest: Respirations are even and non-labored bilaterally.  Abdominal: Soft. No distension, tenderness, masses or guarding. No CVA tenderness.  Extremities: PARUL x 4, Warm. No clubbing.  No cyanosis.    Skin: Pink, warm and dry.  No rashes noted.  Genitourinary: nonpalpable bladder    Labs  Results for orders placed or performed in visit on 20   US Renal Complete     Status: None    Narrative    PROCEDURE: US RENAL COMPLETE    HISTORY:65 years Male . With neurogenic bladder    TECHNIQUE: Targeted sonographic assessment of the kidneys and bladder  was performed.    COMPARISON:  None.    MEASUREMENTS:    Right renal length: 8.4 cm  Left renal length: 9.4 cm    RENAL FINDINGS: There is no hydronephrosis.    BLADDER: Unremarkable      Impression    IMPRESSION:  No evidence of hydronephrosis.      MARIA ELENA LOVETT MD   Basic metabolic panel     Status: Abnormal   Result Value Ref Range    Sodium 140 134 - 144 mmol/L    Potassium 4.3 3.5 - 5.1 mmol/L    Chloride 102 98 - 107 mmol/L    Carbon Dioxide 33 (H) 21 - 31 mmol/L    Anion Gap 5 3 - 14 mmol/L    Glucose 94 70 - 105 mg/dL    Urea Nitrogen 28 (H) 7 - 25 mg/dL    Creatinine 1.23 0.70 - 1.30 mg/dL    GFR Estimate 59 (L) >60 mL/min/[1.73_m2]    GFR Estimate If Black 71 >60 mL/min/[1.73_m2]    Calcium 10.2 8.6 - 10.3 mg/dL     Radiographic Studies  JESSICA  2020  IMPRESSION:   No evidence of hydronephrosis.      ^^^^^^^^^^^^^^^^^^^^^^^^^^^^^^^^^^^^    11/29/2018  CT a/p  IMPRESSION:   1. Tiny renal calculi.  2. No ureteral stone or hydronephrosis.  3. Thickening of the wall of the urinary bladder which may be due to  cystitis given the history.    Assessment  Mr. Gill is a 65 year old male w/h/o MS and neurogenic bladder follows up with ED and self catheterizations.  Renal ultrasound is stable.  Creatinine has also been stable.  He is interested in restarting intracavernosal injections for ED.    We discussed neurogenic bladder surveillance as well as erectile dysfunction management.  He is very satisfied with the current management.    Plan  Annual JESSICA, BMP  Refill for Trimix intracavernosal injections  Continue oxybutynin XL 5mg daily with self cath TID

## 2020-09-04 ENCOUNTER — HOSPITAL ENCOUNTER (OUTPATIENT)
Dept: PHYSICAL THERAPY | Facility: OTHER | Age: 65
Setting detail: THERAPIES SERIES
End: 2020-09-04
Attending: PSYCHIATRY & NEUROLOGY
Payer: MEDICARE

## 2020-09-04 PROCEDURE — 97110 THERAPEUTIC EXERCISES: CPT | Mod: GP,CQ,KX

## 2020-09-10 ENCOUNTER — HOSPITAL ENCOUNTER (OUTPATIENT)
Dept: PHYSICAL THERAPY | Facility: OTHER | Age: 65
Setting detail: THERAPIES SERIES
End: 2020-09-10
Attending: PSYCHIATRY & NEUROLOGY
Payer: MEDICARE

## 2020-09-10 PROCEDURE — 97110 THERAPEUTIC EXERCISES: CPT | Mod: GP,CQ

## 2020-09-16 ENCOUNTER — HOSPITAL ENCOUNTER (OUTPATIENT)
Dept: PHYSICAL THERAPY | Facility: OTHER | Age: 65
Setting detail: THERAPIES SERIES
End: 2020-09-16
Attending: PSYCHIATRY & NEUROLOGY
Payer: MEDICARE

## 2020-09-16 PROCEDURE — 97110 THERAPEUTIC EXERCISES: CPT | Mod: GP,CQ

## 2020-09-21 ENCOUNTER — HOSPITAL ENCOUNTER (OUTPATIENT)
Dept: PHYSICAL THERAPY | Facility: OTHER | Age: 65
Setting detail: THERAPIES SERIES
End: 2020-09-21
Attending: PSYCHIATRY & NEUROLOGY
Payer: MEDICARE

## 2020-09-21 PROCEDURE — 97110 THERAPEUTIC EXERCISES: CPT | Mod: GP,KX

## 2020-09-21 NOTE — PROGRESS NOTES
[facility]      OUTPATIENT PHYSICAL THERAPY  PLAN OF TREATMENT FOR OUTPATIENT REHABILITATION    Patient's Last Name, First Name, M.I.                YOB: 1955  Jamir Gill                        Provider's Name  [facility] Medical Record No.  [id number]                               Onset Date:  chronic   Start of Care Date: 6/9/2020   Type:     _X_PT   ___OT   ___SLP Medical Diagnosis: R leg weakness, MS                       PT Diagnosis: impaired balance, mobility and strength      _________________________________________________________________________________  Plan of Treatment:    Frequency/Duration: 1-2x/week     Goals:  Goal Identifier strength   Goal Description Pt will have improved hip abduction to 2+/5 to allow improved gait mechanics and decreased knee buckling.   Target Date 10/04/20   Date Met      Progress:     Goal Identifier transfers   Goal Description Pt will have improved score on 30 second sit to stand to 10 reps in order to promote improved endurance and safety.   Target Date 07/21/20   Date Met  07/15/20   Progress:     Goal Identifier balance   Goal Description Pt will have improved balance score on condition 5 of mCTSIB to 10 seconds for decreased fall risk.   Target Date 08/04/20   Date Met  (Pt able to stand for 30 seconds with EC and on foam)   Progress:     Goal Identifier HEP   Goal Description Pt will be independent and consistent with performance of a customized home exercise program.   Target Date 09/01/20   Date Met      Progress:     Progress Toward Goals:   Progress this reporting period: Pt continues to make great progress. He is walking less with the cane at home. He has had no falls while doing this. His activity tolerance has greatly improved as well as his ability to complete more challenging tasks such as moving a boat around and  other resort tasks. Pt continues to make a steady increase in function. Will continue with therapy as pt continues to make progress.    Certification date from 9/1/2020 to 10/27/2020.    Manjit Pink PT          I CERTIFY THE NEED FOR THESE SERVICES FURNISHED UNDER        THIS PLAN OF TREATMENT AND WHILE UNDER MY CARE .             Physician Signature               Date    X_____________________________________________________                      Referring Provider: Dr. Dee Gibbs MD

## 2020-09-23 ENCOUNTER — HOSPITAL ENCOUNTER (OUTPATIENT)
Dept: PHYSICAL THERAPY | Facility: OTHER | Age: 65
Setting detail: THERAPIES SERIES
End: 2020-09-23
Attending: PSYCHIATRY & NEUROLOGY
Payer: MEDICARE

## 2020-09-23 PROCEDURE — 97110 THERAPEUTIC EXERCISES: CPT | Mod: GP,CQ,KX

## 2020-09-30 ENCOUNTER — HOSPITAL ENCOUNTER (OUTPATIENT)
Dept: PHYSICAL THERAPY | Facility: OTHER | Age: 65
Setting detail: THERAPIES SERIES
End: 2020-09-30
Attending: PSYCHIATRY & NEUROLOGY
Payer: MEDICARE

## 2020-09-30 PROCEDURE — 97110 THERAPEUTIC EXERCISES: CPT | Mod: GP

## 2020-10-01 DIAGNOSIS — I15.8 OTHER SECONDARY HYPERTENSION: ICD-10-CM

## 2020-10-01 RX ORDER — LISINOPRIL 10 MG/1
TABLET ORAL
Qty: 90 TABLET | Refills: 1 | Status: SHIPPED | OUTPATIENT
Start: 2020-10-01 | End: 2021-04-15

## 2020-10-01 NOTE — TELEPHONE ENCOUNTER
" Disp Refills Start End LISA   lisinopril (ZESTRIL) 10 MG tablet 90 tablet 1 5/19/2020  No   Sig - Route: Take 1 tablet (10 mg) by mouth daily - Oral       LOV: 11/29/2019  Future Office visit: No future appointment scheduled at this time.    Requested Prescriptions   Pending Prescriptions Disp Refills     lisinopril (ZESTRIL) 10 MG tablet [Pharmacy Med Name: LISINOPRIL 10 MG TABLET] 90 tablet 1     Sig: TAKE 1 TABLET BY MOUTH EVERY DAY       ACE Inhibitors (Including Combos) Protocol Passed - 10/1/2020 10:26 AM        Passed - Blood pressure under 140/90 in past 12 months     BP Readings from Last 3 Encounters:   08/31/20 124/64   06/26/20 120/64   11/29/19 120/70                 Passed - Recent (12 mo) or future (30 days) visit within the authorizing provider's specialty     Patient has had an office visit with the authorizing provider or a provider within the authorizing providers department within the previous 12 mos or has a future within next 30 days. See \"Patient Info\" tab in inbasket, or \"Choose Columns\" in Meds & Orders section of the refill encounter.              Passed - Medication is active on med list        Passed - Patient is age 18 or older        Passed - Normal serum creatinine on file in past 12 months     Recent Labs   Lab Test 08/31/20  0856   CR 1.23       Ok to refill medication if creatinine is low          Passed - Normal serum potassium on file in past 12 months     Recent Labs   Lab Test 08/31/20  0856   POTASSIUM 4.3                "

## 2020-10-02 ENCOUNTER — HOSPITAL ENCOUNTER (OUTPATIENT)
Dept: PHYSICAL THERAPY | Facility: OTHER | Age: 65
Setting detail: THERAPIES SERIES
End: 2020-10-02
Attending: PSYCHIATRY & NEUROLOGY
Payer: MEDICARE

## 2020-10-02 PROCEDURE — 97110 THERAPEUTIC EXERCISES: CPT | Mod: GP,KX

## 2020-10-08 ENCOUNTER — HOSPITAL ENCOUNTER (OUTPATIENT)
Dept: PHYSICAL THERAPY | Facility: OTHER | Age: 65
Setting detail: THERAPIES SERIES
End: 2020-10-08
Attending: PSYCHIATRY & NEUROLOGY
Payer: MEDICARE

## 2020-10-08 PROCEDURE — 97110 THERAPEUTIC EXERCISES: CPT | Mod: GP,KX

## 2020-10-16 ENCOUNTER — HOSPITAL ENCOUNTER (OUTPATIENT)
Dept: PHYSICAL THERAPY | Facility: OTHER | Age: 65
Setting detail: THERAPIES SERIES
End: 2020-10-16
Attending: PSYCHIATRY & NEUROLOGY
Payer: MEDICARE

## 2020-10-16 PROCEDURE — 97110 THERAPEUTIC EXERCISES: CPT | Mod: GP

## 2020-10-16 NOTE — PROGRESS NOTES
Outpatient Physical Therapy Progress Note     Patient: Jamir Gill  : 1955    Beginning/End Dates of Reporting Period:  2020 to 10/16/2020    Referring Provider: Dr. Dee Gibbs MD    Therapy Diagnosis: Impaired mobility and decreased functional abililties     Client Self Report: Jamir notes that he has been able to [place] a lot of [place] the resort that he would not have been able to do even 6 weeks ago. He is very happy with his progress to this point.    Objective Measurements:  Objective Measure: Subjective fatigue rating @ end of tx   Details: 7/10 - distal ITB, lateral knees B   Objective Measure: 30 second sit to stand  Details: 9 reps with very MIN to no use of UEs on thighs     Goals:  Goal Identifier strength   Goal Description Pt will have improved hip abduction to 3+/5 to allow improved gait mechanics and decreased knee buckling.   Target Date 10/30/20   Date Met  (Good strength in neutral hip position, loses str in abd)   Progress: Progressing. Shows an increased ability to complete hip abduction tasks but through limited ROM.     Goal Identifier transfers   Goal Description Pt will have improved score on 30 second sit to stand to 10 reps in order to promote improved endurance and safety.   Target Date 20   Date Met  07/15/20   Progress:     Goal Identifier balance   Goal Description Pt will have improved balance score on condition 5 of mCTSIB to 10 seconds for decreased fall risk.   Target Date 20   Date Met  (Pt able to stand for 30 seconds with EC and on foam)   Progress:     Goal Identifier HEP   Goal Description Pt will be independent and consistent with performance of a customized home exercise program.   Target Date 20   Date Met  10/02/20   Progress:     Progress Toward Goals:   Progress this reporting period: Pt continues to make significant gains. His gains seem to be more of improvements from a functional standpoint. He has able to complete many more  tasks at home with greater independence, often times now completing them independently. His strength levels have increased evidenced by increased repetitions at various weights.     Plan:  Continue therapy per current plan of care. Will continue for a few more weeks to maximize progress and then will discharge with home program.    Discharge:  No

## 2020-10-22 ENCOUNTER — HOSPITAL ENCOUNTER (OUTPATIENT)
Dept: PHYSICAL THERAPY | Facility: OTHER | Age: 65
Setting detail: THERAPIES SERIES
End: 2020-10-22
Attending: PSYCHIATRY & NEUROLOGY
Payer: MEDICARE

## 2020-10-22 PROCEDURE — 97110 THERAPEUTIC EXERCISES: CPT | Mod: GP,CQ,KX

## 2020-10-23 ENCOUNTER — TRANSFERRED RECORDS (OUTPATIENT)
Dept: HEALTH INFORMATION MANAGEMENT | Facility: OTHER | Age: 65
End: 2020-10-23

## 2020-10-27 ENCOUNTER — HOSPITAL ENCOUNTER (OUTPATIENT)
Dept: PHYSICAL THERAPY | Facility: OTHER | Age: 65
Setting detail: THERAPIES SERIES
End: 2020-10-27
Attending: PSYCHIATRY & NEUROLOGY
Payer: MEDICARE

## 2020-10-27 PROCEDURE — 97110 THERAPEUTIC EXERCISES: CPT | Mod: GP

## 2020-10-27 NOTE — PROGRESS NOTES
[facility]      OUTPATIENT PHYSICAL THERAPY  PLAN OF TREATMENT FOR OUTPATIENT REHABILITATION    Patient's Last Name, First Name, M.I.                YOB: 1955  Jamir Gill                        Provider's Name  [facility] Medical Record No.  [id number]                               Onset Date: Chronic   Start of Care Date: 6/9/2020   Type:     _X_PT   ___OT   ___SLP Medical Diagnosis: MS; R leg weakness                       PT Diagnosis: Impaired balance, mobility, and strength      _________________________________________________________________________________  Plan of Treatment: Continue previous plan of care.    Frequency/Duration: 1-2x/week for 4 weeks     Goals:  Goal Identifier strength   Goal Description Pt will have improved hip abduction to 3+/5 to allow improved gait mechanics and decreased knee buckling.   Target Date 10/30/20   Date Met  (Good strength in neutral hip position, loses str in abd)   Progress: Continues to make positive gains, pt able to move 40# of weight in Med[employer] hip abduction machine today with partial ROM but pt was at one point unable to move even 20#. Will continue to progress to allow for better R leg stability and movement.      Goal Identifier transfers   Goal Description Pt will have improved score on 30 second sit to stand to 10 reps in order to promote improved endurance and safety.   Target Date 07/21/20   Date Met  07/15/20   Progress:     Goal Identifier balance   Goal Description Pt will have improved balance score on condition 5 of mCTSIB to 10 seconds for decreased fall risk.   Target Date 08/04/20   Date Met  (Pt able to stand for 30 seconds with EC and on foam)   Progress:     Goal Identifier HEP   Goal Description Pt will be independent and consistent with performance of a customized home exercise program.   Target Date  09/01/20   Date Met  10/02/20   Progress:       Progress Toward Goals:   Progress this reporting period: See above. Pt will be leaving for his winter home within the next few weeks. Will continue to progress during that time and then discharge once he leaves.     Certification date from 10/27/2020 to 11/27/2020.    Manjit Pink, PT          I CERTIFY THE NEED FOR THESE SERVICES FURNISHED UNDER        THIS PLAN OF TREATMENT AND WHILE UNDER MY CARE     (Physician co-signature of this document indicates review and certification of the therapy plan).                Referring Provider: Dr. Dee Gibbs MD

## 2020-10-29 ENCOUNTER — HOSPITAL ENCOUNTER (OUTPATIENT)
Dept: PHYSICAL THERAPY | Facility: OTHER | Age: 65
Setting detail: THERAPIES SERIES
End: 2020-10-29
Attending: PSYCHIATRY & NEUROLOGY
Payer: MEDICARE

## 2020-10-29 PROCEDURE — 97110 THERAPEUTIC EXERCISES: CPT | Mod: GP,KX

## 2020-11-03 ENCOUNTER — HOSPITAL ENCOUNTER (OUTPATIENT)
Dept: PHYSICAL THERAPY | Facility: OTHER | Age: 65
Setting detail: THERAPIES SERIES
End: 2020-11-03
Attending: PSYCHIATRY & NEUROLOGY
Payer: MEDICARE

## 2020-11-03 PROCEDURE — 97110 THERAPEUTIC EXERCISES: CPT | Mod: GP,KX

## 2020-11-05 ENCOUNTER — HOSPITAL ENCOUNTER (OUTPATIENT)
Dept: PHYSICAL THERAPY | Facility: OTHER | Age: 65
Setting detail: THERAPIES SERIES
End: 2020-11-05
Attending: PSYCHIATRY & NEUROLOGY
Payer: MEDICARE

## 2020-11-05 PROCEDURE — 97110 THERAPEUTIC EXERCISES: CPT | Mod: GP

## 2020-11-25 NOTE — PROGRESS NOTES
Outpatient Physical Therapy Discharge Note     Patient: Jamir Gill  : 1955    Beginning/End Dates of Reporting Period:  2020 to 2020    Referring Provider: Dr. Bernie MD    Therapy Diagnosis: impaired mobility, MS, R leg weakness     Client Self Report: Pt is leaving for [place] on . Requested exercise list. Pt feels like he made tremendous progress with therapy and will continue his exercises while in [place].    Objective Measurements:  Objective Measure: Subjective fatigue rating @ end of tx   Details: 8/10 - distal ITB, lateral knees B   Objective Measure: 30 second sit to stand  Details: 9 reps with very MIN to no use of UEs on thighs     Goals:  Goal Identifier strength   Goal Description Pt will have improved hip abduction to 3+/5 to allow improved gait mechanics and decreased knee buckling.   Target Date 10/30/20   Date Met  (Good strength in neutral hip position, loses str in abd)   Progress:     Goal Identifier transfers   Goal Description Pt will have improved score on 30 second sit to stand to 10 reps in order to promote improved endurance and safety.   Target Date 20   Date Met  07/15/20   Progress:     Goal Identifier balance   Goal Description Pt will have improved balance score on condition 5 of mCTSIB to 10 seconds for decreased fall risk.   Target Date 20   Date Met  (Pt able to stand for 30 seconds with EC and on foam)   Progress:     Goal Identifier HEP   Goal Description Pt will be independent and consistent with performance of a customized home exercise program.   Target Date 20   Date Met  10/02/20   Progress:     Progress Toward Goals:   Progress this reporting period: Pt continued to make progress during his therapy to the extent where he was much more functional at home completing tasks. His ambulation still has compensations to it but at this point it may be unlikely to change. However, his endurance for walking and the use of his cane  significantly improved with a much higher tolerance for distance and activity.     Plan:  Discharge from therapy. Pt is leaving for his winter home in [place] and he will continue with his exercises there.    Discharge:    Reason for Discharge: Patient chooses to discontinue therapy as he is moving.    Equipment Issued: none    Discharge Plan: Patient to continue home program.

## 2020-12-27 ENCOUNTER — HEALTH MAINTENANCE LETTER (OUTPATIENT)
Age: 65
End: 2020-12-27

## 2021-01-15 ENCOUNTER — HEALTH MAINTENANCE LETTER (OUTPATIENT)
Age: 66
End: 2021-01-15

## 2021-04-02 ENCOUNTER — HOSPITAL ENCOUNTER (OUTPATIENT)
Dept: MRI IMAGING | Facility: OTHER | Age: 66
End: 2021-04-02
Attending: PSYCHIATRY & NEUROLOGY
Payer: MEDICARE

## 2021-04-02 DIAGNOSIS — G35 MS (MULTIPLE SCLEROSIS) (H): ICD-10-CM

## 2021-04-02 DIAGNOSIS — G35 MULTIPLE SCLEROSIS (H): ICD-10-CM

## 2021-04-02 PROCEDURE — 72157 MRI CHEST SPINE W/O & W/DYE: CPT

## 2021-04-02 PROCEDURE — A9575 INJ GADOTERATE MEGLUMI 0.1ML: HCPCS | Performed by: PSYCHIATRY & NEUROLOGY

## 2021-04-02 PROCEDURE — 72156 MRI NECK SPINE W/O & W/DYE: CPT

## 2021-04-02 PROCEDURE — 70553 MRI BRAIN STEM W/O & W/DYE: CPT

## 2021-04-02 PROCEDURE — 255N000002 HC RX 255 OP 636: Performed by: PSYCHIATRY & NEUROLOGY

## 2021-04-02 RX ADMIN — GADOTERATE MEGLUMINE 14 ML: 376.9 INJECTION INTRAVENOUS at 19:54

## 2021-04-03 ENCOUNTER — MYC MEDICAL ADVICE (OUTPATIENT)
Dept: UROLOGY | Facility: OTHER | Age: 66
End: 2021-04-03

## 2021-04-05 ENCOUNTER — HOSPITAL ENCOUNTER (OUTPATIENT)
Dept: CT IMAGING | Facility: OTHER | Age: 66
End: 2021-04-05
Attending: UROLOGY
Payer: MEDICARE

## 2021-04-05 ENCOUNTER — OFFICE VISIT (OUTPATIENT)
Dept: UROLOGY | Facility: OTHER | Age: 66
End: 2021-04-05
Attending: UROLOGY
Payer: MEDICARE

## 2021-04-05 VITALS
WEIGHT: 169.6 LBS | BODY MASS INDEX: 25.05 KG/M2 | RESPIRATION RATE: 20 BRPM | SYSTOLIC BLOOD PRESSURE: 150 MMHG | HEART RATE: 88 BPM | DIASTOLIC BLOOD PRESSURE: 100 MMHG

## 2021-04-05 DIAGNOSIS — R39.15 URINARY URGENCY: Primary | ICD-10-CM

## 2021-04-05 DIAGNOSIS — Z87.442 HISTORY OF KIDNEY STONES: Primary | ICD-10-CM

## 2021-04-05 DIAGNOSIS — Z87.442 HISTORY OF KIDNEY STONES: ICD-10-CM

## 2021-04-05 LAB
ALBUMIN UR-MCNC: NEGATIVE MG/DL
APPEARANCE UR: CLEAR
BILIRUB UR QL STRIP: NEGATIVE
COLOR UR AUTO: NORMAL
GLUCOSE UR STRIP-MCNC: NEGATIVE MG/DL
HGB UR QL STRIP: NEGATIVE
KETONES UR STRIP-MCNC: NEGATIVE MG/DL
LEUKOCYTE ESTERASE UR QL STRIP: NEGATIVE
NITRATE UR QL: NEGATIVE
PH UR STRIP: 6 PH (ref 5–7)
SOURCE: NORMAL
SP GR UR STRIP: 1.02 (ref 1–1.03)
UROBILINOGEN UR STRIP-MCNC: NORMAL MG/DL (ref 0–2)

## 2021-04-05 PROCEDURE — 81003 URINALYSIS AUTO W/O SCOPE: CPT | Mod: ZL | Performed by: UROLOGY

## 2021-04-05 PROCEDURE — G0463 HOSPITAL OUTPT CLINIC VISIT: HCPCS

## 2021-04-05 PROCEDURE — 99214 OFFICE O/P EST MOD 30 MIN: CPT | Performed by: UROLOGY

## 2021-04-05 PROCEDURE — 74176 CT ABD & PELVIS W/O CONTRAST: CPT

## 2021-04-05 RX ORDER — OXYBUTYNIN CHLORIDE 10 MG/1
10 TABLET, EXTENDED RELEASE ORAL DAILY
Qty: 90 TABLET | Refills: 3 | Status: SHIPPED | OUTPATIENT
Start: 2021-04-05 | End: 2022-03-31

## 2021-04-05 ASSESSMENT — PAIN SCALES - GENERAL: PAINLEVEL: NO PAIN (1)

## 2021-04-05 NOTE — PROGRESS NOTES
Type of Visit  Established    Chief Complaint  Left flank pain  Neurogenic bladder  History of kidney stones    HPI  Mr. Gill is a 65 year old male w/h/o MS and neurogenic bladder managed with CIC and new acute left flank pain.  Patient noticed the pain in the last 3 to 4 days.  He also has increased his activity level with the warmer spring weather.  He has a history of stones and the left flank pain reminded him of obstructing stone pain.  The pain has improved since that time but it is persistent.  He became immediately concerned about an obstructing stone given his history.  He denies any other symptoms such as constipation, dysuria/pelvic pain, fevers, gross hematuria.    In addition the patient has a neurogenic bladder that has been managed with intermittent catheterization for many years.  He has been catheterizing 3 times daily.  More recently he has increased this to 4 times daily based on pelvic pressure.  He takes oxybutynin XL 5 mg once daily.  He has run out of medication and is asking about increasing the dose based on his catheterization schedule.  100% of his urine is emptied through self-catheterization.  He has no volitional voiding.      Review of Systems  I reviewed the ROS with the patient.    Nursing Notes:   Rubina Wilson RN  4/5/2021  1:17 PM  Signed  Review of Systems:    Weight loss:    No     Recent fever/chills:  No   Night sweats:   No  Current skin rash:  No   Recent hair loss:  No  Heat intolerance:  No   Cold intolerance:  No  Chest pain:   No   Palpitations:   No  Shortness of breath:  No   Wheezing:   No  Constipation:    Yes   Diarrhea:   No   Nausea:   No   Vomiting:   No   Kidney/side pain:  Yes   Back pain:   Yes  Frequent headaches:  No   Dizziness:     No  Leg swelling:   No   Calf pain:    No    Family History  Family History   Problem Relation Age of Onset     Prostate Cancer Father         Cancer-prostate     Other - See Comments Father         Alzheimer's/kidney  failure     Other - See Comments Mother          with Parkinson's     Heart Disease Mother         Heart Disease,CHF for carditis     Family History Negative Brother         Good Health     Heart Disease Brother         Heart Disease,ASCAD with MI       Physical Exam  Vitals:    21 1314 21 1319   BP: (!) 152/98 (!) 150/100   BP Location: Right arm Right arm   Patient Position: Sitting Sitting   Cuff Size: Adult Regular Adult Regular   Pulse: 88    Resp: 20    Weight: 76.9 kg (169 lb 9.6 oz)      Constitutional: NAD, WDWN.  Cardiovascular: Regular rate.  Pulmonary/Chest: Respirations are even and non-labored bilaterally.  Abdominal: Soft. No distension, tenderness, masses or guarding. No CVA tenderness.  Extremities: PARUL x 4, Warm. No clubbing.  No cyanosis.    Skin: Pink, warm and dry.  No rashes noted.  Genitourinary: nonpalpable bladder    Labs  Results for JOHNNY GILL (MRN [id number]) as of 2021 13:42   2021 13:34   Color Urine Light Yellow   Appearance Urine Clear   Glucose Urine Negative   Bilirubin Urine Negative   Ketones Urine Negative   Specific Gravity Urine 1.020   pH Urine 6.0   Protein Albumin Urine Negative   Urobilinogen mg/dL Normal   Nitrite Urine Negative   Blood Urine Negative   Leukocyte Esterase Urine Negative   Source Midstream Urine     Radiographic Studies  CT Stone  2021  No obstructing ureteral stones.  Minimal bilateral punctate nephrocalcinosis.  No stones of any significant size present.    Report is pending    ^^^^^^^^^^^^^^^^^^^^^^^^^^^^^^^^^^^^    JESSICA  2020  IMPRESSION:  No evidence of hydronephrosis.      ^^^^^^^^^^^^^^^^^^^^^^^^^^^^^^^^^^^^    2018  CT a/p  IMPRESSION:   1. Tiny renal calculi.  2. No ureteral stone or hydronephrosis.  3. Thickening of the wall of the urinary bladder which may be due to  cystitis given the history.    Assessment  Mr. Gill is a 65 year old male w/h/o MS and neurogenic bladder managed with CIC and new  acute left flank pain.  Reviewed the CT scan with the patient.  No obstructing stones present.    Discussed neurogenic bladder and urinary symptoms.  Patient would like to increase the dose of oxybutynin and after discussing the risks and benefits of this approach he agreed to proceed.  His urinalysis today is completely negative for any signs of UTI.    Plan  Refill for Trimix intracavernosal injections  Increase the dose of oxybutynin XL to 10mg once daily with self cath 4 times daily  Follow-up in 1 year with JESSICA

## 2021-04-05 NOTE — NURSING NOTE
Review of Systems:    Weight loss:    No     Recent fever/chills:  No   Night sweats:   No  Current skin rash:  No   Recent hair loss:  No  Heat intolerance:  No   Cold intolerance:  No  Chest pain:   No   Palpitations:   No  Shortness of breath:  No   Wheezing:   No  Constipation:    Yes   Diarrhea:   No   Nausea:   No   Vomiting:   No   Kidney/side pain:  Yes   Back pain:   Yes  Frequent headaches:  No   Dizziness:     No  Leg swelling:   No   Calf pain:    No

## 2021-04-05 NOTE — TELEPHONE ENCOUNTER
Patient is seeing Olu Saldivar MD today in regards to left flank pain.  He will discuss the medication with him at that time per patient.  Rubina Wilson RN......April 5, 2021...10:42 AM

## 2021-04-15 ENCOUNTER — OFFICE VISIT (OUTPATIENT)
Dept: FAMILY MEDICINE | Facility: OTHER | Age: 66
End: 2021-04-15
Attending: FAMILY MEDICINE
Payer: MEDICARE

## 2021-04-15 VITALS
RESPIRATION RATE: 16 BRPM | SYSTOLIC BLOOD PRESSURE: 128 MMHG | WEIGHT: 174.6 LBS | DIASTOLIC BLOOD PRESSURE: 88 MMHG | BODY MASS INDEX: 25.78 KG/M2 | TEMPERATURE: 96.9 F | HEART RATE: 96 BPM

## 2021-04-15 DIAGNOSIS — Z12.11 COLON CANCER SCREENING: ICD-10-CM

## 2021-04-15 DIAGNOSIS — M54.10 RADICULOPATHY, UNSPECIFIED SPINAL REGION: ICD-10-CM

## 2021-04-15 DIAGNOSIS — Z20.822 COVID-19 RULED OUT: Primary | ICD-10-CM

## 2021-04-15 DIAGNOSIS — G35 MULTIPLE SCLEROSIS (H): ICD-10-CM

## 2021-04-15 DIAGNOSIS — Z23 ENCOUNTER FOR IMMUNIZATION: ICD-10-CM

## 2021-04-15 DIAGNOSIS — Z12.11 ENCOUNTER FOR SCREENING COLONOSCOPY: ICD-10-CM

## 2021-04-15 DIAGNOSIS — I10 ESSENTIAL HYPERTENSION: Primary | ICD-10-CM

## 2021-04-15 LAB
ANION GAP SERPL CALCULATED.3IONS-SCNC: 5 MMOL/L (ref 3–14)
BUN SERPL-MCNC: 27 MG/DL (ref 7–25)
CALCIUM SERPL-MCNC: 9.7 MG/DL (ref 8.6–10.3)
CHLORIDE SERPL-SCNC: 102 MMOL/L (ref 98–107)
CHOLEST SERPL-MCNC: 172 MG/DL
CO2 SERPL-SCNC: 32 MMOL/L (ref 21–31)
CREAT SERPL-MCNC: 1.04 MG/DL (ref 0.7–1.3)
GFR SERPL CREATININE-BSD FRML MDRD: 72 ML/MIN/{1.73_M2}
GLUCOSE SERPL-MCNC: 82 MG/DL (ref 70–105)
HDLC SERPL-MCNC: 37 MG/DL (ref 23–92)
LDLC SERPL CALC-MCNC: 118 MG/DL
NONHDLC SERPL-MCNC: 135 MG/DL
POTASSIUM SERPL-SCNC: 4.3 MMOL/L (ref 3.5–5.1)
SODIUM SERPL-SCNC: 139 MMOL/L (ref 134–144)
TRIGL SERPL-MCNC: 85 MG/DL

## 2021-04-15 PROCEDURE — G0463 HOSPITAL OUTPT CLINIC VISIT: HCPCS | Mod: 25

## 2021-04-15 PROCEDURE — 80061 LIPID PANEL: CPT | Mod: ZL | Performed by: FAMILY MEDICINE

## 2021-04-15 PROCEDURE — G0009 ADMIN PNEUMOCOCCAL VACCINE: HCPCS

## 2021-04-15 PROCEDURE — 36415 COLL VENOUS BLD VENIPUNCTURE: CPT | Mod: ZL | Performed by: FAMILY MEDICINE

## 2021-04-15 PROCEDURE — 80048 BASIC METABOLIC PNL TOTAL CA: CPT | Mod: ZL | Performed by: FAMILY MEDICINE

## 2021-04-15 PROCEDURE — 99214 OFFICE O/P EST MOD 30 MIN: CPT | Performed by: FAMILY MEDICINE

## 2021-04-15 RX ORDER — LISINOPRIL 10 MG/1
10 TABLET ORAL DAILY
Qty: 90 TABLET | Refills: 3 | Status: SHIPPED | OUTPATIENT
Start: 2021-04-15 | End: 2022-06-28

## 2021-04-15 ASSESSMENT — PAIN SCALES - GENERAL: PAINLEVEL: MILD PAIN (2)

## 2021-04-15 NOTE — PROGRESS NOTES
SUBJECTIVE:   Jamir Gill is a 65 year old male who presents to clinic today for the following health issues:  Nursing Notes:   Coreen Torres, LPN  4/15/2021  9:26 AM  Signed  Patient is here to follow up on medications, MRI results and discuss colonoscopy.    Medication Reconciliation: complete    Coreen Torres LPN  4/15/2021 9:12 AM         HPI    66 yo male presents for follow-up of chronic medical problems.  Have not seen him for over a year and a half.  He faith in [place].  He is followed by neurology and has an upcoming appointment.  Recent brain and cervical MRI showed stable disease.  In regards to his MS he has no specific concerns.  He is also followed by neurology for neurogenic bladder.  He self caths.  Recent increase in oxybutynin.    Hypertension: Stable currently on lisinopril 10 mg daily.  Due for chemistry panel.  Multiple sclerosis: Stable.  Followed by neurology.  Recent MRI scans showed stable disease.    Acute concern of right sided neck pain, previously responded well to traction/PT. Does not extend in to lower arm.  No numbness tingling or weakness.  He like a referral back to physical therapy.        Patient Active Problem List    Diagnosis Date Noted     Neurogenic bladder 12/06/2018     Priority: Medium     Multiple sclerosis (H)      Priority: Medium     No Comments Provided       Essential (primary) hypertension      Priority: Medium     No Comments Provided       Ureterolithiasis 04/03/2018     Priority: Medium     Past Medical History:   Diagnosis Date     Cervical disc disorder     No Comments Provided     Enlarged prostate with lower urinary tract symptoms (LUTS)     No Comments Provided     Essential (primary) hypertension     No Comments Provided     Multiple sclerosis (H)     No Comments Provided     Other specified disorders of bone density and structure, unspecified site (CODE)     No Comments Provided     Other symptoms and signs involving cognitive functions  and awareness     No Comments Provided     Spondylosis of cervical region without myelopathy or radiculopathy     No Comments Provided      Past Surgical History:   Procedure Laterality Date     APPENDECTOMY OPEN      appendectomy     COLONOSCOPY  01/01/2010 01/01/2010,with polyps resected     COLONOSCOPY  12/15/2015    12/15/2015,[place]- colitis     COMBINED CYSTOSCOPY, URETEROSCOPY, LASER HOLMIUM LITHOTRIPSY URETER(S) Left 4/10/2018    Procedure: COMBINED CYSTOSCOPY, URETEROSCOPY, LASER HOLMIUM LITHOTRIPSY URETER(S);  Left Ureteroscopy with Holmium Laser Lithotripsy & Stent Placement.;  Surgeon: Olu Saldivar MD;  Location:  OR     CYSTOSCOPY, RETROGRADES, INSERT STENT URETER(S), COMBINED Left 4/3/2018    Procedure: COMBINED CYSTOSCOPY, RETROGRADES, INSERT STENT URETER(S);;  Surgeon: Olu Saldivar MD;  Location: [facility] OR     PROSTATE SURGERY      ,PROSTATECTOMY,Laser prostate surgery -- BPH       Review of Systems   All other systems reviewed and are negative.       OBJECTIVE:     /88 (BP Location: Right arm, Patient Position: Sitting, Cuff Size: Adult Regular)   Pulse 96   Temp 96.9  F (36.1  C) (Tympanic)   Resp 16   Wt 79.2 kg (174 lb 9.6 oz)   BMI 25.78 kg/m    Body mass index is 25.78 kg/m .  Physical Exam    Diagnostic Test Results:  Results for orders placed or performed in visit on 04/15/21 (from the past 24 hour(s))   Basic Metabolic Panel   Result Value Ref Range    Sodium 139 134 - 144 mmol/L    Potassium 4.3 3.5 - 5.1 mmol/L    Chloride 102 98 - 107 mmol/L    Carbon Dioxide 32 (H) 21 - 31 mmol/L    Anion Gap 5 3 - 14 mmol/L    Glucose 82 70 - 105 mg/dL    Urea Nitrogen 27 (H) 7 - 25 mg/dL    Creatinine 1.04 0.70 - 1.30 mg/dL    GFR Estimate 72 >60 mL/min/[1.73_m2]    GFR Estimate If Black 87 >60 mL/min/[1.73_m2]    Calcium 9.7 8.6 - 10.3 mg/dL   Lipid Profile   Result Value Ref Range    Cholesterol 172 <200 mg/dL    Triglycerides 85 <150 mg/dL    HDL Cholesterol 37 23 - 92 mg/dL     LDL Cholesterol Calculated 118 (H) <100 mg/dL    Non HDL Cholesterol 135 (H) <130 mg/dL       ASSESSMENT/PLAN:         1. Essential hypertension  BP at goal.  Continue lisinopril 10 mg daily.  Recommend low-salt diet.  - Lipid Profile; Future  - Basic Metabolic Panel; Future  - Basic Metabolic Panel  - Lipid Profile    2. Colon cancer screening  Due for colonoscopy.  - GASTROENTEROLOGY ADULT REF PROCEDURE ONLY; Future    3. Radiculopathy, unspecified spinal region  Cervical radiculopathy.  Previously responded well to traction and physical therapy.  Referral placed.  - PHYSICAL THERAPY REFERRAL; Future    4. Encounter for immunization  Pneumococcal vaccine provided.  - [facility] IMM-  PNEUMOCOCCAL VACCINE,ADULT,SQ OR IM    5. Multiple sclerosis (H)  Stable.  Followed by neurology.        Kayla Pineda MD  [facility]

## 2021-04-15 NOTE — TELEPHONE ENCOUNTER
Screening Questions for the Scheduling of Screening Colonoscopies   (If Colonoscopy is diagnostic, Provider should review the chart before scheduling.)  Are you younger than 50 or older than 80?  NO  Do you take aspirin or fish oil?  YES (if yes, tell patient to stop 1 week prior to Colonoscopy)  Do you take warfarin (Coumadin), clopidogrel (Plavix), apixaban (Eliquis), dabigatram (Pradaxa), rivaroxaban (Xarelto) or any blood thinner? NO  Do you use oxygen at home?  NO  Do you have kidney disease? NO  Are you on dialysis? NO  Have you had a stroke or heart attack in the last year? NO  Have you had a stent in your heart or any blood vessel in the last year? NO  Have you had a transplant of any organ? NO  Have you had a colonoscopy or upper endoscopy (EGD) before? YES         When?  2015  Date of scheduled Colonoscopy. 06/10/2021  Provider FERNANDES  Pharmacy [facility]

## 2021-04-15 NOTE — NURSING NOTE
Patient is here to follow up on medications, MRI results and discuss colonoscopy.    Medication Reconciliation: complete    Coreen Torres LPN  4/15/2021 9:12 AM

## 2021-04-19 RX ORDER — POLYETHYLENE GLYCOL 3350 17 G/17G
POWDER, FOR SOLUTION ORAL
Qty: 510 G | Refills: 0 | Status: ON HOLD | OUTPATIENT
Start: 2021-04-19 | End: 2021-06-17

## 2021-04-19 RX ORDER — BISACODYL 5 MG/1
TABLET, DELAYED RELEASE ORAL
Qty: 2 TABLET | Refills: 0 | Status: ON HOLD | OUTPATIENT
Start: 2021-04-19 | End: 2021-06-17

## 2021-04-27 ENCOUNTER — HOSPITAL ENCOUNTER (OUTPATIENT)
Dept: PHYSICAL THERAPY | Facility: OTHER | Age: 66
Setting detail: THERAPIES SERIES
End: 2021-04-27
Attending: PSYCHIATRY & NEUROLOGY
Payer: MEDICARE

## 2021-04-27 DIAGNOSIS — R29.898 RIGHT LEG WEAKNESS: ICD-10-CM

## 2021-04-27 DIAGNOSIS — M54.10 RADICULOPATHY, UNSPECIFIED SPINAL REGION: ICD-10-CM

## 2021-04-27 DIAGNOSIS — G35 MS (MULTIPLE SCLEROSIS) (H): ICD-10-CM

## 2021-04-27 PROCEDURE — 97161 PT EVAL LOW COMPLEX 20 MIN: CPT | Mod: GP

## 2021-04-27 PROCEDURE — 97110 THERAPEUTIC EXERCISES: CPT | Mod: GP

## 2021-04-29 ENCOUNTER — HOSPITAL ENCOUNTER (OUTPATIENT)
Dept: PHYSICAL THERAPY | Facility: OTHER | Age: 66
Setting detail: THERAPIES SERIES
End: 2021-04-29
Attending: PSYCHIATRY & NEUROLOGY
Payer: MEDICARE

## 2021-04-29 PROCEDURE — 97112 NEUROMUSCULAR REEDUCATION: CPT | Mod: GP

## 2021-04-29 PROCEDURE — 97110 THERAPEUTIC EXERCISES: CPT | Mod: GP

## 2021-05-01 NOTE — PROGRESS NOTES
05/01/21 1100   Quick Adds   Type of Visit Initial OP PT Evaluation   General Information   Start of Care Date 04/27/21   Referring Physician Dr. Dee Gibbs; Dr. Miriam Álvarez   Orders Evaluate and Treat as Indicated   Order Date 04/13/21   Medical Diagnosis MS, R leg weakness, radiculopathy   Surgical/Medical history reviewed Yes   Prior level of functional mobility Transfers;Ambulation   Transfers modified independent   Ambulation  modified independent, standard cane   Previous/Current Treatment Physical Therapy   Improvement after PT Moderate   Current Community Support Family/friend caregiver   Patient role/Employment history Employed  (owner of resort, does his own maintainence)   Living environment [place]   Current Assistive Devices Standard Cane   General Information Comments Pt is here for therapy after he feels he has progressively gotten weaker over the last 6 months. Pt completed physical therapy last fall and did quite well. He was traveling to [place] for the winter and pt was planning on using his exercise facility there to continue getting stronger. Due to covid, his facility was unavailable. He tried his best to be active but couldnt do it like he wanted to. Pt notes that he has also had some R shoulder/neck pain in the past that has returned on and off lately. Cervical traction worked well for him previously but he is doing quite well today so he wishes to focus on his leg weakness   Fall Risk Screen   Fall screen completed by PT   Have you fallen 2 or more times in the past year? Yes   Have you fallen and had an injury in the past year? No   Is patient a fall risk? Yes   Abuse Screen (yes response referral indicated)   Feels Unsafe at Home or Work/School no   Feels Threatened by Someone no   Does Anyone Try to Keep You From Having Contact with Others or Doing Things Outside Your Home? no   Physical Signs of Abuse Present no   Cognitive Status Examination   Orientation orientation  to person, place and time   Level of Consciousness alert   Follows Commands and Answers Questions 100% of the time   Personal Safety and Judgment intact   Memory intact   Posture   Posture Comments adducted R LE with ambulation   Range of Motion (ROM)   ROM Comment limited active ROM of R abduction, ER and hip flexion. Cervical motion today WNLs   Strength   Strength Comments 2+/5 for R hip ER and Abd   Transfer Skills   Transfer Comments SBA for sit<>stands - pt often loses eccentric control due to fatigue of activity prior to sitting   Gait   Gait Comments standard cane use, ambulation with R hip adduction   Gait Special Tests   Gait Special Tests 25 FOOT TIMED WALK   Gait Special Tests 25 Foot Timed Walk   Seconds 8 seconds   Balance Special Tests   Balance Special Tests Sit to stand reps   Balance Special Tests Sit to Stand Reps in 30 Seconds   Reps in 30 seconds 11   Comments Results comparable to previous therapy episode   Planned Therapy Interventions   Planned Therapy Interventions gait training;joint mobilization;motor coordination training;neuromuscular re-education;ROM;stretching;strengthening;transfer training   Clinical Impression   Criteria for Skilled Therapeutic Interventions Met yes, treatment indicated   PT Diagnosis impaired mobility   Influenced by the following impairments weakness   Functional limitations due to impairments limited activity tolerance, impaired ambulation, decreased standing tolerance   Clinical Presentation Stable/Uncomplicated   Clinical Decision Making (Complexity) Low complexity   Therapy Frequency 2 times/Week   Predicted Duration of Therapy Intervention (days/wks) 8-12 weeks   Risk & Benefits of therapy have been explained Yes   Patient, Family & other staff in agreement with plan of care Yes   Clinical Impression Comments Pt presents to therapy with some weakness compared to when PT last saw him. His mobility has decreased slightly as well has his standing/activity  tolerance is below baseline. Pt would benefit from skilled services to help return or exceed his prior level of function. Pt was continuing to improve at last episde and discharged before progress had plateaued.    GOALS   PT Eval Goals 1;2;3;4   Goal 1   Goal Identifier Standing tolerance   Goal Description Pt will be able stand for 10 minutes at therapy while completing balance tasks at therapy in order to improve his endurance   Target Date 06/12/21   Goal 2   Goal Identifier Hip abd   Goal Description Pt will demonstrate 3/5 R hip abduction strength in order to improve mobility and gait   Target Date 06/26/21   Goal 3   Goal Identifier sit<>stand   Goal Description Pt will be able to complet 10 sit<>stand repetitions without the use of hands in order to improve LE strength   Target Date 06/26/21   Total Evaluation Time   PT Kayla, Low Complexity Minutes (70103) 30

## 2021-05-01 NOTE — PROGRESS NOTES
04/27/21 1212   Quick Adds   Type of Visit Initial OP PT Evaluation   General Information   Referring Physician Dr. Dee Gibbs; Dr. Miriam Álvarez   Orders Evaluate and Treat as Indicated   Order Date 04/13/21   Medical Diagnosis MS, R leg weakness, radiculopathy   Surgical/Medical history reviewed Yes   Prior level of functional mobility Transfers;Ambulation   Transfers modified independent   Ambulation  modified independent, standard cane   Previous/Current Treatment Physical Therapy   Improvement after PT Moderate   Current Community Support Family/friend caregiver   Patient role/Employment history Employed  (owner of resort, does his own maintainence)   Living environment [place]   Current Assistive Devices Standard Cane   General Information Comments Pt is here for therapy after he feels he has progressively gotten weaker over the last 6 months. Pt completed physical therapy last fall and did quite well. He was traveling to [place] for the winter and pt was planning on using his exercise facility there to continue getting stronger. Due to covid, his facility was unavailable. He tried his best to be active but couldnt do it like he wanted to. Pt notes that he has also had some R shoulder/neck pain in the past that has returned on and off lately. Cervical traction worked well for him previously but he is doing quite well today so he wishes to focus on his leg weakness   Fall Risk Screen   Fall screen completed by PT   Have you fallen 2 or more times in the past year? Yes   Have you fallen and had an injury in the past year? No   Is patient a fall risk? Yes   Abuse Screen (yes response referral indicated)   Feels Unsafe at Home or Work/School no   Feels Threatened by Someone no   Does Anyone Try to Keep You From Having Contact with Others or Doing Things Outside Your Home? no   Physical Signs of Abuse Present no   Cognitive Status Examination   Orientation orientation to person, place and time    Level of Consciousness alert   Follows Commands and Answers Questions 100% of the time   Personal Safety and Judgment intact   Memory intact   Posture   Posture Comments adducted R LE with ambulation   Range of Motion (ROM)   ROM Comment limited active ROM of R abduction, ER and hip flexion. Cervical motion today WNLs   Strength   Strength Comments 2+/5 for R hip ER and Abd   Transfer Skills   Transfer Comments SBA for sit<>stands - pt often loses eccentric control due to fatigue of activity prior to sitting   Gait   Gait Comments standard cane use, ambulation with R hip adduction   Gait Special Tests   Gait Special Tests 25 FOOT TIMED WALK   Gait Special Tests 25 Foot Timed Walk   Seconds 8 seconds   Balance Special Tests   Balance Special Tests Sit to stand reps   Balance Special Tests Sit to Stand Reps in 30 Seconds   Reps in 30 seconds 11   Comments Results comparable to previous therapy episode   Planned Therapy Interventions   Planned Therapy Interventions gait training;joint mobilization;motor coordination training;neuromuscular re-education;ROM;stretching;strengthening;transfer training   Clinical Impression   PT Diagnosis impaired mobility   Clinical Presentation Stable/Uncomplicated   Clinical Decision Making (Complexity) Low complexity   Therapy Frequency 2 times/Week   GOALS   PT Eval Goals 1;2;3;4   Goal 1   Goal Identifier Standing tolerance   Goal Description Pt will be able stand for 10 minutes at therapy while completing balance tasks at therapy in order to improve his endurance   Target Date 06/12/21   Goal 2   Goal Identifier Hip abd   Goal Description Pt will demonstrate 3/5 R hip abduction strength in order to improve mobility and gait   Target Date 06/26/21   Goal 3   Goal Identifier sit<>stand   Goal Description Pt will be able to complet 10 sit<>stand repetitions without the use of hands in order to improve LE strength   Target Date 06/26/21   Total Evaluation Time   PT Eval, Low Complexity  Minutes ([id number]) 18

## 2021-05-01 NOTE — PROGRESS NOTES
[facility]        OUTPATIENT PHYSICAL THERAPY FUNCTIONAL EVALUATION  PLAN OF TREATMENT FOR OUTPATIENT REHABILITATION  (COMPLETE FOR INITIAL CLAIMS ONLY)  Patient's Last Name, First Name, M.I.  YOB: 1955  Jamir Gill     Provider's Name   [facility]   Medical Record No.  [id number]     Start of Care Date:   4/27/2021   Onset Date:   chronic   Type:     _X__PT   ____OT  ____SLP Medical Diagnosis:   RRMS; R leg weakness, radiculopathy     PT Diagnosis:  impaired mobility Visits from SOC:  1                              __________________________________________________________________________________  Plan of Treatment/Functional Goals:  gait training, joint mobilization, motor coordination training, neuromuscular re-education, ROM, stretching, strengthening, transfer training           GOALS  Standing tolerance  Pt will be able stand for 10 minutes at therapy while completing balance tasks at therapy in order to improve his endurance  06/12/21    Hip abd  Pt will demonstrate 3/5 R hip abduction strength in order to improve mobility and gait  06/26/21    sit<>stand  Pt will be able to complet 10 sit<>stand repetitions without the use of hands in order to improve LE strength  06/26/21     Therapy Frequency:  2 times/Week   Predicted Duration of Therapy Intervention:       Manjit Pink, PT                                    I CERTIFY THE NEED FOR THESE SERVICES FURNISHED UNDER        THIS PLAN OF TREATMENT AND WHILE UNDER MY CARE     (Physician co-signature of this document indicates review and certification of the therapy plan).                Certification Date From:      Certification Date To:       Referring Provider:  Dr. Dee Gibbs; Dr. Miriam Álvarez    Initial Assessment  See Epic Evaluation-

## 2021-05-03 ENCOUNTER — HOSPITAL ENCOUNTER (OUTPATIENT)
Dept: PHYSICAL THERAPY | Facility: OTHER | Age: 66
Setting detail: THERAPIES SERIES
End: 2021-05-03
Attending: PSYCHIATRY & NEUROLOGY
Payer: MEDICARE

## 2021-05-03 PROCEDURE — 97112 NEUROMUSCULAR REEDUCATION: CPT | Mod: GP,CQ

## 2021-05-03 PROCEDURE — 97110 THERAPEUTIC EXERCISES: CPT | Mod: GP,CQ

## 2021-05-05 ENCOUNTER — HOSPITAL ENCOUNTER (OUTPATIENT)
Dept: PHYSICAL THERAPY | Facility: OTHER | Age: 66
Setting detail: THERAPIES SERIES
End: 2021-05-05
Attending: PSYCHIATRY & NEUROLOGY
Payer: MEDICARE

## 2021-05-05 PROCEDURE — 97110 THERAPEUTIC EXERCISES: CPT | Mod: GP

## 2021-05-10 ENCOUNTER — HOSPITAL ENCOUNTER (OUTPATIENT)
Dept: PHYSICAL THERAPY | Facility: OTHER | Age: 66
Setting detail: THERAPIES SERIES
End: 2021-05-10
Attending: PSYCHIATRY & NEUROLOGY
Payer: MEDICARE

## 2021-05-10 PROCEDURE — 97110 THERAPEUTIC EXERCISES: CPT | Mod: GP

## 2021-05-13 ENCOUNTER — HOSPITAL ENCOUNTER (OUTPATIENT)
Dept: PHYSICAL THERAPY | Facility: OTHER | Age: 66
Setting detail: THERAPIES SERIES
End: 2021-05-13
Attending: PSYCHIATRY & NEUROLOGY
Payer: MEDICARE

## 2021-05-13 PROCEDURE — 97110 THERAPEUTIC EXERCISES: CPT | Mod: GP,CQ

## 2021-05-17 ENCOUNTER — HOSPITAL ENCOUNTER (OUTPATIENT)
Dept: PHYSICAL THERAPY | Facility: OTHER | Age: 66
Setting detail: THERAPIES SERIES
End: 2021-05-17
Attending: PSYCHIATRY & NEUROLOGY
Payer: MEDICARE

## 2021-05-17 PROCEDURE — 97110 THERAPEUTIC EXERCISES: CPT | Mod: GP,CQ

## 2021-05-19 ENCOUNTER — HOSPITAL ENCOUNTER (OUTPATIENT)
Dept: PHYSICAL THERAPY | Facility: OTHER | Age: 66
Setting detail: THERAPIES SERIES
End: 2021-05-19
Attending: PSYCHIATRY & NEUROLOGY
Payer: MEDICARE

## 2021-05-19 PROCEDURE — 97110 THERAPEUTIC EXERCISES: CPT | Mod: GP,CQ

## 2021-05-26 ENCOUNTER — HOSPITAL ENCOUNTER (OUTPATIENT)
Dept: PHYSICAL THERAPY | Facility: OTHER | Age: 66
Setting detail: THERAPIES SERIES
End: 2021-05-26
Attending: PHYSICAL MEDICINE & REHABILITATION
Payer: MEDICARE

## 2021-05-26 PROCEDURE — 97110 THERAPEUTIC EXERCISES: CPT | Mod: GP

## 2021-05-28 ENCOUNTER — HOSPITAL ENCOUNTER (OUTPATIENT)
Dept: PHYSICAL THERAPY | Facility: OTHER | Age: 66
Setting detail: THERAPIES SERIES
End: 2021-05-28
Attending: PHYSICAL MEDICINE & REHABILITATION
Payer: MEDICARE

## 2021-05-28 PROCEDURE — 97110 THERAPEUTIC EXERCISES: CPT | Mod: GP

## 2021-06-01 ENCOUNTER — HOSPITAL ENCOUNTER (OUTPATIENT)
Dept: PHYSICAL THERAPY | Facility: OTHER | Age: 66
Setting detail: THERAPIES SERIES
End: 2021-06-01
Attending: PHYSICAL MEDICINE & REHABILITATION
Payer: MEDICARE

## 2021-06-01 PROCEDURE — 97110 THERAPEUTIC EXERCISES: CPT | Mod: GP

## 2021-06-02 NOTE — PROGRESS NOTES
Outpatient Physical Therapy Progress Note     Patient: Jamir Gill  : 1955    Beginning/End Dates of Reporting Period:  2021 to 2021    Referring Provider: Dr. Dee Gibbs    Therapy Diagnosis: Multiple sclerosis     Client Self Report: Jamir continues to see functional improvements at home with his balance and endurance at work.    Objective Measurements:  Objective Measure: 30 second sit to stand  Details: 13 reps     Goals:  Goal Identifier Standing tolerance   Goal Description Pt will be able stand for 10 minutes at therapy while completing balance tasks at therapy in order to improve his endurance   Target Date 21   Date Met      Progress: Pt is up to approximately 5 minutes of continuous standing      Goal Identifier Hip abd   Goal Description Pt will demonstrate 3/5 R hip abduction strength in order to improve mobility and gait   Target Date 21   Date Met      Progress: Progressing. Pt able to complete increased reps with higher resistance on MedX machine.     Goal Identifier sit<>stand   Goal Description Pt will be able to complet 10 sit<>stand repetitions without the use of hands in order to improve LE strength   Target Date 21   Date Met      Progress: Able to complete 13 reps with use of arm rests     Progress Toward Goals:   Progress this reporting period: Jamir continues to show objective gains on our machines for strengthening purposes which translate well to his daily life. He reports he keeps getting better at navigating uneven terrain, can work for longer periods of time without need rest breaks.     Plan:  Continue therapy per current plan of care.    Discharge:  No

## 2021-06-03 ENCOUNTER — HOSPITAL ENCOUNTER (OUTPATIENT)
Dept: PHYSICAL THERAPY | Facility: OTHER | Age: 66
Setting detail: THERAPIES SERIES
End: 2021-06-03
Attending: PHYSICAL MEDICINE & REHABILITATION
Payer: MEDICARE

## 2021-06-03 PROCEDURE — 97110 THERAPEUTIC EXERCISES: CPT | Mod: GP

## 2021-06-08 ENCOUNTER — HOSPITAL ENCOUNTER (OUTPATIENT)
Dept: PHYSICAL THERAPY | Facility: OTHER | Age: 66
Setting detail: THERAPIES SERIES
End: 2021-06-08
Attending: PHYSICAL MEDICINE & REHABILITATION
Payer: MEDICARE

## 2021-06-08 PROCEDURE — 97110 THERAPEUTIC EXERCISES: CPT | Mod: GP,CQ

## 2021-06-10 ENCOUNTER — HOSPITAL ENCOUNTER (OUTPATIENT)
Dept: PHYSICAL THERAPY | Facility: OTHER | Age: 66
Setting detail: THERAPIES SERIES
End: 2021-06-10
Attending: PHYSICAL MEDICINE & REHABILITATION
Payer: MEDICARE

## 2021-06-10 PROCEDURE — 97110 THERAPEUTIC EXERCISES: CPT | Mod: GP

## 2021-06-13 ENCOUNTER — ALLIED HEALTH/NURSE VISIT (OUTPATIENT)
Dept: FAMILY MEDICINE | Facility: OTHER | Age: 66
End: 2021-06-13
Payer: MEDICARE

## 2021-06-13 DIAGNOSIS — Z20.822 COVID-19 RULED OUT: ICD-10-CM

## 2021-06-13 LAB
SARS-COV-2 RNA RESP QL NAA+PROBE: NORMAL
SPECIMEN SOURCE: NORMAL

## 2021-06-13 PROCEDURE — U0003 INFECTIOUS AGENT DETECTION BY NUCLEIC ACID (DNA OR RNA); SEVERE ACUTE RESPIRATORY SYNDROME CORONAVIRUS 2 (SARS-COV-2) (CORONAVIRUS DISEASE [COVID-19]), AMPLIFIED PROBE TECHNIQUE, MAKING USE OF HIGH THROUGHPUT TECHNOLOGIES AS DESCRIBED BY CMS-2020-01-R: HCPCS | Mod: ZL | Performed by: SURGERY

## 2021-06-13 PROCEDURE — U0005 INFEC AGEN DETEC AMPLI PROBE: HCPCS | Mod: ZL | Performed by: SURGERY

## 2021-06-13 PROCEDURE — C9803 HOPD COVID-19 SPEC COLLECT: HCPCS

## 2021-06-13 NOTE — PROGRESS NOTES
Patient swabbed for COVID-19 testing.  Norma J. Gosselin, LPN on 6/13/2021 at 10:42 AM    Surgery

## 2021-06-14 LAB
LABORATORY COMMENT REPORT: NORMAL
SARS-COV-2 RNA RESP QL NAA+PROBE: NEGATIVE
SPECIMEN SOURCE: NORMAL

## 2021-06-15 ENCOUNTER — HOSPITAL ENCOUNTER (OUTPATIENT)
Dept: PHYSICAL THERAPY | Facility: OTHER | Age: 66
Setting detail: THERAPIES SERIES
End: 2021-06-15
Attending: PSYCHIATRY & NEUROLOGY
Payer: MEDICARE

## 2021-06-15 PROCEDURE — 97110 THERAPEUTIC EXERCISES: CPT | Mod: GP

## 2021-06-17 ENCOUNTER — HOSPITAL ENCOUNTER (OUTPATIENT)
Facility: OTHER | Age: 66
Discharge: HOME | End: 2021-06-17
Attending: SURGERY | Admitting: SURGERY
Payer: MEDICARE

## 2021-06-17 ENCOUNTER — ANESTHESIA EVENT (OUTPATIENT)
Dept: SURGERY | Facility: OTHER | Age: 66
End: 2021-06-17
Payer: MEDICARE

## 2021-06-17 ENCOUNTER — ANESTHESIA (OUTPATIENT)
Dept: SURGERY | Facility: OTHER | Age: 66
End: 2021-06-17
Payer: MEDICARE

## 2021-06-17 VITALS
RESPIRATION RATE: 16 BRPM | TEMPERATURE: 96.6 F | BODY MASS INDEX: 25.48 KG/M2 | WEIGHT: 172 LBS | HEART RATE: 93 BPM | HEIGHT: 69 IN | SYSTOLIC BLOOD PRESSURE: 110 MMHG | OXYGEN SATURATION: 99 % | DIASTOLIC BLOOD PRESSURE: 63 MMHG

## 2021-06-17 DIAGNOSIS — R10.9 ABDOMINAL CRAMPS: Primary | ICD-10-CM

## 2021-06-17 PROCEDURE — 45385 COLONOSCOPY W/LESION REMOVAL: CPT | Mod: PT | Performed by: SURGERY

## 2021-06-17 PROCEDURE — 88305 TISSUE EXAM BY PATHOLOGIST: CPT

## 2021-06-17 PROCEDURE — 45385 COLONOSCOPY W/LESION REMOVAL: CPT | Performed by: NURSE ANESTHETIST, CERTIFIED REGISTERED

## 2021-06-17 PROCEDURE — 250N000009 HC RX 250: Performed by: NURSE ANESTHETIST, CERTIFIED REGISTERED

## 2021-06-17 PROCEDURE — 250N000011 HC RX IP 250 OP 636: Performed by: NURSE ANESTHETIST, CERTIFIED REGISTERED

## 2021-06-17 PROCEDURE — 250N000013 HC RX MED GY IP 250 OP 250 PS 637: Performed by: SURGERY

## 2021-06-17 PROCEDURE — 45380 COLONOSCOPY AND BIOPSY: CPT | Performed by: SURGERY

## 2021-06-17 PROCEDURE — 258N000003 HC RX IP 258 OP 636: Performed by: SURGERY

## 2021-06-17 RX ORDER — NALOXONE HYDROCHLORIDE 0.4 MG/ML
0.4 INJECTION, SOLUTION INTRAMUSCULAR; INTRAVENOUS; SUBCUTANEOUS
Status: DISCONTINUED | OUTPATIENT
Start: 2021-06-17 | End: 2021-06-17 | Stop reason: HOSPADM

## 2021-06-17 RX ORDER — ONDANSETRON 2 MG/ML
4 INJECTION INTRAMUSCULAR; INTRAVENOUS
Status: DISCONTINUED | OUTPATIENT
Start: 2021-06-17 | End: 2021-06-17 | Stop reason: HOSPADM

## 2021-06-17 RX ORDER — NALOXONE HYDROCHLORIDE 0.4 MG/ML
0.2 INJECTION, SOLUTION INTRAMUSCULAR; INTRAVENOUS; SUBCUTANEOUS
Status: DISCONTINUED | OUTPATIENT
Start: 2021-06-17 | End: 2021-06-17 | Stop reason: HOSPADM

## 2021-06-17 RX ORDER — PROPOFOL 10 MG/ML
INJECTION, EMULSION INTRAVENOUS PRN
Status: DISCONTINUED | OUTPATIENT
Start: 2021-06-17 | End: 2021-06-17

## 2021-06-17 RX ORDER — SODIUM CHLORIDE, SODIUM LACTATE, POTASSIUM CHLORIDE, CALCIUM CHLORIDE 600; 310; 30; 20 MG/100ML; MG/100ML; MG/100ML; MG/100ML
INJECTION, SOLUTION INTRAVENOUS CONTINUOUS
Status: DISCONTINUED | OUTPATIENT
Start: 2021-06-17 | End: 2021-06-17 | Stop reason: HOSPADM

## 2021-06-17 RX ORDER — LIDOCAINE 40 MG/G
CREAM TOPICAL
Status: DISCONTINUED | OUTPATIENT
Start: 2021-06-17 | End: 2021-06-17 | Stop reason: HOSPADM

## 2021-06-17 RX ORDER — FLUMAZENIL 0.1 MG/ML
0.2 INJECTION, SOLUTION INTRAVENOUS
Status: DISCONTINUED | OUTPATIENT
Start: 2021-06-17 | End: 2021-06-17 | Stop reason: HOSPADM

## 2021-06-17 RX ORDER — SIMETHICONE
LIQUID (ML) MISCELLANEOUS PRN
Status: DISCONTINUED | OUTPATIENT
Start: 2021-06-17 | End: 2021-06-17 | Stop reason: HOSPADM

## 2021-06-17 RX ORDER — LIDOCAINE HYDROCHLORIDE 20 MG/ML
INJECTION, SOLUTION INFILTRATION; PERINEURAL PRN
Status: DISCONTINUED | OUTPATIENT
Start: 2021-06-17 | End: 2021-06-17

## 2021-06-17 RX ORDER — PROPOFOL 10 MG/ML
INJECTION, EMULSION INTRAVENOUS CONTINUOUS PRN
Status: DISCONTINUED | OUTPATIENT
Start: 2021-06-17 | End: 2021-06-17

## 2021-06-17 RX ORDER — DICYCLOMINE HCL 20 MG
20 TABLET ORAL EVERY 6 HOURS
Qty: 15 TABLET | Refills: 1 | Status: SHIPPED | OUTPATIENT
Start: 2021-06-17 | End: 2022-09-02

## 2021-06-17 RX ADMIN — SODIUM CHLORIDE, POTASSIUM CHLORIDE, SODIUM LACTATE AND CALCIUM CHLORIDE: 600; 310; 30; 20 INJECTION, SOLUTION INTRAVENOUS at 07:19

## 2021-06-17 RX ADMIN — PROPOFOL 30 MG: 10 INJECTION, EMULSION INTRAVENOUS at 07:30

## 2021-06-17 RX ADMIN — PROPOFOL 140 MCG/KG/MIN: 10 INJECTION, EMULSION INTRAVENOUS at 07:30

## 2021-06-17 RX ADMIN — LIDOCAINE HYDROCHLORIDE 40 MG: 20 INJECTION, SOLUTION INFILTRATION; PERINEURAL at 07:30

## 2021-06-17 ASSESSMENT — MIFFLIN-ST. JEOR: SCORE: 1550.57

## 2021-06-17 NOTE — OP NOTE
PROCEDURE NOTE    SURGEON:Luis Koehler MD    PRE-OP DIAGNOSIS:  Screening Colonoscopy      POST-OP DIAGNOSIS: Colon poyps    PROCEDURE:  Colonoscopy with cold snare    SPECIMEN:     ID Type Source Tests Collected by Time Destination   A : ascending polyp's Tissue Large Intestine, Right/Ascending SURGICAL PATHOLOGY EXAM Luis Koehler MD 6/17/2021  7:52 AM    B : transverse colon polyp Tissue Large Intestine, Transverse SURGICAL PATHOLOGY EXAM Luis Koehler MD 6/17/2021  7:54 AM          ANESTHESIA:  Monitor Anesthesia Care CRNA Independent: Gem Arias APRN CRNA   Coverage requested due to neurological disorder    ESTIMATED BLOOD LOSS: none    COMPLICATIONS:  None    INDICATION FOR THE PROCEDURE: The patient is a 66 year old male. The patient presents with need for screening. I explained to the patient the risks, benefits and alternatives to screening colonoscopy for evaluating for cancer or polyps. We discussed the risks including bleeding, perforation, potential inability to reach the cecum and the risks of sedation. The patient's questions were answered and the patient wished to proceed. Informed consent paperwork was completed.    PROCEDURE: The patient was taken to the endoscopy suite. Appropriate monitors were attached. The patient was placed in the left lateral decubitus position. Timeout was performed confirming the patient's identity and procedure to be performed.  After appropriate sedation was confirmed, digital rectal exam was performed.  There was normal tone and no gross abnormality was noted.  The lubricated colonoscope was introduced into the anus the colon was insufflated with air. The prep quality was adequate. Under direct visualization the scope was advanced to the cecum. The mucosa of the colon was inspected while withdrawing the scope. Ascending colon polyps X 2 were removed with cold snare (4-6mm). A 5 mm transverse colon polyp was removed with cold snare. The scope was  retroflexed in the rectum and the anorectal junction was inspected. No abnormalities were noted. The scope was returned to aneutral position and the colon was decompressed. The scope was removed. The patient tolerated the procedure with no immediately apparent complication. The patient was taken to recovery in stable condition.    FOLLOW UP: RECOMMEND high fiber diet, will call with pathology results.     Luis Koehler MD on 6/17/2021 at 8:04 AM

## 2021-06-17 NOTE — ANESTHESIA POSTPROCEDURE EVALUATION
Patient: Jamir Gill    Procedure(s):  COLONOSCOPY, WITH POLYPECTOMY AND BIOPSY    Diagnosis:Encounter for screening colonoscopy [Z12.11]  Diagnosis Additional Information: No value filed.    Anesthesia Type:  MAC    Note:  Disposition: Outpatient   Postop Pain Control: Uneventful            Sign Out: Well controlled pain   PONV: No   Neuro/Psych: Uneventful            Sign Out: Acceptable/Baseline neuro status   Airway/Respiratory: Uneventful            Sign Out: Acceptable/Baseline resp. status   CV/Hemodynamics: Uneventful            Sign Out: Acceptable CV status; No obvious hypovolemia; No obvious fluid overload   Other NRE: NONE   DID A NON-ROUTINE EVENT OCCUR? No           Last vitals:  Vitals:    06/17/21 0806 06/17/21 0815 06/17/21 0830   BP:  113/72 105/69   Pulse:  87 85   Resp: 16     Temp: 96.6  F (35.9  C)     SpO2: 97% 97% 97%       Last vitals prior to Anesthesia Care Transfer:  CRNA VITALS  6/17/2021 0734 - 6/17/2021 0834      6/17/2021             Pulse:  89    Ht Rate:  89    SpO2:  98 %    Resp Rate (set):  10          Electronically Signed By: CORIE HUNTER CRNA  June 17, 2021  8:55 AM

## 2021-06-17 NOTE — ANESTHESIA CARE TRANSFER NOTE
Patient: Jamir Gill    Procedure(s):  COLONOSCOPY, WITH POLYPECTOMY AND BIOPSY    Diagnosis: Encounter for screening colonoscopy [Z12.11]  Diagnosis Additional Information: No value filed.    Anesthesia Type:   MAC     Note:      Level of Consciousness: awake  Oxygen Supplementation: room air    Independent Airway: airway patency satisfactory and stable    Vital Signs Stable: post-procedure vital signs reviewed and stable  Report to RN Given: handoff report given  Patient transferred to: Phase II    Handoff Report: Identifed the Patient, Identified the Reponsible Provider, Reviewed the pertinent medical history, Discussed the surgical course, Reviewed Intra-OP anesthesia mangement and issues during anesthesia, Set expectations for post-procedure period and Allowed opportunity for questions and acknowledgement of understanding      Vitals: (Last set prior to Anesthesia Care Transfer)  CRNA VITALS  6/17/2021 0734 - 6/17/2021 0810      6/17/2021             Pulse:  89    Ht Rate:  89    SpO2:  98 %    Resp Rate (set):  10        Electronically Signed By: CORIE HUNTER CRNA  June 17, 2021  8:10 AM

## 2021-06-17 NOTE — OR NURSING
"Pt. has been discharged to home at 0910 via w/c accompanied by rn, wife at car.    Written discharge instructions and scope photo were provided to wife.  Prescriptions were n/a.  Patient states their pain is none (\"just cramping\", and denies any nausea or dizziness upon discharge.    Patient and adult caring for them verbalize understanding of discharge instructions including no driving until tomorrow and no longer taking narcotic pain medications - no operating mechanical equipment and no making any important decisions.They understand reason for discharge, and necessary follow-up appointments.    "

## 2021-06-17 NOTE — H&P
PRE-PROCEDURE NOTE    CHIEFCOMPLAINT / REASON FOR PROCEDURE:  Screening for polyps and colorectal cancer.    PERTINENT HISTORY   Patient is due for colonoscopy. Previous colonoscopy 2015. No family history of colon polyps or colon cancer.    Past Medical History:   Diagnosis Date     Cervical disc disorder     No Comments Provided     Enlarged prostate with lower urinary tract symptoms (LUTS)     No Comments Provided     Essential (primary) hypertension     No Comments Provided     Multiple sclerosis (H)     No Comments Provided     Other specified disorders of bone density and structure, unspecified site (CODE)     No Comments Provided     Other symptoms and signs involving cognitive functions and awareness     No Comments Provided     Spondylosis of cervical region without myelopathy or radiculopathy     No Comments Provided       Past Surgical History:   Procedure Laterality Date     APPENDECTOMY OPEN      appendectomy     COLONOSCOPY  01/01/2010 01/01/2010,with polyps resected     COLONOSCOPY  12/15/2015    12/15/2015,[place]- colitis     COMBINED CYSTOSCOPY, URETEROSCOPY, LASER HOLMIUM LITHOTRIPSY URETER(S) Left 4/10/2018    Procedure: COMBINED CYSTOSCOPY, URETEROSCOPY, LASER HOLMIUM LITHOTRIPSY URETER(S);  Left Ureteroscopy with Holmium Laser Lithotripsy & Stent Placement.;  Surgeon: Olu Saldivar MD;  Location:  OR     CYSTOSCOPY, RETROGRADES, INSERT STENT URETER(S), COMBINED Left 4/3/2018    Procedure: COMBINED CYSTOSCOPY, RETROGRADES, INSERT STENT URETER(S);;  Surgeon: Olu Saldivar MD;  Location:  OR     PROSTATE SURGERY      ,PROSTATECTOMY,Laser prostate surgery -- BPH         Other:  None  Bleeding tendencies: No     Relevant Family History:  None     Relevant Social History:  None     10 point ROS of systems including Constitutional, Eyes, Respiratory, Cardiovascular, Gastroenterology, Genitourinary, Integumentary, Muscularskeletal, Psychiatric were all negative except for pertinent  positives noted in my HPI.      ALLERGIES/SENSITIVITIES: No Known Allergies     CURRENT MEDICATIONS:    No current facility-administered medications on file prior to encounter.   Aspirin Buf,CaCarb-MgCarb-MgO, 81 MG TABS, Take 81 mg by mouth  BACLOFEN PO, Take 10 mg by mouth 2 times daily 10 mg QAM, 20 mg QHS  bisacodyl (DULCOLAX) 5 MG EC tablet, Use as directed for colonoscopy prep  calcium carbonate (CALCIUM ANTACID) 500 MG chewable tablet, Take 1,250 mg by mouth  Docusate Sodium (COLACE PO), Take 100 mg by mouth daily  lisinopril (ZESTRIL) 10 MG tablet, Take 1 tablet (10 mg) by mouth daily  meclizine 25 MG CHEW, Take 25 mg by mouth 2 times daily as needed   oxybutynin ER (DITROPAN-XL) 10 MG 24 hr tablet, Take 1 tablet (10 mg) by mouth daily  polyethylene glycol (MIRALAX) 17 GM/Dose powder, Mix with 255g with 64 oz of Gatorade (not red or purple) drink at a rate of 8oz every 10 min as directed for colonoscopy prep  psyllium 0.52 G capsule, Take 3 capsules by mouth 2 times daily  sildenafil (REVATIO) 20 MG tablet, Take 3-5 (60-100mg) tablets by mouth 1 hour prior to sexual activity (Patient not taking: Reported on 6/26/2020)            PRE-SEDATION ASSESSMENT:    LUNGS:  CTA B/L, no wheezing or crackles.  Heart & CV:  RRR no murmur.  Intact distal pulses, good cap refill.    Comment(s):      IMPRESSION: 66 year old male in need of screening colonoscopy.    PLAN:  I discussed screening colonoscopy with the patient. Anesthesia coverage requested due to neurologic disorder.    Luis Koehler MD    6/17/2021 7:03 AM

## 2021-06-17 NOTE — DISCHARGE INSTRUCTIONS
[facility]  Adult Discharge Orders & Instructions    ________________________________________________________________          For 12 hours after surgery  1. Get plenty of rest.  A responsible adult must stay with you for at least 12 hours after you leave the hospital.   2. You may feel lightheaded.  IF so, sit for a few minutes before standing.  Have someone help you get up.   3. You may have a slight fever. Call the doctor if your fever is over 101 F (38.3 C) (taken under the tongue) or lasts longer than 24 hours.  4. You may have a dry mouth, a sore throat, muscle aches or trouble sleeping.  These should go away after 24 hours.  5. Do not make important or legal decisions.  6.   Do not drive or use heavy equipment.  If you have weakness or tingling, don't drive or use heavy equipment until this feeling goes away.    To contact a doctor, call   [phone]_______________________

## 2021-06-17 NOTE — ANESTHESIA PREPROCEDURE EVALUATION
Anesthesia Pre-Procedure Evaluation    Patient: Jamir Gill   MRN: [id number] : 1955        Preoperative Diagnosis: Encounter for screening colonoscopy [Z12.11]   Procedure : Procedure(s):  COLONOSCOPY     Past Medical History:   Diagnosis Date     Cervical disc disorder     No Comments Provided     Enlarged prostate with lower urinary tract symptoms (LUTS)     No Comments Provided     Essential (primary) hypertension     No Comments Provided     Multiple sclerosis (H)     No Comments Provided     Other specified disorders of bone density and structure, unspecified site (CODE)     No Comments Provided     Other symptoms and signs involving cognitive functions and awareness     No Comments Provided     Spondylosis of cervical region without myelopathy or radiculopathy     No Comments Provided      Past Surgical History:   Procedure Laterality Date     APPENDECTOMY OPEN      appendectomy     COLONOSCOPY  2010,with polyps resected     COLONOSCOPY  12/15/2015    12/15/2015,[place]- colitis     COMBINED CYSTOSCOPY, URETEROSCOPY, LASER HOLMIUM LITHOTRIPSY URETER(S) Left 4/10/2018    Procedure: COMBINED CYSTOSCOPY, URETEROSCOPY, LASER HOLMIUM LITHOTRIPSY URETER(S);  Left Ureteroscopy with Holmium Laser Lithotripsy & Stent Placement.;  Surgeon: Olu Saldivar MD;  Location:  OR     CYSTOSCOPY, RETROGRADES, INSERT STENT URETER(S), COMBINED Left 4/3/2018    Procedure: COMBINED CYSTOSCOPY, RETROGRADES, INSERT STENT URETER(S);;  Surgeon: Olu Saldivar MD;  Location:  OR     PROSTATE SURGERY      ,PROSTATECTOMY,Laser prostate surgery -- BPH      No Known Allergies   Social History     Tobacco Use     Smoking status: Never Smoker     Smokeless tobacco: Never Used   Substance Use Topics     Alcohol use: No      Wt Readings from Last 1 Encounters:   21 78 kg (172 lb)        Anesthesia Evaluation   Pt has had prior anesthetic.     No history of anesthetic complications       ROS/MED  HX  ENT/Pulmonary:  - neg pulmonary ROS     Neurologic:     (+) Multiple Sclerosis,     Cardiovascular:     (+) hypertension-----    METS/Exercise Tolerance: 1 - Eating, dressing    Hematologic:  - neg hematologic  ROS     Musculoskeletal:  - neg musculoskeletal ROS     GI/Hepatic:  - neg GI/hepatic ROS     Renal/Genitourinary:     (+) Nephrolithiasis ,     Endo:  - neg endo ROS     Psychiatric/Substance Use:  - neg psychiatric ROS     Infectious Disease:  - neg infectious disease ROS     Malignancy:  - neg malignancy ROS     Other:  - neg other ROS          Physical Exam    Airway        Mallampati: II   TM distance: < 3 FB   Neck ROM: full   Mouth opening: > 3 cm    Respiratory Devices and Support         Dental  no notable dental history         Cardiovascular   cardiovascular exam normal       Rhythm and rate: regular and normal     Pulmonary   pulmonary exam normal        breath sounds clear to auscultation           OUTSIDE LABS:  CBC:   Lab Results   Component Value Date    WBC 5.6 07/03/2020    WBC 5.8 11/29/2018    HGB 13.8 07/03/2020    HGB 15.4 11/29/2018    HCT 43.4 07/03/2020    HCT 47.0 11/29/2018     07/03/2020     11/29/2018     BMP:   Lab Results   Component Value Date     04/15/2021     08/31/2020    POTASSIUM 4.3 04/15/2021    POTASSIUM 4.3 08/31/2020    CHLORIDE 102 04/15/2021    CHLORIDE 102 08/31/2020    CO2 32 (H) 04/15/2021    CO2 33 (H) 08/31/2020    BUN 27 (H) 04/15/2021    BUN 28 (H) 08/31/2020    CR 1.04 04/15/2021    CR 1.23 08/31/2020    GLC 82 04/15/2021    GLC 94 08/31/2020     COAGS: No results found for: PTT, INR, FIBR  POC: No results found for: BGM, HCG, HCGS  HEPATIC:   Lab Results   Component Value Date    ALBUMIN 4.0 07/03/2020    PROTTOTAL 6.1 (L) 07/03/2020    ALT 14 07/03/2020    AST 16 07/03/2020    ALKPHOS 69 07/03/2020    BILITOTAL 0.4 07/03/2020    BILIDIRECT 0.07 06/24/2015     OTHER:   Lab Results   Component Value Date    LACT 1.5 04/03/2018     ОЛЬГА 9.7 04/15/2021    PHOS 3.8 06/24/2015    LIPASE 7 (L) 04/03/2018       Anesthesia Plan    ASA Status:  2   NPO Status:  NPO Appropriate    Anesthesia Type: MAC.   Induction: Propofol.           Consents    Anesthesia Plan(s) and associated risks, benefits, and realistic alternatives discussed. Questions answered and patient/representative(s) expressed understanding.     - Discussed with:  Patient      - Extended Intubation/Ventilatory Support Discussed: No.      - Patient is DNR/DNI Status: No    Use of blood products discussed: Yes.     Postoperative Care            Comments:                CORIE BACA CRNA

## 2021-06-22 ENCOUNTER — HOSPITAL ENCOUNTER (OUTPATIENT)
Dept: PHYSICAL THERAPY | Facility: OTHER | Age: 66
Setting detail: THERAPIES SERIES
End: 2021-06-22
Attending: PSYCHIATRY & NEUROLOGY
Payer: MEDICARE

## 2021-06-22 PROCEDURE — 97110 THERAPEUTIC EXERCISES: CPT | Mod: GP

## 2021-06-23 ENCOUNTER — TELEPHONE (OUTPATIENT)
Dept: FAMILY MEDICINE | Facility: OTHER | Age: 66
End: 2021-06-23

## 2021-06-23 NOTE — TELEPHONE ENCOUNTER
Patient given results from Dr. Luis Koehler, and notified letter was sent.  Donna Lemos LPN ...... 6/23/2021 11:17 AM

## 2021-06-23 NOTE — TELEPHONE ENCOUNTER
TYP-patient is looking for results please call and advise    Thank You    Gail Ramirez on 6/23/2021 at 10:48 AM

## 2021-06-24 ENCOUNTER — HOSPITAL ENCOUNTER (OUTPATIENT)
Dept: PHYSICAL THERAPY | Facility: OTHER | Age: 66
Setting detail: THERAPIES SERIES
End: 2021-06-24
Attending: PSYCHIATRY & NEUROLOGY
Payer: MEDICARE

## 2021-06-24 PROCEDURE — 97110 THERAPEUTIC EXERCISES: CPT | Mod: GP,CQ

## 2021-07-01 ENCOUNTER — HOSPITAL ENCOUNTER (OUTPATIENT)
Dept: PHYSICAL THERAPY | Facility: OTHER | Age: 66
Setting detail: THERAPIES SERIES
End: 2021-07-01
Attending: PSYCHIATRY & NEUROLOGY
Payer: MEDICARE

## 2021-07-01 PROCEDURE — 97110 THERAPEUTIC EXERCISES: CPT | Mod: GP

## 2021-07-06 ENCOUNTER — HOSPITAL ENCOUNTER (OUTPATIENT)
Dept: PHYSICAL THERAPY | Facility: OTHER | Age: 66
Setting detail: THERAPIES SERIES
End: 2021-07-06
Attending: PSYCHIATRY & NEUROLOGY
Payer: MEDICARE

## 2021-07-06 PROCEDURE — 97110 THERAPEUTIC EXERCISES: CPT | Mod: GP,CQ

## 2021-07-08 ENCOUNTER — HOSPITAL ENCOUNTER (OUTPATIENT)
Dept: PHYSICAL THERAPY | Facility: OTHER | Age: 66
Setting detail: THERAPIES SERIES
End: 2021-07-08
Attending: PSYCHIATRY & NEUROLOGY
Payer: MEDICARE

## 2021-07-08 PROCEDURE — 97110 THERAPEUTIC EXERCISES: CPT | Mod: GP

## 2021-07-08 NOTE — PROGRESS NOTES
Outpatient Physical Therapy Progress Note     Patient: Jamir Gill  : 1955    Beginning/End Dates of Reporting Period:  2021 to 2021    Referring Provider: Dr. Dee Gibbs    Therapy Diagnosis: MS, impaired mobility, weakness     Client Self Report: No new concerns. Jamir feels like today is a good day.    Objective Measurements:  Objective Measure: 30 second sit to stand with use of hands  Details: : 9 reps (completed after leg press); : 11     Goals:   Goal Identifier Standing tolerance   Goal Description Pt will be able stand for 10 minutes at therapy while completing balance tasks at therapy in order to improve his endurance   Target Date 21   Date Met      Progress: Progressing: Pt is able to complete upwards of 5 minutes of standing tasks. Jamir is able to stand more during tasks at home but hasnt quite met the 10 minutes.      Goal Identifier Hip abd   Goal Description Pt will demonstrate 3/5 R hip abduction strength in order to improve mobility and gait   Target Date 21   Date Met      Progress: Not met, continue;       Goal Identifier sit<>stand   Goal Description Pt will be able to complet 10 sit<>stand repetitions without the use of hands in order to improve LE strength   Target Date 21   Date Met      Progress: Progressing: able to complete 11 reps in sit<> 30 seconds with the use of hands today.         Progress Toward Goals:   Progress this reporting period: Pt continues to make positive improvements. It was questioned whether Jamir was experiencing a flare up of his MS for the past 2 weeks but he has responded quite nicely since then. He continues to objectively increase his strength but also progressing his endurance with increased activity during sessions. Jamir is requiring rest breaks of shorter duration.      Plan:  Continue therapy per current plan of care.    Discharge:  No

## 2021-07-08 NOTE — PROGRESS NOTES
Rehabilitation Services      OUTPATIENT PHYSICAL THERAPY  PLAN OF TREATMENT FOR OUTPATIENT REHABILITATION    Patient's Last Name, First Name, M.I.                YOB: 1955  Jamir Gill                        Provider's Name  Manjit Pink PT Medical Record No.  [id number]                               Onset Date: Chronic   Start of Care Date: 4/27/2021   Type:     _X_PT   ___OT   ___SLP Medical Diagnosis: RRMS; R leg weakness, Radiculopathy                       PT Diagnosis: impaired mobility      _________________________________________________________________________________  Plan of Treatment:    Frequency/Duration: 2x/week for10 weeks     Goals:  Goal Identifier Standing tolerance   Goal Description Pt will be able stand for 10 minutes at therapy while completing balance tasks at therapy in order to improve his endurance   Target Date 06/12/21   Date Met      Progress: Progressing: Pt is able to complete upwards of 5 minutes of standing tasks. Jamir is able to stand more during tasks at home but hasnt quite met the 10 minutes.     Goal Identifier Hip abd   Goal Description Pt will demonstrate 3/5 R hip abduction strength in order to improve mobility and gait   Target Date 06/26/21   Date Met      Progress: Not met, continue;      Goal Identifier sit<>stand   Goal Description Pt will be able to complet 10 sit<>stand repetitions without the use of hands in order to improve LE strength   Target Date 06/26/21   Date Met      Progress: Progressing: able to complete 11 reps in sit<> 30 seconds with the use of hands today.       Progress Toward Goals:   Progress this reporting period: Pt continues to make positive improvements. It was questioned whether Jamir was experiencing a flare up of his MS for the past 2 weeks but he has responded quite nicely since then. He continues to objectively increase his  strength but also progressing his endurance with increased activity during sessions. Jamir is requiring rest breaks of shorter duration.    Certification date from 6/23/2021 to 9/1/2021.    Manjit Pink, PT          I CERTIFY THE NEED FOR THESE SERVICES FURNISHED UNDER        THIS PLAN OF TREATMENT AND WHILE UNDER MY CARE     (Physician co-signature of this document indicates review and certification of the therapy plan).                Referring Provider: Dee Gibbs MD

## 2021-07-14 ENCOUNTER — HOSPITAL ENCOUNTER (OUTPATIENT)
Dept: PHYSICAL THERAPY | Facility: OTHER | Age: 66
Setting detail: THERAPIES SERIES
End: 2021-07-14
Attending: PSYCHIATRY & NEUROLOGY
Payer: MEDICARE

## 2021-07-14 PROCEDURE — 97110 THERAPEUTIC EXERCISES: CPT | Mod: GP

## 2021-07-19 ENCOUNTER — HOSPITAL ENCOUNTER (OUTPATIENT)
Dept: PHYSICAL THERAPY | Facility: OTHER | Age: 66
Setting detail: THERAPIES SERIES
End: 2021-07-19
Attending: PSYCHIATRY & NEUROLOGY
Payer: MEDICARE

## 2021-07-19 PROCEDURE — 97110 THERAPEUTIC EXERCISES: CPT | Mod: GP

## 2021-07-22 ENCOUNTER — HOSPITAL ENCOUNTER (OUTPATIENT)
Dept: PHYSICAL THERAPY | Facility: OTHER | Age: 66
Setting detail: THERAPIES SERIES
End: 2021-07-22
Attending: PSYCHIATRY & NEUROLOGY
Payer: MEDICARE

## 2021-07-22 PROCEDURE — 97110 THERAPEUTIC EXERCISES: CPT | Mod: GP,CQ

## 2021-07-29 ENCOUNTER — HOSPITAL ENCOUNTER (OUTPATIENT)
Dept: PHYSICAL THERAPY | Facility: OTHER | Age: 66
Setting detail: THERAPIES SERIES
End: 2021-07-29
Attending: PSYCHIATRY & NEUROLOGY
Payer: MEDICARE

## 2021-07-29 PROCEDURE — 97110 THERAPEUTIC EXERCISES: CPT | Mod: GP,CQ

## 2021-08-02 ENCOUNTER — HOSPITAL ENCOUNTER (OUTPATIENT)
Dept: PHYSICAL THERAPY | Facility: OTHER | Age: 66
Setting detail: THERAPIES SERIES
End: 2021-08-02
Attending: PSYCHIATRY & NEUROLOGY
Payer: MEDICARE

## 2021-08-02 PROCEDURE — 97110 THERAPEUTIC EXERCISES: CPT | Mod: GP,CQ

## 2021-08-05 ENCOUNTER — HOSPITAL ENCOUNTER (OUTPATIENT)
Dept: PHYSICAL THERAPY | Facility: OTHER | Age: 66
Setting detail: THERAPIES SERIES
End: 2021-08-05
Attending: PSYCHIATRY & NEUROLOGY
Payer: MEDICARE

## 2021-08-05 PROCEDURE — 97110 THERAPEUTIC EXERCISES: CPT | Mod: GP

## 2021-09-03 ENCOUNTER — HOSPITAL ENCOUNTER (OUTPATIENT)
Dept: PHYSICAL THERAPY | Facility: OTHER | Age: 66
Setting detail: THERAPIES SERIES
End: 2021-09-03
Attending: PSYCHIATRY & NEUROLOGY
Payer: MEDICARE

## 2021-09-03 PROCEDURE — 97110 THERAPEUTIC EXERCISES: CPT | Mod: GP,CQ,KX

## 2021-09-03 PROCEDURE — 97112 NEUROMUSCULAR REEDUCATION: CPT | Mod: GP,CQ,KX

## 2021-09-08 ENCOUNTER — HOSPITAL ENCOUNTER (OUTPATIENT)
Dept: PHYSICAL THERAPY | Facility: OTHER | Age: 66
Setting detail: THERAPIES SERIES
End: 2021-09-08
Attending: PSYCHIATRY & NEUROLOGY
Payer: MEDICARE

## 2021-09-08 PROCEDURE — 97110 THERAPEUTIC EXERCISES: CPT | Mod: GP,KX

## 2021-09-08 PROCEDURE — 97112 NEUROMUSCULAR REEDUCATION: CPT | Mod: GP,KX
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
## 2021-09-10 ENCOUNTER — HOSPITAL ENCOUNTER (OUTPATIENT)
Dept: PHYSICAL THERAPY | Facility: OTHER | Age: 66
Setting detail: THERAPIES SERIES
End: 2021-09-10
Attending: PSYCHIATRY & NEUROLOGY
Payer: MEDICARE

## 2021-09-10 PROCEDURE — 97110 THERAPEUTIC EXERCISES: CPT | Mod: GP,CQ,KX

## 2021-09-13 ENCOUNTER — HOSPITAL ENCOUNTER (OUTPATIENT)
Dept: PHYSICAL THERAPY | Facility: OTHER | Age: 66
Setting detail: THERAPIES SERIES
End: 2021-09-13
Attending: PSYCHIATRY & NEUROLOGY
Payer: MEDICARE

## 2021-09-13 PROCEDURE — 97110 THERAPEUTIC EXERCISES: CPT | Mod: GP,KX

## 2021-09-13 NOTE — PROGRESS NOTES
Outpatient Physical Therapy Progress Note     Patient: Jamir Gill  : 1955    Beginning/End Dates of Reporting Period:  2021 to 2021    Referring Provider: Dr. Dee Gibbs MD    Therapy Diagnosis: MS, Impaired mobility, weakness     Client Self Report: Jamir had an uneventful weekend. He continues to see improvement in his balance, gait, and activity levels since his injury. Feels he still hasnt fully recovered however.     Objective Measurements:  Objective Measure: subjective fatigue  Details: 8.5/10  Objective Measure: Sit to stand reps  Details: 7 reps without use of hands after leg press     Goals:  Goal Identifier Standing tolerance   Goal Description Pt will be able stand for 10 minutes at therapy while completing balance tasks at therapy in order to improve his endurance   Target Date 21   Date Met      Progress (detail required for progress note): Able to stand with supervision for 10 minutes total, but needed frequent use of UEs for balance or to regain posture. Goal mostly met. Update goal date: 10/1/2021     Goal Identifier Hip abd   Goal Description Pt will demonstrate 3/5 R hip abduction strength in order to improve mobility and gait   Target Date 21   Date Met      Progress (detail required for progress note):  Pt continues to struggle with hip weakness. Scissoring gait still present and actually worsened due to his injuries sustained in late . Pt is still recovering from this. Update goal date to 2021     Goal Identifier sit<>stand   Goal Description Pt will be able to complet 10 sit<>stand repetitions without the use of hands in order to improve LE strength   Target Date 21   Date Met      Progress (detail required for progress note): 7 reps without the use of hands after Leg Press. Previous levels ranged from 9-11 depending on when tested. Pt remains short of his previous baseline. Update Goal date to 21       Plan:  Continue therapy  per current plan of care. See above for Goal Date updates. Jamir has certainly improved in the last two weeks but remains short of his prior baseline function and mobility. Jamir would benefit from continuing therapy.    Discharge:  No

## 2021-09-15 ENCOUNTER — HOSPITAL ENCOUNTER (OUTPATIENT)
Dept: PHYSICAL THERAPY | Facility: OTHER | Age: 66
Setting detail: THERAPIES SERIES
End: 2021-09-15
Attending: PSYCHIATRY & NEUROLOGY
Payer: MEDICARE

## 2021-09-15 PROCEDURE — 97110 THERAPEUTIC EXERCISES: CPT | Mod: GP,KX

## 2021-09-21 ENCOUNTER — HOSPITAL ENCOUNTER (OUTPATIENT)
Dept: PHYSICAL THERAPY | Facility: OTHER | Age: 66
Setting detail: THERAPIES SERIES
End: 2021-09-21
Attending: PSYCHIATRY & NEUROLOGY
Payer: MEDICARE

## 2021-09-21 PROCEDURE — 97110 THERAPEUTIC EXERCISES: CPT | Mod: GP,KX

## 2021-09-23 ENCOUNTER — HOSPITAL ENCOUNTER (OUTPATIENT)
Dept: PHYSICAL THERAPY | Facility: OTHER | Age: 66
Setting detail: THERAPIES SERIES
End: 2021-09-23
Attending: PSYCHIATRY & NEUROLOGY
Payer: MEDICARE

## 2021-09-23 PROCEDURE — 97110 THERAPEUTIC EXERCISES: CPT | Mod: GP,CQ,KX

## 2021-09-27 ENCOUNTER — HOSPITAL ENCOUNTER (OUTPATIENT)
Dept: PHYSICAL THERAPY | Facility: OTHER | Age: 66
Setting detail: THERAPIES SERIES
End: 2021-09-27
Attending: PSYCHIATRY & NEUROLOGY
Payer: MEDICARE

## 2021-09-27 PROCEDURE — 97110 THERAPEUTIC EXERCISES: CPT | Mod: GP,KX

## 2021-09-30 ENCOUNTER — HOSPITAL ENCOUNTER (OUTPATIENT)
Dept: PHYSICAL THERAPY | Facility: OTHER | Age: 66
Setting detail: THERAPIES SERIES
End: 2021-09-30
Attending: PSYCHIATRY & NEUROLOGY
Payer: MEDICARE

## 2021-09-30 PROCEDURE — 97110 THERAPEUTIC EXERCISES: CPT | Mod: GP,KX

## 2021-10-05 ENCOUNTER — HOSPITAL ENCOUNTER (OUTPATIENT)
Dept: PHYSICAL THERAPY | Facility: OTHER | Age: 66
Setting detail: THERAPIES SERIES
End: 2021-10-05
Attending: PSYCHIATRY & NEUROLOGY
Payer: MEDICARE

## 2021-10-05 PROCEDURE — 97110 THERAPEUTIC EXERCISES: CPT | Mod: GP,KX

## 2021-10-09 ENCOUNTER — HEALTH MAINTENANCE LETTER (OUTPATIENT)
Age: 66
End: 2021-10-09

## 2021-10-12 ENCOUNTER — HOSPITAL ENCOUNTER (OUTPATIENT)
Dept: PHYSICAL THERAPY | Facility: OTHER | Age: 66
Setting detail: THERAPIES SERIES
End: 2021-10-12
Attending: PSYCHIATRY & NEUROLOGY
Payer: MEDICARE

## 2021-10-12 PROCEDURE — 97110 THERAPEUTIC EXERCISES: CPT | Mod: GP,KX

## 2021-10-15 ENCOUNTER — HOSPITAL ENCOUNTER (OUTPATIENT)
Dept: PHYSICAL THERAPY | Facility: OTHER | Age: 66
Setting detail: THERAPIES SERIES
End: 2021-10-15
Attending: PSYCHIATRY & NEUROLOGY
Payer: MEDICARE

## 2021-10-15 PROCEDURE — 97110 THERAPEUTIC EXERCISES: CPT | Mod: GP,CQ,KX

## 2021-10-19 ENCOUNTER — HOSPITAL ENCOUNTER (OUTPATIENT)
Dept: PHYSICAL THERAPY | Facility: OTHER | Age: 66
Setting detail: THERAPIES SERIES
End: 2021-10-19
Attending: PSYCHIATRY & NEUROLOGY
Payer: MEDICARE

## 2021-10-19 PROCEDURE — 97110 THERAPEUTIC EXERCISES: CPT | Mod: GP,KX

## 2021-10-21 ENCOUNTER — HOSPITAL ENCOUNTER (OUTPATIENT)
Dept: PHYSICAL THERAPY | Facility: OTHER | Age: 66
Setting detail: THERAPIES SERIES
End: 2021-10-21
Attending: PSYCHIATRY & NEUROLOGY
Payer: MEDICARE

## 2021-10-21 PROCEDURE — 97110 THERAPEUTIC EXERCISES: CPT | Mod: GP,CQ,KX

## 2021-10-26 ENCOUNTER — HOSPITAL ENCOUNTER (OUTPATIENT)
Dept: PHYSICAL THERAPY | Facility: OTHER | Age: 66
Setting detail: THERAPIES SERIES
End: 2021-10-26
Attending: PSYCHIATRY & NEUROLOGY
Payer: MEDICARE

## 2021-10-26 PROCEDURE — 97110 THERAPEUTIC EXERCISES: CPT | Mod: GP,KX

## 2021-10-26 NOTE — PROGRESS NOTES
Outpatient Physical Therapy Progress Note     Patient: Jamir Gill  : 1955    Beginning/End Dates of Reporting Period:  2021 to 10/19/2021    Referring Provider: Dr. Dee Gibbs    Therapy Diagnosis: MS, impaired mobility, weakness     Client Self Report: Jamir reports he has been having a harder time with tasks lately since late last week. Recovery and fatigue have made it more challenging.     Objective Measurements:  Objective Measure: subjective fatigue  Details: 8/10  Objective Measure: Sit to stand reps  Details: 7 reps without use of hands after leg press - significant challenge with final 3 reps compared to previous attempts.    Goals:  Goal Identifier Standing tolerance   Goal Description Pt will be able stand for 10 minutes at therapy while completing balance tasks at therapy in order to improve his endurance   Target Date 10/01/21   Date Met      Progress (detail required for progress note): Able to stand with supervision for 10 minutes total, but needed frequent use of UEs for balance or to regain posture     Goal Identifier Hip abd   Goal Description Pt will demonstrate 3/5 R hip abduction strength in order to improve mobility and gait   Target Date 21   Date Met      Progress (detail required for progress note):  Pt has lost some strength in B hip abductors in the last few days. Gait efficiency has decreased with additional toe drag and increased scissoring noted.      Goal Identifier sit<>stand   Goal Description Pt will be able to complet 10 sit<>stand repetitions without the use of hands in order to improve LE strength   Target Date 21   Date Met      Progress (detail required for progress note): 7 reps without the use of hands after Leg Press       Plan:  Continue therapy per current plan of care. We are going to continue to see Jamir for PT services to help him transition to his winter home in [place]. Pt does seem to be coming down in function level with possibility  of a flare up. Will continue to monitor and help Jamir be as strong and function until he travels.    Discharge:  No

## 2021-10-28 ENCOUNTER — HOSPITAL ENCOUNTER (OUTPATIENT)
Dept: PHYSICAL THERAPY | Facility: OTHER | Age: 66
Setting detail: THERAPIES SERIES
End: 2021-10-28
Attending: PSYCHIATRY & NEUROLOGY
Payer: MEDICARE

## 2021-10-28 PROCEDURE — 97110 THERAPEUTIC EXERCISES: CPT | Mod: GP,KX

## 2021-12-02 NOTE — PROGRESS NOTES
[facility]    Outpatient Physical Therapy Discharge Note  Patient: Jamir Gill  : 1955    Beginning/End Dates of Reporting Period:  21 to 10/28/21    Referring Provider: Dr. Bernie MD    Therapy Diagnosis: MS; weakness, impaired mobility     Client Self Report: Jamir is leaving for [place] soon. Today is his last secheduled session. Pt requests a list of exercises and weights so he can continue to be as active as possible.     Objective Measurements:  Objective Measure: subjective fatigue  Details: 8/10  Objective Measure: Sit to stand reps  Details: 7 reps without use of hands after leg press       Goals:  Goal Identifier Standing tolerance   Goal Description Pt will be able stand for 10 minutes at therapy while completing balance tasks at therapy in order to improve his endurance   Target Date 10/01/21   Date Met      Progress (detail required for progress note): Able to stand with supervision for 10 minutes total, but needed frequent use of UEs for balance or to regain posture     Goal Identifier Hip abd   Goal Description Pt will demonstrate 3/5 R hip abduction strength in order to improve mobility and gait   Target Date 21   Date Met      Progress (detail required for progress note):  Pt abduction strength continues to be limited and has actually regressed slightly in the last few weeks. Pt has had an increase in scissoring gait, increased rest times needed following ambulation.      Goal Identifier sit<>stand   Goal Description Pt will be able to complet 10 sit<>stand repetitions without the use of hands in order to improve LE strength   Target Date 21   Date Met      Progress (detail required for progress note): 7 reps without the use of hands after Leg Press       Plan:  Discharge from therapy.    Discharge:     Reason for Discharge: Pt is leaving for [place] where he lives  during the winter months. Pt is encouraged to work with therapy there if relapse occurs.     Equipment Issued: none    Discharge Plan: Patient to continue home program.

## 2022-01-12 ENCOUNTER — TELEPHONE (OUTPATIENT)
Dept: UROLOGY | Facility: OTHER | Age: 67
End: 2022-01-12
Payer: COMMERCIAL

## 2022-01-12 NOTE — TELEPHONE ENCOUNTER
Prescription faxed [facility].    Andrew Cates LPN on 1/12/2022 at 11:33 AM

## 2022-01-12 NOTE — TELEPHONE ENCOUNTER
I called the patient and he stated he uses 14 Syriac coude. He requested a three month supply and states he uses three catheters a day.     Andrew Cates LPN on 1/12/2022 at 11:27 AM

## 2022-01-12 NOTE — TELEPHONE ENCOUNTER
Patient called and states he is running low on his catheters. He orders them from [facility] in the [facility]. They need a new Rx for them. Their number is [phone].   Please contact patient with any questions.    Kena Carroin on 1/12/2022 at 10:46 AM

## 2022-01-29 ENCOUNTER — HEALTH MAINTENANCE LETTER (OUTPATIENT)
Age: 67
End: 2022-01-29

## 2022-02-22 DIAGNOSIS — R39.15 URINARY URGENCY: Primary | ICD-10-CM

## 2022-02-22 NOTE — PROGRESS NOTES
"Per last office visit  04/05/21 with Olu Saldivar MD \"  Plan  Refill for Trimix intracavernosal injections  Increase the dose of oxybutynin XL to 10mg once daily with self cath 4 times daily  Follow-up in 1 year with JESSICA              Orders Placed    Ewa Clark LPN on 2/22/2022 at 10:53 AM    "

## 2022-03-31 DIAGNOSIS — R39.15 URINARY URGENCY: ICD-10-CM

## 2022-03-31 RX ORDER — OXYBUTYNIN CHLORIDE 10 MG/1
TABLET, EXTENDED RELEASE ORAL
Qty: 90 TABLET | Refills: 3 | Status: SHIPPED | OUTPATIENT
Start: 2022-03-31 | End: 2022-04-28

## 2022-04-18 DIAGNOSIS — M62.81 GENERALIZED MUSCLE WEAKNESS: ICD-10-CM

## 2022-04-18 DIAGNOSIS — G35 MS (MULTIPLE SCLEROSIS) (H): Primary | ICD-10-CM

## 2022-04-21 ENCOUNTER — HOSPITAL ENCOUNTER (OUTPATIENT)
Dept: PHYSICAL THERAPY | Facility: OTHER | Age: 67
Setting detail: THERAPIES SERIES
Discharge: HOME | End: 2022-04-21
Attending: PSYCHIATRY & NEUROLOGY
Payer: MEDICARE

## 2022-04-21 DIAGNOSIS — G35 MS (MULTIPLE SCLEROSIS) (H): ICD-10-CM

## 2022-04-21 DIAGNOSIS — M62.81 GENERALIZED MUSCLE WEAKNESS: ICD-10-CM

## 2022-04-21 PROCEDURE — 97110 THERAPEUTIC EXERCISES: CPT | Mod: GP

## 2022-04-21 PROCEDURE — 97162 PT EVAL MOD COMPLEX 30 MIN: CPT | Mod: GP

## 2022-04-26 ENCOUNTER — HOSPITAL ENCOUNTER (OUTPATIENT)
Dept: PHYSICAL THERAPY | Facility: OTHER | Age: 67
Setting detail: THERAPIES SERIES
Discharge: HOME | End: 2022-04-26
Attending: PSYCHIATRY & NEUROLOGY
Payer: MEDICARE

## 2022-04-26 PROCEDURE — 97110 THERAPEUTIC EXERCISES: CPT | Mod: GP

## 2022-04-28 ENCOUNTER — HOSPITAL ENCOUNTER (OUTPATIENT)
Dept: ULTRASOUND IMAGING | Facility: OTHER | Age: 67
Discharge: HOME | End: 2022-04-28
Attending: UROLOGY
Payer: MEDICARE

## 2022-04-28 ENCOUNTER — HOSPITAL ENCOUNTER (OUTPATIENT)
Dept: PHYSICAL THERAPY | Facility: OTHER | Age: 67
Setting detail: THERAPIES SERIES
Discharge: HOME | End: 2022-04-28
Attending: PSYCHIATRY & NEUROLOGY
Payer: MEDICARE

## 2022-04-28 ENCOUNTER — OFFICE VISIT (OUTPATIENT)
Dept: UROLOGY | Facility: OTHER | Age: 67
End: 2022-04-28
Attending: UROLOGY
Payer: MEDICARE

## 2022-04-28 VITALS
SYSTOLIC BLOOD PRESSURE: 124 MMHG | HEART RATE: 84 BPM | TEMPERATURE: 98.3 F | RESPIRATION RATE: 20 BRPM | BODY MASS INDEX: 24.54 KG/M2 | HEIGHT: 70 IN | OXYGEN SATURATION: 91 % | WEIGHT: 171.4 LBS | DIASTOLIC BLOOD PRESSURE: 78 MMHG

## 2022-04-28 DIAGNOSIS — R39.15 URINARY URGENCY: ICD-10-CM

## 2022-04-28 PROCEDURE — 99213 OFFICE O/P EST LOW 20 MIN: CPT | Performed by: UROLOGY

## 2022-04-28 PROCEDURE — G0463 HOSPITAL OUTPT CLINIC VISIT: HCPCS

## 2022-04-28 PROCEDURE — 97110 THERAPEUTIC EXERCISES: CPT | Mod: GP,CQ

## 2022-04-28 PROCEDURE — G0463 HOSPITAL OUTPT CLINIC VISIT: HCPCS | Mod: 25

## 2022-04-28 PROCEDURE — 76770 US EXAM ABDO BACK WALL COMP: CPT

## 2022-04-28 RX ORDER — OXYBUTYNIN CHLORIDE 10 MG/1
10 TABLET, EXTENDED RELEASE ORAL DAILY
Qty: 90 TABLET | Refills: 3 | Status: SHIPPED | OUTPATIENT
Start: 2022-04-28 | End: 2022-09-15

## 2022-04-28 ASSESSMENT — PAIN SCALES - GENERAL: PAINLEVEL: NO PAIN (0)

## 2022-04-28 NOTE — PROGRESS NOTES
"Type of Visit  Established    Chief Complaint  Neurogenic bladder  History of kidney stones    HPI  Mr. Gill is a 66 year old male w/h/o MS and neurogenic bladder managed with CIC and oxybutynin.  The patient's dose of oxybutynin was increased to 10 mg as well as transition to the long-acting version 1 year ago.  He prefers the increased dose and he denies side effects such as constipation or dry mouth.  He underwent ureteroscopy about 1 year ago for an obstructing stone.  He has no recurrence of flank pain or acute renal colic symptoms since that time.  The patient underwent an ultrasound prior to this visit.    In addition the patient has a neurogenic bladder that has been managed with intermittent catheterization for many years.  He has been catheterizing 3 times daily.  100% of his urine is emptied through self-catheterization.  He has no volitional voiding.      Review of Systems  I reviewed the ROS with the patient.    Nursing Notes:   Lynn Reid LPN  4/28/2022  9:32 AM  Signed  Chief Complaint   Patient presents with     Follow Up     1 yr kidney stones         Medication reconciliation completed.    Review of Systems:    Weight loss:    No     Recent fever/chills:  No   Night sweats:   No  Current skin rash:  No   Recent hair loss:  No  Heat intolerance:  yes   Cold intolerance:  yes  Chest pain:   No   Palpitations:   No  Shortness of breath:  No   Wheezing:   No  Constipation:    yes   Diarrhea:   No   Nausea:   No   Vomiting:   No   Kidney/side pain:  No   Back pain:   No  Frequent headaches:  No   Dizziness:     No  Leg swelling:   No   Calf pain:    No          Initial /78 (BP Location: Right arm, Patient Position: Sitting, Cuff Size: Adult Regular)   Pulse 84   Temp 98.3  F (36.8  C) (Temporal)   Resp 20   Ht 1.778 m (5' 10\")   Wt 77.7 kg (171 lb 6.4 oz)   SpO2 91%   BMI 24.59 kg/m   Estimated body mass index is 24.59 kg/m  as calculated from the following:    Height as of " "this encounter: 1.778 m (5' 10\").    Weight as of this encounter: 77.7 kg (171 lb 6.4 oz).       Lynn Reid SESAR .......  4/28/2022  9:11 AM      Physical Exam  Vitals:    04/28/22 0908   BP: 124/78   BP Location: Right arm   Patient Position: Sitting   Cuff Size: Adult Regular   Pulse: 84   Resp: 20   Temp: 98.3  F (36.8  C)   TempSrc: Temporal   SpO2: 91%   Weight: 77.7 kg (171 lb 6.4 oz)   Height: 1.778 m (5' 10\")   Constitutional: NAD, WDWN.  Cardiovascular: Regular rate.  Pulmonary/Chest: Respirations are even and non-labored bilaterally.  Abdominal: Soft. No distension, tenderness, masses or guarding. No CVA tenderness.  Extremities: PARUL x 4, Warm. No clubbing.  No cyanosis.    Skin: Pink, warm and dry.  No rashes noted.  Genitourinary: nonpalpable bladder    Labs   11/29/19 09:16   PSA 0.212 [1]     Radiographic Studies  JESSICA  4/28/2022  No hydronephrosis or evidence of obstruction.  Report is pending    ^^^^^^^^^^^^^^^^^^^^^^^^^^^^^^^^^^^^    CT Stone  4/5/2021  IMPRESSION:   Bilateral renal lithiasis without evidence of ureteral or bladder  calculus.     Mild prostate hypertrophy, similar in appearance compared to prior  study.     Chronic changes within the lung bases are similar in appearance as are  degenerative changes throughout the lumbar spine. Old fractures again  seen within the right ribs and T12 vertebral body.    ^^^^^^^^^^^^^^^^^^^^^^^^^^^^^^^^^^^^    JESSICA  8/31/2020  IMPRESSION:  No evidence of hydronephrosis.      ^^^^^^^^^^^^^^^^^^^^^^^^^^^^^^^^^^^^    11/29/2018  CT a/p  IMPRESSION:   1. Tiny renal calculi.  2. No ureteral stone or hydronephrosis.  3. Thickening of the wall of the urinary bladder which may be due to  cystitis given the history.    Assessment  Mr. Gill is a 66 year old male w/h/o MS and neurogenic bladder managed with CIC and oxybutynin who is doing well.  Patient experienced no issues over the past year and would like to continue current management.    Plan  Continue " oxybutynin XL 10mg once daily with self cath 3 times daily  Refilled self catheter supply  Follow-up in 1 year

## 2022-04-28 NOTE — PROGRESS NOTES
04/21/22 1700   General Information   Type of Visit Initial OP Ortho PT Evaluation   Start of Care Date 04/21/22   Referring Physician Dee Gibbs MD   Patient/Family Goals Statement improve his mobility and strength   Orders Evaluate and Treat   Date of Order 04/18/22   Certification Required? Yes   Medical Diagnosis MS; general muscle weakness   Surgical/Medical history reviewed Yes   General Information Comments Jamir is a very pleasant 66-year old male that presents to therapy with decreased mobility and ADL function. Pt has relapse remitting multiple sclerosis and has been seen by this PT on more than one occasion. Jamir feels that physical therapy has been the most beneficial treatment he has tried and he has shown improvements in strength and function during episodes of care. Jamir was recently in [place] where he lives during the winter months. He noticed his mobility declining to the point where he used a wheelchair for about a week. He start PT down in [place] and progressed to using his 4WW and cane. Despite this improvement, Jamir still feels he is below his normal levels of mobility.   Body Part(s)   Body Part(s) Hip;Knee   Presentation and Etiology   Impairments A. Pain;D. Decreased ROM;E. Decreased flexibility;F. Decreased strength and endurance;G. Impaired balance;H. Impaired gait;K. Numbness;L. Tingling   Functional Limitations perform activities of daily living;perform desired leisure / sports activities;perform required work activities   Symptom Location LEs > UEs   How/Where did it occur From insidious onset   Onset date of current episode/exacerbation 04/18/22   Chronicity Chronic   Pain rating (0-10 point scale) Best (/10);Worst (/10)   Best (/10) 1   Worst (/10) 3   Pain quality C. Aching;D. Burning   Frequency of pain/symptoms A. Constant   Pain/symptoms are:   (worsens as day progresses.)   Pain/symptoms exacerbated by B. Walking;C. Lifting;D. Carrying;J. ADL;L. Work tasks;K. Home tasks    Pain/symptoms eased by C. Rest;H. Cold   Progression of symptoms since onset: Improved   Prior Level of Function   Prior Level of Function-Mobility modified independent   Prior Level of Function-ADLs modified independent   Functional Level Prior Comment Pt previously ambulated with cane and needed additional time for activities.   Current Level of Function   Current Community Support Family/friend caregiver   Patient role/employment history A. 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 Yes   Have you fallen and had an injury in the past year? No   Is patient a fall risk? Yes   Fall screen comments fall risk with increased balance tasks, ambulation, and when pt has high levels of fatigue.   Abuse Screen (yes response referral indicated)   Feels Unsafe at Home or Work/School no   Feels Threatened by Someone no   Does Anyone Try to Keep You From Having Contact with Others or Doing Things Outside Your Home? no   Physical Signs of Abuse Present no   Knee Objective Findings   Gait/Locomotion 4WW and cane use today, scissoring gait, flexed postures.   Balance/Proprioception (Single Leg Stance) unable to maintain   Knee ROM Comment limited extension B   Knee/Hip Strength Comments Strength grades R: flexion (4/5), extension (3-/5). L: flexion (4/5), extension (4-/5)   Knee Flexibility Comments limitation in hamstring.   Observation flexed knee posturing in gait   Integumentary  unremarkable   Posture flexed knee   Hip Objective Findings   Side (if bilateral, select both right and left) Right;Left   Posture adducted hip posturing bilaterally R>L   Right Hip Flexion PROM WFL   Left Hip Flexion PROM WFL   Right Hip Abduction PROM Limited   Left Hip Abduction PROM limited   Right Hip ER PROM limited   Left Hip ER PROM limited   Right  Hip IR PROM limited   Left Hip IR PROM limited   Right Hip Ext PROM limited   Left Hip Ext PROM limited   Right Hip Flexion Strength 3   Left Hip Flexion Strength 3+   Right Hip Abduction Strength 3- (in available range)   Left Hip Abduction Strength 3   Right Hip Extension Strength limited   Planned Therapy Interventions   Planned Therapy Interventions ADL retraining;balance training;bed mobility training;gait training;joint mobilization;manual therapy;neuromuscular re-education;ROM;strengthening;stretching;transfer training   Clinical Impression   Criteria for Skilled Therapeutic Interventions Met yes, treatment indicated   PT Diagnosis impaired mobility, impaired posture, impaired ADLs   Influenced by the following impairments weakness, pain, fatigue, ROM loss   Functional limitations due to impairments limited ambulation and activity tolerance, fall risk, impaired work tolerance, impaired transferring   Clinical Presentation Evolving/Changing   Clinical Decision Making (Complexity) Moderate complexity   Therapy Frequency 2 times/Week   Predicted Duration of Therapy Intervention (days/wks) 12 weeks   Risk & Benefits of therapy have been explained Yes   Patient, Family & other staff in agreement with plan of care Yes   Clinical Impression Comments Jamir presents with a flare up of his MS that has impaired his mobility, strength, and endurance. He has completed PT in the past for these flare ups and has responded quite well. He is starting PT today at a level that is quite below his level of baseline that I am familiar with. He would benefit from appropriate strengthening and functional mobility activies to help regain highest level of function.   ORTHO GOALS   PT Ortho Eval Goals 1;2;3;4   Ortho Goal 1   Goal Identifier Sit to stand   Goal Description Pt will be able to complete 1 sit to stand rep from standard height chair without use of UE for improved LE strength.   Target Date 06/09/22   Ortho Goal 2   Goal  Identifier Ambulation   Goal Description Pt will be able to ambulate 500 feet with SEC and no use of walls/furniture for stabiliity in order to regain prior level of mobility   Target Date 06/09/22   Ortho Goal 3   Goal Identifier HS flexibility   Goal Description Pt will demonstrate improved knee extension flexibility with improved knee extension measurements of 2 degrees short of full extension bilaterally   Target Date 06/09/22   Ortho Goal 4   Goal Identifier Hip abduction   Goal Description Pt will demonstrate 3+/5 strength grade in R hip abduction for improved gait and upright posturing   Target Date 07/07/22   Total Evaluation Time   PT Eval, Moderate Complexity Minutes (59972) 35   Therapy Certification   Certification date from 04/21/22   Certification date to 07/14/22   Medical Diagnosis MS; generalized muscle weakness

## 2022-04-28 NOTE — PROGRESS NOTES
[facility]    OUTPATIENT PHYSICAL THERAPY ORTHOPEDIC EVALUATION  PLAN OF TREATMENT FOR OUTPATIENT REHABILITATION  (COMPLETE FOR INITIAL CLAIMS ONLY)  Patient's Last Name, First Name, M.I.  YOB: 1955  Jamir Gill    Provider s Name:  [facility]   Medical Record No.  [id number]   Start of Care Date:  04/21/22   Onset Date:  04/18/22   Type:     _X__PT   ___OT   ___SLP Medical Diagnosis:  MS; generalized muscle weakness     PT Diagnosis:  impaired mobility, impaired posture, impaired ADLs   Visits from SOC:  1      _________________________________________________________________________________  Plan of Treatment/Functional Goals:  ADL retraining, balance training, bed mobility training, gait training, joint mobilization, manual therapy, neuromuscular re-education, ROM, strengthening, stretching, transfer training           Goals  Goal Identifier: Sit to stand  Goal Description: Pt will be able to complete 1 sit to stand rep from standard height chair without use of UE for improved LE strength.  Target Date: 06/09/22    Goal Identifier: Ambulation  Goal Description: Pt will be able to ambulate 500 feet with SEC and no use of walls/furniture for stabiliity in order to regain prior level of mobility  Target Date: 06/09/22    Goal Identifier: HS flexibility  Goal Description: Pt will demonstrate improved knee extension flexibility with improved knee extension measurements of 2 degrees short of full extension bilaterally  Target Date: 06/09/22    Goal Identifier: Hip abduction  Goal Description: Pt will demonstrate 3+/5 strength grade in R hip abduction for improved gait and upright posturing  Target Date: 07/07/22             Therapy Frequency:  2 times/Week  Predicted Duration of Therapy Intervention:  12 weeks    Manjit Pink PT                 I CERTIFY THE  NEED FOR THESE SERVICES FURNISHED UNDER        THIS PLAN OF TREATMENT AND WHILE UNDER MY CARE .             Physician Signature               Date    X_____________________________________________________                             Certification Date From:  04/21/22   Certification Date To:  07/14/22    Referring Provider:  Dee Gibbs MD    Initial Assessment        See Epic Evaluation Start of Care Date: 04/21/22

## 2022-04-28 NOTE — NURSING NOTE
"Chief Complaint   Patient presents with     Follow Up     1 yr kidney stones         Medication reconciliation completed.    Review of Systems:    Weight loss:    No     Recent fever/chills:  No   Night sweats:   No  Current skin rash:  No   Recent hair loss:  No  Heat intolerance:  yes   Cold intolerance:  yes  Chest pain:   No   Palpitations:   No  Shortness of breath:  No   Wheezing:   No  Constipation:    yes   Diarrhea:   No   Nausea:   No   Vomiting:   No   Kidney/side pain:  No   Back pain:   No  Frequent headaches:  No   Dizziness:     No  Leg swelling:   No   Calf pain:    No          Initial /78 (BP Location: Right arm, Patient Position: Sitting, Cuff Size: Adult Regular)   Pulse 84   Temp 98.3  F (36.8  C) (Temporal)   Resp 20   Ht 1.778 m (5' 10\")   Wt 77.7 kg (171 lb 6.4 oz)   SpO2 91%   BMI 24.59 kg/m   Estimated body mass index is 24.59 kg/m  as calculated from the following:    Height as of this encounter: 1.778 m (5' 10\").    Weight as of this encounter: 77.7 kg (171 lb 6.4 oz).       Lynn Reid LPN .......  4/28/2022  9:11 AM  "

## 2022-05-03 ENCOUNTER — HOSPITAL ENCOUNTER (OUTPATIENT)
Dept: PHYSICAL THERAPY | Facility: OTHER | Age: 67
Setting detail: THERAPIES SERIES
Discharge: HOME | End: 2022-05-03
Attending: PSYCHIATRY & NEUROLOGY
Payer: MEDICARE

## 2022-05-03 PROCEDURE — 97110 THERAPEUTIC EXERCISES: CPT | Mod: GP,CQ

## 2022-05-05 ENCOUNTER — HOSPITAL ENCOUNTER (OUTPATIENT)
Dept: PHYSICAL THERAPY | Facility: OTHER | Age: 67
Setting detail: THERAPIES SERIES
Discharge: HOME | End: 2022-05-05
Attending: PSYCHIATRY & NEUROLOGY
Payer: MEDICARE

## 2022-05-05 PROCEDURE — 97110 THERAPEUTIC EXERCISES: CPT | Mod: GP

## 2022-05-09 ENCOUNTER — HOSPITAL ENCOUNTER (OUTPATIENT)
Dept: MRI IMAGING | Facility: OTHER | Age: 67
Discharge: HOME | End: 2022-05-09
Attending: PSYCHIATRY & NEUROLOGY
Payer: MEDICARE

## 2022-05-09 DIAGNOSIS — G35 MULTIPLE SCLEROSIS (H): ICD-10-CM

## 2022-05-09 PROCEDURE — 70553 MRI BRAIN STEM W/O & W/DYE: CPT

## 2022-05-09 PROCEDURE — 255N000002 HC RX 255 OP 636: Performed by: PSYCHIATRY & NEUROLOGY

## 2022-05-09 PROCEDURE — 72156 MRI NECK SPINE W/O & W/DYE: CPT

## 2022-05-09 PROCEDURE — 72157 MRI CHEST SPINE W/O & W/DYE: CPT

## 2022-05-09 PROCEDURE — A9575 INJ GADOTERATE MEGLUMI 0.1ML: HCPCS | Performed by: PSYCHIATRY & NEUROLOGY

## 2022-05-09 RX ADMIN — GADOTERATE MEGLUMINE 15 ML: 376.9 INJECTION INTRAVENOUS at 16:47

## 2022-05-10 ENCOUNTER — HOSPITAL ENCOUNTER (OUTPATIENT)
Dept: PHYSICAL THERAPY | Facility: OTHER | Age: 67
Setting detail: THERAPIES SERIES
Discharge: HOME | End: 2022-05-10
Attending: PSYCHIATRY & NEUROLOGY
Payer: MEDICARE

## 2022-05-10 PROCEDURE — 97110 THERAPEUTIC EXERCISES: CPT | Mod: GP

## 2022-05-12 ENCOUNTER — HOSPITAL ENCOUNTER (OUTPATIENT)
Dept: PHYSICAL THERAPY | Facility: OTHER | Age: 67
Setting detail: THERAPIES SERIES
Discharge: HOME | End: 2022-05-12
Attending: PSYCHIATRY & NEUROLOGY
Payer: MEDICARE

## 2022-05-12 PROCEDURE — 97110 THERAPEUTIC EXERCISES: CPT | Mod: GP

## 2022-05-18 ENCOUNTER — HOSPITAL ENCOUNTER (OUTPATIENT)
Dept: PHYSICAL THERAPY | Facility: OTHER | Age: 67
Setting detail: THERAPIES SERIES
Discharge: HOME | End: 2022-05-18
Attending: PSYCHIATRY & NEUROLOGY
Payer: MEDICARE

## 2022-05-18 PROCEDURE — 97110 THERAPEUTIC EXERCISES: CPT | Mod: GP,CQ

## 2022-05-24 ENCOUNTER — HOSPITAL ENCOUNTER (OUTPATIENT)
Dept: PHYSICAL THERAPY | Facility: OTHER | Age: 67
Setting detail: THERAPIES SERIES
Discharge: HOME | End: 2022-05-24
Attending: PSYCHIATRY & NEUROLOGY
Payer: MEDICARE

## 2022-05-24 PROCEDURE — 97110 THERAPEUTIC EXERCISES: CPT | Mod: GP,CQ

## 2022-05-27 ENCOUNTER — HOSPITAL ENCOUNTER (OUTPATIENT)
Dept: PHYSICAL THERAPY | Facility: OTHER | Age: 67
Setting detail: THERAPIES SERIES
Discharge: HOME | End: 2022-05-27
Attending: PSYCHIATRY & NEUROLOGY
Payer: MEDICARE

## 2022-05-27 PROCEDURE — 97110 THERAPEUTIC EXERCISES: CPT | Mod: GP,CQ

## 2022-06-02 ENCOUNTER — TRANSFERRED RECORDS (OUTPATIENT)
Dept: HEALTH INFORMATION MANAGEMENT | Facility: OTHER | Age: 67
End: 2022-06-02
Payer: COMMERCIAL

## 2022-06-06 ENCOUNTER — HOSPITAL ENCOUNTER (OUTPATIENT)
Dept: PHYSICAL THERAPY | Facility: OTHER | Age: 67
Setting detail: THERAPIES SERIES
Discharge: HOME | End: 2022-06-06
Attending: PSYCHIATRY & NEUROLOGY
Payer: MEDICARE

## 2022-06-06 PROCEDURE — 97110 THERAPEUTIC EXERCISES: CPT | Mod: GP,KX

## 2022-06-08 ENCOUNTER — HOSPITAL ENCOUNTER (OUTPATIENT)
Dept: PHYSICAL THERAPY | Facility: OTHER | Age: 67
Setting detail: THERAPIES SERIES
Discharge: HOME | End: 2022-06-08
Attending: PSYCHIATRY & NEUROLOGY
Payer: MEDICARE

## 2022-06-08 PROCEDURE — 97110 THERAPEUTIC EXERCISES: CPT | Mod: GP,CQ

## 2022-06-08 PROCEDURE — 97140 MANUAL THERAPY 1/> REGIONS: CPT | Mod: GP,CQ

## 2022-06-08 NOTE — PROGRESS NOTES
[facility]    Outpatient Physical Therapy Progress Note  Patient: Jamir Gill  : 1955    Beginning/End Dates of Reporting Period:   to 22    Referring Provider: Dr. Dee Gibbs MD; Dr. Miriam Escobar MD (PCP)    Therapy Diagnosis: MS, weakness, neck and shoulder pain     Client Self Report: Jamir returns after traveling to see his specialist in [place]. He found out that one of the medications may be able to help his symptoms. His new specialist reviewed his imaging after their visit and called Jamir with the suggestion of seeing a spine specialist with the thought that his loss of gait and balance could be secondary to his compression fracture. Jamir would also like to try some cervical traction due to neck and shoulder pain.    Objective Measurements:  Objective Measure: Sit to stand with UE assist from standard chair  Details: 34 - Completed first 10 in 30 seconds, second 10 in 40 seconds, remaining in 50 seconds.  Objective Measure: Knee extension  Details: (-5 degrees) R knee extension  Objective Measure: subjective fatigue at end of session - -8/10  Details: Active hip extension in prone past neutral.        Goals:  Goal Identifier Sit to stand   Goal Description Pt will be able to complete 1 sit to stand rep from standard height chair without use of UE for improved LE strength.   Target Date 22   Date Met      Progress (detail required for progress note):       Goal Identifier Ambulation   Goal Description Pt will be able to ambulate 500 feet with SEC and no use of walls/furniture for stabiliity in order to regain prior level of mobility   Target Date 22   Date Met      Progress (detail required for progress note):       Goal Identifier HS flexibility   Goal Description Pt will demonstrate improved knee extension flexibility with improved knee extension measurements of 2  degrees short of full extension bilaterally   Target Date 06/09/22   Date Met      Progress (detail required for progress note):       Goal Identifier Hip abduction   Goal Description Pt will demonstrate 3+/5 strength grade in R hip abduction for improved gait and upright posturing   Target Date 07/07/22   Date Met      Progress (detail required for progress note):       Plan:  Changes to therapy plan of care: Pt continues to make positive strength gains; we are incorporating more functional activities to Jamir's exercises in hopes of increasing his independence. Jamir has also had an increase in shoulder/neck pain lately that he says has been significantly improved with cervical traction before. We will adjust our plan of care to include working on his cervical region. New certification period and orders to be requested and signed.     New certification period from: 6/6/22 to 8/29/22 to include therapy for cervical neck pain.    Interventions to now include: TE, TA, balance training, strengthening, manual therapy, and traction.     Discharge:  No      I CERTIFY THE NEED FOR THESE SERVICES FURNISHED UNDER        THIS PLAN OF TREATMENT AND WHILE UNDER MY CARE .    Referring providers: Dr. Dee Gibbs MD; Dr. Miriam Escobar MD (PCP)       Physician Signature               Date    X_____________________________________________________

## 2022-06-09 ENCOUNTER — MYC MEDICAL ADVICE (OUTPATIENT)
Dept: FAMILY MEDICINE | Facility: OTHER | Age: 67
End: 2022-06-09
Payer: COMMERCIAL

## 2022-06-10 ENCOUNTER — LAB (OUTPATIENT)
Dept: LAB | Facility: OTHER | Age: 67
End: 2022-06-10
Payer: MEDICARE

## 2022-06-10 DIAGNOSIS — E53.8 HOLOCARBOXYLASE SYNTHETASE DEFICIENCY: ICD-10-CM

## 2022-06-10 DIAGNOSIS — E55.9 VITAMIN D DEFICIENCY: ICD-10-CM

## 2022-06-10 DIAGNOSIS — G35 MULTIPLE SCLEROSIS (H): Primary | ICD-10-CM

## 2022-06-10 DIAGNOSIS — R53.82 CHRONIC FATIGUE: ICD-10-CM

## 2022-06-10 LAB
BASOPHILS # BLD AUTO: 0 10E3/UL (ref 0–0.2)
BASOPHILS NFR BLD AUTO: 1 %
EOSINOPHIL # BLD AUTO: 0.2 10E3/UL (ref 0–0.7)
EOSINOPHIL NFR BLD AUTO: 4 %
ERYTHROCYTE [DISTWIDTH] IN BLOOD BY AUTOMATED COUNT: 12.7 % (ref 10–15)
HCT VFR BLD AUTO: 43.5 % (ref 40–53)
HGB BLD-MCNC: 14.3 G/DL (ref 13.3–17.7)
IMM GRANULOCYTES # BLD: 0 10E3/UL
IMM GRANULOCYTES NFR BLD: 0 %
LYMPHOCYTES # BLD AUTO: 1.3 10E3/UL (ref 0.8–5.3)
LYMPHOCYTES NFR BLD AUTO: 25 %
MCH RBC QN AUTO: 29.3 PG (ref 26.5–33)
MCHC RBC AUTO-ENTMCNC: 32.9 G/DL (ref 31.5–36.5)
MCV RBC AUTO: 89 FL (ref 78–100)
MONOCYTES # BLD AUTO: 0.5 10E3/UL (ref 0–1.3)
MONOCYTES NFR BLD AUTO: 9 %
NEUTROPHILS # BLD AUTO: 3.2 10E3/UL (ref 1.6–8.3)
NEUTROPHILS NFR BLD AUTO: 61 %
NRBC # BLD AUTO: 0 10E3/UL
NRBC BLD AUTO-RTO: 0 /100
PLATELET # BLD AUTO: 215 10E3/UL (ref 150–450)
RBC # BLD AUTO: 4.88 10E6/UL (ref 4.4–5.9)
TSH SERPL DL<=0.005 MIU/L-ACNC: 1.61 MU/L (ref 0.4–4)
VIT B12 SERPL-MCNC: 728 PG/ML (ref 180–914)
WBC # BLD AUTO: 5.2 10E3/UL (ref 4–11)

## 2022-06-10 PROCEDURE — 84443 ASSAY THYROID STIM HORMONE: CPT | Mod: ZL

## 2022-06-10 PROCEDURE — 82607 VITAMIN B-12: CPT | Mod: ZL

## 2022-06-10 PROCEDURE — 85025 COMPLETE CBC W/AUTO DIFF WBC: CPT | Mod: ZL

## 2022-06-10 PROCEDURE — 86481 TB AG RESPONSE T-CELL SUSP: CPT | Mod: ZL

## 2022-06-10 PROCEDURE — 87389 HIV-1 AG W/HIV-1&-2 AB AG IA: CPT | Mod: ZL

## 2022-06-10 PROCEDURE — 36415 COLL VENOUS BLD VENIPUNCTURE: CPT | Mod: ZL

## 2022-06-10 PROCEDURE — 82306 VITAMIN D 25 HYDROXY: CPT | Mod: ZL

## 2022-06-13 ENCOUNTER — HOSPITAL ENCOUNTER (OUTPATIENT)
Dept: PHYSICAL THERAPY | Facility: OTHER | Age: 67
Setting detail: THERAPIES SERIES
Discharge: HOME | End: 2022-06-13
Attending: PSYCHIATRY & NEUROLOGY
Payer: MEDICARE

## 2022-06-13 LAB
GAMMA INTERFERON BACKGROUND BLD IA-ACNC: 0.05 IU/ML
HIV 1+2 AB+HIV1 P24 AG SERPL QL IA: NONREACTIVE
M TB IFN-G BLD-IMP: NEGATIVE
M TB IFN-G CD4+ BCKGRND COR BLD-ACNC: 9.95 IU/ML
MITOGEN IGNF BCKGRD COR BLD-ACNC: -0.02 IU/ML
MITOGEN IGNF BCKGRD COR BLD-ACNC: 0 IU/ML
QUANTIFERON MITOGEN: 10 IU/ML
QUANTIFERON NIL TUBE: 0.05 IU/ML
QUANTIFERON TB1 TUBE: 0.05 IU/ML
QUANTIFERON TB2 TUBE: 0.03

## 2022-06-13 PROCEDURE — 97140 MANUAL THERAPY 1/> REGIONS: CPT | Mod: GP

## 2022-06-13 PROCEDURE — 97110 THERAPEUTIC EXERCISES: CPT | Mod: GP

## 2022-06-15 ENCOUNTER — MYC MEDICAL ADVICE (OUTPATIENT)
Dept: FAMILY MEDICINE | Facility: OTHER | Age: 67
End: 2022-06-15
Payer: COMMERCIAL

## 2022-06-15 ENCOUNTER — HOSPITAL ENCOUNTER (OUTPATIENT)
Dept: PHYSICAL THERAPY | Facility: OTHER | Age: 67
Setting detail: THERAPIES SERIES
Discharge: HOME | End: 2022-06-15
Attending: PSYCHIATRY & NEUROLOGY
Payer: MEDICARE

## 2022-06-15 DIAGNOSIS — G35 MULTIPLE SCLEROSIS (H): ICD-10-CM

## 2022-06-15 DIAGNOSIS — M54.10 RADICULOPATHY, UNSPECIFIED SPINAL REGION: Primary | ICD-10-CM

## 2022-06-15 LAB
DEPRECATED CALCIDIOL+CALCIFEROL SERPL-MC: <63 UG/L (ref 20–75)
VITAMIN D2 SERPL-MCNC: <5 UG/L
VITAMIN D3 SERPL-MCNC: 58 UG/L

## 2022-06-15 PROCEDURE — 97140 MANUAL THERAPY 1/> REGIONS: CPT | Mod: GP

## 2022-06-15 PROCEDURE — 97110 THERAPEUTIC EXERCISES: CPT | Mod: GP

## 2022-06-20 ENCOUNTER — HOSPITAL ENCOUNTER (OUTPATIENT)
Dept: PHYSICAL THERAPY | Facility: OTHER | Age: 67
Setting detail: THERAPIES SERIES
Discharge: HOME | End: 2022-06-20
Attending: PSYCHIATRY & NEUROLOGY
Payer: MEDICARE

## 2022-06-20 PROCEDURE — 97140 MANUAL THERAPY 1/> REGIONS: CPT | Mod: GP,KX

## 2022-06-20 PROCEDURE — 97110 THERAPEUTIC EXERCISES: CPT | Mod: GP,KX

## 2022-06-22 ENCOUNTER — HOSPITAL ENCOUNTER (OUTPATIENT)
Dept: PHYSICAL THERAPY | Facility: OTHER | Age: 67
Setting detail: THERAPIES SERIES
Discharge: HOME | End: 2022-06-22
Attending: PSYCHIATRY & NEUROLOGY
Payer: MEDICARE

## 2022-06-22 PROCEDURE — 97110 THERAPEUTIC EXERCISES: CPT | Mod: GP,KX

## 2022-06-22 PROCEDURE — 97012 MECHANICAL TRACTION THERAPY: CPT | Mod: GP,KX

## 2022-06-24 DIAGNOSIS — I10 ESSENTIAL (PRIMARY) HYPERTENSION: Primary | ICD-10-CM

## 2022-06-27 ENCOUNTER — HOSPITAL ENCOUNTER (OUTPATIENT)
Dept: PHYSICAL THERAPY | Facility: OTHER | Age: 67
Setting detail: THERAPIES SERIES
Discharge: HOME | End: 2022-06-27
Attending: PSYCHIATRY & NEUROLOGY
Payer: MEDICARE

## 2022-06-27 PROCEDURE — 97140 MANUAL THERAPY 1/> REGIONS: CPT | Mod: GP,CQ,KX

## 2022-06-27 PROCEDURE — 97012 MECHANICAL TRACTION THERAPY: CPT | Mod: GP,CQ,KX

## 2022-06-27 NOTE — TELEPHONE ENCOUNTER
"Routing to pcp, pt has not been seen by pcp in quite some time, transferred to scheduling to schedule pt for a annual visit on 6/30/22.  Merlyn Sher RN on 6/27/2022 at 8:53 AM        Last Prescription Date: 4/15/21  Last Qty/Refills: 90 / 3  Last Office Visit: No noted pcp in sometime now  Future Office Visit: 6/30/22     Requested Prescriptions   Pending Prescriptions Disp Refills     lisinopril (ZESTRIL) 10 MG tablet [Pharmacy Med Name: LISINOPRIL 10 MG TABLET] 90 tablet 1     Sig: TAKE 1 TABLET BY MOUTH EVERY DAY       ACE Inhibitors (Including Combos) Protocol Failed - 6/24/2022 11:35 PM        Failed - Recent (12 mo) or future (30 days) visit within the authorizing provider's specialty     Patient has had an office visit with the authorizing provider or a provider within the authorizing providers department within the previous 12 mos or has a future within next 30 days. See \"Patient Info\" tab in inbasket, or \"Choose Columns\" in Meds & Orders section of the refill encounter.              Failed - Normal serum creatinine on file in past 12 months     Recent Labs   Lab Test 04/15/21  0955   CR 1.04       Ok to refill medication if creatinine is low          Failed - Normal serum potassium on file in past 12 months     Recent Labs   Lab Test 04/15/21  0955   POTASSIUM 4.3             Passed - Blood pressure under 140/90 in past 12 months     BP Readings from Last 3 Encounters:   04/28/22 124/78   06/17/21 110/63   04/15/21 128/88                 Passed - Medication is active on med list        Passed - Patient is age 18 or older             "

## 2022-06-28 RX ORDER — LISINOPRIL 10 MG/1
TABLET ORAL
Qty: 90 TABLET | Refills: 1 | Status: SHIPPED | OUTPATIENT
Start: 2022-06-28 | End: 2022-09-15

## 2022-06-29 ENCOUNTER — HOSPITAL ENCOUNTER (OUTPATIENT)
Dept: PHYSICAL THERAPY | Facility: OTHER | Age: 67
Setting detail: THERAPIES SERIES
Discharge: HOME | End: 2022-06-29
Attending: PSYCHIATRY & NEUROLOGY
Payer: MEDICARE

## 2022-06-29 PROCEDURE — 97012 MECHANICAL TRACTION THERAPY: CPT | Mod: GP

## 2022-06-29 PROCEDURE — 97140 MANUAL THERAPY 1/> REGIONS: CPT | Mod: GP

## 2022-07-01 ENCOUNTER — OFFICE VISIT (OUTPATIENT)
Dept: FAMILY MEDICINE | Facility: OTHER | Age: 67
End: 2022-07-01
Attending: FAMILY MEDICINE
Payer: COMMERCIAL

## 2022-07-01 VITALS
OXYGEN SATURATION: 97 % | RESPIRATION RATE: 20 BRPM | WEIGHT: 170.8 LBS | TEMPERATURE: 97.2 F | SYSTOLIC BLOOD PRESSURE: 124 MMHG | BODY MASS INDEX: 24.51 KG/M2 | HEART RATE: 97 BPM | DIASTOLIC BLOOD PRESSURE: 70 MMHG

## 2022-07-01 DIAGNOSIS — S22.070A COMPRESSION FRACTURE OF T9 VERTEBRA, INITIAL ENCOUNTER (H): ICD-10-CM

## 2022-07-01 DIAGNOSIS — I70.0 ATHEROSCLEROSIS OF AORTA (H): ICD-10-CM

## 2022-07-01 DIAGNOSIS — S22.000S COMPRESSION FRACTURE OF THORACIC VERTEBRA, UNSPECIFIED THORACIC VERTEBRAL LEVEL, SEQUELA: ICD-10-CM

## 2022-07-01 DIAGNOSIS — Z82.49 FAMILY HISTORY OF ABDOMINAL AORTIC ANEURYSM (AAA): Primary | ICD-10-CM

## 2022-07-01 DIAGNOSIS — M80.88XA OTHER OSTEOPOROSIS WITH CURRENT PATHOLOGICAL FRACTURE, VERTEBRA(E), INITIAL ENCOUNTER FOR FRACTURE (H): ICD-10-CM

## 2022-07-01 DIAGNOSIS — L89.319 DECUBITUS ULCER OF ISCHIAL AREA, RIGHT, UNSPECIFIED PRESSURE ULCER STAGE: ICD-10-CM

## 2022-07-01 PROCEDURE — 99214 OFFICE O/P EST MOD 30 MIN: CPT | Performed by: FAMILY MEDICINE

## 2022-07-01 PROCEDURE — G0463 HOSPITAL OUTPT CLINIC VISIT: HCPCS

## 2022-07-01 ASSESSMENT — PAIN SCALES - GENERAL: PAINLEVEL: MODERATE PAIN (5)

## 2022-07-01 NOTE — NURSING NOTE
Patient is here to discuss checking on AAA, and has a sore on his tailbone wondering how to treat it.     Medication Reconciliation: complete    FOOD SECURITY SCREENING QUESTIONS  Hunger Vital Signs:  Within the past 12 months we worried whether our food would run out before we got money to buy more. Never  Within the past 12 months the food we bought just didn't last and we didn't have money to get more. Never  Coreen Torres LPN 7/1/2022 3:14 PM       Advance care directive on file? no  Advance care directive provided to patient? no       Coreen Torres LPN

## 2022-07-01 NOTE — PROGRESS NOTES
Assessment & Plan   Anay 67-year-old male with multiple sclerosis presents to discuss recent MRI scan that were ordered by his neurologist.  He is scheduled to see spine surgeon in the [place] in the middle of July.    Family history of abdominal aortic aneurysm (AAA)  Proceed with screening as ordered.  Some mild calcification seen on previous chest x-ray.  Strong family history of AAA.  - US Aorta/Ivc/Iliac Duplex Complete; Future        Decubitus ulcer of ischial area, right, unspecified pressure ulcer stage  Stage II decubitus ulcer.  Recommend DuoDERM, good nutrition, avoid pressure/friction.  Dressing change daily.    Compression fracture of T9 vertebra, initial encounter (H)  Likely contributing to some of his discomfort.  He plans to see spine surgeon in the [facility] in 2 weeks.  They will be discussing potential surgical intervention regarding spinal stenosis at T10/T11 that may be contributing to some of his myelopathic symptoms.  - IR Thoracic Kyphoplasty Vertebrae; Future    Other osteoporosis with current pathological fracture, vertebra(e), initial encounter for fracture (H)     - IR Thoracic Kyphoplasty Vertebrae; Future         There are no Patient Instructions on file for this visit.    No follow-ups on file.    Kayla Pineda MD  [facility]    Omari Bowie is a 67 year old, presenting for the following health issues:  Nursing Notes:   Coreen Torres LPN  7/1/2022  3:14 PM  Sign at exiting of workspace  Patient is here to discuss checking on AAA, and has a sore on his tailbone wondering how to treat it.     Medication Reconciliation: complete    FOOD SECURITY SCREENING QUESTIONS  Hunger Vital Signs:  Within the past 12 months we worried whether our food would run out before we got money to buy more. Never  Within the past 12 months the food we bought just didn't last and we didn't have money to get more. Never  Coreen Torres LPN 7/1/2022 3:14 PM        Advance care directive on file? no  Advance care directive provided to patient? no       Coreen Torres LPN      Skin Ulcer      HPI     67-year-old male with history of multiple sclerosis presents with a few concerns.  He is typically followed by , neurologist in the [facility].  She ordered MRI scans in early May and was contacted by the clinic stating they were unchanged.  He was encouraged to start physical therapy due to gait concerns.      Most recently he has developed increasing back pain, worsening neurological symptoms in both lower extremities.  This is led to increasing mobility concerns.  Currently using a single-point cane.    Is a small sore on his bottom.  No history of decubitus ulcer/wound infection.    He has a strong family history of AAA.  He is on lifelong non-smoker.  Was told he should have annual screening.        Review of Systems   Constitutional, HEENT, cardiovascular, pulmonary, gi and gu systems are negative, except as otherwise noted.      Objective    /70   Pulse 97   Temp 97.2  F (36.2  C) (Tympanic)   Resp 20   Wt 77.5 kg (170 lb 12.8 oz)   SpO2 97%   BMI 24.51 kg/m    Body mass index is 24.51 kg/m .  Physical Exam  Skin:            Comments: 1 x 2 cm stage 2 skin ulcer,  No surrounding erythema   Neurological:      Comments: Spastic scissoring gait                             .  ..

## 2022-07-07 ENCOUNTER — HOSPITAL ENCOUNTER (OUTPATIENT)
Dept: PHYSICAL THERAPY | Facility: OTHER | Age: 67
Setting detail: THERAPIES SERIES
Discharge: HOME | End: 2022-07-07
Attending: PSYCHIATRY & NEUROLOGY
Payer: MEDICARE

## 2022-07-07 DIAGNOSIS — S22.060S COMPRESSION FRACTURE OF T7 VERTEBRA, SEQUELA: Primary | ICD-10-CM

## 2022-07-07 PROCEDURE — 97012 MECHANICAL TRACTION THERAPY: CPT | Mod: GP,CQ,KX

## 2022-07-07 PROCEDURE — 97110 THERAPEUTIC EXERCISES: CPT | Mod: GP,CQ,KX

## 2022-07-11 ENCOUNTER — HOSPITAL ENCOUNTER (OUTPATIENT)
Dept: PHYSICAL THERAPY | Facility: OTHER | Age: 67
Setting detail: THERAPIES SERIES
Discharge: HOME | End: 2022-07-11
Attending: PSYCHIATRY & NEUROLOGY
Payer: MEDICARE

## 2022-07-11 ENCOUNTER — HOSPITAL ENCOUNTER (OUTPATIENT)
Dept: GENERAL RADIOLOGY | Facility: OTHER | Age: 67
Discharge: HOME | End: 2022-07-11
Attending: FAMILY MEDICINE | Admitting: FAMILY MEDICINE
Payer: MEDICARE

## 2022-07-11 DIAGNOSIS — S22.060S COMPRESSION FRACTURE OF T7 VERTEBRA, SEQUELA: ICD-10-CM

## 2022-07-11 PROCEDURE — 97110 THERAPEUTIC EXERCISES: CPT | Mod: GP,KX

## 2022-07-11 PROCEDURE — 72072 X-RAY EXAM THORAC SPINE 3VWS: CPT

## 2022-07-11 NOTE — PROGRESS NOTES
[facility] Rehabilitation Service    Outpatient Physical Therapy Progress Note  Patient: Jamir Gill  : 1955    Beginning/End Dates of Reporting Period:  22 to 22    Referring Provider: Dr. Miriam Escobar MD    Therapy Diagnosis: MS; cervical pain, impaired mobility     Client Self Report: Jamir attends therapy today with decreased mobility. Jamir continues to demonstrate L LE shuffling/difficulty with ambulation. We discussed today leaving our session early to get to his x-ray appt. Suggested Jamir call to have a w/c brought to his truck so he didnt have to walk the whole way to radiology. We also discussed calling his neurosurgeon to request an earlier visit (22) due to worsening symptoms.    Objective Measurements:  Objective Measure: Muted sensation B with LE testing - pt identified light touch but unable to discern if R or L      Goals:  Goal Identifier Sit to stand   Goal Description Pt will be able to complete 1 sit to stand rep from standard height chair without use of UE for improved LE strength.   Target Date 22   Date Met      Progress (detail required for progress note): Needing UE support at this time.     Goal Identifier Ambulation   Goal Description Pt will be able to ambulate 500 feet with SEC and no use of walls/furniture for stabiliity in order to regain prior level of mobility   Target Date 22   Date Met      Progress (detail required for progress note): Significant decline in gait at time of progress note. Pt now shuffling L LE     Goal Identifier HS flexibility   Goal Description Pt will demonstrate improved knee extension flexibility with improved knee extension measurements of 2 degrees short of full extension bilaterally   Target Date 22   Date Met      Progress (detail required for progress note): improving but not met     Goal Identifier Hip abduction   Goal  Description Pt will demonstrate 3+/5 strength grade in R hip abduction for improved gait and upright posturing   Target Date 07/07/22   Date Met      Progress (detail required for progress note): R hip has been relatively stable in strenth lately with his L declining in function and mobility       Plan:  Continue therapy per current plan of care. Pt has recently experienced a rather significant decline in his level of function that has impacted his L LE more than his R. He generally remains stable on his L side but at the point now where he is actually shuffling the L LE more than he is his R. Pt is planning to have a kyphoplasty done to help improve his symptoms as well as establishing care with a neurosurgeon. We discussed today, trying to move up his appt if possible due to recent change is symptoms.    Discharge:  No

## 2022-07-13 ENCOUNTER — HOSPITAL ENCOUNTER (OUTPATIENT)
Dept: PHYSICAL THERAPY | Facility: OTHER | Age: 67
Setting detail: THERAPIES SERIES
Discharge: HOME | End: 2022-07-13
Attending: PSYCHIATRY & NEUROLOGY
Payer: MEDICARE

## 2022-07-13 PROCEDURE — 97110 THERAPEUTIC EXERCISES: CPT | Mod: GP,KX

## 2022-07-14 ENCOUNTER — HOSPITAL ENCOUNTER (OUTPATIENT)
Dept: GENERAL RADIOLOGY | Facility: OTHER | Age: 67
Discharge: HOME | End: 2022-07-14
Attending: FAMILY MEDICINE
Payer: MEDICARE

## 2022-07-14 DIAGNOSIS — S22.060S COMPRESSION FRACTURE OF T7 VERTEBRA, SEQUELA: ICD-10-CM

## 2022-07-14 PROCEDURE — G0463 HOSPITAL OUTPT CLINIC VISIT: HCPCS

## 2022-07-18 ENCOUNTER — TELEPHONE (OUTPATIENT)
Dept: UROLOGY | Facility: OTHER | Age: 67
End: 2022-07-18

## 2022-07-18 NOTE — TELEPHONE ENCOUNTER
Patient called and states he called to order catheters and they stated they needed chart notes. Please contact patient with questions,. He is running low.    Kena Carrion on 7/18/2022 at 8:39 AM

## 2022-07-20 ENCOUNTER — HOSPITAL ENCOUNTER (OUTPATIENT)
Dept: PHYSICAL THERAPY | Facility: OTHER | Age: 67
Setting detail: THERAPIES SERIES
Discharge: HOME | End: 2022-07-20
Attending: PSYCHIATRY & NEUROLOGY
Payer: MEDICARE

## 2022-07-20 PROCEDURE — 97110 THERAPEUTIC EXERCISES: CPT | Mod: GP,KX

## 2022-07-26 ENCOUNTER — TELEPHONE (OUTPATIENT)
Dept: FAMILY MEDICINE | Facility: OTHER | Age: 67
End: 2022-07-26

## 2022-07-26 NOTE — TELEPHONE ENCOUNTER
Just tested positive for COVID, wants to know what to do, please call, thank you!      Nay Lawrence on 7/26/2022 at 9:24 AM

## 2022-07-27 NOTE — TELEPHONE ENCOUNTER
Called and spoke to Patient after verifying last name and date of birth. Pt states he was exposed to someone with COVID 5 days ago. He took positive home test yesterday. Symptoms include stuffy/runny nose and slight cough. Details/guidelines of [place] positive COVID result letter reviewed with Pt. The patient indicates understanding of these issues and agrees with the plan. Shira Laboy RN .............. 7/27/2022  8:09 AM

## 2022-08-03 ENCOUNTER — HOSPITAL ENCOUNTER (OUTPATIENT)
Dept: ULTRASOUND IMAGING | Facility: OTHER | Age: 67
Discharge: HOME | End: 2022-08-03
Attending: FAMILY MEDICINE | Admitting: FAMILY MEDICINE
Payer: MEDICARE

## 2022-08-03 DIAGNOSIS — I70.0 ATHEROSCLEROSIS OF AORTA (H): ICD-10-CM

## 2022-08-03 DIAGNOSIS — Z82.49 FAMILY HISTORY OF ABDOMINAL AORTIC ANEURYSM (AAA): ICD-10-CM

## 2022-08-03 PROCEDURE — 76706 US ABDL AORTA SCREEN AAA: CPT

## 2022-08-04 ENCOUNTER — OFFICE VISIT (OUTPATIENT)
Dept: NEUROSURGERY | Facility: CLINIC | Age: 67
End: 2022-08-04
Attending: FAMILY MEDICINE
Payer: COMMERCIAL

## 2022-08-04 VITALS — OXYGEN SATURATION: 100 % | DIASTOLIC BLOOD PRESSURE: 81 MMHG | HEART RATE: 81 BPM | SYSTOLIC BLOOD PRESSURE: 131 MMHG

## 2022-08-04 DIAGNOSIS — M48.04 THORACIC SPINAL STENOSIS: Primary | ICD-10-CM

## 2022-08-04 DIAGNOSIS — Z01.818 PREOP TESTING: ICD-10-CM

## 2022-08-04 PROCEDURE — 99204 OFFICE O/P NEW MOD 45 MIN: CPT | Performed by: NEUROLOGICAL SURGERY

## 2022-08-04 ASSESSMENT — PAIN SCALES - GENERAL: PAINLEVEL: NO PAIN (1)

## 2022-08-04 NOTE — PATIENT INSTRUCTIONS
Order placed for Lumbar MRI, Lumbar and Thoracic XR You can schedule at our  today, or central scheduling will call you to make the appointment. If you do not hear from them within 1-2 business days you can call the numbers below. We will call you with the results and next steps once imaging is completed.  [phone]         Please call us if you have any further questions or concerns.    [facility]   Phone: [phone]  Fax: [phone]    Patient Instructions    Surgery scheduled at [facility] for Thoracic 10-12 laminectomy with Dr. Leija    Pre-Operative    CATA/NDI within the last 6 months? NA, message sent to MA team  Surgical risks: blood clots in the leg or lung, problems urinating, nerve damage, drainage from the incision, infection, stiffness    You will need a Pre-operative physical with primary care physician within 30 days prior to your surgical date.     This is a same day procedure with discharge home day of surgery.    Smoking Cessation  You are advised to quit smoking immediately through recovery to help with healing and reduce risk of complications. Printout given.     Shower procedure  You will need to shower the night before and morning of surgery using the antimicrobial soap (chlorhexidine) given to you. Please refer to showering instruction sheet in surgery education folder.    Eating/Drinking  Stop all solid foods 8 hours before surgery.  Keep drinking clear liquids until 4 hours before surgery  Clear liquids include water, clear juice, black coffee, or clear tea without milk, Gatorade, clear soda.     Medications  Discontinue Aspirin & NSAIDs (Advil/Ibuprofen, Indocin, Naproxen,Nuprin,Relafen/Nabumetone, Diclofenac,Meloxicam, Aleve, Celebrex) 7 days prior to surgical date. After surgery, do not begin taking these medications until given clearance as it may cause bleeding and interfere with healing.  It is ok to take Tylenol  "(Acetaminophen) for pain within the 7 days prior to surgery. Example: you could take 1000 mg 3 times per day. Do not exceed 3,000 mg per day.    If you are on chronic pain medication (oxycodone, Percocet, hydrocodone, Vicodin, Norco, Dilaudid, morphine, MS Contin, naltrexone, Suboxone, etc) or have a pain contract you need  to contact the pain medication prescriber and/or the prescriber who holds the pain contract with you. A plan needs to be created between you and the provider on how to take your chronic pain medication leading up to surgery and what you will be taking to control pain/who will be prescribing that pain medication during recovery.  Any other medications prescribed, please discuss with your primary care provider at your pre-operative physical     COVID Testing   As part of preparation for your upcoming procedure you are required to have a test for the novel Coronavirus, COVID-19  A PCR covid test needs to be completed within 4 days (96) hours of surgery. This can be scheduled at any [place] lab by calling [phone].  Review \"Before Your Procedure or Hospital Admission\" printout in your surgery education folder for additional details   COVID Vaccine: You may NOT receive the COVID-19 vaccine 72 hours before or after surgery.      Post-Operative    Incision Care  Look at your incision site every day. You  may need a mirror or family member to help you.   Watch for signs of infection  Redness, swelling, warmth, drainage (Green or yellow drainage (pus) from your incision or increased bloody drainage), and fever of 101 degrees or higher  Notify clinic [phone]  Remove dressing as instructed upon discharge  Do not apply lotions or ointments to incision  It is okay to shower, just pat the incision dry   No submerging incision in water such as pools, hot tubs, or baths for at least 8 weeks and until the incision is healed    Pain Management  Dealing with pain  As your body heals, you might feel a " stabbing, burning, or aching pain. You may also have some numbness.  Everyone feels pain differently, we may ask you to rate your pain using a pain scale. This will let us know how much pain you feel.   Keep in mind that medicine won't take away all of your pain. It helps to try other ways to relax and ease pain.   Things to help with pain  After surgery, we will give you medicine for your pain. These medications work well, but they can make you drowsy, itchy, or sick to your stomach. If we give you narcotics for pain, try to take the pills with food.   For mild to moderate pain, you can take medication such as Tylenol. These can be used with narcotics, but make sure that your narcotic does not contain Tylenol.   Do NOT drive while taking narcotic pain medication  Do NOT drink alcohol while using any pain medication  You can utilize ice as needed (no longer than 20 minutes at one time)  Refills of pain medication: please call the neurosurgery clinic to request 2-3 days before you run out  Aspirin & NSAIDS (ex. Ibuprofen, aleve, naproxen): Don't take NSAIDs until 2 weeks after surgery to reduce risk of bleeding and interference with bone healing     Bowel Care  Many people have constipation (hard stools) after surgery. The narcotic pain medication we often prescribed can contribute to constipation. To help prevent constipation: Drink plenty of fluid (8-10 glasses/day); Eat more fiber, such as whole grain bread, bran cereal, and fruits and vegetables; Stay active by walking; Over the counter stool softener may also help.      Activity Restrictions  For the first 6 weeks, no lifting > 10 pounds, limited bending, twisting, or overhead reaching.  Take stairs in moderation   Walking is the best way to start exercise after surgery. Take short frequent walks. You may gradually increase the distance as tolerated. If you feel pain, decrease your activity, but DO NOT stop walking. Walking will help you gain strength, prevent  muscle soreness and spasms, and prevent blood clots.  Avoid bed rest and prolonged sitting for longer than 30 minutes (change positions frequently while awake)  No contact sports or high impact activities such as; running/jogging, snowmobile or 4 sarabia riding or any other recreational vehicles until after given clearance at one of your follow up visits    Contact clinic right away or go to the Emergency Department if you develop:   Infection (incisional redness, swelling, warmth, drainage, or fever (temp > 101 F))  New injury  Bladder or bowel changes or loss of control    Signs of blood clot:  Swelling and/or warmth in one or both legs  Pain or tenderness in your leg, ankle, foot, or arm   Red or discolored skin     Go to the Emergency Department   If sudden onset of severe headache, weakness, confusion, change in level of consciousness, pain, or loss of movement.  Chest pain  Trouble breathing     Post-Op Follow Up Appointments  2 week incision check & staple/suture removal with a Neurosurgery Nurse  6 week and 3 month post-op visit with Physican Assistant or Nurse Practitioner     Resources  If you are currently employed, you will need to be off work for 2-4 weeks for recovery and healing.  Please fax any FMLA/short term disability paperwork to [phone] prior to surgery  You may call our clinic when you'd like to return to work and we can provide a work letter  To allow staff adequate time to complete paperwork, please send as soon as possible     [facility]  [facility] - [address] [address]  Telephone:  [phone]       Fax:  [phone]

## 2022-08-04 NOTE — PROGRESS NOTES
I was asked by Dr. Miriam Escobar to see this patient in consultation    67 year old male with MS, marked leg weakness, severe thoracic stenosis.  A few episodes of marked leg weakness over the last several years, worse in the last year.  Often needs a walker and sometimes a wheelchair for longer distances.  MS has otherwise been under good control and he has minimal arm symptoms.  MR Thoracic, personally reviewed, with T10-12 severe stenosis and cord signal change.       Past Medical History:   Diagnosis Date     Cervical disc disorder     No Comments Provided     Enlarged prostate with lower urinary tract symptoms (LUTS)     No Comments Provided     Essential (primary) hypertension     No Comments Provided     Multiple sclerosis (H)     No Comments Provided     Other specified disorders of bone density and structure, unspecified site (CODE)     No Comments Provided     Other symptoms and signs involving cognitive functions and awareness     No Comments Provided     Spondylosis of cervical region without myelopathy or radiculopathy     No Comments Provided     Past Surgical History:   Procedure Laterality Date     APPENDECTOMY OPEN      appendectomy     COLONOSCOPY  01/01/2010 01/01/2010,with polyps resected     COLONOSCOPY  12/15/2015    12/15/2015,[place]- colitis     COLONOSCOPY N/A 06/17/2021    2 sessile serrated and 1 tubular adenoma, 3 year follow up, 6/17/2024     COMBINED CYSTOSCOPY, URETEROSCOPY, LASER HOLMIUM LITHOTRIPSY URETER(S) Left 04/10/2018    Procedure: COMBINED CYSTOSCOPY, URETEROSCOPY, LASER HOLMIUM LITHOTRIPSY URETER(S);  Left Ureteroscopy with Holmium Laser Lithotripsy & Stent Placement.;  Surgeon: Olu Saldivar MD;  Location:  OR     CYSTOSCOPY, RETROGRADES, INSERT STENT URETER(S), COMBINED Left 04/03/2018    Procedure: COMBINED CYSTOSCOPY, RETROGRADES, INSERT STENT URETER(S);;  Surgeon: Olu Saldivar MD;  Location:  OR     PROSTATE SURGERY      ,PROSTATECTOMY,Laser prostate  surgery -- BPH     Social History     Socioeconomic History     Marital status:      Spouse name: Not on file     Number of children: Not on file     Years of education: Not on file     Highest education level: Not on file   Occupational History     Not on file   Tobacco Use     Smoking status: Never Smoker     Smokeless tobacco: Never Used   Vaping Use     Vaping Use: Never used   Substance and Sexual Activity     Alcohol use: No     Drug use: No     Comment: Drug use: No     Sexual activity: Not Currently   Other Topics Concern     Not on file   Social History Narrative    Lives in town, on [facility].  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 [facility]     Social Determinants of Health     Financial Resource Strain: Not on file   Food Insecurity: Not on file   Transportation Needs: Not on file   Physical Activity: Not on file   Stress: Not on file   Social Connections: Not on file   Intimate Partner Violence: Not on file   Housing Stability: Not on file     Family History   Problem Relation Age of Onset     Prostate Cancer Father         Cancer-prostate     Other - See Comments Father         Alzheimer's/kidney failure     Other - See Comments Mother          with Parkinson's     Heart Disease Mother         Heart Disease,CHF for carditis     Family History Negative Brother         Good Health     Heart Disease Brother         Heart Disease,ASCAD with MI        ROS: 10 point ROS neg other than the symptoms noted above in the HPI.    Physical Exam  There were no vitals taken for this visit.  HEENT:  Normocephalic, atraumatic.  PERRLA.  EOM s intact.  Visual fields full to gross exam  Neck:  Supple, non-tender, without lymphadenopathy.  Heart:  No peripheral edema  Lungs:  No SOB  Abdomen:  Non-distended.   Skin:  Warm and dry.  Extremities:  No edema, cyanosis or clubbing.  Psychiatric:  No apparent distress  Musculoskeletal:  Normal bulk and tone    NEUROLOGICAL EXAMINATION:     Mental status:  Alert  and Oriented x 3, speech is fluent.  Cranial nerves:  II-XII intact.   Motor:    Shoulder Abduction:  Right:  5/5   Left:  5/5  Biceps:                      Right:  5/5   Left:  5/5  Triceps:                     Right:  5/5   Left:  5/5  Wrist Extensors:       Right:  5/5   Left:  5/5  Wrist Flexors:           Right:  5/5   Left:  5/5  interosseus :            Right:  5/5   Left:  5/5  Hip Flexion:                Right: 4-/5  Left:  4+/5  Quadriceps:             Right:  4/5  Left:  4+/5  Hamstrings:             Right:  5/5  Left:  5/5  Gastroc Soleus:        Right:  5/5  Left:  5/5  Tib/Ant:                      Right:  4/5  Left:  5/5  EHL:                     Right:  5/5  Left:  5/5  Sensation:  Numbness in right>left leg  Reflexes:  Negative Babinski.  Negative Clonus.  Negative Riley's.  Coordination:  Smooth finger to nose testing.   Negative pronator drift. Needs walker for gait and favors right leg    A/P:  67 year old male with MS, marked leg weakness, severe thoracic stenosis    I had a discussion with the patient, reviewing the history, symptoms, and imaging  Given the severe stenosis and predominantly leg weakness, discussed the option of laminectomy  Will plan for T10-12 laminectomy  Risks and benefits discussed

## 2022-08-04 NOTE — LETTER
8/4/2022         RE: Jamir Gill  [address]  [place] [address]        Dear Colleague,    Thank you for referring your patient, Jamir Gill, to the [facility]. Please see a copy of my visit note below.    I was asked by Dr. Miriam Escobar to see this patient in consultation    67 year old male with MS, marked leg weakness, severe thoracic stenosis.  A few episodes of marked leg weakness over the last several years, worse in the last year.  Often needs a walker and sometimes a wheelchair for longer distances.  MS has otherwise been under good control and he has minimal arm symptoms.  MR Thoracic, personally reviewed, with T10-12 severe stenosis and cord signal change.       Past Medical History:   Diagnosis Date     Cervical disc disorder     No Comments Provided     Enlarged prostate with lower urinary tract symptoms (LUTS)     No Comments Provided     Essential (primary) hypertension     No Comments Provided     Multiple sclerosis (H)     No Comments Provided     Other specified disorders of bone density and structure, unspecified site (CODE)     No Comments Provided     Other symptoms and signs involving cognitive functions and awareness     No Comments Provided     Spondylosis of cervical region without myelopathy or radiculopathy     No Comments Provided     Past Surgical History:   Procedure Laterality Date     APPENDECTOMY OPEN      appendectomy     COLONOSCOPY  01/01/2010 01/01/2010,with polyps resected     COLONOSCOPY  12/15/2015    12/15/2015,[place]- colitis     COLONOSCOPY N/A 06/17/2021    2 sessile serrated and 1 tubular adenoma, 3 year follow up, 6/17/2024     COMBINED CYSTOSCOPY, URETEROSCOPY, LASER HOLMIUM LITHOTRIPSY URETER(S) Left 04/10/2018    Procedure: COMBINED CYSTOSCOPY, URETEROSCOPY, LASER HOLMIUM LITHOTRIPSY URETER(S);  Left Ureteroscopy with Holmium Laser Lithotripsy & Stent Placement.;  Surgeon: Olu Saldivar MD;   Location:  OR     CYSTOSCOPY, RETROGRADES, INSERT STENT URETER(S), COMBINED Left 2018    Procedure: COMBINED CYSTOSCOPY, RETROGRADES, INSERT STENT URETER(S);;  Surgeon: Olu Saldivar MD;  Location: [facility] OR     PROSTATE SURGERY      ,PROSTATECTOMY,Laser prostate surgery -- BPH     Social History     Socioeconomic History     Marital status:      Spouse name: Not on file     Number of children: Not on file     Years of education: Not on file     Highest education level: Not on file   Occupational History     Not on file   Tobacco Use     Smoking status: Never Smoker     Smokeless tobacco: Never Used   Vaping Use     Vaping Use: Never used   Substance and Sexual Activity     Alcohol use: No     Drug use: No     Comment: Drug use: No     Sexual activity: Not Currently   Other Topics Concern     Not on file   Social History Narrative    Lives in town, on [facility].  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 [facility]     Social Determinants of Health     Financial Resource Strain: Not on file   Food Insecurity: Not on file   Transportation Needs: Not on file   Physical Activity: Not on file   Stress: Not on file   Social Connections: Not on file   Intimate Partner Violence: Not on file   Housing Stability: Not on file     Family History   Problem Relation Age of Onset     Prostate Cancer Father         Cancer-prostate     Other - See Comments Father         Alzheimer's/kidney failure     Other - See Comments Mother          with Parkinson's     Heart Disease Mother         Heart Disease,CHF for carditis     Family History Negative Brother         Good Health     Heart Disease Brother         Heart Disease,ASCAD with MI        ROS: 10 point ROS neg other than the symptoms noted above in the HPI.    Physical Exam  There were no vitals taken for this visit.  HEENT:  Normocephalic, atraumatic.  PERRLA.  EOM s intact.  Visual fields full to gross exam  Neck:  Supple, non-tender, without  lymphadenopathy.  Heart:  No peripheral edema  Lungs:  No SOB  Abdomen:  Non-distended.   Skin:  Warm and dry.  Extremities:  No edema, cyanosis or clubbing.  Psychiatric:  No apparent distress  Musculoskeletal:  Normal bulk and tone    NEUROLOGICAL EXAMINATION:     Mental status:  Alert and Oriented x 3, speech is fluent.  Cranial nerves:  II-XII intact.   Motor:    Shoulder Abduction:  Right:  5/5   Left:  5/5  Biceps:                      Right:  5/5   Left:  5/5  Triceps:                     Right:  5/5   Left:  5/5  Wrist Extensors:       Right:  5/5   Left:  5/5  Wrist Flexors:           Right:  5/5   Left:  5/5  interosseus :            Right:  5/5   Left:  5/5  Hip Flexion:                Right: 4-/5  Left:  4+/5  Quadriceps:             Right:  4/5  Left:  4+/5  Hamstrings:             Right:  5/5  Left:  5/5  Gastroc Soleus:        Right:  5/5  Left:  5/5  Tib/Ant:                      Right:  4/5  Left:  5/5  EHL:                     Right:  5/5  Left:  5/5  Sensation:  Numbness in right>left leg  Reflexes:  Negative Babinski.  Negative Clonus.  Negative Riley's.  Coordination:  Smooth finger to nose testing.   Negative pronator drift. Needs walker for gait and favors right leg    A/P:  67 year old male with MS, marked leg weakness, severe thoracic stenosis    I had a discussion with the patient, reviewing the history, symptoms, and imaging  Given the severe stenosis and predominantly leg weakness, discussed the option of laminectomy  Will plan for T10-12 laminectomy  Risks and benefits discussed         Again, thank you for allowing me to participate in the care of your patient.        Sincerely,        Edson Leija MD

## 2022-08-09 ENCOUNTER — HOSPITAL ENCOUNTER (OUTPATIENT)
Dept: PHYSICAL THERAPY | Facility: OTHER | Age: 67
Setting detail: THERAPIES SERIES
Discharge: HOME | End: 2022-08-09
Attending: PSYCHIATRY & NEUROLOGY
Payer: MEDICARE

## 2022-08-09 PROCEDURE — 97110 THERAPEUTIC EXERCISES: CPT | Mod: GP,KX,PO

## 2022-08-11 ENCOUNTER — HOSPITAL ENCOUNTER (OUTPATIENT)
Dept: GENERAL RADIOLOGY | Facility: OTHER | Age: 67
Discharge: HOME | End: 2022-08-11
Attending: NEUROLOGICAL SURGERY
Payer: MEDICARE

## 2022-08-11 ENCOUNTER — HOSPITAL ENCOUNTER (OUTPATIENT)
Dept: PHYSICAL THERAPY | Facility: OTHER | Age: 67
Setting detail: THERAPIES SERIES
Discharge: HOME | End: 2022-08-11
Attending: PSYCHIATRY & NEUROLOGY
Payer: MEDICARE

## 2022-08-11 DIAGNOSIS — M48.04 THORACIC SPINAL STENOSIS: ICD-10-CM

## 2022-08-11 PROCEDURE — 97110 THERAPEUTIC EXERCISES: CPT | Mod: GP,KX

## 2022-08-11 PROCEDURE — 72070 X-RAY EXAM THORAC SPINE 2VWS: CPT

## 2022-08-11 PROCEDURE — 72100 X-RAY EXAM L-S SPINE 2/3 VWS: CPT

## 2022-08-11 PROCEDURE — 97140 MANUAL THERAPY 1/> REGIONS: CPT | Mod: GP,KX,PO

## 2022-08-16 ENCOUNTER — HOSPITAL ENCOUNTER (OUTPATIENT)
Dept: PHYSICAL THERAPY | Facility: OTHER | Age: 67
Setting detail: THERAPIES SERIES
Discharge: HOME | End: 2022-08-16
Attending: PSYCHIATRY & NEUROLOGY
Payer: MEDICARE

## 2022-08-16 PROCEDURE — 97110 THERAPEUTIC EXERCISES: CPT | Mod: GP,KX

## 2022-08-16 PROCEDURE — 97140 MANUAL THERAPY 1/> REGIONS: CPT | Mod: GP,KX,PO

## 2022-08-18 ENCOUNTER — HOSPITAL ENCOUNTER (OUTPATIENT)
Dept: PHYSICAL THERAPY | Facility: OTHER | Age: 67
Setting detail: THERAPIES SERIES
Discharge: HOME | End: 2022-08-18
Attending: PSYCHIATRY & NEUROLOGY
Payer: MEDICARE

## 2022-08-18 ENCOUNTER — HOSPITAL ENCOUNTER (OUTPATIENT)
Dept: MRI IMAGING | Facility: OTHER | Age: 67
Discharge: HOME | End: 2022-08-18
Attending: NEUROLOGICAL SURGERY | Admitting: NEUROLOGICAL SURGERY
Payer: MEDICARE

## 2022-08-18 DIAGNOSIS — M48.04 THORACIC SPINAL STENOSIS: ICD-10-CM

## 2022-08-18 PROCEDURE — 97530 THERAPEUTIC ACTIVITIES: CPT | Mod: GP,KX,PO

## 2022-08-18 PROCEDURE — 97140 MANUAL THERAPY 1/> REGIONS: CPT | Mod: GP,KX,PO

## 2022-08-18 PROCEDURE — G1010 CDSM STANSON: HCPCS

## 2022-08-23 ENCOUNTER — HOSPITAL ENCOUNTER (OUTPATIENT)
Dept: PHYSICAL THERAPY | Facility: OTHER | Age: 67
Setting detail: THERAPIES SERIES
Discharge: HOME | End: 2022-08-23
Attending: PSYCHIATRY & NEUROLOGY
Payer: MEDICARE

## 2022-08-23 PROCEDURE — 97140 MANUAL THERAPY 1/> REGIONS: CPT | Mod: GP,PO,KX

## 2022-08-23 PROCEDURE — 97110 THERAPEUTIC EXERCISES: CPT | Mod: GP,PO,KX

## 2022-08-29 ENCOUNTER — OFFICE VISIT (OUTPATIENT)
Dept: FAMILY MEDICINE | Facility: OTHER | Age: 67
End: 2022-08-29
Attending: PHYSICIAN ASSISTANT
Payer: COMMERCIAL

## 2022-08-29 VITALS
RESPIRATION RATE: 20 BRPM | OXYGEN SATURATION: 96 % | HEIGHT: 70 IN | TEMPERATURE: 97 F | DIASTOLIC BLOOD PRESSURE: 85 MMHG | SYSTOLIC BLOOD PRESSURE: 127 MMHG | WEIGHT: 170 LBS | BODY MASS INDEX: 24.34 KG/M2 | HEART RATE: 75 BPM

## 2022-08-29 DIAGNOSIS — G35 MULTIPLE SCLEROSIS (H): ICD-10-CM

## 2022-08-29 DIAGNOSIS — M48.04 THORACIC SPINAL STENOSIS: ICD-10-CM

## 2022-08-29 DIAGNOSIS — Z01.818 PRE-OP EXAM: Primary | ICD-10-CM

## 2022-08-29 LAB
ANION GAP SERPL CALCULATED.3IONS-SCNC: 5 MMOL/L (ref 3–14)
BUN SERPL-MCNC: 22 MG/DL (ref 7–25)
CALCIUM SERPL-MCNC: 9.6 MG/DL (ref 8.6–10.3)
CHLORIDE BLD-SCNC: 103 MMOL/L (ref 98–107)
CO2 SERPL-SCNC: 31 MMOL/L (ref 21–31)
CREAT SERPL-MCNC: 0.94 MG/DL (ref 0.7–1.3)
GFR SERPL CREATININE-BSD FRML MDRD: 89 ML/MIN/1.73M2
GLUCOSE BLD-MCNC: 75 MG/DL (ref 70–105)
HGB BLD-MCNC: 14.9 G/DL (ref 13.3–17.7)
POTASSIUM BLD-SCNC: 4.2 MMOL/L (ref 3.5–5.1)
SODIUM SERPL-SCNC: 139 MMOL/L (ref 134–144)

## 2022-08-29 PROCEDURE — 36415 COLL VENOUS BLD VENIPUNCTURE: CPT | Mod: ZL | Performed by: PHYSICIAN ASSISTANT

## 2022-08-29 PROCEDURE — 93005 ELECTROCARDIOGRAM TRACING: CPT | Performed by: PHYSICIAN ASSISTANT

## 2022-08-29 PROCEDURE — 82310 ASSAY OF CALCIUM: CPT | Mod: ZL | Performed by: PHYSICIAN ASSISTANT

## 2022-08-29 PROCEDURE — 99214 OFFICE O/P EST MOD 30 MIN: CPT | Performed by: PHYSICIAN ASSISTANT

## 2022-08-29 PROCEDURE — 85018 HEMOGLOBIN: CPT | Mod: ZL | Performed by: PHYSICIAN ASSISTANT

## 2022-08-29 PROCEDURE — G0463 HOSPITAL OUTPT CLINIC VISIT: HCPCS

## 2022-08-29 PROCEDURE — 93010 ELECTROCARDIOGRAM REPORT: CPT | Performed by: INTERNAL MEDICINE

## 2022-08-29 RX ORDER — OXYBUTYNIN CHLORIDE 5 MG/1
5 TABLET ORAL
COMMUNITY
End: 2022-08-29 | Stop reason: DRUGHIGH

## 2022-08-29 ASSESSMENT — PAIN SCALES - GENERAL: PAINLEVEL: NO PAIN (0)

## 2022-08-29 NOTE — NURSING NOTE
Pt presents to clinic today for a pre op exam. Patient is having a laminectomy at [facility] with Dr. Leija on 9/6/22      FOOD SECURITY SCREENING QUESTIONS:    The next two questions are to help us understand your food security.  If you are feeling you need any assistance in this area, we have resources available to support you today.    Hunger Vital Signs:  Within the past 12 months we worried whether our food would run out before we got money to buy more. Never  Within the past 12 months the food we bought just didn't last and we didn't have money to get more. Never          Medication Reconciliation: complete  Jay Jay Casillas LPN,LPN on 8/29/2022 at 8:55 AM

## 2022-08-29 NOTE — PATIENT INSTRUCTIONS
Patient should take the following medications the morning of surgery with a small sip of water: lisinopril.  Patient was instructed to hold the following medications the morning of surgery: hold all other meds.     Patient was advised to call our office and the surgical services with any change in condition or new symptoms if they were to develop between today and their surgical date.  Especially any cardiopulmonary symptoms or symptoms concerning for an infection.     Discontinue aspirin, aleve, naproxen and ibuprofen 7 days prior to surgery to reduce bleeding risk.  It is fine to take tylenol the week before surgery.  Hold vitamins and herbal remedies for 7 days before surgery.    Please have a One On One COVID-19 test on 9/3/2022.   Please stay in your car and call [phone] when you arrive.     Preparing for Your Surgery  Getting started  A nurse will call you to review your health history and instructions. They will give you an arrival time based on your scheduled surgery time. Please be ready to share:  Your doctor's clinic name and phone number  Your medical, surgical and anesthesia history  A list of allergies and sensitivities  A list of medicines, including herbal treatments and over-the-counter drugs  Whether the patient has a legal guardian (ask how to send us the papers in advance)  Please tell us if you're pregnant--or if there's any chance you might be pregnant. Some surgeries may injure a fetus (unborn baby), so they require a pregnancy test. Surgeries that are safe for a fetus don't always need a test, and you can choose whether to have one.   If you have a child who's having surgery, please ask for a copy of Preparing for Your Child's Surgery.    Preparing for surgery  Within 30 days of surgery: Have a pre-op exam (sometimes called an H&P, or History and Physical). This can be done at a clinic or pre-operative center.  If you're having a , you may not need this exam. Talk to your care  team.  At your pre-op exam, talk to your care team about all medicines you take. If you need to stop any medicines before surgery, ask when to start taking them again.  We do this for your safety. Many medicines can make you bleed too much during surgery. Some change how well surgery (anesthesia) drugs work.  Call your insurance company to let them know you're having surgery. (If you don't have insurance, call [phone].)  Call your clinic if there's any change in your health. This includes signs of a cold or flu (sore throat, runny nose, cough, rash, fever). It also includes a scrape or scratch near the surgery site.  If you have questions on the day of surgery, call your hospital or surgery center.  COVID testing  You may need to be tested for COVID-19 before having surgery. If so, we will give you instructions.  Eating and drinking guidelines  For your safety: Unless your surgeon tells you otherwise, follow the guidelines below.  Eat and drink as usual until 8 hours before surgery. After that, no food or milk.  Drink clear liquids until 2 hours before surgery. These are liquids you can see through, like water, Gatorade and Propel Water. You may also have black coffee and tea (no cream or milk).  Nothing by mouth within 2 hours of surgery. This includes gum, candy and breath mints.  If you drink alcohol: Stop drinking it the night before surgery.  If your care team tells you to take medicine on the morning of surgery, it's okay to take it with a sip of water.  Preventing infection  Shower or bathe the night before and morning of your surgery. Follow the instructions your clinic gave you. (If no instructions, use regular soap.)  Don't shave or clip hair near your surgery site. We'll remove the hair if needed.  Don't smoke or vape the morning of surgery. You may chew nicotine gum up to 2 hours before surgery. A nicotine patch is okay.  Note: Some surgeries require you to completely quit smoking and nicotine.  Check with your surgeon.  Your care team will make every effort to keep you safe from infection. We will:  Clean our hands often with soap and water (or an alcohol-based hand rub).  Clean the skin at your surgery site with a special soap that kills germs.  Give you a special gown to keep you warm. (Cold raises the risk of infection.)  Wear special hair covers, masks, gowns and gloves during surgery.  Give antibiotic medicine, if prescribed. Not all surgeries need antibiotics.  What to bring on the day of surgery  Photo ID and insurance card  Copy of your health care directive, if you have one  Glasses and hearing aides (bring cases)  You can't wear contacts during surgery  Inhaler and eye drops, if you use them (tell us about these when you arrive)  CPAP machine or breathing device, if you use them  A few personal items, if spending the night  If you have . . .  A pacemaker, ICD (cardiac defibrillator) or other implant: Bring the ID card.  An implanted stimulator: Bring the remote control.  A legal guardian: Bring a copy of the certified (court-stamped) guardianship papers.  Please remove any jewelry, including body piercings. Leave jewelry and other valuables at home.  If you're going home the day of surgery  You must have a responsible adult drive you home. They should stay with you overnight as well.  If you don't have someone to stay with you, and you aren't safe to go home alone, we may keep you overnight. Insurance often won't pay for this.  After surgery  If it's hard to control your pain or you need more pain medicine, please call your surgeon's office.  Questions?   If you have any questions for your care team, list them here: _________________________________________________________________________________________________________________________________________________________________________ ____________________________________ ____________________________________ ____________________________________  For  informational purposes only. Not to replace the advice of your health care provider. Copyright   2003, 2019 [employer] [facility]. All rights reserved. Clinically reviewed by Annie Schroeder MD. [employer] [id number] - REV 07/21.

## 2022-08-29 NOTE — H&P (VIEW-ONLY)
[facility] AND [facility]  [address]  [place] [address]  Phone: [phone]  Fax: [phone]  Primary Provider: Kayla Hobbs  Pre-op Performing Provider: KRISTINE GARCIA      PREOPERATIVE EVALUATION:  Today's date: 8/29/2022    Jamir Gill is a 67 year old male who presents for a preoperative evaluation.    Surgical Information:  Surgery/Procedure: Thoracic 10 to thoracic 12 laminectomies  Surgery Location: [facility]   Surgeon: geraldo  Surgery Date: 9/6/22  Time of Surgery: tbd  Where patient plans to recover: Other: admitted for 1-2 nights  Fax number for surgical facility:     Type of Anesthesia Anticipated: to be determined    Assessment & Plan     The proposed surgical procedure is considered INTERMEDIATE risk.    Problem List Items Addressed This Visit        Nervous and Auditory    Multiple sclerosis (H)      Other Visit Diagnoses     Pre-op exam    -  Primary    Relevant Orders    EKG 12-lead, tracing only    Hemoglobin (Completed)    Basic Metabolic Panel (Completed)    Thoracic spinal stenosis            Completed hemoglobin, BMP, and EKG which were unremarkable.  No acute concerns at this time.    Patient has a preoperative COVID-19 test scheduled for clearance.    Multiple sclerosis: Patient is currently stable.  No acute concerns at this time.  Unchanged as compared to previous.       Risks and Recommendations:  The patient has the following additional risks and recommendations for perioperative complications:   - No identified additional risk factors other than previously addressed    Medication Instructions:  Patient Instructions     Patient should take the following medications the morning of surgery with a small sip of water: lisinopril.  Patient was instructed to hold the following medications the morning of surgery: hold all other meds.     Patient was advised to call our office and the surgical services with any change in condition or new  symptoms if they were to develop between today and their surgical date.  Especially any cardiopulmonary symptoms or symptoms concerning for an infection.     Discontinue aspirin, aleve, naproxen and ibuprofen 7 days prior to surgery to reduce bleeding risk.  It is fine to take tylenol the week before surgery.  Hold vitamins and herbal remedies for 7 days before surgery.    Please have a Xanodyne COVID-19 test on 9/3/2022.   Please stay in your car and call [phone] when you arrive.     Preparing for Your Surgery  Getting started  A nurse will call you to review your health history and instructions. They will give you an arrival time based on your scheduled surgery time. Please be ready to share:    Your doctor's clinic name and phone number    Your medical, surgical and anesthesia history    A list of allergies and sensitivities    A list of medicines, including herbal treatments and over-the-counter drugs    Whether the patient has a legal guardian (ask how to send us the papers in advance)  Please tell us if you're pregnant--or if there's any chance you might be pregnant. Some surgeries may injure a fetus (unborn baby), so they require a pregnancy test. Surgeries that are safe for a fetus don't always need a test, and you can choose whether to have one.   If you have a child who's having surgery, please ask for a copy of Preparing for Your Child's Surgery.    Preparing for surgery  1. Within 30 days of surgery: Have a pre-op exam (sometimes called an H&P, or History and Physical). This can be done at a clinic or pre-operative center.  ? If you're having a , you may not need this exam. Talk to your care team.  2. At your pre-op exam, talk to your care team about all medicines you take. If you need to stop any medicines before surgery, ask when to start taking them again.  ? We do this for your safety. Many medicines can make you bleed too much during surgery. Some change how well surgery (anesthesia)  drugs work.  3. Call your insurance company to let them know you're having surgery. (If you don't have insurance, call [phone].)  4. Call your clinic if there's any change in your health. This includes signs of a cold or flu (sore throat, runny nose, cough, rash, fever). It also includes a scrape or scratch near the surgery site.  5. If you have questions on the day of surgery, call your hospital or surgery center.  COVID testing  You may need to be tested for COVID-19 before having surgery. If so, we will give you instructions.  Eating and drinking guidelines  For your safety: Unless your surgeon tells you otherwise, follow the guidelines below.    Eat and drink as usual until 8 hours before surgery. After that, no food or milk.    Drink clear liquids until 2 hours before surgery. These are liquids you can see through, like water, Gatorade and Propel Water. You may also have black coffee and tea (no cream or milk).    Nothing by mouth within 2 hours of surgery. This includes gum, candy and breath mints.    If you drink alcohol: Stop drinking it the night before surgery.    If your care team tells you to take medicine on the morning of surgery, it's okay to take it with a sip of water.  Preventing infection  1. Shower or bathe the night before and morning of your surgery. Follow the instructions your clinic gave you. (If no instructions, use regular soap.)  2. Don't shave or clip hair near your surgery site. We'll remove the hair if needed.  3. Don't smoke or vape the morning of surgery. You may chew nicotine gum up to 2 hours before surgery. A nicotine patch is okay.  ? Note: Some surgeries require you to completely quit smoking and nicotine. Check with your surgeon.  4. Your care team will make every effort to keep you safe from infection. We will:  ? Clean our hands often with soap and water (or an alcohol-based hand rub).  ? Clean the skin at your surgery site with a special soap that kills germs.  ? Give  you a special gown to keep you warm. (Cold raises the risk of infection.)  ? Wear special hair covers, masks, gowns and gloves during surgery.  ? Give antibiotic medicine, if prescribed. Not all surgeries need antibiotics.  What to bring on the day of surgery  1. Photo ID and insurance card  2. Copy of your health care directive, if you have one  3. Glasses and hearing aides (bring cases)  ? You can't wear contacts during surgery  4. Inhaler and eye drops, if you use them (tell us about these when you arrive)  5. CPAP machine or breathing device, if you use them  6. A few personal items, if spending the night  7. If you have . . .  ? A pacemaker, ICD (cardiac defibrillator) or other implant: Bring the ID card.  ? An implanted stimulator: Bring the remote control.  ? A legal guardian: Bring a copy of the certified (court-stamped) guardianship papers.  Please remove any jewelry, including body piercings. Leave jewelry and other valuables at home.  If you're going home the day of surgery    You must have a responsible adult drive you home. They should stay with you overnight as well.    If you don't have someone to stay with you, and you aren't safe to go home alone, we may keep you overnight. Insurance often won't pay for this.  After surgery  If it's hard to control your pain or you need more pain medicine, please call your surgeon's office.  Questions?   If you have any questions for your care team, list them here: _________________________________________________________________________________________________________________________________________________________________________ ____________________________________ ____________________________________ ____________________________________  For informational purposes only. Not to replace the advice of your health care provider. Copyright   2003, 2019 [employer]. All rights reserved. Clinically reviewed by Annie Schroeder MD. [employer] 587825 - REV  07/21.       RECOMMENDATION:  APPROVAL GIVEN to proceed with proposed procedure, without further diagnostic evaluation.    30 minutes spent on the date of the encounter doing chart review, history and exam, documentation and further activities per the note        Subjective     HPI related to upcoming procedure:   Patient has history of multiple sclerosis.  Patient has marked leg weakness and severe thoracic spinal stenosis.  Patient has had a few episodes of marked leg weakness over the last several years, worse in the last year.  Often needs a walker and sometimes a wheelchair for longer distances.  MS has otherwise been under good control and he has minimal arm symptoms.  MRI of the thoracic spine showed T10-12 severe stenosis and cord signal change.  Patient is currently scheduled for surgical to alleviate symptoms.  He is looking to have surgery completed to help with his gait and mobility.    Preop Questions 8/29/2022   1. Have you ever had a heart attack or stroke? No   2. Have you ever had surgery on your heart or blood vessels, such as a stent placement, a coronary artery bypass, or surgery on an artery in your head, neck, heart, or legs? No   3. Do you have chest pain with activity? No   4. Do you have a history of  heart failure? No   5. Do you currently have a cold, bronchitis or symptoms of other infection? No   6. Do you have a cough, shortness of breath, or wheezing? No   7. Do you or anyone in your family have previous history of blood clots? No   8. Do you or does anyone in your family have a serious bleeding problem such as prolonged bleeding following surgeries or cuts? No   9. Have you ever had problems with anemia or been told to take iron pills? No   10. Have you had any abnormal blood loss such as black, tarry or bloody stools? No   11. Have you ever had a blood transfusion? No   12. Are you willing to have a blood transfusion if it is medically needed before, during, or after your surgery?  Yes   13. Have you or any of your relatives ever had problems with anesthesia? No   14. Do you have sleep apnea, excessive snoring or daytime drowsiness? No   15. Do you have any artifical heart valves or other implanted medical devices like a pacemaker, defibrillator, or continuous glucose monitor? No   16. Do you have artificial joints? No   17. Are you allergic to latex? No       Health Care Directive:  Patient does not have a Health Care Directive or Living Will: Discussed advance care planning with patient; however, patient declined at this time.    Preoperative Review of :   reviewed - no record of controlled substances prescribed.      Status of Chronic Conditions:  HYPERTENSION - Patient has longstanding history of HTN , currently denies any symptoms referable to elevated blood pressure. Specifically denies chest pain, palpitations, dyspnea, orthopnea, PND or peripheral edema. Blood pressure readings have been in normal range. Current medication regimen is as listed below. Patient denies any side effects of medication.     Patient is history of multiple sclerosis which has been under good control.  No acute concerns at this time.    Patient has tolerated surgery well in the past.  Patient has no recent cough or cold symptoms.  No recent fevers, chills, sore throat, ear pain, chest pain, palpitations, problems breathing, stomachaches, nausea, vomiting, diarrhea, constipation, blood in stool or urine, dysuria, frequency, urgency.    Patient can walk up a flight of stairs without becoming short of breath or having chest pain: YES   Patient is able to tolerate greater than 4 METs of activity without any cardiopulmonary symptoms.    Review of Systems  CONSTITUTIONAL: NEGATIVE for fever, chills, change in weight  INTEGUMENTARY/SKIN: NEGATIVE for worrisome rashes, moles or lesions  EYES: NEGATIVE for vision changes or irritation  ENT/MOUTH: NEGATIVE for ear, mouth and throat problems  RESP: NEGATIVE for  significant cough or SOB  CV: NEGATIVE for chest pain, palpitations or peripheral edema  GI: NEGATIVE for nausea, abdominal pain, heartburn, or change in bowel habits  : NEGATIVE for frequency, dysuria, or hematuria  MUSCULOSKELETAL: NEGATIVE for significant arthralgias or myalgia  NEURO: NEGATIVE for weakness, dizziness or paresthesias  ENDOCRINE: NEGATIVE for temperature intolerance, skin/hair changes  HEME: NEGATIVE for bleeding problems  PSYCHIATRIC: NEGATIVE for changes in mood or affect    Patient Active Problem List    Diagnosis Date Noted     Neurogenic bladder 12/06/2018     Priority: Medium     Multiple sclerosis (H)      Priority: Medium     No Comments Provided       Essential (primary) hypertension      Priority: Medium     No Comments Provided       Ureterolithiasis 04/03/2018     Priority: Medium      Past Medical History:   Diagnosis Date     Cervical disc disorder     No Comments Provided     Enlarged prostate with lower urinary tract symptoms (LUTS)     No Comments Provided     Essential (primary) hypertension     No Comments Provided     Multiple sclerosis (H)     No Comments Provided     Other specified disorders of bone density and structure, unspecified site (CODE)     No Comments Provided     Other symptoms and signs involving cognitive functions and awareness     No Comments Provided     Spondylosis of cervical region without myelopathy or radiculopathy     No Comments Provided     Past Surgical History:   Procedure Laterality Date     APPENDECTOMY OPEN      appendectomy     COLONOSCOPY  01/01/2010 01/01/2010,with polyps resected     COLONOSCOPY  12/15/2015    12/15/2015,[place]- colitis     COLONOSCOPY N/A 06/17/2021    2 sessile serrated and 1 tubular adenoma, 3 year follow up, 6/17/2024     COMBINED CYSTOSCOPY, URETEROSCOPY, LASER HOLMIUM LITHOTRIPSY URETER(S) Left 04/10/2018    Procedure: COMBINED CYSTOSCOPY, URETEROSCOPY, LASER HOLMIUM LITHOTRIPSY URETER(S);  Left Ureteroscopy with  Holmium Laser Lithotripsy & Stent Placement.;  Surgeon: Olu Saldivar MD;  Location:  OR     CYSTOSCOPY, RETROGRADES, INSERT STENT URETER(S), COMBINED Left 2018    Procedure: COMBINED CYSTOSCOPY, RETROGRADES, INSERT STENT URETER(S);;  Surgeon: Olu Saldivar MD;  Location:  OR     PROSTATE SURGERY      ,PROSTATECTOMY,Laser prostate surgery -- BPH     WRIST SURGERY Left     s/p fracture     Current Outpatient Medications   Medication Sig Dispense Refill     Aspirin Buf,CaCarb-MgCarb-MgO, 81 MG TABS Take 81 mg by mouth       BACLOFEN PO Take 10 mg by mouth 2 times daily 10 mg QAM, 20 mg QHS       calcium carbonate (TUMS) 500 MG chewable tablet Take 1,250 mg by mouth       Cyanocobalamin 1000 MCG CAPS Take on tab daily       dicyclomine (BENTYL) 20 MG tablet Take 1 tablet (20 mg) by mouth every 6 hours 15 tablet 1     Docusate Sodium (COLACE PO) Take 100 mg by mouth daily       lisinopril (ZESTRIL) 10 MG tablet TAKE 1 TABLET BY MOUTH EVERY DAY 90 tablet 1     meclizine 25 MG CHEW Take 25 mg by mouth 2 times daily as needed       oxybutynin ER (DITROPAN-XL) 10 MG 24 hr tablet Take 1 tablet (10 mg) by mouth daily 90 tablet 3     psyllium 0.52 G capsule Take 3 capsules by mouth 2 times daily         No Known Allergies     Social History     Tobacco Use     Smoking status: Never Smoker     Smokeless tobacco: Never Used   Substance Use Topics     Alcohol use: No     Family History   Problem Relation Age of Onset     Other - See Comments Mother          with Parkinson's     Heart Disease Mother         Heart Disease,CHF for carditis     Prostate Cancer Father         Cancer-prostate     Other - See Comments Father         Alzheimer's/kidney failure     Family History Negative Brother         Good Health     Heart Disease Brother         Heart Disease,ASCAD with MI     Lung Cancer Brother         tobacco abuse     History   Drug Use No         Objective     /85 (BP Location: Right arm, Patient  "Position: Sitting, Cuff Size: Adult Large)   Pulse 75   Temp 97  F (36.1  C) (Tympanic)   Resp 20   Ht 1.778 m (5' 10\")   Wt 77.1 kg (170 lb)   SpO2 96%   BMI 24.39 kg/m      Physical Exam    GENERAL APPEARANCE: healthy, alert and no distress     EYES: EOMI,  PERRL     HENT: ear canals and TM's normal and nose and mouth without ulcers or lesions     NECK: no adenopathy, no asymmetry, masses, or scars and thyroid normal to palpation     RESP: lungs clear to auscultation - no rales, rhonchi or wheezes     CV: regular rates and rhythm, normal S1 S2, no S3 or S4 and no murmur, click or rub     ABDOMEN:  soft, nontender, no HSM or masses and bowel sounds normal     MS: extremities normal- no gross deformities noted, no evidence of inflammation in joints, FROM in all extremities.     SKIN: no suspicious lesions or rashes     NEURO: Normal strength and tone, sensory exam grossly normal, mentation intact and speech normal     PSYCH: mentation appears normal. and affect normal/bright     LYMPHATICS: No cervical adenopathy    Recent Labs   Lab Test 06/10/22  1101 04/15/21  0955 08/31/20  0856   HGB 14.3  --   --      --   --    NA  --  139 140   POTASSIUM  --  4.3 4.3   CR  --  1.04 1.23        Diagnostics:  Recent Results (from the past 720 hour(s))   Basic Metabolic Panel    Collection Time: 08/29/22  9:30 AM   Result Value Ref Range    Sodium 139 134 - 144 mmol/L    Potassium 4.2 3.5 - 5.1 mmol/L    Chloride 103 98 - 107 mmol/L    Carbon Dioxide (CO2) 31 21 - 31 mmol/L    Anion Gap 5 3 - 14 mmol/L    Urea Nitrogen 22 7 - 25 mg/dL    Creatinine 0.94 0.70 - 1.30 mg/dL    Calcium 9.6 8.6 - 10.3 mg/dL    Glucose 75 70 - 105 mg/dL    GFR Estimate 89 >60 mL/min/1.73m2   Hemoglobin    Collection Time: 08/29/22  9:30 AM   Result Value Ref Range    Hemoglobin 14.9 13.3 - 17.7 g/dL      EKG: Normal Sinus Rhythm    Revised Cardiac Risk Index (RCRI):  The patient has the following serious cardiovascular risks for " perioperative complications:   - No serious cardiac risks = 0 points     RCRI Interpretation: 0 points: Class I (very low risk - 0.4% complication rate)           Signed Electronically by: Zahira Troy PA-C  Copy of this evaluation report is provided to requesting physician.

## 2022-08-29 NOTE — PROGRESS NOTES
[facility] AND [facility]  [address]  [place] [address]  Phone: [phone]  Fax: [phone]  Primary Provider: Kayla Hobbs  Pre-op Performing Provider: KRISTINE GARCIA      PREOPERATIVE EVALUATION:  Today's date: 8/29/2022    Jamir Gill is a 67 year old male who presents for a preoperative evaluation.    Surgical Information:  Surgery/Procedure: Thoracic 10 to thoracic 12 laminectomies  Surgery Location: [facility]   Surgeon: geraldo  Surgery Date: 9/6/22  Time of Surgery: tbd  Where patient plans to recover: Other: admitted for 1-2 nights  Fax number for surgical facility:     Type of Anesthesia Anticipated: to be determined    Assessment & Plan     The proposed surgical procedure is considered INTERMEDIATE risk.    Problem List Items Addressed This Visit        Nervous and Auditory    Multiple sclerosis (H)      Other Visit Diagnoses     Pre-op exam    -  Primary    Relevant Orders    EKG 12-lead, tracing only    Hemoglobin (Completed)    Basic Metabolic Panel (Completed)    Thoracic spinal stenosis            Completed hemoglobin, BMP, and EKG which were unremarkable.  No acute concerns at this time.    Patient has a preoperative COVID-19 test scheduled for clearance.    Multiple sclerosis: Patient is currently stable.  No acute concerns at this time.  Unchanged as compared to previous.       Risks and Recommendations:  The patient has the following additional risks and recommendations for perioperative complications:   - No identified additional risk factors other than previously addressed    Medication Instructions:  Patient Instructions     Patient should take the following medications the morning of surgery with a small sip of water: lisinopril.  Patient was instructed to hold the following medications the morning of surgery: hold all other meds.     Patient was advised to call our office and the surgical services with any change in condition or new  symptoms if they were to develop between today and their surgical date.  Especially any cardiopulmonary symptoms or symptoms concerning for an infection.     Discontinue aspirin, aleve, naproxen and ibuprofen 7 days prior to surgery to reduce bleeding risk.  It is fine to take tylenol the week before surgery.  Hold vitamins and herbal remedies for 7 days before surgery.    Please have a [employer] COVID-19 test on 9/3/2022.   Please stay in your car and call [phone] when you arrive.     Preparing for Your Surgery  Getting started  A nurse will call you to review your health history and instructions. They will give you an arrival time based on your scheduled surgery time. Please be ready to share:    Your doctor's clinic name and phone number    Your medical, surgical and anesthesia history    A list of allergies and sensitivities    A list of medicines, including herbal treatments and over-the-counter drugs    Whether the patient has a legal guardian (ask how to send us the papers in advance)  Please tell us if you're pregnant--or if there's any chance you might be pregnant. Some surgeries may injure a fetus (unborn baby), so they require a pregnancy test. Surgeries that are safe for a fetus don't always need a test, and you can choose whether to have one.   If you have a child who's having surgery, please ask for a copy of Preparing for Your Child's Surgery.    Preparing for surgery  1. Within 30 days of surgery: Have a pre-op exam (sometimes called an H&P, or History and Physical). This can be done at a clinic or pre-operative center.  ? If you're having a , you may not need this exam. Talk to your care team.  2. At your pre-op exam, talk to your care team about all medicines you take. If you need to stop any medicines before surgery, ask when to start taking them again.  ? We do this for your safety. Many medicines can make you bleed too much during surgery. Some change how well surgery (anesthesia)  drugs work.  3. Call your insurance company to let them know you're having surgery. (If you don't have insurance, call [phone].)  4. Call your clinic if there's any change in your health. This includes signs of a cold or flu (sore throat, runny nose, cough, rash, fever). It also includes a scrape or scratch near the surgery site.  5. If you have questions on the day of surgery, call your hospital or surgery center.  COVID testing  You may need to be tested for COVID-19 before having surgery. If so, we will give you instructions.  Eating and drinking guidelines  For your safety: Unless your surgeon tells you otherwise, follow the guidelines below.    Eat and drink as usual until 8 hours before surgery. After that, no food or milk.    Drink clear liquids until 2 hours before surgery. These are liquids you can see through, like water, Gatorade and Propel Water. You may also have black coffee and tea (no cream or milk).    Nothing by mouth within 2 hours of surgery. This includes gum, candy and breath mints.    If you drink alcohol: Stop drinking it the night before surgery.    If your care team tells you to take medicine on the morning of surgery, it's okay to take it with a sip of water.  Preventing infection  1. Shower or bathe the night before and morning of your surgery. Follow the instructions your clinic gave you. (If no instructions, use regular soap.)  2. Don't shave or clip hair near your surgery site. We'll remove the hair if needed.  3. Don't smoke or vape the morning of surgery. You may chew nicotine gum up to 2 hours before surgery. A nicotine patch is okay.  ? Note: Some surgeries require you to completely quit smoking and nicotine. Check with your surgeon.  4. Your care team will make every effort to keep you safe from infection. We will:  ? Clean our hands often with soap and water (or an alcohol-based hand rub).  ? Clean the skin at your surgery site with a special soap that kills germs.  ? Give  you a special gown to keep you warm. (Cold raises the risk of infection.)  ? Wear special hair covers, masks, gowns and gloves during surgery.  ? Give antibiotic medicine, if prescribed. Not all surgeries need antibiotics.  What to bring on the day of surgery  1. Photo ID and insurance card  2. Copy of your health care directive, if you have one  3. Glasses and hearing aides (bring cases)  ? You can't wear contacts during surgery  4. Inhaler and eye drops, if you use them (tell us about these when you arrive)  5. CPAP machine or breathing device, if you use them  6. A few personal items, if spending the night  7. If you have . . .  ? A pacemaker, ICD (cardiac defibrillator) or other implant: Bring the ID card.  ? An implanted stimulator: Bring the remote control.  ? A legal guardian: Bring a copy of the certified (court-stamped) guardianship papers.  Please remove any jewelry, including body piercings. Leave jewelry and other valuables at home.  If you're going home the day of surgery    You must have a responsible adult drive you home. They should stay with you overnight as well.    If you don't have someone to stay with you, and you aren't safe to go home alone, we may keep you overnight. Insurance often won't pay for this.  After surgery  If it's hard to control your pain or you need more pain medicine, please call your surgeon's office.  Questions?   If you have any questions for your care team, list them here: _________________________________________________________________________________________________________________________________________________________________________ ____________________________________ ____________________________________ ____________________________________  For informational purposes only. Not to replace the advice of your health care provider. Copyright   2003, 2019 [employer]. All rights reserved. Clinically reviewed by Annie Schroeder MD. [employer] 591003 - REV  07/21.       RECOMMENDATION:  APPROVAL GIVEN to proceed with proposed procedure, without further diagnostic evaluation.    30 minutes spent on the date of the encounter doing chart review, history and exam, documentation and further activities per the note        Subjective     HPI related to upcoming procedure:   Patient has history of multiple sclerosis.  Patient has marked leg weakness and severe thoracic spinal stenosis.  Patient has had a few episodes of marked leg weakness over the last several years, worse in the last year.  Often needs a walker and sometimes a wheelchair for longer distances.  MS has otherwise been under good control and he has minimal arm symptoms.  MRI of the thoracic spine showed T10-12 severe stenosis and cord signal change.  Patient is currently scheduled for surgical to alleviate symptoms.  He is looking to have surgery completed to help with his gait and mobility.    Preop Questions 8/29/2022   1. Have you ever had a heart attack or stroke? No   2. Have you ever had surgery on your heart or blood vessels, such as a stent placement, a coronary artery bypass, or surgery on an artery in your head, neck, heart, or legs? No   3. Do you have chest pain with activity? No   4. Do you have a history of  heart failure? No   5. Do you currently have a cold, bronchitis or symptoms of other infection? No   6. Do you have a cough, shortness of breath, or wheezing? No   7. Do you or anyone in your family have previous history of blood clots? No   8. Do you or does anyone in your family have a serious bleeding problem such as prolonged bleeding following surgeries or cuts? No   9. Have you ever had problems with anemia or been told to take iron pills? No   10. Have you had any abnormal blood loss such as black, tarry or bloody stools? No   11. Have you ever had a blood transfusion? No   12. Are you willing to have a blood transfusion if it is medically needed before, during, or after your surgery?  Yes   13. Have you or any of your relatives ever had problems with anesthesia? No   14. Do you have sleep apnea, excessive snoring or daytime drowsiness? No   15. Do you have any artifical heart valves or other implanted medical devices like a pacemaker, defibrillator, or continuous glucose monitor? No   16. Do you have artificial joints? No   17. Are you allergic to latex? No       Health Care Directive:  Patient does not have a Health Care Directive or Living Will: Discussed advance care planning with patient; however, patient declined at this time.    Preoperative Review of :   reviewed - no record of controlled substances prescribed.      Status of Chronic Conditions:  HYPERTENSION - Patient has longstanding history of HTN , currently denies any symptoms referable to elevated blood pressure. Specifically denies chest pain, palpitations, dyspnea, orthopnea, PND or peripheral edema. Blood pressure readings have been in normal range. Current medication regimen is as listed below. Patient denies any side effects of medication.     Patient is history of multiple sclerosis which has been under good control.  No acute concerns at this time.    Patient has tolerated surgery well in the past.  Patient has no recent cough or cold symptoms.  No recent fevers, chills, sore throat, ear pain, chest pain, palpitations, problems breathing, stomachaches, nausea, vomiting, diarrhea, constipation, blood in stool or urine, dysuria, frequency, urgency.    Patient can walk up a flight of stairs without becoming short of breath or having chest pain: YES   Patient is able to tolerate greater than 4 METs of activity without any cardiopulmonary symptoms.    Review of Systems  CONSTITUTIONAL: NEGATIVE for fever, chills, change in weight  INTEGUMENTARY/SKIN: NEGATIVE for worrisome rashes, moles or lesions  EYES: NEGATIVE for vision changes or irritation  ENT/MOUTH: NEGATIVE for ear, mouth and throat problems  RESP: NEGATIVE for  significant cough or SOB  CV: NEGATIVE for chest pain, palpitations or peripheral edema  GI: NEGATIVE for nausea, abdominal pain, heartburn, or change in bowel habits  : NEGATIVE for frequency, dysuria, or hematuria  MUSCULOSKELETAL: NEGATIVE for significant arthralgias or myalgia  NEURO: NEGATIVE for weakness, dizziness or paresthesias  ENDOCRINE: NEGATIVE for temperature intolerance, skin/hair changes  HEME: NEGATIVE for bleeding problems  PSYCHIATRIC: NEGATIVE for changes in mood or affect    Patient Active Problem List    Diagnosis Date Noted     Neurogenic bladder 12/06/2018     Priority: Medium     Multiple sclerosis (H)      Priority: Medium     No Comments Provided       Essential (primary) hypertension      Priority: Medium     No Comments Provided       Ureterolithiasis 04/03/2018     Priority: Medium      Past Medical History:   Diagnosis Date     Cervical disc disorder     No Comments Provided     Enlarged prostate with lower urinary tract symptoms (LUTS)     No Comments Provided     Essential (primary) hypertension     No Comments Provided     Multiple sclerosis (H)     No Comments Provided     Other specified disorders of bone density and structure, unspecified site (CODE)     No Comments Provided     Other symptoms and signs involving cognitive functions and awareness     No Comments Provided     Spondylosis of cervical region without myelopathy or radiculopathy     No Comments Provided     Past Surgical History:   Procedure Laterality Date     APPENDECTOMY OPEN      appendectomy     COLONOSCOPY  01/01/2010 01/01/2010,with polyps resected     COLONOSCOPY  12/15/2015    12/15/2015,[place]- colitis     COLONOSCOPY N/A 06/17/2021    2 sessile serrated and 1 tubular adenoma, 3 year follow up, 6/17/2024     COMBINED CYSTOSCOPY, URETEROSCOPY, LASER HOLMIUM LITHOTRIPSY URETER(S) Left 04/10/2018    Procedure: COMBINED CYSTOSCOPY, URETEROSCOPY, LASER HOLMIUM LITHOTRIPSY URETER(S);  Left Ureteroscopy with  Holmium Laser Lithotripsy & Stent Placement.;  Surgeon: Olu Saldivar MD;  Location:  OR     CYSTOSCOPY, RETROGRADES, INSERT STENT URETER(S), COMBINED Left 2018    Procedure: COMBINED CYSTOSCOPY, RETROGRADES, INSERT STENT URETER(S);;  Surgeon: Olu Saldivar MD;  Location:  OR     PROSTATE SURGERY      ,PROSTATECTOMY,Laser prostate surgery -- BPH     WRIST SURGERY Left     s/p fracture     Current Outpatient Medications   Medication Sig Dispense Refill     Aspirin Buf,CaCarb-MgCarb-MgO, 81 MG TABS Take 81 mg by mouth       BACLOFEN PO Take 10 mg by mouth 2 times daily 10 mg QAM, 20 mg QHS       calcium carbonate (TUMS) 500 MG chewable tablet Take 1,250 mg by mouth       Cyanocobalamin 1000 MCG CAPS Take on tab daily       dicyclomine (BENTYL) 20 MG tablet Take 1 tablet (20 mg) by mouth every 6 hours 15 tablet 1     Docusate Sodium (COLACE PO) Take 100 mg by mouth daily       lisinopril (ZESTRIL) 10 MG tablet TAKE 1 TABLET BY MOUTH EVERY DAY 90 tablet 1     meclizine 25 MG CHEW Take 25 mg by mouth 2 times daily as needed       oxybutynin ER (DITROPAN-XL) 10 MG 24 hr tablet Take 1 tablet (10 mg) by mouth daily 90 tablet 3     psyllium 0.52 G capsule Take 3 capsules by mouth 2 times daily         No Known Allergies     Social History     Tobacco Use     Smoking status: Never Smoker     Smokeless tobacco: Never Used   Substance Use Topics     Alcohol use: No     Family History   Problem Relation Age of Onset     Other - See Comments Mother          with Parkinson's     Heart Disease Mother         Heart Disease,CHF for carditis     Prostate Cancer Father         Cancer-prostate     Other - See Comments Father         Alzheimer's/kidney failure     Family History Negative Brother         Good Health     Heart Disease Brother         Heart Disease,ASCAD with MI     Lung Cancer Brother         tobacco abuse     History   Drug Use No         Objective     /85 (BP Location: Right arm, Patient  "Position: Sitting, Cuff Size: Adult Large)   Pulse 75   Temp 97  F (36.1  C) (Tympanic)   Resp 20   Ht 1.778 m (5' 10\")   Wt 77.1 kg (170 lb)   SpO2 96%   BMI 24.39 kg/m      Physical Exam    GENERAL APPEARANCE: healthy, alert and no distress     EYES: EOMI,  PERRL     HENT: ear canals and TM's normal and nose and mouth without ulcers or lesions     NECK: no adenopathy, no asymmetry, masses, or scars and thyroid normal to palpation     RESP: lungs clear to auscultation - no rales, rhonchi or wheezes     CV: regular rates and rhythm, normal S1 S2, no S3 or S4 and no murmur, click or rub     ABDOMEN:  soft, nontender, no HSM or masses and bowel sounds normal     MS: extremities normal- no gross deformities noted, no evidence of inflammation in joints, FROM in all extremities.     SKIN: no suspicious lesions or rashes     NEURO: Normal strength and tone, sensory exam grossly normal, mentation intact and speech normal     PSYCH: mentation appears normal. and affect normal/bright     LYMPHATICS: No cervical adenopathy    Recent Labs   Lab Test 06/10/22  1101 04/15/21  0955 08/31/20  0856   HGB 14.3  --   --      --   --    NA  --  139 140   POTASSIUM  --  4.3 4.3   CR  --  1.04 1.23        Diagnostics:  Recent Results (from the past 720 hour(s))   Basic Metabolic Panel    Collection Time: 08/29/22  9:30 AM   Result Value Ref Range    Sodium 139 134 - 144 mmol/L    Potassium 4.2 3.5 - 5.1 mmol/L    Chloride 103 98 - 107 mmol/L    Carbon Dioxide (CO2) 31 21 - 31 mmol/L    Anion Gap 5 3 - 14 mmol/L    Urea Nitrogen 22 7 - 25 mg/dL    Creatinine 0.94 0.70 - 1.30 mg/dL    Calcium 9.6 8.6 - 10.3 mg/dL    Glucose 75 70 - 105 mg/dL    GFR Estimate 89 >60 mL/min/1.73m2   Hemoglobin    Collection Time: 08/29/22  9:30 AM   Result Value Ref Range    Hemoglobin 14.9 13.3 - 17.7 g/dL      EKG: Normal Sinus Rhythm    Revised Cardiac Risk Index (RCRI):  The patient has the following serious cardiovascular risks for " perioperative complications:   - No serious cardiac risks = 0 points     RCRI Interpretation: 0 points: Class I (very low risk - 0.4% complication rate)           Signed Electronically by: Zahira Troy PA-C  Copy of this evaluation report is provided to requesting physician.

## 2022-08-30 ENCOUNTER — MYC MEDICAL ADVICE (OUTPATIENT)
Dept: FAMILY MEDICINE | Facility: OTHER | Age: 67
End: 2022-08-30

## 2022-08-30 LAB
ATRIAL RATE - MUSE: 80 BPM
DIASTOLIC BLOOD PRESSURE - MUSE: NORMAL MMHG
INTERPRETATION ECG - MUSE: NORMAL
P AXIS - MUSE: 66 DEGREES
PR INTERVAL - MUSE: 156 MS
QRS DURATION - MUSE: 96 MS
QT - MUSE: 370 MS
QTC - MUSE: 426 MS
R AXIS - MUSE: 95 DEGREES
SYSTOLIC BLOOD PRESSURE - MUSE: NORMAL MMHG
T AXIS - MUSE: 64 DEGREES
VENTRICULAR RATE- MUSE: 80 BPM

## 2022-08-31 ENCOUNTER — TELEPHONE (OUTPATIENT)
Dept: NEUROSURGERY | Facility: OTHER | Age: 67
End: 2022-08-31

## 2022-08-31 NOTE — TELEPHONE ENCOUNTER
"Esperanza from utilization department states procedure scheduled with Dr. Leija needs to be changed/coded as \"sameday\"    If you have questions call her at [phone]  "

## 2022-09-01 ENCOUNTER — HOSPITAL ENCOUNTER (OUTPATIENT)
Dept: PHYSICAL THERAPY | Facility: OTHER | Age: 67
Setting detail: THERAPIES SERIES
Discharge: HOME | End: 2022-09-01
Attending: PSYCHIATRY & NEUROLOGY
Payer: MEDICARE

## 2022-09-01 PROCEDURE — 97140 MANUAL THERAPY 1/> REGIONS: CPT | Mod: GP,PO,KX

## 2022-09-01 PROCEDURE — 97530 THERAPEUTIC ACTIVITIES: CPT | Mod: GP,PO,KX

## 2022-09-02 NOTE — PROGRESS NOTES
PTA medications updated by Medication Scribe prior to surgery via phone call with patient (last doses completed by Nurse)     Medication history sources: Patient, Surescripts and H&P  In the past week, patient estimated taking medication this percent of the time: Greater than 90%  Adherence assessment: N/A Not Observed    Significant changes made to the medication list:  Patient reports no longer taking the following meds (med scribe removed from PTA med list): Tums, Bentyl      Additional medication history information:   None    Medication reconciliation completed by provider prior to medication history? No    Time spent in this activity: 40 minutes    The information provided in this note is only as accurate as the sources available at the time of update(s)      Prior to Admission medications    Medication Sig Last Dose Taking? Auth Provider Long Term End Date   Aspirin Buf,CaCarb-MgCarb-MgO, 81 MG TABS Take 81 mg by mouth every evening  at PM Yes Reported, Patient     BACLOFEN PO Take 10 mg by mouth 2 times daily  at AM Yes Reported, Patient     calcium carbonate 600 mg-vitamin D 400 units (CALTRATE) 600-400 MG-UNIT per tablet Take 1 tablet by mouth 2 times daily 8/25/2022 at PM Yes Reported, Patient     Cyanocobalamin 1000 MCG CAPS Take 1 capsule by mouth every morning  at AM Yes Reported, Patient     Docusate Sodium (COLACE PO) Take 100 mg by mouth every evening  at PM Yes Reported, Patient     lisinopril (ZESTRIL) 10 MG tablet TAKE 1 TABLET BY MOUTH EVERY DAY  Patient taking differently: Take 10 mg by mouth every morning  at AM Yes Kayla Hobbs MD Yes    meclizine 25 MG CHEW Take 25 mg by mouth as needed PRN Yes Reported, Patient     oxybutynin ER (DITROPAN-XL) 10 MG 24 hr tablet Take 1 tablet (10 mg) by mouth daily  Patient taking differently: Take 10 mg by mouth every morning  at AM Yes Olu Saldivar MD     psyllium 0.52 G capsule Take 3 capsules by mouth 2 times daily  at AM Yes  Reported, Patient

## 2022-09-03 ENCOUNTER — ALLIED HEALTH/NURSE VISIT (OUTPATIENT)
Dept: FAMILY MEDICINE | Facility: OTHER | Age: 67
End: 2022-09-03
Attending: NEUROLOGICAL SURGERY
Payer: MEDICARE

## 2022-09-03 DIAGNOSIS — Z20.822 COVID-19 RULED OUT: Primary | ICD-10-CM

## 2022-09-03 PROCEDURE — C9803 HOPD COVID-19 SPEC COLLECT: HCPCS

## 2022-09-03 PROCEDURE — U0003 INFECTIOUS AGENT DETECTION BY NUCLEIC ACID (DNA OR RNA); SEVERE ACUTE RESPIRATORY SYNDROME CORONAVIRUS 2 (SARS-COV-2) (CORONAVIRUS DISEASE [COVID-19]), AMPLIFIED PROBE TECHNIQUE, MAKING USE OF HIGH THROUGHPUT TECHNOLOGIES AS DESCRIBED BY CMS-2020-01-R: HCPCS | Mod: ZL

## 2022-09-04 LAB — SARS-COV-2 RNA RESP QL NAA+PROBE: NEGATIVE

## 2022-09-05 ENCOUNTER — ANESTHESIA EVENT (OUTPATIENT)
Dept: SURGERY | Facility: CLINIC | Age: 67
End: 2022-09-05
Payer: MEDICARE

## 2022-09-05 ASSESSMENT — LIFESTYLE VARIABLES: TOBACCO_USE: 0

## 2022-09-05 ASSESSMENT — COPD QUESTIONNAIRES: COPD: 0

## 2022-09-06 ENCOUNTER — HOSPITAL ENCOUNTER (OUTPATIENT)
Facility: CLINIC | Age: 67
Discharge: NURSING, REHAB OR LONG-TERM CARE | End: 2022-09-09
Attending: NEUROLOGICAL SURGERY | Admitting: NEUROLOGICAL SURGERY
Payer: MEDICARE

## 2022-09-06 ENCOUNTER — ANESTHESIA (OUTPATIENT)
Dept: SURGERY | Facility: CLINIC | Age: 67
End: 2022-09-06
Payer: MEDICARE

## 2022-09-06 ENCOUNTER — APPOINTMENT (OUTPATIENT)
Dept: GENERAL RADIOLOGY | Facility: CLINIC | Age: 67
End: 2022-09-06
Attending: NEUROLOGICAL SURGERY
Payer: MEDICARE

## 2022-09-06 DIAGNOSIS — Z98.890 S/P LAMINECTOMY: Primary | ICD-10-CM

## 2022-09-06 LAB — POTASSIUM BLD-SCNC: 3.8 MMOL/L (ref 3.4–5.3)

## 2022-09-06 PROCEDURE — 922N000001 HC NEURO MONITORING SERVICE, UP TO 7 HOURS (T1FEE): Performed by: NEUROLOGICAL SURGERY

## 2022-09-06 PROCEDURE — 250N000013 HC RX MED GY IP 250 OP 250 PS 637: Performed by: PHYSICIAN ASSISTANT

## 2022-09-06 PROCEDURE — 258N000003 HC RX IP 258 OP 636

## 2022-09-06 PROCEDURE — 272N000282 HC OR IOM SUPPLIES OPNP: Performed by: NEUROLOGICAL SURGERY

## 2022-09-06 PROCEDURE — 63046 LAM FACETEC & FORAMOT THRC: CPT | Mod: AS | Performed by: PHYSICIAN ASSISTANT

## 2022-09-06 PROCEDURE — 36415 COLL VENOUS BLD VENIPUNCTURE: CPT | Performed by: ANESTHESIOLOGY

## 2022-09-06 PROCEDURE — 250N000011 HC RX IP 250 OP 636: Performed by: PHYSICIAN ASSISTANT

## 2022-09-06 PROCEDURE — 63048 LAM FACETEC &FORAMOT EA ADDL: CPT | Performed by: NEUROLOGICAL SURGERY

## 2022-09-06 PROCEDURE — 63046 LAM FACETEC & FORAMOT THRC: CPT | Performed by: NEUROLOGICAL SURGERY

## 2022-09-06 PROCEDURE — 250N000025 HC SEVOFLURANE, PER MIN: Performed by: NEUROLOGICAL SURGERY

## 2022-09-06 PROCEDURE — 258N000003 HC RX IP 258 OP 636: Performed by: PHYSICIAN ASSISTANT

## 2022-09-06 PROCEDURE — 999N000141 HC STATISTIC PRE-PROCEDURE NURSING ASSESSMENT: Performed by: NEUROLOGICAL SURGERY

## 2022-09-06 PROCEDURE — 250N000009 HC RX 250

## 2022-09-06 PROCEDURE — 250N000011 HC RX IP 250 OP 636: Performed by: NURSE PRACTITIONER

## 2022-09-06 PROCEDURE — 272N000001 HC OR GENERAL SUPPLY STERILE: Performed by: NEUROLOGICAL SURGERY

## 2022-09-06 PROCEDURE — 250N000011 HC RX IP 250 OP 636: Performed by: NEUROLOGICAL SURGERY

## 2022-09-06 PROCEDURE — 370N000017 HC ANESTHESIA TECHNICAL FEE, PER MIN: Performed by: NEUROLOGICAL SURGERY

## 2022-09-06 PROCEDURE — 710N000010 HC RECOVERY PHASE 1, LEVEL 2, PER MIN: Performed by: NEUROLOGICAL SURGERY

## 2022-09-06 PROCEDURE — 84132 ASSAY OF SERUM POTASSIUM: CPT | Performed by: ANESTHESIOLOGY

## 2022-09-06 PROCEDURE — 250N000009 HC RX 250: Performed by: NEUROLOGICAL SURGERY

## 2022-09-06 PROCEDURE — 258N000003 HC RX IP 258 OP 636: Performed by: ANESTHESIOLOGY

## 2022-09-06 PROCEDURE — 360N000084 HC SURGERY LEVEL 4 W/ FLUORO, PER MIN: Performed by: NEUROLOGICAL SURGERY

## 2022-09-06 PROCEDURE — 999N000179 XR SURGERY CARM FLUORO LESS THAN 5 MIN W STILLS

## 2022-09-06 PROCEDURE — 63048 LAM FACETEC &FORAMOT EA ADDL: CPT | Mod: AS | Performed by: PHYSICIAN ASSISTANT

## 2022-09-06 PROCEDURE — 250N000011 HC RX IP 250 OP 636: Performed by: NURSE ANESTHETIST, CERTIFIED REGISTERED

## 2022-09-06 PROCEDURE — 250N000011 HC RX IP 250 OP 636: Performed by: ANESTHESIOLOGY

## 2022-09-06 PROCEDURE — 250N000009 HC RX 250: Performed by: ANESTHESIOLOGY

## 2022-09-06 PROCEDURE — 250N000011 HC RX IP 250 OP 636

## 2022-09-06 RX ORDER — DEXAMETHASONE 1 MG
1 TABLET ORAL DAILY
Status: DISCONTINUED | OUTPATIENT
Start: 2022-09-16 | End: 2022-09-09 | Stop reason: HOSPADM

## 2022-09-06 RX ORDER — ONDANSETRON 2 MG/ML
4 INJECTION INTRAMUSCULAR; INTRAVENOUS EVERY 6 HOURS PRN
Status: DISCONTINUED | OUTPATIENT
Start: 2022-09-06 | End: 2022-09-09 | Stop reason: HOSPADM

## 2022-09-06 RX ORDER — DEXAMETHASONE 2 MG/1
4 TABLET ORAL 2 TIMES DAILY
Status: DISCONTINUED | OUTPATIENT
Start: 2022-09-07 | End: 2022-09-09 | Stop reason: HOSPADM

## 2022-09-06 RX ORDER — OXYCODONE HYDROCHLORIDE 5 MG/1
5-10 TABLET ORAL
Qty: 30 TABLET | Refills: 0 | Status: SHIPPED | OUTPATIENT
Start: 2022-09-06 | End: 2022-09-09

## 2022-09-06 RX ORDER — DEXAMETHASONE 2 MG/1
4 TABLET ORAL EVERY 6 HOURS SCHEDULED
Status: COMPLETED | OUTPATIENT
Start: 2022-09-06 | End: 2022-09-07

## 2022-09-06 RX ORDER — FENTANYL CITRATE 0.05 MG/ML
50 INJECTION, SOLUTION INTRAMUSCULAR; INTRAVENOUS
Status: DISCONTINUED | OUTPATIENT
Start: 2022-09-06 | End: 2022-09-06

## 2022-09-06 RX ORDER — SODIUM CHLORIDE, SODIUM LACTATE, POTASSIUM CHLORIDE, CALCIUM CHLORIDE 600; 310; 30; 20 MG/100ML; MG/100ML; MG/100ML; MG/100ML
INJECTION, SOLUTION INTRAVENOUS CONTINUOUS
Status: DISCONTINUED | OUTPATIENT
Start: 2022-09-06 | End: 2022-09-07

## 2022-09-06 RX ORDER — OXYCODONE HYDROCHLORIDE 5 MG/1
10 TABLET ORAL EVERY 4 HOURS PRN
Status: DISCONTINUED | OUTPATIENT
Start: 2022-09-06 | End: 2022-09-09 | Stop reason: HOSPADM

## 2022-09-06 RX ORDER — ONDANSETRON 4 MG/1
4 TABLET, ORALLY DISINTEGRATING ORAL EVERY 30 MIN PRN
Status: DISCONTINUED | OUTPATIENT
Start: 2022-09-06 | End: 2022-09-06 | Stop reason: HOSPADM

## 2022-09-06 RX ORDER — LIDOCAINE HYDROCHLORIDE 20 MG/ML
INJECTION, SOLUTION INFILTRATION; PERINEURAL PRN
Status: DISCONTINUED | OUTPATIENT
Start: 2022-09-06 | End: 2022-09-06

## 2022-09-06 RX ORDER — SODIUM CHLORIDE 9 MG/ML
INJECTION, SOLUTION INTRAVENOUS CONTINUOUS PRN
Status: DISCONTINUED | OUTPATIENT
Start: 2022-09-06 | End: 2022-09-06

## 2022-09-06 RX ORDER — AMOXICILLIN 250 MG
1-2 CAPSULE ORAL 2 TIMES DAILY
Qty: 30 TABLET | Refills: 0 | Status: SHIPPED | OUTPATIENT
Start: 2022-09-06 | End: 2022-09-09

## 2022-09-06 RX ORDER — ONDANSETRON 2 MG/ML
INJECTION INTRAMUSCULAR; INTRAVENOUS PRN
Status: DISCONTINUED | OUTPATIENT
Start: 2022-09-06 | End: 2022-09-06

## 2022-09-06 RX ORDER — LIDOCAINE 40 MG/G
CREAM TOPICAL
Status: DISCONTINUED | OUTPATIENT
Start: 2022-09-06 | End: 2022-09-06

## 2022-09-06 RX ORDER — FENTANYL CITRATE 50 UG/ML
25 INJECTION, SOLUTION INTRAMUSCULAR; INTRAVENOUS EVERY 5 MIN PRN
Status: DISCONTINUED | OUTPATIENT
Start: 2022-09-06 | End: 2022-09-06 | Stop reason: HOSPADM

## 2022-09-06 RX ORDER — HYDROMORPHONE HCL IN WATER/PF 6 MG/30 ML
0.2 PATIENT CONTROLLED ANALGESIA SYRINGE INTRAVENOUS EVERY 5 MIN PRN
Status: DISCONTINUED | OUTPATIENT
Start: 2022-09-06 | End: 2022-09-06 | Stop reason: HOSPADM

## 2022-09-06 RX ORDER — ONDANSETRON 4 MG/1
4 TABLET, ORALLY DISINTEGRATING ORAL EVERY 6 HOURS PRN
Status: DISCONTINUED | OUTPATIENT
Start: 2022-09-06 | End: 2022-09-09 | Stop reason: HOSPADM

## 2022-09-06 RX ORDER — HYDROXYZINE HYDROCHLORIDE 10 MG/1
10 TABLET, FILM COATED ORAL EVERY 6 HOURS PRN
Status: DISCONTINUED | OUTPATIENT
Start: 2022-09-06 | End: 2022-09-09 | Stop reason: HOSPADM

## 2022-09-06 RX ORDER — HYDROXYZINE HYDROCHLORIDE 10 MG/1
10 TABLET, FILM COATED ORAL EVERY 6 HOURS PRN
Qty: 30 TABLET | Refills: 0 | Status: SHIPPED | OUTPATIENT
Start: 2022-09-06 | End: 2022-09-09

## 2022-09-06 RX ORDER — FENTANYL CITRATE 50 UG/ML
INJECTION, SOLUTION INTRAMUSCULAR; INTRAVENOUS PRN
Status: DISCONTINUED | OUTPATIENT
Start: 2022-09-06 | End: 2022-09-06

## 2022-09-06 RX ORDER — HYDROMORPHONE HCL IN WATER/PF 6 MG/30 ML
0.4 PATIENT CONTROLLED ANALGESIA SYRINGE INTRAVENOUS
Status: DISCONTINUED | OUTPATIENT
Start: 2022-09-06 | End: 2022-09-09 | Stop reason: HOSPADM

## 2022-09-06 RX ORDER — NALOXONE HYDROCHLORIDE 0.4 MG/ML
0.4 INJECTION, SOLUTION INTRAMUSCULAR; INTRAVENOUS; SUBCUTANEOUS
Status: DISCONTINUED | OUTPATIENT
Start: 2022-09-06 | End: 2022-09-09 | Stop reason: HOSPADM

## 2022-09-06 RX ORDER — OXYCODONE HYDROCHLORIDE 5 MG/1
5 TABLET ORAL EVERY 4 HOURS PRN
Status: DISCONTINUED | OUTPATIENT
Start: 2022-09-06 | End: 2022-09-06 | Stop reason: HOSPADM

## 2022-09-06 RX ORDER — NALOXONE HYDROCHLORIDE 0.4 MG/ML
0.2 INJECTION, SOLUTION INTRAMUSCULAR; INTRAVENOUS; SUBCUTANEOUS
Status: DISCONTINUED | OUTPATIENT
Start: 2022-09-06 | End: 2022-09-09 | Stop reason: HOSPADM

## 2022-09-06 RX ORDER — ONDANSETRON 2 MG/ML
4 INJECTION INTRAMUSCULAR; INTRAVENOUS EVERY 30 MIN PRN
Status: DISCONTINUED | OUTPATIENT
Start: 2022-09-06 | End: 2022-09-06 | Stop reason: HOSPADM

## 2022-09-06 RX ORDER — FAMOTIDINE 20 MG/1
20 TABLET, FILM COATED ORAL 2 TIMES DAILY
Status: DISCONTINUED | OUTPATIENT
Start: 2022-09-06 | End: 2022-09-09 | Stop reason: HOSPADM

## 2022-09-06 RX ORDER — PROPOFOL 10 MG/ML
INJECTION, EMULSION INTRAVENOUS PRN
Status: DISCONTINUED | OUTPATIENT
Start: 2022-09-06 | End: 2022-09-06

## 2022-09-06 RX ORDER — EPHEDRINE SULFATE 50 MG/ML
INJECTION, SOLUTION INTRAMUSCULAR; INTRAVENOUS; SUBCUTANEOUS PRN
Status: DISCONTINUED | OUTPATIENT
Start: 2022-09-06 | End: 2022-09-06

## 2022-09-06 RX ORDER — LISINOPRIL 10 MG/1
10 TABLET ORAL EVERY MORNING
Status: DISCONTINUED | OUTPATIENT
Start: 2022-09-07 | End: 2022-09-09 | Stop reason: HOSPADM

## 2022-09-06 RX ORDER — FENTANYL CITRATE 50 UG/ML
25 INJECTION, SOLUTION INTRAMUSCULAR; INTRAVENOUS
Status: DISCONTINUED | OUTPATIENT
Start: 2022-09-06 | End: 2022-09-06 | Stop reason: HOSPADM

## 2022-09-06 RX ORDER — DEXAMETHASONE 2 MG/1
2 TABLET ORAL 2 TIMES DAILY
Status: DISCONTINUED | OUTPATIENT
Start: 2022-09-10 | End: 2022-09-09 | Stop reason: HOSPADM

## 2022-09-06 RX ORDER — SODIUM CHLORIDE, SODIUM LACTATE, POTASSIUM CHLORIDE, CALCIUM CHLORIDE 600; 310; 30; 20 MG/100ML; MG/100ML; MG/100ML; MG/100ML
INJECTION, SOLUTION INTRAVENOUS CONTINUOUS
Status: DISCONTINUED | OUTPATIENT
Start: 2022-09-06 | End: 2022-09-06 | Stop reason: HOSPADM

## 2022-09-06 RX ORDER — ACETAMINOPHEN 325 MG/1
650 TABLET ORAL EVERY 6 HOURS PRN
Status: DISCONTINUED | OUTPATIENT
Start: 2022-09-06 | End: 2022-09-09 | Stop reason: HOSPADM

## 2022-09-06 RX ORDER — PROPOFOL 10 MG/ML
INJECTION, EMULSION INTRAVENOUS CONTINUOUS PRN
Status: DISCONTINUED | OUTPATIENT
Start: 2022-09-06 | End: 2022-09-06

## 2022-09-06 RX ORDER — CEFAZOLIN SODIUM/WATER 2 G/20 ML
2 SYRINGE (ML) INTRAVENOUS SEE ADMIN INSTRUCTIONS
Status: DISCONTINUED | OUTPATIENT
Start: 2022-09-06 | End: 2022-09-06

## 2022-09-06 RX ORDER — SODIUM CHLORIDE, SODIUM LACTATE, POTASSIUM CHLORIDE, CALCIUM CHLORIDE 600; 310; 30; 20 MG/100ML; MG/100ML; MG/100ML; MG/100ML
INJECTION, SOLUTION INTRAVENOUS CONTINUOUS
Status: DISCONTINUED | OUTPATIENT
Start: 2022-09-06 | End: 2022-09-09 | Stop reason: HOSPADM

## 2022-09-06 RX ORDER — PROCHLORPERAZINE MALEATE 5 MG
5 TABLET ORAL EVERY 6 HOURS PRN
Status: DISCONTINUED | OUTPATIENT
Start: 2022-09-06 | End: 2022-09-09 | Stop reason: HOSPADM

## 2022-09-06 RX ORDER — MEPERIDINE HYDROCHLORIDE 25 MG/ML
12.5 INJECTION INTRAMUSCULAR; INTRAVENOUS; SUBCUTANEOUS
Status: DISCONTINUED | OUTPATIENT
Start: 2022-09-06 | End: 2022-09-06 | Stop reason: HOSPADM

## 2022-09-06 RX ORDER — OXYBUTYNIN CHLORIDE 10 MG/1
10 TABLET, EXTENDED RELEASE ORAL EVERY MORNING
Status: DISCONTINUED | OUTPATIENT
Start: 2022-09-07 | End: 2022-09-09 | Stop reason: HOSPADM

## 2022-09-06 RX ORDER — BUPIVACAINE HYDROCHLORIDE AND EPINEPHRINE 5; 5 MG/ML; UG/ML
INJECTION, SOLUTION PERINEURAL PRN
Status: DISCONTINUED | OUTPATIENT
Start: 2022-09-06 | End: 2022-09-06 | Stop reason: HOSPADM

## 2022-09-06 RX ORDER — DEXAMETHASONE SODIUM PHOSPHATE 4 MG/ML
INJECTION, SOLUTION INTRA-ARTICULAR; INTRALESIONAL; INTRAMUSCULAR; INTRAVENOUS; SOFT TISSUE PRN
Status: DISCONTINUED | OUTPATIENT
Start: 2022-09-06 | End: 2022-09-06

## 2022-09-06 RX ORDER — OXYCODONE HYDROCHLORIDE 5 MG/1
5 TABLET ORAL EVERY 4 HOURS PRN
Status: DISCONTINUED | OUTPATIENT
Start: 2022-09-06 | End: 2022-09-09 | Stop reason: HOSPADM

## 2022-09-06 RX ORDER — CEFAZOLIN SODIUM/WATER 2 G/20 ML
2 SYRINGE (ML) INTRAVENOUS
Status: COMPLETED | OUTPATIENT
Start: 2022-09-06 | End: 2022-09-06

## 2022-09-06 RX ORDER — HYDROMORPHONE HCL IN WATER/PF 6 MG/30 ML
.2-.5 PATIENT CONTROLLED ANALGESIA SYRINGE INTRAVENOUS
Status: DISCONTINUED | OUTPATIENT
Start: 2022-09-06 | End: 2022-09-09 | Stop reason: HOSPADM

## 2022-09-06 RX ORDER — BACLOFEN 10 MG/1
10 TABLET ORAL 2 TIMES DAILY
Status: DISCONTINUED | OUTPATIENT
Start: 2022-09-06 | End: 2022-09-09 | Stop reason: HOSPADM

## 2022-09-06 RX ORDER — DEXAMETHASONE 1 MG
1 TABLET ORAL 2 TIMES DAILY
Status: DISCONTINUED | OUTPATIENT
Start: 2022-09-13 | End: 2022-09-09 | Stop reason: HOSPADM

## 2022-09-06 RX ORDER — LIDOCAINE 40 MG/G
CREAM TOPICAL
Status: DISCONTINUED | OUTPATIENT
Start: 2022-09-06 | End: 2022-09-09 | Stop reason: HOSPADM

## 2022-09-06 RX ADMIN — FENTANYL CITRATE 25 MCG: 50 INJECTION, SOLUTION INTRAMUSCULAR; INTRAVENOUS at 15:50

## 2022-09-06 RX ADMIN — Medication 2 G: at 12:43

## 2022-09-06 RX ADMIN — REMIFENTANIL HYDROCHLORIDE 0.3 MCG/KG/MIN: 1 INJECTION, POWDER, LYOPHILIZED, FOR SOLUTION INTRAVENOUS at 13:59

## 2022-09-06 RX ADMIN — PROPOFOL 150 MCG/KG/MIN: 10 INJECTION, EMULSION INTRAVENOUS at 12:39

## 2022-09-06 RX ADMIN — PROPOFOL 150 MCG/KG/MIN: 10 INJECTION, EMULSION INTRAVENOUS at 13:53

## 2022-09-06 RX ADMIN — PROPOFOL 200 MG: 10 INJECTION, EMULSION INTRAVENOUS at 12:36

## 2022-09-06 RX ADMIN — PHENYLEPHRINE HYDROCHLORIDE 0.4 MCG/KG/MIN: 10 INJECTION INTRAVENOUS at 12:39

## 2022-09-06 RX ADMIN — SODIUM CHLORIDE, POTASSIUM CHLORIDE, SODIUM LACTATE AND CALCIUM CHLORIDE: 600; 310; 30; 20 INJECTION, SOLUTION INTRAVENOUS at 17:25

## 2022-09-06 RX ADMIN — DEXAMETHASONE 4 MG: 2 TABLET ORAL at 21:44

## 2022-09-06 RX ADMIN — FAMOTIDINE 20 MG: 20 TABLET ORAL at 21:43

## 2022-09-06 RX ADMIN — SODIUM CHLORIDE, POTASSIUM CHLORIDE, SODIUM LACTATE AND CALCIUM CHLORIDE: 600; 310; 30; 20 INJECTION, SOLUTION INTRAVENOUS at 11:34

## 2022-09-06 RX ADMIN — FENTANYL CITRATE 25 MCG: 50 INJECTION, SOLUTION INTRAMUSCULAR; INTRAVENOUS at 15:41

## 2022-09-06 RX ADMIN — LIDOCAINE HYDROCHLORIDE 100 MG: 20 INJECTION, SOLUTION INFILTRATION; PERINEURAL at 12:35

## 2022-09-06 RX ADMIN — Medication 10 MG: at 14:58

## 2022-09-06 RX ADMIN — MIDAZOLAM HYDROCHLORIDE 1 MG: 1 INJECTION, SOLUTION INTRAMUSCULAR; INTRAVENOUS at 15:51

## 2022-09-06 RX ADMIN — BACLOFEN 10 MG: 10 TABLET ORAL at 21:43

## 2022-09-06 RX ADMIN — DEXAMETHASONE SODIUM PHOSPHATE 10 MG: 4 INJECTION, SOLUTION INTRA-ARTICULAR; INTRALESIONAL; INTRAMUSCULAR; INTRAVENOUS; SOFT TISSUE at 13:52

## 2022-09-06 RX ADMIN — PHENYLEPHRINE HYDROCHLORIDE 100 MCG: 10 INJECTION INTRAVENOUS at 12:49

## 2022-09-06 RX ADMIN — Medication 20 MG: at 15:54

## 2022-09-06 RX ADMIN — ONDANSETRON 4 MG: 2 INJECTION INTRAMUSCULAR; INTRAVENOUS at 13:52

## 2022-09-06 RX ADMIN — REMIFENTANIL HYDROCHLORIDE 0.1 MCG/KG/MIN: 1 INJECTION, POWDER, LYOPHILIZED, FOR SOLUTION INTRAVENOUS at 12:38

## 2022-09-06 RX ADMIN — PHENYLEPHRINE HYDROCHLORIDE 100 MCG: 10 INJECTION INTRAVENOUS at 12:40

## 2022-09-06 RX ADMIN — Medication 10 MG: at 12:39

## 2022-09-06 RX ADMIN — SODIUM CHLORIDE: 9 INJECTION, SOLUTION INTRAVENOUS at 12:39

## 2022-09-06 RX ADMIN — FENTANYL CITRATE 25 MCG: 50 INJECTION, SOLUTION INTRAMUSCULAR; INTRAVENOUS at 15:36

## 2022-09-06 RX ADMIN — FENTANYL CITRATE 25 MCG: 50 INJECTION, SOLUTION INTRAMUSCULAR; INTRAVENOUS at 15:55

## 2022-09-06 RX ADMIN — FENTANYL CITRATE 100 MCG: 50 INJECTION, SOLUTION INTRAMUSCULAR; INTRAVENOUS at 12:35

## 2022-09-06 ASSESSMENT — ACTIVITIES OF DAILY LIVING (ADL)
TRANSFERRING: 0-->ASSISTANCE NEEDED (DEVELOPMENTALLY APPROPRIATE)
CHANGE_IN_FUNCTIONAL_STATUS_SINCE_ONSET_OF_CURRENT_ILLNESS/INJURY: YES
TOILETING_ISSUES: YES
TOILETING_ASSISTANCE: TOILETING DIFFICULTY, REQUIRES EQUIPMENT
ADLS_ACUITY_SCORE: 46
DRESSING/BATHING_DIFFICULTY: YES
ADLS_ACUITY_SCORE: 41
ADLS_ACUITY_SCORE: 46
WALKING_OR_CLIMBING_STAIRS_DIFFICULTY: YES
TRANSFERRING: 1-->ASSISTANCE (EQUIPMENT/PERSON) NEEDED
DRESSING/BATHING: BATHING DIFFICULTY, REQUIRES EQUIPMENT;BATHING DIFFICULTY, ASSISTANCE 1 PERSON
ADLS_ACUITY_SCORE: 46
VISION_MANAGEMENT: GLASSES
CONCENTRATING,_REMEMBERING_OR_MAKING_DECISIONS_DIFFICULTY: NO
DRESS: 1-->ASSISTANCE (EQUIPMENT/PERSON) NEEDED
DIFFICULTY_EATING/SWALLOWING: NO
ADLS_ACUITY_SCORE: 46
DRESS: 1-->ASSISTANCE (EQUIPMENT/PERSON) NEEDED (NOT DEVELOPMENTALLY APPROPRIATE)
FALL_HISTORY_WITHIN_LAST_SIX_MONTHS: YES
BATHING: 1-->ASSISTANCE NEEDED
ADLS_ACUITY_SCORE: 46
ADLS_ACUITY_SCORE: 46
WALKING_OR_CLIMBING_STAIRS: AMBULATION DIFFICULTY, REQUIRES EQUIPMENT
TOILETING: 1-->ASSISTANCE (EQUIPMENT/PERSON) NEEDED
DOING_ERRANDS_INDEPENDENTLY_DIFFICULTY: YES
TOILETING: 1-->ASSISTANCE (EQUIPMENT/PERSON) NEEDED (NOT DEVELOPMENTALLY APPROPRIATE)
EQUIPMENT_CURRENTLY_USED_AT_HOME: WALKER, ROLLING
WEAR_GLASSES_OR_BLIND: YES
NUMBER_OF_TIMES_PATIENT_HAS_FALLEN_WITHIN_LAST_SIX_MONTHS: 10

## 2022-09-06 NOTE — ANESTHESIA POSTPROCEDURE EVALUATION
Patient: Jamir Gill    Procedure: Procedure(s):  Thoracic 10 to thoracic 12 laminectomies       Anesthesia Type:  General    Note:  Disposition: Inpatient   Postop Pain Control: Uneventful            Sign Out: Well controlled pain   PONV: No   Neuro/Psych: Uneventful            Sign Out: Acceptable/Baseline neuro status   Airway/Respiratory: Uneventful            Sign Out: Acceptable/Baseline resp. status   CV/Hemodynamics: Uneventful            Sign Out: Acceptable CV status   Other NRE: NONE   DID A NON-ROUTINE EVENT OCCUR? No           Last vitals:  Vitals Value Taken Time   /76 09/06/22 1640   Temp 35.9  C (96.7  F) 09/06/22 1630   Pulse 76 09/06/22 1649   Resp 11 09/06/22 1649   SpO2 100 % 09/06/22 1649   Vitals shown include unvalidated device data.    Electronically Signed By: Burke Wilcox MD  September 6, 2022  4:50 PM

## 2022-09-06 NOTE — BRIEF OP NOTE
[facility]    Brief Operative Note    Pre-operative diagnosis: Thoracic spinal stenosis [M48.04]  Post-operative diagnosis Same as pre-operative diagnosis    Procedure: Procedure(s):  Thoracic 10 to thoracic 12 laminectomies  Surgeon: Surgeon(s) and Role:     * Edson Leija MD - Primary     * Glenn Cullen PA-C - Assisting  Anesthesia: General   Estimated Blood Loss: 100 mL    Drains: Suraj-Flores  Specimens: * No specimens in log *  Findings:   Spinal stenosis  Complications: None.  Implants: * No implants in log *      Glenn BETANCOURT [facility] Neurosurgery  [facility]  [address] [place] [id number]    Tel [phone]  Pager [phone]

## 2022-09-06 NOTE — ANESTHESIA PREPROCEDURE EVALUATION
Anesthesia Pre-Procedure Evaluation    Patient: Jamir Gill   MRN: [id number] : 1955        Procedure : Procedure(s):  Thoracic 10 to thoracic 12 laminectomies          Past Medical History:   Diagnosis Date     Cervical disc disorder     No Comments Provided     Enlarged prostate with lower urinary tract symptoms (LUTS)     No Comments Provided     Essential (primary) hypertension     No Comments Provided     Multiple sclerosis (H)     No Comments Provided     Other specified disorders of bone density and structure, unspecified site (CODE)     No Comments Provided     Other symptoms and signs involving cognitive functions and awareness     No Comments Provided     Spondylosis of cervical region without myelopathy or radiculopathy     No Comments Provided      Past Surgical History:   Procedure Laterality Date     APPENDECTOMY OPEN      appendectomy     COLONOSCOPY  2010,with polyps resected     COLONOSCOPY  12/15/2015    12/15/2015,[place]- colitis     COLONOSCOPY N/A 2021    2 sessile serrated and 1 tubular adenoma, 3 year follow up, 2024     COMBINED CYSTOSCOPY, URETEROSCOPY, LASER HOLMIUM LITHOTRIPSY URETER(S) Left 04/10/2018    Procedure: COMBINED CYSTOSCOPY, URETEROSCOPY, LASER HOLMIUM LITHOTRIPSY URETER(S);  Left Ureteroscopy with Holmium Laser Lithotripsy & Stent Placement.;  Surgeon: Olu Saldivar MD;  Location:  OR     CYSTOSCOPY, RETROGRADES, INSERT STENT URETER(S), COMBINED Left 2018    Procedure: COMBINED CYSTOSCOPY, RETROGRADES, INSERT STENT URETER(S);;  Surgeon: Olu Saldivar MD;  Location: [facility] OR     PROSTATE SURGERY      ,PROSTATECTOMY,Laser prostate surgery -- BPH     WRIST SURGERY Left     s/p fracture      No Known Allergies   Social History     Tobacco Use     Smoking status: Never Smoker     Smokeless tobacco: Never Used   Substance Use Topics     Alcohol use: No      Wt Readings from Last 1 Encounters:   22 77.1 kg (170 lb)         Anesthesia Evaluation   Pt has had prior anesthetic. Type: General.    No history of anesthetic complications       ROS/MED HX  ENT/Pulmonary:    (-) tobacco use, asthma, COPD and sleep apnea   Neurologic:     (+) Multiple Sclerosis,     Cardiovascular:     (+) hypertension-----Previous cardiac testing   Echo: Date:  Results:  [id number]  [id number]  [id number]  [id number]^YUMIKO GARDNER^THERESE        [facility]  [address] [place] [address]     Name: JOHNNY LEWIS  MRN: [id number]  : 1955  Study Date: 2019 02:09 PM  Age: 64 yrs  Gender: Male  Patient [facility]: [facility]  Reason For Study: Dyspnea, unspecified type  Ordering Physician: THERESE FRANK  Referring Physician: THERESE FRANK  Performed By: Monique Robin RDCS, RVT     BSA: 1.9 m2  Height: 69 in  Weight: 169 lb  HR: 76  BP: 120/70 mmHg  _____________________________________________________________________________  __        Procedure  Echocardiogram with two-dimensional, color and spectral Doppler performed.  _____________________________________________________________________________  __        Interpretation Summary  No cardiac cause of dyspnea identified.  Normal left and right ventricular size, thickness, global, and regional  systolic function, LVEF=60-65%.  Normal LV diastolic function with normal filling pressures.  No significant valvular abnormalities are noted.  Estimated pulmonary artery systolic pressure is normal.  No pericardial effusion is present.  Previous study not available for comparison.  _____________________________________________________________________________  __        Left Ventricle  Left ventricular function, chamber size, wall motion, and wall thickness are  normal.The EF is 60-65%. Left ventricular diastolic function is normal.  Diastolic Doppler findings (E/E' ratio and/or other parameters) suggest left  ventricular filling pressures  are normal.     Right Ventricle  Right ventricular function, chamber size, wall motion, and thickness are  normal.     Atria  Both atria appear normal. The atrial septum is intact as assessed by color  Doppler .     Mitral Valve  The mitral valve is normal. Trace mitral insufficiency is present.     Aortic Valve  Aortic valve is normal in structure and function. Mild aortic valve  calcification is present. The aortic valve is tricuspid.        Tricuspid Valve  The tricuspid valve is normal. Trace tricuspid insufficiency is present.  Pulmonary artery systolic pressure is normal. The right ventricular systolic  pressure is approximated at 17.3 mmHg plus the right atrial pressure.     Pulmonic Valve  The pulmonic valve is normal.     Vessels  The aorta root is normal. The thoracic aorta is normal. The pulmonary artery  cannot be assessed. The inferior vena cava was normal in size with preserved  respiratory variability. IVC diameter <2.1 cm collapsing >50% with sniff  suggests a normal RA pressure of 3 mmHg.     Pericardium  No pericardial effusion is present.     Compared to Previous Study  Previous study not available for comparison.     _____________________________________________________________________________  Stress Test: Date: Results:    ECG Reviewed: Date: Results:    Cath: Date: Results:   (-) CAD   METS/Exercise Tolerance:     Hematologic:  - neg hematologic  ROS     Musculoskeletal:       GI/Hepatic:    (-) GERD and liver disease   Renal/Genitourinary:    (-) renal disease   Endo:    (-) Type I DM and Type II DM   Psychiatric/Substance Use:       Infectious Disease:       Malignancy:       Other:            Physical Exam    Airway        Mallampati: III   TM distance: > 3 FB   Neck ROM: full   Mouth opening: > 3 cm    Respiratory Devices and Support         Dental  no notable dental history         Cardiovascular   cardiovascular exam normal          Pulmonary   pulmonary exam normal                 OUTSIDE LABS:  CBC:   Lab Results   Component Value Date    WBC 5.2 06/10/2022    WBC 5.6 07/03/2020    HGB 14.9 08/29/2022    HGB 14.3 06/10/2022    HCT 43.5 06/10/2022    HCT 43.4 07/03/2020     06/10/2022     07/03/2020     BMP:   Lab Results   Component Value Date     08/29/2022     04/15/2021    POTASSIUM 4.2 08/29/2022    POTASSIUM 4.3 04/15/2021    CHLORIDE 103 08/29/2022    CHLORIDE 102 04/15/2021    CO2 31 08/29/2022    CO2 32 (H) 04/15/2021    BUN 22 08/29/2022    BUN 27 (H) 04/15/2021    CR 0.94 08/29/2022    CR 1.04 04/15/2021    GLC 75 08/29/2022    GLC 82 04/15/2021     COAGS: No results found for: PTT, INR, FIBR  POC: No results found for: BGM, HCG, HCGS  HEPATIC:   Lab Results   Component Value Date    ALBUMIN 4.0 07/03/2020    PROTTOTAL 6.1 (L) 07/03/2020    ALT 14 07/03/2020    AST 16 07/03/2020    ALKPHOS 69 07/03/2020    BILITOTAL 0.4 07/03/2020    BILIDIRECT 0.07 06/24/2015     OTHER:   Lab Results   Component Value Date    LACT 1.5 04/03/2018    ОЛЬГА 9.6 08/29/2022    PHOS 3.8 06/24/2015    LIPASE 7 (L) 04/03/2018    TSH 1.61 06/10/2022       Anesthesia Plan    ASA Status:  2   NPO Status:  NPO Appropriate    Anesthesia Type: General.     - Airway: ETT   Induction: Intravenous.   Maintenance: TIVA.   Techniques and Equipment:     - Lines/Monitors: 2nd IV     Consents    Anesthesia Plan(s) and associated risks, benefits, and realistic alternatives discussed. Questions answered and patient/representative(s) expressed understanding.    - Discussed:     - Discussed with:  Patient      - Extended Intubation/Ventilatory Support Discussed: No.      - Patient is DNR/DNI Status: No    Use of blood products discussed: Yes.     - Discussed with: Patient.     - Consented: consented to blood products            Reason for refusal: other.     Postoperative Care    Pain management: Multi-modal analgesia.   PONV prophylaxis: Ondansetron (or other 5HT-3), Background Propofol  Infusion     Comments:                Burke Wilcox MD

## 2022-09-06 NOTE — ANESTHESIA CARE TRANSFER NOTE
Patient: Jamir Gill    Procedure: Procedure(s):  Thoracic 10 to thoracic 12 laminectomies       Diagnosis: Thoracic spinal stenosis [M48.04]  Diagnosis Additional Information: No value filed.    Anesthesia Type:   General     Note:    Oropharynx: oropharynx clear of all foreign objects  Level of Consciousness: awake  Oxygen Supplementation: face mask  Level of Supplemental Oxygen (L/min / FiO2): 6  Independent Airway: airway patency satisfactory and stable  Dentition: dentition unchanged  Vital Signs Stable: post-procedure vital signs reviewed and stable  Report to RN Given: handoff report given  Patient transferred to: PACU    Handoff Report: Identifed the Patient, Identified the Reponsible Provider, Reviewed the pertinent medical history, Discussed the surgical course, Reviewed Intra-OP anesthesia mangement and issues during anesthesia, Set expectations for post-procedure period and Allowed opportunity for questions and acknowledgement of understanding      Vitals:  Vitals Value Taken Time   BP     Temp     Pulse     Resp 28 09/06/22 1529   SpO2 93 % 09/06/22 1529   Vitals shown include unvalidated device data.    Electronically Signed By: CORIE Perez CRNA  September 6, 2022  3:31 PM

## 2022-09-06 NOTE — OP NOTE
Date of surgery: September 6, 2022  Surgeon: Edson Leija MD  Assistant: NICO Carter  Note: Glenn Cullen was present for and assisted with the entire surgery, and his/her role as an assistant was crucial for aid in positioning, exposure, suctioning, retraction, and closure    Preoperative diagnosis: Thoracic myelopathy  Postoperative diagnosis: Thoracic myelopathy    Procedure:  1.  T10-11 bilateral laminectomy and medial facetectomy for decompression of stenosis  2.  T11-12 bilateral laminectomy and medial facetectomy for decompression of stenosis  3.  Use of intraoperative fluoroscopy and neuro-monitoring    EBL: 100 mL    Indications: 67 year old male with history of MS, presented with progressive back pain and leg weakness requiring a wheelchair.  MRI showed severe stenosis at T10-11 and T11-12.  Given that his MS was otherwise under very good control, we discussed the option of surgical decompression.  Risks, benefits, indications, and alternatives were discussed with the patient and family in detail.  All of their questions were answered, and they wished to proceed.    Description of surgery: The patient was positioned prone.  Sterile prepping and draping procedures were performed.  Antibiotics were administered and timeout was performed.  A midline incision was performed.  The monopolar was used to expose the spinous processes, lamina, and medial facets from T10-T12.  Fluoroscopy was used to verify the correct level and confirmed with Radiology intra-operatively.  The Leksell rongeurs was used to remove the spinous processes.  The high speed drill was then used to perform bilateral laminectomies and medial facetectomies at both levels.  The Kerrison rongeurs were then used to remove the Ligamentum flavum with decompression of the thecal sac.  Hemostasis was achieved and antibiotic irrigation was performed.  The fascia was closed with 0-Vicryl sutures, and the dermal layer was closed with 2-0 vicryl sutures.

## 2022-09-06 NOTE — ANESTHESIA PROCEDURE NOTES
Airway       Patient location during procedure: OR       Procedure Start/Stop Times: 9/6/2022 12:38 PM  Staff -        Anesthesiologist:  Burke Wilcox MD       CRNA: Vinny Gurrola APRN CRNA       Performed By: CRNA  Consent for Airway        Urgency: elective  Indications and Patient Condition       Indications for airway management: alejandra-procedural       Induction type:intravenous       Mask difficulty assessment: 1 - vent by mask    Final Airway Details       Final airway type: endotracheal airway       Successful airway: ETT - single  Endotracheal Airway Details        ETT size (mm): 8.0       Cuffed: yes       Successful intubation technique: video laryngoscopy       VL Blade Size: Bone 4       Grade View of Cords: 1       Adjucts: stylet       Position: Right       Measured from: gums/teeth       Secured at (cm): 23       Bite block used: None    Post intubation assessment        Placement verified by: capnometry, equal breath sounds and chest rise        Number of attempts at approach: 1       Number of other approaches attempted: 0       Secured with: pink tape       Ease of procedure: easy       Dentition: Intact and Unchanged    Medication(s) Administered   Medication Administration Time: 9/6/2022 12:38 PM

## 2022-09-06 NOTE — OR NURSING
"Upon arrival to PACU patient restless, in pain, rolling side to side.  Given 100 fentanyl (25mgx4).  Notified Dr Land, anesthiologist, Dr Leija came prior to assess.  Little movement of LE's on command, unable to assess dorsi/flexion ability.  Patient aware was at \"[facility], when Dr Land was here.  Ketamine 20mg, Versed 1mg per Dr land verbal order given.  Patient began to quiet and rest comfortably. VSS, SR. Still not following commands at this point.  "

## 2022-09-07 LAB
GLUCOSE BLDC GLUCOMTR-MCNC: 151 MG/DL (ref 70–99)
LACTATE SERPL-SCNC: 1.4 MMOL/L (ref 0.7–2)

## 2022-09-07 PROCEDURE — 36415 COLL VENOUS BLD VENIPUNCTURE: CPT | Performed by: NEUROLOGICAL SURGERY

## 2022-09-07 PROCEDURE — 250N000011 HC RX IP 250 OP 636: Performed by: NURSE PRACTITIONER

## 2022-09-07 PROCEDURE — 250N000011 HC RX IP 250 OP 636: Performed by: PHYSICIAN ASSISTANT

## 2022-09-07 PROCEDURE — 82962 GLUCOSE BLOOD TEST: CPT

## 2022-09-07 PROCEDURE — 250N000011 HC RX IP 250 OP 636: Performed by: NEUROLOGICAL SURGERY

## 2022-09-07 PROCEDURE — 83605 ASSAY OF LACTIC ACID: CPT | Performed by: NEUROLOGICAL SURGERY

## 2022-09-07 PROCEDURE — 250N000013 HC RX MED GY IP 250 OP 250 PS 637: Performed by: PHYSICIAN ASSISTANT

## 2022-09-07 PROCEDURE — 258N000003 HC RX IP 258 OP 636: Performed by: PHYSICIAN ASSISTANT

## 2022-09-07 RX ORDER — CEFAZOLIN SODIUM 1 G/3ML
1 INJECTION, POWDER, FOR SOLUTION INTRAMUSCULAR; INTRAVENOUS EVERY 8 HOURS
Status: DISCONTINUED | OUTPATIENT
Start: 2022-09-07 | End: 2022-09-09

## 2022-09-07 RX ADMIN — CEFAZOLIN 1 G: 1 INJECTION, POWDER, FOR SOLUTION INTRAMUSCULAR; INTRAVENOUS at 19:14

## 2022-09-07 RX ADMIN — DEXAMETHASONE 4 MG: 2 TABLET ORAL at 09:26

## 2022-09-07 RX ADMIN — CEFAZOLIN 1 G: 1 INJECTION, POWDER, FOR SOLUTION INTRAMUSCULAR; INTRAVENOUS at 12:22

## 2022-09-07 RX ADMIN — OXYBUTYNIN CHLORIDE 10 MG: 10 TABLET, EXTENDED RELEASE ORAL at 09:25

## 2022-09-07 RX ADMIN — DEXAMETHASONE 4 MG: 2 TABLET ORAL at 02:03

## 2022-09-07 RX ADMIN — LISINOPRIL 10 MG: 10 TABLET ORAL at 09:26

## 2022-09-07 RX ADMIN — DEXAMETHASONE 4 MG: 2 TABLET ORAL at 20:02

## 2022-09-07 RX ADMIN — SODIUM CHLORIDE, POTASSIUM CHLORIDE, SODIUM LACTATE AND CALCIUM CHLORIDE: 600; 310; 30; 20 INJECTION, SOLUTION INTRAVENOUS at 02:12

## 2022-09-07 RX ADMIN — BACLOFEN 10 MG: 10 TABLET ORAL at 20:02

## 2022-09-07 RX ADMIN — FAMOTIDINE 20 MG: 20 TABLET ORAL at 20:02

## 2022-09-07 RX ADMIN — DEXAMETHASONE 4 MG: 2 TABLET ORAL at 06:00

## 2022-09-07 RX ADMIN — BACLOFEN 10 MG: 10 TABLET ORAL at 09:26

## 2022-09-07 RX ADMIN — FAMOTIDINE 20 MG: 20 TABLET ORAL at 09:26

## 2022-09-07 ASSESSMENT — ACTIVITIES OF DAILY LIVING (ADL)
ADLS_ACUITY_SCORE: 46

## 2022-09-07 NOTE — PROGRESS NOTES
A/O x4. VSS on RA. PIV SL. Advanced to regular diet. Rossi in place d/t neurological bladder and patent. Up to the chair with 2 strong assist/wlk/gb. RUBIO in place. Mid back dressing marked. CMS intact. BLE numbness/weakness, more on th R leg. Denies pain, but gets spasm on his legs.

## 2022-09-07 NOTE — PLAN OF CARE
Goal Outcome Evaluation:  Patient vital signs are at baseline: yes  Patient able to ambulate as they were prior to admission or with assist devices provided by therapies during their stay:  no  Patient MUST void prior to discharge:  no , use alvarado catheter.   Patient able to tolerate oral intake:  no  Pain has adequate pain control using Oral analgesics:  yes  Alert and oriented X4, VSS, clear liquid diet, LR running 100 ml/hr.  Pt denied pain during the shift.

## 2022-09-07 NOTE — PROGRESS NOTES
"[facility]    Neurosurgery Progress Note    Date of Service (when I saw the patient): 09/07/2022     Assessment & Plan     Procedure(s):  Thoracic 10 to thoracic 12 laminectomies   -1 Day Post-Op  Doing well. Minimal back pain. Has baseline intermittent leg pain/paresthesias as well as weakness right>left, unchanged. No new or worsening symptoms. JPx1 with 40 since insertion. Has not eaten or been out of bed since surgery.    Plan:  -Continue supportive and symptomatic cares  -Routine wound care  -Continue RUBIO drain and antibiotics for now  -Advance diet as tolerated  -Activity as tolerated, appreciate therapies  -Continue Rossi until out of bed  -Pain management as needed   -Decadron taper as ordered    I have discussed the following assessment and plan Dr. Leija who is in agreement with initial plan and will follow up with further consultation recommendations.    Ofelia Zavala, LORAINE  [facility]  [facility]  [address]  [address]  [place] [address]    Tel [phone]  Pager [phone]      Interval History   Stable.    Physical Exam   Temp: 97.9  F (36.6  C) Temp src: Oral BP: 120/72 Pulse: 98   Resp: 20 SpO2: 96 % O2 Device: Nasal cannula Oxygen Delivery: 1 LPM  Vitals:    09/06/22 1115   Weight: 77.2 kg (170 lb 3.2 oz)     Vital Signs with Ranges  Temp:  [96  F (35.6  C)-98  F (36.7  C)] 97.9  F (36.6  C)  Pulse:  [67-98] 98  Resp:  [7-28] 20  BP: (104-135)/(33-97) 120/72  SpO2:  [85 %-100 %] 96 %  I/O last 3 completed shifts:  In: 2000 [I.V.:2000]  Out: 790 [Urine:650; Drains:40; Blood:100]     , Blood pressure 120/72, pulse 98, temperature 97.9  F (36.6  C), temperature source Oral, resp. rate 20, height 1.753 m (5' 9\"), weight 77.2 kg (170 lb 3.2 oz), SpO2 96 %.  170 lbs 3.2 oz  HEENT:  Normocephalic.  PERRLA.    Heart:  No peripheral edema  Lungs:  No SOB  Skin:  Warm and dry, good capillary refill. Incision ALESSIA, RUBIO " intact.  Extremities:  Good radial and dorsalis pedis pulses bilaterally, no edema, cyanosis or clubbing.    NEUROLOGICAL EXAMINATION:   Mental status:  Alert and Oriented x 3, speech is fluent.  Motor:  Right>left leg weakness  Sensation:  Decreased RLE>LLE    Medications     lactated ringers Stopped (09/06/22 1442)     lactated ringers 100 mL/hr at 09/07/22 0212       Baclofen  10 mg Oral BID     dexamethasone  4 mg Oral BID    Followed by     [START ON 9/10/2022] dexamethasone  2 mg Oral BID    Followed by     [START ON 9/13/2022] dexamethasone  1 mg Oral BID    Followed by     [START ON 9/16/2022] dexamethasone  1 mg Oral Daily     famotidine  20 mg Oral BID    Or     famotidine  20 mg Intravenous BID     lisinopril  10 mg Oral QAM     oxybutynin ER  10 mg Oral QAM     sodium chloride (PF)  3 mL Intracatheter Q8H     sodium chloride (PF)  3 mL Intracatheter Q8H       Data     CBC RESULTS:   Recent Labs   Lab Test 08/29/22  0930 06/10/22  1101   WBC  --  5.2   RBC  --  4.88   HGB 14.9 14.3   HCT  --  43.5   MCV  --  89   MCH  --  29.3   MCHC  --  32.9   RDW  --  12.7   PLT  --  215     Basic Metabolic Panel:  Lab Results   Component Value Date     08/29/2022     04/15/2021      Lab Results   Component Value Date    POTASSIUM 3.8 09/06/2022    POTASSIUM 4.3 04/15/2021     Lab Results   Component Value Date    CHLORIDE 103 08/29/2022    CHLORIDE 102 04/15/2021     Lab Results   Component Value Date    ОЛЬГА 9.6 08/29/2022    ОЛЬГА 9.7 04/15/2021     Lab Results   Component Value Date    CO2 31 08/29/2022    CO2 32 04/15/2021     Lab Results   Component Value Date    BUN 22 08/29/2022    BUN 27 04/15/2021     Lab Results   Component Value Date    CR 0.94 08/29/2022    CR 1.04 04/15/2021     Lab Results   Component Value Date     09/07/2022    GLC 75 08/29/2022    GLC 82 04/15/2021     INR:  No results found for: INR

## 2022-09-07 NOTE — PLAN OF CARE
Goal Outcome Evaluation:    Patient vital signs are at baseline: Yes  Patient able to ambulate as they were prior to admission or with assist devices provided by therapies during their stay:  Yes  Patient MUST void prior to discharge:  Yes  Patient able to tolerate oral intake:  Yes  Pain has adequate pain control using Oral analgesics:  Yes  Does patient have an identified :  Yes  Has goal D/C date and time been discussed with patient:  Yes  Patient is alert and oriented X4. VSS on RA with good sat. Up with assist of 2 with Bessie steady. On regular diet with good appetite. PIV saline locked.patient denies pain. Numbness and tingling present baseline. Dressing marked with drainage. RUBIO drain in place with minimum output. Up in chair this shift.  Rossi catheter in place with minimum output. Continue to monitor.

## 2022-09-08 ENCOUNTER — APPOINTMENT (OUTPATIENT)
Dept: PHYSICAL THERAPY | Facility: CLINIC | Age: 67
End: 2022-09-08
Attending: PHYSICIAN ASSISTANT
Payer: MEDICARE

## 2022-09-08 LAB — GLUCOSE BLDC GLUCOMTR-MCNC: 135 MG/DL (ref 70–99)

## 2022-09-08 PROCEDURE — 97530 THERAPEUTIC ACTIVITIES: CPT | Mod: GP | Performed by: PHYSICAL THERAPIST

## 2022-09-08 PROCEDURE — 97162 PT EVAL MOD COMPLEX 30 MIN: CPT | Mod: GP,KX | Performed by: PHYSICAL THERAPIST

## 2022-09-08 PROCEDURE — 250N000013 HC RX MED GY IP 250 OP 250 PS 637: Performed by: PHYSICIAN ASSISTANT

## 2022-09-08 PROCEDURE — 258N000003 HC RX IP 258 OP 636: Performed by: PHYSICIAN ASSISTANT

## 2022-09-08 PROCEDURE — 250N000011 HC RX IP 250 OP 636: Performed by: NURSE PRACTITIONER

## 2022-09-08 PROCEDURE — 82962 GLUCOSE BLOOD TEST: CPT

## 2022-09-08 PROCEDURE — 97116 GAIT TRAINING THERAPY: CPT | Mod: GP | Performed by: PHYSICAL THERAPIST

## 2022-09-08 PROCEDURE — 250N000011 HC RX IP 250 OP 636: Performed by: PHYSICIAN ASSISTANT

## 2022-09-08 RX ORDER — MAGNESIUM CARB/ALUMINUM HYDROX 105-160MG
148 TABLET,CHEWABLE ORAL ONCE
Status: DISCONTINUED | OUTPATIENT
Start: 2022-09-08 | End: 2022-09-08

## 2022-09-08 RX ORDER — BISACODYL 10 MG
10 SUPPOSITORY, RECTAL RECTAL DAILY PRN
Status: DISCONTINUED | OUTPATIENT
Start: 2022-09-08 | End: 2022-09-09 | Stop reason: HOSPADM

## 2022-09-08 RX ORDER — DOCUSATE SODIUM 100 MG/1
100 CAPSULE, LIQUID FILLED ORAL DAILY
Status: DISCONTINUED | OUTPATIENT
Start: 2022-09-08 | End: 2022-09-09 | Stop reason: HOSPADM

## 2022-09-08 RX ADMIN — OXYBUTYNIN CHLORIDE 10 MG: 10 TABLET, EXTENDED RELEASE ORAL at 09:26

## 2022-09-08 RX ADMIN — FAMOTIDINE 20 MG: 20 TABLET ORAL at 09:26

## 2022-09-08 RX ADMIN — FAMOTIDINE 20 MG: 20 TABLET ORAL at 20:14

## 2022-09-08 RX ADMIN — SODIUM CHLORIDE, POTASSIUM CHLORIDE, SODIUM LACTATE AND CALCIUM CHLORIDE: 600; 310; 30; 20 INJECTION, SOLUTION INTRAVENOUS at 06:25

## 2022-09-08 RX ADMIN — DEXAMETHASONE 4 MG: 2 TABLET ORAL at 09:26

## 2022-09-08 RX ADMIN — SODIUM PHOSPHATE, DIBASIC AND SODIUM PHOSPHATE, MONOBASIC 1 ENEMA: 7; 19 ENEMA RECTAL at 14:57

## 2022-09-08 RX ADMIN — CEFAZOLIN 1 G: 1 INJECTION, POWDER, FOR SOLUTION INTRAMUSCULAR; INTRAVENOUS at 04:41

## 2022-09-08 RX ADMIN — LISINOPRIL 10 MG: 10 TABLET ORAL at 09:26

## 2022-09-08 RX ADMIN — CEFAZOLIN 1 G: 1 INJECTION, POWDER, FOR SOLUTION INTRAMUSCULAR; INTRAVENOUS at 11:54

## 2022-09-08 RX ADMIN — BISACODYL 10 MG: 10 SUPPOSITORY RECTAL at 12:42

## 2022-09-08 RX ADMIN — BACLOFEN 10 MG: 10 TABLET ORAL at 20:14

## 2022-09-08 RX ADMIN — CEFAZOLIN 1 G: 1 INJECTION, POWDER, FOR SOLUTION INTRAMUSCULAR; INTRAVENOUS at 20:08

## 2022-09-08 RX ADMIN — DOCUSATE SODIUM 100 MG: 100 CAPSULE, LIQUID FILLED ORAL at 17:56

## 2022-09-08 RX ADMIN — DEXAMETHASONE 4 MG: 2 TABLET ORAL at 20:14

## 2022-09-08 RX ADMIN — BACLOFEN 10 MG: 10 TABLET ORAL at 09:26

## 2022-09-08 ASSESSMENT — ACTIVITIES OF DAILY LIVING (ADL)
ADLS_ACUITY_SCORE: 46
ADLS_ACUITY_SCORE: 56
ADLS_ACUITY_SCORE: 46
ADLS_ACUITY_SCORE: 52

## 2022-09-08 NOTE — PROGRESS NOTES
"   09/08/22 1453   Quick Adds   Type of Visit Initial PT Evaluation   Living Environment   People in Home spouse   Current Living Arrangements house   Home Accessibility no concerns   Transportation Anticipated family or friend will provide   Living Environment Comments Has ramp to enter   Self-Care   Usual Activity Tolerance moderate   Current Activity Tolerance fair   Regular Exercise   (has been going to OP PT)   Equipment Currently Used at Home walker, rolling   Fall history within last six months yes   Number of times patient has fallen within last six months 12  (Wife states, \"All the time\".  Per pt., they are usually controlled falls; either tripping on something or legs give out and pt. slowly lowers himself to the floor.)   Activity/Exercise/Self-Care Comment Given the MS, patient varies daily on how much assistance he needs.  Sometimes he needs help with shoes or getting in/out of bed but usually performs on his own.   General Information   Onset of Illness/Injury or Date of Surgery 09/06/22   Referring Physician Glenn Cullen, GRAHAM   Patient/Family Therapy Goals Statement (PT) Patient and family expressed concern that patient hasn't been mobilizing much since surgery on 9/6/22.  Had no PT orders until late AM today and PT saw ASAP.  Patient and wife concerned as patient's function   Pertinent History of Current Problem (include personal factors and/or comorbidities that impact the POC) Has history of MS with right >left L/E weakness, numbness, tingeling.  Admitted for T10-12 decompression/laminectomy   Existing Precautions/Restrictions fall;spinal   General Observations Lying in bed; agreeable to participate.  Wondering why PT hasn't seen since day of surgery (PT had not been ordered until later AM today)   Cognition   Affect/Mental Status (Cognition) WNL   Orientation Status (Cognition) oriented x 4   Follows Commands (Cognition) WNL   Pain Assessment   Patient Currently in Pain No   Integumentary/Edema "   Integumentary/Edema Comments Bilateral feet more swollen than baseline per pt.   Posture    Posture Forward head position   Range of Motion (ROM)   ROM Comment Difficult to move bilateral knees into flexion prior to log rolling; stiffness associated with MS   Strength (Manual Muscle Testing)   Strength Comments Right L/E hip flexion 2-/5, knee extension and flexion 2/5; difficult to advance right L/E for gait   Bed Mobility   Comment, (Bed Mobility) Supine to sit with strong Mod A   Transfers   Comment, (Transfers) Sit to stand with Mod A   Gait/Stairs (Locomotion)   Distance in Feet (Required for LE Total Joints) 3', 10'   Comment, (Gait/Stairs) Took 2 steps with WW for eval with initially Mod A to brace. Knees remained in flexion bilaterally   Balance   Balance Comments Requiring Mod A initially, progressing to Min A in WW   Coordination   Coordination Comments Difficulty advancing right L/E; incoordination vs. weakness   Clinical Impression   Criteria for Skilled Therapeutic Intervention Yes, treatment indicated   PT Diagnosis (PT) Impaired functional mobility   Influenced by the following impairments MS related symptoms, recent back surgery, weakness, numbness   Functional limitations due to impairments decreased independence and increased caregiver burden in functional mobility   Clinical Presentation (PT Evaluation Complexity) Evolving/Changing   Clinical Presentation Rationale per clinical judgement   Clinical Decision Making (Complexity) moderate complexity   Planned Therapy Interventions (PT) bed mobility training;gait training;patient/family education;ROM (range of motion);transfer training;progressive activity/exercise   Anticipated Equipment Needs at Discharge (PT)   (has 4WW and wc)   Risk & Benefits of therapy have been explained evaluation/treatment results reviewed;care plan/treatment goals reviewed;risks/benefits reviewed;participants included;current/potential barriers reviewed;participants  voiced agreement with care plan;patient;spouse/significant other   PT Discharge Planning   PT Discharge Recommendation (DC Rec) Transitional Care Facility;home with assist;home with home care physical therapy   PT Rationale for DC Rec Patient is currently functioning below baseline (which had recently declined due to back issues).  Currently requiring strong Mod A for bed mobility, Mod to Min for sit to and from stand transfers, and Min to occasional Mod to ambulate with WW.  Requiring therapist to assist at belt plus advance right L/E for each step with that foot.  Per wife and patient, though wife assists at home at times, this is harder and more involved than baseline.  Recommend TCU to increase strength and functional mobility prior to discharge home.  If discharged to home would use WW and WC and need more assistance than wife can give so would recommend a second person.  No second person avaialble per family.   Therapy Certification   Start of care date 09/08/22   Certification date from 09/08/22   Certification date to 10/07/22   Medical Diagnosis decompression/laminectomy/MS   Total Evaluation Time   Total Evaluation Time (Minutes) 15   Physical Therapy Goals   PT Frequency Daily   PT Predicted Duration/Target Date for Goal Attainment 09/13/22   PT: Bed Mobility Supervision/stand-by assist;Rolling;Supine to/from sit;Within precautions   PT: Transfers Supervision/stand-by assist;Sit to/from stand;Bed to/from chair;Within precautions;Assistive device   PT: Gait Supervision/stand-by assist;Rolling walker;50 feet

## 2022-09-08 NOTE — PROGRESS NOTES
Notified provider about indwelling alvarado catheter discussed removal or continued need.    Did provider choose to remove indwelling alvarado catheter? Not yet    Provider's alvarado indication for keeping indwelling alvarado catheter: Retention.    Is there an order for indwelling alvarado catheter? Yes    Pt mentioned that he straight cath himself at home, would like to have Alvarado removed before he discharges. PA noted and ok with that.

## 2022-09-08 NOTE — PROGRESS NOTES
Pt is A&O x 4 . VSS. Lumbar dressing is marked with dry drainage. No further drainage noted. RUBIO intact with 10 ml/drainage. Pt states he has baseline numbness and tingling below his waist. R leg is weaker.  Pt is up with assistance of 2 with use of gait belt and walker. Pt was not out of bed this shift. Rossi intact with good urine output. LR infusing at 25 ml/hr. Tolerating a Regular diet.    left lower quadrant/right lower quadrant

## 2022-09-08 NOTE — CONSULTS
Care Management Initial Consult    General Information  Assessment completed with: Patient, Spouse or significant other, Lani  Type of CM/SW Visit: Initial Assessment    Primary Care Provider verified and updated as needed: Yes   Readmission within the last 30 days: no previous admission in last 30 days      Reason for Consult: discharge planning  Advance Care Planning: Advance Care Planning Reviewed: other (see comments) (No acp docs)          Communication Assessment  Patient's communication style: spoken language (English or Bilingual)    Hearing Difficulty or Deaf: no   Wear Glasses or Blind: yes    Cognitive  Cognitive/Neuro/Behavioral: WDL        Orientation: oriented x 4  Mood/Behavior: calm, cooperative          Living Environment:   People in home: spouse  Lani  Current living Arrangements: house      Able to return to prior arrangements: yes       Family/Social Support:  Care provided by: self  Provides care for: no one  Marital Status:   Wife  Lani       Description of Support System: Supportive, Involved    Support Assessment: Adequate social supports, Adequate family and caregiver support    Current Resources:   Patient receiving home care services: No     Community Resources: None  Equipment currently used at home: walker, rolling  Supplies currently used at home: None    Employment/Financial:  Employment Status: retired        Financial Concerns:             Lifestyle & Psychosocial Needs:  Social Determinants of Health     Tobacco Use: Low Risk      Smoking Tobacco Use: Never Smoker     Smokeless Tobacco Use: Never Used   Alcohol Use: Not on file   Financial Resource Strain: Not on file   Food Insecurity: Not on file   Transportation Needs: Not on file   Physical Activity: Not on file   Stress: Not on file   Social Connections: Not on file   Intimate Partner Violence: Not on file   Depression: Not at risk     PHQ-2 Score: 0   Housing Stability: Not on file       Functional  Status:  Prior to admission patient needed assistance:              Mental Health Status:          Chemical Dependency Status:                Values/Beliefs:  Spiritual, Cultural Beliefs, Voodoo Practices, Values that affect care: no               Additional Information:  Per care management/social work consult for discharge planning, patient was admitted on 9/6/2022 with surgery for thoracic spinal stenosis. Tentative date of discharge is 9/9/2022. Writer saw PT's recommendation for TCU. Writer talked to patient and Lani (spouse) for patient's initial consult. Patient lives in a house in [place] with Lani. Lani said that she helps patient get dress, bath, and toileting on days where he's in a lot of pain. Patient uses a walker for walking. Mentioned tcu recommendation and Lani asked for tcu referrals to be sent near [place]. She said if there aren't any available tcus in [place], then they are fine with referrals to be sent near the hospital.    Writer sent referrals via [facility] to [place], [facility], and [place].    Will continue to follow.    RAE Maza

## 2022-09-08 NOTE — UTILIZATION REVIEW
"Admission Status; Secondary Review Determination     Admission Date: 9/6/2022 10:16 AM       Under the authority of the Utilization Management Committee, the utilization review process indicated a secondary review on the above patient.  The review outcome is based on review of the medical records, discussions with staff, and applying clinical experience noted on the date of the review.        ()      Inpatient Status Appropriate - This patient's medical care is consistent with medical management for inpatient care and reasonable inpatient medical practice.      () Observation Status Appropriate - This patient does not meet hospital inpatient criteria and is placed in observation status. If this patient's primary payer is Medicare and was admitted as an inpatient, Condition Code 44 should be used and patient status changed to \"observation\".   () Admission Status NOT Appropriate - This patient's medical care is not consistent with medical management for Inpatient or Observation Status.        (x) Outpatient Procedure Status Appropriate - Procedure not on Medicare Inpatient list and no complications at the time of this review       RATIONALE FOR DETERMINATION      Brief clinical presentation, information copied from the chart, abbreviated and edited for relevant content:     Other than LOS, no inpatient criteria noted. Mostly a disposition issue.     Jamir Gill is a 67 year old male is status post Thoracic 10 to thoracic 12 laminectomies. Now 2 Days Post-Op with Pain well-controlled. Has not really been up with PT just yet. Minimal drainage. Plan to Continue supportive and symptomatic treatment, physical therapy.   PT recommends discharge, awaiting placement.       In summary, the severity of illness, intensity of service provided, expected length of stay and risk for adverse outcome make the care complex, high risk and appropriate for hospital admission.        The information on this document is developed by " the utilization review team in order for the business office to ensure compliance.  This only denotes the appropriateness of proper admission status and does not reflect the quality of care rendered.         The definitions of Inpatient Status and Observation Status used in making the determination above are those provided in the CMS Coverage Manual, Chapter 1 and Chapter 6, section 70.4.      Sincerely,      Mackenzie Vera MD   Utilization Review/ Case Management  [facility].

## 2022-09-08 NOTE — PROGRESS NOTES
[facility]    Neurosurgery  Daily Post-Op Note    Assessment & Plan   Procedure(s):  Thoracic 10 to thoracic 12 laminectomies   2 Days Post-Op  Pain well-controlled.  Has not really been up with PT just yet. He and spouse are concerned about his constipation, as typically he has a regular regimen at home that works, and he is not getting it here.     RUBIO with 10 mL overnight.     Plan:  -Advance activity as tolerated  -Continue supportive and symptomatic treatment  -Start or continue physical therapy  -remove RUBIO today. Will have PT consult today, and likely discharge this afternoon.     Glenn Cullen PA-C    Interval History   Stable.  Doing well.  Improving slowly.  Pain is reasonably controlled.  No fevers.     Physical Exam   Temp: 98.4  F (36.9  C) Temp src: Oral BP: 129/85 Pulse: 82   Resp: 18 SpO2: 96 % O2 Device: None (Room air)    Vitals:    09/06/22 1115   Weight: 77.2 kg (170 lb 3.2 oz)     Vital Signs with Ranges  Temp:  [98.1  F (36.7  C)-98.5  F (36.9  C)] 98.4  F (36.9  C)  Pulse:  [82-91] 82  Resp:  [15-22] 18  BP: (105-129)/(64-85) 129/85  SpO2:  [94 %-98 %] 96 %  I/O last 3 completed shifts:  In: 360 [P.O.:360]  Out: 3440 [Urine:3400; Drains:40]    Alert and oriented.      Incision: CDI      Medications     lactated ringers 25 mL/hr at 09/08/22 0625        Baclofen  10 mg Oral BID     ceFAZolin  1 g Intravenous Q8H     dexamethasone  4 mg Oral BID    Followed by     [START ON 9/10/2022] dexamethasone  2 mg Oral BID    Followed by     [START ON 9/13/2022] dexamethasone  1 mg Oral BID    Followed by     [START ON 9/16/2022] dexamethasone  1 mg Oral Daily     famotidine  20 mg Oral BID    Or     famotidine  20 mg Intravenous BID     lisinopril  10 mg Oral QAM     magnesium citrate  148 mL Oral Once    Followed by     magnesium citrate  148 mL Oral Once     oxybutynin ER  10 mg Oral QAM     sodium chloride (PF)  3 mL Intracatheter Q8H     sodium chloride (PF)  3 mL  Intracatheter Q8H     sodium phosphate  1 enema Rectal Once           Glenn Cullen PA-C  [facility] Neurosurgery  [address]  [address]  [place] [address]    Tel [phone]  Pager [phone]

## 2022-09-08 NOTE — PROGRESS NOTES
[facility]      OUTPATIENT PHYSICAL THERAPY EVALUATION  PLAN OF TREATMENT FOR OUTPATIENT REHABILITATION  (COMPLETE FOR INITIAL CLAIMS ONLY)  Patient's Last Name, First Name, M.I.  YOB: 1955  JairoJamir MENDOZA                        Provider's Name  [facility] Medical Record No.  [id number]                               Onset Date:  09/06/22   Start of Care Date:  09/08/22      Type:     _X_PT   ___OT   ___SLP Medical Diagnosis:  decompression/laminectomy/MS                        PT Diagnosis:  Impaired functional mobility   Visits from SOC:  1   _________________________________________________________________________________  Plan of Treatment/Functional Goals    Planned Interventions: bed mobility training, gait training, patient/family education, ROM (range of motion), transfer training, progressive activity/exercise     Goals: See Physical Therapy Goals on Care Plan in [facility] electronic health record.    Therapy Frequency: Daily  Predicted Duration of Therapy Intervention: 09/13/22  _________________________________________________________________________________    I CERTIFY THE NEED FOR THESE SERVICES FURNISHED UNDER        THIS PLAN OF TREATMENT AND WHILE UNDER MY CARE     (Physician co-signature of this document indicates review and certification of the therapy plan).              Certification date from: 09/08/22, Certification date to: 10/07/22    Referring Physician: Glenn Cullen PA-C            Initial Assessment        See Physical Therapy evaluation dated 09/08/22 in Epic electronic health record.

## 2022-09-09 VITALS
TEMPERATURE: 98.3 F | WEIGHT: 170.2 LBS | BODY MASS INDEX: 25.21 KG/M2 | OXYGEN SATURATION: 94 % | HEART RATE: 84 BPM | RESPIRATION RATE: 16 BRPM | SYSTOLIC BLOOD PRESSURE: 151 MMHG | HEIGHT: 69 IN | DIASTOLIC BLOOD PRESSURE: 88 MMHG

## 2022-09-09 PROCEDURE — 250N000011 HC RX IP 250 OP 636: Performed by: NURSE PRACTITIONER

## 2022-09-09 PROCEDURE — 250N000013 HC RX MED GY IP 250 OP 250 PS 637: Performed by: PHYSICIAN ASSISTANT

## 2022-09-09 PROCEDURE — 250N000011 HC RX IP 250 OP 636: Performed by: PHYSICIAN ASSISTANT

## 2022-09-09 RX ORDER — DEXAMETHASONE 1 MG
1 TABLET ORAL DAILY
Qty: 3 TABLET | Refills: 0
Start: 2022-09-16 | End: 2022-09-16

## 2022-09-09 RX ORDER — DEXAMETHASONE 4 MG/1
4 TABLET ORAL 2 TIMES DAILY
Qty: 6 TABLET | Refills: 0
Start: 2022-09-09 | End: 2022-09-15

## 2022-09-09 RX ORDER — DEXAMETHASONE 1 MG
1 TABLET ORAL 2 TIMES DAILY
Qty: 6 TABLET | Refills: 0
Start: 2022-09-13 | End: 2023-07-14

## 2022-09-09 RX ORDER — AMOXICILLIN 250 MG
1-2 CAPSULE ORAL 2 TIMES DAILY
Qty: 30 TABLET | Refills: 0 | Status: SHIPPED | OUTPATIENT
Start: 2022-09-09 | End: 2023-09-28

## 2022-09-09 RX ORDER — OXYCODONE HYDROCHLORIDE 5 MG/1
5-10 TABLET ORAL
Qty: 30 TABLET | Refills: 0 | Status: SHIPPED | OUTPATIENT
Start: 2022-09-09 | End: 2022-09-09

## 2022-09-09 RX ORDER — DEXAMETHASONE 2 MG/1
2 TABLET ORAL 2 TIMES DAILY
Qty: 6 TABLET | Refills: 0
Start: 2022-09-10 | End: 2022-09-15

## 2022-09-09 RX ORDER — OXYCODONE HYDROCHLORIDE 5 MG/1
5-10 TABLET ORAL
Qty: 30 TABLET | Refills: 0 | Status: SHIPPED | OUTPATIENT
Start: 2022-09-09 | End: 2022-09-12

## 2022-09-09 RX ORDER — HYDROXYZINE HYDROCHLORIDE 10 MG/1
10 TABLET, FILM COATED ORAL EVERY 6 HOURS PRN
Qty: 30 TABLET | Refills: 0 | Status: SHIPPED | OUTPATIENT
Start: 2022-09-09 | End: 2022-09-12

## 2022-09-09 RX ORDER — DEXAMETHASONE 1 MG
1 TABLET ORAL DAILY
Qty: 3 TABLET | Refills: 0 | Status: SHIPPED | OUTPATIENT
Start: 2022-09-16 | End: 2022-09-09

## 2022-09-09 RX ORDER — DEXAMETHASONE 4 MG/1
4 TABLET ORAL 2 TIMES DAILY
Qty: 6 TABLET | Refills: 0 | Status: SHIPPED | OUTPATIENT
Start: 2022-09-07 | End: 2022-09-09

## 2022-09-09 RX ORDER — DEXAMETHASONE 1 MG
1 TABLET ORAL 2 TIMES DAILY
Qty: 6 TABLET | Refills: 0 | Status: SHIPPED | OUTPATIENT
Start: 2022-09-13 | End: 2022-09-09

## 2022-09-09 RX ORDER — DEXAMETHASONE 2 MG/1
2 TABLET ORAL 2 TIMES DAILY
Qty: 6 TABLET | Refills: 0 | Status: SHIPPED | OUTPATIENT
Start: 2022-09-10 | End: 2022-09-09

## 2022-09-09 RX ADMIN — DEXAMETHASONE 4 MG: 2 TABLET ORAL at 08:12

## 2022-09-09 RX ADMIN — Medication 3 CAPSULE: at 13:53

## 2022-09-09 RX ADMIN — LISINOPRIL 10 MG: 10 TABLET ORAL at 08:13

## 2022-09-09 RX ADMIN — DOCUSATE SODIUM 1 ENEMA: 283 LIQUID RECTAL at 11:56

## 2022-09-09 RX ADMIN — Medication 3 CAPSULE: at 08:12

## 2022-09-09 RX ADMIN — OXYBUTYNIN CHLORIDE 10 MG: 10 TABLET, EXTENDED RELEASE ORAL at 08:12

## 2022-09-09 RX ADMIN — CEFAZOLIN 1 G: 1 INJECTION, POWDER, FOR SOLUTION INTRAMUSCULAR; INTRAVENOUS at 04:48

## 2022-09-09 RX ADMIN — FAMOTIDINE 20 MG: 20 TABLET ORAL at 08:12

## 2022-09-09 RX ADMIN — BACLOFEN 10 MG: 10 TABLET ORAL at 08:13

## 2022-09-09 RX ADMIN — DOCUSATE SODIUM 100 MG: 100 CAPSULE, LIQUID FILLED ORAL at 08:12

## 2022-09-09 ASSESSMENT — ACTIVITIES OF DAILY LIVING (ADL)
ADLS_ACUITY_SCORE: 48
ADLS_ACUITY_SCORE: 52
ADLS_ACUITY_SCORE: 52
ADLS_ACUITY_SCORE: 48
ADLS_ACUITY_SCORE: 52
ADLS_ACUITY_SCORE: 48

## 2022-09-09 NOTE — PROGRESS NOTES
Care Management Follow Up    Length of Stay (days): 1    Expected Discharge Date: 09/09/2022     Concerns to be Addressed:       Patient plan of care discussed at interdisciplinary rounds: Yes    Anticipated Discharge Disposition: Transitional Care     Anticipated Discharge Services: None  Anticipated Discharge DME: None    Patient/family educated on Medicare website which has current facility and service quality ratings: yes  Education Provided on the Discharge Plan:    Patient/Family in Agreement with the Plan: yes    Referrals Placed by CM/SW:    Private pay costs discussed: Not applicable    Additional Information:  SW received call form patients wife indicating she called [facility] and they have open beds but did not receive the referral. SW refaxed referral. SW will continue to follow.      Addendum: SW received a call back indicating  They can accept patient tomorrow 09/10o. SW spoke with patient and wife who indicated they need a place today otherwise they are taking him home. HUNTER spoke with [facility]ad on [facility] and they can accept patient today.     RAE Ahmadi    [facility]

## 2022-09-09 NOTE — DISCHARGE SUMMARY
[facility]    Discharge Summary   [facility] Neurosurgery    Date of Admission:  9/6/2022  Date of Discharge:  9/9/2022  Discharging Provider: Danelle Jackson PA-C  Date of Service (when I saw the patient): 09/09/22    Discharge Diagnoses   Active Problems:    S/P laminectomy      History of Present Illness   Jamir Gill is an 67 year old male with history of MS, presented with progressive back pain and leg weakness requiring a wheelchair.  MRI showed severe stenosis at T10-11 and T11-12.  Given that his MS was otherwise under very good control, we discussed the option of surgical decompression.  Risks, benefits, indications, and alternatives were discussed with the patient and family in detail.  All of their questions were answered, and they wished to proceed.    Hospital Course   Jamir Gill was admitted on 9/6/2022. Jamir Gill is an 67 year old male with history of MS, presented with progressive back pain and leg weakness requiring a wheelchair.  MRI showed severe stenosis at T10-11 and T11-12.  Given that his MS was otherwise under very good control, we discussed the option of surgical decompression.  Risks, benefits, indications, and alternatives were discussed with the patient and family in detail.  All of their questions were answered, and they wished to proceed.    On 9/6/22, patient underwent T10-11 bilateral laminectomy and medial facetectomy for decompression of stenosis, T11-12 bilateral laminectomy and medial facetectomy for decompression of stenosis with Dr Leija. No intraoperative complications.  ml. Patient admitted for pain management, neurologic monitoring, wound care. Patient meeting all discharge on 09/09/22. Patient was cleared by Neurosurgery for discharge to TCU in stable condition.     - Therapies evaluated and recommend discharge to TCU  - Routine post op care plan  - Routine wound care and activity restrictions  - Pain medications  prescribed at discharge  - Follow up with NSG clinic as scheduled     I have discussed the following assessment and plan with Dr. Leija who is in agreement with initial plan and will follow up with any further recommendations.    Danelle Jackson PA-C  [facility] Neurosurgery  [address]  [address]  [place] [address]    Tel [phone]  Pager [phone]      Pending Results   These results will be followed up by n/a  Unresulted Labs Ordered in the Past 30 Days of this Admission     No orders found from 8/7/2022 to 9/7/2022.          Code Status   Full Code    Primary Care Physician   Kayla Pineda    Physical Exam   Temp: 98.3  F (36.8  C) Temp src: Oral BP: (!) 151/88 Pulse: 84   Resp: 16 SpO2: 94 % O2 Device: None (Room air)    Vitals:    09/06/22 1115   Weight: 170 lb 3.2 oz (77.2 kg)     Vital Signs with Ranges  Temp:  [98.1  F (36.7  C)-98.4  F (36.9  C)] 98.3  F (36.8  C)  Pulse:  [84-94] 84  Resp:  [16-18] 16  BP: (125-154)/(60-91) 151/88  SpO2:  [94 %-98 %] 94 %  I/O last 3 completed shifts:  In: 480 [P.O.:480]  Out: 1900 [Urine:1900]    Awake, alert, sitting in chair, NAD   Incision is c/d/i with staples in place  BLE weakness at baseline right>left     Time Spent on this Encounter   I, Danelle Jackson PA-C, personally saw the patient today and spent less than or equal to 30 minutes discharging this patient.    Discharge Disposition   Discharged to short-term care facility  Condition at discharge: Stable    Consultations This Hospital Stay   PHYSICAL THERAPY ADULT IP CONSULT  CARE MANAGEMENT / SOCIAL WORK IP CONSULT  CARE MANAGEMENT / SOCIAL WORK IP CONSULT  PHYSICAL THERAPY ADULT IP CONSULT  OCCUPATIONAL THERAPY ADULT IP CONSULT    Discharge Orders      When to call - Contact Surgeon Team    You may experience symptoms that require follow-up before your scheduled appointment. Contact your Surgeon Team if you are concerned about pain control, large amount  of bleeding, blood clots, constipation, or if you experience signs of infection (fever, growing tenderness at the surgery site, a large amount of drainage, severe pain, foul-smelling drainage, redness or swelling.     When to call - Reach out to Urgent Care    If you are experiencing uncontrolled Nausea and Vomiting, uncontrolled pain, inability to urinate and uncomfortable, and in need of immediate care, and you are NOT able to reach your Surgeon Team, go to an Urgent Care clinic. Do NOT go to the Emergency Room unless you have shortness of breath, chest pain, or other signs of a medical emergency.     When to call - Reasons to Call 911    Call 911 immediately if you experience sudden-onset chest pain, arm weakness/numbness, slurred speech, or shortness of breath     Symptoms - Fever Management    A low grade fever can be expected after surgery. Your Provider many have prescribed an Opioid pain medication that also contains acetaminophen (TYLENOL) that may help with Fever management.  Do NOT take additional acetaminophen (TYLENOL) in combination with an Opioid/acetaminophen (TYLENOL) product. Read the labels on your Over The Counter (OTC) medications with care.     Symptoms - Reduced Urine Output    If it has been greater than 8 hours since you have urinated despite drinking plenty of water, call your Surgeon Team.     No driving or operating machinery    Do NOT drive any vehicle or operate mechanical equipment for 24 hours following the end of your surgery.  Even though you may feel normal, your reactions may be affected by Anesthesia medication you received.     No Alcohol    Do NOT drink alcoholic beverages for 24 hours following your surgery and while taking pain medications.     Diet Instructions    Follow your surgeon's orders for any diet restrictions.  If you did not receive any diet restrictions, you may drink clear liquids (apple juice, ginger ale, 7-up, broth, etc.), and progress to your regular diet as  you feel able. It is important to stay well-hydrated after surgery and drink plenty of water.     Dressing / Wound Care - Wound    You have a clean dressing on your surgical wound. Dressing change instructions as follows: keep incision clean and dry   Contact your Surgeon Team if you have increased redness, warmth around the surgical wound, and/or drainage from the surgical wound.     Discharge Instructions - Comfort and Pain Management    Pain after surgery is normal and expected. You will have some amount of pain after surgery. Your pain will improve with time. There are several things you can do to help reduce your pain including: rest, ice, and using pain medications as needed. Use pain interventions and don't wait until pain level is out of control. Contact your Surgeon Team if you have pain that persists or worsens after surgery despite rest, ice, and taking your medication(s) as prescribed. You may have a dry mouth, a sore throat, muscles aches or trouble sleeping, and these symptoms should go away after 24 hours.     Discharge Instructions - Rest    Rest and relax for the next 24 hours. Make arrangements to have someone stay with you overnight, and avoid hazardous and strenuous activities.  Do NOT make any important decisions for the next 24 hours.     Shower/Bathing - No restrictions, may shower after 24 hours    Shower/Bathing - No restrictions, may shower after 24 hours.     Ice to affected area    Ice to operative site PRN     Incision Care    Keep dressing clean and dry, change as instructed by Provider or RN. Wash your hands with soap and warm water before you touch the site of surgery. If you have skin tapes (steri-strips) on your surgical site, leave them on the surgical site until they fall off on their own (typically 7-10 days). If you have stitches under the skin, they will dissolve, or your doctor will give you instructions on when to return to their office for removal. Unless directed otherwise,  remove dressing, if present, the day after surgery and leave open to air. If Dermabond (a type of skin glue) is present, leave in place until it wears/flakes off (2-3 weeks).     No lifting    No lifting over 10 pounds and no strenuous physical activity for 6 weeks.     General info for SNF    Length of Stay Estimate: Short Term Care: Estimated # of Days <30  Condition at Discharge: Improving  Level of care:skilled   Rehabilitation Potential: Excellent  Admission H&P remains valid and up-to-date: Yes  Recent Chemotherapy: N/A  Use Nursing Home Standing Orders: Yes     Mantoux instructions    Give two-step Mantoux (PPD) Per Facility Policy Yes     Follow Up and recommended labs and tests    Your Neurosurgical follow up appointments have been scheduled. You may call [phone] to make, confirm or change your follow-up Neurosurgery appointment dates and/or times.     Reason for your hospital stay    T10-12 laminecctomies     Alvarado catheter    Remove alvarado catheter prior to discharge. Resume at home straight cath schedule per patient after discharge.     Wound care    Site:   thoracic  Instructions:  Keep your incision clean and dry. Okay to remove dressing on post-op day 2 and shower on post-op day 3. Okay for water to run over incision and gently pat dry. Do not scrub incision. No bathing, swimming, or submerging incision under water until follow-up visit. Call clinic or go to the ER with any incision drainage or swelling. Follow-up in clinic at 2 weeks post op for incision check.     Activity - Up ad susana     Physical Therapy Adult Consult    Evaluate and treat as clinically indicated.    Reason:  post op     Occupational Therapy Adult Consult    Evaluate and treat as clinically indicated.    Reason:  post op     Fall precautions     Diet    Follow this diet upon discharge: Regular     Discharge Medications   Current Discharge Medication List      START taking these medications    Details   !! dexamethasone  (DECADRON) 1 MG tablet Take 1 tablet (1 mg) by mouth 2 times daily for 3 days  Qty: 6 tablet, Refills: 0    Associated Diagnoses: S/P laminectomy      !! dexamethasone (DECADRON) 1 MG tablet Take 1 tablet (1 mg) by mouth daily for 3 days  Qty: 3 tablet, Refills: 0    Associated Diagnoses: S/P laminectomy      !! dexamethasone (DECADRON) 2 MG tablet Take 1 tablet (2 mg) by mouth 2 times daily for 3 days  Qty: 6 tablet, Refills: 0    Associated Diagnoses: S/P laminectomy      !! dexamethasone (DECADRON) 4 MG tablet Take 1 tablet (4 mg) by mouth 2 times daily for 3 days  Qty: 6 tablet, Refills: 0    Associated Diagnoses: S/P laminectomy      hydrOXYzine (ATARAX) 10 MG tablet Take 1 tablet (10 mg) by mouth every 6 hours as needed for itching or anxiety (with pain, moderate pain)  Qty: 30 tablet, Refills: 0    Associated Diagnoses: S/P laminectomy      oxyCODONE (ROXICODONE) 5 MG tablet Take 1-2 tablets (5-10 mg) by mouth every 3 hours as needed for pain (Moderate to Severe)  Qty: 30 tablet, Refills: 0    Associated Diagnoses: S/P laminectomy      senna-docusate (SENOKOT-S/PERICOLACE) 8.6-50 MG tablet Take 1-2 tablets by mouth 2 times daily Take while on oral narcotics to prevent or treat constipation.  Qty: 30 tablet, Refills: 0    Associated Diagnoses: S/P laminectomy       !! - Potential duplicate medications found. Please discuss with provider.      CONTINUE these medications which have NOT CHANGED    Details   BACLOFEN PO Take 10 mg by mouth 2 times daily      calcium carbonate 600 mg-vitamin D 400 units (CALTRATE) 600-400 MG-UNIT per tablet Take 1 tablet by mouth 2 times daily      Cyanocobalamin 1000 MCG CAPS Take 1 capsule by mouth every morning      Docusate Sodium (COLACE PO) Take 100 mg by mouth every evening      lisinopril (ZESTRIL) 10 MG tablet TAKE 1 TABLET BY MOUTH EVERY DAY  Qty: 90 tablet, Refills: 1    Associated Diagnoses: Essential (primary) hypertension      meclizine 25 MG CHEW Take 25 mg by mouth  as needed      oxybutynin ER (DITROPAN-XL) 10 MG 24 hr tablet Take 1 tablet (10 mg) by mouth daily  Qty: 90 tablet, Refills: 3    Associated Diagnoses: Urinary urgency      psyllium 0.52 G capsule Take 3 capsules by mouth 2 times daily         STOP taking these medications       Aspirin Buf,CaCarb-MgCarb-MgO, 81 MG TABS Comments:   Reason for Stopping:             Allergies   No Known Allergies

## 2022-09-09 NOTE — PROGRESS NOTES
Notified provider about indwelling alvarado catheter discussed removal or continued need.    Did provider choose to remove indwelling alvarado catheter? Yes    Provider's alvarado indication for keeping indwelling alvarado catheter: Collaboration with patient given hx of neurogenic bladder.    Is there an order for indwelling alvarado catheter? Yes    *If there is a plan to keep alvarado catheter in place at discharge daily notification with provider is not necessary.

## 2022-09-09 NOTE — PROGRESS NOTES
The patient is discharging to TCU, via personal transportation, accompanied by his wife; he attests to having all of his belongings and has signed all pertinent paperwork.

## 2022-09-09 NOTE — PROVIDER NOTIFICATION
"Dr Leija team ( Danelle WALSH on-call) paged     \"[id number] is requesting an enema for chronic constipation related to MS and surgery. NUVIA Yousif *[id number]\"  "

## 2022-09-09 NOTE — PROGRESS NOTES
Patient vital signs are at baseline: Yes  Patient able to ambulate as they were prior to admission or with assist devices provided by therapies during their stay:  Yes  Patient MUST void prior to discharge:  No,  Reason:  Rossi  Patient able to tolerate oral intake:  Yes  Pain has adequate pain control using Oral analgesics:  Yes  Does patient have an identified :  Yes  Has goal D/C date and time been discussed with patient:  Yes      Pt A&O x4, up with Bessie steady. VSS except BP is slightly high. RA. Denied pain. Baseline numbness to BLE. RUBIO removed this morning, new dress applied to incision site. Rossi patent. Pt mentioned that he has problem of urine retention, he straight cath self at home, planning to remove Rossi before discharge. BM x1 after Anema. TCU replacement pending.

## 2022-09-09 NOTE — PROGRESS NOTES
Patient vital signs are at baseline: Yes  Patient able to ambulate as they were prior to admission or with assist devices provided by therapies during their stay:  Yes  Patient MUST void prior to discharge:  No,  Reason:  Rossi  Patient able to tolerate oral intake:  Yes  Pain has adequate pain control using Oral analgesics:  Yes  Does patient have an identified :  Yes  Has goal D/C date and time been discussed with patient:  Yes    Pt A&O x4, up with Bessie suazo. VSS except BP is slightly high. RA. Denied pain. Baseline numbness and weakness to BLE. TCU placement pending

## 2022-09-09 NOTE — PLAN OF CARE
Physical Therapy Discharge Summary    Reason for therapy discharge:    Discharged to transitional care facility.    Progress towards therapy goal(s). See goals on Care Plan in [facility] electronic health record for goal details.  Goals not met.  Barriers to achieving goals:   discharge from facility.    Therapy recommendation(s):    Continued therapy is recommended.  Rationale/Recommendations:  To progress independence and safety with functional mobility.

## 2022-09-09 NOTE — PROGRESS NOTES
Dexamethasone orders are E-prescribed on AVS. T.O.R.B. for writer to change Dexamethasone from e-prescribe to no print out per Danelle WALSH. Nurse notified to update packet with new AVS.

## 2022-09-09 NOTE — PROGRESS NOTES
Care Management Discharge Note    Discharge Date: 09/09/2022       Discharge Disposition: Transitional Care    Discharge Services: None    Discharge DME: None    Discharge Transportation: family or friend will provide    Private pay costs discussed: private room/amenity fees    PAS Confirmation Code: [id number]  Patient/family educated on Medicare website which has current facility and service quality ratings: yes    Education Provided on the Discharge Plan:  Yes  Persons Notified of Discharge Plans: Patient/wife  Patient/Family in Agreement with the Plan: yes    Handoff Referral Completed: Yes    Additional Information:  EDDIE received discharge orders and scripts and faxed to [facility] on [address]. EDDIE informed [facility] can transport via [employer] at 1630 today. Eddie spoke with patient and wife and informed they would like private room and are in agreement with $45.60/day fee. EDDIE spoke with [facility] and informed them that patient has accepted placement elsewhere. Patient/family in agreement with discharge to [facility] today via [employer] at 1640.        RAE Ahmadi    [facility]

## 2022-09-11 ENCOUNTER — LAB REQUISITION (OUTPATIENT)
Dept: LAB | Facility: CLINIC | Age: 67
End: 2022-09-11

## 2022-09-11 VITALS
SYSTOLIC BLOOD PRESSURE: 162 MMHG | HEART RATE: 74 BPM | RESPIRATION RATE: 16 BRPM | WEIGHT: 168.6 LBS | DIASTOLIC BLOOD PRESSURE: 85 MMHG | BODY MASS INDEX: 24.97 KG/M2 | TEMPERATURE: 98.2 F | HEIGHT: 69 IN | OXYGEN SATURATION: 98 %

## 2022-09-11 DIAGNOSIS — Z00.01 ENCOUNTER FOR GENERAL ADULT MEDICAL EXAMINATION WITH ABNORMAL FINDINGS: ICD-10-CM

## 2022-09-11 PROBLEM — R33.9 RETENTION OF URINE: Status: ACTIVE | Noted: 2022-09-11

## 2022-09-11 PROBLEM — M85.80 OSTEOPENIA: Status: ACTIVE | Noted: 2022-09-11

## 2022-09-12 ENCOUNTER — LAB REQUISITION (OUTPATIENT)
Dept: LAB | Facility: CLINIC | Age: 67
End: 2022-09-12

## 2022-09-12 ENCOUNTER — TRANSITIONAL CARE UNIT VISIT (OUTPATIENT)
Dept: GERIATRICS | Facility: CLINIC | Age: 67
End: 2022-09-12
Payer: COMMERCIAL

## 2022-09-12 DIAGNOSIS — I10 ESSENTIAL (PRIMARY) HYPERTENSION: ICD-10-CM

## 2022-09-12 DIAGNOSIS — Z98.890 S/P LAMINECTOMY: ICD-10-CM

## 2022-09-12 DIAGNOSIS — R33.9 RETENTION OF URINE: ICD-10-CM

## 2022-09-12 DIAGNOSIS — G35 MULTIPLE SCLEROSIS (H): Primary | ICD-10-CM

## 2022-09-12 DIAGNOSIS — K59.01 SLOW TRANSIT CONSTIPATION: ICD-10-CM

## 2022-09-12 DIAGNOSIS — M47.9 OSTEOARTHRITIS OF SPINE, UNSPECIFIED SPINAL OSTEOARTHRITIS COMPLICATION STATUS, UNSPECIFIED SPINAL REGION: ICD-10-CM

## 2022-09-12 DIAGNOSIS — I10 PRIMARY HYPERTENSION: ICD-10-CM

## 2022-09-12 PROCEDURE — P9604 ONE-WAY ALLOW PRORATED TRIP: HCPCS | Performed by: NURSE PRACTITIONER

## 2022-09-12 PROCEDURE — 86481 TB AG RESPONSE T-CELL SUSP: CPT | Performed by: NURSE PRACTITIONER

## 2022-09-12 PROCEDURE — 36415 COLL VENOUS BLD VENIPUNCTURE: CPT | Performed by: NURSE PRACTITIONER

## 2022-09-12 PROCEDURE — 99310 SBSQ NF CARE HIGH MDM 45: CPT | Performed by: NURSE PRACTITIONER

## 2022-09-12 NOTE — PROGRESS NOTES
[facility] GERIATRICS    PRIMARY CARE PROVIDER AND CLINIC:  Kayla Pineda MD, [address] / [place] [address]  Chief Complaint   Patient presents with     Hospital F/U      [facility] Medical Record Number:  [id number]  Place of Service where encounter took place:  [facility] [[id number]]    Jamir Gill  is a 67 year old  (1955), admitted to the above facility from [facility]..   HPI:    This is a 66 yo male with a PMHx of MS, with progressive back pain and leg weakness requiring wheelchair.  Per MRI showed severe stenosis in T10-T11 and T11-T12.  He underwent T10-T11 bilateral laminectomy and medial facetectomy for decompression of stenosis, T11-12 bilateral laminectomy and medial facetectomy for decompression of stenosis with Dr Leija. No intraoperative complications. EBL minimal.  No other changes noted, discharged to [facility] for rehab.    CODE STATUS/ADVANCE DIRECTIVES DISCUSSION:  Prior  CPR/Full code   ALLERGIES: No Known Allergies   PAST MEDICAL HISTORY:   Past Medical History:   Diagnosis Date     Cervical disc disorder     No Comments Provided     Enlarged prostate with lower urinary tract symptoms (LUTS)     No Comments Provided     Essential (primary) hypertension     No Comments Provided     Multiple sclerosis (H)     No Comments Provided     Other specified disorders of bone density and structure, unspecified site (CODE)     No Comments Provided     Other symptoms and signs involving cognitive functions and awareness     No Comments Provided     Spondylosis of cervical region without myelopathy or radiculopathy     No Comments Provided      PAST SURGICAL HISTORY:   has a past surgical history that includes Appendectomy open; Colonoscopy (01/01/2010); Colonoscopy (12/15/2015); Cystoscopy, retrogrades, insert stent ureter(s), combined (Left, 04/03/2018); Prostate surgery; Combined Cystoscopy, Ureteroscopy, Laser Holmium Lithotripsy Ureter(S) (Left,  04/10/2018); Colonoscopy (N/A, 06/17/2021); Wrist surgery (Left); and Laminectomy thoracic two levels (N/A, 9/6/2022).  FAMILY HISTORY: family history includes Family History Negative in his brother; Heart Disease in his brother and mother; Lung Cancer in his brother; Other - See Comments in his father and mother; Prostate Cancer in his father.  SOCIAL HISTORY:   reports that he has never smoked. He has never used smokeless tobacco. He reports that he does not drink alcohol and does not use drugs.  Patient's living condition: lives with spouse    Post Discharge Medication Reconciliation Status:     Post Medication Reconciliation Status: Discharge medications reconciled and changed, see notes/orders    Current Outpatient Medications   Medication Sig     BACLOFEN PO Take 10 mg by mouth 2 times daily     calcium carbonate 600 mg-vitamin D 400 units (CALTRATE) 600-400 MG-UNIT per tablet Take 1 tablet by mouth 2 times daily     Cyanocobalamin 1000 MCG CAPS Take 1 capsule by mouth every morning     [START ON 9/13/2022] dexamethasone (DECADRON) 1 MG tablet Take 1 tablet (1 mg) by mouth 2 times daily     [START ON 9/16/2022] dexamethasone (DECADRON) 1 MG tablet Take 1 tablet (1 mg) by mouth daily     dexamethasone (DECADRON) 2 MG tablet Take 1 tablet (2 mg) by mouth 2 times daily     dexamethasone (DECADRON) 4 MG tablet Take 1 tablet (4 mg) by mouth 2 times daily     Docusate Sodium (COLACE PO) Take 100 mg by mouth every evening     lisinopril (ZESTRIL) 10 MG tablet TAKE 1 TABLET BY MOUTH EVERY DAY (Patient taking differently: Take 10 mg by mouth every morning)     meclizine 25 MG CHEW Take 25 mg by mouth as needed (Patient not taking: Reported on 9/12/2022)     oxybutynin ER (DITROPAN-XL) 10 MG 24 hr tablet Take 1 tablet (10 mg) by mouth daily (Patient taking differently: Take 10 mg by mouth every morning)     psyllium 0.52 G capsule Take 3 capsules by mouth 2 times daily     senna-docusate (SENOKOT-S/PERICOLACE) 8.6-50 MG  "tablet Take 1-2 tablets by mouth 2 times daily Take while on oral narcotics to prevent or treat constipation.     No current facility-administered medications for this visit.       ROS:  10 point ROS of systems including Constitutional, Eyes, Respiratory, Cardiovascular, Gastroenterology, Genitourinary, Integumentary, Musculoskeletal, Psychiatric were all negative except for pertinent positives noted in my HPI.    Vitals:  BP (!) 162/85   Pulse 74   Temp 98.2  F (36.8  C)   Resp 16   Ht 1.753 m (5' 9\")   Wt 76.5 kg (168 lb 9.6 oz)   SpO2 98%   BMI 24.90 kg/m    Exam:  GENERAL APPEARANCE:  Alert, oriented, cooperative, denies pain  ENT:  Mouth and posterior oropharynx normal, moist mucous membranes, normal hearing acuity  NECK:  No adenopathy,masses or thyromegaly  RESP:  lungs clear to auscultation , no respiratory distress  CV:  regular rate and rhythm, no murmur, rub, or gallop, no edema  ABDOMEN:  normal bowel sounds, soft, nontender, no hepatosplenomegaly or other masses  M/S:   WBAT  SKIN:  staples intact in back  NEURO:   No focal deficits  PSYCH:  oriented X 3    Lab/Diagnostic data:    Most Recent 3 CBC's:Recent Labs   Lab Test 08/29/22  0930 06/10/22  1101 07/03/20  1115 11/29/18  1126   WBC  --  5.2 5.6 5.8   HGB 14.9 14.3 13.8 15.4   MCV  --  89 91 89   PLT  --  215 198 217     Most Recent 3 BMP's:Recent Labs   Lab Test 09/08/22  0624 09/07/22  0555 09/06/22  1128 08/29/22  0930 04/15/21  0955 08/31/20  0856   NA  --   --   --  139 139 140   POTASSIUM  --   --  3.8 4.2 4.3 4.3   CHLORIDE  --   --   --  103 102 102   CO2  --   --   --  31 32* 33*   BUN  --   --   --  22 27* 28*   CR  --   --   --  0.94 1.04 1.23   ANIONGAP  --   --   --  5 5 5   ОЛЬГА  --   --   --  9.6 9.7 10.2   * 151*  --  75 82 94       ASSESSMENT/PLAN:    (G35) Multiple sclerosis (H)    (M47.9) Osteoarthritis of spine, unspecified spinal osteoarthritis complication status, unspecified spinal region  (Z98.890) S/P " laminectomy  Underwent procedure with Dr Leija.  Given lifting restrictions, follow-up with Glenn WALSH on 10/21 and neuro nurse on 9/19.  Continue calcium/vitamin D supplementation    Pain control  Continue dexamethasone taper, will complete on 9/19.  Continue baclofen 10 mg twice daily.  Discontinue oxycodone and Vistaril per request.  Denies pain    (I10) Primary hypertension  Continue lisinopril 10 mg daily.  Monitor blood pressure twice daily    (R33.9) Retention of urine  Continue oxybutynin 10 mg daily and straight cathing    Renal function  Last CR 0.94 with GFR >80, recheck BMP on 9/13    History of anemia  Last hemoglobin 14.9, recheck CBC on 9/13    (K59.01) Slow transit constipation  Continue Colace 100 mg daily and psyllium 3 caps twice daily    Orders:  Increase blood pressure monitoring, discontinue oxycodone and Vistaril, lab recheck    Total time spent with patient visit at the [facility] nursing facility was 35 min including patient visit and review of past records. Greater than 50% of total time spent with counseling and coordinating care due to increased blood pressure monitoring for hypertension, discontinue oxycodone and Vistaril for pain control, BMP/CBC recheck per renal function and history of anemia with therapy which lasted 18 minutes.     Electronically signed by:  Rigoberto Armstrong NP

## 2022-09-13 ENCOUNTER — MYC MEDICAL ADVICE (OUTPATIENT)
Dept: NEUROSURGERY | Facility: CLINIC | Age: 67
End: 2022-09-13

## 2022-09-13 LAB
ANION GAP SERPL CALCULATED.3IONS-SCNC: 5 MMOL/L (ref 3–14)
BUN SERPL-MCNC: 25 MG/DL (ref 7–30)
CALCIUM SERPL-MCNC: 9.6 MG/DL (ref 8.5–10.1)
CHLORIDE BLD-SCNC: 99 MMOL/L (ref 94–109)
CO2 SERPL-SCNC: 33 MMOL/L (ref 20–32)
CREAT SERPL-MCNC: 0.8 MG/DL (ref 0.66–1.25)
ERYTHROCYTE [DISTWIDTH] IN BLOOD BY AUTOMATED COUNT: 13.1 % (ref 10–15)
GFR SERPL CREATININE-BSD FRML MDRD: >90 ML/MIN/1.73M2
GLUCOSE BLD-MCNC: 123 MG/DL (ref 70–99)
HCT VFR BLD AUTO: 47 % (ref 40–53)
HGB BLD-MCNC: 15.3 G/DL (ref 13.3–17.7)
MCH RBC QN AUTO: 29.2 PG (ref 26.5–33)
MCHC RBC AUTO-ENTMCNC: 32.6 G/DL (ref 31.5–36.5)
MCV RBC AUTO: 90 FL (ref 78–100)
PLATELET # BLD AUTO: 222 10E3/UL (ref 150–450)
POTASSIUM BLD-SCNC: 3.6 MMOL/L (ref 3.4–5.3)
QUANTIFERON MITOGEN: 1.56 IU/ML
QUANTIFERON NIL TUBE: 0.01 IU/ML
QUANTIFERON TB1 TUBE: 0.01 IU/ML
QUANTIFERON TB2 TUBE: 0.01
RBC # BLD AUTO: 5.24 10E6/UL (ref 4.4–5.9)
SODIUM SERPL-SCNC: 137 MMOL/L (ref 133–144)
WBC # BLD AUTO: 10.1 10E3/UL (ref 4–11)

## 2022-09-13 PROCEDURE — 85027 COMPLETE CBC AUTOMATED: CPT | Performed by: NURSE PRACTITIONER

## 2022-09-13 PROCEDURE — 82310 ASSAY OF CALCIUM: CPT | Performed by: NURSE PRACTITIONER

## 2022-09-13 PROCEDURE — P9604 ONE-WAY ALLOW PRORATED TRIP: HCPCS | Performed by: NURSE PRACTITIONER

## 2022-09-13 NOTE — PROGRESS NOTES
[facility]ERIATRIC SERVICES  INITIAL VISIT NOTE  September 14, 2022    PRIMARY CARE PROVIDER AND CLINIC:  Kayla Hobbs [address] /  [place] [address]    CHIEF COMPLAINT:  Hospital follow-up/Initial visit    HPI:    Jamir Gill is a 67 year old  (1955) male who was seen at [place] T[facility] on September 14, 2022 for an initial visit.     Medical history is notable for multiple sclerosis, hypertension, BPH, kidney stone, Raynaud's disease, depression, osteoporosis, and cervical and lumbar disc disease.    Summary of hospital course:  Patient was hospitalized at [facility] from September 6 through September 9, 2022 for elective back surgery.  He underwent T10-T12 bilateral laminectomy and decompression on September 6, 2022 for thoracic myelopathy.  EBL was only 100 mL and postop hemoglobin remained stable.  TCU was recommended per therapies.    Patient is admitted to this facility for medical management, nursing care, and rehab.     Of note, history was obtained from patient, facility RN, and extensive review of the chart.    Today's visit:  Patient was seen in his room, while sitting in a wheelchair.  He looks comfortable.  He reports no surgical pain today.  Surgical wound is healing well.  He denies fever, chills, chest pain, palpitation, dyspnea, nausea, vomiting, or abdominal pain he had a bowel movement yesterday.  He continues to straight cath himself 2-3 times a day for retention.      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  BPH, s/p prostatectomy  Raynaud's disease  Kidney stone  Depression  Osteoporosis  Thoracic compression fracture  Thoracic spondylosis with myelopathy, s/p T10-T12 bilateral laminectomy and decompression on September 6, 2022  Degenerative disc disease of lumbar spine    Past Medical History:   Diagnosis Date      Cervical disc disorder     No Comments Provided     Enlarged prostate with lower urinary tract symptoms (LUTS)     No Comments Provided     Essential (primary) hypertension     No Comments Provided     Multiple sclerosis (H)     No Comments Provided     Other specified disorders of bone density and structure, unspecified site (CODE)     No Comments Provided     Other symptoms and signs involving cognitive functions and awareness     No Comments Provided     Spondylosis of cervical region without myelopathy or radiculopathy     No Comments Provided       PAST SURGICAL HISTORY:   Past Surgical History:   Procedure Laterality Date     APPENDECTOMY OPEN      appendectomy     COLONOSCOPY  2010,with polyps resected     COLONOSCOPY  12/15/2015    12/15/2015,[place]- colitis     COLONOSCOPY N/A 2021    2 sessile serrated and 1 tubular adenoma, 3 year follow up, 2024     COMBINED CYSTOSCOPY, URETEROSCOPY, LASER HOLMIUM LITHOTRIPSY URETER(S) Left 04/10/2018    Procedure: COMBINED CYSTOSCOPY, URETEROSCOPY, LASER HOLMIUM LITHOTRIPSY URETER(S);  Left Ureteroscopy with Holmium Laser Lithotripsy & Stent Placement.;  Surgeon: Oul Saldivar MD;  Location: [facility] OR     CYSTOSCOPY, RETROGRADES, INSERT STENT URETER(S), COMBINED Left 2018    Procedure: COMBINED CYSTOSCOPY, RETROGRADES, INSERT STENT URETER(S);;  Surgeon: Olu Saldivar MD;  Location: [facility] OR     LAMINECTOMY THORACIC TWO LEVELS N/A 2022    Procedure: Thoracic 10 to thoracic 12 laminectomies;  Surgeon: Edson Leija MD;  Location: [facility] OR     PROSTATE SURGERY      ,PROSTATECTOMY,Laser prostate surgery -- BPH     WRIST SURGERY Left     s/p fracture       FAMILY HISTORY:   Family History   Problem Relation Age of Onset     Other - See Comments Mother          with Parkinson's     Heart Disease Mother         Heart Disease,CHF for carditis     Prostate Cancer Father         Cancer-prostate     Other - See Comments Father          Alzheimer's/kidney failure     Family History Negative Brother         Good Health     Heart Disease Brother         Heart Disease,ASCAD with MI     Lung Cancer Brother         tobacco abuse         SOCIAL HISTORY:  Social History     Tobacco Use     Smoking status: Never Smoker     Smokeless tobacco: Never Used   Substance Use Topics     Alcohol use: No       MEDICATIONS:  Current Outpatient Medications   Medication Sig Dispense Refill     BACLOFEN PO Take 10 mg by mouth 2 times daily       calcium carbonate 600 mg-vitamin D 400 units (CALTRATE) 600-400 MG-UNIT per tablet Take 1 tablet by mouth 2 times daily       Cyanocobalamin 1000 MCG CAPS Take 1 capsule by mouth every morning       dexamethasone (DECADRON) 1 MG tablet Take 1 tablet (1 mg) by mouth 2 times daily 6 tablet 0     [START ON 9/16/2022] dexamethasone (DECADRON) 1 MG tablet Take 1 tablet (1 mg) by mouth daily 3 tablet 0     dexamethasone (DECADRON) 2 MG tablet Take 1 tablet (2 mg) by mouth 2 times daily 6 tablet 0     dexamethasone (DECADRON) 4 MG tablet Take 1 tablet (4 mg) by mouth 2 times daily 6 tablet 0     Docusate Sodium (COLACE PO) Take 100 mg by mouth every evening       lisinopril (ZESTRIL) 10 MG tablet TAKE 1 TABLET BY MOUTH EVERY DAY (Patient taking differently: Take 10 mg by mouth every morning) 90 tablet 1     meclizine 25 MG CHEW Take 25 mg by mouth as needed (Patient not taking: Reported on 9/12/2022)       oxybutynin ER (DITROPAN-XL) 10 MG 24 hr tablet Take 1 tablet (10 mg) by mouth daily (Patient taking differently: Take 10 mg by mouth every morning) 90 tablet 3     psyllium 0.52 G capsule Take 3 capsules by mouth 2 times daily       senna-docusate (SENOKOT-S/PERICOLACE) 8.6-50 MG tablet Take 1-2 tablets by mouth 2 times daily Take while on oral narcotics to prevent or treat constipation. 30 tablet 0         Post Medication Reconciliation Status: Previously reconciled during another visit; continue medications without  "change.      ALLERGIES:  No Known Allergies    ROS:  10 point ROS were negative other than the symptoms noted above in the HPI.    PHYSICAL EXAM:  Vital signs were reviewed in the chart.  Vital Signs: BP 99/67   Pulse 108   Temp 98  F (36.7  C)   Resp 18   Ht 1.753 m (5' 9\")   Wt 76.5 kg (168 lb 9.6 oz)   SpO2 98%   BMI 24.90 kg/m    General: Comfortable and in no acute distress  HEENT: No conjunctival pallor, no scleral icterus or injection, moist oral mucosa  Cardiovascular: Normal S1, S2, RRR  Respiratory: Lungs clear to auscultation bilaterally  GI: Abdomen soft, non-tender, non-distended, +BS  Extremities: No LE edema  Neuro: CX II-XII grossly intact  Psych: Alert and oriented x3; normal affect  Skin: Mid posterior thoracic surgical wound is stapled and healing well    LABORATORY/IMAGING DATA:  All relevant labs and imaging data in [facility] and/or Care Everywhere were personally reviewed today.    Hematology profile (September 13, 2022): White count 10.1, hemoglobin 15.3, platelet count 222,000, MCV 90    Chemistry profile (September 13, 2022): Sodium 137, potassium 3.6, creatinine 0.8, calcium 9.6, glucose 123      Most Recent 3 CBC's:Recent Labs   Lab Test 08/29/22  0930 06/10/22  1101 07/03/20  1115 11/29/18  1126   WBC  --  5.2 5.6 5.8   HGB 14.9 14.3 13.8 15.4   MCV  --  89 91 89   PLT  --  215 198 217     Most Recent 3 BMP's:Recent Labs   Lab Test 09/08/22  0624 09/07/22  0555 09/06/22  1128 08/29/22  0930 04/15/21  0955 08/31/20  0856   NA  --   --   --  139 139 140   POTASSIUM  --   --  3.8 4.2 4.3 4.3   CHLORIDE  --   --   --  103 102 102   CO2  --   --   --  31 32* 33*   BUN  --   --   --  22 27* 28*   CR  --   --   --  0.94 1.04 1.23   ANIONGAP  --   --   --  5 5 5   ОЛЬГА  --   --   --  9.6 9.7 10.2   * 151*  --  75 82 94         ASSESSMENT/PLAN:  Thoracic spondylosis with myelopathy, s/p T10-T12 bilateral laminectomy and decompression on September 6, 2022,  Physical deconditioning.  Patient " is hemodynamically stable and reports no pain.  Oxycodone and hydroxyzine were discontinued on September 12.  Plan:  Mobilize with PT/OT  Continue dexamethasone taper as ordered, last dose on September 19  Continue baclofen 10 mg twice daily  Continue the bowel regimen  Follow-up with neurosurgery as directed    Essential hypertension.  Blood pressure is fairly controlled.  Plan:  Continue lisinopril 10 mg daily  Monitor blood pressure    Multiple sclerosis (secondary progressive),  Urinary retention,  History of BPH, s/p prostatectomy.  History of MS for 30 years.  He is no longer on disease modifying therapy.  He does straight catheterization 2-3 times a day.  Plan:  Continue oxybutynin ER 10 mg daily  Continue straight cath as needed        Orders written by provider at facility:  None.        Disclaimer: This note may contain text created using speech-recognition software and may contain unintended word substitutions.      Electronically signed by:  Ariana Parker MD

## 2022-09-14 ENCOUNTER — TRANSITIONAL CARE UNIT VISIT (OUTPATIENT)
Dept: GERIATRICS | Facility: CLINIC | Age: 67
End: 2022-09-14
Payer: COMMERCIAL

## 2022-09-14 VITALS
HEART RATE: 108 BPM | BODY MASS INDEX: 24.97 KG/M2 | DIASTOLIC BLOOD PRESSURE: 67 MMHG | OXYGEN SATURATION: 98 % | WEIGHT: 168.6 LBS | RESPIRATION RATE: 18 BRPM | HEIGHT: 69 IN | TEMPERATURE: 98 F | SYSTOLIC BLOOD PRESSURE: 99 MMHG

## 2022-09-14 VITALS
OXYGEN SATURATION: 96 % | TEMPERATURE: 98.6 F | RESPIRATION RATE: 18 BRPM | WEIGHT: 168.6 LBS | DIASTOLIC BLOOD PRESSURE: 69 MMHG | SYSTOLIC BLOOD PRESSURE: 105 MMHG | HEART RATE: 93 BPM | BODY MASS INDEX: 24.97 KG/M2 | HEIGHT: 69 IN

## 2022-09-14 DIAGNOSIS — I10 ESSENTIAL HYPERTENSION: ICD-10-CM

## 2022-09-14 DIAGNOSIS — G35 MULTIPLE SCLEROSIS (H): ICD-10-CM

## 2022-09-14 DIAGNOSIS — R33.9 URINARY RETENTION: ICD-10-CM

## 2022-09-14 DIAGNOSIS — M47.14 THORACIC SPONDYLOSIS WITH MYELOPATHY: Primary | ICD-10-CM

## 2022-09-14 DIAGNOSIS — R53.81 PHYSICAL DECONDITIONING: ICD-10-CM

## 2022-09-14 DIAGNOSIS — Z98.890 S/P LAMINECTOMY: ICD-10-CM

## 2022-09-14 DIAGNOSIS — Z90.79 H/O PROSTATECTOMY: ICD-10-CM

## 2022-09-14 LAB
GAMMA INTERFERON BACKGROUND BLD IA-ACNC: 0.01 IU/ML
M TB IFN-G BLD-IMP: NEGATIVE
M TB IFN-G CD4+ BCKGRND COR BLD-ACNC: 1.55 IU/ML
MITOGEN IGNF BCKGRD COR BLD-ACNC: 0 IU/ML
MITOGEN IGNF BCKGRD COR BLD-ACNC: 0 IU/ML

## 2022-09-14 PROCEDURE — 99305 1ST NF CARE MODERATE MDM 35: CPT | Performed by: INTERNAL MEDICINE

## 2022-09-14 NOTE — LETTER
9/14/2022        RE: Jamir Gill  [address]  [place] [address]        [facility]  INITIAL VISIT NOTE  September 14, 2022    PRIMARY CARE PROVIDER AND CLINIC:  Kayla Hobbs [address] / [place] [address]    CHIEF COMPLAINT:  Hospital follow-up/Initial visit    HPI:    Jamir Gill is a 67 year old  (1955) male who was seen at [facility] on September 14, 2022 for an initial visit.     Medical history is notable for multiple sclerosis, hypertension, BPH, kidney stone, Raynaud's disease, depression, osteoporosis, and cervical and lumbar disc disease.    Summary of hospital course:  Patient was hospitalized at [facility] from September 6 through September 9, 2022 for elective back surgery.  He underwent T10-T12 bilateral laminectomy and decompression on September 6, 2022 for thoracic myelopathy.  EBL was only 100 mL and postop hemoglobin remained stable.  TCU was recommended per therapies.    Patient is admitted to this facility for medical management, nursing care, and rehab.     Of note, history was obtained from patient, facility RN, and extensive review of the chart.    Today's visit:  Patient was seen in his room, while sitting in a wheelchair.  He looks comfortable.  He reports no surgical pain today.  Surgical wound is healing well.  He denies fever, chills, chest pain, palpitation, dyspnea, nausea, vomiting, or abdominal pain he had a bowel movement yesterday.  He continues to straight cath himself 2-3 times a day for retention.      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  BPH, s/p prostatectomy  Raynaud's disease  Kidney stone  Depression  Osteoporosis  Thoracic compression fracture  Thoracic spondylosis with myelopathy, s/p T10-T12 bilateral laminectomy and decompression on September 6,    Degenerative disc disease of lumbar spine    Past Medical History:   Diagnosis Date     Cervical disc disorder     No Comments Provided     Enlarged prostate with lower urinary tract symptoms (LUTS)     No Comments Provided     Essential (primary) hypertension     No Comments Provided     Multiple sclerosis (H)     No Comments Provided     Other specified disorders of bone density and structure, unspecified site (CODE)     No Comments Provided     Other symptoms and signs involving cognitive functions and awareness     No Comments Provided     Spondylosis of cervical region without myelopathy or radiculopathy     No Comments Provided       PAST SURGICAL HISTORY:   Past Surgical History:   Procedure Laterality Date     APPENDECTOMY OPEN      appendectomy     COLONOSCOPY  2010,with polyps resected     COLONOSCOPY  12/15/2015    12/15/2015,[place]- colitis     COLONOSCOPY N/A 2021    2 sessile serrated and 1 tubular adenoma, 3 year follow up, 2024     COMBINED CYSTOSCOPY, URETEROSCOPY, LASER HOLMIUM LITHOTRIPSY URETER(S) Left 04/10/2018    Procedure: COMBINED CYSTOSCOPY, URETEROSCOPY, LASER HOLMIUM LITHOTRIPSY URETER(S);  Left Ureteroscopy with Holmium Laser Lithotripsy & Stent Placement.;  Surgeon: Olu Saldivar MD;  Location:  OR     CYSTOSCOPY, RETROGRADES, INSERT STENT URETER(S), COMBINED Left 2018    Procedure: COMBINED CYSTOSCOPY, RETROGRADES, INSERT STENT URETER(S);;  Surgeon: Olu Saldivar MD;  Location: [facility] OR     LAMINECTOMY THORACIC TWO LEVELS N/A 2022    Procedure: Thoracic 10 to thoracic 12 laminectomies;  Surgeon: Edson Leija MD;  Location: [facility] OR     PROSTATE SURGERY      ,PROSTATECTOMY,Laser prostate surgery -- BPH     WRIST SURGERY Left     s/p fracture       FAMILY HISTORY:   Family History   Problem Relation Age of Onset     Other - See Comments Mother          with Parkinson's     Heart Disease Mother         Heart Disease,CHF for  carditis     Prostate Cancer Father         Cancer-prostate     Other - See Comments Father         Alzheimer's/kidney failure     Family History Negative Brother         Good Health     Heart Disease Brother         Heart Disease,ASCAD with MI     Lung Cancer Brother         tobacco abuse         SOCIAL HISTORY:  Social History     Tobacco Use     Smoking status: Never Smoker     Smokeless tobacco: Never Used   Substance Use Topics     Alcohol use: No       MEDICATIONS:  Current Outpatient Medications   Medication Sig Dispense Refill     BACLOFEN PO Take 10 mg by mouth 2 times daily       calcium carbonate 600 mg-vitamin D 400 units (CALTRATE) 600-400 MG-UNIT per tablet Take 1 tablet by mouth 2 times daily       Cyanocobalamin 1000 MCG CAPS Take 1 capsule by mouth every morning       dexamethasone (DECADRON) 1 MG tablet Take 1 tablet (1 mg) by mouth 2 times daily 6 tablet 0     [START ON 9/16/2022] dexamethasone (DECADRON) 1 MG tablet Take 1 tablet (1 mg) by mouth daily 3 tablet 0     dexamethasone (DECADRON) 2 MG tablet Take 1 tablet (2 mg) by mouth 2 times daily 6 tablet 0     dexamethasone (DECADRON) 4 MG tablet Take 1 tablet (4 mg) by mouth 2 times daily 6 tablet 0     Docusate Sodium (COLACE PO) Take 100 mg by mouth every evening       lisinopril (ZESTRIL) 10 MG tablet TAKE 1 TABLET BY MOUTH EVERY DAY (Patient taking differently: Take 10 mg by mouth every morning) 90 tablet 1     meclizine 25 MG CHEW Take 25 mg by mouth as needed (Patient not taking: Reported on 9/12/2022)       oxybutynin ER (DITROPAN-XL) 10 MG 24 hr tablet Take 1 tablet (10 mg) by mouth daily (Patient taking differently: Take 10 mg by mouth every morning) 90 tablet 3     psyllium 0.52 G capsule Take 3 capsules by mouth 2 times daily       senna-docusate (SENOKOT-S/PERICOLACE) 8.6-50 MG tablet Take 1-2 tablets by mouth 2 times daily Take while on oral narcotics to prevent or treat constipation. 30 tablet 0         Post Medication  "Reconciliation Status: Previously reconciled during another visit; continue medications without change.      ALLERGIES:  No Known Allergies    ROS:  10 point ROS were negative other than the symptoms noted above in the HPI.    PHYSICAL EXAM:  Vital signs were reviewed in the chart.  Vital Signs: BP 99/67   Pulse 108   Temp 98  F (36.7  C)   Resp 18   Ht 1.753 m (5' 9\")   Wt 76.5 kg (168 lb 9.6 oz)   SpO2 98%   BMI 24.90 kg/m    General: Comfortable and in no acute distress  HEENT: No conjunctival pallor, no scleral icterus or injection, moist oral mucosa  Cardiovascular: Normal S1, S2, RRR  Respiratory: Lungs clear to auscultation bilaterally  GI: Abdomen soft, non-tender, non-distended, +BS  Extremities: No LE edema  Neuro: CX II-XII grossly intact  Psych: Alert and oriented x3; normal affect  Skin: Mid posterior thoracic surgical wound is stapled and healing well    LABORATORY/IMAGING DATA:  All relevant labs and imaging data in [facility] and/or Care Everywhere were personally reviewed today.    Hematology profile (September 13, 2022): White count 10.1, hemoglobin 15.3, platelet count 222,000, MCV 90    Chemistry profile (September 13, 2022): Sodium 137, potassium 3.6, creatinine 0.8, calcium 9.6, glucose 123      Most Recent 3 CBC's:Recent Labs   Lab Test 08/29/22  0930 06/10/22  1101 07/03/20  1115 11/29/18  1126   WBC  --  5.2 5.6 5.8   HGB 14.9 14.3 13.8 15.4   MCV  --  89 91 89   PLT  --  215 198 217     Most Recent 3 BMP's:Recent Labs   Lab Test 09/08/22  0624 09/07/22  0555 09/06/22  1128 08/29/22  0930 04/15/21  0955 08/31/20  0856   NA  --   --   --  139 139 140   POTASSIUM  --   --  3.8 4.2 4.3 4.3   CHLORIDE  --   --   --  103 102 102   CO2  --   --   --  31 32* 33*   BUN  --   --   --  22 27* 28*   CR  --   --   --  0.94 1.04 1.23   ANIONGAP  --   --   --  5 5 5   ОЛЬГА  --   --   --  9.6 9.7 10.2   * 151*  --  75 82 94         ASSESSMENT/PLAN:  Thoracic spondylosis with myelopathy, s/p T10-T12 " bilateral laminectomy and decompression on September 6, 2022,  Physical deconditioning.  Patient is hemodynamically stable and reports no pain.  Oxycodone and hydroxyzine were discontinued on September 12.  Plan:  Mobilize with PT/OT  Continue dexamethasone taper as ordered, last dose on September 19  Continue baclofen 10 mg twice daily  Continue the bowel regimen  Follow-up with neurosurgery as directed    Essential hypertension.  Blood pressure is fairly controlled.  Plan:  Continue lisinopril 10 mg daily  Monitor blood pressure    Multiple sclerosis (secondary progressive),  Urinary retention,  History of BPH, s/p prostatectomy.  History of MS for 30 years.  He is no longer on disease modifying therapy.  He does straight catheterization 2-3 times a day.  Plan:  Continue oxybutynin ER 10 mg daily  Continue straight cath as needed        Orders written by provider at facility:  None.        Disclaimer: This note may contain text created using speech-recognition software and may contain unintended word substitutions.      Electronically signed by:  Ariana Parker MD                          Sincerely,        Ariana Parker MD

## 2022-09-15 ENCOUNTER — DISCHARGE SUMMARY NURSING HOME (OUTPATIENT)
Dept: GERIATRICS | Facility: CLINIC | Age: 67
End: 2022-09-15
Payer: COMMERCIAL

## 2022-09-15 DIAGNOSIS — R39.15 URINARY URGENCY: ICD-10-CM

## 2022-09-15 DIAGNOSIS — Z98.890 S/P LAMINECTOMY: ICD-10-CM

## 2022-09-15 DIAGNOSIS — R33.9 RETENTION OF URINE: ICD-10-CM

## 2022-09-15 DIAGNOSIS — I10 PRIMARY HYPERTENSION: ICD-10-CM

## 2022-09-15 DIAGNOSIS — G35 MULTIPLE SCLEROSIS (H): ICD-10-CM

## 2022-09-15 DIAGNOSIS — I10 ESSENTIAL (PRIMARY) HYPERTENSION: ICD-10-CM

## 2022-09-15 DIAGNOSIS — K59.01 SLOW TRANSIT CONSTIPATION: Primary | ICD-10-CM

## 2022-09-15 PROCEDURE — 99315 NF DSCHRG MGMT 30 MIN/LESS: CPT | Performed by: NURSE PRACTITIONER

## 2022-09-15 RX ORDER — LISINOPRIL 10 MG/1
10 TABLET ORAL EVERY MORNING
Qty: 30 TABLET | Refills: 0 | Status: SHIPPED | OUTPATIENT
Start: 2022-09-15 | End: 2022-12-14

## 2022-09-15 RX ORDER — OXYBUTYNIN CHLORIDE 10 MG/1
10 TABLET, EXTENDED RELEASE ORAL EVERY MORNING
Qty: 30 TABLET | Refills: 0 | Status: SHIPPED | OUTPATIENT
Start: 2022-09-15 | End: 2023-07-13

## 2022-09-15 RX ORDER — BACLOFEN 10 MG/1
10 TABLET ORAL 2 TIMES DAILY
Qty: 60 TABLET | Refills: 0 | Status: SHIPPED | OUTPATIENT
Start: 2022-09-15 | End: 2023-07-12

## 2022-09-15 NOTE — PROGRESS NOTES
[facility] GERIATRICS DISCHARGE SUMMARY  PATIENT'S NAME: Jamir Gill  YOB: 1955  MEDICAL RECORD NUMBER:  [id number]  Place of Service where encounter took place:  [facility] (TCU) [[id number]]    PRIMARY CARE PROVIDER AND CLINIC RESPONSIBLE AFTER TRANSFER:   Kayla Pineda MD, [address] / [place] [address]    Non-[facility] Provider     Transferring providers: Rigoberto Armstrong NP, MD Elena  Recent Hospitalization/ED:  [facility] stay 9/6/22 to 9/9/22.  Date of SNF Admission: September 09, 2022  Date of SNF (anticipated) Discharge: September 16, 2022  Discharged to: previous independent home  Cognitive Scores: SLUMS 28/30  Physical Function: Wheelchair dependent  DME: No DME needed    CODE STATUS/ADVANCE DIRECTIVES DISCUSSION:  Prior   ALLERGIES: Patient has no known allergies.    HPI  This is a 68 yo male with a PMHx of MS, with progressive back pain and leg weakness requiring wheelchair.  Per MRI showed severe stenosis in T10-T11 and T11-T12.  He underwent T10-T11 bilateral laminectomy and medial facetectomy for decompression of stenosis, T11-12 bilateral laminectomy and medial facetectomy for decompression of stenosis with Dr Leija. No intraoperative complications. EBL minimal.  No other changes noted, discharged to [facility] for rehab.    Summary of nursing facility stay:     (G35) Multiple sclerosis (H)    (M47.9) Osteoarthritis of spine, unspecified spinal osteoarthritis complication status, unspecified spinal region  (Z98.890) S/P laminectomy  Underwent procedure with Dr Leija.  Given lifting restrictions, follow-up with Glenn WALSH on 10/21 and neuro nurse on 9/19.  Continue calcium/vitamin D supplementation     Pain control  Continue dexamethasone taper, will complete on 9/19.  Continue baclofen 10 mg twice daily.  Denies pain     (I10) Primary hypertension  Continue lisinopril 10 mg daily. -130     (R33.9) Retention of  urine  Continue oxybutynin 10 mg daily and straight cathing per self     Renal function  Last CR 0.80 with GFR >90 on 9/13     History of anemia  Last hemoglobin 15.3 on 9/13     (K59.01) Slow transit constipation  Continue Colace 100 mg daily and psyllium 3 caps twice daily    Discharge Medications:    Current Outpatient Medications   Medication Sig Dispense Refill     BACLOFEN PO Take 10 mg by mouth 2 times daily       calcium carbonate 600 mg-vitamin D 400 units (CALTRATE) 600-400 MG-UNIT per tablet Take 1 tablet by mouth 2 times daily       Cyanocobalamin 1000 MCG CAPS Take 1 capsule by mouth every morning       dexamethasone (DECADRON) 1 MG tablet Take 1 tablet (1 mg) by mouth 2 times daily (Patient taking differently: Take 1 mg by mouth 2 times daily Complete on 9/16) 6 tablet 0     [START ON 9/16/2022] dexamethasone (DECADRON) 1 MG tablet Take 1 tablet (1 mg) by mouth daily (Patient taking differently: Take 1 mg by mouth daily (with breakfast) Start on 9/17 and complete on 9/19) 3 tablet 0     Docusate Sodium (COLACE PO) Take 100 mg by mouth every evening       lisinopril (ZESTRIL) 10 MG tablet TAKE 1 TABLET BY MOUTH EVERY DAY (Patient taking differently: Take 10 mg by mouth every morning) 90 tablet 1     meclizine 25 MG CHEW Take 25 mg by mouth as needed (Patient not taking: Reported on 9/12/2022)       oxybutynin ER (DITROPAN-XL) 10 MG 24 hr tablet Take 1 tablet (10 mg) by mouth daily (Patient taking differently: Take 10 mg by mouth every morning) 90 tablet 3     psyllium 0.52 G capsule Take 3 capsules by mouth 2 times daily       senna-docusate (SENOKOT-S/PERICOLACE) 8.6-50 MG tablet Take 1-2 tablets by mouth 2 times daily Take while on oral narcotics to prevent or treat constipation. 30 tablet 0     Controlled medications:   not applicable/none     Past Medical History:   Past Medical History:   Diagnosis Date     Cervical disc disorder     No Comments Provided     Enlarged prostate with lower urinary  "tract symptoms (LUTS)     No Comments Provided     Essential (primary) hypertension     No Comments Provided     Multiple sclerosis (H)     No Comments Provided     Other specified disorders of bone density and structure, unspecified site (CODE)     No Comments Provided     Other symptoms and signs involving cognitive functions and awareness     No Comments Provided     Spondylosis of cervical region without myelopathy or radiculopathy     No Comments Provided     Physical Exam:   Vitals: /69   Pulse 93   Temp 98.6  F (37  C)   Resp 18   Ht 1.753 m (5' 9\")   Wt 76.5 kg (168 lb 9.6 oz)   SpO2 96%   BMI 24.90 kg/m    BMI: Body mass index is 24.9 kg/m .  GENERAL APPEARANCE:  Alert, oriented, cooperative, denies pain  RESP:  lungs clear to auscultation , no respiratory distress  CV:  regular rate and rhythm, no murmur, rub, or gallop, no edema  SKIN:  staples intact in back  PSYCH:  oriented X 3    SNF labs: see above    DISCHARGE PLAN:    Follow up labs: No labs orders/due    Medical Follow Up:      Follow up with primary care provider in 1-2 weeks    [facility] scheduled appointments:  Next 5 appointments (look out 90 days)    Sep 19, 2022 11:30 AM  Return Visit with Ph Neuro Nurse  [facility] ([facility] ) [address] [place] [id number]  [phone]   Oct 21, 2022  1:00 PM  Return Visit with Glenn Cullen PA-C  [facility] ([facility] ) [address] [place] [id number]  [phone]   Dec 09, 2022 11:00 AM  Return Visit with Glenn Cullen PA-C  [facility] Neurosurgery [facility] ([facility] ) [address] [place] [id number]  [phone]          Discharge Services: Home Care:  Physical Therapy    Discharge Instructions Verbalized to Patient at Discharge:     None    TOTAL DISCHARGE TIME:   Less " than or equal to 30 minutes  Electronically signed by:  Rigoberto Armstrong NP     Documentation of Face to Face and Certification for Home Health Services    I certify that patient: Jamir Gill is under my care and that I, or a nurse practitioner or physician's assistant working with me, had a face-to-face encounter that meets the physician face-to-face encounter requirements with this patient on: 9/15/2022.    This encounter with the patient was in whole, or in part, for the following medical condition, which is the primary reason for home health care: therapy.    I certify that, based on my findings, the following services are medically necessary home health services: Physical Therapy.    My clinical findings support the need for the above services because: Physical Therapy Services are needed to assess and treat the following functional impairments: falls risk.    Further, I certify that my clinical findings support that this patient is homebound (i.e. absences from home require considerable and taxing effort and are for medical reasons or Christian services or infrequently or of short duration when for other reasons) because: returning home.    Based on the above findings. I certify that this patient is confined to the home and needs intermittent skilled nursing care, physical therapy and/or speech therapy.  The patient is under my care, and I have initiated the establishment of the plan of care.  This patient will be followed by a physician who will periodically review the plan of care.  Physician/Provider to provide follow up care: Kayla Hobbs    Attending [facility] physician (the Medicare certified PECOS provider): Rigoberto Armstrong NP  Physician Signature: See electronic signature associated with these discharge orders.  Date: 9/15/2022

## 2022-09-16 DIAGNOSIS — N31.9 NEUROMUSCULAR DYSFUNCTION OF BLADDER: ICD-10-CM

## 2022-09-16 DIAGNOSIS — I10 ESSENTIAL HYPERTENSION: ICD-10-CM

## 2022-09-16 DIAGNOSIS — N20.1 CALCULUS OF URETER: ICD-10-CM

## 2022-09-16 DIAGNOSIS — Z98.890 OTHER SPECIFIED POSTPROCEDURAL STATES: Primary | ICD-10-CM

## 2022-09-16 DIAGNOSIS — G35 MULTIPLE SCLEROSIS (H): ICD-10-CM

## 2022-09-16 RX ORDER — DEXAMETHASONE 1 MG
1 TABLET ORAL
Qty: 4 TABLET | Refills: 0 | Status: SHIPPED | OUTPATIENT
Start: 2022-09-16 | End: 2023-07-14

## 2022-09-17 ENCOUNTER — HEALTH MAINTENANCE LETTER (OUTPATIENT)
Age: 67
End: 2022-09-17

## 2022-09-19 ENCOUNTER — OFFICE VISIT (OUTPATIENT)
Dept: NEUROSURGERY | Facility: CLINIC | Age: 67
End: 2022-09-19
Payer: COMMERCIAL

## 2022-09-19 VITALS — TEMPERATURE: 96.3 F | DIASTOLIC BLOOD PRESSURE: 70 MMHG | SYSTOLIC BLOOD PRESSURE: 112 MMHG

## 2022-09-19 DIAGNOSIS — Z98.890 S/P LAMINECTOMY: Primary | ICD-10-CM

## 2022-09-19 PROCEDURE — 99207 PR NO CHARGE NURSE ONLY: CPT

## 2022-09-19 ASSESSMENT — PAIN SCALES - GENERAL: PAINLEVEL: NO PAIN (0)

## 2022-09-19 NOTE — PATIENT INSTRUCTIONS
Instructions for Patient    Incision  Steri-strips were applied today, they will fall off in 7-10 days. Do not to pull them off.   Keep your incision clean and dry at all times.   It is okay to shower, just pat the incision dry   No submerging incision in water such as pools, hot tubs, or baths for at least 8 weeks and until the incision is healed  Do not apply lotions or ointments to incision    Activity  No lifting greater than 10 pounds. Limited bending, twisting, or overhead reaching.  Walking is the best way to start exercise after surgery. Take short frequent walks. You may gradually increase the distance as tolerated. If you feel pain, decrease your activity, but DO NOT stop walking. Walking will help you gain strength, prevent muscle soreness and spasms, and prevent blood clots.   Avoid bed rest and prolonged sitting for longer than 30 minutes (change positions frequently while awake)  No contact sports or high impact activities such as; running/jogging, snowmobile or 4 sarabia riding or any other recreational vehicles until after given clearance at one of your follow up visits    Medications   Refills of pain medication:   Please call the neurosurgery clinic to request 2-3 days before you run out  Weaning from narcotic pain medications  When it is time, start weaning by extending the time between doses.   For example, if you're taking 2 tabs every 4 hours, spread it out to 2 tabs over 4.5, 5, 6 hours. At that point you can certainly cut down to 1 tab, then wean to an as needed basis until completely done with them.Refills: call our clinic 2-3 business days before you are out of medication. A nurse will call you back to obtain a pain assessment.   Don't take more than 3000mg of Acetaminophen in 24 hours  Ok to begin taking Aspirin and NSAIDs (ex: ibuprofen/Advil)  Encouraged icing for at least 3-4 times throughout the day for 20-30 minutes at a time. Avoid heat to the incision area.   Taking stool softeners  regularly can reduce constipation commonly caused by narcotic pain medications.    Contact clinic or Emergency Room if you develop:   Infection (redness, swelling, warmth, drainage, fever over 101 F)  New injury  Bladder or bowel changes or loss of control    Signs of blood clot:  Swelling and/or warmth in one or both legs  Pain or tenderness in your leg, ankle, foot, or arm   Red or discolored skin     Go to the Emergency Room   If sudden onset of severe headache, weakness, confusion, change in level of consciousness, pain, or loss of movement.  Chest pain  Trouble breathing     Post-operative appointments  6 week and 3 months post-op    [facility]  [facility]  [address]  [place] [address]  Telephone:  [phone]   Fax:  [phone]

## 2022-09-19 NOTE — PROGRESS NOTES
Post-op Nurse Visit:    Patient presents today s/p T10-12 laminectomies on 9/6/22 per the order of  Edson Leija MD . Wife accompanies patient.    Pain/Neuro Assessment  0/10  Numbness/tingling: unchanged since surgery.   Strength: RLE weaker than left. Patient working with PT to strengthen. Denies weakness.     Pain Relief Measures:  None    Incision   Incision inspected. Edges well-approximated. No redness, swelling, drainage, or warmth noted. Incision prepped with betadine and staple(s)  removed without difficulty. Steri-strips applied.    Activity  Following restrictions   Falls:  none  Patient is walking frequently with slight difficulty using walker. Was in W/c pre-op.  Legs examined in clinic; no redness, swelling, or warmth noted. Patient denies any pain in bilateral calves.     TCO:  discharged from [facility] [place] 9/16/22.  PT: starts this week at [place]    GI/  Patient's appetite is normal  Bowel/bladder problems? No (straight cath is baseline)  Taking stool softeners? Yes     Refills/x-rays/return to work  Refills given at this appointment? No  Return to work discussed at this appointment? Yes. Self employed.    Expressed frustration over hospitalization on the Spine Floor at [facility]. However, Dr. Leija was great!! Advised they call patient relations and fill out the hospital survey.    All of patient's questions addressed today. Patient was instructed to call with any additional questions/concerns.     Lorie Mcgarry RN on 9/19/2022 at 11:48 AM

## 2022-09-21 ENCOUNTER — HOSPITAL ENCOUNTER (OUTPATIENT)
Dept: PHYSICAL THERAPY | Facility: OTHER | Age: 67
Setting detail: THERAPIES SERIES
Discharge: HOME | End: 2022-09-21
Attending: NURSE PRACTITIONER
Payer: MEDICARE

## 2022-09-21 ENCOUNTER — MEDICAL CORRESPONDENCE (OUTPATIENT)
Dept: PHYSICAL THERAPY | Facility: OTHER | Age: 67
End: 2022-09-21

## 2022-09-21 DIAGNOSIS — I10 ESSENTIAL HYPERTENSION: ICD-10-CM

## 2022-09-21 DIAGNOSIS — N31.9 NEUROMUSCULAR DYSFUNCTION OF BLADDER: ICD-10-CM

## 2022-09-21 DIAGNOSIS — N20.1 CALCULUS OF URETER: ICD-10-CM

## 2022-09-21 DIAGNOSIS — Z98.890 OTHER SPECIFIED POSTPROCEDURAL STATES: ICD-10-CM

## 2022-09-21 DIAGNOSIS — G35 MULTIPLE SCLEROSIS (H): ICD-10-CM

## 2022-09-21 PROCEDURE — 97162 PT EVAL MOD COMPLEX 30 MIN: CPT | Mod: GP,PO,KX

## 2022-09-21 PROCEDURE — 97116 GAIT TRAINING THERAPY: CPT | Mod: GP,PO,KX

## 2022-09-21 PROCEDURE — 97110 THERAPEUTIC EXERCISES: CPT | Mod: GP,PO,KX

## 2022-09-23 ENCOUNTER — HOSPITAL ENCOUNTER (OUTPATIENT)
Dept: PHYSICAL THERAPY | Facility: OTHER | Age: 67
Setting detail: THERAPIES SERIES
Discharge: HOME | End: 2022-09-23
Attending: FAMILY MEDICINE
Payer: MEDICARE

## 2022-09-23 PROCEDURE — 97110 THERAPEUTIC EXERCISES: CPT | Mod: GP,PO,KX

## 2022-09-23 PROCEDURE — 97140 MANUAL THERAPY 1/> REGIONS: CPT | Mod: GP,PO,KX

## 2022-09-23 PROCEDURE — 97116 GAIT TRAINING THERAPY: CPT | Mod: GP,PO,KX

## 2022-09-28 ENCOUNTER — HOSPITAL ENCOUNTER (OUTPATIENT)
Dept: PHYSICAL THERAPY | Facility: OTHER | Age: 67
Setting detail: THERAPIES SERIES
Discharge: HOME | End: 2022-09-28
Attending: FAMILY MEDICINE
Payer: MEDICARE

## 2022-09-28 PROCEDURE — 97110 THERAPEUTIC EXERCISES: CPT | Mod: GP,PO,KX

## 2022-09-30 ENCOUNTER — HOSPITAL ENCOUNTER (OUTPATIENT)
Dept: PHYSICAL THERAPY | Facility: OTHER | Age: 67
Setting detail: THERAPIES SERIES
Discharge: HOME | End: 2022-09-30
Attending: FAMILY MEDICINE
Payer: MEDICARE

## 2022-09-30 PROCEDURE — 97110 THERAPEUTIC EXERCISES: CPT | Mod: GP,PO,KX

## 2022-10-02 NOTE — PROGRESS NOTES
[facility]    OUTPATIENT PHYSICAL THERAPY ORTHOPEDIC EVALUATION  PLAN OF TREATMENT FOR OUTPATIENT REHABILITATION  (COMPLETE FOR INITIAL CLAIMS ONLY)  Patient's Last Name, First Name, M.I.  YOB: 1955  JairoJamir  D    Provider s Name:  [facility]   Medical Record No.  [id number]   Start of Care Date:  09/21/22   Onset Date:  09/06/22   Type:     _X__PT   ___OT   ___SLP Medical Diagnosis:  MS; Other specified postprocedural states     PT Diagnosis:  impaired mobility, impaired posture, impaired ADLs   Visits from SOC:  1      _________________________________________________________________________________  Plan of Treatment/Functional Goals:  bed mobility training, balance training, gait training, manual therapy, ROM, strengthening, stretching           Goals  Goal Identifier: Sit to stand  Goal Description: Jamir will be able to complete 5 sit to stands consecutively with armrests for improvement in transfers and mobility  Target Date: 11/06/22    Goal Identifier: Ambulation  Goal Description: Jamir will be able to walk 300 feet with 4WW before needing to sit and rest.  Target Date: 10/30/22    Goal Identifier: Gait  Goal Description: Jamir will be able to ambulate for 100 feet without his feet getting tangled for improved safety and balance  Target Date: 11/06/22    Goal Identifier: Bed mobility  Goal Description: Jamir will be able to complete bed mobility with SBA for 3 consecutive session for improved consistency in status  Target Date: 11/13/22           Therapy Frequency:   (1-2x/week)  Predicted Duration of Therapy Intervention:  10 weeks    Manjit Pink, PT                 I CERTIFY THE NEED FOR THESE SERVICES FURNISHED UNDER        THIS PLAN OF TREATMENT AND WHILE UNDER MY CARE     (Physician co-signature of this document indicates review and  certification of the therapy plan).                       Certification Date From:  09/21/22   Certification Date To:  11/27/22    Referring Provider:  Rigoberto Armstrong NP    Initial Assessment        See Epic Evaluation Start of Care Date: 09/21/22

## 2022-10-02 NOTE — PROGRESS NOTES
09/21/22 1300   General Information   Type of Visit Initial OP Ortho PT Evaluation   Start of Care Date 09/21/22   Referring Physician Rigoberto Armstrong NP   Patient/Family Goals Statement improve his ambulation, increase strength   Orders Evaluate and Treat   Date of Order 09/16/22   Certification Required? Yes   Medical Diagnosis MS; Other specified postprocedural states; s/p laminectomy   Surgical/Medical history reviewed Yes   General Information Comments Jamir presents to therapy following a T10-12 laminectomy. Jamir has a complex medical history that includes MS and poor mobility. Jamir has had previous compression fractures as well as recent onset of L LE symptoms including weakness and difficulty with walking. Jamir and this writer have worked together for PT in the past. He presents today in a wheelchair. Normal mobility for Jamir is use of his 4WW. He does walk with a scissoring gait and increased adductor tone. Jamir also has tight hamstrings and abductor weakness. He stayed at an SNF for one week to work on mobility. He has been home for 5 days and notes that it is going quite well.   Body Part(s)   Body Part(s) Hip;Knee;Lumbar Spine/SI   Presentation and Etiology   Pertinent history of current problem (include personal factors and/or comorbidities that impact the POC) surgery 9/6/22   Impairments D. Decreased ROM;E. Decreased flexibility;F. Decreased strength and endurance;G. Impaired balance;H. Impaired gait   Functional Limitations perform activities of daily living;perform desired leisure / sports activities   Symptom Location LEs   How/Where did it occur Other  (MS and surgery)   Onset date of current episode/exacerbation 09/06/22   Chronicity New   Pain rating (0-10 point scale) Denies pain   Prior Level of Function   Prior Level of Function-Mobility modified indpendent   Prior Level of Function-ADLs modified independent   Functional Level Prior Comment 4WW   Current Level of Function   Current  Community Support Family/friend caregiver   Patient role/employment history H. Other  (resort owner)   Living environment House/[facility]   Current equipment-Gait/Locomotion Front wheeled walker;Wheelchair   Fall Risk Screen   Fall screen completed by PT   Is patient a fall risk? Yes   Abuse Screen (yes response referral indicated)   Feels Unsafe at Home or Work/School no   Feels Threatened by Someone no   Does Anyone Try to Keep You From Having Contact with Others or Doing Things Outside Your Home? no   Physical Signs of Abuse Present no   Knee Objective Findings   Balance/Proprioception (Single Leg Stance) unable   Knee ROM Comment limited knee extension secondary to tightness and tone. Lacks approx 3-5 degrees R>L   Knee/Hip Strength Comments Knee flexion and extension at 3-4/5 depending on angle of flexion   Hip Objective Findings   Hip ROM Comments Significant limitation in hip abduction and extension   Lumbar Spine/SI Objective Findings   Hip Flexion (L2) Strength 4   Hip Abduction Strength 2/5 on R and 3+/5 on L   Hip Adduction Strength 4/5   Planned Therapy Interventions   Planned Therapy Interventions bed mobility training;balance training;gait training;manual therapy;ROM;strengthening;stretching   Clinical Impression   Criteria for Skilled Therapeutic Interventions Met yes, treatment indicated   PT Diagnosis impaired mobility, impaired posture, impaired ADLs   Influenced by the following impairments weakness, posture, fatigue   Functional limitations due to impairments limited mobility, decreased ambulation distance, activity tolerance   Clinical Presentation Evolving/Changing   Clinical Decision Making (Complexity) Moderate complexity   Therapy Frequency   (1-2x/week)   Predicted Duration of Therapy Intervention (days/wks) 10 weeks   Risk & Benefits of therapy have been explained Yes   Clinical Impression Comments Jamir is quite a complex case. He has decreased mobility at baseline secondary to MS with  recent onset of L LE involvement that was suspected to be related to his Tspine. Pt underwent a laminectomy with intention of relieving compression on his cord. Pt would benefit from skilled therapy to help him regain his prior level of function   ORTHO GOALS   PT Ortho Eval Goals 1;2;3;4   Ortho Goal 1   Goal Identifier Sit to stand   Goal Description Jamir will be able to complete 5 sit to stands consecutively with armrests for improvement in transfers and mobility   Target Date 11/06/22   Ortho Goal 2   Goal Identifier Ambulation   Goal Description Jamir will be able to walk 300 feet with 4WW before needing to sit and rest.   Target Date 10/30/22   Ortho Goal 3   Goal Identifier Gait   Goal Description Jamir will be able to ambulate for 100 feet without his feet getting tangled for improved safety and balance   Target Date 11/06/22   Ortho Goal 4   Goal Identifier Bed mobility   Goal Description Jamir will be able to complete bed mobility with SBA for 3 consecutive session for improved consistency in status   Target Date 11/13/22   Total Evaluation Time   PT Eval, Moderate Complexity Minutes (69899) 40   Therapy Certification   Certification date from 09/21/22   Certification date to 11/27/22   Medical Diagnosis MS; Other specified postprocedural states

## 2022-10-03 ENCOUNTER — HOSPITAL ENCOUNTER (OUTPATIENT)
Dept: PHYSICAL THERAPY | Facility: OTHER | Age: 67
Setting detail: THERAPIES SERIES
Discharge: HOME | End: 2022-10-03
Attending: FAMILY MEDICINE
Payer: MEDICARE

## 2022-10-03 PROCEDURE — 97110 THERAPEUTIC EXERCISES: CPT | Mod: GP,PO,KX

## 2022-10-06 ENCOUNTER — HOSPITAL ENCOUNTER (OUTPATIENT)
Dept: PHYSICAL THERAPY | Facility: OTHER | Age: 67
Setting detail: THERAPIES SERIES
Discharge: HOME | End: 2022-10-06
Attending: FAMILY MEDICINE
Payer: MEDICARE

## 2022-10-06 PROCEDURE — 97110 THERAPEUTIC EXERCISES: CPT | Mod: GP,PO,CQ,KX

## 2022-10-11 ENCOUNTER — HOSPITAL ENCOUNTER (OUTPATIENT)
Dept: PHYSICAL THERAPY | Facility: OTHER | Age: 67
Setting detail: THERAPIES SERIES
Discharge: HOME | End: 2022-10-11
Attending: FAMILY MEDICINE
Payer: MEDICARE

## 2022-10-11 PROCEDURE — 97110 THERAPEUTIC EXERCISES: CPT | Mod: GP,PO,CQ,KX

## 2022-10-14 ENCOUNTER — HOSPITAL ENCOUNTER (OUTPATIENT)
Dept: PHYSICAL THERAPY | Facility: OTHER | Age: 67
Setting detail: THERAPIES SERIES
Discharge: HOME | End: 2022-10-14
Attending: FAMILY MEDICINE
Payer: MEDICARE

## 2022-10-14 PROCEDURE — 97110 THERAPEUTIC EXERCISES: CPT | Mod: GP,PO,CQ,KX

## 2022-10-16 ENCOUNTER — MYC MEDICAL ADVICE (OUTPATIENT)
Dept: NEUROSURGERY | Facility: CLINIC | Age: 67
End: 2022-10-16

## 2022-10-17 ENCOUNTER — HOSPITAL ENCOUNTER (OUTPATIENT)
Dept: PHYSICAL THERAPY | Facility: OTHER | Age: 67
Setting detail: THERAPIES SERIES
Discharge: HOME | End: 2022-10-17
Attending: FAMILY MEDICINE
Payer: MEDICARE

## 2022-10-17 PROCEDURE — 97110 THERAPEUTIC EXERCISES: CPT | Mod: GP,PO,KX

## 2022-10-17 NOTE — TELEPHONE ENCOUNTER
DOS: 9/6/22  Surgery: Thoracic 10 to thoracic 12 laminectomies   Surgeon: Dr Leija    Assessment: Patients sends mychart on Sunday with concerns for circulation to lower extremity. Patient is 6 weeks post op  Called patient to discuss  Right foot swollen and cold compared to left foot  Per patient has has this since surgery  PT in [place] evaluated-gave him some compression sock to wear.  Denies s/sx of blood blot. NO pain/cramps/redness to calves    Recommendation given: Reach out to primary care to see if they would like any imaging or follow up. Patient agrees. He is coming for 6 week visit on Friday with Glenn    Action needed from provider: seven

## 2022-10-20 ENCOUNTER — HOSPITAL ENCOUNTER (OUTPATIENT)
Dept: PHYSICAL THERAPY | Facility: OTHER | Age: 67
Setting detail: THERAPIES SERIES
Discharge: HOME | End: 2022-10-20
Attending: FAMILY MEDICINE
Payer: MEDICARE

## 2022-10-20 PROCEDURE — 97110 THERAPEUTIC EXERCISES: CPT | Mod: GP,PO,KX

## 2022-10-21 ENCOUNTER — OFFICE VISIT (OUTPATIENT)
Dept: NEUROSURGERY | Facility: CLINIC | Age: 67
End: 2022-10-21
Payer: COMMERCIAL

## 2022-10-21 VITALS
TEMPERATURE: 97.2 F | SYSTOLIC BLOOD PRESSURE: 128 MMHG | WEIGHT: 173 LBS | HEIGHT: 69 IN | DIASTOLIC BLOOD PRESSURE: 80 MMHG | BODY MASS INDEX: 25.62 KG/M2

## 2022-10-21 DIAGNOSIS — Z98.890 S/P LAMINECTOMY: Primary | ICD-10-CM

## 2022-10-21 PROCEDURE — 99024 POSTOP FOLLOW-UP VISIT: CPT | Performed by: PHYSICIAN ASSISTANT

## 2022-10-21 RX ORDER — ASPIRIN 81 MG/1
81 TABLET, CHEWABLE ORAL DAILY
COMMUNITY

## 2022-10-21 ASSESSMENT — PAIN SCALES - GENERAL: PAINLEVEL: NO PAIN (0)

## 2022-10-21 NOTE — PROGRESS NOTES
"Jamir Gill is a 67 year old male who presents for:  Chief Complaint   Patient presents with     Follow Up     DOS 9/6/22 T 10-12 laminectomies        Initial Vitals:  There were no vitals taken for this visit. Estimated body mass index is 24.9 kg/m  as calculated from the following:    Height as of 9/14/22: 5' 9\" (1.753 m).    Weight as of 9/14/22: 168 lb 9.6 oz (76.5 kg).. There is no height or weight on file to calculate BSA. BP completed using cuff size: regular  Data Unavailable    Nursing Comments:     Coreen Estrada MA    "

## 2022-10-21 NOTE — LETTER
"    10/21/2022         RE: Jamir Gill  [address]  [place] [address]        Dear Colleague,    Thank you for referring your patient, Jamir Gill, to the [facility]UROS[facility]ERY [facility]INIC [place]. Please see a copy of my visit note below.    Jamir Gill is a 67 year old male who presents for:  Chief Complaint   Patient presents with     Follow Up     DOS 9/6/22 T 10-12 laminectomies        Initial Vitals:  There were no vitals taken for this visit. Estimated body mass index is 24.9 kg/m  as calculated from the following:    Height as of 9/14/22: 5' 9\" (1.753 m).    Weight as of 9/14/22: 168 lb 9.6 oz (76.5 kg).. There is no height or weight on file to calculate BSA. BP completed using cuff size: regular  Data Unavailable    Nursing Comments:     Coreen Estrada MA      Neurosurgery Clinic  Neurosurgery followup:    HPI: Jamir is 6 weeks out from T10-12 laminectomy.  He has no back or leg pain.  Unfortunately, he has not really noticed a significant improvement in the lower extremity function.  This is of course compounded by his history of MS.  He will be leaving for [place] in the next couple of days, and plans to reinitiate physical therapy down there.  We discussed that nerve recovery can take many months, and it is unclear whether or not he will regain any function in his lower extremities, but at this point, it is too early to tell.      Exam:  Constitutional:  Alert, well nourished, NAD.  HEENT: Normocephalic, atraumatic.   Pulm:  Without shortness of breath   CV:  No pitting edema of BLE.      Neurological:  Awake  Alert  Oriented x 3  Motor exam:        IP Q DF PF EHL  R   5  5   5   5    5  L   5  5   5   5    5     Reflexes are 2+ in the patellar and Achilles. There is no clonus. Downgoing Babinski.      Able to spontaneously move L/E bilaterally  Sensation intact throughout all L/E dermatomes     Incision: well healed        A/P:    6 weeks out from T10-12 laminectomy " for severe spinal stenosis.  He has done well from a surgical recovery standpoint, with no back pain or leg pain.  He does have ongoing dysfunction with his lower extremities, partially at least due to his multiple sclerosis.  Some of this could have been from nerve compression in his lower thoracic, but it would take a little more time to really see how this is going to return, if at all.  He will be starting physical therapy once he gets to [place] next week.  I think this is a reasonable option.  His next follow-up is set for December 9, and he can turn this into a phone visit if he wishes.  He voiced agreement and understanding.        Glenn Cullen PA-C  [facility] [place] [address]    Tel [phone]  Pager [phone]      The use of Dragon/Phloronol dictation services may have been used to construct the content in this note; any grammatical or spelling errors are non-intentional. Please contact the author of this note directly if you are in need of any clarification.        Again, thank you for allowing me to participate in the care of your patient.        Sincerely,        Glenn Cullen PA-C

## 2022-10-21 NOTE — PROGRESS NOTES
Neurosurgery Clinic  Neurosurgery followup:    HPI: Jamir is 6 weeks out from T10-12 laminectomy.  He has no back or leg pain.  Unfortunately, he has not really noticed a significant improvement in the lower extremity function.  This is of course compounded by his history of MS.  He will be leaving for [place] in the next couple of days, and plans to reinitiate physical therapy down there.  We discussed that nerve recovery can take many months, and it is unclear whether or not he will regain any function in his lower extremities, but at this point, it is too early to tell.      Exam:  Constitutional:  Alert, well nourished, NAD.  HEENT: Normocephalic, atraumatic.   Pulm:  Without shortness of breath   CV:  No pitting edema of BLE.      Neurological:  Awake  Alert  Oriented x 3  Motor exam:        IP Q DF PF EHL  R   5  5   5   5    5  L   5  5   5   5    5     Reflexes are 2+ in the patellar and Achilles. There is no clonus. Downgoing Babinski.      Able to spontaneously move L/E bilaterally  Sensation intact throughout all L/E dermatomes     Incision: well healed        A/P:    6 weeks out from T10-12 laminectomy for severe spinal stenosis.  He has done well from a surgical recovery standpoint, with no back pain or leg pain.  He does have ongoing dysfunction with his lower extremities, partially at least due to his multiple sclerosis.  Some of this could have been from nerve compression in his lower thoracic, but it would take a little more time to really see how this is going to return, if at all.  He will be starting physical therapy once he gets to [place] next week.  I think this is a reasonable option.  His next follow-up is set for December 9, and he can turn this into a phone visit if he wishes.  He voiced agreement and understanding.        Glenn Cullen PA-C  [facility]  [address]  [address] [place] [address]    Tel [phone]  Pager  [phone]      The use of Dragon/Apigeeation services may have been used to construct the content in this note; any grammatical or spelling errors are non-intentional. Please contact the author of this note directly if you are in need of any clarification.

## 2022-10-26 NOTE — PROGRESS NOTES
[facility] Rehabilitation Service    Outpatient Physical Therapy Discharge Note  Patient: Jamir Gill  : 1955    Beginning/End Dates of Reporting Period:  22 to 10/20/22    Referring Provider: Rigoberto Armstrong NP; Dr. Leija    Therapy Diagnosis: MS, s/p laminectomies, impaired mobility      Client Self Report: Jamir will be leaving on Monday for [place]. Plan to d/c and seek care there.    Goals:  Goal Identifier Sit to stand   Goal Description Jamir will be able to complete 5 sit to stands consecutively with armrests for improvement in transfers and mobility   Target Date 22   Date Met      Progress (detail required for progress note):       Goal Identifier Ambulation   Goal Description Jamir will be able to walk 300 feet with 4WW before needing to sit and rest.   Target Date 10/30/22   Date Met      Progress (detail required for progress note):       Goal Identifier Gait   Goal Description Jamir will be able to ambulate for 100 feet without his feet getting tangled for improved safety and balance   Target Date 22   Date Met      Progress (detail required for progress note):       Goal Identifier Bed mobility   Goal Description Jamir will be able to complete bed mobility with SBA for 3 consecutive session for improved consistency in status   Target Date 22   Date Met   10/20/22   Progress (detail required for progress note):           Plan:  Discharge from therapy. Jamir at this point in time has made relatively slow progress in his recovery of mobility. He is pain free and doing really well from a healing standpoint since his surgery. His mobility deficits are very likely caused by the MS that may or may not have had contributions from the nerve compression that he was experiencing prior to surgery. He has good and bad days regarding his mobility. Continues to need extra time to complete tasks. Assist at  times. Uses 4WW at home and uses w/c when coming to therapy. Jamir does seem to have okay bed mobility often only needing supervision or setup to get to and from supine. Jamir will certainly need more therapy to help regain his prior level of function.    Discharge:    Reason for Discharge: Patient chooses to discontinue therapy.    Equipment Issued: theraband    Discharge Plan: Other services: Jamir will continue outpatient PT services in [place].

## 2022-12-09 ENCOUNTER — VIRTUAL VISIT (OUTPATIENT)
Dept: NEUROSURGERY | Facility: CLINIC | Age: 67
End: 2022-12-09
Payer: COMMERCIAL

## 2022-12-09 DIAGNOSIS — Z98.890 S/P LAMINECTOMY: Primary | ICD-10-CM

## 2022-12-09 PROCEDURE — 99024 POSTOP FOLLOW-UP VISIT: CPT | Performed by: PHYSICIAN ASSISTANT

## 2022-12-09 ASSESSMENT — PAIN SCALES - GENERAL: PAINLEVEL: NO PAIN (0)

## 2022-12-09 NOTE — LETTER
12/9/2022         RE: Jamir Gill  [address]  [place] [address]        Dear Colleague,    Thank you for referring your patient, Jamir Gill, to the [facility] NEUROSURGERY CLINIC [place]. Please see a copy of my visit note below.    Jamir is a 67 year old who is being evaluated via a billable telephone visit.      What phone number would you like to be contacted at? [phone]  How would you like to obtain your AVS? Mail a copy        Subjective   Jamir is a 67 year old presenting for the following health issues:      HPI     3 months s/p T10-12 laminectomy.  He is doing well.  He continues to make progress with physical therapy.  He is not having any back or leg pain.  He does continue with weakness, which again is improving.    Review of Systems   CONSTITUTIONAL: NEGATIVE for fever, chills, change in weight  ENT/MOUTH: NEGATIVE for ear, mouth and throat problems  RESP: NEGATIVE for significant cough or SOB  CV: NEGATIVE for chest pain, palpitations or peripheral edema     Objective    Vitals - Patient Reported  Pain Score: No Pain (0)        Physical Exam   healthy, alert and no distress  PSYCH: Alert and oriented times 3; coherent speech, normal   rate and volume, able to articulate logical thoughts, His affect is normal and pleasant  RESP: No cough, no audible wheezing, able to talk in full sentences  Remainder of exam unable to be completed due to telephone visits    3 months s/p T10-12 laminectomy.     He is doing well.  He continues to make progress with PT.  He has no leg or back pain.  At this point, he can return to activity as tolerated.  He will not require any further follow-up, but is certainly welcome to come back and see us to discuss any additional questions or concerns he may have.  He voiced agreement and understanding.           Phone call duration: 6 minutes          Again, thank you for allowing me to participate in the care of your patient.         Sincerely,        Glenn Cullen PA-C

## 2022-12-09 NOTE — PROGRESS NOTES
Jamir is a 67 year old who is being evaluated via a billable telephone visit.      What phone number would you like to be contacted at? [phone]  How would you like to obtain your AVS? Mail a copy        Subjective   Jamir is a 67 year old presenting for the following health issues:      HPI     3 months s/p T10-12 laminectomy.  He is doing well.  He continues to make progress with physical therapy.  He is not having any back or leg pain.  He does continue with weakness, which again is improving.    Review of Systems   CONSTITUTIONAL: NEGATIVE for fever, chills, change in weight  ENT/MOUTH: NEGATIVE for ear, mouth and throat problems  RESP: NEGATIVE for significant cough or SOB  CV: NEGATIVE for chest pain, palpitations or peripheral edema      Objective    Vitals - Patient Reported  Pain Score: No Pain (0)        Physical Exam   healthy, alert and no distress  PSYCH: Alert and oriented times 3; coherent speech, normal   rate and volume, able to articulate logical thoughts, His affect is normal and pleasant  RESP: No cough, no audible wheezing, able to talk in full sentences  Remainder of exam unable to be completed due to telephone visits    3 months s/p T10-12 laminectomy.     He is doing well.  He continues to make progress with PT.  He has no leg or back pain.  At this point, he can return to activity as tolerated.  He will not require any further follow-up, but is certainly welcome to come back and see us to discuss any additional questions or concerns he may have.  He voiced agreement and understanding.            Phone call duration: 6 minutes

## 2022-12-12 DIAGNOSIS — I10 ESSENTIAL (PRIMARY) HYPERTENSION: ICD-10-CM

## 2022-12-14 NOTE — TELEPHONE ENCOUNTER
"Last Prescription Date: 9/15/2022  Last Qty/Refills: 30 / 0  Last Office Visit: 8/29/2022  Future Office Visit: None noted     Requested Prescriptions   Pending Prescriptions Disp Refills     lisinopril (ZESTRIL) 10 MG tablet [Pharmacy Med Name: LISINOPRIL 10 MG TABLET] 90 tablet 1     Sig: TAKE 1 TABLET BY MOUTH EVERY DAY       ACE Inhibitors (Including Combos) Protocol Passed - 12/12/2022  8:07 AM        Passed - Blood pressure under 140/90 in past 12 months     BP Readings from Last 3 Encounters:   10/21/22 128/80   09/19/22 112/70   09/14/22 105/69                 Passed - Recent (12 mo) or future (30 days) visit within the authorizing provider's specialty     Patient has had an office visit with the authorizing provider or a provider within the authorizing providers department within the previous 12 mos or has a future within next 30 days. See \"Patient Info\" tab in inbasket, or \"Choose Columns\" in Meds & Orders section of the refill encounter.              Passed - Medication is active on med list        Passed - Patient is age 18 or older        Passed - Normal serum creatinine on file in past 12 months     Recent Labs   Lab Test 08/29/22  0930   CR 0.94       Ok to refill medication if creatinine is low          Passed - Normal serum potassium on file in past 12 months     Recent Labs   Lab Test 09/06/22  1128   POTASSIUM 3.8                  Salma Cardoso RN on 12/14/2022 at 11:00 AM    "

## 2022-12-15 RX ORDER — LISINOPRIL 10 MG/1
TABLET ORAL
Qty: 90 TABLET | Refills: 1 | Status: SHIPPED | OUTPATIENT
Start: 2022-12-15 | End: 2023-02-20

## 2023-02-20 DIAGNOSIS — I10 ESSENTIAL (PRIMARY) HYPERTENSION: ICD-10-CM

## 2023-02-20 RX ORDER — LISINOPRIL 10 MG/1
10 TABLET ORAL DAILY
Qty: 90 TABLET | Refills: 0 | OUTPATIENT
Start: 2023-02-20

## 2023-02-20 RX ORDER — LISINOPRIL 10 MG/1
10 TABLET ORAL DAILY
Qty: 90 TABLET | Refills: 1 | Status: SHIPPED | OUTPATIENT
Start: 2023-02-20 | End: 2023-09-01

## 2023-02-20 NOTE — TELEPHONE ENCOUNTER
"CC: Hypogonadism/Gynecomastia    HPI: Jorje Elmore [facility] [place] [place] pharmacist states there are no refills available for transfer. Pt needs a new prescription sent to [facility] in [place], [place].    Shira Laboy RN .............. 2/20/2023  2:09 PM       [employer] [facility]. Cristóbal I                            Cristóbal II                           Cristóbal III                          Cristóbal [facility]  Eliezer Brooks M.D., Ph.D.,   [address] [place] [address]  CLIA  [id number]      [facility],  [address], [place]  [address] [phone]  Cade Cano MD  - Medical Director        

## 2023-02-20 NOTE — TELEPHONE ENCOUNTER
Prescription for lisinopril was sent for 90 tablets with a refill on 12/15/2022.  Should not be due for refills until June.   Rachael Coronel PA-C on 2/20/2023 at 2:02 PM

## 2023-02-20 NOTE — TELEPHONE ENCOUNTER
Reason for call: Medication or medication refill    Name of medication requested: Lisinopril    Are you out of the medication? Yes    What pharmacy do you use? [facility], [phone], [facility]    Preferred method for responding to this message: Telephone Call    Phone number patient can be reached at: Cell number on file:    Telephone Information:   Mobile [phone]       If we cannot reach you directly, may we leave a detailed response at the number you provided? Yes    Una Arriaza on 2/20/2023 at 1:15 PM

## 2023-02-20 NOTE — TELEPHONE ENCOUNTER
lisinopril (ZESTRIL) 10 MG tablet 90 tablet 1 12/15/2022  No   Sig: TAKE 1 TABLET BY MOUTH EVERY DAY   Sent to pharmacy as: Lisinopril 10 MG Oral Tablet (ZESTRIL)   Class: E-Prescribe   Order: [id number]   E-Prescribing Status: Receipt confirmed by pharmacy (12/15/2022  8:35 AM CST)     [facility] [address] [place] [facility] [address]     Please send new prescription to new pharmacy, as requested.    In clinical absence of patient's primary, Kayla Hobbs, patient is requesting that this message be sent to the covering provider for consideration please.    Shira Laboy RN .............. 2/20/2023  1:50 PM

## 2023-04-21 DIAGNOSIS — R26.9 ABNORMALITY OF GAIT: ICD-10-CM

## 2023-04-21 DIAGNOSIS — G35 MULTIPLE SCLEROSIS (H): Primary | ICD-10-CM

## 2023-04-26 ENCOUNTER — HOSPITAL ENCOUNTER (OUTPATIENT)
Dept: PHYSICAL THERAPY | Facility: OTHER | Age: 68
Setting detail: THERAPIES SERIES
Discharge: HOME | End: 2023-04-26
Payer: MEDICARE

## 2023-04-26 DIAGNOSIS — R26.9 ABNORMALITY OF GAIT: ICD-10-CM

## 2023-04-26 DIAGNOSIS — G35 MULTIPLE SCLEROSIS (H): ICD-10-CM

## 2023-04-26 PROCEDURE — 97162 PT EVAL MOD COMPLEX 30 MIN: CPT | Mod: GP,KX

## 2023-04-26 PROCEDURE — 97110 THERAPEUTIC EXERCISES: CPT | Mod: GP,KX

## 2023-04-26 ASSESSMENT — 6 MINUTE WALK TEST (6MWT): TOTAL DISTANCE WALKED (FT): 850

## 2023-05-01 NOTE — PROGRESS NOTES
[facility]                                                                                   OUTPATIENT PHYSICAL THERAPY FUNCTIONAL EVALUATION  PLAN OF TREATMENT FOR OUTPATIENT REHABILITATION  (COMPLETE FOR INITIAL CLAIMS ONLY)  Patient's Last Name, First Name, M.I.  YOB: 1955  Jamir Gill     Provider's Name   [facility]   Medical Record No.  [id number]     Start of Care Date:  04/26/23   Onset Date:  04/21/23   Type:     _X__PT   ____OT  ____SLP Medical Diagnosis:  abnormality of gait; MS     PT Diagnosis:  impaired mobility Visits from SOC:  1                              __________________________________________________________________________________  Plan of Treatment/Functional Goals:  bed mobility training, balance training, gait training, joint mobilization, neuromuscular re-education, ROM, strengthening, stretching, transfer training, manual therapy     Electrical Stimulation/Russion Stimulation, Thermotherapy: Hydrocollator Packs     GOALS  6 minute walk test  Jamir will be able to ambulate 1000 feet in 6 minutes for improved endurance and strength to help with community and home ambulation.  07/10/23    6 minute walk test  Jamir will demonstrate improved endurance evidenced by being able to walk 6 minute walk test and complete the last 3 minutes of test with 90% of distance of first 3 minutes.  07/24/23    5 times sit to stand  Jamir will complete 5 times sit to  less than 11 seconds for improved strength and balance.  06/26/23    Knee extension  Jamir will be able to achieve full terminal knee extension for improvement in stance and resting positions.  06/26/23       Therapy Frequency:  2 times/Week   Predicted Duration of Therapy Intervention:  12+ weeks    Manjit Pink, PT                                    I CERTIFY THE NEED FOR THESE  SERVICES FURNISHED UNDER        THIS PLAN OF TREATMENT AND WHILE UNDER MY CARE .             Physician Signature               Date    X_____________________________________________________                      Certification Date From:  04/26/23   Certification Date To:  07/19/23    Referring Provider:  Rudy Stanley MD; Dr. Miriam Álvarez MD    Initial Assessment  See Epic Evaluation- Start of Care Date: 04/26/23

## 2023-05-01 NOTE — PROGRESS NOTES
04/26/23 1500   Quick Adds   Quick Adds Certification   Type of Visit Initial OP PT Evaluation   General Information   Start of Care Date 04/26/23   Referring Physician Rudy Stanley MD   Orders Evaluate and Treat as Indicated   Order Date 04/21/23   Medical Diagnosis multiple sclerosis; gait abnormality   Onset of illness/injury or Date of Surgery 04/21/23   Precautions/Limitations fall precautions   Surgical/Medical history reviewed Yes   Pertinent history of current problem (include personal factors and/or comorbidities that impact the POC) history of MS, history of back surgery 9/6/22 T10-12 laminectomies   Prior level of functional mobility Transfers;Ambulation;ADL   Transfers Assistive equipment, modified independent   Ambulation 4WW majority of time, does have a wheelchair though not used since fall.   ADL Modified independent. 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 Jamir underwent a laminectomy last fall that took about 3 months for improvement but while in [place] he made significant gains. 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   Fall Risk Screen   Fall screen completed by PT   Have you fallen 2 or more times in the past year? No   Have you fallen and had an injury in the past  year? No   Is patient a fall risk? Yes   Abuse Screen (yes response referral indicated)   Feels Unsafe at Home or Work/School no   Feels Threatened by Someone no   Does Anyone Try to Keep You From Having Contact with Others or Doing Things Outside Your Home? no   Physical Signs of Abuse Present no   Pain   Patient currently in pain No;Denies   Vitals Signs   Heart Rate 96   SpO2 97   Cognitive Status Examination   Orientation orientation to person, place and time   Level of Consciousness alert   Follows Commands and Answers Questions 100% of the time   Personal Safety and Judgment intact   Memory intact   Integumentary   Integumentary No deficits were identified   Posture   Posture Protracted shoulders;Kyphosis   Posture Comments Decreased terminal knee extension in stance, some hip adduction   Range of Motion (ROM)   ROM Quick Adds Hip, Left;Hip, Right;Knee, Left;Knee, Right;Hand (general);Ankle, Left;Ankle, Right   Left Hip Flexion ROM   Left Hip Flexion PROM - degrees WNLs   Left Hip Extension ROM   Left Hip Extension AROM - degrees limited   Left Hip ABduction ROM   Left Hip ABduction PROM - degrees 40   Right Hip Flexion ROM   Right Hip Flexion PROM - degrees WNLs   Right Hip Extension ROM   Right Hip Extension PROM - degrees limited   Right Hip ABduction ROM   Right Hip ABduction PROM - degrees 20   Left Knee Extension/Flexion ROM   Left Knee Extension/Flexion PROM - degrees 5-125   Right Knee Extension/Flexion ROM   Right Knee Extension/Flexion PROM - degrees 5-125   Strength   Strength Comments Increased weakness on R side with grades ranging from 2 (DF) to 4- (knee flexion) all others 3+. L LE grades 3+ to 4   Bed Mobility   Bed Mobility Comments modified independent - increased time but not assist of person   Gait   Gait Gait Skill;Gait Analysis   Gait Skills   Level of [place]: Gait stand-by assist   Weight-Bearing Restrictions: Gait full weight-bearing   Assistive Device for Transfer: Gait rolling  walker   Gait Analysis   Gait Pattern Used 2-point gait   Gait Deviations Noted decreased step length;decreased toe-to-floor clearance   Impairments Contributing to Gait Deviations decreased strength   Gait Special Tests   Gait Special Tests SIX MINUTE WALK TEST   Gait Special Tests Six Minute Walk Test   Feet 850 Feet   Comments short of breath with activity - significant speed decrease with last 3 minutes compared to first 3   Balance   Balance other (describe)   Sit-to-Stand Balance good balance   Standing Balance: Static fair balance   Standing Balance: Dynamic poor balance   Balance Comments dynamic balance requires UE support either on 4WW or with HHA for very short distance   Balance Quick Add Sit to stand balance;Standing balance: static;Standing balance: dynamic   Modality Interventions   Planned Modality Interventions Electrical Stimulation/Russion Stimulation;Thermotherapy: Hydrocollator Packs   Planned Therapy Interventions   Planned Therapy Interventions bed mobility training;balance training;gait training;joint mobilization;neuromuscular re-education;ROM;strengthening;stretching;transfer training;manual therapy   Clinical Impression   Criteria for Skilled Therapeutic Interventions Met yes, treatment indicated   PT Diagnosis impaired mobility   Influenced by the following impairments weakness, poor balance, poor endurance   Functional limitations due to impairments limited activity tolerance, impaired ambulation,   Clinical Presentation Evolving/Changing   Clinical Decision Making (Complexity) Moderate complexity   Therapy Frequency 2 times/Week   Predicted Duration of Therapy Intervention (days/wks) 12+ weeks   Risk & Benefits of therapy have been explained Yes   Patient, Family & other staff in agreement with plan of care Yes   Clinical Impression Comments Jamir presents to therapy with weakness, balance impairements, and impaired mobility secondary to his MS and back surgery. He made great progress  rehabing in [place] but he is not at his previous baseline. He would benefit from continued PT   Education Assessment   Preferred Learning Style Demonstration;Pictures/video   Barriers to Learning No barriers   GOALS   PT Kayla Goals 1;2;3;4   Goal 1   Goal Identifier 6 minute walk test   Goal Description Jamir will be able to ambulate 1000 feet in 6 minutes for improved endurance and strength to help with community and home ambulation.   Target Date 07/10/23   Goal 2   Goal Identifier 6 minute walk test   Goal Description Jamir will demonstrate improved endurance evidenced by being able to walk 6 minute walk test and complete the last 3 minutes of test with 90% of distance of first 3 minutes.   Target Date 07/24/23   Goal 3   Goal Identifier 5 times sit to stand   Goal Description Jamir will complete 5 times sit to  less than 11 seconds for improved strength and balance.   Goal Progress Completed in 12 seconds   Target Date 06/26/23   Goal 4   Goal Identifier Knee extension   Goal Description Jamir will be able to achieve full terminal knee extension for improvement in stance and resting positions.   Target Date 06/26/23   Total Evaluation Time   PT Eval, Moderate Complexity Minutes (36524) 40   Therapy Certification   Certification date from 04/26/23   Certification date to 07/19/23   Medical Diagnosis abnormality of gait; MS

## 2023-05-02 ENCOUNTER — HOSPITAL ENCOUNTER (OUTPATIENT)
Dept: PHYSICAL THERAPY | Facility: OTHER | Age: 68
Setting detail: THERAPIES SERIES
Discharge: HOME | End: 2023-05-02
Payer: MEDICARE

## 2023-05-02 PROCEDURE — 97110 THERAPEUTIC EXERCISES: CPT | Mod: GP,CQ,KX

## 2023-05-05 ENCOUNTER — HOSPITAL ENCOUNTER (OUTPATIENT)
Dept: PHYSICAL THERAPY | Facility: OTHER | Age: 68
Setting detail: THERAPIES SERIES
Discharge: HOME | End: 2023-05-05
Payer: MEDICARE

## 2023-05-05 PROCEDURE — 97110 THERAPEUTIC EXERCISES: CPT | Mod: GP,KX

## 2023-05-06 ENCOUNTER — HEALTH MAINTENANCE LETTER (OUTPATIENT)
Age: 68
End: 2023-05-06

## 2023-05-09 ENCOUNTER — HOSPITAL ENCOUNTER (OUTPATIENT)
Dept: PHYSICAL THERAPY | Facility: OTHER | Age: 68
Setting detail: THERAPIES SERIES
Discharge: HOME | End: 2023-05-09
Payer: MEDICARE

## 2023-05-09 PROCEDURE — 97112 NEUROMUSCULAR REEDUCATION: CPT | Mod: GP,KX

## 2023-05-09 PROCEDURE — 97110 THERAPEUTIC EXERCISES: CPT | Mod: GP,KX

## 2023-05-12 ENCOUNTER — HOSPITAL ENCOUNTER (OUTPATIENT)
Dept: PHYSICAL THERAPY | Facility: OTHER | Age: 68
Setting detail: THERAPIES SERIES
Discharge: HOME | End: 2023-05-12
Payer: MEDICARE

## 2023-05-12 PROCEDURE — 97110 THERAPEUTIC EXERCISES: CPT | Mod: GP,CQ,KX

## 2023-05-16 ENCOUNTER — HOSPITAL ENCOUNTER (OUTPATIENT)
Dept: PHYSICAL THERAPY | Facility: OTHER | Age: 68
Setting detail: THERAPIES SERIES
Discharge: HOME | End: 2023-05-16
Payer: MEDICARE

## 2023-05-16 PROCEDURE — 97110 THERAPEUTIC EXERCISES: CPT | Mod: GP,CQ,KX

## 2023-05-23 ENCOUNTER — THERAPY VISIT (OUTPATIENT)
Dept: PHYSICAL THERAPY | Facility: OTHER | Age: 68
End: 2023-05-23
Payer: MEDICARE

## 2023-05-23 DIAGNOSIS — G35 MULTIPLE SCLEROSIS (H): ICD-10-CM

## 2023-05-23 DIAGNOSIS — R26.9 ABNORMALITY OF GAIT: Primary | ICD-10-CM

## 2023-05-23 PROCEDURE — 97110 THERAPEUTIC EXERCISES: CPT | Mod: GP,KX

## 2023-05-26 ENCOUNTER — THERAPY VISIT (OUTPATIENT)
Dept: PHYSICAL THERAPY | Facility: OTHER | Age: 68
End: 2023-05-26
Payer: MEDICARE

## 2023-05-26 DIAGNOSIS — R26.9 ABNORMALITY OF GAIT: Primary | ICD-10-CM

## 2023-05-26 DIAGNOSIS — G35 MULTIPLE SCLEROSIS (H): ICD-10-CM

## 2023-05-26 PROCEDURE — 97110 THERAPEUTIC EXERCISES: CPT | Mod: GP,KX

## 2023-05-30 ENCOUNTER — THERAPY VISIT (OUTPATIENT)
Dept: PHYSICAL THERAPY | Facility: OTHER | Age: 68
End: 2023-05-30
Payer: MEDICARE

## 2023-05-30 DIAGNOSIS — G35 MULTIPLE SCLEROSIS (H): ICD-10-CM

## 2023-05-30 DIAGNOSIS — R26.9 ABNORMALITY OF GAIT: Primary | ICD-10-CM

## 2023-05-30 PROCEDURE — 97110 THERAPEUTIC EXERCISES: CPT | Mod: GP,CQ,KX

## 2023-06-02 ENCOUNTER — THERAPY VISIT (OUTPATIENT)
Dept: PHYSICAL THERAPY | Facility: OTHER | Age: 68
End: 2023-06-02
Payer: MEDICARE

## 2023-06-02 DIAGNOSIS — G35 MULTIPLE SCLEROSIS (H): ICD-10-CM

## 2023-06-02 DIAGNOSIS — R26.9 ABNORMALITY OF GAIT: Primary | ICD-10-CM

## 2023-06-02 PROCEDURE — 97110 THERAPEUTIC EXERCISES: CPT | Mod: GP,CQ,KX

## 2023-06-06 ENCOUNTER — THERAPY VISIT (OUTPATIENT)
Dept: PHYSICAL THERAPY | Facility: OTHER | Age: 68
End: 2023-06-06
Payer: MEDICARE

## 2023-06-06 DIAGNOSIS — G35 MULTIPLE SCLEROSIS (H): Primary | ICD-10-CM

## 2023-06-06 DIAGNOSIS — R26.9 ABNORMALITY OF GAIT: ICD-10-CM

## 2023-06-06 PROCEDURE — 97110 THERAPEUTIC EXERCISES: CPT | Mod: GP,KX

## 2023-06-07 NOTE — PROGRESS NOTES
[facility]                                                                                   OUTPATIENT PHYSICAL THERAPY      PLAN OF TREATMENT FOR OUTPATIENT REHABILITATION   Patient's Last Name, First Name, Jamir Perera YOB: 1955   Provider's Name   [facility]   Medical Record No.  [id number]     Onset Date: 04/21/23  Start of Care Date: 04/26/23     Medical Diagnosis:  multiple sclerosis; gait abnormality      PT Treatment Diagnosis:  impaired mobility Plan of Treatment  Frequency/Duration: 2-3x/week/  12 weeks    Certification date from 6/2/23 to    8/25/23       See note for plan of treatment details and functional goals     Urvashi Rodriguez PTA                         I CERTIFY THE NEED FOR THESE SERVICES FURNISHED UNDER        THIS PLAN OF TREATMENT AND WHILE UNDER MY CARE .             Physician Signature               Date    X_____________________________________________________                        Referring Provider:  Dr. Rudy Stanley MD; Dr. Miriam Álvarez MD      Initial Assessment  See Epic Evaluation- Start of Care Date: 04/26/23        PLAN  Continue therapy per current plan of care. Jamir has made good progress so far. He has improved in his goals as documented below. He does continue to have slow gait speed and endurance. We will test his 6 minute walk test at next visit. Jamir seems to be responding to aggressive treatments. We are going to increase his frequency to 2-3x/week when able. He did this in [place] and responded well. We will continue this frequency until progress is no longer seen or if Jamir declines.     Beginning/End Dates of Progress Note Reporting Period:   4/26/23 to 06/02/2023    Referring Provider:  No ref. provider found        06/02/23 0500   Appointment Info   Signing clinician's name / credentials Urvashi Rodriguez PTA   Total/Authorized Visits 10   Visits Used 10 of 10   Medical  Diagnosis multiple sclerosis; gait abnormality   PT Tx Diagnosis impaired mobility   Precautions/Limitations fall   Progress Note/Certification   Start of Care Date 04/26/23   Onset of illness/injury or Date of Surgery 04/21/23   Therapy Frequency 2-3x/week   Certification date from 04/26/23   Supervision   PT Assistant Visit Number 5   PT Goal 1   Goal Identifier 6 minute walk test   Goal Description Jamir will be able to ambulate 1000 feet in 6 minutes for improved endurance and strength to help with community and home ambulation.   Target Date 07/10/23   PT Goal 2   Goal Identifier 6 minute walk test   Goal Description Jamir will demonstrate improved endurance evidenced by being able to walk 6 minute walk test and complete the last 3 minutes of test with 90% of distance of first 3 minutes.   Target Date 07/24/23   PT Goal 3   Goal Identifier 5 times sit to stand   Goal Description Jamir will complete 5 times sit to  less than 11 seconds for improved strength and balance.   Goal Progress Completed in 12 seconds   Target Date 06/26/23   PT Goal 4   Goal Identifier Knee extension   Goal Description Jamir will be able to achieve full terminal knee extension for improvement in stance and resting positions.   Target Date 06/26/23   Subjective Report   Subjective Report Jamir reports he got his boat in the water yesterday - definitely felt like it was easier getting around and in/out of the boat.   Objective Measures   Objective Measures Objective Measure 1;Objective Measure 2;Objective Measure 3;Objective Measure 4   Objective Measure 1   Objective Measure 5 times sit to stand   Details 15 seconds - poor eccentric control   Objective Measure 2   Objective Measure 6 MWT   Details 850 feet - significant reduction in distance final 3 minutes   Objective Measure 3   Objective Measure Standing TKE   Details lacking 25-30 degrees of TKE   Treatment Interventions (PT)   Interventions Therapeutic  Procedure/Exercise;Neuromuscular Re-education   Therapeutic Procedure/Exercise   Therapeutic Procedures: strength, endurance, ROM, flexibillity minutes (25771) 55   Therapeutic Procedures Ther Proc 2;Ther Proc 3   Ther Proc 1 MEDx Machines   Ther Proc 1 - Details Leg press: 140# x30, Deferred single leg only today - R LE only 44# x 40 (increase weight next visit). Hip abduction 20# x 30, Back extension 50# x 45. Core abs 36# x 30, Rows 80# x 25. HS curl 60# x30.   Ther Proc 2 SciFit (Nustep unavailable) L6 x6 minutes. Nustep lvl 7 seat 11 arms 7 x 6 minutes for one lap today. Standing TKE B x15 against blue tband. Sit to stands from standard height chair x10 with blue tband at anterior hips.   Skilled Intervention education,   Patient Response/Progress positive   Neuromuscular Re-education   Skilled Intervention guarding   Patient Response/Progress increased knee flexion as fatigue increased   Treatment Detail Foam balance eyes open stance then head turns then nods. Flat surface with PT perturbation in 4 directions - increased fatigue but no LOB   Plan   Plan for next session TUG, LE strengthening (MEDx?), standing balance/endurance   Total Session Time   Timed Code Treatment Minutes 55   Total Treatment Time (sum of timed and untimed services) 55   Medicare Claim Information   Medical Diagnosis multiple sclerosis; gait abnormality

## 2023-06-09 ENCOUNTER — THERAPY VISIT (OUTPATIENT)
Dept: PHYSICAL THERAPY | Facility: OTHER | Age: 68
End: 2023-06-09
Payer: MEDICARE

## 2023-06-09 DIAGNOSIS — G35 MULTIPLE SCLEROSIS (H): Primary | ICD-10-CM

## 2023-06-09 DIAGNOSIS — R26.9 ABNORMALITY OF GAIT: ICD-10-CM

## 2023-06-09 PROCEDURE — 97110 THERAPEUTIC EXERCISES: CPT | Mod: GP,CQ,KX

## 2023-06-13 ENCOUNTER — THERAPY VISIT (OUTPATIENT)
Dept: PHYSICAL THERAPY | Facility: OTHER | Age: 68
End: 2023-06-13
Payer: MEDICARE

## 2023-06-13 DIAGNOSIS — G35 MULTIPLE SCLEROSIS (H): Primary | ICD-10-CM

## 2023-06-13 DIAGNOSIS — R26.9 ABNORMALITY OF GAIT: ICD-10-CM

## 2023-06-13 PROCEDURE — 97110 THERAPEUTIC EXERCISES: CPT | Mod: GP,CQ,KX

## 2023-06-19 ENCOUNTER — THERAPY VISIT (OUTPATIENT)
Dept: PHYSICAL THERAPY | Facility: OTHER | Age: 68
End: 2023-06-19
Payer: MEDICARE

## 2023-06-19 DIAGNOSIS — R26.9 ABNORMALITY OF GAIT: ICD-10-CM

## 2023-06-19 DIAGNOSIS — G35 MULTIPLE SCLEROSIS (H): Primary | ICD-10-CM

## 2023-06-19 PROCEDURE — 97110 THERAPEUTIC EXERCISES: CPT | Mod: GP,CQ,KX

## 2023-06-21 ENCOUNTER — THERAPY VISIT (OUTPATIENT)
Dept: PHYSICAL THERAPY | Facility: OTHER | Age: 68
End: 2023-06-21
Payer: MEDICARE

## 2023-06-21 DIAGNOSIS — R26.9 ABNORMALITY OF GAIT: ICD-10-CM

## 2023-06-21 DIAGNOSIS — G35 MULTIPLE SCLEROSIS (H): Primary | ICD-10-CM

## 2023-06-21 PROCEDURE — 97110 THERAPEUTIC EXERCISES: CPT | Mod: GP,KX

## 2023-06-23 ENCOUNTER — THERAPY VISIT (OUTPATIENT)
Dept: PHYSICAL THERAPY | Facility: OTHER | Age: 68
End: 2023-06-23
Payer: MEDICARE

## 2023-06-23 DIAGNOSIS — R26.9 ABNORMALITY OF GAIT: ICD-10-CM

## 2023-06-23 DIAGNOSIS — G35 MULTIPLE SCLEROSIS (H): Primary | ICD-10-CM

## 2023-06-23 PROCEDURE — 97110 THERAPEUTIC EXERCISES: CPT | Mod: GP,KX

## 2023-06-26 ENCOUNTER — THERAPY VISIT (OUTPATIENT)
Dept: PHYSICAL THERAPY | Facility: OTHER | Age: 68
End: 2023-06-26
Payer: MEDICARE

## 2023-06-26 DIAGNOSIS — G35 MULTIPLE SCLEROSIS (H): Primary | ICD-10-CM

## 2023-06-26 DIAGNOSIS — R26.9 ABNORMALITY OF GAIT: ICD-10-CM

## 2023-06-26 PROCEDURE — 97110 THERAPEUTIC EXERCISES: CPT | Mod: GP,KX

## 2023-06-28 ENCOUNTER — THERAPY VISIT (OUTPATIENT)
Dept: PHYSICAL THERAPY | Facility: OTHER | Age: 68
End: 2023-06-28
Payer: MEDICARE

## 2023-06-28 DIAGNOSIS — R26.9 ABNORMALITY OF GAIT: ICD-10-CM

## 2023-06-28 DIAGNOSIS — G35 MULTIPLE SCLEROSIS (H): Primary | ICD-10-CM

## 2023-06-28 PROCEDURE — 97110 THERAPEUTIC EXERCISES: CPT | Mod: GP,CQ,KX

## 2023-06-30 ENCOUNTER — THERAPY VISIT (OUTPATIENT)
Dept: PHYSICAL THERAPY | Facility: OTHER | Age: 68
End: 2023-06-30
Payer: MEDICARE

## 2023-06-30 DIAGNOSIS — G35 MULTIPLE SCLEROSIS (H): Primary | ICD-10-CM

## 2023-06-30 DIAGNOSIS — R26.9 ABNORMALITY OF GAIT: ICD-10-CM

## 2023-06-30 PROCEDURE — 97110 THERAPEUTIC EXERCISES: CPT | Mod: GP,CQ,KX

## 2023-07-07 ENCOUNTER — THERAPY VISIT (OUTPATIENT)
Dept: PHYSICAL THERAPY | Facility: OTHER | Age: 68
End: 2023-07-07
Payer: MEDICARE

## 2023-07-07 DIAGNOSIS — R26.9 ABNORMALITY OF GAIT: ICD-10-CM

## 2023-07-07 DIAGNOSIS — G35 MULTIPLE SCLEROSIS (H): Primary | ICD-10-CM

## 2023-07-07 PROCEDURE — 97110 THERAPEUTIC EXERCISES: CPT | Mod: GP,CQ,KX

## 2023-07-10 ENCOUNTER — THERAPY VISIT (OUTPATIENT)
Dept: PHYSICAL THERAPY | Facility: OTHER | Age: 68
End: 2023-07-10
Payer: MEDICARE

## 2023-07-10 DIAGNOSIS — G35 MULTIPLE SCLEROSIS (H): Primary | ICD-10-CM

## 2023-07-10 DIAGNOSIS — R26.9 ABNORMALITY OF GAIT: ICD-10-CM

## 2023-07-10 PROCEDURE — 97110 THERAPEUTIC EXERCISES: CPT | Mod: GP,CQ,KX

## 2023-07-12 ENCOUNTER — THERAPY VISIT (OUTPATIENT)
Dept: PHYSICAL THERAPY | Facility: OTHER | Age: 68
End: 2023-07-12
Payer: MEDICARE

## 2023-07-12 DIAGNOSIS — R26.9 ABNORMALITY OF GAIT: ICD-10-CM

## 2023-07-12 DIAGNOSIS — G35 MULTIPLE SCLEROSIS (H): ICD-10-CM

## 2023-07-12 DIAGNOSIS — G35 MULTIPLE SCLEROSIS (H): Primary | ICD-10-CM

## 2023-07-12 PROCEDURE — 97110 THERAPEUTIC EXERCISES: CPT | Mod: GP,KX

## 2023-07-12 RX ORDER — BACLOFEN 10 MG/1
10 TABLET ORAL 2 TIMES DAILY
Qty: 60 TABLET | Refills: 0 | Status: SHIPPED | OUTPATIENT
Start: 2023-07-12 | End: 2023-09-14

## 2023-07-12 NOTE — TELEPHONE ENCOUNTER
Requested Prescriptions   Pending Prescriptions Disp Refills     baclofen (LIORESAL) 10 MG tablet 60 tablet 0     Sig: Take 1 tablet (10 mg) by mouth 2 times daily   Last Prescription Date:   9/15/22  Last Fill Qty/Refills:         60, R-0  ([facility] discharge [facility]- Nathaniel Franklin NP)    Last Office Visit:              8/29/22  Future Office visit:           None    In clinical absence of patient's primary, Kayla Hobbs, patient is requesting that this message be sent to the covering provider for consideration please.    Shira Laboy RN .............. 7/12/2023  9:12 AM

## 2023-07-12 NOTE — PROGRESS NOTES
PROGRESS NOTE      PLAN  Continue therapy per current plan of care. Jamir has made excellent objective progress as well as functional progress. He is stronger in many exercises and activities which has now lead to more independence and mobility at home including getting in and out of his boat without assistance. We will continue to progress as able with future session of gait training, therapeutic procedures, balance training, and therapeutic activities that involve ambulation with the least restrictive devices.    Beginning/End Dates of Progress Note Reporting Period:   6/2/23 to 06/30/2023    Referring Provider:  No ref. provider found        06/30/23 5385   Appointment Info   Signing clinician's name / credentials Urvashi Rodriguez PTA   Total/Authorized Visits 20   Visits Used 10 of 10   Medical Diagnosis multiple sclerosis; gait abnormality   PT Tx Diagnosis impaired mobility   Precautions/Limitations fall   Progress Note/Certification   Start of Care Date 04/26/23   Onset of illness/injury or Date of Surgery 04/21/23   Therapy Frequency 2-3x/week   Certification date from 06/02/23   Certification date to 08/25/23   Supervision   Assistant Supervision PTA visit observed, intervention appropriate, plan of care reviewed and remains appropriate   PT Assistant Visit Number 6   PT Goal 1   Goal Identifier 6 minute walk test   Goal Description Jamir will be able to ambulate 1000 feet in 6 minutes for improved endurance and strength to help with community and home ambulation.   Goal Progress 1026 feet. Continue for consistency   Target Date 07/10/23   PT Goal 2   Goal Identifier 6 minute walk test   Goal Description Jamir will demonstrate improved endurance evidenced by being able to walk 6 minute walk test and complete the last 3 minutes of test with 90% of distance of first 3 minutes.   Goal Progress significant improvement. Jamir continues to have increased distance/speed in first 3 minutes compared to last. Today, middle  2 minutes was very similar to last 2 minutes   Target Date 07/24/23   PT Goal 3   Goal Identifier 5 times sit to stand   Goal Description Jamir will complete 5 times sit to  less than 11 seconds for improved strength and balance.   Goal Progress Completed in 12 seconds   Target Date 06/26/23   PT Goal 4   Goal Identifier Knee extension   Goal Description Jamir will be able to achieve full terminal knee extension for improvement in stance and resting positions.   Target Date 06/26/23   Subjective Report   Subjective Report Jamir has noticed considerable improvements since starting the UE MedX machine. He reports he can now get out of the boat and up from the dock without help. Also reports the R arm pain he used to have is completely gone now - has been for 3-4 months.   Objective Measures   Objective Measures Objective Measure 1;Objective Measure 2;Objective Measure 3;Objective Measure 4   Objective Measure 1   Objective Measure 5 times sit to stand   Details 15 seconds - poor eccentric control   Objective Measure 2   Objective Measure 6 MWT   Details 1026   Objective Measure 3   Objective Measure Standing TKE   Details lacking 25-30 degrees of TKE   Treatment Interventions (PT)   Interventions Therapeutic Procedure/Exercise;Neuromuscular Re-education   Therapeutic Procedure/Exercise   Therapeutic Procedures: strength, endurance, ROM, flexibillity minutes (25383) 45   Therapeutic Procedures Ther Proc 2;Ther Proc 3   Ther Proc 1 MEDx Machines   Ther Proc 1 - Details Deferred all except UE MedX machine on this line: Leg press: 140# x30, R LE only started concentrically but changed to eccentrically as Jamir fatigued for improved extension 48# x40. Hip abduction 24# x20. Back and AB: Back extension (80-45 degrees) 50# x30 and (60-10 degrees) 26# x20. Core abs 40# x30. Rows 100# x15 then 1x10. lat pull 120# 1x10 and 1x15 and overhead stretch 2x30 second hold. Triceps extension 70# 2x15   Ther Proc 2 Nustep L7 x5  "minutes. Sit to stands from standard height chair + Biodex foam pad x10, and then x15 with blue tband at anterior hips. Standing TKE with blue tband B x20.   Ther Proc 2 - Details Rebounder 2nd notch from top - 7\" ball chest pass and catch for 1.5 minutes. Added: LIOR rope pull 6 kg B x10 - noted more difficulty with 2nd pull with leading wtih L hand.   Skilled Intervention education,   Patient Response/Progress positive   Neuromuscular Re-education   Skilled Intervention guarding   Patient Response/Progress increased knee flexion as fatigue increased   Treatment Detail Foam balance eyes open stance then head turns then nods. Flat surface with PT perturbation in 4 directions - increased fatigue but no LOB   Plan   Plan for next session TUG, LE strengthening (MEDx?), standing balance/endurance   Total Session Time   Timed Code Treatment Minutes 45   Total Treatment Time (sum of timed and untimed services) 45   Medicare Claim Information   Medical Diagnosis multiple sclerosis; gait abnormality     "

## 2023-07-12 NOTE — TELEPHONE ENCOUNTER
Reason for call: Medication or medication refill    Name of medication requested: baclofen 10mg    Are you out of the medication? yes    What pharmacy do you use? [facility]    Preferred method for responding to this message: Telephone Call    Phone number patient can be reached at: Cell number on file:    Telephone Information:   Mobile [phone]       If we cannot reach you directly, may we leave a detailed response at the number you provided? Yes     Patient wants this prescription sent to [facility]. [facility] is not available today. Can another provider fill?    Miriam Zamora on 7/12/2023 at 8:57 AM

## 2023-07-13 ENCOUNTER — TELEPHONE (OUTPATIENT)
Dept: FAMILY MEDICINE | Facility: OTHER | Age: 68
End: 2023-07-13
Payer: COMMERCIAL

## 2023-07-13 DIAGNOSIS — R39.15 URINARY URGENCY: ICD-10-CM

## 2023-07-13 NOTE — TELEPHONE ENCOUNTER
Routing refill request to provider for review/approval because:    LOV: 8/29/22    Called and spoke with patient .  Patient states that Dr. Saldivar prescribed Oxybutynin originally but he is no longer at [facility].  Informed patient will route to Dr. Pineda for review and consideration of refill.  Patient informed he is due for annual review with   Patient verbalized understanding and transferred to scheduling.  Charlene Rand RN on 7/13/2023 at 3:03 PM

## 2023-07-13 NOTE — TELEPHONE ENCOUNTER
Reason for call: Medication or medication refill    Name of medication requested: Oxybutynin CL 10 mg 90 day supply    Are you out of the medication? 6 left    What pharmacy do you use? [facility]    Preferred method for responding to this message: Telephone Call    Phone number patient can be reached at: Cell number on file:    Telephone Information:   Mobile [phone]       If we cannot reach you directly, may we leave a detailed response at the number you provided? Yes     Una Arriaza on 7/13/2023 at 2:06 PM

## 2023-07-14 ENCOUNTER — THERAPY VISIT (OUTPATIENT)
Dept: PHYSICAL THERAPY | Facility: OTHER | Age: 68
End: 2023-07-14
Payer: MEDICARE

## 2023-07-14 DIAGNOSIS — R26.9 ABNORMALITY OF GAIT: ICD-10-CM

## 2023-07-14 DIAGNOSIS — G35 MULTIPLE SCLEROSIS (H): Primary | ICD-10-CM

## 2023-07-14 PROCEDURE — 97110 THERAPEUTIC EXERCISES: CPT | Mod: GP

## 2023-07-14 RX ORDER — OXYBUTYNIN CHLORIDE 10 MG/1
10 TABLET, EXTENDED RELEASE ORAL EVERY MORNING
Qty: 90 TABLET | Refills: 0 | Status: SHIPPED | OUTPATIENT
Start: 2023-07-14 | End: 2023-08-15

## 2023-07-17 ENCOUNTER — THERAPY VISIT (OUTPATIENT)
Dept: PHYSICAL THERAPY | Facility: OTHER | Age: 68
End: 2023-07-17
Payer: MEDICARE

## 2023-07-17 DIAGNOSIS — G35 MULTIPLE SCLEROSIS (H): Primary | ICD-10-CM

## 2023-07-17 DIAGNOSIS — R26.9 ABNORMALITY OF GAIT: ICD-10-CM

## 2023-07-17 PROCEDURE — 97116 GAIT TRAINING THERAPY: CPT | Mod: GP

## 2023-07-17 PROCEDURE — 97110 THERAPEUTIC EXERCISES: CPT | Mod: GP

## 2023-07-19 ENCOUNTER — THERAPY VISIT (OUTPATIENT)
Dept: PHYSICAL THERAPY | Facility: OTHER | Age: 68
End: 2023-07-19
Payer: MEDICARE

## 2023-07-19 DIAGNOSIS — G35 MULTIPLE SCLEROSIS (H): Primary | ICD-10-CM

## 2023-07-19 DIAGNOSIS — R26.9 ABNORMALITY OF GAIT: ICD-10-CM

## 2023-07-19 PROCEDURE — 97110 THERAPEUTIC EXERCISES: CPT | Mod: GP,KX

## 2023-07-21 ENCOUNTER — THERAPY VISIT (OUTPATIENT)
Dept: PHYSICAL THERAPY | Facility: OTHER | Age: 68
End: 2023-07-21
Payer: MEDICARE

## 2023-07-21 DIAGNOSIS — G35 MULTIPLE SCLEROSIS (H): Primary | ICD-10-CM

## 2023-07-21 DIAGNOSIS — R26.9 ABNORMALITY OF GAIT: ICD-10-CM

## 2023-07-21 PROCEDURE — 97110 THERAPEUTIC EXERCISES: CPT | Mod: GP,KX

## 2023-07-24 ENCOUNTER — THERAPY VISIT (OUTPATIENT)
Dept: PHYSICAL THERAPY | Facility: OTHER | Age: 68
End: 2023-07-24
Payer: MEDICARE

## 2023-07-24 DIAGNOSIS — G35 MULTIPLE SCLEROSIS (H): Primary | ICD-10-CM

## 2023-07-24 DIAGNOSIS — R26.9 ABNORMALITY OF GAIT: ICD-10-CM

## 2023-07-24 PROCEDURE — 97110 THERAPEUTIC EXERCISES: CPT | Mod: GP,KX

## 2023-07-26 ENCOUNTER — THERAPY VISIT (OUTPATIENT)
Dept: PHYSICAL THERAPY | Facility: OTHER | Age: 68
End: 2023-07-26
Payer: MEDICARE

## 2023-07-26 DIAGNOSIS — G35 MULTIPLE SCLEROSIS (H): Primary | ICD-10-CM

## 2023-07-26 DIAGNOSIS — R26.9 ABNORMALITY OF GAIT: ICD-10-CM

## 2023-07-26 PROCEDURE — 97110 THERAPEUTIC EXERCISES: CPT | Mod: GP,CQ,KX

## 2023-07-31 ENCOUNTER — THERAPY VISIT (OUTPATIENT)
Dept: PHYSICAL THERAPY | Facility: OTHER | Age: 68
End: 2023-07-31
Payer: MEDICARE

## 2023-07-31 DIAGNOSIS — R26.9 ABNORMALITY OF GAIT: ICD-10-CM

## 2023-07-31 DIAGNOSIS — G35 MULTIPLE SCLEROSIS (H): Primary | ICD-10-CM

## 2023-07-31 PROCEDURE — 97110 THERAPEUTIC EXERCISES: CPT | Mod: GP,CQ,KX

## 2023-08-02 ENCOUNTER — THERAPY VISIT (OUTPATIENT)
Dept: PHYSICAL THERAPY | Facility: OTHER | Age: 68
End: 2023-08-02
Payer: MEDICARE

## 2023-08-02 DIAGNOSIS — R26.9 ABNORMALITY OF GAIT: ICD-10-CM

## 2023-08-02 DIAGNOSIS — G35 MULTIPLE SCLEROSIS (H): Primary | ICD-10-CM

## 2023-08-02 PROCEDURE — 97110 THERAPEUTIC EXERCISES: CPT | Mod: GP,KX

## 2023-08-04 ENCOUNTER — THERAPY VISIT (OUTPATIENT)
Dept: PHYSICAL THERAPY | Facility: OTHER | Age: 68
End: 2023-08-04
Payer: MEDICARE

## 2023-08-04 DIAGNOSIS — G35 MULTIPLE SCLEROSIS (H): Primary | ICD-10-CM

## 2023-08-04 DIAGNOSIS — R26.9 ABNORMALITY OF GAIT: ICD-10-CM

## 2023-08-04 PROCEDURE — 97110 THERAPEUTIC EXERCISES: CPT | Mod: GP,KX

## 2023-08-07 ENCOUNTER — THERAPY VISIT (OUTPATIENT)
Dept: PHYSICAL THERAPY | Facility: OTHER | Age: 68
End: 2023-08-07
Payer: MEDICARE

## 2023-08-07 DIAGNOSIS — R26.9 ABNORMALITY OF GAIT: ICD-10-CM

## 2023-08-07 DIAGNOSIS — G35 MULTIPLE SCLEROSIS (H): Primary | ICD-10-CM

## 2023-08-07 PROCEDURE — 97110 THERAPEUTIC EXERCISES: CPT | Mod: GP,CQ,KX

## 2023-08-07 NOTE — PROGRESS NOTES
[facility]                                                                                   OUTPATIENT PHYSICAL THERAPY    PLAN OF TREATMENT FOR OUTPATIENT REHABILITATION   Patient's Last Name, First Name, Jamir Perera YOB: 1955   Provider's Name   [facility]   Medical Record No.  [id number]     Onset Date: 04/21/23  Start of Care Date: 04/26/23     Medical Diagnosis:  multiple sclerosis; gait abnormality      PT Treatment Diagnosis:  impaired mobility Plan of Treatment  Frequency/Duration: 2-3x/week/ 12 additional weeks    Certification date from 08/04/23 to 10/27/23         See note for plan of treatment details and functional goals     Manjit Pink PT                         I CERTIFY THE NEED FOR THESE SERVICES FURNISHED UNDER        THIS PLAN OF TREATMENT AND WHILE UNDER MY CARE .             Physician Signature               Date    X_____________________________________________________                    Referring Provider:  Dr. Rudy Stanley MD; Dr. Miriam Escobar      Initial Assessment  See Epic Evaluation- Start of Care Date: 04/26/23            PLAN  Continue therapy per current plan of care. Jamir has made some really good strides in the last 6 weeks. He has progressed at times to ambulation with a cane. He is having less fatigue and increased ease of ADLs and home tasks. His outcome measures today are the first outlier in a sequence of positive changes. We will continue to monitor Jamir's skills and mobility for any further signs of regression and the possibility of a flare up of his MS.    Beginning/End Dates of Progress Note Reporting Period:   7/12/23 to 08/04/2023    Referring Provider:  No ref. provider found       08/04/23 0500   Appointment Info   Signing clinician's name / credentials Manjit Pink DPT   Total/Authorized Visits 30   Visits Used 10 of 10   Medical Diagnosis multiple sclerosis;  gait abnormality   PT Tx Diagnosis impaired mobility   Precautions/Limitations fall   Progress Note/Certification   Start of Care Date 04/26/23   Onset of illness/injury or Date of Surgery 04/21/23   Therapy Frequency 2-3x/week   Certification date from 06/02/23   Certification date to 08/25/23   Supervision   PT Assistant Visit Number 3   PT Goal 1   Goal Identifier 6 minute walk test   Goal Description Jamir will be able to ambulate 1000 feet in 6 minutes for improved endurance and strength to help with community and home ambulation.   Goal Progress 950 feet today. More fatigued.   Target Date 07/10/23   PT Goal 2   Goal Identifier 6 minute walk test   Goal Description Jamir will demonstrate improved endurance evidenced by being able to walk 6 minute walk test and complete the last 3 minutes of test with 90% of distance of first 3 minutes.   Goal Progress significant improvement. Jamir continues to have increased distance/speed in first 3 minutes compared to last. Today, middle 2 minutes was very similar to last 2 minutes   Target Date 07/24/23   PT Goal 3   Goal Identifier 5 times sit to stand   Goal Description Jamir will complete 5 times sit to  less than 11 seconds for improved strength and balance.   Goal Progress 18 seconds today.   Target Date 06/26/23   PT Goal 4   Goal Identifier Knee extension   Goal Description Jamir will be able to achieve full terminal knee extension for improvement in stance and resting positions.   Target Date 06/26/23   Subjective Report   Subjective Report Jamir notes today is just a little more tiring than previous days. He spent more time in the sun yesterday while fishing and he had a hard time getting out of the boat. Elizabeth feet got tangled up on the way into therapy today. Able to prevent fall with brakes and UE strength.   Objective Measures   Objective Measures Objective Measure 1;Objective Measure 2;Objective Measure 3;Objective Measure 4   Objective Measure 1  "  Objective Measure 5 times sit to stand   Details 15 seconds - poor eccentric control   Objective Measure 2   Objective Measure 6 MWT   Details 1026 is best distance, 950 on 8/4/23   Objective Measure 3   Objective Measure Standing TKE   Details lacking 25-30 degrees of TKE   Treatment Interventions (PT)   Interventions Therapeutic Procedure/Exercise;Neuromuscular Re-education;Gait Training   Therapeutic Procedure/Exercise   Therapeutic Procedures: strength, endurance, ROM, flexibillity minutes (36194) 45   Therapeutic Procedures Ther Proc 2;Ther Proc 3   Ther Proc 1 MEDx Machines   Ther Proc 1 - Details Rows 120# 2x15. lat pull 130# 1x30, 1 x20 and overhead stretch 3x30 second hold - increase weight next visit. Triceps extension 100# 1x25 then 1x20. Chest press 100# 2 x15   Ther Proc 2 Nustep L7 x3 minutes. 6MWT.   Ther Proc 2 - Details Marybeth deadlift 11kg x 10 - working on slight knee bend and lifting up at hips vs pulling with arms to raise weight.   Ther Proc 3 Deferred this line today.Back extension (80-45 degrees) 50# x30 then (60-10 degrees) 30# x20. Core abs 40# x30. Rebounder 2nd notch from top - 7\" ball chest pass and catch on blue foam, MIN assist on and off, MIN assist at times for prevention of LOB posterior. Jamir able to self correct when cued. Minimal reaching for walker handle from Jamir.   Ther Proc 3 - Details Leg press # x25 with bolster between knees. Deferred: Hip abduction 28# x16 then PT assisted out with Eccentric control into adducted position x 10.   Skilled Intervention education,   Patient Response/Progress more fatigued today. Possibly due to increased humidity and heat last few days.   Neuromuscular Re-education   Skilled Intervention guarding   Patient Response/Progress increased knee flexion as fatigue increased   Treatment Detail Foam balance eyes open stance then head turns then nods. Flat surface with PT perturbation in 4 directions - increased fatigue but no LOB   Gait " Training   Gait 1 Ambulation with Standard cane in p bars - initially requested no gait belt but PT insisted. Cane slipped out from Jamir as he WBs at an oblique angle with his cane - near LOB but Jamir able to grab p-bars with L hand and PT assisted to prevent LOB. Switched to hurry cane with better stability - ambulated 1x30' then 2x20'. Rest breaks in between - scissor gait with slight R toe drag   Gait 1 - Details ambulation with FWW x 150' - even strides and fair pace.   Skilled Intervention guarding, cuing   Plan   Plan for next session TUG, LE strengthening (MEDx?), standing balance/endurance   Total Session Time   Timed Code Treatment Minutes 45   Total Treatment Time (sum of timed and untimed services) 45   Medicare Claim Information   Medical Diagnosis multiple sclerosis; gait abnormality

## 2023-08-09 ENCOUNTER — THERAPY VISIT (OUTPATIENT)
Dept: PHYSICAL THERAPY | Facility: OTHER | Age: 68
End: 2023-08-09
Payer: MEDICARE

## 2023-08-09 DIAGNOSIS — R26.9 ABNORMALITY OF GAIT: ICD-10-CM

## 2023-08-09 DIAGNOSIS — G35 MULTIPLE SCLEROSIS (H): Primary | ICD-10-CM

## 2023-08-09 PROCEDURE — 97110 THERAPEUTIC EXERCISES: CPT | Mod: GP,KX

## 2023-08-11 ENCOUNTER — LAB (OUTPATIENT)
Dept: LAB | Facility: OTHER | Age: 68
End: 2023-08-11
Payer: MEDICARE

## 2023-08-11 ENCOUNTER — THERAPY VISIT (OUTPATIENT)
Dept: PHYSICAL THERAPY | Facility: OTHER | Age: 68
End: 2023-08-11
Payer: MEDICARE

## 2023-08-11 DIAGNOSIS — G35 MULTIPLE SCLEROSIS (H): ICD-10-CM

## 2023-08-11 DIAGNOSIS — R26.9 ABNORMALITY OF GAIT: ICD-10-CM

## 2023-08-11 DIAGNOSIS — E55.9 VITAMIN D DEFICIENCY DISEASE: Primary | ICD-10-CM

## 2023-08-11 DIAGNOSIS — E53.8 BIOTIN-(PROPIONYL-COA-CARBOXYLASE) LIGASE DEFICIENCY: ICD-10-CM

## 2023-08-11 DIAGNOSIS — G35 MULTIPLE SCLEROSIS (H): Primary | ICD-10-CM

## 2023-08-11 LAB
ALBUMIN SERPL BCG-MCNC: 4.2 G/DL (ref 3.5–5.2)
ALP SERPL-CCNC: 103 U/L (ref 40–129)
ALT SERPL W P-5'-P-CCNC: 28 U/L (ref 0–70)
ANION GAP SERPL CALCULATED.3IONS-SCNC: 7 MMOL/L (ref 7–15)
AST SERPL W P-5'-P-CCNC: 22 U/L (ref 0–45)
BASOPHILS # BLD AUTO: 0 10E3/UL (ref 0–0.2)
BASOPHILS NFR BLD AUTO: 0 %
BILIRUB SERPL-MCNC: 0.4 MG/DL
BUN SERPL-MCNC: 26.3 MG/DL (ref 8–23)
CALCIUM SERPL-MCNC: 10 MG/DL (ref 8.8–10.2)
CHLORIDE SERPL-SCNC: 100 MMOL/L (ref 98–107)
CREAT SERPL-MCNC: 1.11 MG/DL (ref 0.67–1.17)
DEPRECATED HCO3 PLAS-SCNC: 30 MMOL/L (ref 22–29)
EOSINOPHIL # BLD AUTO: 0.2 10E3/UL (ref 0–0.7)
EOSINOPHIL NFR BLD AUTO: 3 %
ERYTHROCYTE [DISTWIDTH] IN BLOOD BY AUTOMATED COUNT: 13.1 % (ref 10–15)
GFR SERPL CREATININE-BSD FRML MDRD: 72 ML/MIN/1.73M2
GLUCOSE SERPL-MCNC: 73 MG/DL (ref 70–99)
HCT VFR BLD AUTO: 44.7 % (ref 40–53)
HGB BLD-MCNC: 14.8 G/DL (ref 13.3–17.7)
IMM GRANULOCYTES # BLD: 0 10E3/UL
IMM GRANULOCYTES NFR BLD: 0 %
LYMPHOCYTES # BLD AUTO: 1 10E3/UL (ref 0.8–5.3)
LYMPHOCYTES NFR BLD AUTO: 20 %
MCH RBC QN AUTO: 29.3 PG (ref 26.5–33)
MCHC RBC AUTO-ENTMCNC: 33.1 G/DL (ref 31.5–36.5)
MCV RBC AUTO: 89 FL (ref 78–100)
MONOCYTES # BLD AUTO: 0.6 10E3/UL (ref 0–1.3)
MONOCYTES NFR BLD AUTO: 12 %
NEUTROPHILS # BLD AUTO: 3.1 10E3/UL (ref 1.6–8.3)
NEUTROPHILS NFR BLD AUTO: 65 %
NRBC # BLD AUTO: 0 10E3/UL
NRBC BLD AUTO-RTO: 0 /100
PLATELET # BLD AUTO: 202 10E3/UL (ref 150–450)
POTASSIUM SERPL-SCNC: 4.5 MMOL/L (ref 3.4–5.3)
PROT SERPL-MCNC: 6.8 G/DL (ref 6.4–8.3)
RBC # BLD AUTO: 5.05 10E6/UL (ref 4.4–5.9)
SODIUM SERPL-SCNC: 137 MMOL/L (ref 136–145)
VIT B12 SERPL-MCNC: 436 PG/ML (ref 232–1245)
WBC # BLD AUTO: 4.8 10E3/UL (ref 4–11)

## 2023-08-11 PROCEDURE — 85025 COMPLETE CBC W/AUTO DIFF WBC: CPT | Mod: ZL

## 2023-08-11 PROCEDURE — 36415 COLL VENOUS BLD VENIPUNCTURE: CPT | Mod: ZL

## 2023-08-11 PROCEDURE — 82607 VITAMIN B-12: CPT | Mod: ZL

## 2023-08-11 PROCEDURE — 82306 VITAMIN D 25 HYDROXY: CPT | Mod: ZL

## 2023-08-11 PROCEDURE — 80053 COMPREHEN METABOLIC PANEL: CPT | Mod: ZL

## 2023-08-11 PROCEDURE — 97110 THERAPEUTIC EXERCISES: CPT | Mod: GP,KX

## 2023-08-14 ENCOUNTER — THERAPY VISIT (OUTPATIENT)
Dept: PHYSICAL THERAPY | Facility: OTHER | Age: 68
End: 2023-08-14
Payer: MEDICARE

## 2023-08-14 DIAGNOSIS — G35 MULTIPLE SCLEROSIS (H): Primary | ICD-10-CM

## 2023-08-14 DIAGNOSIS — R26.9 ABNORMALITY OF GAIT: ICD-10-CM

## 2023-08-14 LAB — DEPRECATED CALCIDIOL+CALCIFEROL SERPL-MC: 49 UG/L (ref 20–75)

## 2023-08-14 PROCEDURE — 97110 THERAPEUTIC EXERCISES: CPT | Mod: GP,CQ,KX

## 2023-08-15 DIAGNOSIS — R39.15 URINARY URGENCY: ICD-10-CM

## 2023-08-15 RX ORDER — OXYBUTYNIN CHLORIDE 10 MG/1
10 TABLET, EXTENDED RELEASE ORAL EVERY MORNING
Qty: 90 TABLET | Refills: 0 | Status: SHIPPED | OUTPATIENT
Start: 2023-08-15 | End: 2023-09-28

## 2023-08-16 ENCOUNTER — THERAPY VISIT (OUTPATIENT)
Dept: PHYSICAL THERAPY | Facility: OTHER | Age: 68
End: 2023-08-16
Payer: MEDICARE

## 2023-08-16 DIAGNOSIS — R26.9 ABNORMALITY OF GAIT: ICD-10-CM

## 2023-08-16 DIAGNOSIS — G35 MULTIPLE SCLEROSIS (H): Primary | ICD-10-CM

## 2023-08-16 PROCEDURE — 97110 THERAPEUTIC EXERCISES: CPT | Mod: GP,CQ,KX

## 2023-08-18 ENCOUNTER — THERAPY VISIT (OUTPATIENT)
Dept: PHYSICAL THERAPY | Facility: OTHER | Age: 68
End: 2023-08-18
Payer: MEDICARE

## 2023-08-18 DIAGNOSIS — R26.9 ABNORMALITY OF GAIT: ICD-10-CM

## 2023-08-18 DIAGNOSIS — G35 MULTIPLE SCLEROSIS (H): Primary | ICD-10-CM

## 2023-08-18 PROCEDURE — 97110 THERAPEUTIC EXERCISES: CPT | Mod: GP,KX

## 2023-08-21 ENCOUNTER — THERAPY VISIT (OUTPATIENT)
Dept: PHYSICAL THERAPY | Facility: OTHER | Age: 68
End: 2023-08-21
Payer: MEDICARE

## 2023-08-21 DIAGNOSIS — R26.9 ABNORMALITY OF GAIT: ICD-10-CM

## 2023-08-21 DIAGNOSIS — G35 MULTIPLE SCLEROSIS (H): Primary | ICD-10-CM

## 2023-08-21 PROCEDURE — 97110 THERAPEUTIC EXERCISES: CPT | Mod: GP,KX

## 2023-08-25 ENCOUNTER — THERAPY VISIT (OUTPATIENT)
Dept: PHYSICAL THERAPY | Facility: OTHER | Age: 68
End: 2023-08-25
Payer: MEDICARE

## 2023-08-25 DIAGNOSIS — R26.9 ABNORMALITY OF GAIT: ICD-10-CM

## 2023-08-25 DIAGNOSIS — G35 MULTIPLE SCLEROSIS (H): Primary | ICD-10-CM

## 2023-08-25 PROCEDURE — 97110 THERAPEUTIC EXERCISES: CPT | Mod: GP,KX

## 2023-08-28 ENCOUNTER — THERAPY VISIT (OUTPATIENT)
Dept: PHYSICAL THERAPY | Facility: OTHER | Age: 68
End: 2023-08-28
Payer: MEDICARE

## 2023-08-28 DIAGNOSIS — G35 MULTIPLE SCLEROSIS (H): Primary | ICD-10-CM

## 2023-08-28 DIAGNOSIS — R26.9 ABNORMALITY OF GAIT: ICD-10-CM

## 2023-08-28 PROCEDURE — 97110 THERAPEUTIC EXERCISES: CPT | Mod: GP,CQ,KX

## 2023-08-30 ENCOUNTER — THERAPY VISIT (OUTPATIENT)
Dept: PHYSICAL THERAPY | Facility: OTHER | Age: 68
End: 2023-08-30
Payer: MEDICARE

## 2023-08-30 DIAGNOSIS — R26.9 ABNORMALITY OF GAIT: ICD-10-CM

## 2023-08-30 DIAGNOSIS — G35 MULTIPLE SCLEROSIS (H): Primary | ICD-10-CM

## 2023-08-30 PROCEDURE — 97110 THERAPEUTIC EXERCISES: CPT | Mod: GP,KX

## 2023-09-01 ENCOUNTER — MYC MEDICAL ADVICE (OUTPATIENT)
Dept: FAMILY MEDICINE | Facility: OTHER | Age: 68
End: 2023-09-01

## 2023-09-01 ENCOUNTER — THERAPY VISIT (OUTPATIENT)
Dept: PHYSICAL THERAPY | Facility: OTHER | Age: 68
End: 2023-09-01
Payer: MEDICARE

## 2023-09-01 DIAGNOSIS — G35 MULTIPLE SCLEROSIS (H): Primary | ICD-10-CM

## 2023-09-01 DIAGNOSIS — I10 ESSENTIAL (PRIMARY) HYPERTENSION: ICD-10-CM

## 2023-09-01 DIAGNOSIS — R26.9 ABNORMALITY OF GAIT: ICD-10-CM

## 2023-09-01 PROCEDURE — 97110 THERAPEUTIC EXERCISES: CPT | Mod: GP,CQ

## 2023-09-01 RX ORDER — LISINOPRIL 10 MG/1
10 TABLET ORAL DAILY
Qty: 90 TABLET | Refills: 0 | Status: SHIPPED | OUTPATIENT
Start: 2023-09-01 | End: 2023-09-28

## 2023-09-01 NOTE — TELEPHONE ENCOUNTER
Requested Prescriptions   Pending Prescriptions Disp Refills    lisinopril (ZESTRIL) 10 MG tablet 90 tablet 1     Sig: Take 1 tablet (10 mg) by mouth daily       There is no refill protocol information for this order          Last Prescription Date:   2/20/2023  Last Fill Qty/Refills:         90, R-1    Last Office Visit:              8/29/2022   Future Office visit:           9/28/2023    Dior Cuevas RN on 9/1/2023 at 2:50 PM

## 2023-09-06 ENCOUNTER — THERAPY VISIT (OUTPATIENT)
Dept: PHYSICAL THERAPY | Facility: OTHER | Age: 68
End: 2023-09-06
Attending: NURSE PRACTITIONER
Payer: MEDICARE

## 2023-09-06 DIAGNOSIS — R26.9 ABNORMALITY OF GAIT: ICD-10-CM

## 2023-09-06 DIAGNOSIS — G35 MULTIPLE SCLEROSIS (H): Primary | ICD-10-CM

## 2023-09-06 PROCEDURE — 97110 THERAPEUTIC EXERCISES: CPT | Mod: GP,KX

## 2023-09-11 ENCOUNTER — THERAPY VISIT (OUTPATIENT)
Dept: PHYSICAL THERAPY | Facility: OTHER | Age: 68
End: 2023-09-11
Attending: NURSE PRACTITIONER
Payer: MEDICARE

## 2023-09-11 DIAGNOSIS — R26.9 ABNORMALITY OF GAIT: ICD-10-CM

## 2023-09-11 DIAGNOSIS — G35 MULTIPLE SCLEROSIS (H): Primary | ICD-10-CM

## 2023-09-11 PROCEDURE — 97110 THERAPEUTIC EXERCISES: CPT | Mod: GP,KX

## 2023-09-13 ENCOUNTER — THERAPY VISIT (OUTPATIENT)
Dept: PHYSICAL THERAPY | Facility: OTHER | Age: 68
End: 2023-09-13
Attending: NURSE PRACTITIONER
Payer: MEDICARE

## 2023-09-13 DIAGNOSIS — G35 MULTIPLE SCLEROSIS (H): Primary | ICD-10-CM

## 2023-09-13 DIAGNOSIS — R26.9 ABNORMALITY OF GAIT: ICD-10-CM

## 2023-09-13 PROCEDURE — 97110 THERAPEUTIC EXERCISES: CPT | Mod: GP,KX

## 2023-09-15 ENCOUNTER — THERAPY VISIT (OUTPATIENT)
Dept: PHYSICAL THERAPY | Facility: OTHER | Age: 68
End: 2023-09-15
Attending: NURSE PRACTITIONER
Payer: MEDICARE

## 2023-09-15 DIAGNOSIS — G35 MULTIPLE SCLEROSIS (H): Primary | ICD-10-CM

## 2023-09-15 DIAGNOSIS — R26.9 ABNORMALITY OF GAIT: ICD-10-CM

## 2023-09-15 PROCEDURE — 97110 THERAPEUTIC EXERCISES: CPT | Mod: GP,CQ,KX

## 2023-09-18 ENCOUNTER — THERAPY VISIT (OUTPATIENT)
Dept: PHYSICAL THERAPY | Facility: OTHER | Age: 68
End: 2023-09-18
Attending: NURSE PRACTITIONER
Payer: MEDICARE

## 2023-09-18 DIAGNOSIS — G35 MULTIPLE SCLEROSIS (H): Primary | ICD-10-CM

## 2023-09-18 DIAGNOSIS — R26.9 ABNORMALITY OF GAIT: ICD-10-CM

## 2023-09-18 PROCEDURE — 97110 THERAPEUTIC EXERCISES: CPT | Mod: GP,KX

## 2023-09-20 ENCOUNTER — THERAPY VISIT (OUTPATIENT)
Dept: PHYSICAL THERAPY | Facility: OTHER | Age: 68
End: 2023-09-20
Attending: NURSE PRACTITIONER
Payer: MEDICARE

## 2023-09-20 DIAGNOSIS — R26.9 ABNORMALITY OF GAIT: ICD-10-CM

## 2023-09-20 DIAGNOSIS — G35 MULTIPLE SCLEROSIS (H): Primary | ICD-10-CM

## 2023-09-20 PROCEDURE — 97110 THERAPEUTIC EXERCISES: CPT | Mod: GP,KX

## 2023-09-25 ENCOUNTER — THERAPY VISIT (OUTPATIENT)
Dept: PHYSICAL THERAPY | Facility: OTHER | Age: 68
End: 2023-09-25
Attending: NURSE PRACTITIONER
Payer: MEDICARE

## 2023-09-25 DIAGNOSIS — R26.9 ABNORMALITY OF GAIT: ICD-10-CM

## 2023-09-25 DIAGNOSIS — G35 MULTIPLE SCLEROSIS (H): Primary | ICD-10-CM

## 2023-09-25 PROCEDURE — 97110 THERAPEUTIC EXERCISES: CPT | Mod: GP,CQ,KX

## 2023-09-27 ENCOUNTER — THERAPY VISIT (OUTPATIENT)
Dept: PHYSICAL THERAPY | Facility: OTHER | Age: 68
End: 2023-09-27
Attending: NURSE PRACTITIONER
Payer: MEDICARE

## 2023-09-27 DIAGNOSIS — G35 MULTIPLE SCLEROSIS (H): Primary | ICD-10-CM

## 2023-09-27 DIAGNOSIS — R26.9 ABNORMALITY OF GAIT: ICD-10-CM

## 2023-09-27 PROCEDURE — 97140 MANUAL THERAPY 1/> REGIONS: CPT | Mod: GP,KX

## 2023-09-27 PROCEDURE — 97110 THERAPEUTIC EXERCISES: CPT | Mod: GP,KX

## 2023-09-27 NOTE — PROGRESS NOTES
PROGRESS NOTE    PLAN  Continue therapy per current plan of care. Jamir continues to find functional progress and strength gains that have made big impacts in his daily life. He has seen improvements in his ability to lift heavier items, transition from the floor better, get in an out of vehicles more easily without assistance. We will continue to progress as able as long as Jamir is still making reasonable gains to maximize his independence.     Beginning/End Dates of Progress Note Reporting Period:  8/30/23  to 09/27/2023    Referring Provider:  Dr. Miriam Escobar MD       09/27/23 0500   Appointment Info   Signing clinician's name / credentials Manjit Pink DPT   Total/Authorized Visits 50   Visits Used 9 of 10   Medical Diagnosis multiple sclerosis; gait abnormality   PT Tx Diagnosis impaired mobility   Precautions/Limitations fall   Quick Adds Certification   Progress Note/Certification   Start of Care Date 04/26/23   Onset of illness/injury or Date of Surgery 04/21/23   Therapy Frequency 2-3x/week   Predicted Duration 12 additional weeks   Certification date from 08/04/23   Certification date to 10/27/23   KX Modifier Statement Therapy services meets medical necessity requirements beyond the therapy threshold for ongoing care.   Supervision   PT Assistant Visit Number 5   PT Goal 1   Goal Identifier 6 minute walk test   Goal Description Jamir will be able to ambulate 1000 feet in 6 minutes for improved endurance and strength to help with community and home ambulation.   Goal Progress 950 feet today. More fatigued.   Target Date 07/10/23   PT Goal 2   Goal Identifier 6 minute walk test   Goal Description Jamir will demonstrate improved endurance evidenced by being able to walk 6 minute walk test and complete the last 3 minutes of test with 90% of distance of first 3 minutes.   Goal Progress significant improvement. Jamir continues to have increased distance/speed in first 3 minutes compared to last. Today,  middle 2 minutes was very similar to last 2 minutes   Target Date 07/24/23   PT Goal 3   Goal Identifier 5 times sit to stand   Goal Description Jamir will complete 5 times sit to  less than 11 seconds for improved strength and balance.   Goal Progress 11.89 seconds today.   Target Date 06/26/23   PT Goal 4   Goal Identifier Knee extension   Goal Description Jamir will be able to achieve full terminal knee extension for improvement in stance and resting positions.   Target Date 06/26/23   Subjective Report   Subjective Report Jamir has dealt with some neck and arm pain over the weekend. A little better today. We reviewed some of his previous stretches and nerve glides today.   Objective Measures   Objective Measures Objective Measure 1;Objective Measure 2;Objective Measure 3;Objective Measure 4   Objective Measure 1   Objective Measure 5 times sit to stand   Details 15 seconds - poor eccentric control   Objective Measure 2   Objective Measure 6 MWT   Details 1026 is best distance, 950 on 8/4/23   Objective Measure 3   Objective Measure Standing TKE   Details lacking 25-30 degrees of TKE   Treatment Interventions (PT)   Interventions Therapeutic Procedure/Exercise;Neuromuscular Re-education;Gait Training;Manual Therapy   Therapeutic Procedure/Exercise   Therapeutic Procedures: strength, endurance, ROM, flexibillity minutes (14629) 35   Therapeutic Procedures Ther Proc 2;Ther Proc 3   Ther Proc 1 MEDx Machines   Ther Proc 1 - Details Rows 140# B x 20. lat pull 160# x 25 - seat belted today and overhead stretch 2x30 second hold. Chest press with one hole showing on weight column 150# 1x10.   Ther Proc 2 [facility] lvl 5-10 x 5 minutes. Deferred: Marybeth D2 2 handed PNF rotation 4.5kg x 12 B - significant improvement in quality of R rotation today.   Ther Proc 2 - Details Deferred this line today: Marybeth deadlift 12kg x 20 - with rop handle.  ), back exension stretch in machine but no weight. seated on grey air disc  "in chair: arms reaching overhead x 10, marching x 10, and pelvic tilts x 15 with jamir having LOB needing UE catch to prevent falling off of chair. Foam stance eyes open and eyes closed 3x30\" with increased sway and some L sided lean. Back extension 56# x 30 - full motion today 80-15 degrees, (40-15 degrees) 34# x30. Core abs 46# x25.   Ther Proc 3 Rebounder 3rd notch from bottom - 8.5\" ball overhead pass and catch, Jamir further away today requiring more effort to throw and catch.   Ther Proc 3 - Details Sit to stand x 5. Deferred due to time: Leg press # x25 with 1/2 bolster between knees.   HS curl 80# x30 with 3x30\" stretches.   Skilled Intervention education,   Patient Response/Progress improving tolerance for activity, increase strength and mobility for improving balance and transitional movements. Responding well after fall.   Neuromuscular Re-education   Neuro Re-ed 1 Blaze pods balance work on and off of foam, reaching outside of PARISH. Random, focus and sequence protocol used. Increased squat/knee flexion with foam pad stance.   Skilled Intervention guarding   Patient Response/Progress increased knee flexion as fatigue increased   Gait Training   Gait 1 Ambulation with Standard cane in p bars - initially requested no gait belt but PT insisted. Cane slipped out from Jamir as he WBs at an oblique angle with his cane - near LOB but Jamir able to grab p-bars with L hand and PT assisted to prevent LOB. Switched to hurry cane with better stability - ambulated 1x30' then 2x20'. Rest breaks in between - scissor gait with slight R toe drag   Gait 1 - Details ambulation with FWW x 150' - even strides and fair pace.   Skilled Intervention guarding, cuing   Manual Therapy   Manual Therapy: Mobilization, MFR, MLD, friction massage minutes ([id number]) 10   Manual Therapy 1 STM to SCM and UT R, first rib mobilizations with improved release. Median nerve glides x 10   Plan   Plan for next session TUG, LE strengthening " (MEDx?), standing balance/endurance   Total Session Time   Timed Code Treatment Minutes 45   Total Treatment Time (sum of timed and untimed services) 45   Medicare Claim Information   Medical Diagnosis multiple sclerosis; gait abnormality

## 2023-09-28 ENCOUNTER — OFFICE VISIT (OUTPATIENT)
Dept: FAMILY MEDICINE | Facility: OTHER | Age: 68
End: 2023-09-28
Attending: FAMILY MEDICINE
Payer: COMMERCIAL

## 2023-09-28 VITALS
TEMPERATURE: 96.4 F | RESPIRATION RATE: 16 BRPM | WEIGHT: 169.2 LBS | DIASTOLIC BLOOD PRESSURE: 76 MMHG | SYSTOLIC BLOOD PRESSURE: 114 MMHG | HEART RATE: 83 BPM | BODY MASS INDEX: 25.06 KG/M2 | HEIGHT: 69 IN | OXYGEN SATURATION: 95 %

## 2023-09-28 DIAGNOSIS — Z87.448 HISTORY OF HYDRONEPHROSIS: ICD-10-CM

## 2023-09-28 DIAGNOSIS — L85.8 CUTANEOUS HORN: ICD-10-CM

## 2023-09-28 DIAGNOSIS — Z12.11 COLON CANCER SCREENING: ICD-10-CM

## 2023-09-28 DIAGNOSIS — G35 MULTIPLE SCLEROSIS (H): ICD-10-CM

## 2023-09-28 DIAGNOSIS — N31.9 NEUROGENIC BLADDER: ICD-10-CM

## 2023-09-28 DIAGNOSIS — R39.15 URINARY URGENCY: ICD-10-CM

## 2023-09-28 DIAGNOSIS — M81.0 OSTEOPOROSIS WITHOUT CURRENT PATHOLOGICAL FRACTURE, UNSPECIFIED OSTEOPOROSIS TYPE: ICD-10-CM

## 2023-09-28 DIAGNOSIS — I10 ESSENTIAL (PRIMARY) HYPERTENSION: ICD-10-CM

## 2023-09-28 DIAGNOSIS — Z12.5 SCREENING FOR PROSTATE CANCER: ICD-10-CM

## 2023-09-28 DIAGNOSIS — Z00.00 MEDICARE ANNUAL WELLNESS VISIT, SUBSEQUENT: Primary | ICD-10-CM

## 2023-09-28 DIAGNOSIS — Z98.890 S/P LAMINECTOMY: ICD-10-CM

## 2023-09-28 PROBLEM — M85.80 OSTEOPENIA: Status: RESOLVED | Noted: 2022-09-11 | Resolved: 2023-09-28

## 2023-09-28 LAB
HOLD SPECIMEN: NORMAL
PSA SERPL DL<=0.01 NG/ML-MCNC: 0.52 NG/ML (ref 0–4.5)

## 2023-09-28 PROCEDURE — 17110 DESTRUCTION B9 LES UP TO 14: CPT | Performed by: FAMILY MEDICINE

## 2023-09-28 PROCEDURE — G0439 PPPS, SUBSEQ VISIT: HCPCS | Performed by: FAMILY MEDICINE

## 2023-09-28 PROCEDURE — G0463 HOSPITAL OUTPT CLINIC VISIT: HCPCS | Mod: 25

## 2023-09-28 PROCEDURE — 36415 COLL VENOUS BLD VENIPUNCTURE: CPT | Mod: ZL | Performed by: FAMILY MEDICINE

## 2023-09-28 PROCEDURE — G0103 PSA SCREENING: HCPCS | Mod: ZL | Performed by: FAMILY MEDICINE

## 2023-09-28 RX ORDER — LISINOPRIL 10 MG/1
10 TABLET ORAL DAILY
Qty: 90 TABLET | Refills: 3 | Status: SHIPPED | OUTPATIENT
Start: 2023-09-28

## 2023-09-28 RX ORDER — BACLOFEN 10 MG/1
10 TABLET ORAL 2 TIMES DAILY
Qty: 180 TABLET | Refills: 3 | Status: SHIPPED | OUTPATIENT
Start: 2023-09-28

## 2023-09-28 RX ORDER — AMOXICILLIN 250 MG
1-2 CAPSULE ORAL 2 TIMES DAILY
Qty: 180 TABLET | Refills: 3 | Status: SHIPPED | OUTPATIENT
Start: 2023-09-28

## 2023-09-28 RX ORDER — OXYBUTYNIN CHLORIDE 10 MG/1
10 TABLET, EXTENDED RELEASE ORAL EVERY MORNING
Qty: 90 TABLET | Refills: 3 | Status: SHIPPED | OUTPATIENT
Start: 2023-09-28

## 2023-09-28 ASSESSMENT — ENCOUNTER SYMPTOMS
DIARRHEA: 0
WEAKNESS: 0
DYSURIA: 0
FEVER: 0
NERVOUS/ANXIOUS: 0
HEMATOCHEZIA: 0
EYE PAIN: 0
MYALGIAS: 1
PALPITATIONS: 0
SORE THROAT: 0
JOINT SWELLING: 0
HEADACHES: 0
HEMATURIA: 0
COUGH: 0
NAUSEA: 1
CONSTIPATION: 1
FREQUENCY: 0
DIZZINESS: 1
ABDOMINAL PAIN: 0
HEARTBURN: 0
CHILLS: 0
ARTHRALGIAS: 0

## 2023-09-28 ASSESSMENT — PATIENT HEALTH QUESTIONNAIRE - PHQ9
SUM OF ALL RESPONSES TO PHQ QUESTIONS 1-9: 0
10. IF YOU CHECKED OFF ANY PROBLEMS, HOW DIFFICULT HAVE THESE PROBLEMS MADE IT FOR YOU TO DO YOUR WORK, TAKE CARE OF THINGS AT HOME, OR GET ALONG WITH OTHER PEOPLE: NOT DIFFICULT AT ALL
SUM OF ALL RESPONSES TO PHQ QUESTIONS 1-9: 0

## 2023-09-28 ASSESSMENT — PAIN SCALES - GENERAL: PAINLEVEL: NO PAIN (0)

## 2023-09-28 ASSESSMENT — ACTIVITIES OF DAILY LIVING (ADL): CURRENT_FUNCTION: NO ASSISTANCE NEEDED

## 2023-09-28 NOTE — NURSING NOTE
"Chief Complaint   Patient presents with    Medicare Visit     Patient is here for Medicare exam     Initial /76   Pulse 83   Temp (!) 96.4  F (35.8  C) (Tympanic)   Resp 16   Ht 1.753 m (5' 9\")   Wt 76.7 kg (169 lb 3.2 oz)   SpO2 95%   BMI 24.99 kg/m   Estimated body mass index is 24.99 kg/m  as calculated from the following:    Height as of this encounter: 1.753 m (5' 9\").    Weight as of this encounter: 76.7 kg (169 lb 3.2 oz).  Medication Reconciliation: complete    Sara Moser CMA    "

## 2023-09-28 NOTE — PROGRESS NOTES
PLAN  Continue therapy per current plan of care. Jamir has had no falls recently and his ambulation is improving. His endurance is better however his 6 minute walk test has not resulted in significant improvement from a distance stand point. However the quality of this test is better with more symmetrical distances in the first minute compared to the 5th. 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     Beginning/End Dates of Progress Note Reporting Period:   8/4/23 to 08/30/2023    Referring Provider:  Dr. Tylor Escobar MD     08/30/23 0500   Appointment Info   Signing clinician's name / credentials Manjit Pink DPT   Total/Authorized Visits 41   Visits Used 10 of 10   Medical Diagnosis multiple sclerosis; gait abnormality   PT Tx Diagnosis impaired mobility   Precautions/Limitations fall   Progress Note/Certification   Start of Care Date 04/26/23   Onset of illness/injury or Date of Surgery 04/21/23   Therapy Frequency 2-3x/week   Predicted Duration 12 additional weeks   Certification date from 08/04/23   Certification date to 10/27/23   Supervision   PT Assistant Visit Number 2   PT Goal 1   Goal Identifier 6 minute walk test   Goal Description Jamir will be able to ambulate 1000 feet in 6 minutes for improved endurance and strength to help with community and home ambulation.   Goal Progress 950 feet today. More fatigued.   Target Date 07/10/23   PT Goal 2   Goal Identifier 6 minute walk test   Goal Description Jamir will demonstrate improved endurance evidenced by being able to walk 6 minute walk test and complete the last 3 minutes of test with 90% of distance of first 3 minutes.   Goal Progress significant improvement. Jamir continues to have increased distance/speed in first 3 minutes compared to last. Today, middle 2 minutes was very similar  "to last 2 minutes   Target Date 07/24/23   PT Goal 3   Goal Identifier 5 times sit to stand   Goal Description Jamir will complete 5 times sit to  less than 11 seconds for improved strength and balance.   Goal Progress 18 seconds today.   Target Date 06/26/23   PT Goal 4   Goal Identifier Knee extension   Goal Description Jamir will be able to achieve full terminal knee extension for improvement in stance and resting positions.   Target Date 06/26/23   Subjective Report   Subjective Report Jamir presents with his sister in law from [place]. Jamir has had a little hip pain lately but he does not feel like its a big deal, just wants to keep PT informed.   Objective Measures   Objective Measures Objective Measure 1;Objective Measure 2;Objective Measure 3;Objective Measure 4   Objective Measure 1   Objective Measure 5 times sit to stand   Details 15 seconds - poor eccentric control   Objective Measure 2   Objective Measure 6 MWT   Details 1026 is best distance, 950 on 8/4/23   Objective Measure 3   Objective Measure Standing TKE   Details lacking 25-30 degrees of TKE   Treatment Interventions (PT)   Interventions Therapeutic Procedure/Exercise;Neuromuscular Re-education;Gait Training   Therapeutic Procedure/Exercise   Therapeutic Procedures: strength, endurance, ROM, flexibillity minutes (83365) 45   Therapeutic Procedures Ther Proc 2;Ther Proc 3   Ther Proc 1 MEDx Machines   Ther Proc 1 - Details Rows single arm 100# 1x25 B with increased difficulty R vs L. lat pull 150# x20 - (increase weight) and overhead stretch 2x30 second hold, Chest press 130# 2x15 then . Deferred: Triceps extension 106# 1x25.   Ther Proc 2 Nustep L7 x5:22 minutes - 1 lap. . Vertical ladder 3 steps L LE lead, overhead work threading rope 1x1 minute then donning 1# wrist weights x 1 minute. Then jamir completing full threading with weights 1'52\" for time.   Ther Proc 2 - Details Deferred this line today. Marybeth deadlift 11kg x 25 - working on " "slight knee bend and lifting up at hips vs pulling with arms to raise weight. Marybeth Triceps extension 11k x 35. Biceps curls 7kg x 25. Standing Shoulder press 5# x 15 - R shoulder fatigue quickly. Back extension (80-40 degrees) 66# x30 and then (40-15 degrees 34# x30. Core abs 46# x20.   Ther Proc 3 Rebounder 3rd notch from bottom - 7\" ball overhead pass and catch   Ther Proc 3 - Details Leg press # x10 with 1/2 bolster between knees. Attempted 160# but too difficult. Deferred: HS curl 80# x 15 with 3x30\" stretches. Deferred rest of this line R LE only 60# x25.  Hip abduction 30# isometric x 10.  then PT assisted out with Eccentric control into adducted [id number]  x 10.   Skilled Intervention education,   Neuromuscular Re-education   Skilled Intervention guarding   Patient Response/Progress increased knee flexion as fatigue increased   Treatment Detail Foam balance eyes open stance then head turns then nods. Flat surface with PT perturbation in 4 directions - increased fatigue but no LOB   Gait Training   Gait 1 Ambulation with Standard cane in p bars - initially requested no gait belt but PT insisted. Cane slipped out from Jamir as he WBs at an oblique angle with his cane - near LOB but Jamir able to grab p-bars with L hand and PT assisted to prevent LOB. Switched to hurry cane with better stability - ambulated 1x30' then 2x20'. Rest breaks in between - scissor gait with slight R toe drag   Gait 1 - Details ambulation with FWW x 150' - even strides and fair pace.   Skilled Intervention guarding, cuing   Plan   Plan for next session TUG, LE strengthening (MEDx?), standing balance/endurance   Total Session Time   Timed Code Treatment Minutes 45   Total Treatment Time (sum of timed and untimed services) 45   Medicare Claim Information   Medical Diagnosis multiple sclerosis; gait abnormality           "

## 2023-09-28 NOTE — PROGRESS NOTES
"SUBJECTIVE:   Jamir is a 68 year old who presents for Preventive Visit.    67 yo male presents for medicare wellness exam and follow-up of MS and spinal stenosis.    S/p t10-12 laminectomy 9/2022  Going to PT 3/week, gaining strength, he feels the best he has in years.    Will be leaving for [place] for the winter.    Neurogenic bladder, self caths twice daily.  On oxybutynin.  No recent UTIs.  His urologist had been arranging for annual renal ultrasounds for unclear reasons.    Strong family history of AAA.  Screen last year was negative.  She is a non-smoker.    Hypertension: Currently on lisinopril.  Well-controlled.    Small wart like lesion on his left forearm.  Present for a few months.  Are you in the first 12 months of your Medicare coverage?  No    Healthy Habits:     In general, how would you rate your overall health?  Fair    Frequency of exercise:  2-3 days/week    Duration of exercise:  30-45 minutes    Do you usually eat at least 4 servings of fruit and vegetables a day, include whole grains    & fiber and avoid regularly eating high fat or \"junk\" foods?  No    Taking medications regularly:  Yes    Medication side effects:  None    Ability to successfully perform activities of daily living:  No assistance needed    Home Safety:  Throw rugs in the hallway    Hearing Impairment:  No hearing concerns    In the past 6 months, have you been bothered by leaking of urine? Yes    In general, how would you rate your overall mental or emotional health?  Excellent    Additional concerns today:  No      Today's PHQ-9 Score:       9/28/2023     8:14 AM   PHQ-9 SCORE   PHQ-9 Total Score MyChart 0   PHQ-9 Total Score 0           Have you ever done Advance Care Planning? (For example, a Health Directive, POLST, or a discussion with a medical provider or your loved ones about your wishes): No, advance care planning information given to patient to review.  Patient declined advance care planning discussion at this " time.       Fall risk  Fallen 2 or more times in the past year?: No  Any fall with injury in the past year?: No    Cognitive Screening   1) Repeat 3 items (Leader, Season, Table)    2) Clock draw: NORMAL  3) 3 item recall: Recalls 3 objects  Results: 3 items recalled: COGNITIVE IMPAIRMENT LESS LIKELY    Mini-CogTM Copyright MARGO Cesar. Licensed by the author for use in [facility]; reprinted with permission (soob@[facility]). All rights reserved.      Do you have sleep apnea, excessive snoring or daytime drowsiness? : no    Reviewed and updated as needed this visit by clinical staff   Tobacco  Allergies  Meds              Reviewed and updated as needed this visit by Provider                 Social History     Tobacco Use    Smoking status: Never    Smokeless tobacco: Never   Substance Use Topics    Alcohol use: No             9/28/2023     8:20 AM   Alcohol Use   Prescreen: >3 drinks/day or >7 drinks/week? Not Applicable     Do you have a current opioid prescription? No  Do you use any other controlled substances or medications that are not prescribed by a provider? None          Hypertension Follow-up    Do you check your blood pressure regularly outside of the clinic? Yes   Are you following a low salt diet? No  Are your blood pressures ever more than 140 on the top number (systolic) OR more   than 90 on the bottom number (diastolic), for example 140/90? No    Current providers sharing in care for this patient include:   Patient Care Team:  Kayla Hobbs MD as PCP - General (Family Practice)  Kayla Hobbs MD as Assigned PCP  Olu Saldivar MD as Assigned Surgical Provider  Edson Leija MD as Assigned Neuroscience Provider  ([facility]), [facility]  Glenn Cullen PA-C as Assigned Musculoskeletal Provider    The following health maintenance items are reviewed in Epic and correct as of today:  Health Maintenance   Topic Date  "Due    ADVANCE CARE PLANNING  Never done    COVID-19 Vaccine (1) Never done    MEDICARE ANNUAL WELLNESS VISIT  11/29/2020    ZOSTER IMMUNIZATION (2 of 2) 08/08/2021    INFLUENZA VACCINE (1) Never done    PHQ-9  03/28/2024    COLORECTAL CANCER SCREENING  06/17/2024    FALL RISK ASSESSMENT  09/28/2024    LIPID  04/15/2026    DTAP/TDAP/TD IMMUNIZATION (2 - Td or Tdap) 11/29/2029    HEPATITIS C SCREENING  Completed    DEPRESSION ACTION PLAN  Completed    Pneumococcal Vaccine: 65+ Years  Completed    AORTIC ANEURYSM SCREENING (SYSTEM ASSIGNED)  Completed    IPV IMMUNIZATION  Aged Out    HPV IMMUNIZATION  Aged Out    MENINGITIS IMMUNIZATION  Aged Out     Labs reviewed in EPIC  BP Readings from Last 3 Encounters:   09/28/23 114/76   10/21/22 128/80   09/19/22 112/70    Wt Readings from Last 3 Encounters:   09/28/23 76.7 kg (169 lb 3.2 oz)   10/21/22 78.5 kg (173 lb)   09/14/22 76.5 kg (168 lb 9.6 oz)                          Review of Systems   Constitutional:  Negative for chills and fever.   HENT:  Negative for ear pain, hearing loss and sore throat.    Eyes:  Negative for pain and visual disturbance.   Respiratory:  Negative for cough.    Cardiovascular:  Negative for chest pain, palpitations and peripheral edema.   Gastrointestinal:  Positive for constipation and nausea. Negative for abdominal pain, diarrhea, heartburn and hematochezia.   Genitourinary:  Positive for impotence. Negative for dysuria, frequency, genital sores, hematuria and urgency.   Musculoskeletal:  Positive for myalgias. Negative for arthralgias and joint swelling.   Neurological:  Positive for dizziness. Negative for weakness and headaches.   Psychiatric/Behavioral:  Negative for mood changes. The patient is not nervous/anxious.          OBJECTIVE:   /76   Pulse 83   Temp (!) 96.4  F (35.8  C) (Tympanic)   Resp 16   Ht 1.753 m (5' 9\")   Wt 76.7 kg (169 lb 3.2 oz)   SpO2 95%   BMI 24.99 kg/m   Estimated body mass index is 24.99 kg/m  as " "calculated from the following:    Height as of this encounter: 1.753 m (5' 9\").    Weight as of this encounter: 76.7 kg (169 lb 3.2 oz).  Physical Exam  GENERAL: healthy, alert and no distress  EYES: Eyes grossly normal to inspection, PERRL and conjunctivae and sclerae normal  HENT: ear canals and TM's normal, nose and mouth without ulcers or lesions  NECK: no adenopathy, no asymmetry, masses, or scars and thyroid normal to palpation  RESP: lungs clear to auscultation - no rales, rhonchi or wheezes  CV: regular rate and rhythm, normal S1 S2, no S3 or S4, no murmur, click or rub, no peripheral edema and peripheral pulses strong  ABDOMEN: soft, nontender, no hepatosplenomegaly, no masses and bowel sounds normal  MS: Thoracic curvature.  SKIN: no suspicious lesions or rashes  SKIN: Small cutaneous horn on the left forearm was treated with liquid nitrogen and 3 freeze thaw cycles.  NEURO: Spastic gait.  PSYCH: mentation appears normal, affect normal/bright    Diagnostic Test Results:  Labs reviewed in Epic  Results for orders placed or performed in visit on 09/28/23 (from the past 24 hour(s))   Extra Tube    Narrative    The following orders were created for panel order Extra Tube.  Procedure                               Abnormality         Status                     ---------                               -----------         ------                     Extra Purple Top Tube[[id number]]                            In process                   Please view results for these tests on the individual orders.       ASSESSMENT / PLAN:       ICD-10-CM    1. Medicare annual wellness visit, subsequent  Z00.00       2. Multiple sclerosis (H)  G35 baclofen (LIORESAL) 10 MG tablet      3. Essential (primary) hypertension  I10 lisinopril (ZESTRIL) 10 MG tablet      4. Urinary urgency  R39.15 oxyBUTYnin ER (DITROPAN XL) 10 MG 24 hr tablet      5. S/P laminectomy  Z98.890 senna-docusate (SENOKOT-S/PERICOLACE) 8.6-50 MG tablet      6. " Screening for prostate cancer  Z12.5 PSA Screen GH     PSA Screen GH      7. Colon cancer screening  Z12.11 Adult GI  Referral - Procedure Only      8. History of hydronephrosis  Z87.448 US Renal Complete Non-Vascular      9. Neurogenic bladder  N31.9 US Renal Complete Non-Vascular      10. 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  He plans to continue this long-term to maintain his strength and mobility.  No recent falls.    He is due for colon cancer screening in June 2024 due to history of serrated and tubular adenoma.    Reviewed previous urology consultations.  It appears they were doing annual renal ultrasounds for unclear reasons.  He does have a neurogenic bladder and self caths twice daily.  He would like to continue with this.    Declines influenza and COVID-vaccine.  He did complete his Shingrix series and will update CARLI to reflect this.    PSA was drawn today.    Blood pressures under excellent control continue on lisinopril.    DEXA scan from 2019 showed a T score of -2.1.  Recommend repeating bone density.    Refilled medications as requested.  Patient Instructions   They will call to schedule    Colonoscopy in June 2024  Renal Ultrasound      Patient has been advised of split billing requirements and indicates understanding: Yes      COUNSELING:  Reviewed preventive health counseling, as reflected in patient instructions  Special attention given to:       Consider AAA screening for ages 65-75 and smoking history       Regular exercise       Healthy diet/nutrition       Vision screening       Fall risk prevention       Colon cancer screening       Prostate cancer screening        He reports that he has never smoked. He has never used smokeless tobacco.      Appropriate preventive services were discussed with this patient, including applicable screening as appropriate for cardiovascular disease, diabetes, osteopenia/osteoporosis, and glaucoma.  As  appropriate for age/gender, discussed screening for colorectal cancer, prostate cancer, breast cancer, and cervical cancer. Checklist reviewing preventive services available has been given to the patient.    Reviewed patients plan of care and provided an AVS. The Basic Care Plan (routine screening as documented in Health Maintenance) for Jamir meets the Care Plan requirement. This Care Plan has been established and reviewed with the Patient.          Kayla Pineda MD  [facility]    Identified Health Risks:  Answers submitted by the patient for this visit:  Patient Health Questionnaire (Submitted on 9/28/2023)  If you checked off any problems, how difficult have these problems made it for you to do your work, take care of things at home, or get along with other people?: Not difficult at all  PHQ9 TOTAL SCORE: 0

## 2023-09-29 ENCOUNTER — THERAPY VISIT (OUTPATIENT)
Dept: PHYSICAL THERAPY | Facility: OTHER | Age: 68
End: 2023-09-29
Attending: NURSE PRACTITIONER
Payer: MEDICARE

## 2023-09-29 DIAGNOSIS — G35 MULTIPLE SCLEROSIS (H): Primary | ICD-10-CM

## 2023-09-29 DIAGNOSIS — R26.9 ABNORMALITY OF GAIT: ICD-10-CM

## 2023-09-29 PROCEDURE — 97110 THERAPEUTIC EXERCISES: CPT | Mod: GP,CQ,KX

## 2023-09-29 PROCEDURE — 97112 NEUROMUSCULAR REEDUCATION: CPT | Mod: GP,CQ,KX

## 2023-10-02 ENCOUNTER — THERAPY VISIT (OUTPATIENT)
Dept: PHYSICAL THERAPY | Facility: OTHER | Age: 68
End: 2023-10-02
Attending: NURSE PRACTITIONER
Payer: MEDICARE

## 2023-10-02 DIAGNOSIS — R26.9 ABNORMALITY OF GAIT: ICD-10-CM

## 2023-10-02 DIAGNOSIS — G35 MULTIPLE SCLEROSIS (H): Primary | ICD-10-CM

## 2023-10-02 PROCEDURE — 97112 NEUROMUSCULAR REEDUCATION: CPT | Mod: GP,KX

## 2023-10-02 PROCEDURE — 97110 THERAPEUTIC EXERCISES: CPT | Mod: GP,KX

## 2023-10-06 ENCOUNTER — THERAPY VISIT (OUTPATIENT)
Dept: PHYSICAL THERAPY | Facility: OTHER | Age: 68
End: 2023-10-06
Attending: NURSE PRACTITIONER
Payer: MEDICARE

## 2023-10-06 DIAGNOSIS — R26.9 ABNORMALITY OF GAIT: ICD-10-CM

## 2023-10-06 DIAGNOSIS — G35 MULTIPLE SCLEROSIS (H): Primary | ICD-10-CM

## 2023-10-06 PROCEDURE — 97112 NEUROMUSCULAR REEDUCATION: CPT | Mod: GP,CQ,KX

## 2023-10-06 PROCEDURE — 97110 THERAPEUTIC EXERCISES: CPT | Mod: GP,CQ,KX

## 2023-10-11 ENCOUNTER — THERAPY VISIT (OUTPATIENT)
Dept: PHYSICAL THERAPY | Facility: OTHER | Age: 68
End: 2023-10-11
Attending: NURSE PRACTITIONER
Payer: MEDICARE

## 2023-10-11 DIAGNOSIS — G35 MULTIPLE SCLEROSIS (H): ICD-10-CM

## 2023-10-11 DIAGNOSIS — G35 MULTIPLE SCLEROSIS (H): Primary | ICD-10-CM

## 2023-10-11 DIAGNOSIS — R39.15 URINARY URGENCY: ICD-10-CM

## 2023-10-11 DIAGNOSIS — I10 ESSENTIAL (PRIMARY) HYPERTENSION: ICD-10-CM

## 2023-10-11 DIAGNOSIS — R26.9 ABNORMALITY OF GAIT: ICD-10-CM

## 2023-10-11 PROCEDURE — 97110 THERAPEUTIC EXERCISES: CPT | Mod: GP,CQ,KX

## 2023-10-11 RX ORDER — OXYBUTYNIN CHLORIDE 10 MG/1
10 TABLET, EXTENDED RELEASE ORAL EVERY MORNING
Qty: 90 TABLET | Refills: 3 | Status: CANCELLED | OUTPATIENT
Start: 2023-10-11

## 2023-10-11 RX ORDER — LISINOPRIL 10 MG/1
10 TABLET ORAL DAILY
Qty: 90 TABLET | Refills: 3 | Status: CANCELLED | OUTPATIENT
Start: 2023-10-11

## 2023-10-11 RX ORDER — BACLOFEN 10 MG/1
10 TABLET ORAL 2 TIMES DAILY
Qty: 180 TABLET | Refills: 3 | Status: CANCELLED | OUTPATIENT
Start: 2023-10-11

## 2023-10-11 NOTE — TELEPHONE ENCOUNTER
baclofen (LIORESAL) 10 MG tablet 180 tablet 3 2023  No   Sig - Route: Take 1 tablet (10 mg) by mouth 2 times daily - Oral   Sent to pharmacy as: Baclofen 10 MG Oral Tablet (LIORESAL)   Class: E-Prescribe   Order: [id number]   E-Prescribing Status: Receipt confirmed by pharmacy (2023  8:48 AM CDT)     lisinopril (ZESTRIL) 10 MG tablet 90 tablet 3 2023  No   Sig - Route: Take 1 tablet (10 mg) by mouth daily - Oral   Sent to pharmacy as: Lisinopril 10 MG Oral Tablet (ZESTRIL)   Class: E-Prescribe   Order: [id number]   E-Prescribing Status: Receipt confirmed by pharmacy (2023  8:48 AM CDT)     oxyBUTYnin ER (DITROPAN XL) 10 MG 24 hr [id number] - Route: Take 1 tablet (10 mg) by mouth every morning - OralSent to pharmacy as: oxyBUTYnin Chloride ER 10 MG Oral Tablet Extended Release 24 Hour (DITROPAN XL)Class: E-PrescribeOrder: [id number] Status: Receipt confirmed by pharmacy (2023  8:48 AM CDT)     [facility]Y #[id number] - [address] AT [address]     [facility]ed [facility] and spoke with technician, after verifying Pt's last name and . She verbalized plan to have pharmacist transfer prescriptions. Called and spoke to Patient after verifying last name and date of birth. Pt notified. Shira Laboy RN .............. 10/11/2023  1:56 PM

## 2023-10-11 NOTE — TELEPHONE ENCOUNTER
Reason for call: Medication or medication refill    Name of medication requested: baclofen, lisinopril, oxybutynin    How many days of medication do you have left? TWO    What [facility] do you use? [facility]    Preferred method for responding to this message: Telephone Call or [employer]    Phone number patient can be reached at: Cell number on file:    Telephone Information:   Mobile [phone]       If we cannot reach you directly, may we leave a detailed response at the number you provided? No        Patient requested a call back regarding prescriptions. Patient stated the pharmacy has not received these yet. Patient stated he switched last year from [employer] to [employer].        Francine Heaton on 10/11/2023 at 12:53 PM

## 2023-10-13 ENCOUNTER — THERAPY VISIT (OUTPATIENT)
Dept: PHYSICAL THERAPY | Facility: OTHER | Age: 68
End: 2023-10-13
Attending: NURSE PRACTITIONER
Payer: MEDICARE

## 2023-10-13 DIAGNOSIS — G35 MULTIPLE SCLEROSIS (H): Primary | ICD-10-CM

## 2023-10-13 DIAGNOSIS — R26.9 ABNORMALITY OF GAIT: ICD-10-CM

## 2023-10-13 PROCEDURE — 97110 THERAPEUTIC EXERCISES: CPT | Mod: GP,KX

## 2023-10-16 ENCOUNTER — THERAPY VISIT (OUTPATIENT)
Dept: PHYSICAL THERAPY | Facility: OTHER | Age: 68
End: 2023-10-16
Attending: NURSE PRACTITIONER
Payer: MEDICARE

## 2023-10-16 DIAGNOSIS — G35 MULTIPLE SCLEROSIS (H): Primary | ICD-10-CM

## 2023-10-16 DIAGNOSIS — R26.9 ABNORMALITY OF GAIT: ICD-10-CM

## 2023-10-16 PROCEDURE — 97110 THERAPEUTIC EXERCISES: CPT | Mod: GP,CQ,KX

## 2023-10-18 DIAGNOSIS — N31.9 NEUROGENIC BLADDER: ICD-10-CM

## 2023-10-18 DIAGNOSIS — G35 MULTIPLE SCLEROSIS (H): Primary | ICD-10-CM

## 2023-10-19 ENCOUNTER — HOSPITAL ENCOUNTER (OUTPATIENT)
Dept: ULTRASOUND IMAGING | Facility: OTHER | Age: 68
Discharge: HOME | End: 2023-10-19
Attending: FAMILY MEDICINE | Admitting: FAMILY MEDICINE
Payer: MEDICARE

## 2023-10-19 DIAGNOSIS — Z87.448 HISTORY OF HYDRONEPHROSIS: ICD-10-CM

## 2023-10-19 DIAGNOSIS — N31.9 NEUROGENIC BLADDER: ICD-10-CM

## 2023-10-19 PROCEDURE — 76770 US EXAM ABDO BACK WALL COMP: CPT

## 2023-10-24 RX ORDER — ULTRASOUND COUPLING MEDIUM
GEL (GRAM) TOPICAL
Qty: 100 G | Refills: 12 | Status: SHIPPED | OUTPATIENT
Start: 2023-10-24 | End: 2024-04-25

## 2023-10-24 NOTE — TELEPHONE ENCOUNTER
[facility]  sent Rx request for the following:      Requested Prescriptions   Pending Prescriptions Disp Refills    Catheters MISC [Pharmacy Med Name: CATH SELF-CATH 14FR 814 BX30] 200 each 12     Sig: COLOPLAST 14FR/COUDE ITEM #814/OLIVE TIP W/GUIDEDX:N20.1/N31.9/N40.0/R330 [facility]: #200EA/30DS **DROPSHIP**       There is no refill protocol information for this order       Lubricants (SURGICAL LUBRICANT) external gel [Pharmacy Med Name: SURGILUBE LUBRICANT]  12     Sig: SURGILUBE 3GM PACKETS #[id number] DX:N20.1/N31.9/N40/R33 [facility]: #200EA/600GMS       There is no refill protocol information for this order          Last Prescription Date:   not on current med list  Last Fill Qty/Refills:         , R-    Last Office Visit:              9/28/23  Future Office visit:           none      Called [facility] and they state Dr. Saldivar last refilled for patient.    Charlene Rand RN on 10/24/2023 at 4:20 PM

## 2023-10-31 DIAGNOSIS — Z86.0101 H/O ADENOMATOUS POLYP OF COLON: Primary | ICD-10-CM

## 2023-10-31 PROBLEM — H40.032: Status: ACTIVE | Noted: 2022-05-17

## 2023-10-31 RX ORDER — POLYETHYLENE GLYCOL 3350, SODIUM CHLORIDE, SODIUM BICARBONATE, POTASSIUM CHLORIDE 420; 11.2; 5.72; 1.48 G/4L; G/4L; G/4L; G/4L
4000 POWDER, FOR SOLUTION ORAL ONCE
Qty: 4000 ML | Refills: 0 | Status: SHIPPED | OUTPATIENT
Start: 2023-10-31 | End: 2023-10-31

## 2023-10-31 RX ORDER — BISACODYL 5 MG
TABLET, DELAYED RELEASE (ENTERIC COATED) ORAL
Qty: 2 TABLET | Refills: 0 | Status: ON HOLD | OUTPATIENT
Start: 2023-10-31 | End: 2024-06-17

## 2023-10-31 NOTE — TELEPHONE ENCOUNTER
Screening Questions for the Scheduling of Screening Colonoscopies   (If Colonoscopy is diagnostic, Provider should review the chart before scheduling.)  Are you younger than 50 or older than 80?  NO  Do you take aspirin or fish oil?  ASPIRIN (if yes, tell patient to stop 1 week prior to Colonoscopy)  Do you take warfarin (Coumadin), clopidogrel (Plavix), apixaban (Eliquis), dabigatram (Pradaxa), rivaroxaban (Xarelto) or any blood thinner? NO  Do you take Ozempic? NO  Do you use oxygen at home?  NO  Do you have kidney disease? NO  Are you on dialysis? NO  Have you had a stroke or heart attack in the last year? NO  Have you had a stent in your heart or any blood vessel in the last year? NO  Have you had a transplant of any organ? NO  Have you had a colonoscopy or upper endoscopy (EGD) before? YES         When?  2019  Date of scheduled Colonoscopy. 06/17/2024  Provider ADITI  [facility]

## 2023-12-04 NOTE — PROGRESS NOTES
DISCHARGE NOTE    DISCHARGE  Reason for Discharge: Jamir chooses to terminate this episode of care earlier than anticipated due to schedule changes. He is leaving for [place] and needing to go to his son's in [place] prior to that so he requested today be his last day. Our PTA provided Jamir with a list of weights and exercises to continue doing at home and Jamir reports there is a fitness center 5 miles from his house in [place] that he is going to join. Jamir was doing quite well at the time of discharge, though no formal assessment could be completed. Jamir had significantly improved his strength evidenced by the weight increases at PT and functional improvements in his mobility, transitions, and endurance.     Equipment Issued: none    Discharge Plan: Patient to continue home program.  Jamir to try HEP management through local fitness center but if unsuccessful, he would benefit from resuming skilled PT services at a facility in [place].    Referring Provider:  No ref. provider found    10/16/23 0500   Appointment Info   Signing clinician's name / credentials Urvashi Rodriguez PTA   Total/Authorized Visits 56   Visits Used 6 of 10   Medical Diagnosis multiple sclerosis; gait abnormality   PT Tx Diagnosis impaired mobility   Precautions/Limitations fall   Quick Adds Certification   Progress Note/Certification   Start of Care Date 04/26/23   Onset of illness/injury or Date of Surgery 04/21/23   Therapy Frequency 2-3x/week   Predicted Duration 12 additional weeks   Certification date from 08/04/23   Certification date to 10/27/23   KX Modifier Statement Therapy services meets medical necessity requirements beyond the therapy threshold for ongoing care.   Supervision   PT Assistant Visit Number 3   PT Goal 1   Goal Identifier 6 minute walk test   Goal Description Jamir will be able to ambulate 1000 feet in 6 minutes for improved endurance and strength to help with community and home ambulation.   Goal Progress 950 feet  today. More fatigued.   Target Date 07/10/23   PT Goal 2   Goal Identifier 6 minute walk test   Goal Description Jamir will demonstrate improved endurance evidenced by being able to walk 6 minute walk test and complete the last 3 minutes of test with 90% of distance of first 3 minutes.   Goal Progress significant improvement. Jamir continues to have increased distance/speed in first 3 minutes compared to last. Today, middle 2 minutes was very similar to last 2 minutes   Target Date 07/24/23   PT Goal 3   Goal Identifier 5 times sit to stand   Goal Description Jamir will complete 5 times sit to  less than 11 seconds for improved strength and balance.   Goal Progress 11.89 seconds today.   Target Date 06/26/23   PT Goal 4   Goal Identifier Knee extension   Goal Description Jamir will be able to achieve full terminal knee extension for improvement in stance and resting positions.   Target Date 06/26/23   Subjective Report   Subjective Report Jamir has had a change in plans, will be heading south early so today will be his last day of PT. He requests a copy of the weights he was doing with each machine so he can continue when he gets to [place].   Objective Measures   Objective Measures Objective Measure 1;Objective Measure 2;Objective Measure 3;Objective Measure 4   Objective Measure 1   Objective Measure 5 times sit to stand   Details 15 seconds - poor eccentric control   Objective Measure 2   Objective Measure 6 MWT   Details 1026 is best distance, 950 on 8/4/23   Objective Measure 3   Objective Measure Standing TKE   Details lacking 25-30 degrees of TKE   Treatment Interventions (PT)   Interventions Therapeutic Procedure/Exercise;Neuromuscular Re-education;Gait Training;Manual Therapy   Therapeutic Procedure/Exercise   Therapeutic Procedures: strength, endurance, ROM, flexibillity minutes ([id number]) 40   Therapeutic Procedures Ther Proc 2;Ther Proc 3   Ther Proc 1 MEDx Machines   Ther Proc 1 - Details Rows 140# B x15.  "lat pull 170# x25 - increase weight next visit. Chest press with one hole showing on weight column 150# 1x13. Tricep dip 120# x20. Back extension 60# x 20 - full motion today 80-35 degrees, (40-15 degrees) 36# x25.   Ther Proc 2 SciFit L6 x5 minutes. Unavailable - Nustep  lvl 7 x 5.5 minutes. LIOR resisted ambulation with 4WW: fwd/retro and retro/fwd 4kg x3 reps each.   Ther Proc 2 - Details Deferred this line today: Lior deadlift 12kg x 20 - with rop handle.  ), back exension stretch in machine but no weight. seated on grey air disc in chair: arms reaching overhead x 10, marching x 10, and pelvic tilts x 15 with jamir having LOB needing UE catch to prevent falling off of chair. Foam stance eyes open and eyes closed 3x30\" with increased sway and some L sided lean. Core abs 46# x25.   Ther Proc 3 Rebounder 3rd notch from bottom - 8.5\" ball overhead pass and catch, Jamir further away today requiring more effort to throw and catch. One LOB requiring MOD A.   Ther Proc 3 - Details Leg press # x15 with 1/2 bolster between knees. Issued Jamir, per his request, a copy of the weights he is currently lifting.   Skilled Intervention education,   Patient Response/Progress did well   Neuromuscular Re-education   Neuro Re-ed 1 In tall kneel and 1/2 kneel with each LE leading (all in front of lowered treatment table) - Blaze pods balance work on and off of foam, reaching outside of PARISH. Random protocol used. L sided LOB with Jamir able to catch himself to prevent fall.   Neuro Re-ed 1 - Details Deferred: 4-point 2 and 3 pod random protocol. Also completed propped on elbows with hips flexed to slightly less than 90 degrees. Very difficult   Skilled Intervention guarding   Patient Response/Progress required quick balance reactions with fatigue   Gait Training   Gait 1 Ambulation with Standard cane in p bars - initially requested no gait belt but PT insisted. Cane slipped out from Jamir as he WBs at an oblique angle with his cane - " near LOB but Jamir able to grab p-bars with L hand and PT assisted to prevent LOB. Switched to hurry cane with better stability - ambulated 1x30' then 2x20'. Rest breaks in between - scissor gait with slight R toe drag   Gait 1 - Details ambulation with FWW x 150' - even strides and fair pace.   Skilled Intervention guarding, cuing   Manual Therapy   Manual Therapy 1 STM to SCM and UT R, first rib mobilizations with improved release. Median nerve glides x 10   Plan   Plan for next session TUG, LE strengthening (MEDx?), standing balance/endurance   Total Session Time   Timed Code Treatment Minutes 40   Total Treatment Time (sum of timed and untimed services) 40   Medicare Claim Information   Medical Diagnosis multiple sclerosis; gait abnormality

## 2024-01-12 ENCOUNTER — PATIENT OUTREACH (OUTPATIENT)
Dept: GASTROENTEROLOGY | Facility: CLINIC | Age: 69
End: 2024-01-12
Payer: COMMERCIAL

## 2024-04-08 DIAGNOSIS — R26.9 ABNORMALITY OF GAIT: ICD-10-CM

## 2024-04-08 DIAGNOSIS — G35 MULTIPLE SCLEROSIS (H): ICD-10-CM

## 2024-04-08 DIAGNOSIS — R25.2 MUSCLE CRAMPS: Primary | ICD-10-CM

## 2024-04-15 ENCOUNTER — THERAPY VISIT (OUTPATIENT)
Dept: PHYSICAL THERAPY | Facility: OTHER | Age: 69
End: 2024-04-15
Payer: MEDICARE

## 2024-04-15 DIAGNOSIS — R26.9 ABNORMALITY OF GAIT: ICD-10-CM

## 2024-04-15 DIAGNOSIS — G35 MULTIPLE SCLEROSIS (H): ICD-10-CM

## 2024-04-15 DIAGNOSIS — R25.2 MUSCLE CRAMPS: ICD-10-CM

## 2024-04-15 PROCEDURE — 97110 THERAPEUTIC EXERCISES: CPT | Mod: GP,KX

## 2024-04-15 PROCEDURE — 97162 PT EVAL MOD COMPLEX 30 MIN: CPT | Mod: GP,KX

## 2024-04-17 ENCOUNTER — THERAPY VISIT (OUTPATIENT)
Dept: PHYSICAL THERAPY | Facility: OTHER | Age: 69
End: 2024-04-17
Payer: MEDICARE

## 2024-04-17 DIAGNOSIS — R25.2 MUSCLE CRAMPS: ICD-10-CM

## 2024-04-17 DIAGNOSIS — R26.9 ABNORMALITY OF GAIT: ICD-10-CM

## 2024-04-17 DIAGNOSIS — G35 MULTIPLE SCLEROSIS (H): Primary | ICD-10-CM

## 2024-04-17 PROCEDURE — 97110 THERAPEUTIC EXERCISES: CPT | Mod: GP,KX

## 2024-04-17 NOTE — PROGRESS NOTES
PHYSICAL THERAPY EVALUATION  Type of Visit: Evaluation    See electronic medical record for Abuse and Falls Screening details.    Subjective       Jamir is a 68-year old male that presents to therapy for strengthening, balance, and mobility training. Jamir has MS and has been seen by this therapist before. 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 He spends faith in [place] and he recently completed a round of PT there. He reports great success as he is now using B canes for ambulation. When I saw Jamir last fall, he was only able to ambulate with a 4WW. Jamir also has a history of back surgery (Laminectomy 9/5/2022) that improved his LE strength and function after forcing him to use a W/C most of the time.     Presenting condition or subjective complaint: strengthening, balance, dx of MS  Date of onset: 04/08/24    Relevant medical history: Bladder or bowel problems; Neck injury; Multiple Sclerosis; History of fractures; Significant weakness   Dates & types of surgery: back surgery    Prior diagnostic imaging/testing results:       Prior therapy history for the same diagnosis, illness or injury: Yes numerous bouts of therapy with progress from wheelchair use to now B canes    Prior Level of Function  Transfers: Assistive equipment  Ambulation: Assistive equipment  ADL: Assistive equipment  IADL: Driving, Finances, Housekeeping, Meal preparation, Medication management, Work    Living Environment  Social support: With a significant other or spouse   Type of home: House   Stairs to enter the home: Yes 3 Is there a railing: Yes   Equipment owned: Straight Cane; Walker with wheels; Standard wheelchair     Employment: Yes resort owner  Hobbies/Interests:  fishing, home projects    Patient goals for therapy: single cane walking, improved balance on docks, more endurance    Pain assessment: Pain denied  Occasional pain from cramping or with neck pain.     Objective      Cognitive  Status Examination  Orientation: Oriented to person, place and time   Level of Consciousness: Alert  Follows Commands and Answers Questions: 100% of the time, Follows multi step instructions  Personal Safety and Judgement: Intact  Memory: Intact    OBSERVATION: Jamir is well-appearing, not in any acute distress.   INTEGUMENTARY: Intact  POSTURE: Standing Posture: Rounded shoulders, flexed knee posture  PALPATION: non-tender to palpation  RANGE OF MOTION:   (Degrees) Left AROM Right AROM   Knee Flexion 120 120   Knee Extension -4 -6   Pain:   End feel:   (Degrees) Left AROM Right AROM   Hip Flexion WFL WFL   Hip Extension 5 neutral   Hip Abduction 20 5   Hip Adduction WNL WNL   Hip Internal Rotation WFL WNL   Hip External Rotation 25 10   Pain:   End feel:   STRENGTH:   Pain: - none + mild ++ moderate +++ severe  Strength Scale: 0-5/5 Left Right   Ankle Dorsiflexion 5 4   Ankle Plantarflexion 5 4   Ankle Inversion 5 5   Ankle Eversion 5 5     Pain: - none + mild ++ moderate +++ severe  Strength Scale: 0-5/5 Left Right   Hip Flexion 5 4+   Hip Extension 5 4-   Hip Abduction 5 4   Hip Adduction 5 5   Hip Internal Rotation 5 4   Hip External Rotation 5 4-   Knee Flexion 5 4+   Knee Extension 5 4-   *Jamir lacks motion on his right side primarily in hip flexion, ER, ABD, and knee extension. Strength grades based on his available ROM.    BED MOBILITY: WFL    TRANSFERS: SBA    GAIT:   Level of [place]: Independent  Assistive Device(s): Cane (single end), Bilateral  Gait Deviations: Base of support decreased  Stride length decreased  LE crosses midline L, Circumduction R, LE crosses midline R, Hip hiking R  Gait Distance: community distances  Stairs: step to pattern up consistently for safety. Can do B handrails and reciprocal but with increased time and difficult.    BALANCE: Standing Balance (static):Good    FGA score: 17/30 - Use of assistive device during testing. Unable to walk heel-toe without assistance.  Reciprocal pattern and handrail use on stairs. Mild impairment with horizontal head turns, MOD impairment with vertical nods. MOD impairment ambulating backwards.     SENSATION:  decreased sensation in lower extremities R greater than L    MUSCLE TONE: Spasticity    Assessment & Plan   CLINICAL IMPRESSIONS  Medical Diagnosis: MS, Gait abnormality, muscle cramps    Treatment Diagnosis: impaired mobility, weakness, impaired posture   Impression/Assessment: Patient is a 68 year old male with mobility complaints.  The following significant findings have been identified: Decreased ROM/flexibility, Decreased strength, Impaired balance, Impaired gait, and Decreased activity tolerance. These impairments interfere with their ability to perform self care tasks, work tasks, recreational activities, and community mobility as compared to previous level of function.     Clinical Decision Making (Complexity):  Clinical Presentation: Stable/Uncomplicated  Clinical Presentation Rationale: based on medical and personal factors listed in PT evaluation  Clinical Decision Making (Complexity): Moderate complexity    PLAN OF CARE  Treatment Interventions:  Modalities: E-stim  Interventions: Gait Training, Manual Therapy, Neuromuscular Re-education, Therapeutic Activity, Therapeutic Exercise    Long Term Goals     PT Goal 1  Goal Identifier: Ambulation  Goal Description: Jamir will be able to ambulate for 100 feet with single cane without LOB on even surfaces.  Target Date: 07/10/24  PT Goal 2  Goal Identifier: Unsupported squat  Goal Description: Jamir will be able to squat unsupported in order to  an item from floor.  Target Date: 06/12/24  PT Goal 3  Goal Identifier: Balance  Goal Description: Jamir will score an increase of 2 from a 17 to a 19 on the FGA for improved balance and mobility.  Target Date: 06/12/24  PT Goal 4  Goal Identifier: Endurance  Goal Description: Jamir will be able to complete 6 minutes of ambulation with B  canes with the final minute within 80% distance of the first minute%  Target Date: 07/10/24      Frequency of Treatment: 2-3x/week  Duration of Treatment: 12 weeks    Recommended Referrals to Other Professionals:  none at this time  Education Assessment:        Risks and benefits of evaluation/treatment have been explained.   Patient/Family/caregiver agrees with Plan of Care.     Evaluation Time:     PT Eval, Moderate Complexity Minutes (09435): 40     Signing Clinician: LES Marie [facility]                                                                                   OUTPATIENT PHYSICAL THERAPY      PLAN OF TREATMENT FOR OUTPATIENT REHABILITATION   Patient's Last Name, First Name, Jamir Perera YOB: 1955   Provider's Name   [facility]   Medical Record No.  [id number]     Onset Date: 04/08/24  Start of Care Date: 04/15/24     Medical Diagnosis:  MS, Gait abnormality, muscle cramps      PT Treatment Diagnosis:  impaired mobility, weakness, impaired posture Plan of Treatment  Frequency/Duration: 2-3x/week/ 12 weeks    Certification date from 04/15/24 to 07/08/24         See note for plan of treatment details and functional goals     Manjit Pink PT                         I CERTIFY THE NEED FOR THESE SERVICES FURNISHED UNDER        THIS PLAN OF TREATMENT AND WHILE UNDER MY CARE     (Physician attestation of this document indicates review and certification of the therapy plan).              Referring Provider:  Dr. Miriam Escobar MD (PCP)    Initial Assessment  See Epic Evaluation- Start of Care Date: 04/15/24

## 2024-04-22 ENCOUNTER — THERAPY VISIT (OUTPATIENT)
Dept: PHYSICAL THERAPY | Facility: OTHER | Age: 69
End: 2024-04-22
Payer: MEDICARE

## 2024-04-22 DIAGNOSIS — G35 MULTIPLE SCLEROSIS (H): Primary | ICD-10-CM

## 2024-04-22 DIAGNOSIS — R26.9 ABNORMALITY OF GAIT: ICD-10-CM

## 2024-04-22 DIAGNOSIS — R25.2 MUSCLE CRAMPS: ICD-10-CM

## 2024-04-22 PROCEDURE — 97110 THERAPEUTIC EXERCISES: CPT | Mod: GP,KX

## 2024-04-24 ENCOUNTER — THERAPY VISIT (OUTPATIENT)
Dept: PHYSICAL THERAPY | Facility: OTHER | Age: 69
End: 2024-04-24
Payer: MEDICARE

## 2024-04-24 DIAGNOSIS — R25.2 MUSCLE CRAMPS: ICD-10-CM

## 2024-04-24 DIAGNOSIS — R26.9 ABNORMALITY OF GAIT: ICD-10-CM

## 2024-04-24 DIAGNOSIS — G35 MULTIPLE SCLEROSIS (H): Primary | ICD-10-CM

## 2024-04-24 PROCEDURE — 97110 THERAPEUTIC EXERCISES: CPT | Mod: GP,KX

## 2024-04-25 DIAGNOSIS — N31.9 NEUROGENIC BLADDER: ICD-10-CM

## 2024-04-25 DIAGNOSIS — G35 MULTIPLE SCLEROSIS (H): ICD-10-CM

## 2024-04-25 RX ORDER — ULTRASOUND COUPLING MEDIUM
GEL (GRAM) TOPICAL
Qty: 600 G | Refills: 4 | Status: SHIPPED | OUTPATIENT
Start: 2024-04-25

## 2024-04-25 NOTE — TELEPHONE ENCOUNTER
Reason for call: Medication or medication refill    Have you contacted your pharmacy regarding this refill? N/A    If No, direct the patient to contact the Pharmacy and discontinue telephone note using Erroneous Encounter.  If Yes, continue.    Name of medication requested: Surgical lubricant - initial prescription was incorrect amount.     How many days of medication do you have left? 0 days    What [facility] do you use? [facility]    Preferred method for responding to this message: Telephone Call    Phone number patient can be reached at: Other phone number:  [phone]    If we cannot reach you directly, may we leave a detailed response at the number you provided? Yes     Una Arriaza on 4/25/2024 at 9:17 AM

## 2024-04-25 NOTE — TELEPHONE ENCOUNTER
[id number] [place] sent Rx request for the following:      Requested Prescriptions   Pending Prescriptions Disp Refills    Lubricants (SURGICAL LUBRICANT) external gel [Pharmacy Med Name: SURGILUBE LUBRICANT]  12     Sig: SURGILUBE 3GM PACKETS #[id number]/#[id number] DX:N20.1/N31.9/N40/R33 [facility]: **DROPSHIP**       There is no refill protocol information for this order        Last Prescription Date:   10/24/23  Last Fill Qty/Refills:         100 g, R-12    Last Office Visit:              9/28/23   Future Office visit:           None    Unable to complete prescription refill per RN Medication Refill Policy.     Routing to covering provider for refill consideration, as PCP/provider is out of clinic >48 hours or Pt is completely out of medication and provider is out of the clinic today.    Shira Laboy RN .............. 4/25/2024  9:26 AM

## 2024-04-30 ENCOUNTER — THERAPY VISIT (OUTPATIENT)
Dept: PHYSICAL THERAPY | Facility: OTHER | Age: 69
End: 2024-04-30
Attending: FAMILY MEDICINE
Payer: MEDICARE

## 2024-04-30 DIAGNOSIS — G35 MULTIPLE SCLEROSIS (H): Primary | ICD-10-CM

## 2024-04-30 DIAGNOSIS — R26.9 ABNORMALITY OF GAIT: ICD-10-CM

## 2024-04-30 DIAGNOSIS — R25.2 MUSCLE CRAMPS: ICD-10-CM

## 2024-04-30 PROCEDURE — 97110 THERAPEUTIC EXERCISES: CPT | Mod: GP,KX

## 2024-05-02 ENCOUNTER — THERAPY VISIT (OUTPATIENT)
Dept: PHYSICAL THERAPY | Facility: OTHER | Age: 69
End: 2024-05-02
Attending: FAMILY MEDICINE
Payer: MEDICARE

## 2024-05-02 DIAGNOSIS — R25.2 MUSCLE CRAMPS: ICD-10-CM

## 2024-05-02 DIAGNOSIS — R26.9 ABNORMALITY OF GAIT: ICD-10-CM

## 2024-05-02 DIAGNOSIS — G35 MULTIPLE SCLEROSIS (H): Primary | ICD-10-CM

## 2024-05-02 PROCEDURE — 97110 THERAPEUTIC EXERCISES: CPT | Mod: GP,KX

## 2024-05-07 ENCOUNTER — THERAPY VISIT (OUTPATIENT)
Dept: PHYSICAL THERAPY | Facility: OTHER | Age: 69
End: 2024-05-07
Payer: MEDICARE

## 2024-05-07 DIAGNOSIS — R25.2 MUSCLE CRAMPS: ICD-10-CM

## 2024-05-07 DIAGNOSIS — R26.9 ABNORMALITY OF GAIT: ICD-10-CM

## 2024-05-07 DIAGNOSIS — G35 MULTIPLE SCLEROSIS (H): Primary | ICD-10-CM

## 2024-05-07 PROCEDURE — 97110 THERAPEUTIC EXERCISES: CPT | Mod: GP,KX

## 2024-05-09 ENCOUNTER — THERAPY VISIT (OUTPATIENT)
Dept: PHYSICAL THERAPY | Facility: OTHER | Age: 69
End: 2024-05-09
Attending: FAMILY MEDICINE
Payer: MEDICARE

## 2024-05-09 DIAGNOSIS — R26.9 ABNORMALITY OF GAIT: ICD-10-CM

## 2024-05-09 DIAGNOSIS — G35 MULTIPLE SCLEROSIS (H): Primary | ICD-10-CM

## 2024-05-09 DIAGNOSIS — R25.2 MUSCLE CRAMPS: ICD-10-CM

## 2024-05-09 PROCEDURE — 97110 THERAPEUTIC EXERCISES: CPT | Mod: GP,KX

## 2024-05-14 ENCOUNTER — THERAPY VISIT (OUTPATIENT)
Dept: PHYSICAL THERAPY | Facility: OTHER | Age: 69
End: 2024-05-14
Payer: MEDICARE

## 2024-05-14 DIAGNOSIS — R25.2 MUSCLE CRAMPS: ICD-10-CM

## 2024-05-14 DIAGNOSIS — G35 MULTIPLE SCLEROSIS (H): Primary | ICD-10-CM

## 2024-05-14 DIAGNOSIS — R26.9 ABNORMALITY OF GAIT: ICD-10-CM

## 2024-05-14 PROCEDURE — 97110 THERAPEUTIC EXERCISES: CPT | Mod: GP,KX

## 2024-05-16 ENCOUNTER — THERAPY VISIT (OUTPATIENT)
Dept: PHYSICAL THERAPY | Facility: OTHER | Age: 69
End: 2024-05-16
Attending: FAMILY MEDICINE
Payer: MEDICARE

## 2024-05-16 DIAGNOSIS — G35 MULTIPLE SCLEROSIS (H): Primary | ICD-10-CM

## 2024-05-16 DIAGNOSIS — R26.9 ABNORMALITY OF GAIT: ICD-10-CM

## 2024-05-16 DIAGNOSIS — R25.2 MUSCLE CRAMPS: ICD-10-CM

## 2024-05-16 PROCEDURE — 97110 THERAPEUTIC EXERCISES: CPT | Mod: GP,KX

## 2024-05-21 ENCOUNTER — THERAPY VISIT (OUTPATIENT)
Dept: PHYSICAL THERAPY | Facility: OTHER | Age: 69
End: 2024-05-21
Attending: FAMILY MEDICINE
Payer: MEDICARE

## 2024-05-21 DIAGNOSIS — G35 MULTIPLE SCLEROSIS (H): Primary | ICD-10-CM

## 2024-05-21 DIAGNOSIS — R26.9 ABNORMALITY OF GAIT: ICD-10-CM

## 2024-05-21 DIAGNOSIS — R25.2 MUSCLE CRAMPS: ICD-10-CM

## 2024-05-21 PROCEDURE — 97110 THERAPEUTIC EXERCISES: CPT | Mod: GP,KX

## 2024-05-21 NOTE — PROGRESS NOTES
PLAN  Continue therapy per current plan of care. Jamir is improving. We are seeing strength gains that are making mobility easier at times. He continues to need B UE support for ambulation at this point. We will continue to work on his ambulation skills with the least restrictive device with a goal of reaching a single cane in order to help improve his ability to carry items while ambulating to make his ADLs and [place] easier.     Beginning/End Dates of Progress Note Reporting Period:  04/15/24 to 05/16/2024    Referring Provider:  No ref. provider found      05/16/24 0500   Appointment Info   Signing clinician's name / credentials Manjit Pink DPT   Total/Authorized Visits 10   Visits Used 10 of 10   Medical Diagnosis MS, Gait abnormality, muscle cramps   PT Tx Diagnosis impaired mobility, weakness, impaired posture   Quick Adds Certification   Progress Note/Certification   Start of Care Date 04/15/24   Onset of illness/injury or Date of Surgery 04/08/24   Therapy Frequency 2-3x/week   Predicted Duration 12 weeks   Certification date from 04/15/24   Certification date to 07/08/24   Progress Note Completed Date 04/15/24   GOALS   PT Goals 2;3;4   PT Goal 1   Goal Identifier Ambulation   Goal Description Jamir will be able to ambulate for 100 feet with single cane without LOB on even surfaces.   Goal Progress Two canes for ambulation, continues to need B UE support for mobility   Target Date 07/10/24   PT Goal 2   Goal Identifier Unsupported squat   Goal Description Jamir will be able to squat unsupported in order to  an item from floor.   Goal Progress Progressing. Currently able to squat to slight knee bend but needs variable level of UE assist to elevate   Target Date 06/12/24   PT Goal 3   Goal Identifier Balance   Goal Description Jamir will score an increase of 2 from a 17 to a 19 on the FGA for improved balance and mobility.   Goal Progress Not assessed today.   Target Date  "06/12/24   PT Goal 4   Goal Identifier Endurance   Goal Description Jamir will be able to complete 6 minutes of ambulation with B canes with the final minute within 80% distance of the first minute%   Goal Progress Jamir does not wish to pursue this goal due to him not needing to complete this task at home. DISCONTINUE GOAL.   Target Date 07/10/24   Subjective Report   Subjective Report Jamir feels good today. Notes walking was a little easier today and yesterday.   Objective Measures   Objective Measures Objective Measure 1   Objective Measure 1   Objective Measure FGA: 17/30   Treatment Interventions (PT)   Interventions Therapeutic Procedure/Exercise   Therapeutic Procedure/Exercise   Therapeutic Procedures: strength, endurance, ROM, flexibility minutes (53688) 45   Therapeutic Procedures Ther Proc 2;Ther Proc 3;Ther Proc 4   Ther Proc 1 Nustep lvl 8 arms 8 seat 10 x 6 minutes   Ther Proc 1 - Details Back extension (foot 5 knees 4) 70# x 30 - 80 degrees to 45 degrees  then 40# from 45-20 degrees x 25, deferred: Core ABs  (foot 5)  50# x 20   Ther Proc 2 UE MedX   Ther Proc 2 - Details Chest press (seat 5 pins 4) 150# x 20 , Lat pulldowns (seat 6) 186# x 25 with seatbelt. Rows 120# (chest 1) x 20   Ther Proc 3 Rebounder 7\" ball x 30 seconds - Also completed staggered stance with L forward and R back then R fwd. Deferred: Treadmill weight ball rolls using treadmill 0.9 mph, seat behind Jamir in case he lost balance. Completed 1 cycle up to 1 minute. Jamir needing to bend down at waist and knees while trying to not hold with UEs.   Ther Proc 3 - Details LE MEDX: Leg press (seat 22, back up) 160 x 20 then 170#x 15  with 1/2 foam roller between knees for better knee alignment. Hip ABD (R 2 L 3) 24# partial range x 15 - much improved from last session.   Total Session Time   Timed Code Treatment Minutes 45   Total Treatment Time (sum of timed and untimed services) 45       "

## 2024-05-23 ENCOUNTER — THERAPY VISIT (OUTPATIENT)
Dept: PHYSICAL THERAPY | Facility: OTHER | Age: 69
End: 2024-05-23
Attending: FAMILY MEDICINE
Payer: MEDICARE

## 2024-05-23 DIAGNOSIS — G35 MULTIPLE SCLEROSIS (H): Primary | ICD-10-CM

## 2024-05-23 DIAGNOSIS — R25.2 MUSCLE CRAMPS: ICD-10-CM

## 2024-05-23 DIAGNOSIS — R26.9 ABNORMALITY OF GAIT: ICD-10-CM

## 2024-05-23 PROCEDURE — 97110 THERAPEUTIC EXERCISES: CPT | Mod: GP,KX

## 2024-05-29 ENCOUNTER — THERAPY VISIT (OUTPATIENT)
Dept: PHYSICAL THERAPY | Facility: OTHER | Age: 69
End: 2024-05-29
Payer: MEDICARE

## 2024-05-29 DIAGNOSIS — R25.2 MUSCLE CRAMPS: ICD-10-CM

## 2024-05-29 DIAGNOSIS — G35 MULTIPLE SCLEROSIS (H): Primary | ICD-10-CM

## 2024-05-29 DIAGNOSIS — R26.9 ABNORMALITY OF GAIT: ICD-10-CM

## 2024-05-29 PROCEDURE — 97110 THERAPEUTIC EXERCISES: CPT | Mod: GP,CQ,KX

## 2024-05-31 ENCOUNTER — THERAPY VISIT (OUTPATIENT)
Dept: PHYSICAL THERAPY | Facility: OTHER | Age: 69
End: 2024-05-31
Payer: MEDICARE

## 2024-05-31 DIAGNOSIS — R25.2 MUSCLE CRAMPS: ICD-10-CM

## 2024-05-31 DIAGNOSIS — R26.9 ABNORMALITY OF GAIT: ICD-10-CM

## 2024-05-31 DIAGNOSIS — G35 MULTIPLE SCLEROSIS (H): Primary | ICD-10-CM

## 2024-05-31 PROCEDURE — 97110 THERAPEUTIC EXERCISES: CPT | Mod: GP,CQ,KX

## 2024-06-02 ENCOUNTER — OFFICE VISIT (OUTPATIENT)
Dept: FAMILY MEDICINE | Facility: OTHER | Age: 69
End: 2024-06-02
Payer: COMMERCIAL

## 2024-06-02 VITALS
HEIGHT: 70 IN | HEART RATE: 96 BPM | OXYGEN SATURATION: 100 % | DIASTOLIC BLOOD PRESSURE: 76 MMHG | RESPIRATION RATE: 19 BRPM | BODY MASS INDEX: 25.8 KG/M2 | TEMPERATURE: 98 F | SYSTOLIC BLOOD PRESSURE: 122 MMHG | WEIGHT: 180.2 LBS

## 2024-06-02 DIAGNOSIS — H10.33 ACUTE BACTERIAL CONJUNCTIVITIS OF BOTH EYES: Primary | ICD-10-CM

## 2024-06-02 PROCEDURE — G0463 HOSPITAL OUTPT CLINIC VISIT: HCPCS

## 2024-06-02 PROCEDURE — 99213 OFFICE O/P EST LOW 20 MIN: CPT | Performed by: NURSE PRACTITIONER

## 2024-06-02 RX ORDER — POLYMYXIN B SULFATE AND TRIMETHOPRIM 1; 10000 MG/ML; [USP'U]/ML
1-2 SOLUTION OPHTHALMIC 4 TIMES DAILY
Qty: 10 ML | Refills: 0 | Status: SHIPPED | OUTPATIENT
Start: 2024-06-02 | End: 2024-06-07

## 2024-06-02 ASSESSMENT — VISUAL ACUITY: OU: 1

## 2024-06-02 ASSESSMENT — PAIN SCALES - GENERAL: PAINLEVEL: NO PAIN (0)

## 2024-06-02 NOTE — PROGRESS NOTES
Jamir Gill  1955    ASSESSMENT/PLAN  1. Acute bacterial conjunctivitis of both eyes  - polymixin b-trimethoprim (POLYTRIM) 38197-8.1 UNIT/ML-% ophthalmic solution; Place 1-2 drops into both eyes 4 times daily for 5 days  Dispense: 10 mL; Refill: 0     Recommend treatment for acute bacterial conjunctivitis of bilateral eyes.  Recommend Polytrim ophthalmic solution.  Review hand hygiene and how to properly clean the eyes.  May continue with supportive measures including daily nondrowsy histamine and Tylenol as needed for discomfort.    Explanation of diagnosis, treatment options and risk and benefits of medications reviewed with patient. Patient agrees with plan of care.  All questions were answered.  Red flags symptoms were discussed and patient was advised when they should return for reevaluation or for prompt emergency evaluation.   Patient was given verbal and written instructions on plan of care. Instructions were printed or are available on [employer] on electronic AVS.     SUBJECTIVE  CHIEF COMPLAINT/ REASON FOR VISIT  Patient presents with:  Eye Problem     HISTORY OF PRESENT ILLNESS  Jamir Gill is a pleasant 68 year old male presents to clinic today with concerns for possible pinkeye.  Patient states yesterday he thought he got something in his eye.  He had itching, pain and redness in his right eye which then spread to his left eye.  Both of his eyes feel irritated.  He tried flushing them out at home without success.  He continues to have goopy drainage coming from his eyes, worse in the morning when he wakes up.  He states his eyes are painful and it still feels like there is sand in his eyes.   He denies any blurry vision or vision changes, no headaches, dizziness or lightheadedness.   He has been slightly congested and started taking Sudafed a couple days ago.  He denies any cough or fever.    I have reviewed the nursing notes.  I have reviewed allergies, medication list, problem list, and  "past medical history.    REVIEW OF SYSTEMS  Review of Systems   All other systems reviewed and are negative.       VITAL SIGNS  Vitals:    06/02/24 1102   BP: 122/76   Pulse: 96   Resp: 19   Temp: 98  F (36.7  C)   TempSrc: Tympanic   SpO2: 100%   Weight: 81.7 kg (180 lb 3.2 oz)   Height: 1.778 m (5' 10\")      Body mass index is 25.86 kg/m .    OBJECTIVE  PHYSICAL EXAM  Physical Exam  Vitals reviewed.   Constitutional:       Appearance: Normal appearance. He is not ill-appearing or toxic-appearing.   HENT:      Head: Normocephalic and atraumatic.      Right Ear: External ear normal.      Left Ear: External ear normal.      Nose: Congestion and rhinorrhea present.      Mouth/Throat:      Pharynx: No oropharyngeal exudate or posterior oropharyngeal erythema.   Eyes:      General: Lids are normal. Vision grossly intact.         Right eye: Discharge present. No foreign body.         Left eye: Discharge present.No foreign body.      Extraocular Movements: Extraocular movements intact.      Conjunctiva/sclera:      Right eye: Right conjunctiva is injected. No exudate.     Left eye: Left conjunctiva is injected. No exudate.     Comments: Bilateral conjunctiva are erythematous and injected.  Inner upper and lower eyelids are erythematous and irritated.  No pain with ocular motion.  Yellow/green crusty drainage in eyelashes with clear watery drainage.   Cardiovascular:      Rate and Rhythm: Normal rate and regular rhythm.      Pulses: Normal pulses.      Heart sounds: Normal heart sounds. No murmur heard.  Pulmonary:      Effort: Pulmonary effort is normal.      Breath sounds: Normal breath sounds. No wheezing or rhonchi.   Musculoskeletal:      Cervical back: Neck supple. No tenderness.   Lymphadenopathy:      Cervical: No cervical adenopathy.   Skin:     Findings: No rash.   Neurological:      General: No focal deficit present.      Mental Status: He is alert and oriented to person, place, and time.   Psychiatric:         " Mood and Affect: Mood normal.         Behavior: Behavior normal.         Thought Content: Thought content normal.         Judgment: Judgment normal.          DIAGNOSTICS  No results found for any visits on 06/02/24.     Dee Hudson NP  [facility]

## 2024-06-02 NOTE — NURSING NOTE
"Chief Complaint   Patient presents with    Eye Problem     Patient here for possible pink eye. He has had crusty, itchy and gunky eyes x2 days. Has been washing eyes.     Initial /76   Pulse 96   Temp 98  F (36.7  C) (Tympanic)   Resp 19   Ht 1.778 m (5' 10\")   Wt 81.7 kg (180 lb 3.2 oz)   SpO2 100%   BMI 25.86 kg/m   Estimated body mass index is 25.86 kg/m  as calculated from the following:    Height as of this encounter: 1.778 m (5' 10\").    Weight as of this encounter: 81.7 kg (180 lb 3.2 oz).  Medication Review: complete    The next two questions are to help us understand your food security.  If you are feeling you need any assistance in this area, we have resources available to support you today.          9/28/2023   SDOH- Food Insecurity   Within the past 12 months, did you worry that your food would run out before you got money to buy more? N   Within the past 12 months, did the food you bought just not last and you didn t have money to get more? N         Health Care Directive:  Patient does not have a Health Care Directive or Living Will: Discussed advance care planning with patient; however, patient declined at this time.    Elena Carpio LPN      "

## 2024-06-03 ENCOUNTER — THERAPY VISIT (OUTPATIENT)
Dept: PHYSICAL THERAPY | Facility: OTHER | Age: 69
End: 2024-06-03
Attending: FAMILY MEDICINE
Payer: MEDICARE

## 2024-06-03 DIAGNOSIS — G35 MULTIPLE SCLEROSIS (H): Primary | ICD-10-CM

## 2024-06-03 DIAGNOSIS — R25.2 MUSCLE CRAMPS: ICD-10-CM

## 2024-06-03 DIAGNOSIS — R26.9 ABNORMALITY OF GAIT: ICD-10-CM

## 2024-06-03 PROCEDURE — 97110 THERAPEUTIC EXERCISES: CPT | Mod: GP,CQ,KX

## 2024-06-05 ENCOUNTER — THERAPY VISIT (OUTPATIENT)
Dept: PHYSICAL THERAPY | Facility: OTHER | Age: 69
End: 2024-06-05
Attending: FAMILY MEDICINE
Payer: MEDICARE

## 2024-06-05 DIAGNOSIS — R25.2 MUSCLE CRAMPS: ICD-10-CM

## 2024-06-05 DIAGNOSIS — R26.9 ABNORMALITY OF GAIT: ICD-10-CM

## 2024-06-05 DIAGNOSIS — G35 MULTIPLE SCLEROSIS (H): Primary | ICD-10-CM

## 2024-06-05 PROCEDURE — 97110 THERAPEUTIC EXERCISES: CPT | Mod: GP,KX

## 2024-06-10 ENCOUNTER — THERAPY VISIT (OUTPATIENT)
Dept: PHYSICAL THERAPY | Facility: OTHER | Age: 69
End: 2024-06-10
Attending: FAMILY MEDICINE
Payer: MEDICARE

## 2024-06-10 DIAGNOSIS — R26.9 ABNORMALITY OF GAIT: ICD-10-CM

## 2024-06-10 DIAGNOSIS — G35 MULTIPLE SCLEROSIS (H): Primary | ICD-10-CM

## 2024-06-10 DIAGNOSIS — R25.2 MUSCLE CRAMPS: ICD-10-CM

## 2024-06-10 PROCEDURE — 97110 THERAPEUTIC EXERCISES: CPT | Mod: GP,KX

## 2024-06-12 ENCOUNTER — THERAPY VISIT (OUTPATIENT)
Dept: PHYSICAL THERAPY | Facility: OTHER | Age: 69
End: 2024-06-12
Attending: FAMILY MEDICINE
Payer: MEDICARE

## 2024-06-12 DIAGNOSIS — R25.2 MUSCLE CRAMPS: ICD-10-CM

## 2024-06-12 DIAGNOSIS — R26.9 ABNORMALITY OF GAIT: ICD-10-CM

## 2024-06-12 DIAGNOSIS — G35 MULTIPLE SCLEROSIS (H): Primary | ICD-10-CM

## 2024-06-12 PROBLEM — Z86.0101 H/O ADENOMATOUS POLYP OF COLON: Status: ACTIVE | Noted: 2023-10-31

## 2024-06-12 PROCEDURE — 97110 THERAPEUTIC EXERCISES: CPT | Mod: GP,KX

## 2024-06-17 ENCOUNTER — ANESTHESIA EVENT (OUTPATIENT)
Dept: SURGERY | Facility: OTHER | Age: 69
End: 2024-06-17
Payer: MEDICARE

## 2024-06-17 ENCOUNTER — ANESTHESIA (OUTPATIENT)
Dept: SURGERY | Facility: OTHER | Age: 69
End: 2024-06-17
Payer: MEDICARE

## 2024-06-17 ENCOUNTER — HOSPITAL ENCOUNTER (OUTPATIENT)
Facility: OTHER | Age: 69
Discharge: HOME | End: 2024-06-17
Attending: SURGERY | Admitting: SURGERY
Payer: MEDICARE

## 2024-06-17 VITALS
HEART RATE: 89 BPM | SYSTOLIC BLOOD PRESSURE: 138 MMHG | OXYGEN SATURATION: 95 % | BODY MASS INDEX: 25.77 KG/M2 | DIASTOLIC BLOOD PRESSURE: 78 MMHG | HEIGHT: 70 IN | TEMPERATURE: 97.2 F | RESPIRATION RATE: 16 BRPM | WEIGHT: 180 LBS

## 2024-06-17 PROCEDURE — G0105 COLORECTAL SCRN; HI RISK IND: HCPCS | Performed by: SURGERY

## 2024-06-17 PROCEDURE — 999N000010 HC STATISTIC ANES STAT CODE-CRNA PER MINUTE: Performed by: SURGERY

## 2024-06-17 PROCEDURE — 45378 DIAGNOSTIC COLONOSCOPY: CPT

## 2024-06-17 PROCEDURE — 258N000003 HC RX IP 258 OP 636: Mod: JZ | Performed by: SURGERY

## 2024-06-17 PROCEDURE — 45378 DIAGNOSTIC COLONOSCOPY: CPT | Performed by: SURGERY

## 2024-06-17 PROCEDURE — 250N000009 HC RX 250

## 2024-06-17 PROCEDURE — 250N000011 HC RX IP 250 OP 636

## 2024-06-17 RX ORDER — LIDOCAINE 40 MG/G
CREAM TOPICAL
Status: DISCONTINUED | OUTPATIENT
Start: 2024-06-17 | End: 2024-06-17 | Stop reason: HOSPADM

## 2024-06-17 RX ORDER — LIDOCAINE HYDROCHLORIDE 20 MG/ML
INJECTION, SOLUTION INFILTRATION; PERINEURAL PRN
Status: DISCONTINUED | OUTPATIENT
Start: 2024-06-17 | End: 2024-06-17

## 2024-06-17 RX ORDER — FLUMAZENIL 0.1 MG/ML
0.2 INJECTION, SOLUTION INTRAVENOUS
Status: DISCONTINUED | OUTPATIENT
Start: 2024-06-17 | End: 2024-06-17 | Stop reason: HOSPADM

## 2024-06-17 RX ORDER — SODIUM CHLORIDE, SODIUM LACTATE, POTASSIUM CHLORIDE, CALCIUM CHLORIDE 600; 310; 30; 20 MG/100ML; MG/100ML; MG/100ML; MG/100ML
INJECTION, SOLUTION INTRAVENOUS CONTINUOUS
Status: DISCONTINUED | OUTPATIENT
Start: 2024-06-17 | End: 2024-06-17 | Stop reason: HOSPADM

## 2024-06-17 RX ORDER — ONDANSETRON 2 MG/ML
4 INJECTION INTRAMUSCULAR; INTRAVENOUS
Status: DISCONTINUED | OUTPATIENT
Start: 2024-06-17 | End: 2024-06-17 | Stop reason: HOSPADM

## 2024-06-17 RX ORDER — NALOXONE HYDROCHLORIDE 0.4 MG/ML
0.2 INJECTION, SOLUTION INTRAMUSCULAR; INTRAVENOUS; SUBCUTANEOUS
Status: DISCONTINUED | OUTPATIENT
Start: 2024-06-17 | End: 2024-06-17 | Stop reason: HOSPADM

## 2024-06-17 RX ORDER — PROPOFOL 10 MG/ML
INJECTION, EMULSION INTRAVENOUS PRN
Status: DISCONTINUED | OUTPATIENT
Start: 2024-06-17 | End: 2024-06-17

## 2024-06-17 RX ORDER — PROPOFOL 10 MG/ML
INJECTION, EMULSION INTRAVENOUS CONTINUOUS PRN
Status: DISCONTINUED | OUTPATIENT
Start: 2024-06-17 | End: 2024-06-17

## 2024-06-17 RX ORDER — NALOXONE HYDROCHLORIDE 0.4 MG/ML
0.4 INJECTION, SOLUTION INTRAMUSCULAR; INTRAVENOUS; SUBCUTANEOUS
Status: DISCONTINUED | OUTPATIENT
Start: 2024-06-17 | End: 2024-06-17 | Stop reason: HOSPADM

## 2024-06-17 RX ADMIN — PROPOFOL 50 MG: 10 INJECTION, EMULSION INTRAVENOUS at 08:41

## 2024-06-17 RX ADMIN — PROPOFOL 140 MCG/KG/MIN: 10 INJECTION, EMULSION INTRAVENOUS at 08:41

## 2024-06-17 RX ADMIN — SODIUM CHLORIDE, POTASSIUM CHLORIDE, SODIUM LACTATE AND CALCIUM CHLORIDE: 600; 310; 30; 20 INJECTION, SOLUTION INTRAVENOUS at 07:42

## 2024-06-17 RX ADMIN — LIDOCAINE HYDROCHLORIDE 60 MG: 20 INJECTION, SOLUTION INFILTRATION; PERINEURAL at 08:41

## 2024-06-17 ASSESSMENT — ACTIVITIES OF DAILY LIVING (ADL)
ADLS_ACUITY_SCORE: 45

## 2024-06-17 NOTE — ANESTHESIA POSTPROCEDURE EVALUATION
Patient: Jamir Gill    Procedure: Procedure(s):  Colonoscopy       Anesthesia Type:  MAC    Note:  Disposition: Outpatient   Postop Pain Control: Uneventful            Sign Out: Well controlled pain   PONV: No   Neuro/Psych: Uneventful            Sign Out: Acceptable/Baseline neuro status   Airway/Respiratory: Uneventful            Sign Out: Acceptable/Baseline resp. status   CV/Hemodynamics: Uneventful            Sign Out: Acceptable CV status; No obvious hypovolemia; No obvious fluid overload   Other NRE: NONE   DID A NON-ROUTINE EVENT OCCUR? No       Last vitals:  Vitals Value Taken Time   /78 06/17/24 1030   Temp 97.2  F (36.2  C) 06/17/24 0907   Pulse 89 06/17/24 1030   Resp 16 06/17/24 0907   SpO2 94 % 06/17/24 1040   Vitals shown include unfiled device data.    Electronically Signed By: CORIE Sarabia CRNA  June 17, 2024  1:19 PM

## 2024-06-17 NOTE — OP NOTE
PROCEDURE NOTE    DATE OF SERVICE: 6/17/2024    SURGEON: Rudy Gilbert MD    PRE-OP DIAGNOSIS:    Screening  History of Polyps      POST-OP DIAGNOSIS:  Same  Normal Exam      PROCEDURE:   Colonoscopy      ANESTHESIA:  MONICA Koehler CRNA    INDICATION FOR THE PROCEDURE: The patient is a 69 year old male with h/o polyps . The patient has no other complaints  . After explaining the risks to include bleeding, perforation, potential inability toreach the cecum, the patient wished to proceed.    PROCEDURE:After adequate sedation, the patient was in the left lateral decubitus position.  Rectal exam was performed.  There was normal tone and no palpable masses .  The colonoscope was introduced into the rectum and advanced to the cecum with Mild difficulty.  The patient's prep was excellent.  The terminal cecum was reached.  The cecum, ascending, transverse, descending and sigmoid colon was unremarkable .  The scope was retroflexed in the rectum.  The rectum was unremarkable  .  The scope was straightened and removed.  The patient tolerated the procedure well.     ESTIMATED BLOOD LOSS: none    COMPLICATIONS:  None    TISSUE REMOVED:  No    RECOMMEND:      Follow-up in 10 years      Rudy Gilbert MD FACS

## 2024-06-17 NOTE — H&P
History and Physical    CHIEF COMPLAINT / REASON FOR PROCEDURE:  h/o polyps    PERTINENT HISTORY   Patient is a 69 year old male who presents today for screening colonoscopy for h/o polyps.   Last colonoscopy 2021.  Patient has no complaints.    Past Medical History:   Diagnosis Date    Cervical disc disorder     No Comments Provided    Enlarged prostate with lower urinary tract symptoms (LUTS)     No Comments Provided    Essential (primary) hypertension     No Comments Provided    Multiple sclerosis (H)     No Comments Provided    Other specified disorders of bone density and structure, unspecified site (CODE)     No Comments Provided    Other symptoms and signs involving cognitive functions and awareness     No Comments Provided    Spondylosis of cervical region without myelopathy or radiculopathy     No Comments Provided     Past Surgical History:   Procedure Laterality Date    APPENDECTOMY OPEN      appendectomy    COLONOSCOPY  01/01/2010 01/01/2010,with polyps resected    COLONOSCOPY  12/15/2015    12/15/2015,[place]- colitis    COLONOSCOPY N/A 06/17/2021    2 sessile serrated and 1 tubular adenoma, 3 year follow up, 6/17/2024    COMBINED CYSTOSCOPY, URETEROSCOPY, LASER HOLMIUM LITHOTRIPSY URETER(S) Left 04/10/2018    Procedure: COMBINED CYSTOSCOPY, URETEROSCOPY, LASER HOLMIUM LITHOTRIPSY URETER(S);  Left Ureteroscopy with Holmium Laser Lithotripsy & Stent Placement.;  Surgeon: Olu Saldivar MD;  Location:  OR    CYSTOSCOPY, RETROGRADES, INSERT STENT URETER(S), COMBINED Left 04/03/2018    Procedure: COMBINED CYSTOSCOPY, RETROGRADES, INSERT STENT URETER(S);;  Surgeon: Olu Saldivar MD;  Location: [facility] OR    LAMINECTOMY THORACIC TWO LEVELS N/A 9/6/2022    Procedure: Thoracic 10 to thoracic 12 laminectomies;  Surgeon: Edson Leija MD;  Location:  OR    PROSTATE SURGERY      ,PROSTATECTOMY,Laser prostate surgery -- BPH    WRIST SURGERY Left     s/p fracture       Bleeding tendencies:   "No    ALLERGIES/SENSITIVITIES: No Known Allergies     CURRENT MEDICATIONS:    Prior to Admission medications    Medication Sig Start Date End Date Taking? Authorizing Provider   aspirin (ASA) 81 MG chewable tablet Take 81 mg by mouth daily   Yes Reported, Patient   calcium carbonate 600 mg-vitamin D 400 units (CALTRATE) 600-400 MG-UNIT per tablet Take 1 tablet by mouth 2 times daily   Yes Reported, Patient   baclofen (LIORESAL) 10 MG tablet Take 1 tablet (10 mg) by mouth 2 times daily 9/28/23   Kayla Hobbs MD   bisacodyl (DULCOLAX) 5 MG EC tablet Use as directed per colonoscopy prep. 10/31/23   Rudy Gilbert MD   [facility] COLOPLAST 14FR/COUDE ITEM #814/OLIVE TIP W/GUIDEDX:N20.1/N31.9/N40.0/R330 [facility]: #200EA/30DS **DROPSHIP** 10/24/23   Kayla Hobbs MD   lisinopril (ZESTRIL) 10 MG tablet Take 1 tablet (10 mg) by mouth daily 9/28/23   Kayla Hobbs MD   Lubricants (SURGICAL LUBRICANT) external gel SURGILUBE 3GM PACKETS #[id number]/#200EA/600GMS DX:N20.1/N31.9/N40/R33 [facility]: **DROPSHIP** 4/25/24   Esau Anaya MD   meclizine 25 MG CHEW Take 25 mg by mouth as needed    Reported, Patient   oxyBUTYnin ER (DITROPAN XL) 10 MG 24 hr tablet Take 1 tablet (10 mg) by mouth every morning 9/28/23   Kayla Hobbs MD   senna-docusate (SENOKOT-S/PERICOLACE) 8.6-50 MG tablet Take 1-2 tablets by mouth 2 times daily Take while on oral narcotics to prevent or treat constipation. 9/28/23   Kayla Hobbs MD       Physical Exam:  Temp 97  F (36.1  C) (Temporal)   Resp 18   Ht 1.778 m (5' 10\")   Wt 81.6 kg (180 lb)   BMI 25.83 kg/m    EXAM:  Chest/Respiratory Exam: Normal - Clear to auscultation without rales, rhonchi, or wheezing.  Cardiovascular Exam: regular rate and rhythm        PLAN: COLONOSCOPY .  Patient understands risks of bleeding, perforation, potential inability to reach cecum, aspiration and wishes to proceed.      "

## 2024-06-17 NOTE — ANESTHESIA CARE TRANSFER NOTE
Patient: Jamir Gill    Procedure: Procedure(s):  Colonoscopy       Diagnosis: Colon cancer screening [Z12.11]  Diagnosis Additional Information: No value filed.    Anesthesia Type:   MAC     Note:    Oropharynx: oropharynx clear of all foreign objects and spontaneously breathing  Level of Consciousness: drowsy  Oxygen Supplementation: nasal cannula  Level of Supplemental Oxygen (L/min / FiO2): 4  Independent Airway: airway patency satisfactory and stable  Dentition: dentition unchanged  Vital Signs Stable: post-procedure vital signs reviewed and stable  Report to RN Given: handoff report given  Patient transferred to: Phase II    Handoff Report: Identifed the Patient, Identified the Reponsible Provider, Reviewed the pertinent medical history, Discussed the surgical course, Reviewed Intra-OP anesthesia mangement and issues during anesthesia, Set expectations for post-procedure period and Allowed opportunity for questions and acknowledgement of understanding    Vitals:  Vitals Value Taken Time   BP     Temp     Pulse     Resp     SpO2 100 % 06/17/24 0908   Vitals shown include unfiled device data.    Electronically Signed By: CORIE Mac CRNA  June 17, 2024  9:09 AM

## 2024-06-17 NOTE — ANESTHESIA PREPROCEDURE EVALUATION
Anesthesia Pre-Procedure Evaluation    Patient: Jamir Gill   MRN: [id number] : 1955        Procedure : Procedure(s):  Colonoscopy          Past Medical History:   Diagnosis Date     Cervical disc disorder     No Comments Provided     Enlarged prostate with lower urinary tract symptoms (LUTS)     No Comments Provided     Essential (primary) hypertension     No Comments Provided     Multiple sclerosis (H)     No Comments Provided     Other specified disorders of bone density and structure, unspecified site (CODE)     No Comments Provided     Other symptoms and signs involving cognitive functions and awareness     No Comments Provided     Spondylosis of cervical region without myelopathy or radiculopathy     No Comments Provided      Past Surgical History:   Procedure Laterality Date     APPENDECTOMY OPEN      appendectomy     COLONOSCOPY  2010,with polyps resected     COLONOSCOPY  12/15/2015    12/15/2015,[place]- colitis     COLONOSCOPY N/A 2021    2 sessile serrated and 1 tubular adenoma, 3 year follow up, 2024     COMBINED CYSTOSCOPY, URETEROSCOPY, LASER HOLMIUM LITHOTRIPSY URETER(S) Left 04/10/2018    Procedure: COMBINED CYSTOSCOPY, URETEROSCOPY, LASER HOLMIUM LITHOTRIPSY URETER(S);  Left Ureteroscopy with Holmium Laser Lithotripsy & Stent Placement.;  Surgeon: Olu Saldivar MD;  Location:  OR     CYSTOSCOPY, RETROGRADES, INSERT STENT URETER(S), COMBINED Left 2018    Procedure: COMBINED CYSTOSCOPY, RETROGRADES, INSERT STENT URETER(S);;  Surgeon: Olu Saldivar MD;  Location: [facility] OR     LAMINECTOMY THORACIC TWO LEVELS N/A 2022    Procedure: Thoracic 10 to thoracic 12 laminectomies;  Surgeon: Edson Leija MD;  Location:  OR     PROSTATE SURGERY      ,PROSTATECTOMY,Laser prostate surgery -- BPH     WRIST SURGERY Left     s/p fracture      No Known Allergies   Social History     Tobacco Use     Smoking status: Never     Smokeless tobacco: Never  "  Substance Use Topics     Alcohol use: No      Wt Readings from Last 1 Encounters:   06/17/24 81.6 kg (180 lb)        Anesthesia Evaluation   Pt has had prior anesthetic.     No history of anesthetic complications       ROS/MED HX  ENT/Pulmonary:       Neurologic: Comment: Neurogenic bladder    (+)                   Multiple Sclerosis,             Cardiovascular:     (+)  hypertension- -   -  - -                                      METS/Exercise Tolerance: >4 METS    Hematologic:       Musculoskeletal:   (+)  arthritis,             GI/Hepatic:       Renal/Genitourinary:       Endo:       Psychiatric/Substance Use:     (+) psychiatric history depression       Infectious Disease:       Malignancy:       Other:          Physical Exam    Airway  airway exam normal      Mallampati: II   TM distance: > 3 FB   Neck ROM: full   Mouth opening: > 3 cm    Respiratory Devices and Support         Dental       (+) Minor Abnormalities - some fillings, tiny chips      Cardiovascular   cardiovascular exam normal       Rhythm and rate: regular and normal     Pulmonary   pulmonary exam normal        breath sounds clear to auscultation       OUTSIDE LABS:  CBC:   Lab Results   Component Value Date    WBC 4.8 08/11/2023    WBC 5.2 06/10/2022    HGB 14.8 08/11/2023    HGB 14.9 08/29/2022    HCT 44.7 08/11/2023    HCT 43.5 06/10/2022     08/11/2023     06/10/2022     BMP:   Lab Results   Component Value Date     08/11/2023     08/29/2022    POTASSIUM 4.5 08/11/2023    POTASSIUM 3.8 09/06/2022    CHLORIDE 100 08/11/2023    CHLORIDE 103 08/29/2022    CO2 30 (H) 08/11/2023    CO2 31 08/29/2022    BUN 26.3 (H) 08/11/2023    BUN 22 08/29/2022    CR 1.11 08/11/2023    CR 0.94 08/29/2022    GLC 73 08/11/2023     (H) 09/08/2022     COAGS: No results found for: \"PTT\", \"INR\", \"FIBR\"  POC: No results found for: \"BGM\", \"HCG\", \"HCGS\"  HEPATIC:   Lab Results   Component Value Date    ALBUMIN 4.2 08/11/2023    " "PROTTOTAL 6.8 08/11/2023    ALT 28 08/11/2023    AST 22 08/11/2023    ALKPHOS 103 08/11/2023    BILITOTAL 0.4 08/11/2023    BILIDIRECT 0.07 06/24/2015     OTHER:   Lab Results   Component Value Date    LACT 1.4 09/07/2022    ОЛЬГА 10.0 08/11/2023    PHOS 3.8 06/24/2015    LIPASE 7 (L) 04/03/2018    TSH 1.61 06/10/2022       Anesthesia Plan    ASA Status:  2    NPO Status:  NPO Appropriate    Anesthesia Type: MAC.     - Reason for MAC: straight local not clinically adequate   Induction: Intravenous.   Maintenance: Balanced.        Consents    Anesthesia Plan(s) and associated risks, benefits, and realistic alternatives discussed. Questions answered and patient/representative(s) expressed understanding.     - Discussed: Risks, Benefits and Alternatives for BOTH SEDATION and the PROCEDURE were discussed     - Discussed with:  Patient, Spouse            Postoperative Care            Comments:    Other Comments: Risks, benefits and alternatives discussed and would like to proceed. General anesthesia ok if indicated.              CORIE Sarabia CRNA    I have reviewed the pertinent notes and labs in the chart from the past 30 days and (re)examined the patient.  Any updates or changes from those notes are reflected in this note.             # Drug Induced Platelet Defect: home medication list includes an antiplatelet medication  # Overweight: Estimated body mass index is 25.83 kg/m  as calculated from the following:    Height as of this encounter: 1.778 m (5' 10\").    Weight as of this encounter: 81.6 kg (180 lb).      "

## 2024-06-18 ENCOUNTER — THERAPY VISIT (OUTPATIENT)
Dept: PHYSICAL THERAPY | Facility: OTHER | Age: 69
End: 2024-06-18
Attending: FAMILY MEDICINE
Payer: MEDICARE

## 2024-06-18 DIAGNOSIS — R26.9 ABNORMALITY OF GAIT: ICD-10-CM

## 2024-06-18 DIAGNOSIS — G35 MULTIPLE SCLEROSIS (H): Primary | ICD-10-CM

## 2024-06-18 DIAGNOSIS — R25.2 MUSCLE CRAMPS: ICD-10-CM

## 2024-06-18 PROCEDURE — 97110 THERAPEUTIC EXERCISES: CPT | Mod: GP,KX

## 2024-06-20 ENCOUNTER — THERAPY VISIT (OUTPATIENT)
Dept: PHYSICAL THERAPY | Facility: OTHER | Age: 69
End: 2024-06-20
Attending: FAMILY MEDICINE
Payer: MEDICARE

## 2024-06-20 DIAGNOSIS — R26.9 ABNORMALITY OF GAIT: ICD-10-CM

## 2024-06-20 DIAGNOSIS — R25.2 MUSCLE CRAMPS: ICD-10-CM

## 2024-06-20 DIAGNOSIS — G35 MULTIPLE SCLEROSIS (H): Primary | ICD-10-CM

## 2024-06-20 PROCEDURE — 97110 THERAPEUTIC EXERCISES: CPT | Mod: GP,KX

## 2024-06-20 NOTE — PROGRESS NOTES
[facility]                                                                                   OUTPATIENT PHYSICAL THERAPY    PLAN OF TREATMENT FOR OUTPATIENT REHABILITATION   Patient's Last Name, First Name, Jamir Perera YOB: 1955   Provider's Name   [facility]   Medical Record No.  [id number]     Onset Date: 04/08/24  Start of Care Date: 04/15/24     Medical Diagnosis:  MS, Gait abnormality, muscle cramps      PT Treatment Diagnosis:  impaired mobility, weakness, impaired posture Plan of Treatment  Frequency/Duration: 2-3x/week/ 12 weeks    Certification date from (P) 06/20/24 to (P) 09/12/24         See note for plan of treatment details and functional goals     Manjit Pink PT                         I CERTIFY THE NEED FOR THESE SERVICES FURNISHED UNDER        THIS PLAN OF TREATMENT AND WHILE UNDER MY CARE     (Physician attestation of this document indicates review and certification of the therapy plan).              Referring Provider:  Dr. Miriam Escobar MD    Initial Assessment  See Epic Evaluation- Start of Care Date: 04/15/24            PLAN  Continue therapy per current plan of care. Jamir is progressing well with increased weight on the MedX equipment as well as better endurance for individual tasks but for a session as a whole. He can work for longer durations at home and is moving easier. Jamir does continue to need B UE support in the form of canes at the moment to ambulate but our goal is to get to single device use. PT continues to be appropriate to work on his mobility deficits.     We are going update an endurance goal to instead of completing a 6 minute walk test to work on standing endurance while completing an UE task for 5 minutes. Will adjust for next session.    Beginning/End Dates of Progress Note Reporting Period:  (P) 05/16/24 to 06/20/2024    Referring Provider:  No ref. provider found         06/20/24 0500   Appointment Info   Signing clinician's name / credentials Manjit Pink DPT   Total/Authorized Visits 20   Visits Used 10 of 10   Medical Diagnosis MS, Gait abnormality, muscle cramps   PT Tx Diagnosis impaired mobility, weakness, impaired posture   Quick Adds Certification   Progress Note/Certification   Start of Care Date 04/15/24   Onset of illness/injury or Date of Surgery 04/08/24   Therapy Frequency 2-3x/week   Predicted Duration 12 weeks   Certification date from 06/20/24   Certification date to 09/12/24   KX Modifier Statement Therapy services meets medical necessity requirements beyond the therapy threshold for ongoing care.   Progress Note Completed Date 05/16/24   Supervision   PT Assistant Visit Number 3   GOALS   PT Goals 2;3;4   PT Goal 1   Goal Identifier Ambulation   Goal Description Jamir will be able to ambulate for 100 feet with single cane without LOB on even surfaces.   Goal Progress Two canes for ambulation, continues to need B UE support for mobility   Target Date 07/10/24   PT Goal 2   Goal Identifier Unsupported squat   Goal Description Jamir will be able to squat unsupported in order to  an item from floor.   Goal Progress Progressing. Currently able to squat to slight knee bend but needs variable level of UE assist to elevate   Target Date 06/12/24   PT Goal 3   Goal Identifier Balance   Goal Description Jamir will score an increase of 2 from a 17 to a 19 on the FGA for improved balance and mobility.   Goal Progress Not assessed today.   Target Date 06/12/24   PT Goal 4   Goal Identifier Endurance   Goal Description Jamir will be able to complete 6 minutes of ambulation with B canes with the final minute within 80% distance of the first minute%   Goal Progress Jamir does not wish to pursue this goal due to him not needing to complete this task at home. DISCONTINUE GOAL.   Target Date 07/10/24   Subjective Report   Subjective Report Jamir feels better today than Tuesday.  "Thinks his decreased performance was due to a procedured that he had on Monday.   Objective Measures   Objective Measures Objective Measure 1   Objective Measure 1   Objective Measure FGA: 17/30   Details 5 time sit to stand 16 seconds   Treatment Interventions (PT)   Interventions Therapeutic Procedure/Exercise   Therapeutic Procedure/Exercise   Therapeutic Procedures: strength, endurance, ROM, flexibility minutes (56661) 45   Therapeutic Procedures Ther Proc 2;Ther Proc 3;Ther Proc 4   Ther Proc 1 Deferred: Prone: passive hip flexor and quad stretches B 2 x60 second hold each, glute retraining B x20, flutter kicks B x20. Sidelying clam B x20. Reformer with 2 reds and 1 green, jump board: trunk rotation while seated on box B x20, double leg squats x20, single leg squats L 2x20 and R 2x10, heel raises DL x20. Standing gastroc stretch on incline wedge x60 seconds - needing to complete single leg due to inability of R LE to maintain positioning. Close guarding to get on the reformer and A to lie supine, but Jamir was able to return to sitting on own and then stand without use of UEs.   Ther Proc 1 - Details \ Back extension (foot 5 knees 4) 86# x25 - 80 degrees to 55 degrees  then 46# from 55-25 degrees x20, Deferred: Core ABs  (foot 5)  50# x 20.   Ther Proc 2 UE MedX   Ther Proc 2 - Details Chest press (seat 5 pins 3) 160# 1x15 then x 13. Lat pulldowns (seat 6) 180# x 25 with seatbelt. Rows 130# (chest 1) x 20. Added: tricep dip 110# x20.   Ther Proc 3 Rebounder 7\" ball - to fatigue - able to complete for 2 minutes today. Deferred: sit to stand after every 5th throw while holding ball from standard height chair + Airex foam.  Treadmill weight ball rolls using treadmill 0.9 mph, seat behind Jamir in case he lost balance. Completed 1 cycle up to 1 minute. Jamir needing to bend down at waist and knees while trying to not hold with UEs.   Ther Proc 3 - Details LE MEDX: Leg press (seat 19, back up) 176 1x15 amd 1 x14 with " 1/2 foam roller between knees for better knee alignment. Deferred due to time and fatigue: Hip ABD  26# partial range x 20 - much improved from last session.   Patient Response/Progress cecelia resting in lobby for 10 minutes after session.   Plan   Plan for next session start in neuro gym with reformer   Total Session Time   Timed Code Treatment Minutes 45   Total Treatment Time (sum of timed and untimed services) 45

## 2024-06-25 ENCOUNTER — THERAPY VISIT (OUTPATIENT)
Dept: PHYSICAL THERAPY | Facility: OTHER | Age: 69
End: 2024-06-25
Attending: FAMILY MEDICINE
Payer: MEDICARE

## 2024-06-25 DIAGNOSIS — R25.2 MUSCLE CRAMPS: ICD-10-CM

## 2024-06-25 DIAGNOSIS — R26.9 ABNORMALITY OF GAIT: ICD-10-CM

## 2024-06-25 DIAGNOSIS — G35 MULTIPLE SCLEROSIS (H): Primary | ICD-10-CM

## 2024-06-25 PROCEDURE — 97140 MANUAL THERAPY 1/> REGIONS: CPT | Mod: GP,KX

## 2024-06-25 PROCEDURE — 97110 THERAPEUTIC EXERCISES: CPT | Mod: GP,KX

## 2024-06-26 ENCOUNTER — PATIENT OUTREACH (OUTPATIENT)
Dept: GASTROENTEROLOGY | Facility: CLINIC | Age: 69
End: 2024-06-26
Payer: COMMERCIAL

## 2024-06-27 ENCOUNTER — THERAPY VISIT (OUTPATIENT)
Dept: PHYSICAL THERAPY | Facility: OTHER | Age: 69
End: 2024-06-27
Attending: FAMILY MEDICINE
Payer: MEDICARE

## 2024-06-27 DIAGNOSIS — R25.2 MUSCLE CRAMPS: ICD-10-CM

## 2024-06-27 DIAGNOSIS — G35 MULTIPLE SCLEROSIS (H): Primary | ICD-10-CM

## 2024-06-27 DIAGNOSIS — R26.9 ABNORMALITY OF GAIT: ICD-10-CM

## 2024-06-27 PROCEDURE — 97140 MANUAL THERAPY 1/> REGIONS: CPT | Mod: GP,KX

## 2024-06-27 PROCEDURE — 97110 THERAPEUTIC EXERCISES: CPT | Mod: GP,KX

## 2024-07-02 ENCOUNTER — THERAPY VISIT (OUTPATIENT)
Dept: PHYSICAL THERAPY | Facility: OTHER | Age: 69
End: 2024-07-02
Attending: FAMILY MEDICINE
Payer: MEDICARE

## 2024-07-02 DIAGNOSIS — R25.2 MUSCLE CRAMPS: ICD-10-CM

## 2024-07-02 DIAGNOSIS — G35 MULTIPLE SCLEROSIS (H): Primary | ICD-10-CM

## 2024-07-02 DIAGNOSIS — R26.9 ABNORMALITY OF GAIT: ICD-10-CM

## 2024-07-02 PROCEDURE — 97110 THERAPEUTIC EXERCISES: CPT | Mod: GP,CQ,KX

## 2024-07-02 PROCEDURE — 97140 MANUAL THERAPY 1/> REGIONS: CPT | Mod: GP,CQ,KX

## 2024-07-09 ENCOUNTER — THERAPY VISIT (OUTPATIENT)
Dept: PHYSICAL THERAPY | Facility: OTHER | Age: 69
End: 2024-07-09
Attending: FAMILY MEDICINE
Payer: MEDICARE

## 2024-07-09 DIAGNOSIS — G35 MULTIPLE SCLEROSIS (H): Primary | ICD-10-CM

## 2024-07-09 DIAGNOSIS — R26.9 ABNORMALITY OF GAIT: ICD-10-CM

## 2024-07-09 DIAGNOSIS — R25.2 MUSCLE CRAMPS: ICD-10-CM

## 2024-07-09 PROCEDURE — 97110 THERAPEUTIC EXERCISES: CPT | Mod: GP,KX

## 2024-07-11 ENCOUNTER — THERAPY VISIT (OUTPATIENT)
Dept: PHYSICAL THERAPY | Facility: OTHER | Age: 69
End: 2024-07-11
Attending: FAMILY MEDICINE
Payer: MEDICARE

## 2024-07-11 DIAGNOSIS — R26.9 ABNORMALITY OF GAIT: ICD-10-CM

## 2024-07-11 DIAGNOSIS — G35 MULTIPLE SCLEROSIS (H): Primary | ICD-10-CM

## 2024-07-11 DIAGNOSIS — R25.2 MUSCLE CRAMPS: ICD-10-CM

## 2024-07-11 PROCEDURE — 97140 MANUAL THERAPY 1/> REGIONS: CPT | Mod: GP,CQ,KX

## 2024-07-11 PROCEDURE — 97110 THERAPEUTIC EXERCISES: CPT | Mod: GP,CQ,KX

## 2024-07-16 ENCOUNTER — THERAPY VISIT (OUTPATIENT)
Dept: PHYSICAL THERAPY | Facility: OTHER | Age: 69
End: 2024-07-16
Attending: FAMILY MEDICINE
Payer: MEDICARE

## 2024-07-16 DIAGNOSIS — R25.2 MUSCLE CRAMPS: ICD-10-CM

## 2024-07-16 DIAGNOSIS — G35 MULTIPLE SCLEROSIS (H): Primary | ICD-10-CM

## 2024-07-16 DIAGNOSIS — R26.9 ABNORMALITY OF GAIT: ICD-10-CM

## 2024-07-16 PROCEDURE — 97140 MANUAL THERAPY 1/> REGIONS: CPT | Mod: GP,KX

## 2024-07-16 PROCEDURE — 97110 THERAPEUTIC EXERCISES: CPT | Mod: GP,KX

## 2024-07-18 ENCOUNTER — THERAPY VISIT (OUTPATIENT)
Dept: PHYSICAL THERAPY | Facility: OTHER | Age: 69
End: 2024-07-18
Attending: FAMILY MEDICINE
Payer: MEDICARE

## 2024-07-18 DIAGNOSIS — R25.2 MUSCLE CRAMPS: ICD-10-CM

## 2024-07-18 DIAGNOSIS — R26.9 ABNORMALITY OF GAIT: ICD-10-CM

## 2024-07-18 DIAGNOSIS — G35 MULTIPLE SCLEROSIS (H): Primary | ICD-10-CM

## 2024-07-18 PROCEDURE — 97110 THERAPEUTIC EXERCISES: CPT | Mod: GP,KX

## 2024-07-18 PROCEDURE — 97140 MANUAL THERAPY 1/> REGIONS: CPT | Mod: GP,KX

## 2024-07-23 ENCOUNTER — THERAPY VISIT (OUTPATIENT)
Dept: PHYSICAL THERAPY | Facility: OTHER | Age: 69
End: 2024-07-23
Attending: FAMILY MEDICINE
Payer: MEDICARE

## 2024-07-23 DIAGNOSIS — R25.2 MUSCLE CRAMPS: ICD-10-CM

## 2024-07-23 DIAGNOSIS — R26.9 ABNORMALITY OF GAIT: ICD-10-CM

## 2024-07-23 DIAGNOSIS — G35 MULTIPLE SCLEROSIS (H): Primary | ICD-10-CM

## 2024-07-23 PROCEDURE — 97110 THERAPEUTIC EXERCISES: CPT | Mod: GP,KX

## 2024-07-25 ENCOUNTER — THERAPY VISIT (OUTPATIENT)
Dept: PHYSICAL THERAPY | Facility: OTHER | Age: 69
End: 2024-07-25
Attending: FAMILY MEDICINE
Payer: MEDICARE

## 2024-07-25 DIAGNOSIS — R25.2 MUSCLE CRAMPS: ICD-10-CM

## 2024-07-25 DIAGNOSIS — R26.9 ABNORMALITY OF GAIT: ICD-10-CM

## 2024-07-25 DIAGNOSIS — G35 MULTIPLE SCLEROSIS (H): Primary | ICD-10-CM

## 2024-07-25 PROCEDURE — 97110 THERAPEUTIC EXERCISES: CPT | Mod: GP,KX

## 2024-07-30 ENCOUNTER — THERAPY VISIT (OUTPATIENT)
Dept: PHYSICAL THERAPY | Facility: OTHER | Age: 69
End: 2024-07-30
Attending: FAMILY MEDICINE
Payer: MEDICARE

## 2024-07-30 DIAGNOSIS — R25.2 MUSCLE CRAMPS: ICD-10-CM

## 2024-07-30 DIAGNOSIS — R26.9 ABNORMALITY OF GAIT: ICD-10-CM

## 2024-07-30 DIAGNOSIS — G35 MULTIPLE SCLEROSIS (H): Primary | ICD-10-CM

## 2024-07-30 PROCEDURE — 97110 THERAPEUTIC EXERCISES: CPT | Mod: GP,KX

## 2024-07-30 NOTE — PROGRESS NOTES
PLAN  Continue therapy per current plan of care. Jamir notes his shoulder is slightly improving. We have gotten him set up to see our sports med MD Dr. Hardin for further management. He will see him on 8/12/24. Jamir continue to work on balance and strength work. Jamir continues to struggle with ambulation or dynamic activities that require him to use less than B canes for assistive devices. Jamir has improve his sit to stands as well as his endurance evidenced by increased duration of activities prior to rest breaks. Arthurs MS does seem to be stable at the moment without significant changes in his function. Jamir does however decline quicker than most people if he is inactive for periods of time/gaps in therapy.    Beginning/End Dates of Progress Note Reporting Period:  5/16/24 to 07/30/2024    Referring Provider:  No ref. provider found        07/30/24 0500   Appointment Info   Signing clinician's name / credentials Manjit Pink DPT   Total/Authorized Visits 30   Visits Used 10 of 10   Medical Diagnosis MS, Gait abnormality, muscle cramps   PT Tx Diagnosis impaired mobility, weakness, impaired posture   Quick Adds Certification   Progress Note/Certification   Start of Care Date 04/15/24   Onset of illness/injury or Date of Surgery 04/08/24   Therapy Frequency 2-3x/week   Predicted Duration 12 weeks   Certification date from 06/20/24   Certification date to 09/12/24   KX Modifier Statement Therapy services meets medical necessity requirements beyond the therapy threshold for ongoing care.   Progress Note Completed Date 07/30/24   Supervision   PT Assistant Visit Number 5   GOALS   PT Goals 2;3;4   PT Goal 1   Goal Identifier Ambulation   Goal Description Jamir will be able to ambulate for 100 feet with single cane without LOB on even surfaces.   Goal Progress Two canes for ambulation, continues to need B UE support for mobility. Jamir has only attempted this at home briefly with walls or counters nearby for  support   Target Date 07/10/24   PT Goal 2   Goal Identifier Unsupported squat   Goal Description Jamir will be able to squat unsupported in order to  an item from floor.   Goal Progress Progressing. Currently able to squat to slight knee bend but needs variable level of UE assist to elevate. Improving ability to rise from chair without armrests today.   Target Date 06/12/24   PT Goal 3   Goal Identifier Balance   Goal Description Jamir will score an increase of 2 from a 17 to a 19 on the FGA for improved balance and mobility.   Goal Progress Not assessed today.   Target Date 06/12/24   PT Goal 4   Goal Identifier Endurance   Goal Description Jamir will be able to maintain upright stance for 5 minutes without LOB or rest break while completing UE activity for improved endurance.   Goal Progress Jamir has been able to maintain stance positions with dynamic activity for up to 3 minutes to this point. Continues to improve his endurance   Target Date 09/12/24   Subjective Report   Subjective Report Jamir notes he had his best day with his shoulder yesterday and last night. Also was moving better in the boat and on the docs.   Objective Measures   Objective Measures Objective Measure 1;Objective Measure 2;Objective Measure 3   Objective Measure 1   Objective Measure FGA: 17/30   Details 5 time sit to stand 16 seconds   PT Modalities   PT Modalities Vasopneumatic device   Vasopneumatic Device   Treatment Detail NICE machine   Vasopneumatic Pressure medium   Duration 10 min   Temperature 5/5   Patient Response/Progress Declined today   Treatment Interventions (PT)   Interventions Therapeutic Procedure/Exercise;Manual Therapy   Therapeutic Procedure/Exercise   Therapeutic Procedures: strength, endurance, ROM, flexibility minutes (18991) 40   Therapeutic Procedures Ther Proc 2;Ther Proc 3;Ther Proc 4;Ther Proc 5   Ther Proc 1 - Details Deferred this line today:Back extension (foot 5 knees 4) 86# x25 - 80 degrees to 55  "degrees, 46# from 55-25 degrees x20,Core ABs  (foot 5)  50# x 20.   Ther Proc 2 UE MedX   Ther Proc 2 - Details Chest press (seat 5 pins 3) 170# then 160# 2x 10. Lat pulldowns (seat 6) 180# x 25 with seatbelt   Ther Proc 3 Nustep L8 x6 minutes.  Rebounder 7\" ball - to fatigue - able to complete for 2.5 minutes today - trampoline position to require overhead throw and catch - No LOB today   Ther Proc 3 - Details LE MEDX: Leg press (seat 19, back up) 160 x20 then 176# x 20 with 1/2 foam roller between knees for better knee alignment. deferred due to time: Hip ABD  26# partial range x 20 - much improved from last session.   Ther Proc 4 Rows 120# (chest 1) x 20 - improved rowing ability today..   Manual Therapy   Manual Therapy 1 STM/MFR to RTC supraspinatus, UT, and infraspinatus and into SCM, scalenes. Very tender and tight through SCM. Inferior mobs at  joint - grade 3-4. AP mobs at  joint. STM to R middle deltoid x5 minutes.   Manual Therapy 1 - Details Oscillating manual traction x 8 minutes   Total Session Time   Timed Code Treatment Minutes 40   Total Treatment Time (sum of timed and untimed services) 40       "

## 2024-08-06 ENCOUNTER — THERAPY VISIT (OUTPATIENT)
Dept: PHYSICAL THERAPY | Facility: OTHER | Age: 69
End: 2024-08-06
Payer: MEDICARE

## 2024-08-06 DIAGNOSIS — G35 MULTIPLE SCLEROSIS (H): Primary | ICD-10-CM

## 2024-08-06 DIAGNOSIS — R25.2 MUSCLE CRAMPS: ICD-10-CM

## 2024-08-06 DIAGNOSIS — R26.9 ABNORMALITY OF GAIT: ICD-10-CM

## 2024-08-06 PROCEDURE — 97110 THERAPEUTIC EXERCISES: CPT | Mod: GP,KX

## 2024-08-08 ENCOUNTER — THERAPY VISIT (OUTPATIENT)
Dept: PHYSICAL THERAPY | Facility: OTHER | Age: 69
End: 2024-08-08
Payer: MEDICARE

## 2024-08-08 DIAGNOSIS — R25.2 MUSCLE CRAMPS: ICD-10-CM

## 2024-08-08 DIAGNOSIS — G35 MULTIPLE SCLEROSIS (H): Primary | ICD-10-CM

## 2024-08-08 DIAGNOSIS — R26.9 ABNORMALITY OF GAIT: ICD-10-CM

## 2024-08-08 PROCEDURE — 97110 THERAPEUTIC EXERCISES: CPT | Mod: GP,KX

## 2024-08-12 ENCOUNTER — OFFICE VISIT (OUTPATIENT)
Dept: FAMILY MEDICINE | Facility: OTHER | Age: 69
End: 2024-08-12
Attending: FAMILY MEDICINE
Payer: MEDICARE

## 2024-08-12 ENCOUNTER — THERAPY VISIT (OUTPATIENT)
Dept: PHYSICAL THERAPY | Facility: OTHER | Age: 69
End: 2024-08-12
Payer: MEDICARE

## 2024-08-12 ENCOUNTER — HOSPITAL ENCOUNTER (OUTPATIENT)
Dept: GENERAL RADIOLOGY | Facility: OTHER | Age: 69
Discharge: HOME | End: 2024-08-12
Attending: FAMILY MEDICINE
Payer: MEDICARE

## 2024-08-12 VITALS
BODY MASS INDEX: 25.11 KG/M2 | TEMPERATURE: 97.5 F | SYSTOLIC BLOOD PRESSURE: 110 MMHG | DIASTOLIC BLOOD PRESSURE: 64 MMHG | OXYGEN SATURATION: 97 % | HEART RATE: 96 BPM | WEIGHT: 175 LBS | RESPIRATION RATE: 16 BRPM

## 2024-08-12 DIAGNOSIS — R26.9 ABNORMALITY OF GAIT: ICD-10-CM

## 2024-08-12 DIAGNOSIS — G35 MULTIPLE SCLEROSIS (H): Primary | ICD-10-CM

## 2024-08-12 DIAGNOSIS — G35 MULTIPLE SCLEROSIS (H): ICD-10-CM

## 2024-08-12 DIAGNOSIS — M19.011 ARTHRITIS OF RIGHT GLENOHUMERAL JOINT: Primary | ICD-10-CM

## 2024-08-12 DIAGNOSIS — R25.2 MUSCLE CRAMPS: ICD-10-CM

## 2024-08-12 PROCEDURE — 73030 X-RAY EXAM OF SHOULDER: CPT | Mod: RT

## 2024-08-12 PROCEDURE — 99214 OFFICE O/P EST MOD 30 MIN: CPT | Performed by: FAMILY MEDICINE

## 2024-08-12 PROCEDURE — G0463 HOSPITAL OUTPT CLINIC VISIT: HCPCS | Mod: 25 | Performed by: FAMILY MEDICINE

## 2024-08-12 PROCEDURE — 97110 THERAPEUTIC EXERCISES: CPT | Mod: GP,KX

## 2024-08-12 ASSESSMENT — PAIN SCALES - GENERAL: PAINLEVEL: SEVERE PAIN (7)

## 2024-08-12 NOTE — PROGRESS NOTES
Sports Medicine Office Note    HPI:  69-year-old male with MS coming in for evaluation of right shoulder pain.  His pain has been present for approximately 6 months.  No inciting event or injury.  He notes a significant sharp pain that will last for a few seconds with certain movements/positions.  This will occur multiple times per day.  He has had difficulty isolating the specific movement(s) that caused these episodes.  When this pain occurs he rates it at 7/10.  He characterized the pain as sharp.  He is currently undergoing intensive physical therapy for his MS.  He has been doing some work on his shoulder during therapy.  He has been icing during therapy.      EXAM:  /64 (BP Location: Right arm, Patient Position: Sitting, Cuff Size: Adult Regular)   Pulse 96   Temp 97.5  F (36.4  C) (Temporal)   Resp 16   Wt 79.4 kg (175 lb)   SpO2 97%   BMI 25.11 kg/m    MUSCULOSKELETAL EXAM:  RIGHT SHOULDER  Inspection:  -No gross deformity  -No bruising or swelling  -Scars:  None    Tenderness to palpation of the:  -SC joint:  Negative  -AC joint:   Negative  -Clavicle:   Negative  -Biceps tendon in bicipital groove:   Negative  -Deltoid musculature:   Negative  -Upper trapezius musculature:   Negative    Range of Motion:  -Active flexion:  150 left, 140 right  -Active abduction:  150 left, 140 right  -Passive external rotation in 90 of abduction:  85  -Passive internal rotation in 90 of abduction:  45    Strength:  -Supraspinatus:  5/5  -Infraspinatus:  5/5  -Subscapularis:  5/5  -Deltoid:  5/5  -Biceps:  5/5    Special Tests:  -Ana Maria test:  Negative  -Khan test:   Positive  -Neer test:   Positive  -Mid-arc pain: Present  -Speeds test:   Negative  -O Man active compression test:   Negative  -Crossarm adduction test:   Negative  -Apprehension test:   Negative  -Yergason test:   Negative  -Lag sign:   Negative    Other:  -Intact sensation to light touch distally.  -No signs of cyanosis. Normal skin temperature  of the upper extremity.  -Elbow:  No gross deformity. Full range of motion.  -Hand/wrist:  No gross deformity. Full range of motion.  -Left shoulder:  No gross deformity. No palpable tenderness. Normal strength.      IMAGIN2024: 3 view right shoulder x-ray  - No fracture, dislocation, or bony lesion.  Moderate-severe degenerative changes of the GH joint.  Mildly downsloping acromion.      ASSESSMENT/PLAN:  Diagnoses and all orders for this visit:  Arthritis of right glenohumeral joint  -     XR Shoulder Right G/E 3 Views  Multiple sclerosis (H)    69-year-old male with significant glenohumeral arthritis of the right shoulder.  X-rays were performed in the office today and personally reviewed by me with the findings as demonstrated above by my interpretation.  Based on the findings on x-ray, I suspect that some of the irregularities within the glenohumeral joint are causing the pain.  We discussed treatment options to include expectant management, ice, topical medications, oral medications, therapy, injections, and surgery.  At this time he would like to proceed with least invasive interventions.  - Expectant management  - He will continue to have his therapist work with him on his shoulder  - Follow-up as needed      Kofi Lewis MD  2024  1:46 PM    Total time spent with this patient was 27 minutes which included chart review, visualization and independent interpretation of images, time spent with the patient, and documentation.    Procedure time:  0 minute(s)

## 2024-08-15 ENCOUNTER — THERAPY VISIT (OUTPATIENT)
Dept: PHYSICAL THERAPY | Facility: OTHER | Age: 69
End: 2024-08-15
Payer: MEDICARE

## 2024-08-15 DIAGNOSIS — G35 MULTIPLE SCLEROSIS (H): Primary | ICD-10-CM

## 2024-08-15 DIAGNOSIS — R26.9 ABNORMALITY OF GAIT: ICD-10-CM

## 2024-08-15 DIAGNOSIS — R25.2 MUSCLE CRAMPS: ICD-10-CM

## 2024-08-15 PROCEDURE — 97110 THERAPEUTIC EXERCISES: CPT | Mod: GP,KX

## 2024-08-19 ENCOUNTER — THERAPY VISIT (OUTPATIENT)
Dept: PHYSICAL THERAPY | Facility: OTHER | Age: 69
End: 2024-08-19
Payer: MEDICARE

## 2024-08-19 DIAGNOSIS — R26.9 ABNORMALITY OF GAIT: ICD-10-CM

## 2024-08-19 DIAGNOSIS — G35 MULTIPLE SCLEROSIS (H): Primary | ICD-10-CM

## 2024-08-19 DIAGNOSIS — R25.2 MUSCLE CRAMPS: ICD-10-CM

## 2024-08-19 PROCEDURE — 97110 THERAPEUTIC EXERCISES: CPT | Mod: GP,KX

## 2024-08-22 ENCOUNTER — THERAPY VISIT (OUTPATIENT)
Dept: PHYSICAL THERAPY | Facility: OTHER | Age: 69
End: 2024-08-22
Payer: MEDICARE

## 2024-08-22 DIAGNOSIS — R26.9 ABNORMALITY OF GAIT: ICD-10-CM

## 2024-08-22 DIAGNOSIS — G35 MULTIPLE SCLEROSIS (H): Primary | ICD-10-CM

## 2024-08-22 DIAGNOSIS — R25.2 MUSCLE CRAMPS: ICD-10-CM

## 2024-08-22 PROCEDURE — 97110 THERAPEUTIC EXERCISES: CPT | Mod: GP,KX

## 2024-08-29 ENCOUNTER — THERAPY VISIT (OUTPATIENT)
Dept: PHYSICAL THERAPY | Facility: OTHER | Age: 69
End: 2024-08-29
Payer: MEDICARE

## 2024-08-29 DIAGNOSIS — R25.2 MUSCLE CRAMPS: ICD-10-CM

## 2024-08-29 DIAGNOSIS — R26.9 ABNORMALITY OF GAIT: ICD-10-CM

## 2024-08-29 DIAGNOSIS — G35 MULTIPLE SCLEROSIS (H): Primary | ICD-10-CM

## 2024-08-29 PROCEDURE — 97110 THERAPEUTIC EXERCISES: CPT | Mod: GP,KX

## 2024-09-03 ENCOUNTER — THERAPY VISIT (OUTPATIENT)
Dept: PHYSICAL THERAPY | Facility: OTHER | Age: 69
End: 2024-09-03
Payer: MEDICARE

## 2024-09-03 DIAGNOSIS — G35 MULTIPLE SCLEROSIS (H): Primary | ICD-10-CM

## 2024-09-03 DIAGNOSIS — R26.9 ABNORMALITY OF GAIT: ICD-10-CM

## 2024-09-03 DIAGNOSIS — R25.2 MUSCLE CRAMPS: ICD-10-CM

## 2024-09-03 PROCEDURE — 97110 THERAPEUTIC EXERCISES: CPT | Mod: GP,KX

## 2024-09-05 ENCOUNTER — THERAPY VISIT (OUTPATIENT)
Dept: PHYSICAL THERAPY | Facility: OTHER | Age: 69
End: 2024-09-05
Attending: FAMILY MEDICINE
Payer: MEDICARE

## 2024-09-05 DIAGNOSIS — R26.9 ABNORMALITY OF GAIT: ICD-10-CM

## 2024-09-05 DIAGNOSIS — R25.2 MUSCLE CRAMPS: ICD-10-CM

## 2024-09-05 DIAGNOSIS — G35 MULTIPLE SCLEROSIS (H): Primary | ICD-10-CM

## 2024-09-05 PROCEDURE — 97116 GAIT TRAINING THERAPY: CPT | Mod: GP,KX

## 2024-09-05 PROCEDURE — 97110 THERAPEUTIC EXERCISES: CPT | Mod: GP,KX

## 2024-09-06 NOTE — PROGRESS NOTES
[facility]                                                                                   OUTPATIENT PHYSICAL THERAPY    PLAN OF TREATMENT FOR OUTPATIENT REHABILITATION   Patient's Last Name, First Name, Jamir Perera YOB: 1955   Provider's Name   [facility]   Medical Record No.  [id number]     Onset Date: 04/08/24  Start of Care Date: 04/15/24     Medical Diagnosis:  MS, Gait abnormality, muscle cramps      PT Treatment Diagnosis:  impaired mobility, weakness, impaired posture Plan of Treatment  Frequency/Duration: 2-3x/week/ 60 days    Certification date from 09/05/24 to 11/04/24         See note for plan of treatment details and functional goals     Manjit Pink, PT                         I CERTIFY THE NEED FOR THESE SERVICES FURNISHED UNDER        THIS PLAN OF TREATMENT AND WHILE UNDER MY CARE     (Physician attestation of this document indicates review and certification of the therapy plan).              Referring Provider:  No ref. provider found    Initial Assessment  See Epic Evaluation- Start of Care Date: 04/15/24            PLAN  Continue therapy per current plan of care. Jamir continues to report functional improvements even to the point where he is attempting interior ambulation with single cane, he does however have a counter or railing nearby which ultimately he does end up using. We tried single cane ambulation in the clinic today at the end of our session with Jamir fatigued. Jamir was unable to maintain gait speed, stride length, and hip position with 1 cane compared to B canes. Jamir continues to be a fall risk and would benefit from continues therapy to support his functional mobility and strength.     Beginning/End Dates of Progress Note Reporting Period:  07/30/24 to 09/05/2024    Referring Provider:  No ref. provider found        09/05/24 0500   Appointment Info   Signing clinician's name /  credentials Manjit Pink DPT   Total/Authorized Visits 39   Visits Used 9 of 10   Medical Diagnosis MS, Gait abnormality, muscle cramps   PT Tx Diagnosis impaired mobility, weakness, impaired posture   Progress Note/Certification   Start of Care Date 04/15/24   Onset of illness/injury or Date of Surgery 04/08/24   Therapy Frequency 2-3x/week   Predicted Duration 60 days   Certification date from 09/05/24   Certification date to 11/04/24   KX Modifier Statement Therapy services meets medical necessity requirements beyond the therapy threshold for ongoing care.   Progress Note Completed Date 07/30/24   Supervision   PT Assistant Visit Number 5   GOALS   PT Goals 2;3;4   PT Goal 1   Goal Identifier Ambulation   Goal Description Jamir will be able to ambulate for 100 feet with single cane without LOB on even surfaces.   Goal Progress Ambulated with single cane x 20 feet - inconsistent need for opposite UE to reach for wall/railing - poor hip control and increased fall risk.   Target Date 07/10/24   PT Goal 2   Goal Identifier Unsupported squat   Goal Description Jamir will be able to squat unsupported in order to  an item from floor.   Goal Progress Able to perform mini squats without UE support   Target Date 06/12/24   PT Goal 3   Goal Identifier Balance   Goal Description Jamir will score an increase of 2 from a 17 to a 19 on the FGA for improved balance and mobility.   Goal Progress Not assessed today.   Target Date 06/12/24   PT Goal 4   Goal Identifier Endurance   Goal Description Jamir will be able to maintain upright stance for 5 minutes without LOB or rest break while completing UE activity for improved endurance.   Goal Progress Jamir has been able to maintain stance positions with dynamic activity for up to 3 minutes to this point. Continues to improve his endurance   Target Date 09/12/24   Subjective Report   Subjective Report Jamir is going to leave for [place] at the end of the month.   Objective  "Measures   Objective Measures Objective Measure 1;Objective Measure 2;Objective Measure 3   Objective Measure 1   Objective Measure FGA: 17/30   Details 5 time sit to stand 16 seconds   PT Modalities   PT Modalities Vasopneumatic device   Vasopneumatic Device   Treatment Detail NICE machine   Vasopneumatic Pressure medium   Duration 10 min   Temperature 5/5   Patient Response/Progress Declined today   Treatment Interventions (PT)   Interventions Therapeutic Procedure/Exercise;Manual Therapy;Gait Training   Therapeutic Procedure/Exercise   Therapeutic Procedures: strength, endurance, ROM, flexibility minutes (11200) 30   Therapeutic Procedures Ther Proc 2;Ther Proc 3;Ther Proc 4;Ther Proc 5   Ther Proc 1 Nustep L9 x6 minutes.Rebounder 7\" ball - to fatigue - trampoline position to require overhead throw and catch - No LOB today   Ther Proc 1 - Details Back extension (foot 5 knees 4) 88# x20 - 80 degrees to 55 degrees - Jamir notes this was easier today, 56# from 55-25 degrees x25, Defered abs: Core ABs  (foot 5)  52# x 20. Rows 130# (chest 1) x 20   Ther Proc 2 UE MedX   Ther Proc 2 - Details Chest press (seat 5 pins 3) 170# x 14 Lat pulldowns (seat 6) 200# x 18 with seatbelt   Ther Proc 3 Deferred today: TRX sit to stands x 10 from standard chair - heavy UE assist with this today. Green band TKE x 12 B - unable to complete with bending only one leg   Ther Proc 3 - Details Deferred due to time: LE MEDX: Leg press (seat 19, back up) 176 x9 then x 5 with 1/2 foam roller between knees for better knee alignment. deferred due to time. deferred due to fatigue: Hip ABD  26# partial range x 15   Ther Proc 5 : Marybeth deadlift 11kg 2x 12,   Gait Training   Gait Training Minutes, includes stair climbing (43474) 15   Gait 1 Ambulation with single railing x 45 feet, then single cane near railing which allowed Jamir to stabilize a touch more, then attempted with no railing and cane on R side - completed but decreased stride length, " more hip drop and much slower krystin. \   Manual Therapy   Manual Therapy 1 STM/MFR to RTC supraspinatus, UT, and infraspinatus and into SCM, scalenes. Very tender and tight through SCM. Inferior mobs at GH joint - grade 3-4. AP mobs at GH joint. STM to R middle deltoid x5 minutes.   Manual Therapy 1 - Details Oscillating manual traction x 8 minutes   Plan   Plan for next session lazaro light deadlift?   Total Session Time   Timed Code Treatment Minutes 45   Total Treatment Time (sum of timed and untimed services) 45

## 2024-09-10 ENCOUNTER — THERAPY VISIT (OUTPATIENT)
Dept: PHYSICAL THERAPY | Facility: OTHER | Age: 69
End: 2024-09-10
Payer: MEDICARE

## 2024-09-10 DIAGNOSIS — G35 MULTIPLE SCLEROSIS (H): Primary | ICD-10-CM

## 2024-09-10 DIAGNOSIS — R25.2 MUSCLE CRAMPS: ICD-10-CM

## 2024-09-10 DIAGNOSIS — R26.9 ABNORMALITY OF GAIT: ICD-10-CM

## 2024-09-10 PROCEDURE — 97110 THERAPEUTIC EXERCISES: CPT | Mod: GP,KX

## 2024-09-12 ENCOUNTER — THERAPY VISIT (OUTPATIENT)
Dept: PHYSICAL THERAPY | Facility: OTHER | Age: 69
End: 2024-09-12
Attending: FAMILY MEDICINE
Payer: MEDICARE

## 2024-09-12 DIAGNOSIS — R25.2 MUSCLE CRAMPS: ICD-10-CM

## 2024-09-12 DIAGNOSIS — G35 MULTIPLE SCLEROSIS (H): Primary | ICD-10-CM

## 2024-09-12 DIAGNOSIS — R26.9 ABNORMALITY OF GAIT: ICD-10-CM

## 2024-09-12 PROCEDURE — 97110 THERAPEUTIC EXERCISES: CPT | Mod: GP,CQ,KX

## 2024-09-17 ENCOUNTER — THERAPY VISIT (OUTPATIENT)
Dept: PHYSICAL THERAPY | Facility: OTHER | Age: 69
End: 2024-09-17
Payer: MEDICARE

## 2024-09-17 DIAGNOSIS — G35 MULTIPLE SCLEROSIS (H): Primary | ICD-10-CM

## 2024-09-17 DIAGNOSIS — R25.2 MUSCLE CRAMPS: ICD-10-CM

## 2024-09-17 DIAGNOSIS — R26.9 ABNORMALITY OF GAIT: ICD-10-CM

## 2024-09-17 PROCEDURE — 97110 THERAPEUTIC EXERCISES: CPT | Mod: GP,KX

## 2024-09-19 ENCOUNTER — THERAPY VISIT (OUTPATIENT)
Dept: PHYSICAL THERAPY | Facility: OTHER | Age: 69
End: 2024-09-19
Attending: FAMILY MEDICINE
Payer: MEDICARE

## 2024-09-19 DIAGNOSIS — G35 MULTIPLE SCLEROSIS (H): Primary | ICD-10-CM

## 2024-09-19 DIAGNOSIS — R26.9 ABNORMALITY OF GAIT: ICD-10-CM

## 2024-09-19 DIAGNOSIS — R25.2 MUSCLE CRAMPS: ICD-10-CM

## 2024-09-19 PROCEDURE — 97110 THERAPEUTIC EXERCISES: CPT | Mod: GP,KX

## 2024-09-24 ENCOUNTER — THERAPY VISIT (OUTPATIENT)
Dept: PHYSICAL THERAPY | Facility: OTHER | Age: 69
End: 2024-09-24
Payer: MEDICARE

## 2024-09-24 DIAGNOSIS — R25.2 MUSCLE CRAMPS: ICD-10-CM

## 2024-09-24 DIAGNOSIS — G35 MULTIPLE SCLEROSIS (H): Primary | ICD-10-CM

## 2024-09-24 DIAGNOSIS — R26.9 ABNORMALITY OF GAIT: ICD-10-CM

## 2024-09-24 PROCEDURE — 97110 THERAPEUTIC EXERCISES: CPT | Mod: GP,KX

## 2024-09-26 ENCOUNTER — THERAPY VISIT (OUTPATIENT)
Dept: PHYSICAL THERAPY | Facility: OTHER | Age: 69
End: 2024-09-26
Attending: FAMILY MEDICINE
Payer: MEDICARE

## 2024-09-26 DIAGNOSIS — R25.2 MUSCLE CRAMPS: ICD-10-CM

## 2024-09-26 DIAGNOSIS — G35 MULTIPLE SCLEROSIS (H): Primary | ICD-10-CM

## 2024-09-26 DIAGNOSIS — R26.9 ABNORMALITY OF GAIT: ICD-10-CM

## 2024-09-26 PROCEDURE — 97110 THERAPEUTIC EXERCISES: CPT | Mod: GP,KX

## 2024-10-01 ENCOUNTER — THERAPY VISIT (OUTPATIENT)
Dept: PHYSICAL THERAPY | Facility: OTHER | Age: 69
End: 2024-10-01
Attending: FAMILY MEDICINE
Payer: MEDICARE

## 2024-10-01 DIAGNOSIS — R26.9 ABNORMALITY OF GAIT: ICD-10-CM

## 2024-10-01 DIAGNOSIS — G35 MULTIPLE SCLEROSIS (H): Primary | ICD-10-CM

## 2024-10-01 DIAGNOSIS — R25.2 MUSCLE CRAMPS: ICD-10-CM

## 2024-10-01 PROCEDURE — 97110 THERAPEUTIC EXERCISES: CPT | Mod: GP,KX

## 2024-10-16 NOTE — PROGRESS NOTES
DISCHARGE  Reason for Discharge: Jamir is leaving for [place] for the winter. We did not get to formally assess progress as he was ill for the last scheduled visit. Jamir had made good progress to where he was consistently ambulating with B canes. He was having an easier time with transfers and ability to get up and down from the boat, dock, and cabin floors.     Equipment Issued: theraband    Discharge Plan: Patient to continue home program.    Referring Provider:  Kayla Escobar        10/01/24 0500   Appointment Info   Signing clinician's name / credentials Manjit Pink DPT   Total/Authorized Visits 46   Visits Used 7 of 10   Medical Diagnosis MS, Gait abnormality, muscle cramps   PT Tx Diagnosis impaired mobility, weakness, impaired posture   Progress Note/Certification   Start of Care Date 04/15/24   Onset of illness/injury or Date of Surgery 04/08/24   Therapy Frequency 2-3x/week   Predicted Duration 60 days   Certification date from 09/05/24   Certification date to 11/04/24   KX Modifier Statement Therapy services meets medical necessity requirements beyond the therapy threshold for ongoing care.   Progress Note Completed Date 07/30/24   Supervision   PT Assistant Visit Number 0   GOALS   PT Goals 2;3;4   PT Goal 1   Goal Identifier Ambulation   Goal Description Jamir will be able to ambulate for 100 feet with single cane without LOB on even surfaces.   Goal Progress Ambulated with single cane x 20 feet - inconsistent need for opposite UE to reach for wall/railing - poor hip control and increased fall risk.   Target Date 07/10/24   PT Goal 2   Goal Identifier Unsupported squat   Goal Description Jamir will be able to squat unsupported in order to  an item from floor.   Goal Progress Able to perform mini squats without UE support   Target Date 06/12/24   PT Goal 3   Goal Identifier Balance   Goal Description Jamir will score an increase of 2 from a 17 to a 19 on the FGA for improved  "balance and mobility.   Goal Progress Not assessed today.   Target Date 06/12/24   PT Goal 4   Goal Identifier Endurance   Goal Description Jamir will be able to maintain upright stance for 5 minutes without LOB or rest break while completing UE activity for improved endurance.   Goal Progress Jamir has been able to maintain stance positions with dynamic activity for up to 3 minutes to this point. Continues to improve his endurance   Target Date 09/12/24   Subjective Report   Subjective Report Jamir has some errands to run today.   Objective Measures   Objective Measures Objective Measure 1;Objective Measure 2;Objective Measure 3   Objective Measure 1   Objective Measure FGA: 17/30   Details 5 time sit to stand 16 seconds   PT Modalities   PT Modalities Vasopneumatic device   Vasopneumatic Device   Treatment Detail NICE machine   Vasopneumatic Pressure medium   Duration 10 min   Temperature 5/5   Patient Response/Progress Declined today   Treatment Interventions (PT)   Interventions Therapeutic Procedure/Exercise;Manual Therapy;Gait Training   Therapeutic Procedure/Exercise   Therapeutic Procedures: strength, endurance, ROM, flexibility minutes (35368) 40   Therapeutic Procedures Ther Proc 2;Ther Proc 3;Ther Proc 4;Ther Proc 5   Ther Proc 1 Nustep L9 x6 minutes. Rebounder 7\" ball - to fatigue - trampoline position tat highest level for lower trajectory and increased velocity - Jamir quite fatigued after this.   Ther Proc 1 - Details Back extension (foot 5 knees 4) 88# x20 - 80 degrees to 55 degrees, 52# from 80-35 degrees x20, deferred abs: Core ABs  (foot 5)  52# x 20.   Ther Proc 2 UE MedX   Ther Proc 2 - Details Chest press (seat 5 pins 3) 160# x16. Lat pulldowns (seat 6) 210# x 25 with seatbelt. Rows 130# (chest 1) x 25   Ther Proc 3 Deferred today: Green band TKE x 12 B - unable to complete with bending only one leg   Ther Proc 3 - Details Deferred: Hip ABD 26# partial range x 15 then 30# x 5 with less motion but " more than isometric. Leg press (seat 19, back up) 176 x20 then 190# x18 with 1/2 foam roller between knees for better knee alignment   Ther Proc 4 standing D2 PNF flexion with thoracic rotation with Lazaro L 4kg  x15 then R 2kg x 15. Cues for posture.   Gait Training   Gait 1 Ambulation with single railing x 45 feet, then single cane near railing which allowed Jamir to stabilize a touch more, then attempted with no railing and cane on R side - completed but decreased stride length, more hip drop and much slower krystin. \   Manual Therapy   Manual Therapy 1 STM/MFR to RTC supraspinatus, UT, and infraspinatus and into SCM, scalenes. Very tender and tight through SCM. Inferior mobs at GH joint - grade 3-4. AP mobs at GH joint. STM to R middle deltoid x5 minutes.   Manual Therapy 1 - Details Oscillating manual traction x 8 minutes   Plan   Plan for next session lazaro light deadlift?   Total Session Time   Timed Code Treatment Minutes 40   Total Treatment Time (sum of timed and untimed services) 40

## 2024-10-21 ENCOUNTER — MYC MEDICAL ADVICE (OUTPATIENT)
Dept: FAMILY MEDICINE | Facility: OTHER | Age: 69
End: 2024-10-21
Payer: COMMERCIAL

## 2024-10-21 DIAGNOSIS — R39.15 URINARY URGENCY: ICD-10-CM

## 2024-10-21 RX ORDER — OXYBUTYNIN CHLORIDE 10 MG/1
10 TABLET, EXTENDED RELEASE ORAL EVERY MORNING
Qty: 90 TABLET | Refills: 3 | Status: SHIPPED | OUTPATIENT
Start: 2024-10-21

## 2024-10-21 NOTE — TELEPHONE ENCOUNTER
See refill encounter/call that was worked and routed to Sara Langston.      TYP out today.     Patient leaves for [place] in morning.     [facility]    Dior Cuevas RN on 10/21/2024 at 12:09 PM

## 2024-10-21 NOTE — TELEPHONE ENCOUNTER
Patient is aware that medication has been sent to the pharmacy.    Also called patient back and left voicemail to please schedule annual visit.    Shira Balbuena on 10/21/2024 at 1:23 PM

## 2024-10-21 NOTE — TELEPHONE ENCOUNTER
Requested Prescriptions   Pending Prescriptions Disp Refills    oxyBUTYnin ER (DITROPAN XL) 10 MG 24 hr tablet 90 tablet 3     Sig: Take 1 tablet (10 mg) by mouth every morning.       Muscarinic Antagonists (Urinary Incontinence Agents) Failed - 10/21/2024 12:01 PM        Failed - Patient does not have a diagnosis of glaucoma on the problem list     If glaucoma diagnosis is new, refer refill to physician.         Last Prescription Date:   9/28/2023  Last Fill Qty/Refills:         90, R-3    Last Office Visit:              9/28/2023   Overdue for MWV.    Future Office visit:           None    Dior Cuevas RN on 10/21/2024 at 12:07 PM

## 2024-10-21 NOTE — TELEPHONE ENCOUNTER
Reason for call: Medication or medication refill    Have you contacted your pharmacy regarding this refill? YES    Name of medication requested: oxybutynin ER    How many days of medication do you have left? ZERO    What pharmacy do you use? [facility] [place]    Preferred method for responding to this message: Telephone Call    Phone number patient can be reached at: Cell number on file:    Telephone Information:   Mobile [phone]       If we cannot reach you directly, may we leave a detailed response at the number you provided? Yes      Patient requests the refill be filled as soon as possible so he can pick it up today as he will be leaving town tomorrow morning.      Francine Heaton on 10/21/2024 at 11:53 AM

## 2024-12-09 ENCOUNTER — MYC MEDICAL ADVICE (OUTPATIENT)
Dept: FAMILY MEDICINE | Facility: OTHER | Age: 69
End: 2024-12-09
Payer: COMMERCIAL

## 2024-12-09 DIAGNOSIS — I10 ESSENTIAL (PRIMARY) HYPERTENSION: ICD-10-CM

## 2024-12-09 RX ORDER — LISINOPRIL 10 MG/1
10 TABLET ORAL DAILY
Qty: 90 TABLET | Refills: 1 | Status: SHIPPED | OUTPATIENT
Start: 2024-11-29

## 2024-12-09 NOTE — TELEPHONE ENCOUNTER
Lisinopril moved from [facility] to [facility] [place] [facility] per patient request.      Non-controlled substance.  Order date adjusted to ordered date.  Fulfilling signed order.    Patient update on MyChart.  Iveth Devries RN on 12/9/2024 at 11:19 AM

## 2025-01-02 DIAGNOSIS — G35 MULTIPLE SCLEROSIS (H): ICD-10-CM

## 2025-01-02 DIAGNOSIS — N31.9 NEUROGENIC BLADDER: ICD-10-CM

## 2025-01-04 NOTE — TELEPHONE ENCOUNTER
CATH SELF-CATH 14FR 814 BX30       Last Written Prescription Date:  10/24/23  Last Fill Quantity: 200,   # refills: 12  Last Office Visit: 9/28/23  Future Office visit:       Routing refill request to provider for review/approval because:  Drug not active on patient's medication list   Patient has not been seen in over a year except for a visit for shoulder pain with Lashawn Morillo, RN on 1/4/2025 at 7:40 AM

## 2025-04-28 ENCOUNTER — THERAPY VISIT (OUTPATIENT)
Dept: PHYSICAL THERAPY | Facility: OTHER | Age: 70
End: 2025-04-28
Payer: MEDICARE

## 2025-04-28 DIAGNOSIS — G35 MULTIPLE SCLEROSIS (H): ICD-10-CM

## 2025-04-28 DIAGNOSIS — R26.9 NEUROLOGIC GAIT DYSFUNCTION: ICD-10-CM

## 2025-04-28 PROCEDURE — 97162 PT EVAL MOD COMPLEX 30 MIN: CPT | Mod: GP,KX

## 2025-04-28 PROCEDURE — 97110 THERAPEUTIC EXERCISES: CPT | Mod: GP,KX

## 2025-04-30 NOTE — PROGRESS NOTES
PHYSICAL THERAPY EVALUATION  Type of Visit: Evaluation       Fall Risk Screen:  Have you fallen 2 or more times in the past year?: Yes  Have you fallen and had an injury in the past year?: No  Is patient receiving Physical Therapy Services?: Yes    Subjective         Presenting condition or subjective complaint: Jamir is a 69-year old male that presents to therapy for evaluation. Jamir is well-known to this writer as we have worked together in the past. Jamir has a chronic diagnosis of multiple sclerosis as well as a relevant medical history that includes compression fractures, s/p spinal surgery. 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 [place] before he resumed therapy in [place] this past winter. He also had Covid in February that he was able to recover from but given his MS he has lingering side effects that have left him with diminished mobility compared to what he was doing when he departed last October. Jamir had previously been using bilateral canes but it became too difficult and he ended up falling. He decided to use his 4WW from then on. His R knee has given him fits as well. That was part of the reason he fell.  Date of onset: 04/09/25    Relevant medical history: Bladder or bowel problems; Neck injury; Multiple Sclerosis; History of fractures; Significant weakness   Dates & types of surgery: back surgery    Prior diagnostic imaging/testing results: MRI     Prior therapy history for the same diagnosis, illness or injury: Yes numerous bouts of therapy    Prior Level of Function  Transfers: Assistive equipment  Ambulation: Assistive equipment  ADL: Independent  IADL: Driving, Housekeeping, Meal preparation, Work    Living Environment  Social support: With a significant other or spouse   Type of home: House   Stairs to enter the home: Yes   Is there a railing: Yes    Equipment owned: Straight Cane; Walker with  "wheels; Standard wheelchair     Employment: Yes resort owner  Hobbies/Interests: Catching big bass    Patient goals for therapy: prolonged sitting, tailor sit, ambulaltion with canes    Pain assessment: Pain denied     Objective      Cognitive Status Examination  Orientation: Oriented to person, place and time   Level of Consciousness: Alert  Follows Commands and Answers Questions: 100% of the time  Personal Safety and Judgement: Intact  Memory: Intact    OBSERVATION: Uses 4WW today. Well-appearing.   INTEGUMENTARY: Impaired, LE bruising and scrapes. Jamir always notes its from doing his work at the resort.   POSTURE: Standing Posture: adducted Les, flexed gait posture  PALPATION: n/a  RANGE OF MOTION:  Limited R ER, limited knee extension  STRENGTH:  has R sided weakness. Hip flexor strength 2-/5, knee extension 4-/5, abduction 3-/5.    BED MOBILITY: SBA    TRANSFERS:  depends on the surface of transfer. SBA for nearly all chairs. Floor to stand transfer may need MIN A at times. Heavy UE assist from lower surfaces.    GAIT:   Level of [place]: SBA  Assistive Device(s): Walker (four wheeled)  Gait Deviations: Knee extension decreased R, Transverse plane rotation R  Decreased dynamic control R, LE crosses midline R  Gait Distance: ambulates 100-300 feet today in session.   Stairs: not observed.    BALANCE: Sitting Balance (static):Normal, if supported in a chair, or in short sitting position. Pt struggles with any long sitting balance or balance in tailor sit. Can heel sit for brief periods of time but limited due to fatigue and leg weakness not balance with sitting in this case.   Sitting Balance (dynamic):Good, see above for positional differences.  Standing Balance (static):Good  Standing Balance (dynamic):Poor, requires assist with a device for balance. In this case he has been relying on 4WW.    SPECIAL TESTS  5 Times Sit-to-Stand (5TSTS)  15 seconds from a 21\" surface. Upper extremity assist.      Single " Leg Stance Right (sec) Unable    Single Leg Stance Left (sec) Unable     SENSATION:  R lower extremity more impaired than the L.     MUSCLE TONE: Spasticity    Assessment & Plan   CLINICAL IMPRESSIONS  Medical Diagnosis: multiple sclerosis; neurological gait dysfunction.    Treatment Diagnosis: impaired mobility, poor endurance, weakness, ROM loss   Impression/Assessment: Patient is a 69 year old male with weakness and mobility complaints.  The following significant findings have been identified: Decreased ROM/flexibility, Decreased joint mobility, Decreased strength, Impaired balance, Decreased proprioception, Impaired sensation, Impaired gait, Impaired muscle performance, Decreased activity tolerance, and Impaired posture. These impairments interfere with their ability to perform work tasks, recreational activities, household chores, driving , household mobility, and community mobility as compared to previous level of function. Jamir is below his baseline level of function and certainly is at risk for continued decline in function without physical activity. He will continue to benefit from skilled therapy services.     Clinical Decision Making (Complexity):  Clinical Presentation: Evolving/Changing  Clinical Presentation Rationale: based on medical and personal factors listed in PT evaluation  Clinical Decision Making (Complexity): Moderate complexity    PLAN OF CARE  Treatment Interventions:  Interventions: Gait Training, Manual Therapy, Neuromuscular Re-education, Therapeutic Activity, Therapeutic Exercise    Long Term Goals     PT Goal 1  Goal Identifier: sitting  Goal Description: Jamir will be able to tailor sit for 2 minutes without loss of balance for improved motion, stability, and positioning to be able to work at low surfaces.  Target Date: 06/10/25  PT Goal 2  Goal Identifier: 5 times sit to stand  Goal Description: Jamir will be able to complete 5 times sit to  less than 13 seconds to lower his  fall risk with improved LE strength.  Target Date: 07/08/25  PT Goal 3  Goal Identifier: Ambulation  Goal Description: Jamir will be able to ambulate 100 feet with B canes for improved mobility.  Target Date: 07/22/25  PT Goal 4  Goal Identifier: Floor to stand transfer  Goal Description: Jamir will be able to complete floor to stand transfer x 3 without LOB and within 5 minutes for improved endurance and increased ability ability transfer during the day.  Target Date: 07/22/25      Frequency of Treatment: 2-3x/week  Duration of Treatment: 90 days    Risks and benefits of evaluation/treatment have been explained.   Patient/Family/caregiver agrees with Plan of Care.     Evaluation Time:     PT Eval, Moderate Complexity Minutes (00870): 40       Signing Clinician: LES [facility]                                                                                   OUTPATIENT PHYSICAL THERAPY      PLAN OF TREATMENT FOR OUTPATIENT REHABILITATION   Patient's Last Name, First Name, M.Jamir Murdock YOB: 1955   Provider's Name   [facility]   Medical Record No.  [id number]     Onset Date: 04/09/25  Start of Care Date: 04/28/25     Medical Diagnosis:  multiple sclerosis; neurological gait dysfunction.      PT Treatment Diagnosis:  impaired mobility, poor endurance, weakness, ROM loss Plan of Treatment  Frequency/Duration: 2-3x/week/ 90 days    Certification date from 04/28/25 to 07/27/25         See note for plan of treatment details and functional goals     Manjit Pink, PT                         I CERTIFY THE NEED FOR THESE SERVICES FURNISHED UNDER        THIS PLAN OF TREATMENT AND WHILE UNDER MY CARE     (Physician attestation of this document indicates review and certification of the therapy plan).              Referring Provider:  Dr. Miriam Escobar MD (PCP). Dr. Rudy Stanley MD (referring provider(    Initial  Assessment  See Epic Evaluation- Start of Care Date: 04/28/25

## 2025-05-01 ENCOUNTER — THERAPY VISIT (OUTPATIENT)
Dept: PHYSICAL THERAPY | Facility: OTHER | Age: 70
End: 2025-05-01
Payer: MEDICARE

## 2025-05-01 DIAGNOSIS — G35 MULTIPLE SCLEROSIS (H): Primary | ICD-10-CM

## 2025-05-01 DIAGNOSIS — R26.9 NEUROLOGIC GAIT DYSFUNCTION: ICD-10-CM

## 2025-05-01 PROCEDURE — 97110 THERAPEUTIC EXERCISES: CPT | Mod: GP,KX

## 2025-05-05 ENCOUNTER — THERAPY VISIT (OUTPATIENT)
Dept: PHYSICAL THERAPY | Facility: OTHER | Age: 70
End: 2025-05-05
Payer: MEDICARE

## 2025-05-05 DIAGNOSIS — G35 MULTIPLE SCLEROSIS (H): Primary | ICD-10-CM

## 2025-05-05 DIAGNOSIS — R26.9 NEUROLOGIC GAIT DYSFUNCTION: ICD-10-CM

## 2025-05-05 PROCEDURE — 97110 THERAPEUTIC EXERCISES: CPT | Mod: GP,KX

## 2025-05-07 ENCOUNTER — THERAPY VISIT (OUTPATIENT)
Dept: PHYSICAL THERAPY | Facility: OTHER | Age: 70
End: 2025-05-07
Payer: MEDICARE

## 2025-05-07 ENCOUNTER — HOSPITAL ENCOUNTER (OUTPATIENT)
Dept: MRI IMAGING | Facility: OTHER | Age: 70
Discharge: HOME | End: 2025-05-07
Payer: MEDICARE

## 2025-05-07 DIAGNOSIS — G35 MULTIPLE SCLEROSIS (H): ICD-10-CM

## 2025-05-07 DIAGNOSIS — R26.9 ABNORMALITY OF GAIT: ICD-10-CM

## 2025-05-07 DIAGNOSIS — M51.04 INTERVERTEBRAL THORACIC DISC DISORDER WITH MYELOPATHY, THORACIC REGION: ICD-10-CM

## 2025-05-07 DIAGNOSIS — R26.9 NEUROLOGIC GAIT DYSFUNCTION: ICD-10-CM

## 2025-05-07 DIAGNOSIS — G35 MULTIPLE SCLEROSIS (H): Primary | ICD-10-CM

## 2025-05-07 PROCEDURE — 72141 MRI NECK SPINE W/O DYE: CPT

## 2025-05-07 PROCEDURE — 72146 MRI CHEST SPINE W/O DYE: CPT

## 2025-05-07 PROCEDURE — 70551 MRI BRAIN STEM W/O DYE: CPT | Mod: 26 | Performed by: RADIOLOGY

## 2025-05-07 PROCEDURE — 72146 MRI CHEST SPINE W/O DYE: CPT | Mod: 26 | Performed by: RADIOLOGY

## 2025-05-07 PROCEDURE — 70551 MRI BRAIN STEM W/O DYE: CPT

## 2025-05-07 PROCEDURE — 97110 THERAPEUTIC EXERCISES: CPT | Mod: GP,KX

## 2025-05-07 PROCEDURE — 72141 MRI NECK SPINE W/O DYE: CPT | Mod: 26 | Performed by: RADIOLOGY

## 2025-05-12 ENCOUNTER — THERAPY VISIT (OUTPATIENT)
Dept: PHYSICAL THERAPY | Facility: OTHER | Age: 70
End: 2025-05-12
Payer: MEDICARE

## 2025-05-12 DIAGNOSIS — R26.9 NEUROLOGIC GAIT DYSFUNCTION: ICD-10-CM

## 2025-05-12 DIAGNOSIS — G35 MULTIPLE SCLEROSIS (H): Primary | ICD-10-CM

## 2025-05-12 PROCEDURE — 97110 THERAPEUTIC EXERCISES: CPT | Mod: GP,KX

## 2025-05-13 ENCOUNTER — OFFICE VISIT (OUTPATIENT)
Dept: FAMILY MEDICINE | Facility: OTHER | Age: 70
End: 2025-05-13
Attending: FAMILY MEDICINE
Payer: MEDICARE

## 2025-05-13 VITALS
HEIGHT: 69 IN | BODY MASS INDEX: 26.87 KG/M2 | WEIGHT: 181.4 LBS | SYSTOLIC BLOOD PRESSURE: 136 MMHG | DIASTOLIC BLOOD PRESSURE: 86 MMHG | TEMPERATURE: 96.9 F | OXYGEN SATURATION: 100 % | HEART RATE: 62 BPM | RESPIRATION RATE: 16 BRPM

## 2025-05-13 DIAGNOSIS — Z00.00 MEDICARE ANNUAL WELLNESS VISIT, SUBSEQUENT: Primary | ICD-10-CM

## 2025-05-13 DIAGNOSIS — R33.9 RETENTION OF URINE: ICD-10-CM

## 2025-05-13 DIAGNOSIS — M85.80 OSTEOPENIA, UNSPECIFIED LOCATION: ICD-10-CM

## 2025-05-13 DIAGNOSIS — M81.0 OSTEOPOROSIS, UNSPECIFIED OSTEOPOROSIS TYPE, UNSPECIFIED PATHOLOGICAL FRACTURE PRESENCE: ICD-10-CM

## 2025-05-13 DIAGNOSIS — Z12.5 PROSTATE CANCER SCREENING: ICD-10-CM

## 2025-05-13 DIAGNOSIS — I10 PRIMARY HYPERTENSION: ICD-10-CM

## 2025-05-13 DIAGNOSIS — G35 MULTIPLE SCLEROSIS (H): ICD-10-CM

## 2025-05-13 DIAGNOSIS — R39.15 URINARY URGENCY: ICD-10-CM

## 2025-05-13 DIAGNOSIS — I10 ESSENTIAL (PRIMARY) HYPERTENSION: ICD-10-CM

## 2025-05-13 LAB
ALBUMIN SERPL BCG-MCNC: 4.2 G/DL (ref 3.5–5.2)
ALP SERPL-CCNC: 101 U/L (ref 40–150)
ALT SERPL W P-5'-P-CCNC: 27 U/L (ref 0–70)
ANION GAP SERPL CALCULATED.3IONS-SCNC: 9 MMOL/L (ref 7–15)
AST SERPL W P-5'-P-CCNC: 24 U/L (ref 0–45)
BASOPHILS # BLD AUTO: 0 10E3/UL (ref 0–0.2)
BASOPHILS NFR BLD AUTO: 1 %
BILIRUB SERPL-MCNC: 0.5 MG/DL
BUN SERPL-MCNC: 25.9 MG/DL (ref 8–23)
CALCIUM SERPL-MCNC: 10.1 MG/DL (ref 8.8–10.4)
CHLORIDE SERPL-SCNC: 102 MMOL/L (ref 98–107)
CHOLEST SERPL-MCNC: 150 MG/DL
CREAT SERPL-MCNC: 1.14 MG/DL (ref 0.67–1.17)
EGFRCR SERPLBLD CKD-EPI 2021: 70 ML/MIN/1.73M2
EOSINOPHIL # BLD AUTO: 0.2 10E3/UL (ref 0–0.7)
EOSINOPHIL NFR BLD AUTO: 4 %
ERYTHROCYTE [DISTWIDTH] IN BLOOD BY AUTOMATED COUNT: 13.2 % (ref 10–15)
FASTING STATUS PATIENT QL REPORTED: YES
FASTING STATUS PATIENT QL REPORTED: YES
GLUCOSE SERPL-MCNC: 94 MG/DL (ref 70–99)
HCO3 SERPL-SCNC: 28 MMOL/L (ref 22–29)
HCT VFR BLD AUTO: 45.8 % (ref 40–53)
HDLC SERPL-MCNC: 38 MG/DL
HGB BLD-MCNC: 15.4 G/DL (ref 13.3–17.7)
IMM GRANULOCYTES # BLD: 0 10E3/UL
IMM GRANULOCYTES NFR BLD: 0 %
LDLC SERPL CALC-MCNC: 98 MG/DL
LYMPHOCYTES # BLD AUTO: 1.6 10E3/UL (ref 0.8–5.3)
LYMPHOCYTES NFR BLD AUTO: 26 %
MCH RBC QN AUTO: 29.2 PG (ref 26.5–33)
MCHC RBC AUTO-ENTMCNC: 33.6 G/DL (ref 31.5–36.5)
MCV RBC AUTO: 87 FL (ref 78–100)
MONOCYTES # BLD AUTO: 0.6 10E3/UL (ref 0–1.3)
MONOCYTES NFR BLD AUTO: 9 %
NEUTROPHILS # BLD AUTO: 3.6 10E3/UL (ref 1.6–8.3)
NEUTROPHILS NFR BLD AUTO: 60 %
NONHDLC SERPL-MCNC: 112 MG/DL
NRBC # BLD AUTO: 0 10E3/UL
NRBC BLD AUTO-RTO: 0 /100
PLATELET # BLD AUTO: 221 10E3/UL (ref 150–450)
POTASSIUM SERPL-SCNC: 4.7 MMOL/L (ref 3.4–5.3)
PROT SERPL-MCNC: 7 G/DL (ref 6.4–8.3)
PSA SERPL DL<=0.01 NG/ML-MCNC: 0.44 NG/ML (ref 0–4.5)
RBC # BLD AUTO: 5.27 10E6/UL (ref 4.4–5.9)
SODIUM SERPL-SCNC: 139 MMOL/L (ref 135–145)
TRIGL SERPL-MCNC: 71 MG/DL
WBC # BLD AUTO: 6.1 10E3/UL (ref 4–11)

## 2025-05-13 PROCEDURE — 82040 ASSAY OF SERUM ALBUMIN: CPT | Mod: ZL | Performed by: FAMILY MEDICINE

## 2025-05-13 PROCEDURE — 36415 COLL VENOUS BLD VENIPUNCTURE: CPT | Mod: ZL | Performed by: FAMILY MEDICINE

## 2025-05-13 PROCEDURE — G0463 HOSPITAL OUTPT CLINIC VISIT: HCPCS

## 2025-05-13 PROCEDURE — 85025 COMPLETE CBC W/AUTO DIFF WBC: CPT | Mod: ZL | Performed by: FAMILY MEDICINE

## 2025-05-13 PROCEDURE — G0103 PSA SCREENING: HCPCS | Mod: ZL | Performed by: FAMILY MEDICINE

## 2025-05-13 PROCEDURE — 82465 ASSAY BLD/SERUM CHOLESTEROL: CPT | Mod: ZL | Performed by: FAMILY MEDICINE

## 2025-05-13 RX ORDER — OXYBUTYNIN CHLORIDE 10 MG/1
10 TABLET, EXTENDED RELEASE ORAL EVERY MORNING
Qty: 90 TABLET | Refills: 4 | Status: SHIPPED | OUTPATIENT
Start: 2025-05-13

## 2025-05-13 RX ORDER — LISINOPRIL 10 MG/1
10 TABLET ORAL DAILY
Qty: 90 TABLET | Refills: 4 | Status: SHIPPED | OUTPATIENT
Start: 2025-05-13

## 2025-05-13 RX ORDER — DIPHENHYDRAMINE HCL 25 MG
50 TABLET ORAL
COMMUNITY

## 2025-05-13 SDOH — HEALTH STABILITY: PHYSICAL HEALTH: ON AVERAGE, HOW MANY DAYS PER WEEK DO YOU ENGAGE IN MODERATE TO STRENUOUS EXERCISE (LIKE A BRISK WALK)?: 2 DAYS

## 2025-05-13 ASSESSMENT — ANXIETY QUESTIONNAIRES
6. BECOMING EASILY ANNOYED OR IRRITABLE: NOT AT ALL
GAD7 TOTAL SCORE: 0
2. NOT BEING ABLE TO STOP OR CONTROL WORRYING: NOT AT ALL
1. FEELING NERVOUS, ANXIOUS, OR ON EDGE: NOT AT ALL
3. WORRYING TOO MUCH ABOUT DIFFERENT THINGS: NOT AT ALL
7. FEELING AFRAID AS IF SOMETHING AWFUL MIGHT HAPPEN: NOT AT ALL
GAD7 TOTAL SCORE: 0
7. FEELING AFRAID AS IF SOMETHING AWFUL MIGHT HAPPEN: NOT AT ALL
5. BEING SO RESTLESS THAT IT IS HARD TO SIT STILL: NOT AT ALL
4. TROUBLE RELAXING: NOT AT ALL
8. IF YOU CHECKED OFF ANY PROBLEMS, HOW DIFFICULT HAVE THESE MADE IT FOR YOU TO DO YOUR WORK, TAKE CARE OF THINGS AT HOME, OR GET ALONG WITH OTHER PEOPLE?: SOMEWHAT DIFFICULT
IF YOU CHECKED OFF ANY PROBLEMS ON THIS QUESTIONNAIRE, HOW DIFFICULT HAVE THESE PROBLEMS MADE IT FOR YOU TO DO YOUR WORK, TAKE CARE OF THINGS AT HOME, OR GET ALONG WITH OTHER PEOPLE: SOMEWHAT DIFFICULT
GAD7 TOTAL SCORE: 0

## 2025-05-13 ASSESSMENT — SOCIAL DETERMINANTS OF HEALTH (SDOH): HOW OFTEN DO YOU GET TOGETHER WITH FRIENDS OR RELATIVES?: MORE THAN THREE TIMES A WEEK

## 2025-05-13 ASSESSMENT — PATIENT HEALTH QUESTIONNAIRE - PHQ9
10. IF YOU CHECKED OFF ANY PROBLEMS, HOW DIFFICULT HAVE THESE PROBLEMS MADE IT FOR YOU TO DO YOUR WORK, TAKE CARE OF THINGS AT HOME, OR GET ALONG WITH OTHER PEOPLE: SOMEWHAT DIFFICULT
SUM OF ALL RESPONSES TO PHQ QUESTIONS 1-9: 4
SUM OF ALL RESPONSES TO PHQ QUESTIONS 1-9: 4

## 2025-05-13 ASSESSMENT — PAIN SCALES - GENERAL: PAINLEVEL_OUTOF10: MILD PAIN (2)

## 2025-05-13 NOTE — NURSING NOTE
Patient is here for annual medicare wellness. No concerns at this time.       Medication Reconciliation: complete    Coreen Torres LPN 5/13/2025 8:59 AM       Advance care directive on file? no  Advance care directive provided to patient? no       Coreen Torres LPN

## 2025-05-13 NOTE — PROGRESS NOTES
"Preventive Care Visit  [facility]  Kayla Pineda MD, Family Medicine  May 13, 2025      Assessment & Plan     Medicare annual wellness visit, subsequent  Reviewed preventive cares which are up-to-date    Essential (primary) hypertension  BP at goal.  Continue Zestril 10 mg daily.  - Lipid Panel; Future  - Comprehensive Metabolic Panel; Future  - CBC with Platelets & Differential; Future  - lisinopril (ZESTRIL) 10 MG tablet; Take 1 tablet (10 mg) by mouth daily.  - Lipid Panel  - Comprehensive Metabolic Panel  - CBC with Platelets & Differential    Urinary urgency  Followed by urology.  Self cathing 2-4 times per day.  No recent UTIs  - Comprehensive Metabolic Panel; Future  - CBC with Platelets & Differential; Future  - oxyBUTYnin ER (DITROPAN XL) 10 MG 24 hr tablet; Take 1 tablet (10 mg) by mouth every morning.  - Comprehensive Metabolic Panel  - CBC with Platelets & Differential      Retention of urine    - Comprehensive Metabolic Panel; Future  - CBC with Platelets & Differential; Future  - Comprehensive Metabolic Panel  - CBC with Platelets & Differential    Multiple sclerosis (H)  Followed by neurology.  Increase weakness and gait instability.  Involved in physical therapy.    Osteopenia, unspecified location      Osteoporosis, unspecified osteoporosis type, unspecified pathological fracture presence  Recommend repeat bone density scan.  Reviewed calcium vitamin D intake.  - DX Bone Density; Future    Prostate cancer screening    - PSA Screen [facility]; Future  - PSA Screen [facility]    Patient has been advised of split billing requirements and indicates understanding: Yes        BMI  Estimated body mass index is 26.79 kg/m  as calculated from the following:    Height as of this encounter: 1.753 m (5' 9\").    Weight as of this encounter: 82.3 kg (181 lb 6.4 oz).       Counseling  Appropriate preventive services were addressed with this patient via screening, questionnaire, or discussion as " appropriate for fall prevention, nutrition, physical activity, Tobacco-use cessation, social engagement, weight loss and cognition.  Checklist reviewing preventive services available has been given to the patient.  Reviewed patient's diet, addressing concerns and/or questions.   He is at risk for lack of exercise and has been provided with information to increase physical activity for the benefit of his well-being.   Patient reported safety concerns were addressed today.Information on urinary incontinence and treatment options given to patient.           Omari Bowie is a 69 year old, presenting for the following:  Medicare Visit    70 yo male presents for medicare wellness exam    PT x 2 weekly  Neurology twice a year, Dr Stanley, scans stable    Overall decreased strength and mobility, now using a walker mostly      History of Present Illness       Hypertension: He presents for follow up of hypertension.  He does not check blood pressure  regularly outside of the clinic. Outpatient blood pressures have not been over 140/90. He does not follow a low salt diet.           Hypertension Follow-up    Do you check your blood pressure regularly outside of the clinic? Yes   Are you following a low salt diet? No  Are your blood pressures ever more than 140 on the top number (systolic) OR more   than 90 on the bottom number (diastolic), for example 140/90? No    BP Readings from Last 2 Encounters:   05/13/25 136/86   08/12/24 110/64       Advance Care Planning    Discussed advance care planning with patient; however, patient declined at this time.        5/13/2025   General Health   How would you rate your overall physical health? Good   Feel stress (tense, anxious, or unable to sleep) Not at all         5/13/2025   Nutrition   Diet: Regular (no restrictions)         5/13/2025   Exercise   Days per week of moderate/strenous exercise 2 days   (!) EXERCISE CONCERN      5/13/2025   Social Factors   Frequency of gathering  with friends or relatives More than three times a week   Worry food won't last until get money to buy more No   Food not last or not have enough money for food? No   Do you have housing? (Housing is defined as stable permanent housing and does not include staying outside in a car, in a tent, in an abandoned building, in an overnight shelter, or couch-surfing.) Yes   Are you worried about losing your housing? No   Lack of transportation? No   Unable to get utilities (heat,electricity)? No         5/13/2025   Fall Risk   Fallen 2 or more times in the past year? Yes    Yes   Trouble with walking or balance? Yes    Yes   Reason Gait Speed Test Not Completed Patient declines   Reason for decline MS related       Multiple values from one day are sorted in reverse-chronological order          5/13/2025   Activities of Daily Living- Home Safety   Needs help with the following daily activites None of the above   Safety concerns in the home Throw rugs in the hallway         5/13/2025   Dental   Dentist two times every year? Yes         5/13/2025   Hearing Screening   Hearing concerns? None of the above         5/13/2025   Driving Risk Screening   Patient/family members have concerns about driving No         5/13/2025   General Alertness/Fatigue Screening   Have you been more tired than usual lately? No         5/13/2025   Urinary Incontinence Screening   Bothered by leaking urine in past 6 months Yes       Today's PHQ-9 Score:       5/13/2025     8:35 AM   PHQ-9 SCORE   PHQ-9 Total Score MyChart 4 (Minimal depression)   PHQ-9 Total Score 4        Patient-reported         5/13/2025   Substance Use   Alcohol more than 3/day or more than 7/wk No   Do you have a current opioid prescription? No   How severe/bad is pain from 1 to 10? 2/10   Do you use any other substances recreationally? No     Social History     Tobacco Use    Smoking status: Never     Passive exposure: Never    Smokeless tobacco: Never   Vaping Use    Vaping  status: Never Used   Substance Use Topics    Alcohol use: No    Drug use: No           5/13/2025   AAA Screening   Family history of Abdominal Aortic Aneurysm (AAA)? (!) YES    Last PSA:   PSA   Date Value Ref Range Status   04/10/2008 0.66 0 - 4 ug/L Final     Prostate Specific Antigen Screen   Date Value Ref Range Status   09/28/2023 0.52 0.00 - 4.50 ng/mL Final     ASCVD Risk   The 10-year ASCVD risk score (Darvin DK, et al., 2019) is: 22.8%    Values used to calculate the score:      Age: 69 years      Sex: Male      Is Non- : No      Diabetic: No      Tobacco smoker: No      Systolic Blood Pressure: 136 mmHg      Is BP treated: Yes      HDL Cholesterol: 37 mg/dL      Total Cholesterol: 172 mg/dL            Reviewed and updated as needed this visit by Provider                    Past Medical History:   Diagnosis Date    Cervical disc disorder     No Comments Provided    Enlarged prostate with lower urinary tract symptoms (LUTS)     No Comments Provided    Essential (primary) hypertension     No Comments Provided    Multiple sclerosis (H)     No Comments Provided    Other specified disorders of bone density and structure, unspecified site (CODE)     No Comments Provided    Other symptoms and signs involving cognitive functions and awareness     No Comments Provided    Spondylosis of cervical region without myelopathy or radiculopathy     No Comments Provided     Past Surgical History:   Procedure Laterality Date    APPENDECTOMY OPEN      appendectomy    COLONOSCOPY  01/01/2010 01/01/2010,with polyps resected    COLONOSCOPY  12/15/2015    12/15/2015,[place]- colitis    COLONOSCOPY N/A 06/17/2021    2 sessile serrated and 1 tubular adenoma, 3 year follow up, 6/17/2024    COLONOSCOPY  06/17/2024    F/U 2034 normal    COLONOSCOPY N/A 6/17/2024    Procedure: Colonoscopy;  Surgeon: Rudy Gilbert MD;  Location: [facility] OR    COMBINED CYSTOSCOPY, URETEROSCOPY, LASER HOLMIUM LITHOTRIPSY  URETER(S) Left 04/10/2018    Procedure: COMBINED CYSTOSCOPY, URETEROSCOPY, LASER HOLMIUM LITHOTRIPSY URETER(S);  Left Ureteroscopy with Holmium Laser Lithotripsy & Stent Placement.;  Surgeon: Olu Saldivar MD;  Location: [facility] OR    CYSTOSCOPY, RETROGRADES, INSERT STENT URETER(S), COMBINED Left 04/03/2018    Procedure: COMBINED CYSTOSCOPY, RETROGRADES, INSERT STENT URETER(S);;  Surgeon: Olu Saldivar MD;  Location: [facility] OR    LAMINECTOMY THORACIC TWO LEVELS N/A 09/06/2022    Procedure: Thoracic 10 to thoracic 12 laminectomies;  Surgeon: Edson Leija MD;  Location: [facility] OR    PROSTATE SURGERY      ,PROSTATECTOMY,Laser prostate surgery -- BPH    WRIST SURGERY Left     s/p fracture     BP Readings from Last 3 Encounters:   05/13/25 136/86   08/12/24 110/64   06/17/24 138/78    Wt Readings from Last 3 Encounters:   05/13/25 82.3 kg (181 lb 6.4 oz)   08/12/24 79.4 kg (175 lb)   06/17/24 81.6 kg (180 lb)                  Current providers sharing in care for this patient include:  Patient Care Team:  Kayla Hobbs MD as PCP - General (Family Practice)  Kalya Hobbs MD as Assigned PCP  ([facility]    The following health maintenance items are reviewed in Epic and correct as of today:  Health Maintenance   Topic Date Due    BMP  08/11/2024    MEDICARE ANNUAL WELLNESS VISIT  09/28/2024    INFLUENZA VACCINE (Season Ended) 09/01/2025    PHQ-9  11/13/2025    LIPID  04/15/2026    FALL RISK ASSESSMENT  05/13/2026    DIABETES SCREENING  08/11/2026    ADVANCE CARE PLANNING  06/02/2029    DTAP/TDAP/TD IMMUNIZATION (2 - Td or Tdap) 11/29/2029    RSV VACCINE (1 - 1-dose 75+ series) 06/03/2030    COLORECTAL CANCER SCREENING  06/17/2034    HEPATITIS C SCREENING  Completed    DEPRESSION ACTION PLAN  Completed    Pneumococcal Vaccine: 50+ Years  Completed    ZOSTER IMMUNIZATION  Completed    HPV IMMUNIZATION  Aged Out    MENINGITIS IMMUNIZATION   "Aged Out    COVID-19 Vaccine  Discontinued         Review of Systems  Constitutional, neuro, ENT, endocrine, pulmonary, cardiac, gastrointestinal, genitourinary, musculoskeletal, integument and psychiatric systems are negative, except as otherwise noted.     Objective    Exam  /86   Pulse 62   Temp 96.9  F (36.1  C) (Tympanic)   Resp 16   Ht 1.753 m (5' 9\")   Wt 82.3 kg (181 lb 6.4 oz)   SpO2 100%   BMI 26.79 kg/m     Estimated body mass index is 26.79 kg/m  as calculated from the following:    Height as of this encounter: 1.753 m (5' 9\").    Weight as of this encounter: 82.3 kg (181 lb 6.4 oz).    Physical Exam  GENERAL: alert and no distress  NECK: no adenopathy, no asymmetry, masses, or scars  RESP: lungs clear to auscultation - no rales, rhonchi or wheezes  CV: regular rate and rhythm, normal S1 S2, no S3 or S4, no murmur, click or rub, no peripheral edema  SKIN: no suspicious lesions or rashes  NEURO: Spastic gait.  PSYCH: mentation appears normal, affect normal/bright        5/13/2025   Mini Cog   Clock Draw Score 2 Normal   3 Item Recall 2 objects recalled   Mini Cog Total Score 4              Signed Electronically by: Kayla Pineda MD    "

## 2025-05-15 ENCOUNTER — RESULTS FOLLOW-UP (OUTPATIENT)
Dept: FAMILY MEDICINE | Facility: OTHER | Age: 70
End: 2025-05-15

## 2025-05-15 ENCOUNTER — THERAPY VISIT (OUTPATIENT)
Dept: PHYSICAL THERAPY | Facility: OTHER | Age: 70
End: 2025-05-15
Payer: MEDICARE

## 2025-05-15 DIAGNOSIS — G35 MULTIPLE SCLEROSIS (H): Primary | ICD-10-CM

## 2025-05-15 DIAGNOSIS — R26.9 NEUROLOGIC GAIT DYSFUNCTION: ICD-10-CM

## 2025-05-15 PROCEDURE — 97110 THERAPEUTIC EXERCISES: CPT | Mod: GP,KX

## 2025-05-19 ENCOUNTER — THERAPY VISIT (OUTPATIENT)
Dept: PHYSICAL THERAPY | Facility: OTHER | Age: 70
End: 2025-05-19
Payer: MEDICARE

## 2025-05-19 DIAGNOSIS — G35 MULTIPLE SCLEROSIS (H): Primary | ICD-10-CM

## 2025-05-19 DIAGNOSIS — R26.9 NEUROLOGIC GAIT DYSFUNCTION: ICD-10-CM

## 2025-05-19 PROCEDURE — 97530 THERAPEUTIC ACTIVITIES: CPT | Mod: GP,KX

## 2025-05-19 PROCEDURE — 97110 THERAPEUTIC EXERCISES: CPT | Mod: GP,KX

## 2025-05-21 ENCOUNTER — DOCUMENTATION ONLY (OUTPATIENT)
Dept: NEUROSURGERY | Facility: CLINIC | Age: 70
End: 2025-05-21
Payer: COMMERCIAL

## 2025-05-21 NOTE — CONFIDENTIAL NOTE
Action [facility] Medical Records P#[phone] F#[phone]   Action Taken Spoke with [facility] . Medical Records department was closed, received fax number for medical records from . Sent fax request for disk with imaging:    Please send disk with the following images, no need for reports we have those through Care Everywhere.  12/11/2023 - Thoracic MRI, Cervical MRI, Brain MRI.    Please mail to:  Attn: Fani BETANCOURT [facility]  [address]  [address]  [place] [address]    Please call direct line and leave a voicemail or please send fax with confirmation that disk has been mailed. Thank you!   Status: 5/21, request sent    Action [facility] Medical Records F#[phone] Email: [email or web address]   Action Taken Please mail a disk with 11/23/2024 Brain MRI images. No need for reports we have thru Care Everywhere.    Please mail disk to:  Attn: Fani BETANCOURT [facility]  [address]   [address]  [place] [address]   Status: 5/21, request sent

## 2025-05-22 ENCOUNTER — TELEPHONE (OUTPATIENT)
Dept: NEUROSURGERY | Facility: CLINIC | Age: 70
End: 2025-05-22

## 2025-05-22 ENCOUNTER — OFFICE VISIT (OUTPATIENT)
Dept: NEUROSURGERY | Facility: CLINIC | Age: 70
End: 2025-05-22
Payer: COMMERCIAL

## 2025-05-22 VITALS
TEMPERATURE: 96.3 F | HEART RATE: 94 BPM | DIASTOLIC BLOOD PRESSURE: 84 MMHG | OXYGEN SATURATION: 98 % | BODY MASS INDEX: 27.02 KG/M2 | RESPIRATION RATE: 18 BRPM | WEIGHT: 183 LBS | SYSTOLIC BLOOD PRESSURE: 148 MMHG

## 2025-05-22 DIAGNOSIS — M47.14 THORACIC MYELOPATHY: Primary | ICD-10-CM

## 2025-05-22 ASSESSMENT — PAIN SCALES - GENERAL: PAINLEVEL_OUTOF10: NO PAIN (0)

## 2025-05-22 NOTE — PROGRESS NOTES
69M with history of MS and prior T10-12 laminectomies in 2022 returns with progressive gait difficulties.  He did well after surgery and had substantial improvement in his gait.  Continues to have MS treatment with a neurologist at [facility], and has had stable overall disease findings.  In the last 3 months, he has had significant worsening of gait tolerance and strength in his therapy appointments.  This was reviewed with his neurologist and MRI imaging was obtained.  MR thoracic, personally viewed, shows postop changes and dorsal decompression from T10-T12, but with progressive facet arthropathy and enlargement leading to recurrent severe stenosis.       Physical Exam  BP (!) 148/84   Pulse 94   Temp (!) 96.3  F (35.7  C) (Temporal)   Resp 18   Wt 83 kg (183 lb)   SpO2 98%   BMI 27.02 kg/m      Mental status:  Alert and Oriented x 3, speech is fluent.  HEENT:  PERRL.  EOM s intact.  Visual fields full to gross exam  Cranial nerves:  II-XII intact.   Motor:    Shoulder Abduction:  Right:  5/5   Left:  5/5  Biceps:                      Right:  5/5   Left:  5/5  Triceps:                     Right:  5/5   Left:  5/5  Interosseus :            Right:  5/5   Left:  5/5  Hip Flexor:                Right: 4+/5  Left:  4+/5  Quadriceps:             Right:  5/5  Left:  5/5  Hamstrings:             Right:  5/5  Left:  5/5  Gastroc Soleus:        Right:  5/5  Left:  5/5  Tib/Ant:                      Right:  5/5  Left:  5/5  EHL:                     Right:  5/5  Left:  5/5  Sensation:  Intact  Slow weak gait requiring walker  Incision well healed    A/P:  69M with history of MS and prior T10-12 laminectomies in 2022 returns with progressive gait difficulties    Imaging shows progressive thoracic facet arthropathy leading to recurrent severe stenosis from T10-T12  Patient with worsening gait and strength in spite of significant physical therapy  Not a candidate for further physical therapy due to progressive weakness and  severe stenosis  Discussed need for fusion given need for complete facetectomy for decompression of canal  We will plan for T10-12 revision decompression and fusion  Risks and benefits discussed, including infection, hematoma, CSF leak,  spinal cord and nerve root injury

## 2025-05-22 NOTE — TELEPHONE ENCOUNTER
Spoke to patient about surgery with Dr. Leija and possible dates. Patient prefers 7/1, so I told him that I would schedule it for 7/1 and gave him surgery details. I am waiting for a case request to get it scheduled.

## 2025-05-22 NOTE — TELEPHONE ENCOUNTER
Type of surgery: T10-12 revision decompression and fusion   Location of surgery: [facility]  Date and time of surgery: 7/1, 7:30  Surgeon: Dr. FOX Leija  Pre-Op Appt Date: ALICIA CONKLIN  Post-Op Appt Date: 7/21   Packet sent out: Yes  Pre-cert/Authorization completed:  No  Date: NA

## 2025-05-22 NOTE — PROGRESS NOTES
PRE-SURGERY EDUCATION  Reviewed pre- and post-operative instructions as outlined in the After Visit Summary/Patient Instructions with patient.   Surgery folder was given to patient    Patient Education Topic: Procedure with Dr. Leija   Learner(s): Patient  Knowledge Level: Basic  Readiness to Learn: Ready  Method:  Verbal explanation and Written material   Outcome: Able to verbalize instructions  Barriers to Learning: No barrier    STD/FMLA: No  Job Description: Not applicable   Time Off: Not applicable      Patient had the opportunity for questions to be answered. Advised Patient to call clinic with any questions/concerns. Gave patient antibacterial soap for pre-surgery skin preparation.     FUSION SPINE PRE-SURGICAL CHECKLIST   Intervention/Diagnostic Meets Criteria Details   NDI/CATA  Completed within the last 6 months Yes Confirmation of completion within last 6 months   Nicotine Status  Currently using: None     Yes Fusions  - Used nicotine or smoked within the last year? No  - If patient uses nicotine, RN orders a Nicotine lab test to be completed 2 weeks after smoking cessation. Surgery will need to be scheduled at least 3 weeks after the nicotine lab test  -Used nicotine or smoked: need to document quit date in chart at least 6 weeks prior to surgery.   Physical Therapy   Completed within 6 months   Completed on the corresponding body part  Completed at least 4 weeks (unless provider documented that patient unable to tolerate PT) Yes  Records verified and located Internal     Injection  Completed within last 1 years  Completed on the corresponding body part   No      Injection not completed (do not need to take action)   Imaging  MRI or CT completed within 6 months Yes Records verified and located Internal   Chronic Pain or Pain contract  No Yes      Lorie Mcgarry RN on 5/22/2025 at 2:33 PM

## 2025-05-22 NOTE — NURSING NOTE
"Jamir Gill is a 69 year old male who presents for:  Chief Complaint   Patient presents with    Neurologic Problem     MRI Results          Initial Vitals:  BP (!) 148/84   Pulse 94   Temp (!) 96.3  F (35.7  C) (Temporal)   Resp 18   Wt 183 lb (83 kg)   SpO2 98%   BMI 27.02 kg/m   Estimated body mass index is 27.02 kg/m  as calculated from the following:    Height as of 5/13/25: 5' 9\" (1.753 m).    Weight as of this encounter: 183 lb (83 kg).. Body surface area is 2.01 meters squared. BP completed using cuff size: regular  No Pain (0)    Nursing Comments:     Susan Biggs    "

## 2025-05-22 NOTE — PATIENT INSTRUCTIONS
Patient Instructions    Surgery scheduled at [facility] for T10-12 fusion and decompression with Dr. Leija   Our surgery scheduler (Francisca) will call you within 3 business days to schedule surgery. Her phone number is [phone].    Pre-Operative    Surgical risks: blood clots in the leg or lung, problems urinating, nerve damage, drainage from the incision, infection, stiffness    You will need a Pre-operative physical with primary care physician within 30 days prior to your surgical date. Please schedule this more than 7 days prior to your surgical date to allow time for follow up relating to surgical clearance.    You will spend 2-4 nights in the hospital.    Shower procedure  You will need to shower the night before and morning of surgery using the antimicrobial soap (chlorhexidine) given to you. Please refer to showering instruction sheet in your surgery education folder.    Eating/Drinking  Stop all solid foods 8 hours before surgery.  Keep drinking clear liquids until 4 hours before surgery  Clear liquids include water, clear juice, black coffee, or clear tea without milk, Gatorade, clear soda.     Medications  Injectable: Semaglutide/Ozempic/Wegovy, Trulicity, Mounjaro  Need to be held 2 weeks prior to surgery due to risk for aspiration because of slowed gastric emptying.  Discontinue Aspirin & NSAIDs (Advil/Ibuprofen, Indocin, Naproxen,Nuprin,Relafen/Nabumetone, Diclofenac,Meloxicam, Aleve, Celebrex) 7 days prior to surgical date. After surgery, do not begin taking these medications until given clearance as it may cause bleeding and interfere with healing.  It is ok to take Tylenol (Acetaminophen) for pain within the 7 days prior to surgery. Example: you could take 1000 mg 3 times per day. Do not exceed 3,000 mg per day.   If you are on chronic pain medication (oxycodone, Percocet, hydrocodone, Vicodin, Norco, Dilaudid, morphine, MS Contin, naltrexone, Suboxone, etc) or have a pain  contract we will reach out to your pain clinic to gather your most recent records and recommendations for pain management post-op.  Please ask your provider who manages your chronic pain if they require you to schedule an office visit prior to surgery. Continue obtaining your pain medications from your current provider until surgery. Our team will manage your acute post-op pain in the hospital and during the recovery period. Your pain team will continue to manage your chronic pain.   Any other medications prescribed, please discuss with your primary care provider at your pre-operative physical        Post-Operative    Incision Care  Look at your incision site every day. You  may need a mirror or family member to help you.   Watch for signs of infection  Redness, swelling, warmth, drainage (Green or yellow drainage (pus) from your incision or increased bloody drainage), and fever of 101 degrees or higher  Notif clinic [phone]  Remove dressing as instructed upon discharge  Do not apply lotions or ointments to incision  It is okay to shower, just pat the incision dry   No submerging incision in water such as pools, hot tubs, or baths for at least 8 weeks and until the incision is healed    Pain Management  Dealing with pain  As your body heals, you might feel a stabbing, burning, or aching pain. You may also have some numbness.  Everyone feels pain differently, we may ask you to rate your pain using a pain scale. This will let us know how much pain you feel.   Keep in mind that medicine won't take away all of your pain. It helps to try other ways to relax and ease pain.   Things to help with pain  After surgery, we will give you medicine for your pain. These medications work well, but they can make you drowsy, itchy, or sick to your stomach. If we give you narcotics for pain, try to take the pills with food.   For mild to moderate pain, you can take medication such as Tylenol. These can be used with narcotics,  but make sure that your narcotic does not contain Tylenol.   Do NOT drive while taking narcotic pain medication  Do NOT drink alcohol while using any pain medication  You can utilize ice as needed (no longer than 20 minutes at one time)  Avoid putting heat over the incision  Refills of pain medication: please call the neurosurgery clinic to request 2-3 days before you run out. We will continue to refill your pain medications for up to 12 weeks after surgery. If you are still needing refills around the 8 week bienvenido, please schedule your Primary Care or Pain Provider before the 12 week post-op bienvenido.  Aspirin & NSAIDS (ex. Ibuprofen, aleve, naproxen): Don't take NSAIDs until 6 weeks after surgery to reduce risk of bleeding and interference with bone healing     Bowel Care  Many people have constipation (hard stools) after surgery. The narcotic pain medication we often prescribed can contribute to constipation. To help prevent constipation: Drink plenty of fluid (8-10 glasses/day); Eat more fiber, such as whole grain bread, bran cereal, and fruits and vegetables; Stay active by walking; Over the counter stool softener may also help.      Activity Restrictions  For the first 2 weeks, no lifting > 10 pounds, no bending, twisting, or overhead reaching.  For weeks 2 through your 6 week post-op appointment, no lifting > 25 pounds, limited bending, twisting, or overhead reaching.  You will be re-evaluated at your 6 week post-op visit. You will have some level of restrictions through 3 months post-op.  Take stairs in moderation   Walking is the best way to start exercise after surgery. Take short frequent walks. You may gradually increase the distance as tolerated. If you feel pain, decrease your activity, but DO NOT stop walking. Walking will help you gain strength and prevent muscle soreness and spasms.   Avoid bed rest and prolonged sitting for longer than 30 minutes (change positions frequently while awake)  No contact  sports or high impact activities such as; snow removal, lawn mowing, running/jogging, snowmobile or 4 sarabia riding or any other recreational vehicles until after given clearance at your 3 month follow up visit  If a brace is required per Dr. Leija, Orthotics will fit you for the brace in the hospital. Brace type and length of time to wear it will be determined by Dr. Leija.     Contact clinic right away or go to the Emergency Department if you develop:   Infection (incisional redness, swelling, warmth, drainage, or fever (temp > 101 F))  New injury  Bladder or bowel changes or loss of control    Signs of blood clot:  Swelling and/or warmth in one or both legs  Pain or tenderness in your leg, ankle, foot, or arm   Red or discolored skin     Go to the Emergency Department   If sudden onset of severe headache, weakness, confusion, change in level of consciousness, pain, or loss of movement.  Chest pain  Trouble breathing   Unable to swallow thin liquids or feeling like your throat is tightening    Post-Op Follow Up Appointments  2 week incision check & staple/suture removal with Neurosurgery Nurse  6 week post op with x-ray prior with our Physician Assistant or Nurse Practitioner  3 months post op with x-ray prior with our Physician Assistant or Nurse Practitioner  6 months post op with x-ray prior with our Physician Assistant or Nurse Practitioner  1 year post op with x-ray prior with our Physician Assistant or Nurse Practitioner  Please call to schedule follow up and xray appointments at [phone]    Resources  If you are currently employed, you will need to be off work for 4-6 weeks for recovery and healing.  You will have activity restrictions through 3 months post-op.  Please fax any FMLA/short term disability paperwork to [phone] prior to surgery  You may call our clinic when you'd like to return to work and we can provide a work letter  To allow staff adequate time to complete paperwork, please send as  soon as possible     [facility]  [facility]  [address] [place] [id number]  Telephone:  [phone]        Fax:  [phone]

## 2025-05-27 ENCOUNTER — THERAPY VISIT (OUTPATIENT)
Dept: PHYSICAL THERAPY | Facility: OTHER | Age: 70
End: 2025-05-27
Payer: MEDICARE

## 2025-05-27 DIAGNOSIS — G35 MULTIPLE SCLEROSIS (H): Primary | ICD-10-CM

## 2025-05-27 DIAGNOSIS — R26.9 NEUROLOGIC GAIT DYSFUNCTION: ICD-10-CM

## 2025-05-27 PROCEDURE — 97110 THERAPEUTIC EXERCISES: CPT | Mod: GP,KX

## 2025-05-29 ENCOUNTER — THERAPY VISIT (OUTPATIENT)
Dept: PHYSICAL THERAPY | Facility: OTHER | Age: 70
End: 2025-05-29
Payer: MEDICARE

## 2025-05-29 DIAGNOSIS — G35 MULTIPLE SCLEROSIS (H): Primary | ICD-10-CM

## 2025-05-29 DIAGNOSIS — R26.9 NEUROLOGIC GAIT DYSFUNCTION: ICD-10-CM

## 2025-05-29 PROCEDURE — 97110 THERAPEUTIC EXERCISES: CPT | Mod: GP,KX

## 2025-05-29 NOTE — PROGRESS NOTES
PLAN  Continue therapy per current plan of care. Jamir is now scheduled to have T10-12 fusion with Dr. Leija in July. We are progressing his strength and endurance as best week can in preparation for his recovery. Initial discussion was for Jamir to go without PT for 6 weeks following his procedure. He and I had a discussion today regarding our concern of disuse atrophy and severe mobility limitations and weakness following a procedure. Jamir has done breaks of therapy in the past and he almost always seems to have a major regression that is getting more challenging to overcome due to his MS. We discussed common post-operative precautions and guidelines that often follow this procedure and we hope to have discussion with his surgical care team to discuss continuing therapy within the guidance of their protocol. Jamir certainly will continue to benefit from PT services prior to his surgery.     Beginning/End Dates of Progress Note Reporting Period:  04/28/25 to 05/29/2025    Referring Provider:  No ref. provider found        05/29/25 0500   Appointment Info   Signing clinician's name / credentials Manjit Pink DPT   Total/Authorized Visits 10   Visits Used 10 of 10   Medical Diagnosis multiple sclerosis; neurological gait dysfunction.   PT Tx Diagnosis impaired mobility, poor endurance, weakness, ROM loss   Progress Note/Certification   Start of Care Date 04/28/25   Onset of illness/injury or Date of Surgery 04/09/25   Therapy Frequency 2-3x/week   Predicted Duration 90 days   Certification date from 04/28/25   Certification date to 07/27/25   KX Modifier Statement Therapy services meets medical necessity requirements beyond the therapy threshold for ongoing care.   Progress Note Completed Date 04/28/25   Supervision   PT Assistant Visit Number 4   GOALS   PT Goals 2;3;4   PT Goal 1   Goal Identifier sitting   Goal Description Jamir will be able to tailor sit for 2 minutes without loss of balance for improved  motion, stability, and positioning to be able to work at low surfaces.   Goal Progress Improving motion, continues to need posterior support to prevent LOB. Does have mild L sided lean that increases LOB risk.   Target Date 06/10/25   PT Goal 2   Goal Identifier 5 times sit to stand   Goal Description Jamir will be able to complete 5 times sit to  less than 13 seconds to lower his fall risk with improved LE strength.   Goal Progress Improving. Does not complete in 13 seconds.   Target Date 07/08/25   PT Goal 3   Goal Identifier Ambulation   Goal Description Jamir will be able to ambulate 100 feet with B canes for improved mobility.   Goal Progress Not met. Jamir currently is not ambulating this distance safely with B canes.   Target Date 07/22/25   PT Goal 4   Goal Identifier Floor to stand transfer   Goal Description Jamir will be able to complete floor to stand transfer x 3 without LOB and within 5 minutes for improved endurance and increased ability ability transfer during the day.   Goal Progress Improving. Can complete with support surface elevated.   Target Date 07/22/25   Subjective Report   Subjective Report Jamir is doing well today. We discussed our concern about Jamir having 6 weeks of no PT.   Treatment Interventions (PT)   Interventions Therapeutic Procedure/Exercise;Therapeutic Activity   Therapeutic Procedure/Exercise   Therapeutic Procedures: strength, endurance, ROM, flexibility minutes ([id number]) 40   Therapeutic Procedures Ther Proc 2;Ther Proc 3   Ther Proc 1 Leg press (seat 21, back rest up) 176# x18, 184# x18. Abductor 20# x 12 - improved ability today. Added 5 reps of eccentric abduction, PT getting to abduction position and Jamir holding. Jamir able to isometrically hold at times.   Ther Proc 1 - Details Chest press 150# x15, Lat pulldown 180# x30 (increase next visit), Triceps extension 126# x15, Rows 150# x15.   Ther Proc 2 LE exercises/Core   Ther Proc 2 - Details Deferred: lumbar extension  "90# from 80-40 degrees x 25 (increase weight next visit) then 66# from 55 to 30 degrees x 25 - needed to reduce weight to 46# today. Core abdominal 50# x20.   Ther Proc 3 Nustep seat:11 arms: 8 lvl 8 x 6 minutes   Ther Proc 3 - Details Rebounder with 4# medicine ball approximately 2 minutes 30-45 seconds. Stand squat to stand with D2 flexion UE PNF red tband B x20.   Therapeutic Activity   Ther Act 1 Floor to stand transfers from various heights and strategies: attempted with 4 inch aerobic step - unsuccessful. 7\" training stair and low handle - successful. At his walker - successful. L half kneeling from floor - unsuccessful but this was new technique.   Plan   Plan for next session hip cars, floor to stand from 6-8\" surface   Total Session Time   Timed Code Treatment Minutes 40   Total Treatment Time (sum of timed and untimed services) 40       "

## 2025-06-03 ENCOUNTER — THERAPY VISIT (OUTPATIENT)
Dept: PHYSICAL THERAPY | Facility: OTHER | Age: 70
End: 2025-06-03
Attending: FAMILY MEDICINE
Payer: MEDICARE

## 2025-06-03 DIAGNOSIS — G35 MULTIPLE SCLEROSIS (H): Primary | ICD-10-CM

## 2025-06-03 DIAGNOSIS — R26.9 NEUROLOGIC GAIT DYSFUNCTION: ICD-10-CM

## 2025-06-03 PROCEDURE — 97110 THERAPEUTIC EXERCISES: CPT | Mod: GP,KX

## 2025-06-05 ENCOUNTER — THERAPY VISIT (OUTPATIENT)
Dept: PHYSICAL THERAPY | Facility: OTHER | Age: 70
End: 2025-06-05
Attending: FAMILY MEDICINE
Payer: MEDICARE

## 2025-06-05 DIAGNOSIS — G35 MULTIPLE SCLEROSIS (H): Primary | ICD-10-CM

## 2025-06-05 DIAGNOSIS — R26.9 NEUROLOGIC GAIT DYSFUNCTION: ICD-10-CM

## 2025-06-05 PROCEDURE — 97110 THERAPEUTIC EXERCISES: CPT | Mod: GP,KX

## 2025-06-10 ENCOUNTER — THERAPY VISIT (OUTPATIENT)
Dept: PHYSICAL THERAPY | Facility: OTHER | Age: 70
End: 2025-06-10
Attending: FAMILY MEDICINE
Payer: MEDICARE

## 2025-06-10 DIAGNOSIS — R26.9 NEUROLOGIC GAIT DYSFUNCTION: ICD-10-CM

## 2025-06-10 DIAGNOSIS — G35 MULTIPLE SCLEROSIS (H): Primary | ICD-10-CM

## 2025-06-10 PROCEDURE — 97110 THERAPEUTIC EXERCISES: CPT | Mod: GP,KX

## 2025-06-12 ENCOUNTER — THERAPY VISIT (OUTPATIENT)
Dept: PHYSICAL THERAPY | Facility: OTHER | Age: 70
End: 2025-06-12
Attending: FAMILY MEDICINE
Payer: MEDICARE

## 2025-06-12 DIAGNOSIS — G35 MULTIPLE SCLEROSIS (H): Primary | ICD-10-CM

## 2025-06-12 DIAGNOSIS — N31.9 NEUROGENIC BLADDER: ICD-10-CM

## 2025-06-12 DIAGNOSIS — R26.9 NEUROLOGIC GAIT DYSFUNCTION: ICD-10-CM

## 2025-06-12 DIAGNOSIS — G35 MULTIPLE SCLEROSIS (H): ICD-10-CM

## 2025-06-12 PROCEDURE — 97110 THERAPEUTIC EXERCISES: CPT | Mod: GP,KX

## 2025-06-16 ENCOUNTER — OFFICE VISIT (OUTPATIENT)
Dept: FAMILY MEDICINE | Facility: OTHER | Age: 70
End: 2025-06-16
Attending: NURSE PRACTITIONER
Payer: COMMERCIAL

## 2025-06-16 ENCOUNTER — RESULTS FOLLOW-UP (OUTPATIENT)
Dept: FAMILY MEDICINE | Facility: OTHER | Age: 70
End: 2025-06-16

## 2025-06-16 ENCOUNTER — THERAPY VISIT (OUTPATIENT)
Dept: PHYSICAL THERAPY | Facility: OTHER | Age: 70
End: 2025-06-16
Attending: FAMILY MEDICINE
Payer: MEDICARE

## 2025-06-16 VITALS
TEMPERATURE: 97.6 F | DIASTOLIC BLOOD PRESSURE: 88 MMHG | HEART RATE: 81 BPM | OXYGEN SATURATION: 99 % | RESPIRATION RATE: 16 BRPM | SYSTOLIC BLOOD PRESSURE: 130 MMHG | WEIGHT: 181.8 LBS | BODY MASS INDEX: 26.93 KG/M2 | HEIGHT: 69 IN

## 2025-06-16 DIAGNOSIS — G35 MULTIPLE SCLEROSIS (H): ICD-10-CM

## 2025-06-16 DIAGNOSIS — Z01.818 PREOP GENERAL PHYSICAL EXAM: ICD-10-CM

## 2025-06-16 DIAGNOSIS — M47.14 THORACIC SPONDYLOSIS WITH MYELOPATHY: ICD-10-CM

## 2025-06-16 DIAGNOSIS — I10 PRIMARY HYPERTENSION: ICD-10-CM

## 2025-06-16 DIAGNOSIS — N31.9 NEUROGENIC BLADDER: ICD-10-CM

## 2025-06-16 DIAGNOSIS — G35 MULTIPLE SCLEROSIS (H): Primary | ICD-10-CM

## 2025-06-16 DIAGNOSIS — R26.9 NEUROLOGIC GAIT DYSFUNCTION: ICD-10-CM

## 2025-06-16 DIAGNOSIS — R39.15 URINARY URGENCY: ICD-10-CM

## 2025-06-16 DIAGNOSIS — Z01.818 PRE-OP EXAM: Primary | ICD-10-CM

## 2025-06-16 LAB
ANION GAP SERPL CALCULATED.3IONS-SCNC: 10 MMOL/L (ref 7–15)
BUN SERPL-MCNC: 26.2 MG/DL (ref 8–23)
CALCIUM SERPL-MCNC: 10.1 MG/DL (ref 8.8–10.4)
CHLORIDE SERPL-SCNC: 103 MMOL/L (ref 98–107)
CREAT SERPL-MCNC: 1.12 MG/DL (ref 0.67–1.17)
EGFRCR SERPLBLD CKD-EPI 2021: 71 ML/MIN/1.73M2
GLUCOSE SERPL-MCNC: 90 MG/DL (ref 70–99)
HCO3 SERPL-SCNC: 26 MMOL/L (ref 22–29)
HGB BLD-MCNC: 14.9 G/DL (ref 13.3–17.7)
MCV RBC AUTO: 89 FL (ref 78–100)
POTASSIUM SERPL-SCNC: 5.1 MMOL/L (ref 3.4–5.3)
SODIUM SERPL-SCNC: 139 MMOL/L (ref 135–145)

## 2025-06-16 PROCEDURE — 99214 OFFICE O/P EST MOD 30 MIN: CPT | Performed by: NURSE PRACTITIONER

## 2025-06-16 PROCEDURE — 97110 THERAPEUTIC EXERCISES: CPT | Mod: GP,KX

## 2025-06-16 PROCEDURE — 36415 COLL VENOUS BLD VENIPUNCTURE: CPT | Mod: ZL | Performed by: NURSE PRACTITIONER

## 2025-06-16 PROCEDURE — G0463 HOSPITAL OUTPT CLINIC VISIT: HCPCS

## 2025-06-16 PROCEDURE — 3075F SYST BP GE 130 - 139MM HG: CPT | Performed by: NURSE PRACTITIONER

## 2025-06-16 PROCEDURE — 80048 BASIC METABOLIC PNL TOTAL CA: CPT | Mod: ZL | Performed by: NURSE PRACTITIONER

## 2025-06-16 PROCEDURE — 85018 HEMOGLOBIN: CPT | Mod: ZL | Performed by: NURSE PRACTITIONER

## 2025-06-16 PROCEDURE — 3079F DIAST BP 80-89 MM HG: CPT | Performed by: NURSE PRACTITIONER

## 2025-06-16 PROCEDURE — 1126F AMNT PAIN NOTED NONE PRSNT: CPT | Performed by: NURSE PRACTITIONER

## 2025-06-16 ASSESSMENT — PAIN SCALES - GENERAL: PAINLEVEL_OUTOF10: NO PAIN (0)

## 2025-06-16 ASSESSMENT — PATIENT HEALTH QUESTIONNAIRE - PHQ9
10. IF YOU CHECKED OFF ANY PROBLEMS, HOW DIFFICULT HAVE THESE PROBLEMS MADE IT FOR YOU TO DO YOUR WORK, TAKE CARE OF THINGS AT HOME, OR GET ALONG WITH OTHER PEOPLE: SOMEWHAT DIFFICULT
SUM OF ALL RESPONSES TO PHQ QUESTIONS 1-9: 3
SUM OF ALL RESPONSES TO PHQ QUESTIONS 1-9: 3

## 2025-06-16 NOTE — PATIENT INSTRUCTIONS
How to Take Your Medication Before Surgery  Preoperative Medication Instructions   Antiplatelet or Anticoagulation Medication Instructions   - aspirin: Discontinue aspirin 7 days prior to procedure to reduce bleeding risk. It should be resumed postoperatively.     Additional Medication Instructions   - Herbal medications and vitamins: DO NOT TAKE 14 days prior to surgery.   - ACE/ARB/ARNI (lisinopril, enalapril, losartan, valsartan, olmesartan, sacubritril/valsartan) : DO NOT TAKE on day of surgery (minimum 11 hours for general anesthesia).   - anticholinergics: Continue without modification.        Do NOT take your LISINOPRIL the morning of surgery; you MAY take it up until the day before surgery as normal.     Patient Education   Preparing for Your Surgery  For Adults  Getting started  In most cases, a nurse will call to review your health history and instructions. They will give you an arrival time based on your scheduled surgery time. Please be ready to share:  Your doctor's clinic name and phone number  Your medical, surgical, and anesthesia history  A list of allergies and sensitivities  A list of medicines, including herbal treatments and over-the-counter drugs  Whether the patient has a legal guardian (ask how to send us the papers in advance)  Note: You may not receive a call if you were seen at our PAC (Preoperative Assessment Center).  Please tell us if you're pregnant--or if there's any chance you might be pregnant. Some surgeries may injure a fetus (unborn baby), so they require a pregnancy test. Surgeries that are safe for a fetus don't always need a test, and you can choose whether to have one.   Preparing for surgery  Within 10 to 30 days of surgery: Have a pre-op exam (sometimes called an H&P, or History and Physical). This can be done at a clinic or pre-operative center.  If you're having a , you may not need this exam. Talk to your care team.  At your pre-op exam, talk to your care team  about all medicines you take. (This includes CBD oil and any drugs, such as THC, marijuana, and other forms of cannabis.) If you need to stop any medicine before surgery, ask when to start taking it again.  This is for your safety. Many medicines and drugs can make you bleed too much during surgery. Some change how well surgery (anesthesia) drugs work.  Call your insurance company to let them know you're having surgery. (If you don't have insurance, call [phone].)  Call your clinic if there's any change in your health. This includes a scrape or scratch near the surgery site, or any signs of a cold (sore throat, runny nose, cough, rash, fever).  Eating and drinking guidelines  For your safety: Unless your surgeon tells you otherwise, follow the guidelines below.  Eat and drink as normal until 8 hours before you arrive for surgery. After that, no food or milk. You can spit out gum when you arrive.  Drink clear liquids until 2 hours before you arrive. These are liquids you can see through, like water, Gatorade, and Propel Water. They also include plain black coffee and tea (no cream or milk).  No alcohol for 24 hours before you arrive. The night before surgery, stop any drinks that contain THC.  If your care team tells you to take medicine on the morning of surgery, it's okay to take it with a sip of water. No other medicines or drugs are allowed (including CBD oil)--follow your care team's instructions.  If you have questions the day of surgery, call your hospital or surgery center.   Preventing infection  Shower or bathe the night before and the morning of surgery. Follow the instructions your clinic gave you. (If no instructions, use regular soap.)  Don't shave or clip hair near your surgery site. We'll remove the hair if needed.  Don't smoke or vape the morning of surgery. No chewing tobacco for 6 hours before you arrive. A nicotine patch is okay. You may spit out nicotine gum when you arrive.  For some  surgeries, the surgeon will tell you to fully quit smoking and nicotine.  We will make every effort to keep you safe from infection. We will:  Clean our hands often with soap and water (or an alcohol-based hand rub).  Clean the skin at your surgery site with a special soap that kills germs.  Give you a special gown to keep you warm. (Cold raises the risk of infection.)  Wear hair covers, masks, gowns, and gloves during surgery.  Give antibiotic medicine, if prescribed. Not all surgeries need this medicine.  What to bring on the day of surgery  Photo ID and insurance card  Copy of your health care directive, if you have one  Glasses and hearing aids (bring cases)  You can't wear contacts during surgery  Inhaler and eye drops, if you use them (tell us about these when you arrive)  CPAP machine or breathing device, if you use them  A few personal items, if spending the night  If you have . . .  A pacemaker, ICD (cardiac defibrillator), or other implant: Bring the ID card.  An implanted stimulator: Bring the remote control.  A legal guardian: Bring a copy of the certified (court-stamped) guardianship papers.  Please remove any jewelry, including body piercings. Leave jewelry and other valuables at home.  If you're going home the day of surgery  You must have a support person drive you home. They should stay with you overnight, and they may need to help with your self-care.  If you don't have a support person, please tells us as soon as possible. We can help.  After surgery  If it's hard to control your pain or you need more pain medicine, please call your surgeon's office.  Questions?   If you have any questions for your care team, list them here:   ____________________________________________________________________________________________________________________________________________________________________________________________________________________________________________________________  For informational  purposes only. Not to replace the advice of your health care provider. Copyright   2003, 2019 [facility]. All rights reserved. Clinically reviewed by Shay Valdovinos MD. [employer] [id number] - REV 02/25.

## 2025-06-16 NOTE — TELEPHONE ENCOUNTER
Lubricants (SURGICAL LUBRICANT) external gel         Last Written Prescription Date:  4/25/24  Last Fill Quantity: 600g,   # refills: 4  Last Office Visit: 5/13/25  Future Office visit:    Next 5 appointments (look out 90 days)      Jul 21, 2025 1:30 PM  (Arrive by 1:15 PM)  Nurse Only with Ph Neuro Nurse  [facility] Neurosurgery [facility] ([facility] ) [address] [place] [address] [id number] [phone]             Routing refill request to provider for review/approval because:  Drug not on the [facility], [facility] or [facility] refill protocol or controlled substance. Unable to complete prescription refill per RNMedication Refill Policy.................... Meka Moreno RN ....................  6/16/2025   11:01 AM

## 2025-06-16 NOTE — PROGRESS NOTES
Preoperative Evaluation  [facility]  [address]  [place] [address]  Phone: [phone]  Fax: [phone]  Primary Provider: Kayla Pineda MD  Pre-op Performing Provider: Sara Langston NP  Jun 16, 2025 6/16/2025   Surgical Information   What procedure is being done? spinal fusion T10 T11 and T12   Facility or Hospital where procedure/surgery [facility] be performed[facility]   Who is doing the procedure / surgery? Dr. Leija   Date of surgery / procedure: 7/1/25   Time of surgery / procedure: not known   Where do you plan to recover after surgery? at home with family     Fax number for surgical facility: Note does not need to be faxed, will be available electronically in Epic.    Assessment & Plan     The proposed surgical procedure is considered INTERMEDIATE risk.    Problem List Items Addressed This Visit       Multiple sclerosis (H)    Hypertension    Relevant Orders    Hemoglobin (Completed)    Basic Metabolic Panel    Neurogenic bladder    Neurologic gait dysfunction     Other Visit Diagnoses         Pre-op exam    -  Primary    Relevant Orders    EKG 12-lead complete w/read - Clinics (Completed)    Hemoglobin (Completed)    Basic Metabolic Panel      Preop general physical exam        Relevant Orders    Hemoglobin (Completed)    Basic Metabolic Panel      Thoracic spondylosis with myelopathy          Urinary urgency                1. Pre-op exam (Primary)  2. Preop general physical exam  - EKG 12-lead complete w/read - Clinics  - Hemoglobin  - Basic Metabolic Panel  Stable renal function and hemoglobin today.   Normal EKG     3. Thoracic spondylosis with myelopathy  Indication for upcoming surgical procedure, upcoming T10-12 spinal fusion     4. Primary hypertension  - Hemoglobin  - Basic Metabolic Panel  Blood pressure is within goal on current regimen, continue lisinopril 10 mg daily which he will hold the day of surgery     5. Multiple  sclerosis (H)  6. Neurogenic bladder  7. Urinary urgency  May continue use of oxybutynin 10 mg 24 hour tablet and baclofen     8. Neurologic gait dysfunction  Continue working with physical therapist     How to Take Your Medication Before Surgery  Preoperative Medication Instructions   Antiplatelet or Anticoagulation Medication Instructions   - aspirin: Discontinue aspirin 7 days prior to procedure to reduce bleeding risk. It should be resumed postoperatively.     Additional Medication Instructions   - Herbal medications and vitamins: DO NOT TAKE 14 days prior to surgery.   - ACE/ARB/ARNI (lisinopril, enalapril, losartan, valsartan, olmesartan, sacubritril/valsartan) : DO NOT TAKE on day of surgery (minimum 11 hours for general anesthesia).   - anticholinergics: Continue without modification.        Do NOT take your LISINOPRIL the morning of surgery; you MAY take it up until the day before surgery as normal.     Recommendation  Approval given to proceed with proposed procedure, without further diagnostic evaluation.      Omari Bowie is a 70 year old, presenting for the following:  Pre-Op Exam          6/16/2025    10:54 AM   Additional Questions   Roomed by VALDEMAR Ruiz   Accompanied by Self     HPI: Jamir presents today for a pre-operative physical exam. He will be having a thoracic 10 to thoracic 12 revision decompression procedure done on 7/1/25 with Dr. Leija. He does not have significant back pain, but has had significant mobility issues that have been declining. No cardiac conditions. No chest pain, palpitations, cough, shortness of breath, or recent febrile illnesses or infections. He feels overall well at this time. No diabetes or active cancers. No past anesthesia problems/complications.            6/16/2025   Pre-Op Questionnaire   Have you ever had a heart attack or stroke? No   Have you ever had surgery on your heart or blood vessels, such as a stent placement, a coronary artery bypass, or surgery on  an artery in your head, neck, heart, or legs? No   Do you have chest pain with activity? No   Do you have a history of heart failure? No   Do you currently have a cold, bronchitis or symptoms of other infection? No   Do you have a cough, shortness of breath, or wheezing? No   Do you or anyone in your family have previous history of blood clots? No   Do you or does anyone in your family have a serious bleeding problem such as prolonged bleeding following surgeries or cuts? No   Have you ever had problems with anemia or been told to take iron pills? No   Have you had any abnormal blood loss such as black, tarry or bloody stools? No   Have you ever had a blood transfusion? No   Are you willing to have a blood transfusion if it is medically needed before, during, or after your surgery? Yes   Have you or any of your relatives ever had problems with anesthesia? No   Do you have sleep apnea, excessive snoring or daytime drowsiness? No   Do you have any artifical heart valves or other implanted medical devices like a pacemaker, defibrillator, or continuous glucose monitor? No   Do you have artificial joints? No   Are you allergic to latex? No     Advance Care Planning    Discussed advance care planning with patient; however, patient declined at this time.    Preoperative Review of    reviewed - no record of controlled substances prescribed.        Patient Active Problem List    Diagnosis Date Noted    Neurologic gait dysfunction 05/05/2025     Priority: Medium    Muscle cramps 04/15/2024     Priority: Medium    H/O adenomatous polyp of colon 10/31/2023     Priority: Medium    Abnormality of gait 05/30/2023     Priority: Medium    Slow transit constipation 09/12/2022     Priority: Medium    Retention of urine 09/11/2022     Priority: Medium     Formatting of this note might be different from the original.  on flomax      S/P laminectomy 09/06/2022     Priority: Medium    Anatomical narrow angle glaucoma of left eye  05/17/2022     Priority: Medium    Ureterolithiasis 04/03/2018     Priority: Medium    Erectile dysfunction 08/13/2015     Priority: Medium    Neurogenic bladder 08/10/2015     Priority: Medium    Major depressive disorder, single episode, unspecified 05/20/2015     Priority: Medium    Hypertension 05/31/2013     Priority: Medium     No Comments Provided      Hypertrophy of prostate with urinary obstruction and other lower urinary tract symptoms (LUTS) 07/23/2012     Priority: Medium    Cervical disc disorder 06/12/2012     Priority: Medium    Osteoarthritis of spine 06/12/2012     Priority: Medium    Family history of abdominal aortic aneurysm 07/12/2011     Priority: Medium     Formatting of this note might be different from the original.  3 male relatives. Negative screen 6/2010      Multiple sclerosis (H) 04/15/2011     Priority: Medium     No Comments Provided  Formatting of this note might be different from the original.  on interferon injections  Formatting of this note might be different from the original.  Diagnosed 1993.  Dr. Dillon      Osteoporosis 05/12/2010     Priority: Medium     Formatting of this note might be different from the original.  Abnormal DEXA at neurologist.        Past Medical History:   Diagnosis Date    Cervical disc disorder     No Comments Provided    Enlarged prostate with lower urinary tract symptoms (LUTS)     No Comments Provided    Essential (primary) hypertension     No Comments Provided    Multiple sclerosis (H)     No Comments Provided    Other specified disorders of bone density and structure, unspecified site (CODE)     No Comments Provided    Other symptoms and signs involving cognitive functions and awareness     No Comments Provided    Spondylosis of cervical region without myelopathy or radiculopathy     No Comments Provided     Past Surgical History:   Procedure Laterality Date    APPENDECTOMY OPEN      appendectomy    COLONOSCOPY  01/01/2010 01/01/2010,with  polyps resected    COLONOSCOPY  12/15/2015    12/15/2015,[place]- colitis    COLONOSCOPY N/A 06/17/2021    2 sessile serrated and 1 tubular adenoma, 3 year follow up, 6/17/2024    COLONOSCOPY  06/17/2024    F/U 2034 normal    COLONOSCOPY N/A 6/17/2024    Procedure: Colonoscopy;  Surgeon: Rudy Gilbert MD;  Location: [facility] OR    COMBINED CYSTOSCOPY, URETEROSCOPY, LASER HOLMIUM LITHOTRIPSY URETER(S) Left 04/10/2018    Procedure: COMBINED CYSTOSCOPY, URETEROSCOPY, LASER HOLMIUM LITHOTRIPSY URETER(S);  Left Ureteroscopy with Holmium Laser Lithotripsy & Stent Placement.;  Surgeon: Olu Saldivar MD;  Location: [facility] OR    CYSTOSCOPY, RETROGRADES, INSERT STENT URETER(S), COMBINED Left 04/03/2018    Procedure: COMBINED CYSTOSCOPY, RETROGRADES, INSERT STENT URETER(S);;  Surgeon: Olu Saldivar MD;  Location: [facility] OR    LAMINECTOMY THORACIC TWO LEVELS N/A 09/06/2022    Procedure: Thoracic 10 to thoracic 12 laminectomies;  Surgeon: Edson Leija MD;  Location: [facility] OR    PROSTATE SURGERY      ,PROSTATECTOMY,Laser prostate surgery -- BPH    WRIST SURGERY Left     s/p fracture     Current Outpatient Medications   Medication Sig Dispense Refill    aspirin (ASA) 81 MG chewable tablet Take 81 mg by mouth daily      baclofen (LIORESAL) 10 MG tablet Take 1 tablet (10 mg) by mouth 2 times daily 180 tablet 3    calcium carbonate 600 mg-vitamin D 400 units (CALTRATE) 600-400 MG-UNIT per tablet Take 1 tablet by mouth 2 times daily      Catheters MISC COLOPLAST 14FR/COUDE ITEM #814/OLIVE TIP W/GUIDEDX:N20.1/N31.9/N40.0/R330 HCPC:/#200EA/30DS **PICK-UP** 200 each 12    diphenhydrAMINE (BENADRYL) 25 MG tablet Take 50 mg by mouth nightly as needed for itching or allergies.      lisinopril (ZESTRIL) 10 MG tablet Take 1 tablet (10 mg) by mouth daily. 90 tablet 4    Lubricants (SURGICAL LUBRICANT) external gel SURGILUBE 3GM PACKETS #[id number]/#200EA/600GMS DX:N20.1/N31.9/N40/R33 HCPC: **DROPSHIP** 600 g 4    meclizine 25 MG CHEW Take  "25 mg by mouth as needed      oxyBUTYnin ER (DITROPAN XL) 10 MG 24 hr tablet Take 1 tablet (10 mg) by mouth every morning. 90 tablet 4    senna-docusate (SENOKOT-S/PERICOLACE) 8.6-50 MG tablet Take 1-2 tablets by mouth 2 times daily Take while on oral narcotics to prevent or treat constipation. 180 tablet 3       No Known Allergies     Social History     Tobacco Use    Smoking status: Never     Passive exposure: Never    Smokeless tobacco: Never   Substance Use Topics    Alcohol use: No       History   Drug Use No           Review of Systems  Constitutional, neuro, ENT, endocrine, pulmonary, cardiac, gastrointestinal, genitourinary, musculoskeletal, integument and psychiatric systems are negative, except as otherwise noted.    Objective    /88   Pulse 81   Temp 97.6  F (36.4  C) (Temporal)   Resp 16   Ht 1.753 m (5' 9\")   Wt 82.5 kg (181 lb 12.8 oz)   SpO2 99%   BMI 26.85 kg/m     Estimated body mass index is 26.85 kg/m  as calculated from the following:    Height as of this encounter: 1.753 m (5' 9\").    Weight as of this encounter: 82.5 kg (181 lb 12.8 oz).  Physical Exam  Vitals and nursing note reviewed.   Constitutional:       General: He is not in acute distress.     Appearance: Normal appearance. He is normal weight. He is not ill-appearing or toxic-appearing.   Cardiovascular:      Rate and Rhythm: Normal rate and regular rhythm.      Heart sounds: Normal heart sounds. No murmur heard.  Pulmonary:      Effort: Pulmonary effort is normal. No respiratory distress.      Breath sounds: Normal breath sounds. No wheezing.   Musculoskeletal:         General: Normal range of motion.      Comments: Mobility if impaired; uses a walker    Skin:     General: Skin is warm and dry.   Neurological:      General: No focal deficit present.      Mental Status: He is alert and oriented to person, place, and time.   Psychiatric:         Mood and Affect: Mood normal.         Recent Labs   Lab Test 05/13/25  0943 "   HGB 15.4         POTASSIUM 4.7   CR 1.14        Diagnostics    Results for orders placed or performed in visit on 06/16/25   Hemoglobin     Status: Normal   Result Value Ref Range    Hemoglobin 14.9 13.3 - 17.7 g/dL    MCV 89 78 - 100 fL   EKG 12-lead complete w/read - Clinics     Status: None (Preliminary result)   Result Value Ref Range    Systolic Blood Pressure  mmHg    Diastolic Blood Pressure  mmHg    Ventricular Rate 76 BPM    Atrial Rate 76 BPM    WV Interval 166 ms    QRS Duration 94 ms     ms    QTc 425 ms    P Axis 44 degrees    R AXIS 62 degrees    T Axis 59 degrees    Interpretation ECG       Sinus rhythm  Normal ECG  When compared with ECG of 29-Aug-2022 09:05,  No significant change was found           Revised Cardiac Risk Index (RCRI)  The patient has the following serious cardiovascular risks for perioperative complications:   - No serious cardiac risks = 0 points     RCRI Interpretation: 0 points: Class I (very low risk - 0.4% complication rate)         Signed Electronically by: Sara Langston NP  A copy of this evaluation report is provided to the requesting physician.         Answers submitted by the patient for this visit:  Patient Health Questionnaire (Submitted on 6/16/2025)  If you checked off any problems, how difficult have these problems made it for you to do your work, take care of things at home, or get along with other people?: Somewhat difficult  PHQ9 TOTAL SCORE: 3

## 2025-06-16 NOTE — NURSING NOTE
"Chief Complaint   Patient presents with    Pre-Op Exam       Initial BP (!) 132/90   Pulse 81   Temp 97.6  F (36.4  C) (Temporal)   Resp 16   Ht 1.753 m (5' 9\")   Wt 82.5 kg (181 lb 12.8 oz)   SpO2 99%   BMI 26.85 kg/m   Estimated body mass index is 26.85 kg/m  as calculated from the following:    Height as of this encounter: 1.753 m (5' 9\").    Weight as of this encounter: 82.5 kg (181 lb 12.8 oz).  Medication Review: complete    The next two questions are to help us understand your food security.  If you are feeling you need any assistance in this area, we have resources available to support you today.          5/13/2025   SDOH- Food Insecurity   Within the past 12 months, did you worry that your food would run out before you got money to buy more? N   Within the past 12 months, did the food you bought just not last and you didn t have money to get more? N         Health Care Directive:  Patient does not have a Health Care Directive: Discussed advance care planning with patient; however, patient declined at this time.    Sara Moser CMA      "

## 2025-06-17 RX ORDER — ULTRASOUND COUPLING MEDIUM
GEL (GRAM) TOPICAL
Qty: 600 G | Refills: 4 | Status: SHIPPED | OUTPATIENT
Start: 2025-06-17

## 2025-06-19 ENCOUNTER — THERAPY VISIT (OUTPATIENT)
Dept: PHYSICAL THERAPY | Facility: OTHER | Age: 70
End: 2025-06-19
Attending: FAMILY MEDICINE
Payer: MEDICARE

## 2025-06-19 DIAGNOSIS — R26.9 NEUROLOGIC GAIT DYSFUNCTION: ICD-10-CM

## 2025-06-19 DIAGNOSIS — G35 MULTIPLE SCLEROSIS (H): Primary | ICD-10-CM

## 2025-06-19 LAB
ATRIAL RATE - MUSE: 76 BPM
DIASTOLIC BLOOD PRESSURE - MUSE: NORMAL MMHG
INTERPRETATION ECG - MUSE: NORMAL
P AXIS - MUSE: 44 DEGREES
PR INTERVAL - MUSE: 166 MS
QRS DURATION - MUSE: 94 MS
QT - MUSE: 378 MS
QTC - MUSE: 425 MS
R AXIS - MUSE: 62 DEGREES
SYSTOLIC BLOOD PRESSURE - MUSE: NORMAL MMHG
T AXIS - MUSE: 59 DEGREES
VENTRICULAR RATE- MUSE: 76 BPM

## 2025-06-23 ENCOUNTER — THERAPY VISIT (OUTPATIENT)
Dept: PHYSICAL THERAPY | Facility: OTHER | Age: 70
End: 2025-06-23
Attending: FAMILY MEDICINE
Payer: MEDICARE

## 2025-06-23 DIAGNOSIS — G35 MULTIPLE SCLEROSIS (H): Primary | ICD-10-CM

## 2025-06-23 DIAGNOSIS — R26.9 NEUROLOGIC GAIT DYSFUNCTION: ICD-10-CM

## 2025-06-23 PROCEDURE — 97110 THERAPEUTIC EXERCISES: CPT | Mod: GP,KX

## 2025-06-25 NOTE — PROGRESS NOTES
PTA medications updated by Medication Scribe prior to surgery via phone call with patient (last doses completed by Nurse)     Medication history sources: Patient, Surescripts, and H&P  In the past week, patient estimated taking medication this percent of the time: Greater than 90%      Significant changes made to the medication list:  Patient taking meds differently than prescribed; See PTA entries for: baclofen      Additional medication history information:   None    Medication reconciliation completed by provider prior to medication history? No    Time spent in this activity: 30 minutes    The information provided in this note is only as accurate as the sources available at the time of update(s)      Prior to Admission medications    Medication Sig Last Dose Taking? Auth Provider Long Term End Date   aspirin (ASA) 81 MG chewable tablet Take 81 mg by mouth daily  Yes Reported, Patient No    baclofen (LIORESAL) 10 MG tablet Take 1 tablet (10 mg) by mouth 2 times daily  Patient taking differently: Take 10 mg by mouth daily.  Yes Kayla Hobbs MD     calcium carbonate 600 mg-vitamin D 400 units (CALTRATE) 600-400 MG-UNIT per tablet Take 1 tablet by mouth 2 times daily 6/16/2025 Yes Reported, Patient     diphenhydrAMINE (BENADRYL) 25 MG tablet Take 50 mg by mouth nightly as needed for itching or allergies. 2 x 25mg =50mg  Yes Reported, Patient     lisinopril (ZESTRIL) 10 MG tablet Take 1 tablet (10 mg) by mouth daily.  Yes Kayla Hobbs MD Yes    meclizine 25 MG CHEW Take 25 mg by mouth as needed  Yes Reported, Patient     oxyBUTYnin ER (DITROPAN XL) 10 MG 24 hr tablet Take 1 tablet (10 mg) by mouth every morning.  Yes Kayla Hobbs MD     senna-docusate (SENOKOT-S/PERICOLACE) 8.6-50 MG tablet Take 1-2 tablets by mouth 2 times daily Take while on oral narcotics to prevent or treat constipation.  Yes Kayla Hobbs MD     Catheters [facility] COLOPLAST  14FR/COUDE ITEM #814/OLIVE TIP W/GUIDEDX:N20.1/N31.9/N40.0/R330 HCPC:/#200EA/30DS **PICK-UP**   Alisha Echevarria APRN CNP     Lubricants (SURGICAL LUBRICANT) external gel SURGILUBE 3GM PACKETS #[id number]/#200EA/600GMS DX:N20.1/N31.9/N40/R33 HCP:   Kayla Hobbs MD         Medication history completed by: Ewa Barron

## 2025-06-26 ENCOUNTER — THERAPY VISIT (OUTPATIENT)
Dept: PHYSICAL THERAPY | Facility: OTHER | Age: 70
End: 2025-06-26
Attending: FAMILY MEDICINE
Payer: MEDICARE

## 2025-06-26 DIAGNOSIS — G35 MULTIPLE SCLEROSIS (H): Primary | ICD-10-CM

## 2025-06-26 DIAGNOSIS — R26.9 NEUROLOGIC GAIT DYSFUNCTION: ICD-10-CM

## 2025-06-26 PROCEDURE — 97110 THERAPEUTIC EXERCISES: CPT | Mod: GP,KX

## 2025-06-30 ENCOUNTER — THERAPY VISIT (OUTPATIENT)
Dept: PHYSICAL THERAPY | Facility: OTHER | Age: 70
End: 2025-06-30
Attending: FAMILY MEDICINE
Payer: MEDICARE

## 2025-06-30 DIAGNOSIS — R26.9 NEUROLOGIC GAIT DYSFUNCTION: ICD-10-CM

## 2025-06-30 DIAGNOSIS — G35 MULTIPLE SCLEROSIS (H): Primary | ICD-10-CM

## 2025-06-30 PROCEDURE — 97110 THERAPEUTIC EXERCISES: CPT | Mod: GP,KX

## 2025-07-01 ENCOUNTER — APPOINTMENT (OUTPATIENT)
Dept: GENERAL RADIOLOGY | Facility: CLINIC | Age: 70
DRG: 448 | End: 2025-07-01
Attending: NEUROLOGICAL SURGERY
Payer: MEDICARE

## 2025-07-01 ENCOUNTER — ANESTHESIA (OUTPATIENT)
Dept: SURGERY | Facility: CLINIC | Age: 70
End: 2025-07-01
Payer: MEDICARE

## 2025-07-01 ENCOUNTER — ANESTHESIA EVENT (OUTPATIENT)
Dept: SURGERY | Facility: CLINIC | Age: 70
End: 2025-07-01
Payer: MEDICARE

## 2025-07-01 ENCOUNTER — HOSPITAL ENCOUNTER (INPATIENT)
Facility: CLINIC | Age: 70
End: 2025-07-01
Attending: NEUROLOGICAL SURGERY | Admitting: NEUROLOGICAL SURGERY
Payer: MEDICARE

## 2025-07-01 DIAGNOSIS — G35 MULTIPLE SCLEROSIS (H): ICD-10-CM

## 2025-07-01 DIAGNOSIS — Z98.1 STATUS POST THORACIC SPINAL FUSION: Primary | ICD-10-CM

## 2025-07-01 LAB
ABO + RH BLD: NORMAL
BLD GP AB SCN SERPL QL: NEGATIVE
CREAT SERPL-MCNC: 1.27 MG/DL (ref 0.67–1.17)
EGFRCR SERPLBLD CKD-EPI 2021: 61 ML/MIN/1.73M2
GLUCOSE SERPL-MCNC: 81 MG/DL (ref 70–99)
POTASSIUM SERPL-SCNC: 4 MMOL/L (ref 3.4–5.3)
SPECIMEN EXP DATE BLD: NORMAL

## 2025-07-01 PROCEDURE — 250N000025 HC SEVOFLURANE, PER MIN: Performed by: NEUROLOGICAL SURGERY

## 2025-07-01 PROCEDURE — 22842 INSERT SPINE FIXATION DEVICE: CPT | Mod: AS | Performed by: NURSE PRACTITIONER

## 2025-07-01 PROCEDURE — 82947 ASSAY GLUCOSE BLOOD QUANT: CPT | Performed by: ANESTHESIOLOGY

## 2025-07-01 PROCEDURE — 22842 INSERT SPINE FIXATION DEVICE: CPT | Performed by: NEUROLOGICAL SURGERY

## 2025-07-01 PROCEDURE — 63046 LAM FACETEC & FORAMOT THRC: CPT | Mod: 51 | Performed by: NEUROLOGICAL SURGERY

## 2025-07-01 PROCEDURE — 0RG7071 FUSION OF 2 TO 7 THORACIC VERTEBRAL JOINTS WITH AUTOLOGOUS TISSUE SUBSTITUTE, POSTERIOR APPROACH, POSTERIOR COLUMN, OPEN APPROACH: ICD-10-PCS | Performed by: NEUROLOGICAL SURGERY

## 2025-07-01 PROCEDURE — 272N000001 HC OR GENERAL SUPPLY STERILE: Performed by: NEUROLOGICAL SURGERY

## 2025-07-01 PROCEDURE — 710N000009 HC RECOVERY PHASE 1, LEVEL 1, PER MIN: Performed by: NEUROLOGICAL SURGERY

## 2025-07-01 PROCEDURE — 61783 SCAN PROC SPINAL: CPT | Mod: AS | Performed by: NURSE PRACTITIONER

## 2025-07-01 PROCEDURE — 258N000003 HC RX IP 258 OP 636: Performed by: ANESTHESIOLOGY

## 2025-07-01 PROCEDURE — 22610 ARTHRD PST TQ 1NTRSPC THRC: CPT | Performed by: NEUROLOGICAL SURGERY

## 2025-07-01 PROCEDURE — 86900 BLOOD TYPING SEROLOGIC ABO: CPT | Performed by: NURSE PRACTITIONER

## 2025-07-01 PROCEDURE — 120N000001 HC R&B MED SURG/OB

## 2025-07-01 PROCEDURE — 999N000180 XR SURGERY CARM FLUORO LESS THAN 5 MIN

## 2025-07-01 PROCEDURE — 22614 ARTHRD PST TQ 1NTRSPC EA ADD: CPT | Performed by: NEUROLOGICAL SURGERY

## 2025-07-01 PROCEDURE — 250N000013 HC RX MED GY IP 250 OP 250 PS 637

## 2025-07-01 PROCEDURE — 250N000011 HC RX IP 250 OP 636

## 2025-07-01 PROCEDURE — 63046 LAM FACETEC & FORAMOT THRC: CPT | Mod: AS | Performed by: NURSE PRACTITIONER

## 2025-07-01 PROCEDURE — 82565 ASSAY OF CREATININE: CPT | Performed by: ANESTHESIOLOGY

## 2025-07-01 PROCEDURE — 370N000017 HC ANESTHESIA TECHNICAL FEE, PER MIN: Performed by: NEUROLOGICAL SURGERY

## 2025-07-01 PROCEDURE — 20930 SP BONE ALGRFT MORSEL ADD-ON: CPT | Performed by: NEUROLOGICAL SURGERY

## 2025-07-01 PROCEDURE — C1713 ANCHOR/SCREW BN/BN,TIS/BN: HCPCS | Performed by: NEUROLOGICAL SURGERY

## 2025-07-01 PROCEDURE — 63048 LAM FACETEC &FORAMOT EA ADDL: CPT | Performed by: NEUROLOGICAL SURGERY

## 2025-07-01 PROCEDURE — 84132 ASSAY OF SERUM POTASSIUM: CPT | Performed by: ANESTHESIOLOGY

## 2025-07-01 PROCEDURE — 258N000003 HC RX IP 258 OP 636: Performed by: NURSE ANESTHETIST, CERTIFIED REGISTERED

## 2025-07-01 PROCEDURE — 258N000003 HC RX IP 258 OP 636

## 2025-07-01 PROCEDURE — 4A11X4G MONITORING OF PERIPHERAL NERVOUS ELECTRICAL ACTIVITY, INTRAOPERATIVE, EXTERNAL APPROACH: ICD-10-PCS | Performed by: NEUROLOGICAL SURGERY

## 2025-07-01 PROCEDURE — 999N000141 HC STATISTIC PRE-PROCEDURE NURSING ASSESSMENT: Performed by: NEUROLOGICAL SURGERY

## 2025-07-01 PROCEDURE — 61783 SCAN PROC SPINAL: CPT | Mod: 59 | Performed by: NEUROLOGICAL SURGERY

## 2025-07-01 PROCEDURE — 250N000013 HC RX MED GY IP 250 OP 250 PS 637: Performed by: INTERNAL MEDICINE

## 2025-07-01 PROCEDURE — 250N000009 HC RX 250: Performed by: NEUROLOGICAL SURGERY

## 2025-07-01 PROCEDURE — 36415 COLL VENOUS BLD VENIPUNCTURE: CPT | Performed by: NURSE PRACTITIONER

## 2025-07-01 PROCEDURE — 250N000009 HC RX 250: Performed by: NURSE ANESTHETIST, CERTIFIED REGISTERED

## 2025-07-01 PROCEDURE — 250N000011 HC RX IP 250 OP 636: Performed by: NEUROLOGICAL SURGERY

## 2025-07-01 PROCEDURE — 22614 ARTHRD PST TQ 1NTRSPC EA ADD: CPT | Mod: AS | Performed by: NURSE PRACTITIONER

## 2025-07-01 PROCEDURE — 250N000011 HC RX IP 250 OP 636: Performed by: NURSE ANESTHETIST, CERTIFIED REGISTERED

## 2025-07-01 PROCEDURE — 63048 LAM FACETEC &FORAMOT EA ADDL: CPT | Mod: AS | Performed by: NURSE PRACTITIONER

## 2025-07-01 PROCEDURE — 250N000013 HC RX MED GY IP 250 OP 250 PS 637: Performed by: NURSE PRACTITIONER

## 2025-07-01 PROCEDURE — 250N000011 HC RX IP 250 OP 636: Performed by: NURSE PRACTITIONER

## 2025-07-01 PROCEDURE — 22610 ARTHRD PST TQ 1NTRSPC THRC: CPT | Mod: AS | Performed by: NURSE PRACTITIONER

## 2025-07-01 PROCEDURE — 99222 1ST HOSP IP/OBS MODERATE 55: CPT | Mod: AI | Performed by: INTERNAL MEDICINE

## 2025-07-01 PROCEDURE — 86901 BLOOD TYPING SEROLOGIC RH(D): CPT | Performed by: NURSE PRACTITIONER

## 2025-07-01 PROCEDURE — 8E0WXBG COMPUTER ASSISTED PROCEDURE OF TRUNK REGION, WITH COMPUTERIZED TOMOGRAPHY: ICD-10-PCS | Performed by: NEUROLOGICAL SURGERY

## 2025-07-01 PROCEDURE — 250N000009 HC RX 250: Performed by: ANESTHESIOLOGY

## 2025-07-01 PROCEDURE — 999N000182 XR SURGERY OARM

## 2025-07-01 PROCEDURE — 360N000086 HC SURGERY LEVEL 6 W/ FLUORO, PER MIN: Performed by: NEUROLOGICAL SURGERY

## 2025-07-01 PROCEDURE — 00NX0ZZ RELEASE THORACIC SPINAL CORD, OPEN APPROACH: ICD-10-PCS | Performed by: NEUROLOGICAL SURGERY

## 2025-07-01 DEVICE — IMPLANTABLE DEVICE: Type: IMPLANTABLE DEVICE | Site: SPINE THORACIC | Status: FUNCTIONAL

## 2025-07-01 DEVICE — BONE GRAFT PUTTY MAGNETOS EASYPACK PUTTY 10CC 703-053-US: Type: IMPLANTABLE DEVICE | Site: SPINE THORACIC | Status: FUNCTIONAL

## 2025-07-01 DEVICE — IMP ROD MEDT SOLERA CVD 5.5X70MM TI 1553201070: Type: IMPLANTABLE DEVICE | Site: SPINE THORACIC | Status: FUNCTIONAL

## 2025-07-01 DEVICE — SCREW BN PEEK SS MA STRL SPNE 5.5/6 MM ROD 559200029: Type: IMPLANTABLE DEVICE | Site: SPINE THORACIC | Status: FUNCTIONAL

## 2025-07-01 RX ORDER — NALOXONE HYDROCHLORIDE 0.4 MG/ML
0.2 INJECTION, SOLUTION INTRAMUSCULAR; INTRAVENOUS; SUBCUTANEOUS
Status: ACTIVE | OUTPATIENT
Start: 2025-07-01

## 2025-07-01 RX ORDER — OXYBUTYNIN CHLORIDE 10 MG/1
10 TABLET, EXTENDED RELEASE ORAL EVERY MORNING
Status: DISPENSED | OUTPATIENT
Start: 2025-07-02

## 2025-07-01 RX ORDER — ACETAMINOPHEN 325 MG/1
975 TABLET ORAL EVERY 8 HOURS
Status: DISPENSED | OUTPATIENT
Start: 2025-07-01

## 2025-07-01 RX ORDER — HYDROMORPHONE HCL IN WATER/PF 6 MG/30 ML
0.4 PATIENT CONTROLLED ANALGESIA SYRINGE INTRAVENOUS
Status: ACTIVE | OUTPATIENT
Start: 2025-07-01

## 2025-07-01 RX ORDER — AMOXICILLIN 250 MG
2 CAPSULE ORAL 2 TIMES DAILY
Status: DISPENSED | OUTPATIENT
Start: 2025-07-01

## 2025-07-01 RX ORDER — HYDROXYZINE HYDROCHLORIDE 25 MG/1
25 TABLET, FILM COATED ORAL EVERY 6 HOURS PRN
Status: ACTIVE | OUTPATIENT
Start: 2025-07-01

## 2025-07-01 RX ORDER — ONDANSETRON 4 MG/1
4 TABLET, ORALLY DISINTEGRATING ORAL EVERY 30 MIN PRN
Status: DISCONTINUED | OUTPATIENT
Start: 2025-07-01 | End: 2025-07-01 | Stop reason: HOSPADM

## 2025-07-01 RX ORDER — OXYCODONE HYDROCHLORIDE 5 MG/1
5 TABLET ORAL EVERY 4 HOURS PRN
Status: ACTIVE | OUTPATIENT
Start: 2025-07-01

## 2025-07-01 RX ORDER — CEFAZOLIN SODIUM/WATER 2 G/20 ML
2 SYRINGE (ML) INTRAVENOUS SEE ADMIN INSTRUCTIONS
Status: DISCONTINUED | OUTPATIENT
Start: 2025-07-01 | End: 2025-07-01 | Stop reason: HOSPADM

## 2025-07-01 RX ORDER — NALOXONE HYDROCHLORIDE 0.4 MG/ML
0.4 INJECTION, SOLUTION INTRAMUSCULAR; INTRAVENOUS; SUBCUTANEOUS
Status: ACTIVE | OUTPATIENT
Start: 2025-07-01

## 2025-07-01 RX ORDER — HYDROMORPHONE HCL IN WATER/PF 6 MG/30 ML
0.4 PATIENT CONTROLLED ANALGESIA SYRINGE INTRAVENOUS EVERY 5 MIN PRN
Status: DISCONTINUED | OUTPATIENT
Start: 2025-07-01 | End: 2025-07-01 | Stop reason: HOSPADM

## 2025-07-01 RX ORDER — FENTANYL CITRATE 50 UG/ML
INJECTION, SOLUTION INTRAMUSCULAR; INTRAVENOUS PRN
Status: DISCONTINUED | OUTPATIENT
Start: 2025-07-01 | End: 2025-07-01

## 2025-07-01 RX ORDER — LIDOCAINE 40 MG/G
CREAM TOPICAL
Status: ACTIVE | OUTPATIENT
Start: 2025-07-01

## 2025-07-01 RX ORDER — BUPIVACAINE HYDROCHLORIDE AND EPINEPHRINE 5; 5 MG/ML; UG/ML
INJECTION, SOLUTION PERINEURAL PRN
Status: DISCONTINUED | OUTPATIENT
Start: 2025-07-01 | End: 2025-07-01 | Stop reason: HOSPADM

## 2025-07-01 RX ORDER — FENTANYL CITRATE 50 UG/ML
50 INJECTION, SOLUTION INTRAMUSCULAR; INTRAVENOUS EVERY 5 MIN PRN
Status: DISCONTINUED | OUTPATIENT
Start: 2025-07-01 | End: 2025-07-01 | Stop reason: HOSPADM

## 2025-07-01 RX ORDER — SODIUM CHLORIDE 9 MG/ML
INJECTION, SOLUTION INTRAVENOUS CONTINUOUS
Status: DISCONTINUED | OUTPATIENT
Start: 2025-07-01 | End: 2025-07-03

## 2025-07-01 RX ORDER — LISINOPRIL 10 MG/1
10 TABLET ORAL DAILY
Status: DISCONTINUED | OUTPATIENT
Start: 2025-07-01 | End: 2025-07-01

## 2025-07-01 RX ORDER — ONDANSETRON 2 MG/ML
4 INJECTION INTRAMUSCULAR; INTRAVENOUS EVERY 30 MIN PRN
Status: DISCONTINUED | OUTPATIENT
Start: 2025-07-01 | End: 2025-07-01 | Stop reason: HOSPADM

## 2025-07-01 RX ORDER — LIDOCAINE HYDROCHLORIDE 10 MG/ML
INJECTION, SOLUTION INFILTRATION; PERINEURAL
Status: COMPLETED | OUTPATIENT
Start: 2025-07-01 | End: 2025-07-01

## 2025-07-01 RX ORDER — HYDROMORPHONE HCL IN WATER/PF 6 MG/30 ML
0.2 PATIENT CONTROLLED ANALGESIA SYRINGE INTRAVENOUS
Status: ACTIVE | OUTPATIENT
Start: 2025-07-01

## 2025-07-01 RX ORDER — BACLOFEN 10 MG/1
10 TABLET ORAL DAILY
Status: DISPENSED | OUTPATIENT
Start: 2025-07-01

## 2025-07-01 RX ORDER — ONDANSETRON 2 MG/ML
4 INJECTION INTRAMUSCULAR; INTRAVENOUS EVERY 6 HOURS PRN
Status: ACTIVE | OUTPATIENT
Start: 2025-07-01

## 2025-07-01 RX ORDER — DEXAMETHASONE SODIUM PHOSPHATE 4 MG/ML
4 INJECTION, SOLUTION INTRA-ARTICULAR; INTRALESIONAL; INTRAMUSCULAR; INTRAVENOUS; SOFT TISSUE
Status: DISCONTINUED | OUTPATIENT
Start: 2025-07-01 | End: 2025-07-01 | Stop reason: HOSPADM

## 2025-07-01 RX ORDER — FENTANYL CITRATE 50 UG/ML
25 INJECTION, SOLUTION INTRAMUSCULAR; INTRAVENOUS EVERY 5 MIN PRN
Status: DISCONTINUED | OUTPATIENT
Start: 2025-07-01 | End: 2025-07-01 | Stop reason: HOSPADM

## 2025-07-01 RX ORDER — AMOXICILLIN 250 MG
1 CAPSULE ORAL 2 TIMES DAILY
Status: DISCONTINUED | OUTPATIENT
Start: 2025-07-01 | End: 2025-07-01

## 2025-07-01 RX ORDER — GABAPENTIN 100 MG/1
100 CAPSULE ORAL
Status: COMPLETED | OUTPATIENT
Start: 2025-07-01 | End: 2025-07-01

## 2025-07-01 RX ORDER — PROCHLORPERAZINE MALEATE 5 MG/1
5 TABLET ORAL EVERY 6 HOURS PRN
Status: ACTIVE | OUTPATIENT
Start: 2025-07-01

## 2025-07-01 RX ORDER — ACETAMINOPHEN 325 MG/1
975 TABLET ORAL ONCE
Status: COMPLETED | OUTPATIENT
Start: 2025-07-01 | End: 2025-07-01

## 2025-07-01 RX ORDER — ONDANSETRON 2 MG/ML
INJECTION INTRAMUSCULAR; INTRAVENOUS PRN
Status: DISCONTINUED | OUTPATIENT
Start: 2025-07-01 | End: 2025-07-01

## 2025-07-01 RX ORDER — OXYCODONE HYDROCHLORIDE 5 MG/1
10 TABLET ORAL EVERY 4 HOURS PRN
Status: ACTIVE | OUTPATIENT
Start: 2025-07-01

## 2025-07-01 RX ORDER — POLYETHYLENE GLYCOL 3350 17 G/17G
17 POWDER, FOR SOLUTION ORAL 2 TIMES DAILY
Status: DISPENSED | OUTPATIENT
Start: 2025-07-02

## 2025-07-01 RX ORDER — SODIUM CHLORIDE, SODIUM LACTATE, POTASSIUM CHLORIDE, CALCIUM CHLORIDE 600; 310; 30; 20 MG/100ML; MG/100ML; MG/100ML; MG/100ML
INJECTION, SOLUTION INTRAVENOUS CONTINUOUS
Status: DISCONTINUED | OUTPATIENT
Start: 2025-07-01 | End: 2025-07-01 | Stop reason: HOSPADM

## 2025-07-01 RX ORDER — CALCIUM CARBONATE 500 MG/1
500 TABLET, CHEWABLE ORAL 4 TIMES DAILY PRN
Status: ACTIVE | OUTPATIENT
Start: 2025-07-01

## 2025-07-01 RX ORDER — DEXAMETHASONE SODIUM PHOSPHATE 4 MG/ML
INJECTION, SOLUTION INTRA-ARTICULAR; INTRALESIONAL; INTRAMUSCULAR; INTRAVENOUS; SOFT TISSUE PRN
Status: DISCONTINUED | OUTPATIENT
Start: 2025-07-01 | End: 2025-07-01

## 2025-07-01 RX ORDER — HYDROMORPHONE HCL IN WATER/PF 6 MG/30 ML
0.2 PATIENT CONTROLLED ANALGESIA SYRINGE INTRAVENOUS EVERY 5 MIN PRN
Status: DISCONTINUED | OUTPATIENT
Start: 2025-07-01 | End: 2025-07-01 | Stop reason: HOSPADM

## 2025-07-01 RX ORDER — LIDOCAINE HYDROCHLORIDE 20 MG/ML
INJECTION, SOLUTION INFILTRATION; PERINEURAL PRN
Status: DISCONTINUED | OUTPATIENT
Start: 2025-07-01 | End: 2025-07-01

## 2025-07-01 RX ORDER — PROPOFOL 10 MG/ML
INJECTION, EMULSION INTRAVENOUS PRN
Status: DISCONTINUED | OUTPATIENT
Start: 2025-07-01 | End: 2025-07-01

## 2025-07-01 RX ORDER — ONDANSETRON 4 MG/1
4 TABLET, ORALLY DISINTEGRATING ORAL EVERY 6 HOURS PRN
Status: ACTIVE | OUTPATIENT
Start: 2025-07-01

## 2025-07-01 RX ORDER — BISACODYL 10 MG
10 SUPPOSITORY, RECTAL RECTAL DAILY PRN
Status: ACTIVE | OUTPATIENT
Start: 2025-07-04

## 2025-07-01 RX ORDER — LISINOPRIL 10 MG/1
10 TABLET ORAL DAILY
Status: DISPENSED | OUTPATIENT
Start: 2025-07-02

## 2025-07-01 RX ORDER — CEFAZOLIN SODIUM/WATER 2 G/20 ML
2 SYRINGE (ML) INTRAVENOUS
Status: COMPLETED | OUTPATIENT
Start: 2025-07-01 | End: 2025-07-01

## 2025-07-01 RX ORDER — POLYETHYLENE GLYCOL 3350 17 G/17G
17 POWDER, FOR SOLUTION ORAL DAILY
Status: DISCONTINUED | OUTPATIENT
Start: 2025-07-02 | End: 2025-07-01

## 2025-07-01 RX ORDER — HALOPERIDOL 5 MG/ML
1 INJECTION INTRAMUSCULAR
Status: DISCONTINUED | OUTPATIENT
Start: 2025-07-01 | End: 2025-07-01 | Stop reason: HOSPADM

## 2025-07-01 RX ORDER — HYDROMORPHONE HYDROCHLORIDE 1 MG/ML
INJECTION, SOLUTION INTRAMUSCULAR; INTRAVENOUS; SUBCUTANEOUS PRN
Status: DISCONTINUED | OUTPATIENT
Start: 2025-07-01 | End: 2025-07-01

## 2025-07-01 RX ORDER — CEFAZOLIN SODIUM 2 G/50ML
2 SOLUTION INTRAVENOUS EVERY 8 HOURS
Status: DISPENSED | OUTPATIENT
Start: 2025-07-01

## 2025-07-01 RX ORDER — LIDOCAINE 40 MG/G
CREAM TOPICAL
Status: DISCONTINUED | OUTPATIENT
Start: 2025-07-01 | End: 2025-07-01 | Stop reason: HOSPADM

## 2025-07-01 RX ORDER — NALOXONE HYDROCHLORIDE 0.4 MG/ML
0.1 INJECTION, SOLUTION INTRAMUSCULAR; INTRAVENOUS; SUBCUTANEOUS
Status: DISCONTINUED | OUTPATIENT
Start: 2025-07-01 | End: 2025-07-01 | Stop reason: HOSPADM

## 2025-07-01 RX ADMIN — Medication 2 G: at 07:36

## 2025-07-01 RX ADMIN — PHENYLEPHRINE HYDROCHLORIDE 100 MCG: 10 INJECTION INTRAVENOUS at 11:44

## 2025-07-01 RX ADMIN — FENTANYL CITRATE 50 MCG: 50 INJECTION INTRAMUSCULAR; INTRAVENOUS at 08:31

## 2025-07-01 RX ADMIN — BACLOFEN 10 MG: 10 TABLET ORAL at 19:53

## 2025-07-01 RX ADMIN — GABAPENTIN 100 MG: 100 CAPSULE ORAL at 06:17

## 2025-07-01 RX ADMIN — PROPOFOL 200 MG: 10 INJECTION, EMULSION INTRAVENOUS at 07:42

## 2025-07-01 RX ADMIN — HYDROMORPHONE HYDROCHLORIDE 0.5 MG: 1 INJECTION, SOLUTION INTRAMUSCULAR; INTRAVENOUS; SUBCUTANEOUS at 11:33

## 2025-07-01 RX ADMIN — ACETAMINOPHEN 975 MG: 325 TABLET ORAL at 15:41

## 2025-07-01 RX ADMIN — LIDOCAINE HYDROCHLORIDE 100 MG: 20 INJECTION, SOLUTION INFILTRATION; PERINEURAL at 07:42

## 2025-07-01 RX ADMIN — HYDROMORPHONE HYDROCHLORIDE 0.5 MG: 1 INJECTION, SOLUTION INTRAMUSCULAR; INTRAVENOUS; SUBCUTANEOUS at 11:55

## 2025-07-01 RX ADMIN — SENNOSIDES AND DOCUSATE SODIUM 2 TABLET: 50; 8.6 TABLET ORAL at 19:53

## 2025-07-01 RX ADMIN — SODIUM CHLORIDE, SODIUM LACTATE, POTASSIUM CHLORIDE, AND CALCIUM CHLORIDE: .6; .31; .03; .02 INJECTION, SOLUTION INTRAVENOUS at 09:30

## 2025-07-01 RX ADMIN — ACETAMINOPHEN 975 MG: 325 TABLET ORAL at 06:16

## 2025-07-01 RX ADMIN — PHENYLEPHRINE HYDROCHLORIDE 200 MCG: 10 INJECTION INTRAVENOUS at 12:00

## 2025-07-01 RX ADMIN — FENTANYL CITRATE 50 MCG: 50 INJECTION INTRAMUSCULAR; INTRAVENOUS at 07:42

## 2025-07-01 RX ADMIN — PHENYLEPHRINE HYDROCHLORIDE 100 MCG: 10 INJECTION INTRAVENOUS at 11:49

## 2025-07-01 RX ADMIN — ONDANSETRON 4 MG: 2 INJECTION INTRAMUSCULAR; INTRAVENOUS at 09:41

## 2025-07-01 RX ADMIN — PHENYLEPHRINE HYDROCHLORIDE 0.5 MCG/KG/MIN: 10 INJECTION INTRAVENOUS at 07:49

## 2025-07-01 RX ADMIN — PROPOFOL 150 MCG/KG/MIN: 10 INJECTION, EMULSION INTRAVENOUS at 07:43

## 2025-07-01 RX ADMIN — LIDOCAINE HYDROCHLORIDE 2 ML: 10 INJECTION, SOLUTION INFILTRATION; PERINEURAL at 07:08

## 2025-07-01 RX ADMIN — Medication 2 G: at 11:39

## 2025-07-01 RX ADMIN — REMIFENTANIL HYDROCHLORIDE 150 MCG: 1 INJECTION, POWDER, LYOPHILIZED, FOR SOLUTION INTRAVENOUS at 07:43

## 2025-07-01 RX ADMIN — DEXAMETHASONE SODIUM PHOSPHATE 10 MG: 4 INJECTION, SOLUTION INTRA-ARTICULAR; INTRALESIONAL; INTRAMUSCULAR; INTRAVENOUS; SOFT TISSUE at 09:41

## 2025-07-01 RX ADMIN — MIDAZOLAM 2 MG: 1 INJECTION INTRAMUSCULAR; INTRAVENOUS at 07:30

## 2025-07-01 RX ADMIN — SODIUM CHLORIDE, SODIUM LACTATE, POTASSIUM CHLORIDE, AND CALCIUM CHLORIDE: .6; .31; .03; .02 INJECTION, SOLUTION INTRAVENOUS at 06:46

## 2025-07-01 RX ADMIN — CEFAZOLIN SODIUM 2 G: 2 SOLUTION INTRAVENOUS at 19:55

## 2025-07-01 RX ADMIN — SODIUM CHLORIDE: 0.9 INJECTION, SOLUTION INTRAVENOUS at 15:46

## 2025-07-01 ASSESSMENT — LIFESTYLE VARIABLES: TOBACCO_USE: 0

## 2025-07-01 ASSESSMENT — ACTIVITIES OF DAILY LIVING (ADL)
ADLS_ACUITY_SCORE: 38
ADLS_ACUITY_SCORE: 38
ADLS_ACUITY_SCORE: 39
ADLS_ACUITY_SCORE: 38
ADLS_ACUITY_SCORE: 39
ADLS_ACUITY_SCORE: 36
ADLS_ACUITY_SCORE: 39
ADLS_ACUITY_SCORE: 39
ADLS_ACUITY_SCORE: 38
ADLS_ACUITY_SCORE: 53
ADLS_ACUITY_SCORE: 39
ADLS_ACUITY_SCORE: 38
ADLS_ACUITY_SCORE: 39
ADLS_ACUITY_SCORE: 39

## 2025-07-01 ASSESSMENT — ENCOUNTER SYMPTOMS
DYSRHYTHMIAS: 0
SEIZURES: 0

## 2025-07-01 NOTE — ANESTHESIA CARE TRANSFER NOTE
Patient: Jamir Gill    Procedure: Procedure(s):  Thoracic 10 to thoracic 12 revision decompression and fusion with stealth and spinal cord monitoring       Diagnosis: Thoracic myelopathy [M47.14]  Diagnosis Additional Information: No value filed.    Anesthesia Type:   General     Note:    Oropharynx: oropharynx clear of all foreign objects  Level of Consciousness: drowsy  Oxygen Supplementation: face mask  Level of Supplemental Oxygen (L/min / FiO2): 6  Independent Airway: airway patency satisfactory and stable  Dentition: dentition unchanged  Vital Signs Stable: post-procedure vital signs reviewed and stable  Report to RN Given: handoff report given  Patient transferred to: Phase II    Handoff Report: Identifed the Patient, Identified the Reponsible Provider, Reviewed the pertinent medical history, Discussed the surgical course, Reviewed Intra-OP anesthesia mangement and issues during anesthesia, Set expectations for post-procedure period and Allowed opportunity for questions and acknowledgement of understanding      Vitals:  Vitals Value Taken Time   /69    Temp 36.5 C    Pulse 89 07/01/25 12:54   Resp 13 07/01/25 12:54   SpO2 100 % 07/01/25 12:54   Vitals shown include unfiled device data.    Electronically Signed By: CORIE Howell CRNA  July 1, 2025  12:55 PM

## 2025-07-01 NOTE — ANESTHESIA PROCEDURE NOTES
Airway         Procedure Start/Stop Times: 7/1/2025 7:47 AM  Staff -        Performed By: CRNAIndications and Patient Condition       Induction type:intravenous       Mask difficulty assessment: 0 - not attempted    Final Airway Details       Final airway type: endotracheal airway       Successful airway: ETT - single  Endotracheal Airway Details        ETT size (mm): 8.0       Cuffed: yes       Successful intubation technique: video laryngoscopy       VL Blade Size: Glidescope 4       Grade View of Cords: 1       Adjucts: stylet       Position: Right       Measured from: gums/teeth       Secured at (cm): 23       Bite block used: None    Post intubation assessment        Placement verified by: capnometry, equal breath sounds and chest rise        Number of attempts at approach: 1       Number of other approaches attempted: 0       Secured with: tape       Ease of procedure: easy       Dentition: Intact and Unchanged    Medication(s) Administered   Medication Administration Time: 7/1/2025 7:47 AM

## 2025-07-01 NOTE — PROGRESS NOTES
Post Op Note   07/01/2025     POD0 s/p  Thoracic 10 to thoracic 12 revision decompression and fusion with stealth and spinal cord monitoring      MD Leija     Pre op sx: worsening gait and strength      PLAN:   - okay for floor   - Activity: Up as tolerated. No bracing needed  - Neuro checks: as ordered   - Incentive spirometer as ordered   - Imaging: none indicated      Meds:   - Multimodal Pain management  - Hold off on DVT prophylaxis for now  - May resume PTA ASA on POD7  - Abx while drain in place     Incision cares:  - Monitor wound for drainage  - Incision closed with nylon, covered with dressing   - Leave dressing on until POD2     Drain:   - RUBIO drain: empty and record output every shift, even if zero    Dispo:   - Per therapy recs and adequate pain control.      Follow ups:   - 2 week incision check, 6 week s, 12 weeks     Reviewed history, imaging and plans with Dr. Leija who was in agreement.     Roxana Rae PA-C   [facility]  Clinic phone number: [phone]  Securely message or page via Epic Secure Chat, MailInBlack, or Get Satisfaction

## 2025-07-01 NOTE — PROGRESS NOTES
Pain: Pain goal 0/10 Pain Rating 2-3/10 back Effective pain medication/regimen tylenol ice     Assessment  Vitals/Pain: VSS on RA   Resp: LS Clear   Neuro: A&Ox 4, neuro intact ex baseline weakness BLE (R weaker the L), BLE numbness and tingling, nystagmus bilat eyes   GI/: BS +, x0 BM, last BM Friday, Pt having adequate urine output throughout shift with alvarado in place   Skin/Wounds: bilat lower buttock old wounds scar tissue, mid back surgical site CDI   Lines/Drains: R back RUBIO drain to bulb suction with sanguinous output, alvarado in place, R PIV with NS running at 75 ml/hr  Activity: x1 GB walker, weak on R leg due to MS     Aggression Stop Light: Green     Patient Care Plan: alvarado out POD1, dressing off POD2

## 2025-07-01 NOTE — OP NOTE
Date of surgery: July 1, 2025  Surgeon: Edson Leija MD  Assistant: Brenda Inman NP  (Note: The assistant was present for and assisted with the entire surgery, and his/her role as an assistant was crucial for aid in positioning, exposure, suctioning, retraction, and closure)      Preoperative diagnosis: Thoracic myelopathy  Postoperative diagnosis:  Thoracic myelopathy    Procedure:  1.  T10-T12 posterior segmental instrumentation with insertion of bilateral pedicle screws and rods (Medtronic Module X)  2.  T10-11, T11-12 revision bilateral laminectomies and medial facetectomies for decompression of stenosis  3.  T10-11, T11-12 posterolateral arthrodesis and fusion with allograft  4.  Use of intraoperative fluoroscopy, microscope, and neuro-monitoring  5.  Medtronic O-arm intraoperative CT scan  6.  Use of "[email or web address] Stealth navigation     EBL: 100 mL      Indications: 70 year old male with history of MS, had worsening thoracic myelopathy and underwent laminectomy in 2022.  He improved considerably after the surgery, until the last few months when he had recurrent worsening weakness and myelopathy.  Risks, benefits, indications, and alternatives were discussed with the patient and family in detail, and they wished to proceed.      Description of surgery: The patient was positioned prone.  Sterile prepping and draping procedures were performed.  Antibiotics were administered and timeout was performed.  A midline incision was performed.  The monopolar was used to expose the remaining spinous processes, lamina, facets, and transverse processes from T10-T12.  An O-arm intraoperative CT scan was performed.  Stealth navigation was registered, and under navigation, bilateral pedicle screws were inserted at T10, T11, and T12.  An O-arm scan confirmed good positioning of the hardware.  The microscope was brought into the field, and under microscopy, the prior scar tissu was dissected, and the drill was used to perform  T10-11 and T11-12 revision laminectomies and medial facetectomies.  The Kerrison rongeurs were then used to remove the scar tissue and ligament with decompression of the thecal sac.  Connecting rods were run from T10 to T12.  Set screws were inserted.  Antibiotic irrigation was performed.  Hemostasis was achieved.  The bilateral transverse processes and facets were arthrodesed at T10-T11 and T11-12, and allograft was packed for posterolateral fusion.  Fluoroscopy demonstrated excellent positioning of the hardware.  The fascia was closed with 0-Vicryl sutures, and the dermal layer was closed with 2-0 vicryl sutures.  The skin was closed with staples.  Monitoring signals were stable throughout.

## 2025-07-01 NOTE — ANESTHESIA POSTPROCEDURE EVALUATION
Patient: Jamir Gill    Procedure: Procedure(s):  Thoracic 10 to thoracic 12 revision decompression and fusion with stealth and spinal cord monitoring       Anesthesia Type:  General    Note:  Disposition: Admission   Postop Pain Control: Uneventful            Sign Out: Well controlled pain   PONV: No   Neuro/Psych: Uneventful            Sign Out: Acceptable/Baseline neuro status   Airway/Respiratory: Uneventful            Sign Out: Acceptable/Baseline resp. status   CV/Hemodynamics: Uneventful            Sign Out: Acceptable CV status; No obvious hypovolemia; No obvious fluid overload   Other NRE: NONE   DID A NON-ROUTINE EVENT OCCUR? No           Last vitals:  Vitals Value Taken Time   /59 07/01/25 14:15   Temp 36.1  C (97  F) 07/01/25 14:15   Pulse 86 07/01/25 14:16   Resp 10 07/01/25 14:16   SpO2 100 % 07/01/25 14:16   Vitals shown include unfiled device data.    Electronically Signed By: Lucrecia Wheeler MD  July 1, 2025  5:42 PM

## 2025-07-01 NOTE — CONSULTS
[facility]  Consult Note - Hospitalist Service  Date of Admission:  7/1/2025  Consult Requested by:   Reason for Consult: Postoperative medical management     Assessment & Plan     Jamir Gill is a 70 year old male with past medical history significant for essential hypertension, BPH with lower urinary tract symptoms, multiple sclerosis and and spondylosis of cervical region without myelopathy or radiculopathy along with issues with thoracic spondylosis with myelopathy, chronic neurogenic bladder from MS who underwent elective T10-T12 revision decompression and fusion with Stealth and spinal cord monitoring by Dr. Barahona from neurosurgery on 07/01/2025.  Internal medicine service was consulted for postoperative medical management.    Thoracic spondylosis with myelopathy.  Status post T10-T12 revision decompression and fusion with Stealth and spinal cord monitoring    Patient is currently denying any problem, denying any pain, no chest pain, no palpitations no shortness of breath, Rossi catheter in place.    --Postoperative management as per primary team including DVT prophylaxis, pain management and IV fluids.  --Surgery team planning to hold off on DVT prophylaxis for now  --Patient may resume PTA aspirin on postop day 7.  --Antibiotics while drain in place.  --Plan to leave dressing on until postop day 2.  --Nursing instructions in place to empty and record RUBIO drain output.  --Therapies ordered.    Essential hypertension:  --Plan to resume PTA Lisinopril 10 mg p.o. daily from tomorrow morning with holding parameters.  --Will check BMP in AM.    History of multiple sclerosis  Chronic neurogenic bladder: Secondary to above, self cath twice a day at home  -- Continue outpatient follow-up with neurology in [facility]  -- Continue PTA baclofen.  -- Rossi catheter in place, patient will need to self cath when catheter has been removed.    Chronic constipation:  -- Continue MiraLAX,  "will order twice daily along with twice daily dose of senna and docusate considering postoperative status. Holding parameters are in place       Clinically Significant Risk Factors Present on Admission                 # Drug Induced Platelet Defect: home medication list includes an antiplatelet medication   # Hypertension: Noted on problem list           # Overweight: Estimated body mass index is 26.6 kg/m  as calculated from the following:    Height as of this encounter: 1.753 m (5' 9\").    Weight as of this encounter: 81.7 kg (180 lb 1.9 oz).              Marie Vanessa MD  Hospitalist Service  Securely message with [employer] (more info)  Text page via [facility] Paging/Directory   ______________________________________________________________________    Chief Complaint     S/p T10-T12 revision decompression and fusion with Stealth and his spinal cord monitoring.  Internal medicine consultation for medical management of essential hypertension, MS, neurogenic bladder.    History is obtained from the patient.    History of Present Illness     Jamir Gill is a 70 year old male with past medical history significant for essential hypertension, BPH with lower urinary tract symptoms, multiple sclerosis and spondylosis of cervical region without myelopathy or radiculopathy along with issues with thoracic spondylosis with myelopathy and urinary urgency who underwent elective surgery by Dr. Leija for T10-T12 revision decompression and fusion with Stealth and spinal cord monitoring.  Internal medicine consultation has been requested for postoperative medical management.    Patient tolerated the procedure well, at the time of evaluation, patient vitals are showing blood pressure 119/68, temperature of 36.1, pulse of 83, respiratory rate of 16 and was saturating 98%.  Patient currently takes lisinopril 10 mg daily for essential hypertension which was held this morning.  In the outpatient setting patient has been following up with " physical therapy also takes oxybutynin 10 mg every 24 hours due to the history of neurogenic bladder and baclofen due to his history of multiple sclerosis.  Patient self caths twice a day at home, currently has Rossi catheter postsurgery .patient is denying any chest pain, palpitations or shortness of breath.  Patient does have issues with chronic constipation due to multiple sclerosis.  His multiple sclerosis has been very stable and he follows up with his neurologist in [facility].    Past Medical History    Past Medical History:   Diagnosis Date    Cervical disc disorder     No Comments Provided    Enlarged prostate with lower urinary tract symptoms (LUTS)     No Comments Provided    Essential (primary) hypertension     No Comments Provided    Multiple sclerosis (H)     No Comments Provided    Other specified disorders of bone density and structure, unspecified site (CODE)     No Comments Provided    Other symptoms and signs involving cognitive functions and awareness     No Comments Provided    Spondylosis of cervical region without myelopathy or radiculopathy     No Comments Provided       Past Surgical History   Past Surgical History:   Procedure Laterality Date    APPENDECTOMY OPEN      appendectomy    COLONOSCOPY  01/01/2010 01/01/2010,with polyps resected    COLONOSCOPY  12/15/2015    12/15/2015,[place]- colitis    COLONOSCOPY N/A 06/17/2021    2 sessile serrated and 1 tubular adenoma, 3 year follow up, 6/17/2024    COLONOSCOPY  06/17/2024    F/U 2034 normal    COLONOSCOPY N/A 6/17/2024    Procedure: Colonoscopy;  Surgeon: Rudy Gilbert MD;  Location:  OR    COMBINED CYSTOSCOPY, URETEROSCOPY, LASER HOLMIUM LITHOTRIPSY URETER(S) Left 04/10/2018    Procedure: COMBINED CYSTOSCOPY, URETEROSCOPY, LASER HOLMIUM LITHOTRIPSY URETER(S);  Left Ureteroscopy with Holmium Laser Lithotripsy & Stent Placement.;  Surgeon: Olu Saldivar MD;  Location:  OR    CYSTOSCOPY, RETROGRADES, INSERT STENT URETER(S),  COMBINED Left 04/03/2018    Procedure: COMBINED CYSTOSCOPY, RETROGRADES, INSERT STENT URETER(S);;  Surgeon: Olu Saldivar MD;  Location: [facility] OR    LAMINECTOMY THORACIC TWO LEVELS N/A 09/06/2022    Procedure: Thoracic 10 to thoracic 12 laminectomies;  Surgeon: Edson Leija MD;  Location: [facility] OR    PROSTATE SURGERY      ,PROSTATECTOMY,Laser prostate surgery -- BPH    WRIST SURGERY Left     s/p fracture       Medications   Medications Prior to Admission   Medication Sig Dispense Refill Last Dose/Taking    aspirin (ASA) 81 MG chewable tablet Take 81 mg by mouth daily   6/23/2025    baclofen (LIORESAL) 10 MG tablet Take 1 tablet (10 mg) by mouth 2 times daily (Patient taking differently: Take 10 mg by mouth daily.) 180 tablet 3 6/30/2025 Evening    calcium carbonate 600 mg-vitamin D 400 units (CALTRATE) 600-400 MG-UNIT per tablet Take 1 tablet by mouth 2 times daily   6/16/2025    diphenhydrAMINE (BENADRYL) 25 MG tablet Take 50 mg by mouth nightly as needed for itching or allergies. 2 x 25mg =50mg   6/30/2025 Evening    lisinopril (ZESTRIL) 10 MG tablet Take 1 tablet (10 mg) by mouth daily. 90 tablet 4 6/30/2025 Morning    meclizine 25 MG CHEW Take 25 mg by mouth as needed   Past Month    oxyBUTYnin ER (DITROPAN XL) 10 MG 24 hr tablet Take 1 tablet (10 mg) by mouth every morning. 90 tablet 4 7/1/2025 Morning    senna-docusate (SENOKOT-S/PERICOLACE) 8.6-50 MG tablet Take 1-2 tablets by mouth 2 times daily Take while on oral narcotics to prevent or treat constipation. 180 tablet 3 7/1/2025 Morning    Catheters MISC COLOPLAST 14FR/COUDE ITEM #814/OLIVE TIP W/GUIDEDX:N20.1/N31.9/N40.0/R330 HCP:/#200EA/30DS **PICK-UP** 200 each 12     Lubricants (SURGICAL LUBRICANT) external gel SURGILUBE 3GM PACKETS #[id number]/#200EA/600GMS DX:N20.1/N31.9/N40/R33 [facility]: 600 g 4           Review of Systems    The 10 point Review of Systems is negative other than noted in the HPI or here.     Social History   I have  reviewed this patient's social history and updated it with pertinent information if needed.  Social History     Tobacco Use    Smoking status: Never     Passive exposure: Never    Smokeless tobacco: Never   Vaping Use    Vaping status: Never Used   Substance Use Topics    Alcohol use: No    Drug use: No         Family History   I have reviewed this patient's family history and updated it with pertinent information if needed.  Family History   Problem Relation Age of Onset    Other - See Comments Mother          with Parkinson's    Heart Disease Mother         Heart Disease,CHF for carditis    Prostate Cancer Father         Cancer-prostate    Other - See Comments Father         Alzheimer's/kidney failure    Family History Negative Brother         Good Health    Heart Disease Brother         Heart Disease,ASCAD with MI    Lung Cancer Brother         tobacco abuse         Allergies   No Known Allergies     Physical Exam   Vital Signs: Temp: 97.5  F (36.4  C) Temp src: Tympanic BP: 119/70 Pulse: 82   Resp: 16 SpO2: (!) 91 % O2 Device: None (Room air) Oxygen Delivery: 2 LPM  Weight: 180 lbs 1.85 oz    Physical Exam  Vitals and nursing note reviewed.   Constitutional:       Appearance: He is well-developed.   HENT:      Head: Normocephalic and atraumatic.   Eyes:      Pupils: Pupils are equal, round, and reactive to light.   Neck:      Thyroid: No thyromegaly.   Cardiovascular:      Rate and Rhythm: Normal rate and regular rhythm.      Heart sounds: Normal heart sounds.   Pulmonary:      Effort: Pulmonary effort is normal. No respiratory distress.      Breath sounds: Normal breath sounds.   Abdominal:      General: Bowel sounds are normal. There is no distension.      Palpations: Abdomen is soft.   Musculoskeletal:         General: No tenderness. Normal range of motion.      Cervical back: Normal range of motion and neck supple.   Skin:     General: Skin is warm and dry.      Comments: Surgical site in dressing ,  surgical drain in place.   Neurological:      Mental Status: He is alert and oriented to person, place, and time.   Psychiatric:         Behavior: Behavior normal.         Medical Decision Making       65 MINUTES SPENT BY ME on the date of service doing chart review, history, exam, documentation & further activities per the note.      Data     I have personally reviewed the following data over the past 24 hrs:    N/A  \   N/A   / N/A     N/A N/A N/A /  81   4.0 N/A 1.27 (H) \       Imaging results reviewed over the past 24 hrs:   Recent Results (from the past 24 hours)   XR Surgery OARM    Narrative    This exam was marked as non-reportable because it will not be read by a   radiologist or a [place] non-radiologist provider.         XR Surgery PARTHA Fluoro Less Than 5 Min    Narrative    This exam was marked as non-reportable because it will not be read by a   radiologist or a [place] non-radiologist provider.           Recent Labs   Lab 07/01/25  0601   POTASSIUM 4.0   CR 1.27*   GLC 81

## 2025-07-01 NOTE — ANESTHESIA PROCEDURE NOTES
Arterial Line Procedure Note    Pre-Procedure   Staff -        Anesthesiologist:  Lucrecia Wheeler MD       Performed By: anesthesiologist       Location: pre-op       Pre-Anesthestic Checklist: patient identified, IV checked, risks and benefits discussed, informed consent, monitors and equipment checked, pre-op evaluation and at physician/surgeon's request  Timeout:       Correct Patient: Yes        Correct Procedure: Yes        Correct Site: Yes        Correct Position: Yes   Line Placement:   This line was placed Pre Induction starting at 7/1/2025 7:08 AM and ending at 7/1/2025 7:14 AM  Procedure   Procedure: arterial line       Diagnosis: spinal cord compression       Laterality: right       Insertion Site: radial.  Sterile Prep        Standard elements of sterile barrier followed       Skin prep: Chloraprep  Insertion/Injection        Technique: ultrasound guided        Medical Necessity: need to minimize puncture attempts       1. Ultrasound was used to evaluate the access site.       2. Artery evaluated via ultrasound for patency/adequacy.       3. Using real-time ultrasound the needle/catheter was observed entering the artery/vein.       4. Permanent image was captured and entered into the patient's record.       5. The visualized structures were anatomically normal.       6. There were no apparent abnormal pathologic findings.       Catheter Type/Size: 20 G, 1.75 in/4.5 cm quick cath (integral wire)  Narrative        Tegaderm dressing used.       Complications: None apparent,        Arterial waveform: Yes        IBP within 10% of NIBP: Yes

## 2025-07-01 NOTE — BRIEF OP NOTE
[facility]    Brief Operative Note    Pre-operative diagnosis: Thoracic myelopathy [M47.14]  Post-operative diagnosis Same as pre-operative diagnosis    Procedure: Thoracic 10 to thoracic 12 revision decompression and fusion with stealth and spinal cord monitoring, N/A - Spine    Surgeon: Surgeons and Role:     * Edson Leija MD - Primary     * Roxana Rae PA-C - Assisting     * Brenda Inman NP - Assisting  Anesthesia: General   Estimated Blood Loss: 200 ml    Drains: Suraj-[place]ores  Specimens: * No specimens in log *  Findings:  See op note  Complications: None.  Implants:   Implant Name Type Inv. Item Serial No.  Lot No. LRB No. Used Action   SCREW BN 55MM 6.5MM SHNK WENDY OSTEOGRIP SPNE - [id number] Metallic Hardware/[place] SCREW BN 55MM 6.5MM SHNK WENDY OSTEOGRIP SPNE  [employer] [id number] N/A 1 Implanted   SCREW BN 55MM 6.5MM SHNK WENDY OSTEOGRIP SPNE - [id number] Metallic Hardware/[place] SCREW BN 55MM 6.5MM SHNK WENDY OSTEOGRIP SPNE  [employer] [id number] N/A 1 Implanted   SCREW BN 50MM 6.5MM SHNK WENDY OSTEOGRIP SPNE - [id number] Metallic Hardware/[place] SCREW BN 50MM 6.5MM SHNK WENDY OSTEOGRIP SPNE  [employer] [id number] N/A 1 Implanted   SCREW BN 55MM 6.5MM SHNK WENDY OSTEOGRIP SPNE - [id number] Metallic Hardware/[place] SCREW BN 55MM 6.5MM SHNK WENDY OSTEOGRIP SPNE  [employer] [id number] N/A 1 Implanted   SCREW BN 50MM 6.5MM SHNK WENDY OSTEOGRIP SPNE - [id number] Metallic Hardware/[place] SCREW BN 50MM 6.5MM SHNK WENDY OSTEOGRIP SPNE  [employer] [id number] N/A 2 Implanted   SCREW BN PEEK SS MA STRL SPNE 5.5/6 MM WILBERT [id number] - [id number] Metallic Hardware/[place] SCREW BN PEEK SS MA STRL SPNE 5.5/6 MM WILBERT [id number]  [employer] [id number] N/A 4 Implanted   SCREW BN PEEK SS MA STRL SPNE 5.5/6 MM WILBERT [id number] - [id number] Metallic Hardware/[place] SCREW BN PEEK SS MA STRL SPNE 5.5/6 MM WILBERT [id number]  [employer] [id number] N/A 2 Implanted   BONE GRAFT  PUTTY MAGNETOS EASYPACK PUTTY [facility] [phone] - [id number] Bone/Tissue Synthetic BONE GRAFT PUTTY MAGNETOS EASYPACK PUTTY [facility] [phone]  [employer]  N/A 1 Implanted   IMP WILBERT MEDT SOLERA CVD 5.5X70MM TI [id number] - [id number] Metallic Hardware/Delta IMP WILBERT MEDT SOLERA CVD 5.5X70MM TI [id number]  [employer] 41 [id number] N/A 2 Implanted

## 2025-07-01 NOTE — ANESTHESIA PREPROCEDURE EVALUATION
Anesthesia Pre-Procedure Evaluation    Patient: Jamir Gill   MRN: [id number] : 1955          Procedure : Procedure(s):  Thoracic 10 to thoracic 12 revision decompression and fusion with stealth and spinal cord monitoring         Past Medical History:   Diagnosis Date    Cervical disc disorder     No Comments Provided    Enlarged prostate with lower urinary tract symptoms (LUTS)     No Comments Provided    Essential (primary) hypertension     No Comments Provided    Multiple sclerosis (H)     No Comments Provided    Other specified disorders of bone density and structure, unspecified site (CODE)     No Comments Provided    Other symptoms and signs involving cognitive functions and awareness     No Comments Provided    Spondylosis of cervical region without myelopathy or radiculopathy     No Comments Provided      Past Surgical History:   Procedure Laterality Date    APPENDECTOMY OPEN      appendectomy    COLONOSCOPY  2010,with polyps resected    COLONOSCOPY  12/15/2015    12/15/2015,[place]- colitis    COLONOSCOPY N/A 2021    2 sessile serrated and 1 tubular adenoma, 3 year follow up, 2024    COLONOSCOPY  2024    F/U  normal    COLONOSCOPY N/A 2024    Procedure: Colonoscopy;  Surgeon: Rudy Gilbert MD;  Location:  OR    COMBINED CYSTOSCOPY, URETEROSCOPY, LASER HOLMIUM LITHOTRIPSY URETER(S) Left 04/10/2018    Procedure: COMBINED CYSTOSCOPY, URETEROSCOPY, LASER HOLMIUM LITHOTRIPSY URETER(S);  Left Ureteroscopy with Holmium Laser Lithotripsy & Stent Placement.;  Surgeon: Olu Saldivar MD;  Location:  OR    CYSTOSCOPY, RETROGRADES, INSERT STENT URETER(S), COMBINED Left 2018    Procedure: COMBINED CYSTOSCOPY, RETROGRADES, INSERT STENT URETER(S);;  Surgeon: Olu Saldivar MD;  Location:  OR    LAMINECTOMY THORACIC TWO LEVELS N/A 2022    Procedure: Thoracic 10 to thoracic 12 laminectomies;  Surgeon: Edson Leija MD;  Location:  OR     "PROSTATE SURGERY      ,PROSTATECTOMY,Laser prostate surgery -- BPH    WRIST SURGERY Left     s/p fracture      No Known Allergies   Social History     Tobacco Use    Smoking status: Never     Passive exposure: Never    Smokeless tobacco: Never   Substance Use Topics    Alcohol use: No      Wt Readings from Last 1 Encounters:   06/16/25 82.5 kg (181 lb 12.8 oz)        Anesthesia Evaluation   Pt has had prior anesthetic.     No history of anesthetic complications       ROS/MED HX  ENT/Pulmonary:    (-) tobacco use, asthma, sleep apnea and recent URI   Neurologic: Comment: Neurogenic bladder  Neurologic gait dysfunction    (+)                   Multiple Sclerosis, limitations: Walks ~200 yards \"these days, since I started noticing the limited mobility with the spine findings.\" Currently using walker but prior to discovery of cord compression, was using 2 canes..         (-) no seizures, no CVA and no TIA   Cardiovascular:     (+)  hypertension- -   -  - -                                 Previous cardiac testing   Echo: Date: 2019 Results:  Interpretation Summary  No cardiac cause of dyspnea identified.  Normal left and right ventricular size, thickness, global, and regional  systolic function, LVEF=60-65%.  Normal LV diastolic function with normal filling pressures.  No significant valvular abnormalities are noted.  Estimated pulmonary artery systolic pressure is normal.  No pericardial effusion is present.  Previous study not available for comparison.    Stress Test:  Date: Results:    ECG Reviewed:  Date: Results:    Cath:  Date: Results:   (-) arrhythmias   METS/Exercise Tolerance: >4 METS    Hematologic:    (-) history of blood clots   Musculoskeletal: Comment:  Thoracic spondylosis with myelopathy    (+)  arthritis,             GI/Hepatic:    (-) GERD and liver disease   Renal/Genitourinary:    (-) renal disease   Endo:    (-) obesity   Psychiatric/Substance Use:     (+) psychiatric history depression     " "  Infectious Disease:    (-) Recent Fever   Malignancy:       Other:              Physical Exam  Airway  Mallampati: II  TM distance: >3 FB  Neck ROM: full  Mouth opening: >= 4 cm    Cardiovascular   Rhythm: regular  Rate: normal rate     Dental   (+) Completely normal teeth      Pulmonary Breath sounds clear to auscultation        Neurological   He appears awake, alert and oriented x3.    Other Findings       OUTSIDE LABS:  CBC:   Lab Results   Component Value Date    WBC 6.1 05/13/2025    WBC 4.8 08/11/2023    HGB 14.9 06/16/2025    HGB 15.4 05/13/2025    HCT 45.8 05/13/2025    HCT 44.7 08/11/2023     05/13/2025     08/11/2023     BMP:   Lab Results   Component Value Date     06/16/2025     05/13/2025    POTASSIUM 4.0 07/01/2025    POTASSIUM 5.1 06/16/2025    CHLORIDE 103 06/16/2025    CHLORIDE 102 05/13/2025    CO2 26 06/16/2025    CO2 28 05/13/2025    BUN 26.2 (H) 06/16/2025    BUN 25.9 (H) 05/13/2025    CR 1.27 (H) 07/01/2025    CR 1.12 06/16/2025    GLC 81 07/01/2025    GLC 90 06/16/2025     COAGS: No results found for: \"PTT\", \"INR\", \"FIBR\"  POC: No results found for: \"BGM\", \"HCG\", \"HCGS\"  HEPATIC:   Lab Results   Component Value Date    ALBUMIN 4.2 05/13/2025    PROTTOTAL 7.0 05/13/2025    ALT 27 05/13/2025    AST 24 05/13/2025    ALKPHOS 101 05/13/2025    BILITOTAL 0.5 05/13/2025    BILIDIRECT 0.07 06/24/2015     OTHER:   Lab Results   Component Value Date    LACT 1.4 09/07/2022    ОЛЬГА 10.1 06/16/2025    PHOS 3.8 06/24/2015    LIPASE 7 (L) 04/03/2018    TSH 1.61 06/10/2022       Anesthesia Plan    ASA Status:  3      NPO Status: NPO Appropriate   Anesthesia Type: General.  Airway: oral.  Induction: intravenous.  Maintenance: TIVA.   Techniques and Equipment:       - Monitoring Plan: standard ASA monitoring, train of four monitoring, arterial line kit, electrophysiologic monitoring     Consents    Anesthesia Plan(s) and associated risks, benefits, and realistic alternatives " "discussed. Questions answered and patient/representative(s) expressed understanding.     - Discussed:     - Discussed with:  Patient, family               Postoperative Care    Pain management: multimodal analgesia.     Comments:                   Lucrecia Wheeler MD    I have reviewed the pertinent notes and labs in the chart from the past 30 days and (re)examined the patient.  Any updates or changes from those notes are reflected in this note.    Clinically Significant Risk Factors Present on Admission                 # Drug Induced Platelet Defect: home medication list includes an antiplatelet medication   # Hypertension: Noted on problem list           # Overweight: Estimated body mass index is 26.85 kg/m  as calculated from the following:    Height as of 6/16/25: 1.753 m (5' 9\").    Weight as of 6/16/25: 82.5 kg (181 lb 12.8 oz).                    "

## 2025-07-01 NOTE — PROGRESS NOTES
DISCHARGE  Reason for Discharge: Change in medical status. Jamir is having a T10-T12 decompression and fusion on 7/1/25. This marks a significant change in his medical status. Jamri will have restrictions related to his surgery and will require evaluation when he returns to therapy. New orders from his medical team will also be required.     Equipment Issued: theraband    Discharge Plan: Patient to continue home program.    Referring Provider:  No ref. provider found        06/30/25 0500   Appointment Info   Signing clinician's name / credentials Manjit Pink DPT   Total/Authorized Visits 19   Visits Used 9 of 10   Medical Diagnosis multiple sclerosis; neurological gait dysfunction.   PT Tx Diagnosis impaired mobility, poor endurance, weakness, ROM loss   Progress Note/Certification   Start of Care Date 04/28/25   Onset of illness/injury or Date of Surgery 04/09/25   Therapy Frequency 2-3x/week   Predicted Duration 90 days   Certification date from 04/28/25   Certification date to 07/27/25   KX Modifier Statement Therapy services meets medical necessity requirements beyond the therapy threshold for ongoing care.   Progress Note Completed Date 05/29/25   Supervision   PT Assistant Visit Number 5   GOALS   PT Goals 2;3;4   PT Goal 1   Goal Identifier sitting   Goal Description Jamir will be able to tailor sit for 2 minutes without loss of balance for improved motion, stability, and positioning to be able to work at low surfaces.   Goal Progress Improving motion, continues to need posterior support to prevent LOB. Does have mild L sided lean that increases LOB risk.   Target Date 06/10/25   PT Goal 2   Goal Identifier 5 times sit to stand   Goal Description Jamir will be able to complete 5 times sit to  less than 13 seconds to lower his fall risk with improved LE strength.   Goal Progress Improving. Does not complete in 13 seconds.   Target Date 07/08/25   PT Goal 3   Goal Identifier Ambulation   Goal  "Description Jamir will be able to ambulate 100 feet with B canes for improved mobility.   Goal Progress Not met. Jamir currently is not ambulating this distance safely with B canes.   Target Date 07/22/25   PT Goal 4   Goal Identifier Floor to stand transfer   Goal Description Jamir will be able to complete floor to stand transfer x 3 without LOB and within 5 minutes for improved endurance and increased ability ability transfer during the day.   Goal Progress Improving. Can complete with support surface elevated.   Target Date 07/22/25   Subjective Report   Subjective Report Jamir is feeling good today. Is ready for his surgery tomorrow.   Treatment Interventions (PT)   Interventions Therapeutic Procedure/Exercise;Therapeutic Activity   Therapeutic Procedure/Exercise   Therapeutic Procedures: strength, endurance, ROM, flexibility minutes (16244) 40   Therapeutic Procedures Ther Proc 2;Ther Proc 3   Ther Proc 1 Leg press (seat 21, back rest up) 200#x10 - more fatigued at the end. Deferred due to time: Abductor 24# x 20. HS curl 60# (wants more weight next session)x 30 with partial range as his R LE likes to stay extended. HS stretch in machine x 1 minute.   Ther Proc 1 - Details Chest press 156# x15, Lat pulldown 200# x25 , Triceps extension 134# x12, Rows 160# x20.   Ther Proc 2 LE exercises/Core   Ther Proc 2 - Details lumbar extension 96# from 80-55 degrees x 20 then 44# from 55 to 30 degrees x 20 - challenging today. Core abdominal 50# x25.   Ther Proc 3 Nustep seat:11 arms: 8 lvl 8 x 6 minutes   Ther Proc 3 - Details Rebounder with 4# medicine ball 2.5' today   Therapeutic Activity   Ther Act 1 Floor to stand transfers from various heights and strategies: attempted with 4 inch aerobic step - unsuccessful. 7\" training stair and low handle - successful. At his walker - successful. L half kneeling from floor - unsuccessful but this was new technique.   Plan   Plan for next session hip cars, floor to stand from 6-8\" " surface   Comments   Comments Jamir continues to meet the criteria for medically necessary therapy due to poor mobility, balance issues, and regression of skill without formal therapy.   Total Session Time   Timed Code Treatment Minutes 40   Total Treatment Time (sum of timed and untimed services) 40

## 2025-07-02 ENCOUNTER — APPOINTMENT (OUTPATIENT)
Dept: PHYSICAL THERAPY | Facility: CLINIC | Age: 70
DRG: 448 | End: 2025-07-02
Attending: NEUROLOGICAL SURGERY
Payer: MEDICARE

## 2025-07-02 ENCOUNTER — APPOINTMENT (OUTPATIENT)
Dept: OCCUPATIONAL THERAPY | Facility: CLINIC | Age: 70
DRG: 448 | End: 2025-07-02
Payer: MEDICARE

## 2025-07-02 LAB
ANION GAP SERPL CALCULATED.3IONS-SCNC: 8 MMOL/L (ref 7–15)
BUN SERPL-MCNC: 18.3 MG/DL (ref 8–23)
CALCIUM SERPL-MCNC: 8.9 MG/DL (ref 8.8–10.4)
CHLORIDE SERPL-SCNC: 105 MMOL/L (ref 98–107)
CREAT SERPL-MCNC: 0.94 MG/DL (ref 0.67–1.17)
EGFRCR SERPLBLD CKD-EPI 2021: 87 ML/MIN/1.73M2
GLUCOSE BLDC GLUCOMTR-MCNC: 107 MG/DL (ref 70–99)
GLUCOSE SERPL-MCNC: 104 MG/DL (ref 70–99)
HCO3 SERPL-SCNC: 26 MMOL/L (ref 22–29)
HGB BLD-MCNC: 12.7 G/DL (ref 13.3–17.7)
MCV RBC AUTO: 88 FL (ref 78–100)
POTASSIUM SERPL-SCNC: 4.8 MMOL/L (ref 3.4–5.3)
SODIUM SERPL-SCNC: 139 MMOL/L (ref 135–145)

## 2025-07-02 PROCEDURE — 36415 COLL VENOUS BLD VENIPUNCTURE: CPT

## 2025-07-02 PROCEDURE — 97165 OT EVAL LOW COMPLEX 30 MIN: CPT | Mod: GO

## 2025-07-02 PROCEDURE — 250N000013 HC RX MED GY IP 250 OP 250 PS 637: Performed by: INTERNAL MEDICINE

## 2025-07-02 PROCEDURE — 99232 SBSQ HOSP IP/OBS MODERATE 35: CPT | Performed by: INTERNAL MEDICINE

## 2025-07-02 PROCEDURE — 97535 SELF CARE MNGMENT TRAINING: CPT | Mod: GO

## 2025-07-02 PROCEDURE — 80048 BASIC METABOLIC PNL TOTAL CA: CPT | Performed by: INTERNAL MEDICINE

## 2025-07-02 PROCEDURE — 97530 THERAPEUTIC ACTIVITIES: CPT | Mod: GP | Performed by: PHYSICAL THERAPIST

## 2025-07-02 PROCEDURE — 120N000001 HC R&B MED SURG/OB

## 2025-07-02 PROCEDURE — 250N000013 HC RX MED GY IP 250 OP 250 PS 637

## 2025-07-02 PROCEDURE — 97161 PT EVAL LOW COMPLEX 20 MIN: CPT | Mod: GP | Performed by: PHYSICAL THERAPIST

## 2025-07-02 PROCEDURE — 97116 GAIT TRAINING THERAPY: CPT | Mod: GP | Performed by: PHYSICAL THERAPIST

## 2025-07-02 PROCEDURE — 85018 HEMOGLOBIN: CPT

## 2025-07-02 PROCEDURE — 250N000011 HC RX IP 250 OP 636

## 2025-07-02 PROCEDURE — 250N000013 HC RX MED GY IP 250 OP 250 PS 637: Performed by: NEUROLOGICAL SURGERY

## 2025-07-02 RX ADMIN — ACETAMINOPHEN 975 MG: 325 TABLET ORAL at 09:40

## 2025-07-02 RX ADMIN — CEFAZOLIN SODIUM 2 G: 2 SOLUTION INTRAVENOUS at 03:13

## 2025-07-02 RX ADMIN — LISINOPRIL 10 MG: 10 TABLET ORAL at 09:40

## 2025-07-02 RX ADMIN — POLYETHYLENE GLYCOL 3350 17 G: 17 POWDER, FOR SOLUTION ORAL at 20:11

## 2025-07-02 RX ADMIN — POLYETHYLENE GLYCOL 3350 17 G: 17 POWDER, FOR SOLUTION ORAL at 09:40

## 2025-07-02 RX ADMIN — BACLOFEN 10 MG: 10 TABLET ORAL at 20:11

## 2025-07-02 RX ADMIN — ACETAMINOPHEN 975 MG: 325 TABLET ORAL at 00:50

## 2025-07-02 RX ADMIN — CEFAZOLIN SODIUM 2 G: 2 SOLUTION INTRAVENOUS at 20:11

## 2025-07-02 RX ADMIN — CEFAZOLIN SODIUM 2 G: 2 SOLUTION INTRAVENOUS at 13:19

## 2025-07-02 RX ADMIN — SENNOSIDES AND DOCUSATE SODIUM 2 TABLET: 50; 8.6 TABLET ORAL at 09:40

## 2025-07-02 RX ADMIN — OXYBUTYNIN CHLORIDE 10 MG: 10 TABLET, EXTENDED RELEASE ORAL at 09:40

## 2025-07-02 RX ADMIN — ACETAMINOPHEN 975 MG: 325 TABLET ORAL at 17:20

## 2025-07-02 RX ADMIN — SENNOSIDES AND DOCUSATE SODIUM 2 TABLET: 50; 8.6 TABLET ORAL at 20:11

## 2025-07-02 RX ADMIN — ACETAMINOPHEN 975 MG: 325 TABLET ORAL at 23:39

## 2025-07-02 ASSESSMENT — ACTIVITIES OF DAILY LIVING (ADL)
ADLS_ACUITY_SCORE: 43
ADLS_ACUITY_SCORE: 39
ADLS_ACUITY_SCORE: 43
ADLS_ACUITY_SCORE: 39
ADLS_ACUITY_SCORE: 43
IADL_COMMENTS: SPOUSE ABLE TO ASSIST WITH IADLS
ADLS_ACUITY_SCORE: 39
ADLS_ACUITY_SCORE: 50
ADLS_ACUITY_SCORE: 39
ADLS_ACUITY_SCORE: 43
ADLS_ACUITY_SCORE: 39
ADLS_ACUITY_SCORE: 39
ADLS_ACUITY_SCORE: 43
ADLS_ACUITY_SCORE: 43
ADLS_ACUITY_SCORE: 39
PREVIOUS_RESPONSIBILITIES: MEAL PREP;HOUSEKEEPING;LAUNDRY;SHOPPING;MEDICATION MANAGEMENT;DRIVING
ADLS_ACUITY_SCORE: 50
ADLS_ACUITY_SCORE: 39
ADLS_ACUITY_SCORE: 43
ADLS_ACUITY_SCORE: 43
ADLS_ACUITY_SCORE: 50

## 2025-07-02 NOTE — PLAN OF CARE
Goal Outcome Evaluation:         Patient ambulation status: Ax1. Walker gait belt. Patient has a spastic gait.   Patient able to stand for X-ray:Yes  Standing/upright x-ray complete: No  Patient using oral analgesics:yes  Voiding spontaneously:no. Neurogenic bladder. Rossi in place.   Drains discontinued:no. RUBIO in place  Incision clean and dry:yes  Bowel status:chronic constipation due to MS per patient account.

## 2025-07-02 NOTE — PROGRESS NOTES
Notified provider about indwelling alvarado catheter discussed removal or continued need.    Did provider choose to remove indwelling alvarado catheter? No    Provider's alvarado indication for keeping indwelling alvarado catheter: Other - neurogenic bladder r/t Multiple sclerosis. Patient straight caths at home. .    Is the catheter for retention? Yes - Is there an order to remove in 48 hours? No If yes, when is date/time of removal? NA If no order for removal discuss with MD to remove.     Alvarado to remain in place until discharge.

## 2025-07-02 NOTE — PROGRESS NOTES
"   07/02/25 1000   Appointment Info   Signing Clinician's Name / Credentials (OT) Katherine Bell, OTD, OTR/L   Rehab Comments (OT) Spinal Precautions   Living Environment   People in Home spouse   Current Living Arrangements house   Home Accessibility wheelchair accessible   Transportation Anticipated family or friend will provide   Living Environment Comments Pt lives in a single story house with his spouse. House is ramp accessible. All need met on one floor. BR set up: walk in shower, shower chair, comfort height toilet. Pt has reacher.   Self-Care   Usual Activity Tolerance good   Current Activity Tolerance moderate   Regular Exercise Yes   Activity/Exercise Type other (see comments)  (OP PT)   Equipment Currently Used at Home walker, rolling;other (see comments)  (reacher)   Fall history within last six months no   Activity/Exercise/Self-Care Comment Pt is baseline ind with I/ADLs. Pt is typically IND w/ ADL at baseline. He ambulates w/ a 4WW or previously B SECs. Regullary works with outpatient physical therapy.   Instrumental Activities of Daily Living (IADL)   Previous Responsibilities meal prep;housekeeping;laundry;shopping;medication management;driving   IADL Comments Spouse able to assist with IADLs   General Information   Onset of Illness/Injury or Date of Surgery 07/01/25   Referring Physician Roxana Rae PA-C   Patient/Family Therapy Goal Statement (OT) To get stronger/to go home   Additional Occupational Profile Info/Pertinent History of Current Problem Per chart: \"Pt is a 71 y/o male s/p  Thoracic 10 to thoracic 12 revision decompression and fusion with stealth and spinal cord monitoring on 7/1/25, POD#1. HX of MS.\"   Existing Precautions/Restrictions fall;spinal   Cognitive Status Examination   Orientation Status orientation to person, place and time   Visual Perception   Visual Impairment/Limitations WFL;corrective lenses full-time   Pain Assessment   Patient Currently in Pain Yes, see Vital Sign " flowsheet  (4/10 in back)   Posture   Posture forward head position;protracted shoulders;kyphosis   Range of Motion Comprehensive   Comment, General Range of Motion BUEs WFL, not formally assessed d/t precautions   Strength Comprehensive (MMT)   Comment, General Manual Muscle Testing (MMT) Assessment Not formally assessed d/t spinal precautions   Coordination   Upper Extremity Coordination No deficits were identified   Bed Mobility   Comment (Bed Mobility) NT - pt up in chair at time of OT session   Transfers   Transfers sit-stand transfer;toilet transfer   Sit-Stand Transfer   Sit/Stand Transfer Comments CGA   Toilet Transfer   Toilet Transfer Comments Min A   Balance   Balance Comments Mild unsteadiness with use of 4WW, no overt LOB noted   Activities of Daily Living   BADL Assessment/Intervention lower body dressing   Lower Body Dressing Assessment/Training   Comment, (Lower Body Dressing) Mod A per clinical judgement d/t precautions   Clinical Impression   Criteria for Skilled Therapeutic Interventions Met (OT) Yes, treatment indicated   OT Diagnosis Decreased ind with I/ADLs   OT Problem List-Impairments impacting ADL problems related to;activity tolerance impaired;balance;post-surgical precautions;strength   Assessment of Occupational Performance 3-5 Performance Deficits   Identified Performance Deficits bathing, toileting, grooming   Planned Therapy Interventions (OT) ADL retraining;transfer training   Clinical Decision Making Complexity (OT) problem focused assessment/low complexity   Risk & Benefits of therapy have been explained evaluation/treatment results reviewed;care plan/treatment goals reviewed;risks/benefits reviewed;current/potential barriers reviewed;participants voiced agreement with care plan;patient;participants included   OT Total Evaluation Time   OT Eval, Low Complexity Minutes (43846) 10   OT Goals   Therapy Frequency (OT) Daily   OT Predicted Duration/Target Date for Goal Attainment  07/09/25   OT Goals Hygiene/Grooming;Transfers;Toilet Transfer/Toileting   OT: Hygiene/Grooming supervision/stand-by assist;within precautions;while standing  (4WW)   OT: Transfer Supervision/stand-by assist;with assistive device;within precautions  (4WW, walk in shower)   OT: Toilet Transfer/Toileting Supervision/stand-by assist;toilet transfer;cleaning and garment management;using adaptive equipment;within precautions  (4WW, BSC overlay)   Self-Care/Home Management   Self-Care/Home Mgmt/ADL, Compensatory, Meal Prep Minutes (95567) 33   Symptoms Noted During/After Treatment (Meal Preparation/Planning Training) fatigue   Treatment Detail/Skilled Intervention Pt greeted in chair, spouse present, pt agreeable to OT. Pt educated on spinal precautions/movement and lifting restrictions. Patient educated on LB dressing with use of AE to maintain no bending precautions. Pt and spouse report pt has reacher, pt and spouse report plan for spouse to assist with dressing. Patient demonstrates sit > stand from chair with CGA progressing to SBA, 4WW. Patient completes room level functional mobility to/from BR with CGA progressing to SBA, 4WW. Patient completes toilet transfer with Min A, FWW. Pt stands from toilet with Min A and 4WW and returns to chair with SBA and FWW, sits in chair with SBA and 4WW. Pt edu on toileting AE for alejandra-cares to maintain no twisting precaution, edu on use of toilet tongs, toilet buddy, alejandra-bottle or bidet as options for alejandra-cares, edu on how to obtain AE, pt provided with AE handout. Edu pt and spouse edu on RTS vs BSC for safety with toilet transfers, pt and spouse verbalize understanding. Patient up in chair with needs met, alarm set, items in reach.   OT Discharge Planning   OT Plan toileting, review AE recs (toileting AE, RTS, long handled sponge) as needed, gh sink, shower transfer   OT Discharge Recommendation (DC Rec) home with assist   OT Rationale for DC Rec Pt currently functioning below  baseline d/t precautions impacting safety/independence within I/ADLs. Pt at baseline resides with spouse and has baseline assist with IADLs, spouse plans to assist with dressing at discharge. Currently, pt completes room level mobility and self cares with intermittent Ax1, for toilet transfer. Anticipate with continued IP OT, pt will progress for discharge home with assist for dressing, bathing, toileting, IADLs, continued OP PT, once medically ready. OT will continue to follow.   OT Brief overview of current status Goals of therapy will be to address safe mobility and make recs for d/c to next level of care. Pt and RN will continue to follow all falls risk precautions as documented by RN staff while hospitalized  (Min A toilet transfer; CGA-SBA 4WW in room)   OT Total Distance Amb During Session (feet) 30   OT Equipment Needed at Discharge raised toilet seat;other (see comments)  (long handled sponge - plan to obtain outside hospital)   Total Session Time   Timed Code Treatment Minutes 28   Total Session Time (sum of timed and untimed services) 38

## 2025-07-02 NOTE — PROGRESS NOTES
Diagnosis: Thoracic spondylosis with myelopathy.  Status post T10-T12 revision decompression and fusion with Stealth and spinal cord monitoring    POD#: 1  Mental Status: A/O x4  Activity/dangle: Did not get out of bed during shift   Diet: Regular   Pain: Denies   Rossi/Voiding: Rossi intact  02/LDA: On RA. PIV on left hand, SL. RUBIO drain located in back   D/C Date: Pending

## 2025-07-02 NOTE — PROGRESS NOTES
"   07/02/25 0900   Appointment Info   Signing Clinician's Name / Credentials (PT) Jud Foote, [place]SANA   Student Supervision Direct supervision provided;Line of sight supervision provided;Direct Patient Contact Provided   Rehab Comments (PT) Spinal precautions   Living Environment   People in Home spouse   Current Living Arrangements house   Home Accessibility wheelchair accessible   Transportation Anticipated family or friend will provide   Living Environment Comments Pt lives in a single story house with his spouse. House is ramp accessible. All need met on one floor.   Self-Care   Usual Activity Tolerance good   Current Activity Tolerance moderate   Regular Exercise Yes   Activity/Exercise Type other (see comments)  (outpatient physical therapy)   Equipment Currently Used at Home walker, rolling   Fall history within last six months no   Activity/Exercise/Self-Care Comment Pt is typically IND w/ ADL at baseline. He ambulates w/ a 4WW or previously B SECs. Regullary works with outpatient physical therapy.   General Information   Onset of Illness/Injury or Date of Surgery 07/01/25   Referring Physician Roxana Rae, GRAHAM   Patient/Family Therapy Goals Statement (PT) return to prior activity   Pertinent History of Current Problem (include personal factors and/or comorbidities that impact the POC) s/p Thoracic 10 to thoracic 12 revision decompression and fusion with stealth and spinal cord monitoring. PMH per chart \"70 year old male with past medical history significant for essential hypertension, BPH with lower urinary tract symptoms, multiple sclerosis and and spondylosis of cervical region without myelopathy or radiculopathy along with issues with thoracic spondylosis with myelopathy, chronic neurogenic bladder from MS\"   Existing Precautions/Restrictions fall;spinal   Weight-Bearing Status - LLE full weight-bearing   Weight-Bearing Status - RLE full weight-bearing   Cognition   Affect/Mental Status (Cognition) " WNL   Orientation Status (Cognition) oriented x 4   Follows Commands (Cognition) WNL   Integumentary/Edema   Integumentary/Edema Comments Spine covered in post op dressing appearing CDI, RUBIO drain present.   Posture    Posture Forward head position   Range of Motion (ROM)   Range of Motion ROM deficits secondary to surgical procedure   ROM Comment Spinal ROM limited due to surgical precautions and pain   Strength (Manual Muscle Testing)   Strength (Manual Muscle Testing) Deficits observed during functional mobility   Strength Comments no formal strength assessment. baseline RLE weakness   Bed Mobility   Comment, (Bed Mobility) not assessed pt up in recliner chair   Transfers   Comment, (Transfers) sit>stand w/ FWW and CGA   Gait/Stairs (Locomotion)   Comment, (Gait/Stairs) ambulated 10' w/ 4WW and CGA   Balance   Balance Comments mild dynamic balance impairemetn   Clinical Impression   Criteria for Skilled Therapeutic Intervention Yes, treatment indicated   PT Diagnosis (PT) impaired functinal mobility   Influenced by the following impairments impaired spinal ROM, RLE weakness, impaired activity tolerance post surgery   Functional limitations due to impairments limited independence w/ bed mobility, transfers, gait   Clinical Presentation (PT Evaluation Complexity) stable   Clinical Presentation Rationale clinical judgement, post thoracic spinal decompression   Clinical Decision Making (Complexity) low complexity   Planned Therapy Interventions (PT) balance training;bed mobility training;gait training;home exercise program;patient/family education;ROM (range of motion);strengthening;transfer training;progressive activity/exercise;risk factor education;home program guidelines;postural re-education;neuromuscular re-education   Risk & Benefits of therapy have been explained evaluation/treatment results reviewed;care plan/treatment goals reviewed;risks/benefits reviewed;current/potential barriers reviewed;participants  voiced agreement with care plan;participants included;patient;spouse/significant other   PT Total Evaluation Time   PT Eval, Low Complexity Minutes (47993) 8   Physical Therapy Goals   PT Frequency One time eval and treatment only   PT Predicted Duration/Target Date for Goal Attainment 07/07/25   PT Goals Bed Mobility;Transfers;Gait   PT: Bed Mobility Supervision/stand-by assist;Supine to/from sit;Rolling;Within precautions;Goal Met   PT: Transfers Supervision/stand-by assist;Sit to/from stand;Bed to/from chair;Assistive device;Within precautions;Goal Met   PT: Gait Supervision/stand-by assist;Assistive device;Within precautions;100 feet;Goal Met   Interventions   Interventions Quick Adds Gait Training;Therapeutic Activity   Therapeutic Activity   Therapeutic Activities: dynamic activities to improve functional performance Minutes (84362) 10   Symptoms Noted During/After Treatment Increased pain   Treatment Detail/Skilled Intervention Pt greeted upon arrival to room and agreed to work with physical therapy. Pt up in recliner chair upon arrival and spouse present for session. Education regarding spinal precautions, safety within the home, and balancing rest with activity. Sit>stand w/ 4WW and CGA. Cueing for proper sequencing including: scooting to the edge of the surface, pushing from the armrests. Stand>sit w/ 4WW and SBA. Cueing for locking of walker brakes and to reach for the arm rests before sitting. Pt left in recliner chair with all needs met and call light within reach.   Gait Training   Gait Training Minutes (48460) 5   Symptoms Noted During/After Treatment (Gait Training) none   Treatment Detail/Skilled Intervention Pt ambulated 115' w/ 4WW and CGA progressing to SBA throughout. Pt with crouched BLE and minimal foot clearance on R. Pt cued for steady ambulation speed and small turns.   Distance in Feet 115   PT Discharge Planning   PT Plan DC   PT Discharge Recommendation (DC Rec) home with assist   PT  Rationale for DC Rec Pt is currently below baseline mobility levels post spinal surgery. Pt typically uses a 4WW for ambulation. Pt is currently ambulating with CGA/SBA for ambulation but limited with activity tolerance and spinal precautions. Pt will have good support at home from spouse and no concerns for stairs. Recommending pt discharge home w/ assist for IADLs. Per patient plans to follow up with outpatient physical therapy. All inpatient therapy goals met.   PT Brief overview of current status SBA w/ 4WW. Goals of therapy will be to address safe mobility and make recs for d/c to next level of care. Pt and RN will continue to follow all falls risks precautions as documented by RN staff while hospitilized.   PT Total Distance Amb During Session (feet) 115   Physical Therapy Time and Intention   Timed Code Treatment Minutes 15   Total Session Time (sum of timed and untimed services) 23       Physical Therapy Discharge Summary    Reason for therapy discharge:    All goals and outcomes met, no further needs identified.    Progress towards therapy goal(s). See goals on Care Plan in [facility] electronic health record for goal details.  Goals met    Therapy recommendation(s):    Recommend pt continue to ambulate with nursing staff using a FWW throughout remainder of hospital stay. IPPT goals met.

## 2025-07-02 NOTE — PROGRESS NOTES
[facility]  Neurosurgery Progress Note  Date of Admission: 7/1/2025   Date of Service: 07/02/2025    Procedure(s):  Thoracic 10 to thoracic 12 revision decompression and fusion with stealth and spinal cord monitoring   1 Day Post-Op    Assessment and Plan:  Jamir Gill is a 70-year-old male with a past medical history of MS and previous T10-T12 laminectomies with Dr. Leija in 2022 who developed worsening thoracic myelopathy recently.  He went to the OR with Dr. Leija for a T10-T12 fusion and revision bilateral laminectomies and medial facetectomies on 7/1/2025    - Activity: Up ad susana with assistance  - Neuro checks: as ordered   - Incentive spirometer as ordered   - Imaging: none indicated   - Multimodal Pain management  - Hold off on DVT prophylaxis for now  - May resume PTA ASA on POD7  - Abx while drain in place   - Monitor wound for drainage  - Leave dressing on until POD2   - PT/OT   - Continue RUBIO drain: empty and record output every shift, even if zero  - Dispo: per therapy recs and adequate pain control.   - Follow ups: 2 week incision check, 6 week s, 12 weeks     Plans discussed with Dr. Leija who was in agreement with plans.     Roxana Rae PA-C   [facility] Neurosurgery  Clinic phone number: [phone]  Securely message or page via Epic Secure Chat, Chromasun, or AquaHydrate     - - - - -     Subjective: Pt endorses no back pain.  He says his baseline numbness and tingling in his legs is unchanged since the surgery.  He says he is able to better move his right lower extremity and he feels like he is walking better.    Objective:   Imaging:   No results found for this visit on 07/01/25.    Vitals:    07/01/25 0648 07/01/25 1440   Weight: 82.1 kg (181 lb) 81.7 kg (180 lb 1.9 oz)     Vital Signs with Ranges  Temp:  [97.1  F (36.2  C)-98.3  F (36.8  C)] 97.6  F (36.4  C)  Pulse:  [69-92] 86  Resp:  [16-20] 18  BP: (110-128)/(62-68) 122/66  SpO2:  [91 %-100 %] 91 %  I/O last 3  completed shifts:  In: 300 [P.O.:300]  Out: 1460 [Urine:1200; Drains:260]  Drain output: 270 ml / 24 hours (80 ml / night shift)    Physical Exam:   General: NAD, sitting up in chair eating breakfast, pleasant  HENNT: atraumatic, normocephalic. EOMi b/l. PERRLA  Mental status:  AOx4, speech is fluent, following commands  Cranial nerves:  II-XII intact.   Motor:  Moves all extremities independently.   Strength:   Hip flexion                Right: 3/5  Left:  5/5  Knee extension         Right:  4/5  Left:  5/5  Knee flexion              Right:  4/5  Left:  5/5  Gastroc Soleus (PF) Right:  4/5  Left:  5/5  Tibialis Ant (DF)        Right:  4/5  Left:  5/5  Sensation: grossly Intact to light touch   Incision: Back incision covered with dressing which is without strikethrough.  RUBIO drain is intact with serosanguineous output

## 2025-07-02 NOTE — PLAN OF CARE
Physical Therapy Discharge Summary    Reason for therapy discharge:    All goals and outcomes met, no further needs identified.    Progress towards therapy goal(s). See goals on Care Plan in [facility] electronic health record for goal details.  Goals met    Therapy recommendation(s):    Pt is currently below baseline mobility levels post spinal surgery. Pt typically uses a 4WW for ambulation. Pt is currently ambulating with CGA/SBA for ambulation but limited with activity tolerance and spinal precautions. Pt will have good support at home from spouse and no concerns for stairs. Recommending pt discharge home w/ assist for IADLs. Per patient plans to follow up with outpatient physical therapy. All inpatient therapy goals met.

## 2025-07-02 NOTE — PROGRESS NOTES
[facility]    Medicine Progress Note - Hospitalist Service    Date of Admission:  7/1/2025    Assessment & Plan     Jamir Gill is a 70 year old male with past medical history significant for essential hypertension, BPH with lower urinary tract symptoms, multiple sclerosis and and spondylosis of cervical region without myelopathy or radiculopathy along with issues with thoracic spondylosis with myelopathy, chronic neurogenic bladder from MS who underwent elective T10-T12 revision decompression and fusion with Stealth and spinal cord monitoring by Dr. Barahona from neurosurgery on 07/01/2025.  Internal medicine service was consulted for postoperative medical management.       Thoracic spondylosis with myelopathy.  Status post T10-T12 revision decompression and fusion with Stealth and spinal cord monitoring     Patient is currently denying any problem, denying any pain, no chest pain, no palpitations no shortness of breath, Alvarado catheter in place.     --Postoperative management as per primary team including DVT prophylaxis, pain management and IV fluids.  --Surgery team planning to hold off on DVT prophylaxis for now  --Patient may resume PTA aspirin on postop day 7.  --Antibiotics while drain in place.  --Plan to leave dressing on until postop day 2.  --Nursing instructions in place to empty and record RUBIO drain output.  --Therapies ordered.  -- Plan to keep alvarado in for now due to neurogenic bladder      Essential hypertension:  -- resumed PTA Lisinopril 10 mg p.o. daily with holding parameters.  -- BMP reviewed from this morning showing normal electrolytes.     History of multiple sclerosis  Chronic neurogenic bladder: Secondary to above, self cath twice a day at home  -- Continue outpatient follow-up with neurology in [facility]  -- Continue PTA baclofen.  -- Alvarado catheter in place, Alvarado catheter for now will remove before discharge and then patient can continue with self cath at  "home.     Chronic constipation:  -- Continue MiraLAX, will order twice daily along with twice daily dose of senna and docusate considering postoperative status. Holding parameters are in place       Diet: Advance Diet as Tolerated: Regular Diet Adult    DVT Prophylaxis: Pneumatic Compression Devices and Defer to primary service  Rossi Catheter: PRESENT, indication: Surgical procedure  Lines: None     Cardiac Monitoring: None  Code Status: Full Code      Clinically Significant Risk Factors                   # Hypertension: Noted on problem list            # Overweight: Estimated body mass index is 26.6 kg/m  as calculated from the following:    Height as of this encounter: 1.753 m (5' 9\").    Weight as of this encounter: 81.7 kg (180 lb 1.9 oz)., PRESENT ON ADMISSION            Social Drivers of Health    Physical Activity: Unknown (5/13/2025)    Exercise Vital Sign     Days of Exercise per Week: 2 days   Social Connections: Unknown (5/13/2025)    Social Connection and Isolation Panel [NHANES]     Frequency of Social Gatherings with Friends and Family: More than three times a week          Disposition Plan     Medically Ready for Discharge: Anticipated in 2-4 Days      Marie Vanessa MD  Hospitalist Service  [facility]  Securely message with [employer] (more info)  Text page via [employer] Paging/Directory   ______________________________________________________________________    Interval History     Patient seen and examined, wife also present at bedside, denying any complaints.    Physical Exam   Vital Signs: Temp: 98.3  F (36.8  C) Temp src: Oral BP: 110/68 Pulse: 69   Resp: 16 SpO2: 100 % O2 Device: None (Room air) Oxygen Delivery: 2 LPM  Weight: 180 lbs 1.85 oz    Physical Exam  Vitals and nursing note reviewed.   Constitutional:       Appearance: He is well-developed.   HENT:      Head: Normocephalic and atraumatic.   Eyes:      Pupils: Pupils are equal, round, and reactive to light.   Neck:     "  Thyroid: No thyromegaly.   Cardiovascular:      Rate and Rhythm: Normal rate and regular rhythm.      Heart sounds: Normal heart sounds.   Pulmonary:      Effort: Pulmonary effort is normal. No respiratory distress.      Breath sounds: Normal breath sounds.   Abdominal:      General: Bowel sounds are normal. There is no distension.      Palpations: Abdomen is soft.   Musculoskeletal:         General: No tenderness. Normal range of motion.      Cervical back: Normal range of motion and neck supple.   Skin:     General: Skin is warm and dry.   Neurological:      Mental Status: He is alert and oriented to person, place, and time.   Psychiatric:         Behavior: Behavior normal.       Medical Decision Making       40 MINUTES SPENT BY ME on the date of service doing chart review, history, exam, documentation & further activities per the note.      Data     I have personally reviewed the following data over the past 24 hrs:    N/A  \   12.7 (L)   / N/A     139 105 18.3 /  104 (H)   4.8 26 0.94 \       Imaging results reviewed over the past 24 hrs:   No results found for this or any previous visit (from the past 24 hours).  Recent Labs   Lab 07/02/25  0819 07/02/25  0546 07/01/25  0601   HGB 12.7*  --   --    MCV 88  --   --      --   --    POTASSIUM 4.8  --  4.0   CHLORIDE 105  --   --    CO2 26  --   --    BUN 18.3  --   --    CR 0.94  --  1.27*   ANIONGAP 8  --   --    ОЛЬГА 8.9  --   --    * 107* 81

## 2025-07-03 VITALS
HEIGHT: 69 IN | TEMPERATURE: 98.1 F | HEART RATE: 91 BPM | SYSTOLIC BLOOD PRESSURE: 130 MMHG | RESPIRATION RATE: 16 BRPM | DIASTOLIC BLOOD PRESSURE: 74 MMHG | BODY MASS INDEX: 26.68 KG/M2 | WEIGHT: 180.12 LBS | OXYGEN SATURATION: 96 %

## 2025-07-03 LAB — GLUCOSE BLDC GLUCOMTR-MCNC: 117 MG/DL (ref 70–99)

## 2025-07-03 PROCEDURE — 250N000011 HC RX IP 250 OP 636

## 2025-07-03 PROCEDURE — 250N000013 HC RX MED GY IP 250 OP 250 PS 637: Performed by: INTERNAL MEDICINE

## 2025-07-03 PROCEDURE — 120N000001 HC R&B MED SURG/OB

## 2025-07-03 PROCEDURE — 99232 SBSQ HOSP IP/OBS MODERATE 35: CPT | Performed by: INTERNAL MEDICINE

## 2025-07-03 PROCEDURE — 250N000013 HC RX MED GY IP 250 OP 250 PS 637: Performed by: NEUROLOGICAL SURGERY

## 2025-07-03 PROCEDURE — 250N000013 HC RX MED GY IP 250 OP 250 PS 637

## 2025-07-03 RX ORDER — TIZANIDINE 2 MG/1
2 TABLET ORAL EVERY 6 HOURS PRN
Status: ACTIVE | OUTPATIENT
Start: 2025-07-03

## 2025-07-03 RX ORDER — TRAZODONE HYDROCHLORIDE 50 MG/1
50 TABLET ORAL AT BEDTIME
Status: DISPENSED | OUTPATIENT
Start: 2025-07-03

## 2025-07-03 RX ORDER — BACLOFEN 10 MG/1
10 TABLET ORAL 3 TIMES DAILY PRN
Status: DISPENSED | OUTPATIENT
Start: 2025-07-03

## 2025-07-03 RX ADMIN — LISINOPRIL 10 MG: 10 TABLET ORAL at 08:00

## 2025-07-03 RX ADMIN — TRAZODONE HYDROCHLORIDE 50 MG: 50 TABLET ORAL at 20:31

## 2025-07-03 RX ADMIN — BACLOFEN 10 MG: 10 TABLET ORAL at 20:31

## 2025-07-03 RX ADMIN — MAGNESIUM HYDROXIDE 30 ML: 400 SUSPENSION ORAL at 08:01

## 2025-07-03 RX ADMIN — ACETAMINOPHEN 975 MG: 325 TABLET ORAL at 17:15

## 2025-07-03 RX ADMIN — CEFAZOLIN SODIUM 2 G: 2 SOLUTION INTRAVENOUS at 03:12

## 2025-07-03 RX ADMIN — MAGNESIUM HYDROXIDE 30 ML: 400 SUSPENSION ORAL at 07:49

## 2025-07-03 RX ADMIN — SENNOSIDES AND DOCUSATE SODIUM 2 TABLET: 50; 8.6 TABLET ORAL at 20:31

## 2025-07-03 RX ADMIN — BACLOFEN 10 MG: 10 TABLET ORAL at 07:50

## 2025-07-03 RX ADMIN — POLYETHYLENE GLYCOL 3350 17 G: 17 POWDER, FOR SOLUTION ORAL at 08:01

## 2025-07-03 RX ADMIN — OXYBUTYNIN CHLORIDE 10 MG: 10 TABLET, EXTENDED RELEASE ORAL at 08:01

## 2025-07-03 RX ADMIN — ACETAMINOPHEN 975 MG: 325 TABLET ORAL at 08:01

## 2025-07-03 RX ADMIN — CEFAZOLIN SODIUM 2 G: 2 SOLUTION INTRAVENOUS at 20:32

## 2025-07-03 RX ADMIN — CEFAZOLIN SODIUM 2 G: 2 SOLUTION INTRAVENOUS at 13:45

## 2025-07-03 RX ADMIN — SENNOSIDES AND DOCUSATE SODIUM 2 TABLET: 50; 8.6 TABLET ORAL at 08:00

## 2025-07-03 ASSESSMENT — ACTIVITIES OF DAILY LIVING (ADL)
ADLS_ACUITY_SCORE: 48
ADLS_ACUITY_SCORE: 50
ADLS_ACUITY_SCORE: 48
ADLS_ACUITY_SCORE: 48
ADLS_ACUITY_SCORE: 50
ADLS_ACUITY_SCORE: 48
ADLS_ACUITY_SCORE: 50

## 2025-07-03 NOTE — PROGRESS NOTES
Patient ambulation status: Ax1. FWW.   Patient able to stand for X-ray:Yes  Standing/upright x-ray complete: NA  Patient using oral analgesics:yes  Voiding spontaneously:no  Drains discontinued:no  Incision clean and dry:yes  Bowel status:had a large bm during shift.

## 2025-07-03 NOTE — PROGRESS NOTES
Neurosurgery progress note    Drain was 60 mL out overnight.  No acute events overnight.  Patient reports back pain is manageable.  Denies any change in his baseline lower extremity function with his underlying MS.  Rossi catheter in place, patient has neurogenic bladder and straight caths at home.    Exam    Alert and oriented no acute distress  Left lower extremity with 5-5 strength with hip flexion extension ankle dorsiflexion plantarflexion  Right lower extremity with 4+/5 strength with ankle dorsiflexion plantarflexion and hip flexion.        Assessment    Thoracic 10 to thoracic 12 revision decompression and fusion with stealth and spinal cord monitoring, performed for worsening myelopathy, also has underlying MS, prior surgery in 2022.  2 Days Post-Op    Plan    - Activity: Up ad susana with assistance  - Neuro checks: as ordered   - Incentive spirometer as ordered   - Imaging: none indicated   - Multimodal Pain management  - Hold off on DVT prophylaxis for now  - May resume PTA ASA on POD7  - Abx while drain in place   - Monitor wound for drainage  - Leave dressing on until POD2   -Continue Rossi catheter while inpatient, remove prior to discharge.  - PT/OT   - Continue RUBIO drain: empty and record output every shift, even if zero  - Dispo: per therapy recs and adequate pain control.  Anticipate discharge home tomorrow pending drain output and removal.  - Follow ups: 2 week incision check, 6 week s, 12 weeks

## 2025-07-03 NOTE — PROGRESS NOTES
[facility]    Medicine Progress Note - Hospitalist Service    Date of Admission:  7/1/2025    Assessment & Plan     Jamir Gill is a 70 year old male with past medical history significant for essential hypertension, BPH with lower urinary tract symptoms, multiple sclerosis and and spondylosis of cervical region without myelopathy or radiculopathy along with issues with thoracic spondylosis with myelopathy, chronic neurogenic bladder from MS who underwent elective T10-T12 revision decompression and fusion with Stealth and spinal cord monitoring by Dr. Barahona from neurosurgery on 07/01/2025.  Internal medicine service was consulted for postoperative medical management.     Thoracic spondylosis with myelopathy.  Status post T10-T12 revision decompression and fusion with Stealth and spinal cord monitoring     Patient is currently denying any problem, denying any pain, no chest pain, no palpitations no shortness of breath, Alvarado catheter in place.     --Postoperative management as per primary team including DVT prophylaxis, pain management and IV fluids.  --Surgery team planning to hold off on DVT prophylaxis for now  --Patient may resume PTA aspirin on postop day 7.  --Antibiotics while drain in place.  --Plan to leave dressing on until postop day 2.  --Nursing instructions in place to empty and record RUBIO drain output.  --Therapies ordered.  -- Plan to keep alvarado in for now due to neurogenic bladder   -- added baclofen 10 mg po TID PRN for muscle spasm     Essential hypertension:  -- resumed PTA Lisinopril 10 mg p.o. daily with holding parameters.  -- BMP reviewed from this morning showing normal electrolytes.     History of multiple sclerosis  Chronic neurogenic bladder: Secondary to above, self cath twice a day at home  -- Continue outpatient follow-up with neurology in [facility]  -- Continue PTA baclofen.  -- Alvarado catheter in place, Alvarado catheter for now will remove before discharge  "and then patient can continue with self cath at home.     Chronic constipation:  -- Continue MiraLAX, will order twice daily along with twice daily dose of senna and docusate considering postoperative status. Holding parameters are in place       Diet: Advance Diet as Tolerated: Regular Diet Adult    DVT Prophylaxis: Pneumatic Compression Devices and Defer to primary service  Rossi Catheter: PRESENT, indication: Surgical procedure  Lines: None     Cardiac Monitoring: None  Code Status: Full Code      Clinically Significant Risk Factors                   # Hypertension: Noted on problem list            # Overweight: Estimated body mass index is 26.6 kg/m  as calculated from the following:    Height as of this encounter: 1.753 m (5' 9\").    Weight as of this encounter: 81.7 kg (180 lb 1.9 oz)., PRESENT ON ADMISSION            Social Drivers of Health    Physical Activity: Unknown (5/13/2025)    Exercise Vital Sign     Days of Exercise per Week: 2 days   Social Connections: Unknown (5/13/2025)    Social Connection and Isolation Panel [NHANES]     Frequency of Social Gatherings with Friends and Family: More than three times a week          Disposition Plan     Medically Ready for Discharge: Anticipated in 2-4 Days      Marie Vanessa MD  Hospitalist Service  [facility]  Securely message with [employer] (more info)  Text page via [employer] Paging/Directory   ______________________________________________________________________    Interval History     Patient seen and examined ,feeling well other than muscle spasm, we discussed about PRN Baclofen    Physical Exam   Vital Signs: Temp: 97.3  F (36.3  C) Temp src: Oral BP: 123/68 Pulse: 78   Resp: 16 SpO2: 93 % O2 Device: None (Room air)    Weight: 180 lbs 1.85 oz    Physical Exam  Vitals and nursing note reviewed.   Constitutional:       Appearance: He is well-developed.   HENT:      Head: Normocephalic and atraumatic.   Eyes:      Pupils: Pupils are equal, " round, and reactive to light.   Neck:      Thyroid: No thyromegaly.   Cardiovascular:      Rate and Rhythm: Normal rate and regular rhythm.      Heart sounds: Normal heart sounds.   Pulmonary:      Effort: Pulmonary effort is normal. No respiratory distress.      Breath sounds: Normal breath sounds.   Abdominal:      General: Bowel sounds are normal. There is no distension.      Palpations: Abdomen is soft.   Musculoskeletal:         General: No tenderness. Normal range of motion.      Cervical back: Normal range of motion and neck supple.   Skin:     General: Skin is warm and dry.   Neurological:      Mental Status: He is alert and oriented to person, place, and time.   Psychiatric:         Behavior: Behavior normal.       Medical Decision Making       38 MINUTES SPENT BY ME on the date of service doing chart review, history, exam, documentation & further activities per the note.      Data     I have personally reviewed the following data over the past 24 hrs:    N/A  \   12.7 (L)   / N/A     139 105 18.3 /  117 (H)   4.8 26 0.94 \       Imaging results reviewed over the past 24 hrs:   No results found for this or any previous visit (from the past 24 hours).  Recent Labs   Lab 07/03/25  0208 07/02/25  0819 07/02/25  0546 07/01/25  0601   HGB  --  12.7*  --   --    MCV  --  88  --   --    NA  --  139  --   --    POTASSIUM  --  4.8  --  4.0   CHLORIDE  --  105  --   --    CO2  --  26  --   --    BUN  --  18.3  --   --    CR  --  0.94  --  1.27*   ANIONGAP  --  8  --   --    ОЛЬГА  --  8.9  --   --    * 104* 107* 81

## 2025-07-03 NOTE — PROGRESS NOTES
Diagnosis: Thoracic spondylosis with myelopathy.  Status post T10-T12 revision decompression and fusion with Stealth and spinal cord monitoring    POD#: 2  Mental Status: A/O x4  Activity/dangle: Ax1 with GB and walker  Diet: Regular   Pain: Pt c/o muscles spasms and wants muscle relaxer this morning at 0540. [place] messaged Hospitalist, awaiting orders. Pt does not want PRN pain medications   Rossi/Voiding: Rossi intact   02/LDA: On RA. PIV on left hand, SL  D/C Date: Pending

## 2025-07-03 NOTE — PROVIDER NOTIFICATION
MD Notification    Notified Person: MD    Notified Person Name: Kyler Fu    Notification Date/Time: 7/3/2025 @ 0545    Notification Interaction: [employer] messaging     Purpose of Notification: Pt would like a PRN muscle relaxer. thanks    Orders Received: awaiting orders

## 2025-07-04 VITALS
BODY MASS INDEX: 26.68 KG/M2 | HEIGHT: 69 IN | OXYGEN SATURATION: 94 % | WEIGHT: 180.12 LBS | RESPIRATION RATE: 18 BRPM | DIASTOLIC BLOOD PRESSURE: 78 MMHG | HEART RATE: 88 BPM | TEMPERATURE: 97.9 F | SYSTOLIC BLOOD PRESSURE: 123 MMHG

## 2025-07-04 PROCEDURE — 250N000013 HC RX MED GY IP 250 OP 250 PS 637: Performed by: INTERNAL MEDICINE

## 2025-07-04 PROCEDURE — 250N000011 HC RX IP 250 OP 636

## 2025-07-04 PROCEDURE — 250N000013 HC RX MED GY IP 250 OP 250 PS 637: Performed by: NEUROLOGICAL SURGERY

## 2025-07-04 PROCEDURE — 250N000013 HC RX MED GY IP 250 OP 250 PS 637

## 2025-07-04 PROCEDURE — 99232 SBSQ HOSP IP/OBS MODERATE 35: CPT | Performed by: INTERNAL MEDICINE

## 2025-07-04 RX ORDER — BACLOFEN 10 MG/1
10 TABLET ORAL 2 TIMES DAILY
COMMUNITY
Start: 2025-07-04

## 2025-07-04 RX ORDER — ASPIRIN 81 MG/1
81 TABLET, CHEWABLE ORAL DAILY
COMMUNITY
Start: 2025-07-08

## 2025-07-04 RX ADMIN — LISINOPRIL 10 MG: 10 TABLET ORAL at 08:52

## 2025-07-04 RX ADMIN — POLYETHYLENE GLYCOL 3350 17 G: 17 POWDER, FOR SOLUTION ORAL at 08:51

## 2025-07-04 RX ADMIN — SENNOSIDES AND DOCUSATE SODIUM 2 TABLET: 50; 8.6 TABLET ORAL at 08:52

## 2025-07-04 RX ADMIN — BACLOFEN 10 MG: 10 TABLET ORAL at 06:56

## 2025-07-04 RX ADMIN — ACETAMINOPHEN 975 MG: 325 TABLET ORAL at 00:37

## 2025-07-04 RX ADMIN — OXYBUTYNIN CHLORIDE 10 MG: 10 TABLET, EXTENDED RELEASE ORAL at 08:52

## 2025-07-04 RX ADMIN — ACETAMINOPHEN 975 MG: 325 TABLET ORAL at 08:52

## 2025-07-04 RX ADMIN — CEFAZOLIN SODIUM 2 G: 2 SOLUTION INTRAVENOUS at 03:33

## 2025-07-04 ASSESSMENT — ACTIVITIES OF DAILY LIVING (ADL)
ADLS_ACUITY_SCORE: 48

## 2025-07-04 NOTE — DISCHARGE SUMMARY
[facility]    DISCHARGE SUMMARY   [facility] Neurosurgery    Patient ID:   Jamir Gill   [id number]   70 year old   1955     Admit date: 7/1/2025   Discharge date: 7/4/2025     Admission Diagnoses: Thoracic myelopathy [M47.14]  Status post thoracic spinal fusion [Z98.1]     Procedure:   1.  T10-T12 posterior segmental instrumentation with insertion of bilateral pedicle screws and rods (Medtronic Module X)  2.  T10-11, T11-12 revision bilateral laminectomies and medial facetectomies for decompression of stenosis  3.  T10-11, T11-12 posterolateral arthrodesis and fusion with allograft  4.  Use of intraoperative fluoroscopy, microscope, and neuro-monitoring  5.  Medtronic O-arm intraoperative CT scan  6.  Use of [employer] Stealth navigation    Surgeon: Dr. Leija      Post op Diagnosis:   Thoracic myelopathy     HPI:  Jamir Gill is an 70 year old male who underwent T10 -T12 revision decompression and fusion with stealth and spinal cord monitoring with Dr. Leija for management of worsening myelopathy. The procedure went well and without complication.        Hospital course:   Jamir Gill has done well post-operatively. At the time of hospital discharge, pain adequately managed with oral medications. Tolerating normal diet.  He has participated in physical therapy and occupational therapy, recommendations for continued outpatient physical therapy (new order placed to accommodate post-op diagnosis and hx of MS). Reviewed activity restrictions, post-operative care plan, and follow-up appointments. He is to be discharged home in stable condition on 7/4/25.      DISCHARGE PLAN:   - Patient works with outpatient therapy, discussed with care integration, will place outpatient PT order so able to continue to work with his physical therapist in the outpatient setting (post-op and ongoing MS cares)   - Rossi to be removed prior to discharge (resume straight cath, his baseline)   -  RUBIO drain removed prior to discharge   - Routine post op care plan discussed  - Routine wound care and activity restrictions reviewed   - Pain medications prescribed at discharge- no new orders placed, patient refuses narcotic; has Tylenol and muscle relaxant at home (okay to take Baclofen up to three times daily)   - Resume aspirin on POD #7   - Follow up with NSGY clinic as scheduled        Melissa Rogers PA-C  [facility] Neurosurgery        Imaging:     Vital signs with ranges:   Temp:  [97.8  F (36.6  C)-98.1  F (36.7  C)] 97.9  F (36.6  C)  Pulse:  [88-91] 88  Resp:  [16-18] 18  BP: (122-130)/(74-78) 123/78  SpO2:  [93 %-96 %] 94 %  I/O last 3 completed shifts:  In: -   Out: 3190 [Urine:3100; Drains:90]    Exam:   Pt in bed. He appears comfortable and in no apparent distress, moving all extremities.   CN II-XII intact, alert and appropriate with conversation and following commands.   Bilateral leg strength stable in the setting of MS - hip flexion on the right is particularly limited (his baseline), overall intact on the left   Calves soft and non-tender bilaterally.   Back dressing changed. Incision intact. Absent for edema, erythema or drainage. RUBIO drain to be removed prior to discharged   He is ambulating with steady gait with baseline assistive devices. Tolerating regular diet without nausea or vomiting. Pain managed with oral medication. Rossi in place, voids with straight cath at home. He is on room air with O2 sats greater than 90%.     Disposition: Home with continued outpatient physical therapy      Jaimr Gill verbalizes understanding and is in agreement with discharge.     Done while pt still in hospital bed        Review of your medicines        CONTINUE these medicines which may have CHANGED, or have new prescriptions. If we are uncertain of the size of tablets/capsules you have at home, strength may be listed as something that might have changed.        Dose / Directions   aspirin 81  MG chewable tablet  Commonly known as: ASA  This may have changed:   additional instructions  These instructions start on July 8, 2025. If you are unsure what to do until then, ask your doctor or other care provider.      Dose: 81 mg  Start taking on: July 8, 2025  Take 1 tablet (81 mg) by mouth daily. Okay to resume when one week out from surgery  Refills: 0     baclofen 10 MG tablet  Commonly known as: LIORESAL  This may have changed: additional instructions  Used for: Multiple sclerosis (H)      Dose: 10 mg  Take 1 tablet (10 mg) by mouth 2 times daily. Okay to take one extra dose daily as needed for muscle spasms (up to three times daily)  Refills: 0            CONTINUE these medicines which have NOT CHANGED        Dose / Directions   calcium carbonate 600 mg-vitamin D 400 units 600-400 MG-UNIT per tablet  Commonly known as: CALTRATE      Dose: 1 tablet  Take 1 tablet by mouth 2 times daily  Refills: 0     Catheters Misc  Used for: Multiple sclerosis (H), Neurogenic bladder      COLOPLAST 14FR/COUDE ITEM #814/OLIVE TIP W/GUIDEDX:N20.1/N31.9/N40.0/R330 [facility]:/#200EA/30DS **PICK-UP**  Quantity: 200 each  Refills: 12     diphenhydrAMINE 25 MG tablet  Commonly known as: BENADRYL      Dose: 50 mg  Take 50 mg by mouth nightly as needed for itching or allergies. 2 x 25mg =50mg  Refills: 0     lisinopril 10 MG tablet  Commonly known as: ZESTRIL  Used for: Essential (primary) hypertension      Dose: 10 mg  Take 1 tablet (10 mg) by mouth daily.  Quantity: 90 tablet  Refills: 4     meclizine 25 MG Chew      Dose: 25 mg  Take 25 mg by mouth as needed  Refills: 0     oxyBUTYnin ER 10 MG 24 hr tablet  Commonly known as: DITROPAN XL  Used for: Urinary urgency      Dose: 10 mg  Take 1 tablet (10 mg) by mouth every morning.  Quantity: 90 tablet  Refills: 4     senna-docusate 8.6-50 MG tablet  Commonly known as: SENOKOT-S/PERICOLACE  Used for: S/P laminectomy      Dose: 1-2 tablet  Take 1-2 tablets by mouth 2 times daily Take  while on oral narcotics to prevent or treat constipation.  Quantity: 180 tablet  Refills: 3     surgical lubricant external gel  Used for: Multiple sclerosis (H), Neurogenic bladder      SURGILUBE 3GM PACKETS #[id number]/#[id number]/600GMS DX:N20.1/N31.9/N40/R33 [facility]:  Quantity: 600 g  Refills: 4               Discharge Procedure Orders   Physical Therapy  Referral   Standing Status: Future   Referral Priority: Routine Referral Type: Rehab Therapy Physical Therapy   Number of Visits Requested: 1     Reason for your hospital stay   Order Comments: 1.  T10-T12 posterior segmental instrumentation with insertion of bilateral pedicle screws and rods (Medtronic Module X)  2.  T10-11, T11-12 revision bilateral laminectomies and medial facetectomies for decompression of stenosis  3.  T10-11, T11-12 posterolateral arthrodesis and fusion with allograft  4.  Use of intraoperative fluoroscopy, microscope, and neuro-monitoring  5.  Medtronic O-arm intraoperative CT scan  6.  Use of [employer] Stealth navigation     Activity   Order Comments: Your activity upon discharge:   -No lifting of more than 10 pounds until follow up visit.  -Avoid excessive jostling and jarring activities.   -Ok to walk as tolerated, avoid bedrest.  -No contact sports or high impact activities until after follow up visit.  -Ok to shampoo hair and shower, but do not scrub or submerge incision under water until evaluated post operatively in the neurosurgery clinic.    Please call the Neurosurgery Clinic for increasing redness, swelling or pus draining from the incision, increased pain or any other questions and concerns.     Order Specific Question Answer Comments   Is discharge order? Yes      Follow Up   Order Comments: Follow-up with Neurosurgery Team as scheduled     Diet   Order Comments: Follow this diet upon discharge: Resume normal diet     Order Specific Question Answer Comments   Is discharge order? Yes

## 2025-07-04 NOTE — PROGRESS NOTES
[facility]    Medicine Progress Note - Hospitalist Service    Date of Admission:  7/1/2025    Assessment & Plan     Jamir Gill is a 70 year old male with past medical history significant for essential hypertension, BPH with lower urinary tract symptoms, multiple sclerosis and and spondylosis of cervical region without myelopathy or radiculopathy along with issues with thoracic spondylosis with myelopathy, chronic neurogenic bladder from MS who underwent elective T10-T12 revision decompression and fusion with Stealth and spinal cord monitoring by Dr. Barahona from neurosurgery on 07/01/2025.  Internal medicine service was consulted for postoperative medical management.     Thoracic spondylosis with myelopathy.  Status post T10-T12 revision decompression and fusion with Stealth and spinal cord monitoring     Patient is currently denying any problem, denying any pain, no chest pain, no palpitations no shortness of breath, Alvarado catheter in place.     -- Postoperative management as per primary team including DVT prophylaxis, pain management and IV fluids.  --Surgery team planning to hold off on DVT prophylaxis for now  --Patient may resume PTA aspirin on postop day 7.  --Antibiotics while drain in place, plan to remove drain later by NS.  --Therapies ordered, ok for patient to return home after discharge   -- Will remove alvarado cathter before discharge due to neurogenic bladder, patient will continue with self cath after discharge       Essential hypertension:  -- continue PTA Lisinopril 10 mg p.o. daily on discharge      History of multiple sclerosis  Chronic neurogenic bladder: Secondary to above, self cath twice a day at home  -- Continue outpatient follow-up with neurology in [facility]  -- Continue PTA baclofen.  -- Alvarado catheter in place, Alvarado catheter will be removed now before discharge and then patient can continue with self cath at home.     Chronic constipation:  -- Continue  "Miralax twice daily along with twice daily dose of senna and docusate considering postoperative status.     Diet: Advance Diet as Tolerated: Regular Diet Adult  Diet    DVT Prophylaxis: Pneumatic Compression Devices and Defer to primary service  Rossi Catheter: Not present  Lines: None     Cardiac Monitoring: None  Code Status: Full Code      Clinically Significant Risk Factors                   # Hypertension: Noted on problem list            # Overweight: Estimated body mass index is 26.6 kg/m  as calculated from the following:    Height as of this encounter: 1.753 m (5' 9\").    Weight as of this encounter: 81.7 kg (180 lb 1.9 oz)., PRESENT ON ADMISSION            Social Drivers of Health    Physical Activity: Unknown (5/13/2025)    Exercise Vital Sign     Days of Exercise per Week: 2 days   Social Connections: Unknown (5/13/2025)    Social Connection and Isolation Panel [NHANES]     Frequency of Social Gatherings with Friends and Family: More than three times a week          Disposition Plan     Medically Ready for Discharge: Anticipated Today, ok to be discharged later today from medical perspective.      Marie Vanessa MD  Hospitalist Service  [facility]  Securely message with [employer] (more info)  Text page via [email or web address] Paging/Directory   ______________________________________________________________________    Interval History     Seen and examined, wife also present at home, denying any new complaints, excited to be discharged later home, we discussed regarding his home medications, patient showed understanding does have lisinopril and baclofen at home and does not need any prescriptions.  Discussed with patient to follow-up with primary care physician after hospitalization.      Physical Exam   Vital Signs: Temp: 97.9  F (36.6  C) Temp src: Oral BP: 123/78 Pulse: 88   Resp: 18 SpO2: 94 % O2 Device: None (Room air)    Weight: 180 lbs 1.85 oz    Physical Exam  Vitals and nursing note " reviewed.   Constitutional:       Appearance: He is well-developed.   HENT:      Head: Normocephalic and atraumatic.   Eyes:      Pupils: Pupils are equal, round, and reactive to light.   Neck:      Thyroid: No thyromegaly.   Cardiovascular:      Rate and Rhythm: Normal rate and regular rhythm.      Heart sounds: Normal heart sounds.   Pulmonary:      Effort: Pulmonary effort is normal. No respiratory distress.      Breath sounds: Normal breath sounds.   Abdominal:      General: Bowel sounds are normal. There is no distension.      Palpations: Abdomen is soft.   Musculoskeletal:         General: No tenderness. Normal range of motion.      Cervical back: Normal range of motion and neck supple.   Skin:     General: Skin is warm and dry.   Neurological:      Mental Status: He is alert and oriented to person, place, and time.   Psychiatric:         Behavior: Behavior normal.       Medical Decision Making       36 MINUTES SPENT BY ME on the date of service doing chart review, history, exam, documentation & further activities per the note.      Data         Imaging results reviewed over the past 24 hrs:   No results found for this or any previous visit (from the past 24 hours).  Recent Labs   Lab 07/03/25  0208 07/02/25  0819 07/02/25  0546 07/01/25  0601   HGB  --  12.7*  --   --    MCV  --  88  --   --    NA  --  139  --   --    POTASSIUM  --  4.8  --  4.0   CHLORIDE  --  105  --   --    CO2  --  26  --   --    BUN  --  18.3  --   --    CR  --  0.94  --  1.27*   ANIONGAP  --  8  --   --    ОЛЬГА  --  8.9  --   --    * 104* 107* 81

## 2025-07-04 NOTE — PLAN OF CARE
Goal Outcome Evaluation:      Plan of Care Reviewed With: patient, spouse           Patient alert and oriented x 4.  Denies shortness of breath, chest pain, nausea or vomiting.  Baseline numbness to lower extremities, no change.  RUBIO drain pulled, drain intact, sterile dressing applied.  Rossi removed prior to discharge.  Patient and wife able to repeat all discharge instructions for understanding.  Patient refused any discharge medicines including additional pain meds.  Patient educated on risk of increased pain and having to go to ER for pain, patient still refused.  Additional instructions not in packet but patient educated on included-getting out of car during 3-hour drive home for DVT prevention, ankle leg exercises for DVT prevention, incentive spirometry to prevent pneumonia.      Patient ambulation status: A1 GB walker  Patient able to stand for X-ray:NA  Standing/upright x-ray complete: NA  Patient using oral analgesics:yes  Voiding spontaneously:yes  Drains discontinued:yes  Incision clean and dry:yes  Bowel status:WNL

## 2025-07-04 NOTE — PROGRESS NOTES
Shift Summary [id number]    Admitting Diagnosis: Thoracic myelopathy [M47.14]  Status post thoracic spinal fusion [Z98.1]   Vitals WNL  Pain denies, Taking Scheduled Tylenol  A&Ox4  Voiding  Rossi in place for retention  Mobility A1GB/W  Tele N/A  CMS Intact  Lung Sounds Clear on RA  GI WNL  Dressing CDI    Orders Placed This Encounter      Advance Diet as Tolerated: Regular Diet Adult       Plan/ Other info: RUBIO in place, L PIV SL w/ ABX, pending placement plan.

## 2025-07-05 NOTE — PLAN OF CARE
Occupational Therapy Discharge Summary    Reason for therapy discharge:    Discharged to home with outpatient therapy.    Progress towards therapy goal(s). See goals on [facility] Plan in [facility] electronic health record for goal details.  Goals partially met.  Barriers to achieving goals:   discharge from facility.    Therapy recommendation(s):      Continued therapy is recommended.  Rationale/Recommendations:   .OT Rationale for DC Rec: Pt currently functioning below baseline d/t precautions impacting safety/independence within I/ADLs. Pt at baseline resides with spouse and has baseline assist with IADLs, spouse plans to assist with dressing at discharge. Currently, pt completes room level mobility and self cares with intermittent Ax1, for toilet transfer. Anticipate with continued IP OT, pt will progress for discharge home with assist for dressing, bathing, toileting, IADLs, continued OP PT, once medically ready. OT will continue to follow.    Writing therapist did not treat pt, note written based on previous treating therapist notes and recommendations. Please see chart and flow sheet for additional details.

## 2025-07-07 ENCOUNTER — APPOINTMENT (OUTPATIENT)
Dept: CT IMAGING | Facility: OTHER | Age: 70
End: 2025-07-07
Attending: FAMILY MEDICINE
Payer: MEDICARE

## 2025-07-07 ENCOUNTER — APPOINTMENT (OUTPATIENT)
Dept: ULTRASOUND IMAGING | Facility: OTHER | Age: 70
End: 2025-07-07
Attending: FAMILY MEDICINE
Payer: MEDICARE

## 2025-07-07 ENCOUNTER — HOSPITAL ENCOUNTER (INPATIENT)
Facility: CLINIC | Age: 70
LOS: 2 days | Discharge: HOME | DRG: 163 | End: 2025-07-09
Attending: INTERNAL MEDICINE | Admitting: INTERNAL MEDICINE
Payer: MEDICARE

## 2025-07-07 ENCOUNTER — HOSPITAL ENCOUNTER (EMERGENCY)
Facility: OTHER | Age: 70
Discharge: ANOTHER HOSPITAL | End: 2025-07-07
Attending: FAMILY MEDICINE
Payer: MEDICARE

## 2025-07-07 ENCOUNTER — APPOINTMENT (OUTPATIENT)
Dept: CARDIOLOGY | Facility: OTHER | Age: 70
End: 2025-07-07
Attending: FAMILY MEDICINE
Payer: MEDICARE

## 2025-07-07 ENCOUNTER — APPOINTMENT (OUTPATIENT)
Dept: GENERAL RADIOLOGY | Facility: OTHER | Age: 70
End: 2025-07-07
Attending: FAMILY MEDICINE
Payer: MEDICARE

## 2025-07-07 VITALS
RESPIRATION RATE: 23 BRPM | WEIGHT: 180 LBS | SYSTOLIC BLOOD PRESSURE: 100 MMHG | HEIGHT: 69 IN | TEMPERATURE: 97.9 F | OXYGEN SATURATION: 94 % | DIASTOLIC BLOOD PRESSURE: 65 MMHG | HEART RATE: 99 BPM | BODY MASS INDEX: 26.66 KG/M2

## 2025-07-07 DIAGNOSIS — I26.02 ACUTE SADDLE PULMONARY EMBOLISM WITH ACUTE COR PULMONALE (H): ICD-10-CM

## 2025-07-07 DIAGNOSIS — I26.92 ACUTE SADDLE PULMONARY EMBOLISM, UNSPECIFIED WHETHER ACUTE COR PULMONALE PRESENT (H): Primary | ICD-10-CM

## 2025-07-07 PROBLEM — Z98.890 S/P LAMINECTOMY: Status: ACTIVE | Noted: 2022-09-06

## 2025-07-07 PROBLEM — I26.99 PULMONARY EMBOLI (H): Status: ACTIVE | Noted: 2025-07-07

## 2025-07-07 LAB
ABO + RH BLD: NORMAL
ALBUMIN SERPL BCG-MCNC: 3.4 G/DL (ref 3.5–5.2)
ALP SERPL-CCNC: 103 U/L (ref 40–150)
ALT SERPL W P-5'-P-CCNC: 29 U/L (ref 0–70)
ANION GAP SERPL CALCULATED.3IONS-SCNC: 10 MMOL/L (ref 7–15)
ANION GAP SERPL CALCULATED.3IONS-SCNC: 11 MMOL/L (ref 7–15)
APTT PPP: >240 SECONDS (ref 22–38)
AST SERPL W P-5'-P-CCNC: 29 U/L (ref 0–45)
ATRIAL RATE - MUSE: 104 BPM
BASOPHILS # BLD AUTO: 0 10E3/UL (ref 0–0.2)
BASOPHILS NFR BLD AUTO: 0 %
BILIRUB SERPL-MCNC: 0.5 MG/DL
BLD GP AB SCN SERPL QL: NEGATIVE
BUN SERPL-MCNC: 24.5 MG/DL (ref 8–23)
BUN SERPL-MCNC: 29.3 MG/DL (ref 8–23)
CALCIUM SERPL-MCNC: 9.3 MG/DL (ref 8.8–10.4)
CALCIUM SERPL-MCNC: 9.6 MG/DL (ref 8.8–10.4)
CHLORIDE SERPL-SCNC: 103 MMOL/L (ref 98–107)
CHLORIDE SERPL-SCNC: 103 MMOL/L (ref 98–107)
CREAT SERPL-MCNC: 1.04 MG/DL (ref 0.67–1.17)
CREAT SERPL-MCNC: 1.05 MG/DL (ref 0.67–1.17)
D DIMER PPP FEU-MCNC: >20 UG/ML FEU (ref 0–0.5)
DIASTOLIC BLOOD PRESSURE - MUSE: NORMAL MMHG
EGFRCR SERPLBLD CKD-EPI 2021: 76 ML/MIN/1.73M2
EGFRCR SERPLBLD CKD-EPI 2021: 77 ML/MIN/1.73M2
EOSINOPHIL # BLD AUTO: 0.3 10E3/UL (ref 0–0.7)
EOSINOPHIL NFR BLD AUTO: 2 %
ERYTHROCYTE [DISTWIDTH] IN BLOOD BY AUTOMATED COUNT: 12.9 % (ref 10–15)
ERYTHROCYTE [DISTWIDTH] IN BLOOD BY AUTOMATED COUNT: 13 % (ref 10–15)
ERYTHROCYTE [DISTWIDTH] IN BLOOD BY AUTOMATED COUNT: 13.1 % (ref 10–15)
GLUCOSE BLDC GLUCOMTR-MCNC: 55 MG/DL (ref 70–99)
GLUCOSE SERPL-MCNC: 126 MG/DL (ref 70–99)
GLUCOSE SERPL-MCNC: 89 MG/DL (ref 70–99)
HCO3 SERPL-SCNC: 24 MMOL/L (ref 22–29)
HCO3 SERPL-SCNC: 25 MMOL/L (ref 22–29)
HCT VFR BLD AUTO: 36.4 % (ref 40–53)
HCT VFR BLD AUTO: 36.8 % (ref 40–53)
HCT VFR BLD AUTO: 37.4 % (ref 40–53)
HGB BLD-MCNC: 11.9 G/DL (ref 13.3–17.7)
HGB BLD-MCNC: 12.2 G/DL (ref 13.3–17.7)
HGB BLD-MCNC: 12.3 G/DL (ref 13.3–17.7)
HOLD SPECIMEN: NORMAL
HOLD SPECIMEN: NORMAL
IMM GRANULOCYTES # BLD: 0 10E3/UL
IMM GRANULOCYTES NFR BLD: 0 %
INR PPP: 1.24 (ref 0.85–1.15)
INTERPRETATION ECG - MUSE: NORMAL
LACTATE SERPL-SCNC: 1.1 MMOL/L (ref 0.7–2)
LVEF ECHO: NORMAL
LYMPHOCYTES # BLD AUTO: 0.9 10E3/UL (ref 0.8–5.3)
LYMPHOCYTES NFR BLD AUTO: 9 %
MAGNESIUM SERPL-MCNC: 1.6 MG/DL (ref 1.7–2.3)
MCH RBC QN AUTO: 28.8 PG (ref 26.5–33)
MCH RBC QN AUTO: 29 PG (ref 26.5–33)
MCH RBC QN AUTO: 29.4 PG (ref 26.5–33)
MCHC RBC AUTO-ENTMCNC: 32.6 G/DL (ref 31.5–36.5)
MCHC RBC AUTO-ENTMCNC: 32.7 G/DL (ref 31.5–36.5)
MCHC RBC AUTO-ENTMCNC: 33.4 G/DL (ref 31.5–36.5)
MCV RBC AUTO: 88 FL (ref 78–100)
MCV RBC AUTO: 88 FL (ref 78–100)
MCV RBC AUTO: 89 FL (ref 78–100)
MONOCYTES # BLD AUTO: 0.8 10E3/UL (ref 0–1.3)
MONOCYTES NFR BLD AUTO: 8 %
NEUTROPHILS # BLD AUTO: 8.4 10E3/UL (ref 1.6–8.3)
NEUTROPHILS NFR BLD AUTO: 80 %
NRBC # BLD AUTO: 0 10E3/UL
NRBC BLD AUTO-RTO: 0 /100
NT-PROBNP SERPL-MCNC: 1031 PG/ML (ref 0–229)
P AXIS - MUSE: 57 DEGREES
PLATELET # BLD AUTO: 125 10E3/UL (ref 150–450)
PLATELET # BLD AUTO: 140 10E3/UL (ref 150–450)
PLATELET # BLD AUTO: 165 10E3/UL (ref 150–450)
POTASSIUM SERPL-SCNC: 4.6 MMOL/L (ref 3.4–5.3)
POTASSIUM SERPL-SCNC: 4.8 MMOL/L (ref 3.4–5.3)
PR INTERVAL - MUSE: 170 MS
PROT SERPL-MCNC: 5.9 G/DL (ref 6.4–8.3)
PROTHROMBIN TIME: 15.9 SECONDS (ref 11.8–14.8)
QRS DURATION - MUSE: 104 MS
QT - MUSE: 370 MS
QTC - MUSE: 486 MS
R AXIS - MUSE: 62 DEGREES
RADIOLOGIST FLAGS: ABNORMAL
RBC # BLD AUTO: 4.1 10E6/UL (ref 4.4–5.9)
RBC # BLD AUTO: 4.18 10E6/UL (ref 4.4–5.9)
RBC # BLD AUTO: 4.24 10E6/UL (ref 4.4–5.9)
SODIUM SERPL-SCNC: 137 MMOL/L (ref 135–145)
SODIUM SERPL-SCNC: 139 MMOL/L (ref 135–145)
SPECIMEN EXP DATE BLD: NORMAL
SYSTOLIC BLOOD PRESSURE - MUSE: NORMAL MMHG
T AXIS - MUSE: 50 DEGREES
TROPONIN T SERPL HS-MCNC: 122 NG/L
TROPONIN T SERPL HS-MCNC: 166 NG/L
TROPONIN T SERPL HS-MCNC: 167 NG/L
TSH SERPL DL<=0.005 MIU/L-ACNC: 2.87 UIU/ML (ref 0.3–4.2)
VENTRICULAR RATE- MUSE: 104 BPM
WBC # BLD AUTO: 10.4 10E3/UL (ref 4–11)
WBC # BLD AUTO: 10.8 10E3/UL (ref 4–11)
WBC # BLD AUTO: 9.1 10E3/UL (ref 4–11)

## 2025-07-07 PROCEDURE — 36415 COLL VENOUS BLD VENIPUNCTURE: CPT | Performed by: STUDENT IN AN ORGANIZED HEALTH CARE EDUCATION/TRAINING PROGRAM

## 2025-07-07 PROCEDURE — 71045 X-RAY EXAM CHEST 1 VIEW: CPT

## 2025-07-07 PROCEDURE — 250N000011 HC RX IP 250 OP 636: Performed by: FAMILY MEDICINE

## 2025-07-07 PROCEDURE — 82728 ASSAY OF FERRITIN: CPT | Performed by: STUDENT IN AN ORGANIZED HEALTH CARE EDUCATION/TRAINING PROGRAM

## 2025-07-07 PROCEDURE — 83550 IRON BINDING TEST: CPT | Performed by: STUDENT IN AN ORGANIZED HEALTH CARE EDUCATION/TRAINING PROGRAM

## 2025-07-07 PROCEDURE — 96360 HYDRATION IV INFUSION INIT: CPT | Mod: XU | Performed by: FAMILY MEDICINE

## 2025-07-07 PROCEDURE — 83880 ASSAY OF NATRIURETIC PEPTIDE: CPT | Performed by: FAMILY MEDICINE

## 2025-07-07 PROCEDURE — 93306 TTE W/DOPPLER COMPLETE: CPT

## 2025-07-07 PROCEDURE — 71275 CT ANGIOGRAPHY CHEST: CPT | Mod: 26 | Performed by: RADIOLOGY

## 2025-07-07 PROCEDURE — 36415 COLL VENOUS BLD VENIPUNCTURE: CPT | Performed by: FAMILY MEDICINE

## 2025-07-07 PROCEDURE — 99291 CRITICAL CARE FIRST HOUR: CPT | Mod: 24 | Performed by: FAMILY MEDICINE

## 2025-07-07 PROCEDURE — 96365 THER/PROPH/DIAG IV INF INIT: CPT | Performed by: FAMILY MEDICINE

## 2025-07-07 PROCEDURE — 93010 ELECTROCARDIOGRAM REPORT: CPT | Performed by: INTERNAL MEDICINE

## 2025-07-07 PROCEDURE — 82947 ASSAY GLUCOSE BLOOD QUANT: CPT | Performed by: STUDENT IN AN ORGANIZED HEALTH CARE EDUCATION/TRAINING PROGRAM

## 2025-07-07 PROCEDURE — 85379 FIBRIN DEGRADATION QUANT: CPT | Performed by: FAMILY MEDICINE

## 2025-07-07 PROCEDURE — 93971 EXTREMITY STUDY: CPT | Mod: 26 | Performed by: RADIOLOGY

## 2025-07-07 PROCEDURE — 85730 THROMBOPLASTIN TIME PARTIAL: CPT | Performed by: STUDENT IN AN ORGANIZED HEALTH CARE EDUCATION/TRAINING PROGRAM

## 2025-07-07 PROCEDURE — 85025 COMPLETE CBC W/AUTO DIFF WBC: CPT | Performed by: FAMILY MEDICINE

## 2025-07-07 PROCEDURE — 84443 ASSAY THYROID STIM HORMONE: CPT | Performed by: FAMILY MEDICINE

## 2025-07-07 PROCEDURE — 84484 ASSAY OF TROPONIN QUANT: CPT | Performed by: STUDENT IN AN ORGANIZED HEALTH CARE EDUCATION/TRAINING PROGRAM

## 2025-07-07 PROCEDURE — 84484 ASSAY OF TROPONIN QUANT: CPT | Performed by: FAMILY MEDICINE

## 2025-07-07 PROCEDURE — 250N000013 HC RX MED GY IP 250 OP 250 PS 637: Performed by: STUDENT IN AN ORGANIZED HEALTH CARE EDUCATION/TRAINING PROGRAM

## 2025-07-07 PROCEDURE — 258N000001 HC RX 258: Performed by: STUDENT IN AN ORGANIZED HEALTH CARE EDUCATION/TRAINING PROGRAM

## 2025-07-07 PROCEDURE — 83735 ASSAY OF MAGNESIUM: CPT | Performed by: FAMILY MEDICINE

## 2025-07-07 PROCEDURE — 71275 CT ANGIOGRAPHY CHEST: CPT

## 2025-07-07 PROCEDURE — 250N000011 HC RX IP 250 OP 636: Performed by: STUDENT IN AN ORGANIZED HEALTH CARE EDUCATION/TRAINING PROGRAM

## 2025-07-07 PROCEDURE — 86900 BLOOD TYPING SEROLOGIC ABO: CPT | Performed by: STUDENT IN AN ORGANIZED HEALTH CARE EDUCATION/TRAINING PROGRAM

## 2025-07-07 PROCEDURE — 80053 COMPREHEN METABOLIC PANEL: CPT | Performed by: FAMILY MEDICINE

## 2025-07-07 PROCEDURE — 83605 ASSAY OF LACTIC ACID: CPT | Performed by: STUDENT IN AN ORGANIZED HEALTH CARE EDUCATION/TRAINING PROGRAM

## 2025-07-07 PROCEDURE — 85610 PROTHROMBIN TIME: CPT | Performed by: STUDENT IN AN ORGANIZED HEALTH CARE EDUCATION/TRAINING PROGRAM

## 2025-07-07 PROCEDURE — 71045 X-RAY EXAM CHEST 1 VIEW: CPT | Mod: 26 | Performed by: RADIOLOGY

## 2025-07-07 PROCEDURE — 96361 HYDRATE IV INFUSION ADD-ON: CPT | Performed by: FAMILY MEDICINE

## 2025-07-07 PROCEDURE — 250N000009 HC RX 250: Performed by: FAMILY MEDICINE

## 2025-07-07 PROCEDURE — 83036 HEMOGLOBIN GLYCOSYLATED A1C: CPT | Performed by: STUDENT IN AN ORGANIZED HEALTH CARE EDUCATION/TRAINING PROGRAM

## 2025-07-07 PROCEDURE — 200N000002 HC R&B ICU UMMC

## 2025-07-07 PROCEDURE — 96366 THER/PROPH/DIAG IV INF ADDON: CPT | Performed by: FAMILY MEDICINE

## 2025-07-07 PROCEDURE — 85027 COMPLETE CBC AUTOMATED: CPT | Performed by: FAMILY MEDICINE

## 2025-07-07 PROCEDURE — 99291 CRITICAL CARE FIRST HOUR: CPT | Performed by: FAMILY MEDICINE

## 2025-07-07 PROCEDURE — 258N000003 HC RX IP 258 OP 636: Performed by: FAMILY MEDICINE

## 2025-07-07 PROCEDURE — 87040 BLOOD CULTURE FOR BACTERIA: CPT | Performed by: FAMILY MEDICINE

## 2025-07-07 PROCEDURE — 93005 ELECTROCARDIOGRAM TRACING: CPT | Performed by: FAMILY MEDICINE

## 2025-07-07 PROCEDURE — 93971 EXTREMITY STUDY: CPT | Mod: RT

## 2025-07-07 PROCEDURE — 85018 HEMOGLOBIN: CPT | Performed by: STUDENT IN AN ORGANIZED HEALTH CARE EDUCATION/TRAINING PROGRAM

## 2025-07-07 RX ORDER — BACLOFEN 10 MG/1
10 TABLET ORAL 2 TIMES DAILY
Status: DISCONTINUED | OUTPATIENT
Start: 2025-07-07 | End: 2025-07-09 | Stop reason: HOSPADM

## 2025-07-07 RX ORDER — HEPARIN SODIUM 10000 [USP'U]/100ML
0-5000 INJECTION, SOLUTION INTRAVENOUS CONTINUOUS
Status: DISCONTINUED | OUTPATIENT
Start: 2025-07-07 | End: 2025-07-09

## 2025-07-07 RX ORDER — IOPAMIDOL 755 MG/ML
79 INJECTION, SOLUTION INTRAVASCULAR ONCE
Status: COMPLETED | OUTPATIENT
Start: 2025-07-07 | End: 2025-07-07

## 2025-07-07 RX ORDER — DIPHENHYDRAMINE HCL 25 MG
50 CAPSULE ORAL
Status: DISCONTINUED | OUTPATIENT
Start: 2025-07-07 | End: 2025-07-09 | Stop reason: HOSPADM

## 2025-07-07 RX ORDER — OXYBUTYNIN CHLORIDE 10 MG/1
10 TABLET, EXTENDED RELEASE ORAL EVERY MORNING
Status: DISCONTINUED | OUTPATIENT
Start: 2025-07-08 | End: 2025-07-09 | Stop reason: HOSPADM

## 2025-07-07 RX ORDER — DIPHENHYDRAMINE HCL 50 MG
50 CAPSULE ORAL
Status: DISCONTINUED | OUTPATIENT
Start: 2025-07-07 | End: 2025-07-07

## 2025-07-07 RX ORDER — AMOXICILLIN 250 MG
2 CAPSULE ORAL 2 TIMES DAILY
Status: DISCONTINUED | OUTPATIENT
Start: 2025-07-08 | End: 2025-07-09 | Stop reason: HOSPADM

## 2025-07-07 RX ORDER — CALCIUM CARBONATE/VITAMIN D3 600 MG-10
1 TABLET ORAL 2 TIMES DAILY WITH MEALS
Status: DISCONTINUED | OUTPATIENT
Start: 2025-07-07 | End: 2025-07-09 | Stop reason: HOSPADM

## 2025-07-07 RX ORDER — ACETAMINOPHEN 650 MG/1
650 SUPPOSITORY RECTAL EVERY 4 HOURS PRN
Status: DISCONTINUED | OUTPATIENT
Start: 2025-07-07 | End: 2025-07-09 | Stop reason: HOSPADM

## 2025-07-07 RX ORDER — DEXTROSE MONOHYDRATE 25 G/50ML
25-50 INJECTION, SOLUTION INTRAVENOUS
Status: DISCONTINUED | OUTPATIENT
Start: 2025-07-07 | End: 2025-07-09 | Stop reason: HOSPADM

## 2025-07-07 RX ORDER — NICOTINE POLACRILEX 4 MG
15-30 LOZENGE BUCCAL
Status: DISCONTINUED | OUTPATIENT
Start: 2025-07-07 | End: 2025-07-09 | Stop reason: HOSPADM

## 2025-07-07 RX ORDER — POLYETHYLENE GLYCOL 3350 17 G/17G
17 POWDER, FOR SOLUTION ORAL DAILY
Status: DISCONTINUED | OUTPATIENT
Start: 2025-07-08 | End: 2025-07-09 | Stop reason: HOSPADM

## 2025-07-07 RX ORDER — AMOXICILLIN 250 MG
1 CAPSULE ORAL 2 TIMES DAILY
Status: DISCONTINUED | OUTPATIENT
Start: 2025-07-08 | End: 2025-07-09 | Stop reason: HOSPADM

## 2025-07-07 RX ORDER — HEPARIN SODIUM 10000 [USP'U]/100ML
0-5000 INJECTION, SOLUTION INTRAVENOUS CONTINUOUS
Status: DISCONTINUED | OUTPATIENT
Start: 2025-07-07 | End: 2025-07-07 | Stop reason: HOSPADM

## 2025-07-07 RX ORDER — MAGNESIUM HYDROXIDE/ALUMINUM HYDROXICE/SIMETHICONE 120; 1200; 1200 MG/30ML; MG/30ML; MG/30ML
30 SUSPENSION ORAL EVERY 4 HOURS PRN
Status: DISCONTINUED | OUTPATIENT
Start: 2025-07-07 | End: 2025-07-09 | Stop reason: HOSPADM

## 2025-07-07 RX ORDER — LIDOCAINE 40 MG/G
CREAM TOPICAL
Status: DISCONTINUED | OUTPATIENT
Start: 2025-07-07 | End: 2025-07-09 | Stop reason: HOSPADM

## 2025-07-07 RX ORDER — ACETAMINOPHEN 325 MG/1
650 TABLET ORAL EVERY 4 HOURS PRN
Status: DISCONTINUED | OUTPATIENT
Start: 2025-07-07 | End: 2025-07-09 | Stop reason: HOSPADM

## 2025-07-07 RX ADMIN — HEPARIN SODIUM 1450 UNITS/HR: 10000 INJECTION, SOLUTION INTRAVENOUS at 13:31

## 2025-07-07 RX ADMIN — DEXTROSE MONOHYDRATE 25 ML: 25 INJECTION, SOLUTION INTRAVENOUS at 23:59

## 2025-07-07 RX ADMIN — SODIUM CHLORIDE 80 ML: 9 INJECTION, SOLUTION INTRAVENOUS at 12:23

## 2025-07-07 RX ADMIN — HEPARIN SODIUM 1450 UNITS/HR: 10000 INJECTION, SOLUTION INTRAVENOUS at 21:33

## 2025-07-07 RX ADMIN — DEXTROSE MONOHYDRATE 25 ML: 25 INJECTION, SOLUTION INTRAVENOUS at 23:40

## 2025-07-07 RX ADMIN — CALCIUM CARBONATE 600 MG (1,500 MG)-VITAMIN D3 400 UNIT TABLET 1 TABLET: at 22:01

## 2025-07-07 RX ADMIN — BACLOFEN 10 MG: 10 TABLET ORAL at 23:27

## 2025-07-07 RX ADMIN — SODIUM CHLORIDE 1000 ML: 0.9 INJECTION, SOLUTION INTRAVENOUS at 10:31

## 2025-07-07 RX ADMIN — IOPAMIDOL 79 ML: 755 INJECTION, SOLUTION INTRAVENOUS at 12:22

## 2025-07-07 ASSESSMENT — ACTIVITIES OF DAILY LIVING (ADL)
ADLS_ACUITY_SCORE: 58
ADLS_ACUITY_SCORE: 42
ADLS_ACUITY_SCORE: 58
ADLS_ACUITY_SCORE: 42

## 2025-07-07 ASSESSMENT — ENCOUNTER SYMPTOMS
SHORTNESS OF BREATH: 0
ACTIVITY CHANGE: 1
COUGH: 0
DIARRHEA: 0
NAUSEA: 0
ABDOMINAL PAIN: 0
PALPITATIONS: 0
FEVER: 0

## 2025-07-07 ASSESSMENT — COLUMBIA-SUICIDE SEVERITY RATING SCALE - C-SSRS
6. HAVE YOU EVER DONE ANYTHING, STARTED TO DO ANYTHING, OR PREPARED TO DO ANYTHING TO END YOUR LIFE?: NO
2. HAVE YOU ACTUALLY HAD ANY THOUGHTS OF KILLING YOURSELF IN THE PAST MONTH?: NO
1. IN THE PAST MONTH, HAVE YOU WISHED YOU WERE DEAD OR WISHED YOU COULD GO TO SLEEP AND NOT WAKE UP?: NO

## 2025-07-07 NOTE — ED TRIAGE NOTES
Patient comes in from home with concerns after a syncopal episode at home where he lost consciousness and fell face first into the couch.  He was out for about 30 seconds before he came to.  Family was unable to get a BP on patient, nor was EMS when the showed up, mechanical or manual BP was not reading.  He is weak and flacid.  Pupils equal and reactive.  Patient upon EMS arrival was complaining of heavy breathing, was 92-93 on RA, but was placed on 3L for comfort.  Upon arrival to ER however, he is 85% on RA.  No complaints of chest pain or dizziness.  Some ankle swelling present which is normal for patient.  He recently came home on Friday from the hospital after spinal surgery.  He is not able to twist or bend after surgery.  EMS gave 50 of fentanyl and 4 of zofran.  12 lead from EMS was unremarkable.  About 300cc of NS given by EMS.         Family

## 2025-07-07 NOTE — ED NOTES
[facility] called and updated to let writer know when flight is available as soon as possible. [facility] also notified to hold the 1900 flight time for the patient and to let staff know if any flight will be sooner. [employer] notified that if patient becomes unstable that flight to [facility]'s ER will be needed.

## 2025-07-07 NOTE — PHARMACY-CONSULT NOTE
Pharmacy - Pharmacist to review heparin infusion orders     The patient's home/inpatient medication list has been reviewed. Prior to arrival patient did not have a running heparin infusion. All PRE- existing heparin and LMWH orders have been discontinued. The patient does not take a Factor Xa inhibitor (and has not within the last 72 hours - per available sources). Patient will be monitored using the standard Anti Xa levels.     Thank you. Pharmacy will continue to monitor.     [facility] located within [facility] ....................  7/7/2025   1:15 PM

## 2025-07-07 NOTE — ED PROVIDER NOTES
History     Chief Complaint   Patient presents with    Syncope     HPI  Jamir Gill is a 70 year old male who presents for syncope.  Initially arrived here by EMS but wife was close behind him.  He did not specifically recall the incident.  She was able to tell me that today he had a good day.  He has a diagnosis of multiple sclerosis.  He had surgery last Tuesday.  Anytime he has surgery it takes him a very long time to recover he generally has weakness.  He continually self caths for at least the last 10 years.  He was still having pain and weakness when he went home on Friday but wife felt she could care for him at home.  He declined getting any pain medications but has been using Tylenol as needed.  His pain is quite severe with any movement today.  Though he did not injure or trauma the area.  Patient's wife states she was helping him get dressed this morning he sat up and then fell gently forward it was not a far fall he seemed to catch himself with arms so he did not hit his head but she thought his eyes may have rolled back into his head.  He had a period of being unresponsive for about 3 to 5 seconds but then was back to his normal baseline mental status.  She states his level of lethargy that I am seeing on exam today is not uncommon for him especially after surgery and has been present since he was discharged.  He denies any chest pain or shortness of breath prior to the event.  He has been very minimally mobile due to his surgery and his comorbidity of multiple sclerosis.  No known prior history of DVT or pulmonary embolism.    Allergies:  No Known Allergies    Problem List:    Patient Active Problem List    Diagnosis Date Noted    Status post thoracic spinal fusion 07/01/2025     Priority: Medium    Neurologic gait dysfunction 05/05/2025     Priority: Medium    Muscle cramps 04/15/2024     Priority: Medium    H/O adenomatous polyp of colon 10/31/2023     Priority: Medium    Abnormality of gait  05/30/2023     Priority: Medium    Slow transit constipation 09/12/2022     Priority: Medium    Retention of urine 09/11/2022     Priority: Medium     Formatting of this note might be different from the original.  on flomax      S/P laminectomy 09/06/2022     Priority: Medium    Anatomical narrow angle glaucoma of left eye 05/17/2022     Priority: Medium    Ureterolithiasis 04/03/2018     Priority: Medium    Erectile dysfunction 08/13/2015     Priority: Medium    Neurogenic bladder 08/10/2015     Priority: Medium    Major depressive disorder, single episode, unspecified 05/20/2015     Priority: Medium    Hypertension 05/31/2013     Priority: Medium     No Comments Provided      Hypertrophy of prostate with urinary obstruction and other lower urinary tract symptoms (LUTS) 07/23/2012     Priority: Medium    Cervical disc disorder 06/12/2012     Priority: Medium    Osteoarthritis of spine 06/12/2012     Priority: Medium    Family history of abdominal aortic aneurysm 07/12/2011     Priority: Medium     Formatting of this note might be different from the original.  3 male relatives. Negative screen 6/2010      Multiple sclerosis (H) 04/15/2011     Priority: Medium     No Comments Provided  Formatting of this note might be different from the original.  on interferon injections  Formatting of this note might be different from the original.  Diagnosed 1993.  Dr. Dillon      Osteoporosis 05/12/2010     Priority: Medium     Formatting of this note might be different from the original.  Abnormal DEXA at neurologist.          Past Medical History:    Past Medical History:   Diagnosis Date    Cervical disc disorder     Enlarged prostate with lower urinary tract symptoms (LUTS)     Essential (primary) hypertension     Multiple sclerosis (H)     Other specified disorders of bone density and structure, unspecified site (CODE)     Other symptoms and signs involving cognitive functions and awareness     Spondylosis of cervical  region without myelopathy or radiculopathy        Past Surgical History:    Past Surgical History:   Procedure Laterality Date    APPENDECTOMY OPEN      appendectomy    COLONOSCOPY  2010,with polyps resected    COLONOSCOPY  12/15/2015    12/15/2015,[place]- colitis    COLONOSCOPY N/A 2021    2 sessile serrated and 1 tubular adenoma, 3 year follow up, 2024    COLONOSCOPY  2024    F/U  normal    COLONOSCOPY N/A 2024    Procedure: Colonoscopy;  Surgeon: Rudy Gilbert MD;  Location: [facility] OR    COMBINED CYSTOSCOPY, URETEROSCOPY, LASER HOLMIUM LITHOTRIPSY URETER(S) Left 04/10/2018    Procedure: COMBINED CYSTOSCOPY, URETEROSCOPY, LASER HOLMIUM LITHOTRIPSY URETER(S);  Left Ureteroscopy with Holmium Laser Lithotripsy & Stent Placement.;  Surgeon: Olu Saldivar MD;  Location: [facility] OR    CYSTOSCOPY, RETROGRADES, INSERT STENT URETER(S), COMBINED Left 2018    Procedure: COMBINED CYSTOSCOPY, RETROGRADES, INSERT STENT URETER(S);;  Surgeon: Olu Saldivar MD;  Location:  OR    LAMINECTOMY THORACIC TWO LEVELS N/A 2022    Procedure: Thoracic 10 to thoracic 12 laminectomies;  Surgeon: Edson Leija MD;  Location:  OR    OPTICAL TRACKING SYSTEM FUSION POSTERIOR SPINE THORACIC TWO LEVELS N/A 2025    Procedure: Thoracic 10 to thoracic 12 revision decompression and fusion with stealth and spinal cord monitoring;  Surgeon: Edson Leija MD;  Location:  OR    PROSTATE SURGERY      ,PROSTATECTOMY,Laser prostate surgery -- BPH    WRIST SURGERY Left     s/p fracture       Family History:    Family History   Problem Relation Age of Onset    Other - See Comments Mother          with Parkinson's    Heart Disease Mother         Heart Disease,CHF for carditis    Prostate Cancer Father         Cancer-prostate    Other - See Comments Father         Alzheimer's/kidney failure    Family History Negative Brother         Good Health    Heart Disease Brother         Heart  "Disease,ASCAD with MI    Lung Cancer Brother         tobacco abuse       Social History:  Marital Status:   [2]  Social History     Tobacco Use    Smoking status: Never     Passive exposure: Never    Smokeless tobacco: Never   Vaping Use    Vaping status: Never Used   Substance Use Topics    Alcohol use: No    Drug use: No        Medications:    [START ON 7/8/2025] aspirin (ASA) 81 MG chewable tablet  baclofen (LIORESAL) 10 MG tablet  calcium carbonate 600 mg-vitamin D 400 units (CALTRATE) 600-400 MG-UNIT per tablet  Catheters MISC  diphenhydrAMINE (BENADRYL) 25 MG tablet  lisinopril (ZESTRIL) 10 MG tablet  Lubricants (SURGICAL LUBRICANT) external gel  meclizine 25 MG CHEW  oxyBUTYnin ER (DITROPAN XL) 10 MG 24 hr tablet  senna-docusate (SENOKOT-S/PERICOLACE) 8.6-50 MG tablet          Review of Systems   Constitutional:  Positive for activity change. Negative for fever.   Respiratory:  Negative for cough and shortness of breath.    Cardiovascular:  Negative for chest pain, palpitations and leg swelling.   Gastrointestinal:  Negative for abdominal pain, diarrhea and nausea.   Skin:  Negative for rash.   Neurological:  Positive for syncope.   All other systems reviewed and are negative.      Physical Exam   BP: 93/68  Pulse: 105  Temp: 97.9  F (36.6  C)  Resp: 16  Height: 175.3 cm (5' 9\")  Weight: 81.6 kg (180 lb)  SpO2: (!) 85 %      Physical Exam  Constitutional:       General: He is not in acute distress.     Appearance: Normal appearance. He is not toxic-appearing.   HENT:      Head: Normocephalic and atraumatic.   Eyes:      General: No scleral icterus.     Conjunctiva/sclera: Conjunctivae normal.   Cardiovascular:      Rate and Rhythm: Tachycardia present.      Heart sounds: Normal heart sounds.   Pulmonary:      Effort: Pulmonary effort is normal. No respiratory distress.      Comments: Diminished but poor inspiratory effort  Abdominal:      Palpations: Abdomen is soft.      Tenderness: There is no " "abdominal tenderness.   Musculoskeletal:         General: No deformity.      Cervical back: Neck supple.      Comments: Incision of the thoracic area is clean dry and intact without surrounding erythema or drainage.   Skin:     General: Skin is warm.   Neurological:      Mental Status: He is alert.         ED Course     ED Course as of 07/07/25 1911 Mon Jul 07, 2025   1035 Patient here for \"syncope\". Had back surgery last Tuesday, has not been taking opiates by choice. Is having significant pain. No trauma or injury to back that he is aware of. No chest pain. Hypoxia is new   1035 BP low. Starting fluids   1102 Patient's wife at bedside.  He has MS.  It always takes him a while to recover from surgery.  Blood pressure normally runs no low but not as low as it is here.  He is not normally on oxygen.  He has not complained of any chest pain or shortness of breath.   1104 EKG: sinus tahycardia, rate 104. Qwave leadIII, no change from prior. Normal axis and interval. No acute ST changes   1146 D dimer greater than 20   1148 CT PE scan ordered   1236 Per my review of CT: saddle embolism and B/L PE.    1243 Called [facility] for PE team   1252 CODE PERT activation   1300 No lytics recommended due to recent surgery   1302 Thrombectomy preferred   1302 IR and cards recommend speaking with spine surgery team to see if he is a candidate for heparin   1309 Plan will be to transfer to [facility]. I will call Dr. Leija re: anticoag plan   1311 Per [facility] team, they spoke with Dr. Leija. Ok for heparin and bolus prior to transfer   1355 Troponin T, High Sensitivity(!!): 166   1446 Flight  delay.  Called [facility] to see if they have capacity for interventional radiology intervention.   1511 Waitlist at [facility]   1537 Continue to hold and monitor here until flight available. Would not tolerate ground transport due to recent surgery     Procedures              EKG Interpretation:      Interpreted by Cheryl Cross DO  Time reviewed: "   Symptoms at time of EKG: syncope   Rhythm: sinus tachycardia  Rate: 100-110  Axis: Normal  Ectopy: none  Conduction: normal  ST Segments/ T Waves: No ST-T wave changes  Q Waves: III  Comparison to prior: Unchanged    Clinical Impression: sinus tachycardia      Critical Care time:  was 90 minutes for this patient excluding procedures.     None         Recent Results (from the past 24 hours)   CBC with platelets differential    Narrative    The following orders were created for panel order CBC with platelets differential.  Procedure                               Abnormality         Status                     ---------                               -----------         ------                     CBC with platelets and ...[[id number]]  Abnormal            Final result                 Please view results for these tests on the individual orders.   Comprehensive metabolic panel   Result Value Ref Range    Sodium 137 135 - 145 mmol/L    Potassium 4.6 3.4 - 5.3 mmol/L    Carbon Dioxide (CO2) 24 22 - 29 mmol/L    Anion Gap 10 7 - 15 mmol/L    Urea Nitrogen 29.3 (H) 8.0 - 23.0 mg/dL    Creatinine 1.04 0.67 - 1.17 mg/dL    GFR Estimate 77 >60 mL/min/1.73m2    Calcium 9.3 8.8 - 10.4 mg/dL    Chloride 103 98 - 107 mmol/L    Glucose 126 (H) 70 - 99 mg/dL    Alkaline Phosphatase 103 40 - 150 U/L    AST 29 0 - 45 U/L    ALT 29 0 - 70 U/L    Protein Total 5.9 (L) 6.4 - 8.3 g/dL    Albumin 3.4 (L) 3.5 - 5.2 g/dL    Bilirubin Total 0.5 <=1.2 mg/dL   Troponin T, High Sensitivity   Result Value Ref Range    Troponin T, High Sensitivity 122 (HH) <=22 ng/L   Magnesium   Result Value Ref Range    Magnesium 1.6 (L) 1.7 - 2.3 mg/dL   TSH Reflex [facility]   Result Value Ref Range    TSH 2.87 0.30 - 4.20 uIU/mL   CBC with platelets and differential   Result Value Ref Range    WBC Count 10.4 4.0 - 11.0 10e3/uL    RBC Count 4.18 (L) 4.40 - 5.90 10e6/uL    Hemoglobin 12.3 (L) 13.3 - 17.7 g/dL    Hematocrit 36.8 (L) 40.0 - 53.0 %    MCV 88 78 - 100 fL     MCH 29.4 26.5 - 33.0 pg    MCHC 33.4 31.5 - 36.5 g/dL    RDW 13.0 10.0 - 15.0 %    Platelet Count 140 (L) 150 - 450 10e3/uL    % Neutrophils 80 %    % Lymphocytes 9 %    % Monocytes 8 %    % Eosinophils 2 %    % Basophils 0 %    % Immature Granulocytes 0 %    NRBCs per 100 WBC 0 <1 /100    Absolute Neutrophils 8.4 (H) 1.6 - 8.3 10e3/uL    Absolute Lymphocytes 0.9 0.8 - 5.3 10e3/uL    Absolute Monocytes 0.8 0.0 - 1.3 10e3/uL    Absolute Eosinophils 0.3 0.0 - 0.7 10e3/uL    Absolute Basophils 0.0 0.0 - 0.2 10e3/uL    Absolute Immature Granulocytes 0.0 <=0.4 10e3/uL    Absolute NRBCs 0.0 10e3/uL   [place] Draw    Narrative    The following orders were created for panel order [place] [facility].  Procedure                               Abnormality         Status                     ---------                               -----------         ------                     Extra Blue Top Tube[[id number]]                             Final result               Extra Red Top Tube[[id number]]                              Final result                 Please view results for these tests on the individual orders.   Extra Blue Top Tube   Result Value Ref Range    Hold Specimen JIC    Extra Red Top Tube   Result Value Ref Range    Hold Specimen JIC    D dimer quantitative   Result Value Ref Range    D-Dimer Quantitative >20.00 (HH) 0.00 - 0.50 ug/mL FEU    Narrative    This D-dimer assay is intended for use in conjunction with a clinical pretest probability assessment model to exclude pulmonary embolism (PE) and deep venous thrombosis (DVT) in outpatients suspected of PE or DVT. The cut-off value is 0.50 ug/mL FEU.    For patients 50 years of age or older, the application of age-adjusted cut-off values for D-Dimer may increase the specificity without significant effect on sensitivity. The literature suggested calculation age adjusted cut-off in ug/L = age in years x 10 ug/L. The results in this laboratory are reported as ug/mL rather  than ug/L. The calculation for age adjusted cut off in ug/mL= age in years x 0.01 ug/mL. For example, the cut off for a 76 year old male is 76 x 0.01 ug/mL = 0.76 ug/mL (760 ug/L).    M Antonio et al. Age adjusted D-dimer cut-off levels to rule out pulmonary embolism: The ADJUST-PE Study. MERRITT 2014;311:2426-7520.; HJ Aubree et al. Diagnostic accuracy of conventional or age adjusted D-dimer cutoff values in older patients with suspected venous thromboembolism. Systemic review and meta-analysis. BMJ 2013:346:f2492.   NT-proBNP   Result Value Ref Range    NT-proBNP 1,031 (H) 0 - 229 pg/mL   EKG 12-lead, tracing only   Result Value Ref Range    Systolic Blood Pressure  mmHg    Diastolic Blood Pressure  mmHg    Ventricular Rate 104 BPM    Atrial Rate 104 BPM    NM Interval 170 ms    QRS Duration 104 ms     ms    QTc 486 ms    P Axis 57 degrees    R AXIS 62 degrees    T Axis 50 degrees    Interpretation ECG       Sinus tachycardia  Otherwise normal ECG  When compared with ECG of 16-Jun-2025 11:07,  QT has lengthened  Confirmed by DO PANTOJA STACY ([id number]) on 7/7/2025 10:58:04 AM     XR Chest Port 1 View    Narrative    PROCEDURE:  XR CHEST PORT 1 VIEW    HISTORY:  hypoxia, recent back surgery.     COMPARISON:  12/6/2019    FINDINGS:   The cardiac silhouette is normal in size. The pulmonary vasculature is  normal.  There is elevation of the right hemidiaphragm. The lungs are  clear. No pleural effusion or pneumothorax. There are postoperative  changes in the thoracic spine.      Impression    IMPRESSION:  No acute cardiopulmonary disease.      JOANN THORNTON MD         SYSTEM ID:  [id number]   CT Chest Pulmonary Embolism w Contrast   Result Value Ref Range    Radiologist flags Pulmonary embolism (AA)     Narrative    PROCEDURE:  CT CHEST PULMONARY EMBOLISM W CONTRAST.    HISTORY:  Evaluate for pulmonary embolism.  recent surgery, hypoxia,  hypotension, tachycardia    TECHNIQUE:  Initial pre-contrast  and  localizer images were  obtained.  Contrast enhanced helical thoracic CT angiography was then  performed.  Routine transaxial and post-processed (including 3D MIP)  reformations were obtained. This CT exam was performed using one or  more the following dose reduction techniques: automated exposure  control, adjustment of the mA and/or kV according to patient size,  and/or iterative reconstruction technique.    COMPARISON:  12/6/2019    MEDS/CONTRAST: 79 ml Isovue 370    CTA FINDINGS:  There is a saddle pulmonary embolus with numerous  segmental and subsegmental branch emboli.    OTHER FINDINGS:      There is evidence of right heart strain with an abnormal RV:LV ratio  (appx 1.5). Cardiac size remains normal. There is no pericardial  effusion. No abnormal thoracic adenopathy is identified.    Limited views of the upper abdomen reveal no adrenal mass or acute  process.     The pleura is without thickening or effusions. The central airways are  unremarkable. Mild dependent atelectasis is seen. No focal  consolidation or significant infarct volume is identified.    No suspicious osseous lesion is identified.      Impression    IMPRESSION:    Saddle pulmonary embolus with right heart strain.    [Critical Result: Pulmonary embolism]    Finding was identified on 7/7/2025 12:39 PM.     Dr. Cross was contacted by me on 7/7/2025 12:40 PM and verbalized  understanding of the critical result.        CHOCO GALARZA MD         SYSTEM ID:  [id number]   Troponin T, High Sensitivity   Result Value Ref Range    Troponin T, High Sensitivity 166 (HH) <=22 ng/L   CBC with platelets   Result Value Ref Range    WBC Count 10.8 4.0 - 11.0 10e3/uL    RBC Count 4.10 (L) 4.40 - 5.90 10e6/uL    Hemoglobin 11.9 (L) 13.3 - 17.7 g/dL    Hematocrit 36.4 (L) 40.0 - 53.0 %    MCV 89 78 - 100 fL    MCH 29.0 26.5 - 33.0 pg    MCHC 32.7 31.5 - 36.5 g/dL    RDW 12.9 10.0 - 15.0 %    Platelet Count 125 (L) 150 - 450 10e3/uL   Echocardiogram Complete    Result Value Ref Range    LVEF  70%     [facility]    [id number]  [id number]  [id number]  [id number]^NATANAEL^DOROTHEA^SERAFIN     [facility]  [address] [place] [address]     Name: JOHNNY LEWIS  MRN: [id number]  : 1955  Study Date: 2025 12:03 PM  Age: 70 yrs  Gender: Male  Patient Location: [facility]  Reason For Study: Dyspnea, Dyspnea  Ordering Physician: DOROTHEA SANTOYO  Performed By: Monique Robin, JESSICA, RDCS, RVT     BSA: 2.0 m2  Height: 69 in  Weight: 180 lb  HR: 99  BP: 89/64 mmHg  ______________________________________________________________________________  Procedure  Echocardiogram with two-dimensional, color and spectral Doppler.  ______________________________________________________________________________  Interpretation Summary  Global and regional left ventricular function is hyperkinetic with an EF >70%.  Mild RV dilation with akinetic RV free wall and preserved RV apical  caontraction,typical for hemodynamically significant PE.  The inferior vena cava is normal.  No pericardial effusion is present.  ______________________________________________________________________________  Left Ventricle  Global and regional left ventricular function is hyperkinetic with an EF >70%.  Left ventricular wall thickness is normal. Left ventricular size is normal.  Grade I or early diastolic dysfunction. No regional wall motion abnormalities  are seen.     Right Ventricle  Mild RV dilation with akinetic RV free wall and preserved RV apical  caontraction,typical for hemodynamically significant PE.     Atria  Both atria appear normal.     Mitral Valve  The mitral valve is normal.     Aortic Valve  Aortic valve is normal in structure and function.     Tricuspid Valve  The tricuspid valve is normal. Pulmonary artery systolic pressure cannot be  assessed.     Pulmonic Valve  The valve leaflets are not well visualized. On Doppler interrogation, there is  no significant stenosis or  regurgitation.     Vessels  The thoracic aorta is normal. The pulmonary artery cannot be assessed. The  inferior vena cava is normal.     Pericardium  No pericardial effusion is present.     ______________________________________________________________________________  MMode/2D Measurements & Calculations  IVSd: 0.96 cm  LVIDd: 3.4 cm  LVIDs: 2.3 cm  LVPWd: 0.90 cm  FS: 30.1 %     LV mass(C)d: 87.6 grams  LV mass(C)dI: 44.3 grams/m2  Ao root diam: 3.4 cm  asc Aorta Diam: 3.4 cm  LVOT diam: 2.2 cm  LVOT area: 3.8 cm2  Ao root diam index Ht(cm/m): 2.0  Ao root diam index BSA (cm/m2): 1.7  Asc Ao diam index BSA (cm/m2): 1.7  Asc Ao diam index Ht(cm/m): 2.0  LA Volume (BP): 23.5 ml  LA Volume Index (BP): 11.9 ml/m2     RWT: 0.54     Doppler Measurements & Calculations  MV E max isai: 59.7 cm/sec  MV A max isai: 107.0 cm/sec  MV E/A: 0.56  MV dec time: 0.15 sec  Ao V2 max: 181.0 cm/sec  Ao max P.0 mmHg  Ao V2 mean: 124.0 cm/sec  Ao mean P.0 mmHg  Ao V2 VTI: 29.0 cm  ALEXANDER(I,D): 3.3 cm2  ALEXANDER(V,D): 3.3 cm2  LV V1 max P.7 mmHg  LV V1 max: 156.0 cm/sec  LV V1 VTI: 25.2 cm  SV(LVOT): 96.4 ml  SI(LVOT): 48.8 ml/m2  TR max isai: 142.5 cm/sec  TR max P.1 mmHg  AV Isai Ratio (DI): 0.86  ALEXANDER Index (cm2/m2): 1.7  E/E' av.6  Lateral E/e': 7.2  Medial E/e': 10.0     ______________________________________________________________________________  Report approved by: BERLIN Borjas MD on 2025 01:46 PM         US Lower Extremity Venous Duplex Right    Narrative    Procedure: US LOWER EXTREMITY VENOUS DUPLEX RIGHT    HISTORY:  saddle embolism    TECHNIQUE: Grayscale, color Doppler and compression views of the deep  venous structures of the right lower extremity.    COMPARISON: None.    FINDINGS:    Large mobile thrombus was seen moving centrally in the proximal right  femoral vein. The ER physician was immediately notified and the  procedure was terminated.       Impression    IMPRESSION:     Positive DVT study.       CHOCO GALARZA MD         SYSTEM ID:  [id number]       Medications   heparin 25,000 units in 0.45% NaCl 250 mL ANTICOAGULANT infusion (1,450 Units/hr Intravenous $New Bag 7/7/25 1331)   sodium chloride 0.9% BOLUS 1,000 mL (0 mLs Intravenous Stopped 7/7/25 1216)   iopamidol (ISOVUE-370) solution 79 mL (79 mLs Intravenous $Given 7/7/25 1222)   sodium chloride 0.9 % bag for CT scan flush (80 mLs Intravenous $Given 7/7/25 1223)   heparin ANTICOAGULANT Loading dose for HIGH INTENSITY TREATMENT * Give BEFORE starting heparin infusion (6,550 Units Intravenous $Given 7/7/25 1329)       Assessments & Plan (with Medical Decision Making)     I have reviewed the nursing notes.    I have reviewed the findings, diagnosis, plan and need for follow up with the patient.     Critical Care Addendum  My initial assessment, based on my review of prehospital provider report, review of nursing observations, review of vital signs, focused history, physical exam, review of cardiac rhythm monitor, discussion with IR and cards at [facility], and interpretation of CT scan , established a high suspicion that Jamir Gill has saddle embolism with  hypoxia, hypotension and tachycardia with R heart strain on CT, which requires immediate intervention, and therefore he is critically ill.     After the initial assessment, the care team initiated multiple lab tests, initiated IV fluid administration, and consulted with cards, IR to provide stabilization care. Due to the critical nature of this patient, I reassessed nursing observations, vital signs, physical exam, review of cardiac rhythm monitor, 12 lead ECG analysis, mental status, neurologic status, and respiratory status multiple times prior to his disposition.     Time also spent performing documentation, discussion with family to obtain medical information for decision making, reviewing test results, discussion with consultants, and coordination of care.     Critical care time (excluding  teaching time and procedures): 90 minutes.           Medical Decision Making  The patient's presentation was of high complexity (an acute health issue posing potential threat to life or bodily function).    The patient's evaluation involved:  review of external note(s) from 2 sources (see separate area of note for details)  review of 3+ test result(s) ordered prior to this encounter (see separate area of note for details)  ordering and/or review of 3+ test(s) in this encounter (see separate area of note for details)    The patient's management necessitated moderate risk (prescription drug management including medications given in the ED).        New Prescriptions    No medications on file       Final diagnoses:   Acute saddle pulmonary embolism with acute cor pulmonale (H)   Patient presented with syncopal event, elevated D-dimer, elevated BNP, elevated troponin.  CT pulmonary embolism positive for saddle embolism with right heart strain.  Echocardiogram preliminary confirms the right heart strain.  Given clot burden including comorbidities of recent  spinal surgery last Tuesday, plan was made to transfer to higher level of care.  Per discussion with care team, heparin drip was started.  He will transfer to cardiac ICU for consideration of thrombectomy.  Patient and family were updated and agree with plan.  He remains stable at this time though he has had episodes of hypotension.  Will continue close monitoring.    Sign out at shift change to Dr. Wilson. Flight available and on the way.     7/7/2025   [facility]       Cheryl Cross, DO  07/07/25 1319       Cheryl Cross, DO  07/07/25 1911

## 2025-07-07 NOTE — ED NOTES
[facility] called for update. [facility] stated that they have shift change at 7 and will call with update after shift change. [employer] [facility] called and stated that they got a call from [employer] and they are still on a weather hold and will check again in an hour.

## 2025-07-07 NOTE — ED NOTES
Critical and/or Actionable Test Result  Date: July 7, 2025     Time Received:  1349  Lab:troponin  Verbal Result Read Back to Caller: yes  MD Notification: Yes: Dr. Tay LUDWIG Read Back:  Yes  Nursing Plan: Primary nurse and MD notified

## 2025-07-08 ENCOUNTER — APPOINTMENT (OUTPATIENT)
Dept: INTERVENTIONAL RADIOLOGY/VASCULAR | Facility: CLINIC | Age: 70
DRG: 163 | End: 2025-07-08
Attending: NURSE PRACTITIONER
Payer: MEDICARE

## 2025-07-08 LAB
ALBUMIN SERPL BCG-MCNC: 3 G/DL (ref 3.5–5.2)
ALP SERPL-CCNC: 92 U/L (ref 40–150)
ALT SERPL W P-5'-P-CCNC: 24 U/L (ref 0–70)
ANION GAP SERPL CALCULATED.3IONS-SCNC: 8 MMOL/L (ref 7–15)
AST SERPL W P-5'-P-CCNC: 32 U/L (ref 0–45)
BILIRUB SERPL-MCNC: 0.4 MG/DL
BUN SERPL-MCNC: 22.9 MG/DL (ref 8–23)
CALCIUM SERPL-MCNC: 9.4 MG/DL (ref 8.8–10.4)
CHLORIDE SERPL-SCNC: 105 MMOL/L (ref 98–107)
CREAT SERPL-MCNC: 0.97 MG/DL (ref 0.67–1.17)
EGFRCR SERPLBLD CKD-EPI 2021: 84 ML/MIN/1.73M2
ERYTHROCYTE [DISTWIDTH] IN BLOOD BY AUTOMATED COUNT: 13.1 % (ref 10–15)
EST. AVERAGE GLUCOSE BLD GHB EST-MCNC: 108 MG/DL
FERRITIN SERPL-MCNC: 217 NG/ML (ref 31–409)
GLUCOSE BLDC GLUCOMTR-MCNC: 104 MG/DL (ref 70–99)
GLUCOSE BLDC GLUCOMTR-MCNC: 107 MG/DL (ref 70–99)
GLUCOSE BLDC GLUCOMTR-MCNC: 120 MG/DL (ref 70–99)
GLUCOSE BLDC GLUCOMTR-MCNC: 122 MG/DL (ref 70–99)
GLUCOSE BLDC GLUCOMTR-MCNC: 139 MG/DL (ref 70–99)
GLUCOSE BLDC GLUCOMTR-MCNC: 60 MG/DL (ref 70–99)
GLUCOSE BLDC GLUCOMTR-MCNC: 78 MG/DL (ref 70–99)
GLUCOSE BLDC GLUCOMTR-MCNC: 78 MG/DL (ref 70–99)
GLUCOSE BLDC GLUCOMTR-MCNC: 83 MG/DL (ref 70–99)
GLUCOSE BLDC GLUCOMTR-MCNC: 83 MG/DL (ref 70–99)
GLUCOSE BLDC GLUCOMTR-MCNC: 97 MG/DL (ref 70–99)
GLUCOSE SERPL-MCNC: 114 MG/DL (ref 70–99)
HBA1C MFR BLD: 5.4 %
HCO3 SERPL-SCNC: 23 MMOL/L (ref 22–29)
HCT VFR BLD AUTO: 36.2 % (ref 40–53)
HGB BLD-MCNC: 11.8 G/DL (ref 13.3–17.7)
IRON BINDING CAPACITY (ROCHE): 192 UG/DL (ref 240–430)
IRON SATN MFR SERPL: 29 % (ref 15–46)
IRON SERPL-MCNC: 56 UG/DL (ref 61–157)
LACTATE SERPL-SCNC: 0.9 MMOL/L (ref 0.7–2)
MCH RBC QN AUTO: 28.6 PG (ref 26.5–33)
MCHC RBC AUTO-ENTMCNC: 32.6 G/DL (ref 31.5–36.5)
MCV RBC AUTO: 88 FL (ref 78–100)
PLATELET # BLD AUTO: 141 10E3/UL (ref 150–450)
POTASSIUM SERPL-SCNC: 4.3 MMOL/L (ref 3.4–5.3)
PROT SERPL-MCNC: 5.6 G/DL (ref 6.4–8.3)
RBC # BLD AUTO: 4.12 10E6/UL (ref 4.4–5.9)
SODIUM SERPL-SCNC: 136 MMOL/L (ref 135–145)
UFH PPP CHRO-ACNC: 0.7 IU/ML (ref ?–1.1)
UFH PPP CHRO-ACNC: 0.72 IU/ML (ref ?–1.1)
UFH PPP CHRO-ACNC: >1.1 IU/ML (ref ?–1.1)
WBC # BLD AUTO: 8.4 10E3/UL (ref 4–11)

## 2025-07-08 PROCEDURE — 258N000003 HC RX IP 258 OP 636: Performed by: NURSE PRACTITIONER

## 2025-07-08 PROCEDURE — 99152 MOD SED SAME PHYS/QHP 5/>YRS: CPT | Mod: GC | Performed by: STUDENT IN AN ORGANIZED HEALTH CARE EDUCATION/TRAINING PROGRAM

## 2025-07-08 PROCEDURE — 250N000011 HC RX IP 250 OP 636: Performed by: STUDENT IN AN ORGANIZED HEALTH CARE EDUCATION/TRAINING PROGRAM

## 2025-07-08 PROCEDURE — 250N000009 HC RX 250: Performed by: STUDENT IN AN ORGANIZED HEALTH CARE EDUCATION/TRAINING PROGRAM

## 2025-07-08 PROCEDURE — 82310 ASSAY OF CALCIUM: CPT | Performed by: STUDENT IN AN ORGANIZED HEALTH CARE EDUCATION/TRAINING PROGRAM

## 2025-07-08 PROCEDURE — 85014 HEMATOCRIT: CPT | Performed by: STUDENT IN AN ORGANIZED HEALTH CARE EDUCATION/TRAINING PROGRAM

## 2025-07-08 PROCEDURE — 272N000143 HC KIT CR3

## 2025-07-08 PROCEDURE — 02CR3ZZ EXTIRPATION OF MATTER FROM LEFT PULMONARY ARTERY, PERCUTANEOUS APPROACH: ICD-10-PCS | Performed by: STUDENT IN AN ORGANIZED HEALTH CARE EDUCATION/TRAINING PROGRAM

## 2025-07-08 PROCEDURE — 37184 PRIM ART M-THRMBC 1ST VSL: CPT | Mod: 26 | Performed by: STUDENT IN AN ORGANIZED HEALTH CARE EDUCATION/TRAINING PROGRAM

## 2025-07-08 PROCEDURE — 120N000002 HC R&B MED SURG/OB UMMC

## 2025-07-08 PROCEDURE — 250N000013 HC RX MED GY IP 250 OP 250 PS 637

## 2025-07-08 PROCEDURE — 02CQ3ZZ EXTIRPATION OF MATTER FROM RIGHT PULMONARY ARTERY, PERCUTANEOUS APPROACH: ICD-10-PCS | Performed by: STUDENT IN AN ORGANIZED HEALTH CARE EDUCATION/TRAINING PROGRAM

## 2025-07-08 PROCEDURE — C1769 GUIDE WIRE: HCPCS

## 2025-07-08 PROCEDURE — 258N000001 HC RX 258: Performed by: STUDENT IN AN ORGANIZED HEALTH CARE EDUCATION/TRAINING PROGRAM

## 2025-07-08 PROCEDURE — 36014 PLACE CATHETER IN ARTERY: CPT | Mod: 50

## 2025-07-08 PROCEDURE — C1757 CATH, THROMBECTOMY/EMBOLECT: HCPCS

## 2025-07-08 PROCEDURE — 99291 CRITICAL CARE FIRST HOUR: CPT | Mod: 24 | Performed by: INTERNAL MEDICINE

## 2025-07-08 PROCEDURE — 99418 PROLNG IP/OBS E/M EA 15 MIN: CPT

## 2025-07-08 PROCEDURE — 36415 COLL VENOUS BLD VENIPUNCTURE: CPT | Performed by: INTERNAL MEDICINE

## 2025-07-08 PROCEDURE — 99152 MOD SED SAME PHYS/QHP 5/>YRS: CPT

## 2025-07-08 PROCEDURE — 85520 HEPARIN ASSAY: CPT | Performed by: STUDENT IN AN ORGANIZED HEALTH CARE EDUCATION/TRAINING PROGRAM

## 2025-07-08 PROCEDURE — 272N000190 HC ACCESSORY CR14

## 2025-07-08 PROCEDURE — 83605 ASSAY OF LACTIC ACID: CPT | Performed by: NURSE PRACTITIONER

## 2025-07-08 PROCEDURE — 255N000002 HC RX 255 OP 636: Performed by: STUDENT IN AN ORGANIZED HEALTH CARE EDUCATION/TRAINING PROGRAM

## 2025-07-08 PROCEDURE — 99233 SBSQ HOSP IP/OBS HIGH 50: CPT

## 2025-07-08 PROCEDURE — 250N000013 HC RX MED GY IP 250 OP 250 PS 637: Performed by: STUDENT IN AN ORGANIZED HEALTH CARE EDUCATION/TRAINING PROGRAM

## 2025-07-08 PROCEDURE — 36415 COLL VENOUS BLD VENIPUNCTURE: CPT | Performed by: NURSE PRACTITIONER

## 2025-07-08 PROCEDURE — 4A133B3 MONITORING OF ARTERIAL PRESSURE, PULMONARY, PERCUTANEOUS APPROACH: ICD-10-PCS | Performed by: STUDENT IN AN ORGANIZED HEALTH CARE EDUCATION/TRAINING PROGRAM

## 2025-07-08 PROCEDURE — 272N000504 HC NEEDLE CR4

## 2025-07-08 PROCEDURE — 76937 US GUIDE VASCULAR ACCESS: CPT | Mod: 26 | Performed by: STUDENT IN AN ORGANIZED HEALTH CARE EDUCATION/TRAINING PROGRAM

## 2025-07-08 PROCEDURE — 37184 PRIM ART M-THRMBC 1ST VSL: CPT | Mod: 50

## 2025-07-08 PROCEDURE — 85520 HEPARIN ASSAY: CPT | Performed by: INTERNAL MEDICINE

## 2025-07-08 PROCEDURE — 36415 COLL VENOUS BLD VENIPUNCTURE: CPT | Performed by: STUDENT IN AN ORGANIZED HEALTH CARE EDUCATION/TRAINING PROGRAM

## 2025-07-08 PROCEDURE — 258N000003 HC RX IP 258 OP 636: Performed by: STUDENT IN AN ORGANIZED HEALTH CARE EDUCATION/TRAINING PROGRAM

## 2025-07-08 PROCEDURE — 76937 US GUIDE VASCULAR ACCESS: CPT

## 2025-07-08 PROCEDURE — 36014 PLACE CATHETER IN ARTERY: CPT | Mod: 26 | Performed by: STUDENT IN AN ORGANIZED HEALTH CARE EDUCATION/TRAINING PROGRAM

## 2025-07-08 RX ORDER — FLUMAZENIL 0.1 MG/ML
0.2 INJECTION, SOLUTION INTRAVENOUS
Status: DISCONTINUED | OUTPATIENT
Start: 2025-07-08 | End: 2025-07-08 | Stop reason: HOSPADM

## 2025-07-08 RX ORDER — DEXTROSE MONOHYDRATE 100 MG/ML
INJECTION, SOLUTION INTRAVENOUS CONTINUOUS
Status: DISCONTINUED | OUTPATIENT
Start: 2025-07-08 | End: 2025-07-09

## 2025-07-08 RX ORDER — HEPARIN SODIUM 200 [USP'U]/100ML
2 INJECTION, SOLUTION INTRAVENOUS EVERY 5 MIN PRN
Status: DISCONTINUED | OUTPATIENT
Start: 2025-07-08 | End: 2025-07-09

## 2025-07-08 RX ORDER — FENTANYL CITRATE 50 UG/ML
25-50 INJECTION, SOLUTION INTRAMUSCULAR; INTRAVENOUS EVERY 5 MIN PRN
Refills: 0 | Status: DISCONTINUED | OUTPATIENT
Start: 2025-07-08 | End: 2025-07-08 | Stop reason: HOSPADM

## 2025-07-08 RX ORDER — NALOXONE HYDROCHLORIDE 0.4 MG/ML
0.2 INJECTION, SOLUTION INTRAMUSCULAR; INTRAVENOUS; SUBCUTANEOUS
Status: DISCONTINUED | OUTPATIENT
Start: 2025-07-08 | End: 2025-07-09 | Stop reason: HOSPADM

## 2025-07-08 RX ORDER — BISACODYL 10 MG
10 SUPPOSITORY, RECTAL RECTAL DAILY PRN
Status: DISCONTINUED | OUTPATIENT
Start: 2025-07-08 | End: 2025-07-09 | Stop reason: HOSPADM

## 2025-07-08 RX ORDER — NALOXONE HYDROCHLORIDE 0.4 MG/ML
0.4 INJECTION, SOLUTION INTRAMUSCULAR; INTRAVENOUS; SUBCUTANEOUS
Status: DISCONTINUED | OUTPATIENT
Start: 2025-07-08 | End: 2025-07-08 | Stop reason: HOSPADM

## 2025-07-08 RX ORDER — NALOXONE HYDROCHLORIDE 0.4 MG/ML
0.2 INJECTION, SOLUTION INTRAMUSCULAR; INTRAVENOUS; SUBCUTANEOUS
Status: DISCONTINUED | OUTPATIENT
Start: 2025-07-08 | End: 2025-07-08 | Stop reason: HOSPADM

## 2025-07-08 RX ORDER — NALOXONE HYDROCHLORIDE 0.4 MG/ML
0.4 INJECTION, SOLUTION INTRAMUSCULAR; INTRAVENOUS; SUBCUTANEOUS
Status: DISCONTINUED | OUTPATIENT
Start: 2025-07-08 | End: 2025-07-09 | Stop reason: HOSPADM

## 2025-07-08 RX ORDER — SODIUM PHOSPHATE,MONO-DIBASIC 19G-7G/118
1 ENEMA (ML) RECTAL ONCE
Status: COMPLETED | OUTPATIENT
Start: 2025-07-08 | End: 2025-07-08

## 2025-07-08 RX ORDER — ROPIVACAINE IN 0.9% SOD CHL/PF 0.1 %
.01-.2 PLASTIC BAG, INJECTION (ML) EPIDURAL CONTINUOUS
Status: DISCONTINUED | OUTPATIENT
Start: 2025-07-08 | End: 2025-07-08

## 2025-07-08 RX ORDER — IODIXANOL 320 MG/ML
150 INJECTION, SOLUTION INTRAVASCULAR ONCE
Status: COMPLETED | OUTPATIENT
Start: 2025-07-08 | End: 2025-07-08

## 2025-07-08 RX ORDER — OXYCODONE HYDROCHLORIDE 5 MG/1
5 TABLET ORAL EVERY 4 HOURS PRN
Refills: 0 | Status: DISCONTINUED | OUTPATIENT
Start: 2025-07-08 | End: 2025-07-09 | Stop reason: HOSPADM

## 2025-07-08 RX ORDER — SODIUM PHOSPHATE,MONO-DIBASIC 19G-7G/118
1 ENEMA (ML) RECTAL DAILY PRN
Status: DISCONTINUED | OUTPATIENT
Start: 2025-07-08 | End: 2025-07-09 | Stop reason: HOSPADM

## 2025-07-08 RX ADMIN — DEXTROSE: 10 SOLUTION INTRAVENOUS at 00:18

## 2025-07-08 RX ADMIN — MIDAZOLAM 0.5 MG: 1 INJECTION INTRAMUSCULAR; INTRAVENOUS at 10:31

## 2025-07-08 RX ADMIN — FENTANYL CITRATE 25 MCG: 50 INJECTION, SOLUTION INTRAMUSCULAR; INTRAVENOUS at 10:58

## 2025-07-08 RX ADMIN — IODIXANOL 100 ML: 320 INJECTION, SOLUTION INTRAVASCULAR at 11:12

## 2025-07-08 RX ADMIN — LIDOCAINE HYDROCHLORIDE 3 ML: 10 INJECTION, SOLUTION EPIDURAL; INFILTRATION; INTRACAUDAL; PERINEURAL at 10:17

## 2025-07-08 RX ADMIN — ACETAMINOPHEN 650 MG: 325 TABLET ORAL at 19:47

## 2025-07-08 RX ADMIN — HEPARIN SODIUM 1150 UNITS/HR: 10000 INJECTION, SOLUTION INTRAVENOUS at 17:56

## 2025-07-08 RX ADMIN — BACLOFEN 10 MG: 10 TABLET ORAL at 19:47

## 2025-07-08 RX ADMIN — MIDAZOLAM 0.5 MG: 1 INJECTION INTRAMUSCULAR; INTRAVENOUS at 10:10

## 2025-07-08 RX ADMIN — SENNOSIDES AND DOCUSATE SODIUM 1 TABLET: 50; 8.6 TABLET ORAL at 13:37

## 2025-07-08 RX ADMIN — FENTANYL CITRATE 25 MCG: 50 INJECTION, SOLUTION INTRAMUSCULAR; INTRAVENOUS at 10:21

## 2025-07-08 RX ADMIN — FENTANYL CITRATE 25 MCG: 50 INJECTION, SOLUTION INTRAMUSCULAR; INTRAVENOUS at 09:27

## 2025-07-08 RX ADMIN — HEPARIN SODIUM 2 BAG: 200 INJECTION, SOLUTION INTRAVENOUS at 10:25

## 2025-07-08 RX ADMIN — FENTANYL CITRATE 25 MCG: 50 INJECTION, SOLUTION INTRAMUSCULAR; INTRAVENOUS at 10:31

## 2025-07-08 RX ADMIN — FENTANYL CITRATE 25 MCG: 50 INJECTION, SOLUTION INTRAMUSCULAR; INTRAVENOUS at 10:10

## 2025-07-08 RX ADMIN — SODIUM PHOSPHATE, DIBASIC AND SODIUM PHOSPHATE, MONOBASIC 1 ENEMA: 7; 19 ENEMA RECTAL at 18:00

## 2025-07-08 RX ADMIN — DEXTROSE: 10 SOLUTION INTRAVENOUS at 08:21

## 2025-07-08 RX ADMIN — CALCIUM CARBONATE 600 MG (1,500 MG)-VITAMIN D3 400 UNIT TABLET 1 TABLET: at 13:37

## 2025-07-08 RX ADMIN — SODIUM CHLORIDE, SODIUM LACTATE, POTASSIUM CHLORIDE, AND CALCIUM CHLORIDE 500 ML: .6; .31; .03; .02 INJECTION, SOLUTION INTRAVENOUS at 08:09

## 2025-07-08 RX ADMIN — FENTANYL CITRATE 25 MCG: 50 INJECTION, SOLUTION INTRAMUSCULAR; INTRAVENOUS at 09:59

## 2025-07-08 RX ADMIN — OXYBUTYNIN CHLORIDE 10 MG: 10 TABLET, EXTENDED RELEASE ORAL at 13:37

## 2025-07-08 RX ADMIN — BACLOFEN 10 MG: 10 TABLET ORAL at 13:37

## 2025-07-08 RX ADMIN — SODIUM CHLORIDE, SODIUM LACTATE, POTASSIUM CHLORIDE, AND CALCIUM CHLORIDE 500 ML: .6; .31; .03; .02 INJECTION, SOLUTION INTRAVENOUS at 02:44

## 2025-07-08 RX ADMIN — MIDAZOLAM 0.5 MG: 1 INJECTION INTRAMUSCULAR; INTRAVENOUS at 09:27

## 2025-07-08 RX ADMIN — CALCIUM CARBONATE 600 MG (1,500 MG)-VITAMIN D3 400 UNIT TABLET 1 TABLET: at 19:46

## 2025-07-08 RX ADMIN — MIDAZOLAM 0.5 MG: 1 INJECTION INTRAMUSCULAR; INTRAVENOUS at 10:58

## 2025-07-08 RX ADMIN — MIDAZOLAM 0.5 MG: 1 INJECTION INTRAMUSCULAR; INTRAVENOUS at 09:59

## 2025-07-08 RX ADMIN — MIDAZOLAM 0.5 MG: 1 INJECTION INTRAMUSCULAR; INTRAVENOUS at 10:21

## 2025-07-08 RX ADMIN — OXYCODONE HYDROCHLORIDE 5 MG: 5 TABLET ORAL at 00:18

## 2025-07-08 RX ADMIN — SENNOSIDES AND DOCUSATE SODIUM 1 TABLET: 50; 8.6 TABLET ORAL at 19:47

## 2025-07-08 ASSESSMENT — ACTIVITIES OF DAILY LIVING (ADL)
ADLS_ACUITY_SCORE: 43
ADLS_ACUITY_SCORE: 43
ADLS_ACUITY_SCORE: 42
ADLS_ACUITY_SCORE: 43

## 2025-07-08 NOTE — PROCEDURES
[facility]    Procedure: IR Procedure Note    Date/Time: 7/8/2025 11:27 AM    Performed by: Dayton Wright MD  Authorized by: Jigar Heredia MD  IR Fellow Physician: Dayton Wright MD  Radiology Resident Physician: None    Pre Procedure Diagnosis: Pulmonary embolism  Post Procedure Diagnosis: Pulmonary embolism    UNIVERSAL PROTOCOL   Site Marked: Yes  Prior Images Obtained and Reviewed:  Yes  Required items: Required blood products, implants, devices and special equipment available    Patient identity confirmed:  Verbally with patient and arm band  Patient was reevaluated immediately before administering moderate or deep sedation or anesthesia  Confirmation Checklist:  Patient's identity using two indicators  Time out: Immediately prior to the procedure a time out was called    Universal Protocol: the Joint Commission Universal Protocol was followed    Preparation: Patient was prepped and draped in usual sterile fashion    ESBL (mL):  4     ANESTHESIA    Local Anesthetic:  Lidocaine 1% without epinephrine      SEDATION  Patient Sedated: Yes    Sedation:  Fentanyl and midazolam  Vital signs: Vital signs monitored during sedation    Fluoroscopy Time: 18 minute(s)  Findings: Right hemidiaphragm paralysis noted during procedure    Specimens: none    Procedural Complications: None    Condition: Stable    Plan: - Bedrest 2 hours with leg straight.    - Restart pre-procedural Heparin drip immediately.          PROCEDURE  Describe Procedure: Successful mechanical thrombectomy of saddle pulmonary embolism with demonstration of patency of the bilateral main pulmonary arteries and decrease pulmonary arterial pressures following the procedure.  Patient Tolerance:  Patient tolerated the procedure well with no immediate complications  Length of time physician/provider present for 1:1 monitoring during sedation:  53-67 min

## 2025-07-08 NOTE — CONSULTS
Interventional Radiology  [facility] Consult Service Note  07/08/25   6:58 AM    Consult Requested: PE thrombectomy    Recommendations/Plan:    Patient is on IR schedule 7/8 for a PE thrombectomy.   Labs WNL for procedure.  Orders entered for procedure, NPO status.  Medications to be held include: NO HOLD heparin gtt  Consent will be done prior to procedure.     Please contact the IR charge RN at [phone] for estimated time of procedure.     Case and imaging discussed with IR attending, Dr. Shukla and Dr. Heredia.  Recommendations were reviewed with PERT.    History of Present Illness:  Jamir Gill is a 70 year old male with PMH of essential hypertension, BPH and neurogenic bladder from MS (self catheterizes), and T10 -T12 revision decompression and fusion 7/1 (discharge 7/4), who presented to OSH ED 7/7 after syncopal episode and was found to have saddle PE with RHS on CT with elevated cardiac enzymes. PERT was activated and pt transfer was initiated to [facility] for consideration of PE thrombectomy vs IVC filter vs other. Heparin gtt was started after discussion with surgery. Hemodynamically stable on 2 L O2 via NC. Intermediate-Risk PE though some hypotension that improved with fluids. Pt reportedly does have lower BPs at baseline as well as some lethargy at baseline. Limited mobility given MS. IR will plan for PE thrombectomy.     Pertinent Imaging Reviewed:    CT chest PE 7/7/25    IMPRESSION:     Saddle pulmonary embolus with right heart strain.     [Critical Result: Pulmonary embolism]     Finding was identified on 7/7/2025 12:39 PM.      Dr. Cross was contacted by me on 7/7/2025 12:40 PM and verbalized  understanding of the critical result.          CHOCO GALARZA MD     US lower extremity right 7/7/25    FINDINGS:     Large mobile thrombus was seen moving centrally in the proximal right  femoral vein. The ER physician was immediately notified and the  procedure was terminated.        "                                                                IMPRESSION:      Positive DVT study.       CHOCO GALARZA MD     Expected date of discharge:  TBD    Vitals:   /70   Pulse 83   Temp 98.2  F (36.8  C) (Oral)   Resp 16   Ht 1.753 m (5' 9\")   Wt 82.2 kg (181 lb 3.5 oz)   SpO2 98%   BMI 26.76 kg/m      Pertinent Labs:   Lab Results   Component Value Date    WBC 8.4 07/08/2025    WBC 9.1 07/07/2025    WBC 10.8 07/07/2025    WBC 5.6 07/03/2020    WBC 5.8 11/29/2018    WBC 9.2 04/04/2018     Lab Results   Component Value Date    HGB 11.8 07/08/2025    HGB 12.2 07/07/2025    HGB 11.9 07/07/2025    HGB 13.8 07/03/2020    HGB 15.4 11/29/2018    HGB 11.3 04/04/2018     Lab Results   Component Value Date     07/08/2025     07/07/2025     07/07/2025     07/03/2020     11/29/2018     04/04/2018     Lab Results   Component Value Date    INR 1.24 (H) 07/07/2025    PTT >240 (HH) 07/07/2025     Lab Results   Component Value Date    POTASSIUM 4.3 07/08/2025    POTASSIUM 3.8 09/06/2022    POTASSIUM 4.3 04/15/2021        COVID-19 Antibody Results, Testing for Immunity           No data to display              COVID-19 PCR Results           No data to display                CORIE Ellington CNP  Interventional Radiology  Pager: [phone]    "

## 2025-07-08 NOTE — H&P
[facility]  Jamir Gill MRN: [id number]  1955  Date of Admission:(Not on file)  Primary care provider: Kayla Hobbs      Assessment and Plan:     Jamir Gill is an 70 year old male with PMH of essential hypertension, BPH and neurogenic bladder from MS (self catheterizes), and recent T10 -T12 revision decompression and fusion, who presents for saddle PE.    PLAN:  ===NEURO===  # Myelopathy s/p T10 -T12 revision decompression and fusion   # Multiple sclerosis  Patient underwent T10 -T12 revision decompression and fusion with stealth and spinal cord monitoring on 7/1/25 for worsening myelopathy at [facility]. He was discharged home on 7/4.   - Continue home baclofen  - PT/OT consults  - Tylenol for pain  - Benadryl prn for sleep    ===CARDIOVASCULAR===  # Saddle pulmonary embolus  # Numerous segmental and subsegmental branch emboli  # RV strain on CT and echo  # Right femoral DVT  Presented with syncope. Currently intermediate risk given hemodynamically stable, but did have some hypotension that improved with fluids at OSH. Additionally, syncope concerning for hemodynamic compromise. Likely provoked in the setting of recent surgery.  Workup:  - Labs: BNP 1031, troponin 122 > 166, d-dimer >20.   - CTPA: saddle pulmonary embolus with numerous segmental and subsegmental branch emboli, as well as evidence of right heart strain with an abnormal RV:LV ratio  (appx 1.5).   - LE duplex: large mobile thrombus seen moving centrally in the proximal right femoral vein.   - Echo: hyperkinetic LV with EF >70%, mild RV dilation with akinetic RV free wall and preserved RV apical contraction, typical for hemodynamically significant PE.   Plan:  - Continue heparin gtt  - Discussed with IR overnight, plan for intervention in the morning if patient remains hemodynamically stable  - Relative Tpa contraindication due to recent spinal surgery (7/1/25)    # Essential  hypertension  - Hold home lisinopril    ===PULMONARY===  # Acute hypoxemic respiratory failure  Patient on 2L NC on arrival, but unable to obtain reliable pulse ox reading.     ===GASTROINTESTINAL===  # Mild hypoproteinemia  - NPO for procedure    ===RENAL===  # Hypomagnesemia  # Neurogenic bladder  # BPH  - Straight cath prn  - Continue home oxybutynin     ===HEME/ONC===  # Anemia  # Thrombocytopenia  Baseline hgb around 14, now around 12. No signs of bleeding currently.  - CTM  - Iron studies    ===ENDOCRINE===  # Hyperglycemia  - A1c pending    ===INFECTIOUS DISEASE===  No acute concerns.  - Follow up blood cultures    ===SKIN/MSK===  No acute concerns.    LINES: PIV (18G and 20G)  Prophylaxis: Heparin gtt  Family:  Updated by patient  Disposition: CICU  Code Status: Full    Patient was seen and discussed with attending physician Dr. Pritchett, who agrees with above assessment and plan.    Mary Blankenship  Cardiology fellow PGY4         Chief Complaint:   Passed out         History of Present Illness:   Jamir Gill is an 70 year old male with PMH of essential hypertension, BPH and neurogenic bladder from MS (self catheterizes), who presents for saddle PE.     Patient underwent T10 -T12 revision decompression and fusion with stealth and spinal cord monitoring on 7/1/25 for worsening myelopathy at [facility]. He was discharged home on 7/4. He then had a syncopal episode on 7/7, prompting him to present to the ED in [facility] near where he lives. Blood pressure soft but improved with 1L NS. He was mildly tachycardic in the low 100s and requiring 2L NC. Labs notable for BNP 1031, troponin 122 > 166, d-dimer >20. CTPA showed a saddle pulmonary embolus with numerous segmental and subsegmental branch emboli, as well as evidence of right heart strain with an abnormal RV:LV ratio  (appx 1.5). LE duplex with large mobile thrombus seen moving centrally in the proximal right femoral vein. Echo showed hyperkinetic LV  with EF >70%, mild RV dilation with akinetic RV free wall and preserved RV apical contraction, typical for hemodynamically significant PE. He was started on heparin gtt and transferred here.     Upon arrival, he is hemodynamically stable with /73, heart rate in the 90-100s. He is on 2L NC but we are unable to get accurate pulse oximeter readings. He feels well and is asymptomatic, denying chest pain, shortness of breath, nausea, or any other symptoms. He reports that he has been slightly less mobile recently due to the back surgery, but was feeling well. He took his morning medications and got dressed. He then sat on the couch. He had no prodromal symptoms, but woke up tipped over on the couch. His wife was around and immediately called for help. He woke up shortly after and had some nausea that resolved with medication by EMS. He reports never passing out before. Denies any history of blood clots personally or in his family. Never smoked, no alcohol intake or drug use. He lives in [place] with his wife and dogs.            Past Medical History:   Medical History reviewed.   Past Medical History:   Diagnosis Date    Cervical disc disorder     No Comments Provided    Enlarged prostate with lower urinary tract symptoms (LUTS)     No Comments Provided    Essential (primary) hypertension     No Comments Provided    Multiple sclerosis (H)     No Comments Provided    Other specified disorders of bone density and structure, unspecified site (CODE)     No Comments Provided    Other symptoms and signs involving cognitive functions and awareness     No Comments Provided    Spondylosis of cervical region without myelopathy or radiculopathy     No Comments Provided             Past Surgical History:   Surgical History reviewed.   Past Surgical History:   Procedure Laterality Date    APPENDECTOMY OPEN      appendectomy    COLONOSCOPY  01/01/2010 01/01/2010,with polyps resected    COLONOSCOPY  12/15/2015     12/15/2015,[place]- colitis    COLONOSCOPY N/A 2021    2 sessile serrated and 1 tubular adenoma, 3 year follow up, 2024    COLONOSCOPY  2024    F/U  normal    COLONOSCOPY N/A 2024    Procedure: Colonoscopy;  Surgeon: Rudy Gilbert MD;  Location:  OR    COMBINED CYSTOSCOPY, URETEROSCOPY, LASER HOLMIUM LITHOTRIPSY URETER(S) Left 04/10/2018    Procedure: COMBINED CYSTOSCOPY, URETEROSCOPY, LASER HOLMIUM LITHOTRIPSY URETER(S);  Left Ureteroscopy with Holmium Laser Lithotripsy & Stent Placement.;  Surgeon: Olu Saldivar MD;  Location:  OR    CYSTOSCOPY, RETROGRADES, INSERT STENT URETER(S), COMBINED Left 2018    Procedure: COMBINED CYSTOSCOPY, RETROGRADES, INSERT STENT URETER(S);;  Surgeon: Olu Saldivar MD;  Location:  OR    LAMINECTOMY THORACIC TWO LEVELS N/A 2022    Procedure: Thoracic 10 to thoracic 12 laminectomies;  Surgeon: Edson Leija MD;  Location:  OR    OPTICAL TRACKING SYSTEM FUSION POSTERIOR SPINE THORACIC TWO LEVELS N/A 2025    Procedure: Thoracic 10 to thoracic 12 revision decompression and fusion with stealth and spinal cord monitoring;  Surgeon: Edson Leija MD;  Location:  OR    PROSTATE SURGERY      ,PROSTATECTOMY,Laser prostate surgery -- BPH    WRIST SURGERY Left     s/p fracture             Social History:   Social History reviewed.  Social History     Tobacco Use    Smoking status: Never     Passive exposure: Never    Smokeless tobacco: Never   Substance Use Topics    Alcohol use: No             Family History:   Family History reviewed.   Family History   Problem Relation Age of Onset    Other - See Comments Mother          with Parkinson's    Heart Disease Mother         Heart Disease,CHF for carditis    Prostate Cancer Father         Cancer-prostate    Other - See Comments Father         Alzheimer's/kidney failure    Family History Negative Brother         Good Health    Heart Disease Brother         Heart Disease,ASCAD  with MI    Lung Cancer Brother         tobacco abuse             Allergies:   No Known Allergies          Medications:   Medications Reviewed.   Current Facility-Administered Medications   Medication Dose Route Frequency Provider Last Rate Last Admin    acetaminophen (TYLENOL) Suppository 650 mg  650 mg Rectal Q4H PRN Mary Blankenship MD        acetaminophen (TYLENOL) tablet 650 mg  650 mg Oral Q4H PRN Mary Blankenship MD        alum & mag hydroxide-simethicone (MAALOX) suspension 30 mL  30 mL Oral Q4H PRN Mary Blankenship MD        baclofen (LIORESAL) tablet 10 mg  10 mg Oral BID Mary Blankenship MD        calcium carbonate-vitamin D (CALTRATE) 600-10 MG-MCG per tablet 1 tablet  1 tablet Oral BID w/meals Mary Blankenship MD   1 tablet at 07/07/25 2201    diphenhydrAMINE (BENADRYL) capsule 50 mg  50 mg Oral At Bedtime PRN Mary Blankenship MD        heparin 25,000 units in 0.45% NaCl 250 mL ANTICOAGULANT infusion  0-5,000 Units/hr Intravenous Continuous Mary Blankenship MD 14.5 mL/hr at 07/07/25 2133 1,450 Units/hr at 07/07/25 2133    lidocaine (LMX4) cream   Topical Q1H PRN Mary Blankenship MD        lidocaine 1 % 0.1-1 mL  0.1-1 mL Other Q1H PRN Mary Blankenship MD        medication instruction   Does not apply Continuous PRN Mary Blankenship MD        [START ON 7/8/2025] oxyBUTYnin ER (DITROPAN XL) 24 hr tablet 10 mg  10 mg Oral QAM Mary Blankenship MD        Patient is already receiving anticoagulation with heparin, enoxaparin (LOVENOX), warfarin (COUMADIN)  or other anticoagulant medication   Does not apply Continuous PRN Mary Blankenship MD        [START ON 7/8/2025] polyethylene glycol (MIRALAX) Packet 17 g  17 g Oral Daily Mary Blankenship MD        [START ON 7/8/2025] senna-docusate (SENOKOT-S/PERICOLACE) 8.6-50 MG per tablet 1 tablet  1 tablet Oral BID Mary Blankenship MD        Or    [START ON 7/8/2025] senna-docusate (SENOKOT-S/PERICOLACE) 8.6-50 MG per tablet 2  "tablet  2 tablet Oral BID Mary Blankenship MD        sodium chloride (PF) 0.9% PF flush 3 mL  3 mL Intracatheter Q8H [facility] Mary Blankenship MD   3 mL at 07/07/25 2138    sodium chloride (PF) 0.9% PF flush 3 mL  3 mL Intracatheter q1 min prn Mary Blankenship MD                 Physical Exam:   Vitals were reviewed.  Blood pressure 125/73, pulse 100, temperature 99  F (37.2  C), temperature source Axillary, resp. rate 16, height 1.753 m (5' 9\"), weight 82.2 kg (181 lb 3.5 oz), SpO2 92%.    General: AAOx3, NAD  Skin: Not jaundiced, no rash, no ecchymoses  HEENT: MMM, no scleral icterus  CV: RRR, normal S1S2, no murmur, clicks, rubs  Resp: Clear to auscultation bilaterally, no wheezes, rhonchi  Abd: Soft, non-tender, BS+, no masses appreciated  Extremities: warm and well perfused, palpable pulses, 1+ LE edema R>L  Neuro: Awake and alert           Data:   No intake/output data recorded.    ROUTINE LABS (Last four results)  CMP  Recent Labs   Lab 07/07/25  1031 07/03/25  0208 07/02/25  0819 07/02/25  0546 07/01/25  0601     --  139  --   --    POTASSIUM 4.6  --  4.8  --  4.0   CHLORIDE 103  --  105  --   --    CO2 24  --  26  --   --    ANIONGAP 10  --  8  --   --    * 117* 104* 107* 81   BUN 29.3*  --  18.3  --   --    CR 1.04  --  0.94  --  1.27*   GFRESTIMATED 77  --  87  --  61   ОЛЬГА 9.3  --  8.9  --   --    MAG 1.6*  --   --   --   --    PROTTOTAL 5.9*  --   --   --   --    ALBUMIN 3.4*  --   --   --   --    BILITOTAL 0.5  --   --   --   --    ALKPHOS 103  --   --   --   --    AST 29  --   --   --   --    ALT 29  --   --   --   --      CBC  Recent Labs   Lab 07/07/25 2137 07/07/25  1318 07/07/25  1031 07/02/25  0819   WBC 9.1 10.8 10.4  --    RBC 4.24* 4.10* 4.18*  --    HGB 12.2* 11.9* 12.3* 12.7*   HCT 37.4* 36.4* 36.8*  --    MCV 88 89 88 88   MCH 28.8 29.0 29.4  --    MCHC 32.6 32.7 33.4  --    RDW 13.1 12.9 13.0  --     125* 140*  --      INR  Recent Labs   Lab 07/07/25 2137   INR " 1.24*     Arterial Blood GasNo lab results found in last 7 days.

## 2025-07-08 NOTE — PRE-PROCEDURE
GENERAL PRE-PROCEDURE:   Procedure:  Image guided pulmonary embolism mechanical thrombectomy  Date/Time:  7/8/2025 9:11 AM    Verbal consent obtained?: Yes    Written consent obtained?: Yes    Risks and benefits: Risks, benefits and alternatives were discussed    DC Plan: Appropriate discharge home plan in place for patients who are going home after procedure   Consent given by:  Parent  Patient states understanding of procedure being performed: Yes    Patient's understanding of procedure matches consent: Yes    Procedure consent matches procedure scheduled: Yes    Expected level of sedation:  Moderate  Appropriately NPO:  Yes  ASA Class:  3  Mallampati  :  Grade 3- soft palate visible, posterior pharyngeal wall not visible  Lungs:  Lungs clear with good breath sounds bilaterally  Heart:  Normal heart sounds and rate  History & Physical reviewed:  History and physical reviewed and no updates needed  Statement of review:  I have reviewed the lab findings, diagnostic data, medications, and the plan for sedation

## 2025-07-08 NOTE — PROGRESS NOTES
Cardiology ICU Progress Note    Brief HPI:  Jamir Gill is an 70 year old male with PMH of essential hypertension, BPH and neurogenic bladder from MS (self catheterizes), and recent T10 -T12 revision decompression and fusion, who presents for saddle PE.     Subjective and Interval:  Patient in ICU, hemodynamics stable requiring 2 L NC.     Assessment and plan by system:   Today's changes:  IR thrombectomy  Post procedure bedrest  Continue heparin through procedural period  Discussion with surgery team about long term AC>> per Dr Heladio pimentel with oral AC tomorrow, DOAC or coumadin ok     Neurology   # Myelopathy s/p T10 -T12 revision decompression and fusion   # Multiple sclerosis  Patient underwent T10 -T12 revision decompression and fusion with stealth and spinal cord monitoring on 7/1/25 for worsening myelopathy at [facility]. He was discharged home on 7/4.   - Continue home baclofen  - PT/OT consults  - Tylenol for pain  - Benadryl prn for sleep     Cardiovascular  # Saddle pulmonary embolus  # Numerous segmental and subsegmental branch emboli  # RV strain on CT and echo  # Right femoral DVT  Presented with syncope. Currently intermediate risk given hemodynamically stable, but did have some hypotension that improved with fluids at OSH. Additionally, syncope concerning for hemodynamic compromise. Likely provoked in the setting of recent surgery.  Workup:  - Labs: BNP 1031, troponin 122 > 166, d-dimer >20.   - CTPA: saddle pulmonary embolus with numerous segmental and subsegmental branch emboli, as well as evidence of right heart strain with an abnormal RV:LV ratio  (appx 1.5).   - LE duplex: large mobile thrombus seen moving centrally in the proximal right femoral vein.   - Echo: hyperkinetic LV with EF >70%, mild RV dilation with akinetic RV free wall and preserved RV apical contraction, typical for hemodynamically significant PE.   Plan:  - Continue heparin gtt  - Thrombectomy today with IR  - Relative Tpa  contraindication due to recent spinal surgery (7/1/25)  - Discuss long term AC with Dr Leija>> page out to team     # Essential hypertension  - Hold home lisinopril     Pulmonary  # Acute hypoxia 2/2 PE    Vent Settings:   Resp: 14    ABG:   No lab results found in last 7 days.    Plan:  - O2 as needed  -AC as above        Gastrointestinal, Nutrition  # KATHY     Recent Labs   Lab 07/08/25  0337 07/07/25  1031   AST 32 29   ALT 24 29   BILITOTAL 0.4 0.5   ALBUMIN 3.0* 3.4*   PROTTOTAL 5.6* 5.9*   ALKPHOS 92 103     NPO for procedure  Regular diet once bacl         Renal, Electrolytes  # Hypomagnesemia  # Neurogenic bladder  # BPH  - Straight cath prn  - Continue home oxybutynin       I/O last 3 completed shifts:  In: 650 [I.V.:150; IV Piggyback:500]  Out: 350 [Urine:350]  Recent Labs   Lab 07/08/25 0337 07/07/25 2137 07/07/25  1031    139 137   POTASSIUM 4.3 4.8 4.6   CHLORIDE 105 103 103   CO2 23 25 24   BUN 22.9 24.5* 29.3*   CR 0.97 1.05 1.04   MAG  --   --  1.6*       Infectious Disease  # KATHY     Hematology  # Acute Anemia of critical illness on chronic  Recent Labs   Lab 07/08/25 0337 07/07/25 2137 07/07/25  1318   HGB 11.8* 12.2* 11.9*   HCT 36.2* 37.4* 36.4*   * 165 125*       Hgb stable. No e/o bleeding.    DVT PPX: Heparin    Plan:  - Monitor CBC  - Sheparin as above     Endocrinology  # Hyperglycemia   - No known medical history.   Hgb a1c 5.4    Recent Labs   Lab 07/08/25  0833 07/08/25  0741 07/08/25  0339 07/08/25 0337 07/08/25  0049   GLC 78 83 107* 114* 120*     Plan  - insulin if needed  - Goal -180 in setting of critical illness    Integumentary:  - No skin issues    Clinically Significant Risk Factors Present on Admission             # Hypomagnesemia: Lowest Mg = 1.6 mg/dL in last 2 days, will replace as needed   # Hypoalbuminemia: Lowest albumin = 3 g/dL at 7/8/2025  3:37 AM, will monitor as appropriate  # Coagulation Defect: INR = 1.24 (Ref range: 0.85 - 1.15) and/or PTT =  ">240 Seconds (Ref range: 22 - 38 Seconds), will monitor for bleeding  # Drug Induced Platelet Defect: home medication list includes an antiplatelet medication   # Hypertension: Noted on problem list           # Overweight: Estimated body mass index is 26.76 kg/m  as calculated from the following:    Height as of this encounter: 1.753 m (5' 9\").    Weight as of this encounter: 82.2 kg (181 lb 3.5 oz).                Lines/Tubes/Drains:  PIV x 2       ICU  Feeding: Encourage PO intake  Analgesia:  n/a  Sedation: n/a  Thrombopx: Heparin infusion  Head of bed: 30 Degrees   Ulcers: PPI  Glucose: SSI  Medically Ready for Discharge: Anticipated Tomorrow      Family will be updated by me    Patient seen and discussed with staff physician Dr. Pritchett.    CORIE De La Fuente CNP  [facility]  ICU Cardiology-CICU Service  Send message or 10 digit call back number Securely via [employer] with the [employer] Web Console (learn more here)    Objective:  Most recent vital signs:  BP 92/58 (BP Location: Right arm)   Pulse 76   Temp 97.8  F (36.6  C) (Oral)   Resp 14   Ht 1.753 m (5' 9\")   Wt 82.2 kg (181 lb 3.5 oz)   SpO2 99%   BMI 26.76 kg/m    Temp:  [97.8  F (36.6  C)-99  F (37.2  C)] 97.8  F (36.6  C)  Pulse:  [] 76  Resp:  [9-28] 14  BP: ()/(58-76) 92/58  SpO2:  [71 %-100 %] 99 %      Physical exam:  General: In bed, in NAD  HEENT: PERRL, no scleral icterus or injection  CARDIAC: RRR, no m/r/g appreciated. Peripheral pulses dopplered  RESP: CTAB, no wheezes, rhonchi or crackles appreciated.  GI: soft, BS hypoactive  : Voiding  EXTREMITIES: NO LE edema, pulses 2+.   SKIN: No acute lesions appreciated  NEURO: Awake, alert, oriented x 4, NURY, weakness noted, at neurologic baseline    Imaging/procedure results:  US Lower Extremity Venous Duplex Right  Narrative: Procedure: US LOWER EXTREMITY VENOUS DUPLEX RIGHT    HISTORY:  saddle embolism    TECHNIQUE: Grayscale, color Doppler and compression views of the " deep  venous structures of the right lower extremity.    COMPARISON: None.    FINDINGS:    Large mobile thrombus was seen moving centrally in the proximal right  femoral vein. The ER physician was immediately notified and the  procedure was terminated.   Impression: IMPRESSION:     Positive DVT study.      CHOCO GALARZA MD         SYSTEM ID:  [id number]  Echocardiogram Complete  [id number]  [id number]  [id number]  [id number]^NATANAEL^DOROTHEA^SERAFIN     [facility]  [address]  [place] [address]     Name: JOHNNY LEWIS  MRN: [id number]  : 1955  Study Date: 2025 12:03 PM  Age: 70 yrs  Gender: Male  Patient [facility]: [facility]  Reason For Study: Dyspnea, Dyspnea  Ordering Physician: DOROTHEA SANTOYO  Performed By: JESSICA Kc, RDCS, RVT     BSA: 2.0 m2  Height: 69 in  Weight: 180 lb  HR: 99  BP: 89/64 mmHg  ______________________________________________________________________________  Procedure  Echocardiogram with two-dimensional, color and spectral Doppler.  ______________________________________________________________________________  Interpretation Summary  Global and regional left ventricular function is hyperkinetic with an EF >70%.  Mild RV dilation with akinetic RV free wall and preserved RV apical  caontraction,typical for hemodynamically significant PE.  The inferior vena cava is normal.  No pericardial effusion is present.  ______________________________________________________________________________  Left Ventricle  Global and regional left ventricular function is hyperkinetic with an EF >70%.  Left ventricular wall thickness is normal. Left ventricular size is normal.  Grade I or early diastolic dysfunction. No regional wall motion abnormalities  are seen.     Right Ventricle  Mild RV dilation with akinetic RV free wall and preserved RV apical  caontraction,typical for hemodynamically significant PE.     Atria  Both atria appear normal.     Mitral Valve  The mitral  valve is normal.     Aortic Valve  Aortic valve is normal in structure and function.     Tricuspid Valve  The tricuspid valve is normal. Pulmonary artery systolic pressure cannot be  assessed.     Pulmonic Valve  The valve leaflets are not well visualized. On Doppler interrogation, there is  no significant stenosis or regurgitation.     Vessels  The thoracic aorta is normal. The pulmonary artery cannot be assessed. The  inferior vena cava is normal.     Pericardium  No pericardial effusion is present.     ______________________________________________________________________________  MMode/2D Measurements & Calculations  IVSd: 0.96 cm  LVIDd: 3.4 cm  LVIDs: 2.3 cm  LVPWd: 0.90 cm  FS: 30.1 %     LV mass(C)d: 87.6 grams  LV mass(C)dI: 44.3 grams/m2  Ao root diam: 3.4 cm  asc Aorta Diam: 3.4 cm  LVOT diam: 2.2 cm  LVOT area: 3.8 cm2  Ao root diam index Ht(cm/m): 2.0  Ao root diam index BSA (cm/m2): 1.7  Asc Ao diam index BSA (cm/m2): 1.7  Asc Ao diam index Ht(cm/m): 2.0  LA Volume (BP): 23.5 ml  LA Volume Index (BP): 11.9 ml/m2     RWT: 0.54     Doppler Measurements & Calculations  MV E max isai: 59.7 cm/sec  MV A max isai: 107.0 cm/sec  MV E/A: 0.56  MV dec time: 0.15 sec  Ao V2 max: 181.0 cm/sec  Ao max P.0 mmHg  Ao V2 mean: 124.0 cm/sec  Ao mean P.0 mmHg  Ao V2 VTI: 29.0 cm  ALEXANDER(I,D): 3.3 cm2  ALEXANDER(V,D): 3.3 cm2  LV V1 max P.7 mmHg  LV V1 max: 156.0 cm/sec  LV V1 VTI: 25.2 cm  SV(LVOT): 96.4 ml  SI(LVOT): 48.8 ml/m2  TR max isai: 142.5 cm/sec  TR max P.1 mmHg  AV Isai Ratio (DI): 0.86  ALEXANDER Index (cm2/m2): 1.7  E/E' av.6  Lateral E/e': 7.2  Medial E/e': 10.0     ______________________________________________________________________________  Report approved by: BERLIN Borjas MD on 2025 01:46 PM        CT Chest Pulmonary Embolism w Contrast  Narrative: PROCEDURE:  CT CHEST PULMONARY EMBOLISM W CONTRAST.    HISTORY:  Evaluate for pulmonary embolism.  recent surgery, hypoxia,  hypotension,  tachycardia    TECHNIQUE:  Initial pre-contrast  and localizer images were  obtained.  Contrast enhanced helical thoracic CT angiography was then  performed.  Routine transaxial and post-processed (including 3D MIP)  reformations were obtained. This CT exam was performed using one or  more the following dose reduction techniques: automated exposure  control, adjustment of the mA and/or kV according to patient size,  and/or iterative reconstruction technique.    COMPARISON:  12/6/2019    MEDS/CONTRAST: 79 ml Isovue 370    CTA FINDINGS:  There is a saddle pulmonary embolus with numerous  segmental and subsegmental branch emboli.    OTHER FINDINGS:      There is evidence of right heart strain with an abnormal RV:LV ratio  (appx 1.5). Cardiac size remains normal. There is no pericardial  effusion. No abnormal thoracic adenopathy is identified.    Limited views of the upper abdomen reveal no adrenal mass or acute  process.     The pleura is without thickening or effusions. The central airways are  unremarkable. Mild dependent atelectasis is seen. No focal  consolidation or significant infarct volume is identified.    No suspicious osseous lesion is identified.  Impression: IMPRESSION:    Saddle pulmonary embolus with right heart strain.    [Critical Result: Pulmonary embolism]    Finding was identified on 7/7/2025 12:39 PM.     Dr. Cross was contacted by me on 7/7/2025 12:40 PM and verbalized  understanding of the critical result.      CHOCO GALARZA MD         SYSTEM ID:  [id number]  XR Chest Port 1 View  Narrative: PROCEDURE:  XR CHEST PORT 1 VIEW    HISTORY:  hypoxia, recent back surgery.     COMPARISON:  12/6/2019    FINDINGS:   The cardiac silhouette is normal in size. The pulmonary vasculature is  normal.  There is elevation of the right hemidiaphragm. The lungs are  clear. No pleural effusion or pneumothorax. There are postoperative  changes in the thoracic spine.  Impression: IMPRESSION:  No acute  cardiopulmonary disease.      JOANN THORNTON MD         SYSTEM ID:  [id number]     Recent Results (from the past 24 hours)   XR Chest Port 1 View    Narrative    PROCEDURE:  XR CHEST PORT 1 VIEW    HISTORY:  hypoxia, recent back surgery.     COMPARISON:  12/6/2019    FINDINGS:   The cardiac silhouette is normal in size. The pulmonary vasculature is  normal.  There is elevation of the right hemidiaphragm. The lungs are  clear. No pleural effusion or pneumothorax. There are postoperative  changes in the thoracic spine.      Impression    IMPRESSION:  No acute cardiopulmonary disease.      JOANN THORNTON MD         SYSTEM ID:  [id number]   CT Chest Pulmonary Embolism w Contrast   Result Value    Radiologist flags Pulmonary embolism (AA)    Narrative    PROCEDURE:  CT CHEST PULMONARY EMBOLISM W CONTRAST.    HISTORY:  Evaluate for pulmonary embolism.  recent surgery, hypoxia,  hypotension, tachycardia    TECHNIQUE:  Initial pre-contrast  and localizer images were  obtained.  Contrast enhanced helical thoracic CT angiography was then  performed.  Routine transaxial and post-processed (including 3D MIP)  reformations were obtained. This CT exam was performed using one or  more the following dose reduction techniques: automated exposure  control, adjustment of the mA and/or kV according to patient size,  and/or iterative reconstruction technique.    COMPARISON:  12/6/2019    MEDS/CONTRAST: 79 ml Isovue 370    CTA FINDINGS:  There is a saddle pulmonary embolus with numerous  segmental and subsegmental branch emboli.    OTHER FINDINGS:      There is evidence of right heart strain with an abnormal RV:LV ratio  (appx 1.5). Cardiac size remains normal. There is no pericardial  effusion. No abnormal thoracic adenopathy is identified.    Limited views of the upper abdomen reveal no adrenal mass or acute  process.     The pleura is without thickening or effusions. The central airways are  unremarkable. Mild dependent  atelectasis is seen. No focal  consolidation or significant infarct volume is identified.    No suspicious osseous lesion is identified.      Impression    IMPRESSION:    Saddle pulmonary embolus with right heart strain.    [Critical Result: Pulmonary embolism]    Finding was identified on 2025 12:39 PM.     Dr. Santoyo was contacted by me on 2025 12:40 PM and verbalized  understanding of the critical result.        CHOCO GALARZA MD         SYSTEM ID:  [id number]   Echocardiogram Complete   Result Value    LVEF  70%    Narrative    [id number]  [id number]  [id number]  [id number]^NATANAEL^DOROTHEA^SERAFIN     [facility]  [address] [place] [address]     Name: JOHNNY LEWIS  MRN: [id number]  : 1955  Study Date: 2025 12:03 PM  Age: 70 yrs  Gender: Male  Patient Location: [facility]  Reason For Study: Dyspnea, Dyspnea  Ordering Physician: DOROTHEA SANTOYO  Performed By: JESSICA Kc, RDCS, RVT     BSA: 2.0 m2  Height: 69 in  Weight: 180 lb  HR: 99  BP: 89/64 mmHg  ______________________________________________________________________________  Procedure  Echocardiogram with two-dimensional, color and spectral Doppler.  ______________________________________________________________________________  Interpretation Summary  Global and regional left ventricular function is hyperkinetic with an EF >70%.  Mild RV dilation with akinetic RV free wall and preserved RV apical  caontraction,typical for hemodynamically significant PE.  The inferior vena cava is normal.  No pericardial effusion is present.  ______________________________________________________________________________  Left Ventricle  Global and regional left ventricular function is hyperkinetic with an EF >70%.  Left ventricular wall thickness is normal. Left ventricular size is normal.  Grade I or early diastolic dysfunction. No regional wall motion abnormalities  are seen.     Right Ventricle  Mild RV dilation with akinetic  RV free wall and preserved RV apical  caontraction,typical for hemodynamically significant PE.     Atria  Both atria appear normal.     Mitral Valve  The mitral valve is normal.     Aortic Valve  Aortic valve is normal in structure and function.     Tricuspid Valve  The tricuspid valve is normal. Pulmonary artery systolic pressure cannot be  assessed.     Pulmonic Valve  The valve leaflets are not well visualized. On Doppler interrogation, there is  no significant stenosis or regurgitation.     Vessels  The thoracic aorta is normal. The pulmonary artery cannot be assessed. The  inferior vena cava is normal.     Pericardium  No pericardial effusion is present.     ______________________________________________________________________________  MMode/2D Measurements & Calculations  IVSd: 0.96 cm  LVIDd: 3.4 cm  LVIDs: 2.3 cm  LVPWd: 0.90 cm  FS: 30.1 %     LV mass(C)d: 87.6 grams  LV mass(C)dI: 44.3 grams/m2  Ao root diam: 3.4 cm  asc Aorta Diam: 3.4 cm  LVOT diam: 2.2 cm  LVOT area: 3.8 cm2  Ao root diam index Ht(cm/m): 2.0  Ao root diam index BSA (cm/m2): 1.7  Asc Ao diam index BSA (cm/m2): 1.7  Asc Ao diam index Ht(cm/m): 2.0  LA Volume (BP): 23.5 ml  LA Volume Index (BP): 11.9 ml/m2     RWT: 0.54     Doppler Measurements & Calculations  MV E max isai: 59.7 cm/sec  MV A max isai: 107.0 cm/sec  MV E/A: 0.56  MV dec time: 0.15 sec  Ao V2 max: 181.0 cm/sec  Ao max P.0 mmHg  Ao V2 mean: 124.0 cm/sec  Ao mean P.0 mmHg  Ao V2 VTI: 29.0 cm  ALEXANDER(I,D): 3.3 cm2  ALEXANDER(V,D): 3.3 cm2  LV V1 max P.7 mmHg  LV V1 max: 156.0 cm/sec  LV V1 VTI: 25.2 cm  SV(LVOT): 96.4 ml  SI(LVOT): 48.8 ml/m2  TR max isai: 142.5 cm/sec  TR max P.1 mmHg  AV Isai Ratio (DI): 0.86  ALEXANDER Index (cm2/m2): 1.7  E/E' av.6  Lateral E/e': 7.2  Medial E/e': 10.0     ______________________________________________________________________________  Report approved by: BERLIN Borjas MD on 2025 01:46 PM          Lower Extremity Venous Duplex  Right    Narrative    Procedure: US LOWER EXTREMITY VENOUS DUPLEX RIGHT    HISTORY:  saddle embolism    TECHNIQUE: Grayscale, color Doppler and compression views of the deep  venous structures of the right lower extremity.    COMPARISON: None.    FINDINGS:    Large mobile thrombus was seen moving centrally in the proximal right  femoral vein. The ER physician was immediately notified and the  procedure was terminated.       Impression    IMPRESSION:     Positive DVT study.      CHOCO GALARZA MD         SYSTEM ID:  [id number]

## 2025-07-08 NOTE — PROGRESS NOTES
[facility]    ICU Accept Note - Hospitalist Service, GOLD TEAM        Date of Admission:  7/7/2025    Assessment & Plan   Jamir Gill is a 70 year old male admitted on 7/7/2025 who is being transferred out of the ICU on 7/8/2025. He has a history of HTN, BPH, MS c/b neurogenic bladder, recent T10-T12 revision of decompression and fusion. Originally underwent T10-T12 fusion revision, discharged home, but then presented to [facility] on 7/7/25 following syncopal episode. He was found to have saddle PE w/ numerous segmental & subsegmental PE, a R femoral DVT and ultimately transferred and admitted to [facility] Cardiac ICU, underwent saddle thrombectomy 7/8/25. He remained hemodynamically stably in the post-operative setting and was ultimately transferred to the Medicine service 7/8/25.     Saddle Pulmonary Embolus (S/P saddle thrombectomy 7/8/25)  Segmental and Subsegmental Pulmonary Emboli  Acute Hypoxic Respiratory Failure 2/2 PE, improving  Right Femoral DVT  Initially underwent procedure as below on 7/1/25. Discharged home where he unfortunately had a syncopal episode prompting ED visit at [facility] (near his home). While there, workup revealing for BNP 1,031, Trop 122-->166, D-dimer >20. CT PE showed saddle PE w/ numerous segmental & subsegmental branch emboli, as well as e/o R heart strain. BLE US showed large mobile thrombus in proximal R femoral vein. ECHO showed hyperkinetic LV w/ EF >70%, mild RV dilation. Did have some mild hypotension at OSH which improved w/ IVF. Started on Heparin drip and transferred to [facility] cardiac ICU. He was not a tPA candidate given recent spinal surgery, so he underwent saddle thrombectomy w/ IR on 7/8/25 without complications. Did not require pressors. Following procedure remained HDS, and was ultimately transferred to Medicine service 7/8/25. Upon transfer to our service, he is satting WNL on RA, and has no chest pain or  dyspnea.  - Continue Heparin drip for now  - Prior to discharge will need to discuss long-term anticoagulation plan w/ surgical team  - Continuous O2 monitoring for now  - Titrate O2 to maintain sats >90%  - Will remain on tele overnight  - Transfer out of ICU pending    Multiple Sclerosis c/b Thoracic Spondylosis & Myelopathy (S/P T10-T12 decompression & fusion 7/1/25)  Underwent the above procedure w/ stealth and spinal cord monitoring on 7/1/25 at [facility]. Discharged home following that procedure on 7/4/25.   - Continue PTA Baclofen   - PT/OT consulted  - Tylenol PRN for pain    Acute Anemia in setting of Acute Illness  Mild Thrombocytopenia   Baseline hgb ~14, but since admission has been hovering ~12. Suspect r/t acute illness. No signs of bleeding currently, on heparin drip as above. Iron studies mixed (low iron and IBC, WNL ferritin), not c/w BONNIE. Plts intermittent low ~120s-140s.  - Trend daily CBC for now  - Monitor closely for s/sx of active bleeding    Constipation  Pt reports frequent constipation lately in setting of surgery, increased immobility and being hospitalized. This is reportedly typical for him. Last BM was following an enema on 7/3/25. He feels nauseous and does not believe that he will void this evening without an enema.  - Give enema now  - Continue Miralax daily  - Continue Senokot 1-2 tablets BID as ordered  - If does not produce BM, will consider getting AXR to assess degree of residual stool burden  - Encourage PO intake (especially fluids) and activity as tolerated  - Avoid addition of anticholinergic medications as much as possible    Neurogenic Bladder 2/2 MS  BPH  Neurogenic bladder ISO MS, c/b BPH for which he straight caths PRN at home. PTA Oxybutynin  - Continue straight cath PRN  - Continue PTA Oxybutynin     HTN  PTA Lisinopril.   - Hold PTA Lisinopril for now pending BP trends        ICU Transfer Checklist    YES NO Links/Additional comments   Current meds & orders reviewed  "& updated? [x] [] Transfer Navigator   O2 requirements appropriate for accepting floor? [x] [] O2 Device: None (Room air)   Oxygen Delivery: 2 LPM    N/A   Appropriate fluids ordered? [x] []    Appropriate diet ordered? [x] [] Regular Diet Adult   Telemetry indicated/ordered?    [x] [] Cardiac Monitoring: ACTIVE order. Indication: Syncope- high cardiac risk (48 hours)     Appropriate DVT prophy ordered? [x] [] Anticoag/VTE   N/A   N/A  PT/OT indicated/ordered? [x] [] D/C Planner  [facility]   Code status reviewed? [x] [] Full Code   Preferred contact on file? [x] []      Clinically Significant Risk Factors Present on Admission             # Hypomagnesemia: Lowest Mg = 1.6 mg/dL in last 2 days, will replace as needed   # Hypoalbuminemia: Lowest albumin = 3 g/dL at 7/8/2025  3:37 AM, will monitor as appropriate  # Coagulation Defect: INR = 1.24 (Ref range: 0.85 - 1.15) and/or PTT = >240 Seconds (Ref range: 22 - 38 Seconds), will monitor for bleeding  # Drug Induced Platelet Defect: home medication list includes an antiplatelet medication   # Hypertension: Noted on problem list           # Overweight: Estimated body mass index is 26.76 kg/m  as calculated from the following:    Height as of this encounter: 1.753 m (5' 9\").    Weight as of this encounter: 82.2 kg (181 lb 3.5 oz).              Disposition Plan     Medically Ready for Discharge: Anticipated in 2-4 Days         The patient's care was discussed with the Bedside Nurse, Patient, and Patient's Family.    Abdoulaye Rand PA-C  Hospitalist Service, [facility]  Securely message with [employer] (more info)  Text page via [facility] Paging/Directory   See signed in provider for up to date coverage information  ______________________________________________________________________    Interval History   Pt seen in his room this afternoon with wife present at bedside. He reports feeling constipated and notes that " "his last BM was last Thursday (7/3/25) following an enema. He recalls that he typically gets \"very constipated\" after surgery or while hospitalized. Reports smoldering nausea d/t his perceived constipation, but denies vomiting. Reports generalized abdominal discomfort with the constipation. Additionally, he requests that his RN straight cath him as he feels that his bladder is full.    At the moment, Mr Bowie otherwise denies headaches, vision changes, dizziness, lightheadedness, chest pain, dyspnea, changes in chronic BLE paresthesias, or worsening back pain.    Physical Exam   Vital Signs: Temp: 98  F (36.7  C) Temp src: Oral BP: 93/58 Pulse: 83   Resp: 12 SpO2: 93 % O2 Device: None (Room air) Oxygen Delivery: 2 LPM  Weight: 181 lbs 3.49 oz    Constitutional: awake, alert, cooperative, no apparent distress, and appears stated age  Eyes: lids and lashes normal, sclera clear, and conjunctiva normal  ENT: normocephalic, without obvious abnormality  Respiratory: No increased work of breathing, good air exchange, clear to auscultation bilaterally, no crackles or wheezing  Cardiovascular: regular rate and rhythm, normal S1 and S2, no S3, no S4, and no murmur noted  GI: normal bowel sounds, soft, non-distended, and diffuse tenderness.  Skin: no bruising or bleeding, no redness, warmth, or swelling, no rashes, and no lesions on visualized skin.   Musculoskeletal: no lower extremity pitting edema present, no deformities.  Neurologic: Decreased motor activity in BLEs (per pt this is baseline). Maintains spontaneous movement of his BUEs.   Neuropsychiatric: General: normal, calm, and normal eye contact  Level of consciousness: alert / normal  Affect: normal and pleasant  Memory and insight: normal, memory for past and recent events intact, and thought process normal    Medical Decision Making       75 MINUTES SPENT BY ME on the date of service doing chart review, history, exam, documentation & further activities per the note. "      Data   Unresulted Labs Ordered in the Past 30 Days of this Admission       Date and Time Order Name Status Description    7/7/2025 11:22 AM Blood Culture Peripheral blood (BC) Arm, Left Preliminary     7/7/2025 11:22 AM Blood Culture Peripheral blood (BC) Arm, Right Preliminary             I have personally reviewed the following data over the past 24 hrs:    8.4  \   11.8 (L)   / 141 (L)     136 105 22.9 /  83   4.3 23 0.97 \     ALT: 24 AST: 32 AP: 92 TBILI: 0.4   ALB: 3.0 (L) TOT PROTEIN: 5.6 (L) LIPASE: N/A     Trop: 167 (HH) BNP: N/A     TSH: N/A T4: N/A A1C: 5.4     Procal: N/A CRP: N/A Lactic Acid: 0.9       INR:  1.24 (H) PTT:  >240 (HH)   D-dimer:  N/A Fibrinogen:  N/A     Ferritin:  217 % Retic:  N/A LDH:  N/A       Imaging results reviewed over the past 24 hrs:   No results found for this or any previous visit (from the past 24 hours).  Recent Labs   Lab 07/08/25  1457 07/08/25  1407 07/08/25  1136 07/08/25  0339 07/08/25  0337 07/07/25  2332 07/07/25  2137 07/07/25  1318 07/07/25  1031   WBC  --   --   --   --  8.4  --  9.1 10.8 10.4   HGB  --   --   --   --  11.8*  --  12.2* 11.9* 12.3*   MCV  --   --   --   --  88  --  88 89 88   PLT  --   --   --   --  141*  --  165 125* 140*   INR  --   --   --   --   --   --  1.24*  --   --    NA  --   --   --   --  136  --  139  --  137   POTASSIUM  --   --   --   --  4.3  --  4.8  --  4.6   CHLORIDE  --   --   --   --  105  --  103  --  103   CO2  --   --   --   --  23  --  25  --  24   BUN  --   --   --   --  22.9  --  24.5*  --  29.3*   CR  --   --   --   --  0.97  --  1.05  --  1.04   ANIONGAP  --   --   --   --  8  --  11  --  10   ОЛЬГА  --   --   --   --  9.4  --  9.6  --  9.3   GLC 83 78 97   < > 114*   < > 89  --  126*   ALBUMIN  --   --   --   --  3.0*  --   --   --  3.4*   PROTTOTAL  --   --   --   --  5.6*  --   --   --  5.9*   BILITOTAL  --   --   --   --  0.4  --   --   --  0.5   ALKPHOS  --   --   --   --  92  --   --   --  103   ALT  --   --   --    --  24  --   --   --  29   AST  --   --   --   --  32  --   --   --  29    < > = values in this interval not displayed.

## 2025-07-08 NOTE — CONSULTS
Discharge Pharmacy Test Claim    Patient has pharmacy benefits through [facility] Medicare Part D plan. Patient has an unmet drug deductible with $505.67 remaining. Eliquis and xarelto are covered and will meet the drug deductible with the first fill. Expected copays before and after the drug deductible is met listed below. [facility] pharmacy has one-time use 30-day free trial vouchers available for eliquis or xarelto.    Test Claim Initial Copay Copay after Drug Deductible is Met   eliquis 527.31 148.06   xarelto 525.30 146.05   warfarin 5.44 5.44   savaysa not covered      Antoinette Schmid  [facility] Pharmacy Liaison, [place]  Ph: [phone]  Fax: [phone]  Available on Teams and Vocera  Disclaimer: Pharmacy test claims are estimates and may not reflect final costs. Suggested alternatives aim to be cost-effective and may not be therapeutically equivalent. This consult is informational and does not constitute medical advice. Clinical decisions should be made by qualified healthcare providers.

## 2025-07-08 NOTE — PLAN OF CARE
Goal Outcome Evaluation:         ICU End of Shift Summary. See flowsheets for vital signs and detailed assessment.    Changes this shift: A&Ox4. VSS on 2L via NC. MAP goal >65. LR bolus given for soft bp. X1 oxy given for surgical back pain. Straight cath output of 350. X2 attempts to place arterial line. Wife at bedside. NPO since midnight.    Plan:  Pulmonary angiogram in IR 7/8                    Yes

## 2025-07-08 NOTE — PROGRESS NOTES
Admitted/transferred from: [facility]  Reason for admission/transfer: Saddle PE  Patient status upon admission/transfer: Hemodynamically stable  Plan: IR 7/8  2 RN skin assessment: completed by Mirlande BETANCOURT and Sarai HOUSTON  Sexual Orientation and Gender Identity (SOGI) smartfom completed: Not Done  Result of skin assessment and interventions/actions: Spinal incision from fusion 7/1 present and WDL  Height, weight, drug calc weight: Done  Patient belongings (see Flowsheet - Adult Profile for details): Remain with patient (glasses and cellphone)  MDRO education (if applicable):  N/A

## 2025-07-08 NOTE — IR NOTE
Patient Name: Jamir Gill  Medical Record Number: [id number]  Today's Date: 7/8/2025    Procedure: PE Thrombectomy  Proceduralist: Dr. Heredia  Pathology present: n/a    Procedure Start: 1010  Procedure end: 1110  Sedation medications administered: Midazolam 3 mg, Fentanyl 150 mcg    Other medications: n/a     Interventional Access: 17 fr sheath  Right great  saphenous vein  Manual pressure held for 5 mins  1 hrs flat bedrest until 1210    EBL: 125 mL    Pt endorsed that he needed to be cathed. Attempted to straight cath pt with  14 fr straight tip  catheter, but unsuccessful, writer then tried 12  fr coude which pt usually uses at home, and also unsuccessful. Pt also tried himself but unsuccessful. Of note, pt did have small amount of blood present in urethra with straight cath attempt. Stopped attempting,  will pass along to bedside nurse that we were unsuccessful and to  try again when he returns to .       Report given to: Danelle on 4C  : n/a    Other Notes: Pt arrived to IR room 1 from . Consent reviewed. Pt denies any questions or concerns regarding procedure. Pt positioned supine and monitored per protocol. Pt tolerated procedure without any noted complications. Pt transferred back to .

## 2025-07-08 NOTE — PLAN OF CARE
ICU End of Shift Summary. See flowsheets for vital signs and detailed assessment.    Changes this shift: A/Ox4. on room air. Dewnies pain at rest.  ml bolus given. SR HR 80-90. Straight cathed x2 for 1300 ml. Fleets enema given, Large loose stool out. Up to bedside commode with assist x2 ,walker, gb. BG 78-97. Continues on  D10@ 50ml/hr. Minimal appetite. Denies nausea. Back incision with sutures, intact open to air. Heparin gtt infusing. Wife at bedside.     Plan: ECHO in am, transfer to floor       Goal Outcome Evaluation:      Plan of Care Reviewed With: patient    Overall Patient Progress: improvingOverall Patient Progress: improving    Outcome Evaluation: VSS on room air           no

## 2025-07-09 ENCOUNTER — APPOINTMENT (OUTPATIENT)
Dept: CARDIOLOGY | Facility: CLINIC | Age: 70
DRG: 163 | End: 2025-07-09
Attending: STUDENT IN AN ORGANIZED HEALTH CARE EDUCATION/TRAINING PROGRAM
Payer: MEDICARE

## 2025-07-09 ENCOUNTER — APPOINTMENT (OUTPATIENT)
Dept: PHYSICAL THERAPY | Facility: CLINIC | Age: 70
DRG: 163 | End: 2025-07-09
Attending: STUDENT IN AN ORGANIZED HEALTH CARE EDUCATION/TRAINING PROGRAM
Payer: MEDICARE

## 2025-07-09 ENCOUNTER — APPOINTMENT (OUTPATIENT)
Dept: OCCUPATIONAL THERAPY | Facility: CLINIC | Age: 70
DRG: 163 | End: 2025-07-09
Attending: STUDENT IN AN ORGANIZED HEALTH CARE EDUCATION/TRAINING PROGRAM
Payer: MEDICARE

## 2025-07-09 VITALS
RESPIRATION RATE: 14 BRPM | DIASTOLIC BLOOD PRESSURE: 76 MMHG | HEIGHT: 69 IN | TEMPERATURE: 97.8 F | WEIGHT: 182.54 LBS | HEART RATE: 100 BPM | OXYGEN SATURATION: 100 % | SYSTOLIC BLOOD PRESSURE: 128 MMHG | BODY MASS INDEX: 27.04 KG/M2

## 2025-07-09 LAB
ALBUMIN SERPL BCG-MCNC: 3.1 G/DL (ref 3.5–5.2)
ALP SERPL-CCNC: 94 U/L (ref 40–150)
ALT SERPL W P-5'-P-CCNC: 33 U/L (ref 0–70)
ANION GAP SERPL CALCULATED.3IONS-SCNC: 8 MMOL/L (ref 7–15)
AST SERPL W P-5'-P-CCNC: 40 U/L (ref 0–45)
BILIRUB SERPL-MCNC: 0.3 MG/DL
BUN SERPL-MCNC: 19.8 MG/DL (ref 8–23)
CALCIUM SERPL-MCNC: 9.3 MG/DL (ref 8.8–10.4)
CHLORIDE SERPL-SCNC: 101 MMOL/L (ref 98–107)
CREAT SERPL-MCNC: 0.95 MG/DL (ref 0.67–1.17)
EGFRCR SERPLBLD CKD-EPI 2021: 86 ML/MIN/1.73M2
ERYTHROCYTE [DISTWIDTH] IN BLOOD BY AUTOMATED COUNT: 12.8 % (ref 10–15)
GLUCOSE BLDC GLUCOMTR-MCNC: 109 MG/DL (ref 70–99)
GLUCOSE BLDC GLUCOMTR-MCNC: 88 MG/DL (ref 70–99)
GLUCOSE SERPL-MCNC: 107 MG/DL (ref 70–99)
HCO3 SERPL-SCNC: 28 MMOL/L (ref 22–29)
HCT VFR BLD AUTO: 32.6 % (ref 40–53)
HGB BLD-MCNC: 10.8 G/DL (ref 13.3–17.7)
LVEF ECHO: NORMAL
MCH RBC QN AUTO: 29.3 PG (ref 26.5–33)
MCHC RBC AUTO-ENTMCNC: 33.1 G/DL (ref 31.5–36.5)
MCV RBC AUTO: 88 FL (ref 78–100)
PLATELET # BLD AUTO: 134 10E3/UL (ref 150–450)
POTASSIUM SERPL-SCNC: 4.2 MMOL/L (ref 3.4–5.3)
PROT SERPL-MCNC: 5.2 G/DL (ref 6.4–8.3)
RBC # BLD AUTO: 3.69 10E6/UL (ref 4.4–5.9)
SODIUM SERPL-SCNC: 137 MMOL/L (ref 135–145)
UFH PPP CHRO-ACNC: 0.45 IU/ML (ref ?–1.1)
WBC # BLD AUTO: 7 10E3/UL (ref 4–11)

## 2025-07-09 PROCEDURE — 97530 THERAPEUTIC ACTIVITIES: CPT | Mod: GO

## 2025-07-09 PROCEDURE — 84155 ASSAY OF PROTEIN SERUM: CPT | Performed by: STUDENT IN AN ORGANIZED HEALTH CARE EDUCATION/TRAINING PROGRAM

## 2025-07-09 PROCEDURE — 97165 OT EVAL LOW COMPLEX 30 MIN: CPT | Mod: GO

## 2025-07-09 PROCEDURE — 85520 HEPARIN ASSAY: CPT

## 2025-07-09 PROCEDURE — 85027 COMPLETE CBC AUTOMATED: CPT | Performed by: STUDENT IN AN ORGANIZED HEALTH CARE EDUCATION/TRAINING PROGRAM

## 2025-07-09 PROCEDURE — 97116 GAIT TRAINING THERAPY: CPT | Mod: GP

## 2025-07-09 PROCEDURE — 97530 THERAPEUTIC ACTIVITIES: CPT | Mod: GP

## 2025-07-09 PROCEDURE — 250N000013 HC RX MED GY IP 250 OP 250 PS 637: Performed by: STUDENT IN AN ORGANIZED HEALTH CARE EDUCATION/TRAINING PROGRAM

## 2025-07-09 PROCEDURE — 93308 TTE F-UP OR LMTD: CPT | Mod: 26 | Performed by: INTERNAL MEDICINE

## 2025-07-09 PROCEDURE — 93325 DOPPLER ECHO COLOR FLOW MAPG: CPT

## 2025-07-09 PROCEDURE — 250N000013 HC RX MED GY IP 250 OP 250 PS 637: Performed by: PHYSICIAN ASSISTANT

## 2025-07-09 PROCEDURE — 99207 PR APP CREDIT; MD BILLING SHARED VISIT: CPT | Mod: FS | Performed by: STUDENT IN AN ORGANIZED HEALTH CARE EDUCATION/TRAINING PROGRAM

## 2025-07-09 PROCEDURE — 97161 PT EVAL LOW COMPLEX 20 MIN: CPT | Mod: GP

## 2025-07-09 PROCEDURE — 99239 HOSP IP/OBS DSCHRG MGMT >30: CPT | Mod: FS | Performed by: PHYSICIAN ASSISTANT

## 2025-07-09 PROCEDURE — 93321 DOPPLER ECHO F-UP/LMTD STD: CPT | Mod: 26 | Performed by: INTERNAL MEDICINE

## 2025-07-09 PROCEDURE — 93325 DOPPLER ECHO COLOR FLOW MAPG: CPT | Mod: 26 | Performed by: INTERNAL MEDICINE

## 2025-07-09 RX ADMIN — OXYBUTYNIN CHLORIDE 10 MG: 10 TABLET, EXTENDED RELEASE ORAL at 09:26

## 2025-07-09 RX ADMIN — BACLOFEN 10 MG: 10 TABLET ORAL at 09:26

## 2025-07-09 RX ADMIN — APIXABAN 10 MG: 5 TABLET, FILM COATED ORAL at 10:14

## 2025-07-09 RX ADMIN — CALCIUM CARBONATE 600 MG (1,500 MG)-VITAMIN D3 400 UNIT TABLET 1 TABLET: at 09:26

## 2025-07-09 RX ADMIN — SENNOSIDES AND DOCUSATE SODIUM 1 TABLET: 50; 8.6 TABLET ORAL at 09:25

## 2025-07-09 ASSESSMENT — ACTIVITIES OF DAILY LIVING (ADL)
ADLS_ACUITY_SCORE: 43
ADLS_ACUITY_SCORE: 49
ADLS_ACUITY_SCORE: 49
ADLS_ACUITY_SCORE: 47
PREVIOUS_RESPONSIBILITIES: MEAL PREP;HOUSEKEEPING;LAUNDRY;SHOPPING;MEDICATION MANAGEMENT;DRIVING
ADLS_ACUITY_SCORE: 49
ADLS_ACUITY_SCORE: 49
ADLS_ACUITY_SCORE: 43
ADLS_ACUITY_SCORE: 49
ADLS_ACUITY_SCORE: 43

## 2025-07-09 NOTE — PROGRESS NOTES
"      [facility] Inpatient Progress Note  Interventional Radiology  07/09/25   9:47 AM      Assesment/Plan:    - Status post successful PE thrombectomy.  - Transition to anticoagulation per hematology recommendation.  - Follow up with cardiology.  - From IR standpoint, patient is stable and appropriate for discharge.      History of Present Illness:  Patient with history of MS, HTN, BPH, and neurogenic bladder  was admitted following a syncopal event. Imaging revealed saddle pulmonary embolism, segmental PE, and right femoral DVT with elevated troponin. Notably, patient is s/p T10-T12 spinal fusion on 7/1/25. Underwent successful IR thrombectomy for PE on 7/9/2025.      Objective   - No recent up to date imaging.     Vitals:   /74 (BP Location: Right arm)   Pulse 83   Temp 98.1  F (36.7  C) (Oral)   Resp 14   Ht 1.753 m (5' 9\")   Wt 82.8 kg (182 lb 8.7 oz)   SpO2 100%   BMI 26.96 kg/m      Exam:  Respirations normal; no respiratory distress  Alert and oriented ×3, normal mentation  Heart rate and rhythm regular  Right groin access site clean, no oozing or hematoma  Clear tegaderm dressing in place; no closure device used    Pertinent Labs Reviewed:  CBC:  Lab Results   Component Value Date    WBC 7.0 07/09/2025    WBC 8.4 07/08/2025    WBC 9.1 07/07/2025    WBC 5.6 07/03/2020    WBC 5.8 11/29/2018    WBC 9.2 04/04/2018     Lab Results   Component Value Date    HGB 10.8 07/09/2025    HGB 11.8 07/08/2025    HGB 12.2 07/07/2025    HGB 13.8 07/03/2020    HGB 15.4 11/29/2018    HGB 11.3 04/04/2018     Lab Results   Component Value Date     07/09/2025     07/08/2025     07/07/2025     07/03/2020     11/29/2018     04/04/2018    INR:  Lab Results   Component Value Date    INR 1.24 (H) 07/07/2025    PTT >240 (HH) 07/07/2025          COVID Results:  COVID-19 Antibody Results, Testing for Immunity           No data to display              COVID-19 PCR Results "           No data to display             Potassium   Date Value Ref Range Status   07/09/2025 4.2 3.4 - 5.3 mmol/L Final   09/06/2022 3.8 3.4 - 5.3 mmol/L Final   04/15/2021 4.3 3.5 - 5.1 mmol/L Final          Dayton Wright MD  Interventional Radiology  Pager: [phone]

## 2025-07-09 NOTE — PROGRESS NOTES
07/09/25 1200   Appointment Info   Signing Clinician's Name / Credentials (PT) Coreen Rachel, PT   Living Environment   People in Home spouse   Current Living Arrangements house   Home Accessibility not wheelchair accessible   Transportation Anticipated family or friend will provide   Living Environment Comments Pt lives with spouse in one level house. Ramps to enter. Has walk-in shower, shower chair, no grab bars. Has reacher. Has 4WW and access to wheelchair if needed   Self-Care   Usual Activity Tolerance good   Current Activity Tolerance moderate   Regular Exercise Yes   Activity/Exercise Type   (OP PT)   Exercise Amount/Frequency 3-5 times/wk   Equipment Currently Used at Home walker, rolling  (4WW)   Fall history within last six months yes   Number of times patient has fallen within last six months 1   Activity/Exercise/Self-Care Comment IND in ADLs prior to admission, completing OP PT 5x/week.   General Information   Onset of Illness/Injury or Date of Surgery 07/07/25  (has spinal surgery T10-12 fusion 7/1)   Referring Physician Mary Blankenship MD   Patient/Family Therapy Goals Statement (PT) to go home   Pertinent History of Current Problem (include personal factors and/or comorbidities that impact the POC) history of MS, HTN, BPH, and neurogenic bladder  was admitted following a syncopal event. Imaging revealed saddle pulmonary embolism, segmental PE, and right femoral DVT with elevated troponin. Notably, patient is s/p T10-T12 spinal fusion on 7/1/25. Underwent successful IR thrombectomy for PE on 7/9/2025   Existing Precautions/Restrictions fall;spinal   Cognition   Affect/Mental Status (Cognition) WFL   Orientation Status (Cognition) oriented x 3   Follows Commands (Cognition) follows one-step commands;over 90% accuracy   Posture    Posture Forward head position;Protracted shoulders;Kyphosis   Range of Motion (ROM)   ROM Comment spinal ROM limited, LE stiff with limited ROM due to tone   Strength  (Manual Muscle Testing)   Strength (Manual Muscle Testing) Deficits observed during functional mobility   Bed Mobility   Comment, (Bed Mobility) sit > supine SBA   Transfers   Comment, (Transfers) Sit > stand 4WW, SBA   Gait/Stairs (Locomotion)   Comment, (Gait/Stairs) Amb x 10' with 4WW, CGA   Balance   Balance Comments impaired dynamic and static standing balance, good seated balance   Clinical Impression   Criteria for Skilled Therapeutic Intervention Yes, treatment indicated   PT Diagnosis (PT) impaired functional mobility   Influenced by the following impairments spinal precuations, strength, ROM, activity tolerance, tone, pain   Functional limitations due to impairments gait, transfers ,bed mobliity, functional endurance, stairs   Clinical Presentation (PT Evaluation Complexity) stable   Clinical Presentation Rationale clinical reasoning   Clinical Decision Making (Complexity) low complexity   Planned Therapy Interventions (PT) balance training;bed mobility training;gait training;neuromuscular re-education;stair training;strengthening;transfer training;progressive activity/exercise;risk factor education;home program guidelines   Risk & Benefits of therapy have been explained evaluation/treatment results reviewed;care plan/treatment goals reviewed;risks/benefits reviewed;patient   PT Total Evaluation Time   PT Eval, Low Complexity Minutes (52647) 5   Physical Therapy Goals   PT Frequency 6x/week   PT Predicted Duration/Target Date for Goal Attainment 07/16/25   PT Goals Bed Mobility;Transfers;Gait;Stairs   PT: Bed Mobility Independent;Supine to/from sit;Within precautions   PT: Transfers Independent;Sit to/from stand;Within precautions   PT: Gait Independent;Within precautions;Rolling walker;Greater than 200 feet   PT: Stairs Modified independent;1 stair   PT Discharge Planning   PT Plan Progress gait, stairs, strengthening   PT Discharge Recommendation (DC Rec) home with assist;home with outpatient physical  therapy   PT Rationale for DC Rec Pt close to baseline from prior to admission, he is below his previous functional baseline from prior to spinal surgery but appropriate to discharge home with assist and continue working with OP therapies. Has 1 level living and support of family.   PT Brief overview of current status SBA FWW   PT Total Distance Amb During Session (feet) 150

## 2025-07-09 NOTE — DISCHARGE SUMMARY
"[facility]  Hospitalist Discharge Summary      Date of Admission:  7/7/2025  Date of Discharge:  7/9/2025  Discharging Provider: Lisa Patino PA-C  Discharge Service: Hospitalist Service, GOLD TEAM 2    Discharge Diagnoses   Saddle pulmonary embolus s/p thrombectomy (7/9/25)  Bilateral segmental and subsegmental pulmonary emboli  Acute hypoxic respiratory failure  Right femoral DVT  Multiple sclerosis  Thoracic spondylosis and myelopathy s/p T10-T12 decompression and fusion (7/1/25)  Acute anemia  Constipation    Clinically Significant Risk Factors     # Overweight: Estimated body mass index is 26.96 kg/m  as calculated from the following:    Height as of this encounter: 1.753 m (5' 9\").    Weight as of this encounter: 82.8 kg (182 lb 8.7 oz).       Follow-ups Needed After Discharge    - Follow-up with primary care provider in 1 week        Unresulted Labs Ordered in the Past 30 Days of this Admission       Date and Time Order Name Status Description    7/7/2025 11:22 AM Blood Culture Peripheral blood (BC) Arm, Left Preliminary     7/7/2025 11:22 AM Blood Culture Peripheral blood (BC) Arm, Right Preliminary         These results will be followed up by hospitalist pool    Discharge Disposition   Discharged to home  Condition at discharge: Stable    Hospital Course   Jamir Gill is a 70 year old man with a history of HTN, BPH, multiple sclerosis, neurogenic bladder, recent T10-12 decompression and fusion (7/1/25) who presented to [facility] ED on 7/7/25 following a syncopal episode. Found to have hemodynamically significant PE with evidence of right heart strain. Transferred emergently to [facility] and underwent saddle embolus thrombectomy on 7/9/25.     Saddle pulmonary embolus s/p thrombectomy (7/9/25)  Bilateral segmental and subsegmental pulmonary emboli  Acute hypoxic respiratory failure  Right femoral DVT  Had syncopal episode at home and presented " to [facility] ED. ED work-up revealed BNP of 1031, troponin 166, D dimer >20, hypotensive. CT PE with saddle pulmonary embolus, numerous segmental and subsegmental branch emboli, and evidence of right heart strain. BLE US revealed large mobile thrombus in proximal R femoral vein. TTE with hyperkinetic LV with EF >70%, mild RV dilation, akinetic RV free wall. Started on heparin gtt and transferred to [facility] for IR intervention. He underwent successful saddle embolus thrombectomy with IR on 7/9/25. Weaned to room air. Blood pressure stabilized. Repeat TTE today with normalization of LV and RV function. Clot most likely provoked by recent spinal surgery.    - Discharged on apixaban    - Hold PTA preventive ASA while on apixaban   - Follow-up with PCP in 1 week    Multiple sclerosis  Thoracic spondylosis and myelopathy s/p T10-T12 decompression and fusion (7/1/25)  Acute anticoagulation plans were discussed with surgeon, Dr. Leija. Continue routine post-operative follow-up as previously planned.     Hypertension: SBP in 110s post thrombectomy. Hold lisinopril until BP rechecked in PCP office within 1 week.    Acute anemia: In setting of acute illness. No evidence of active bleeding.     Constipation: Increased constipation post-operatively. Had bowel movement overnight after enema administration. Continue Senna, Miralax at home.    Neurogenic bladder  BPH  Continue home straight cath regimen and Oxybutynin.     Consultations This Hospital Stay   PHYSICAL THERAPY ADULT IP CONSULT  OCCUPATIONAL THERAPY ADULT IP CONSULT  PHARMACY IP CONSULT  INTERVENTIONAL RADIOLOGY ADULT/PEDS IP CONSULT  PHARMACY LIAISON FOR MEDICATION COVERAGE CONSULT  SMOKING CESSATION PROGRAM IP CONSULT    Code Status   Full Code    Time Spent on this Encounter   ILisa PA-C, personally saw the patient today and spent greater than 30 minutes discharging this patient.       Lisa Patino PA-C  [facility] UN[facility]  [address]AST  BANK  [address] [address]  Phone: [phone]  ______________________________________________________________________    Physical Exam   Vital Signs: Temp: 98.1  F (36.7  C) Temp src: Oral BP: 111/74 Pulse: 83   Resp: 14 SpO2: 100 % O2 Device: None (Room air) Oxygen Delivery: 2 LPM  Weight: 182 lbs 8.65 oz  Constitutional: Awake and alert, in no apparent distress. Sitting up comfortably in bed.   Eyes: Sclera clear, anicteric   Respiratory: Breathing non-labored. CTAB  Cardiovascular:  RRR, normal S1/S2. No rubs or murmurs. No JVD. No peripheral edema.   GI: Soft, non-tender, non-distended. Normoactive bowel sounds.   Skin:  Good color. No jaundice. No visible rashes, lesions, or bruising of concern. R groin access site c/d/I without hematoma or active bleeding.   Neurologic: Alert and fully oriented.             Primary Care Physician   Kayla Pineda    Discharge Orders      Activity    Your activity upon discharge: activity as tolerated     Reason for your hospital stay    You were admitted to the [facility] with a blood clots in your lungs (pulmonary embolism). The largest blood clot in your lungs was in the main arteries that supply blood flow to your lungs.and was causing stress on your heart. You had a thrombectomy procedure with Interventional Radiology to remove this large blood clot. Your procedure went well and today you are ready to be discharged home. Your blood clot was likely provoked due to your recent spinal surgery. You will need to take a blood thinning medication for three months and have close follow-up with your primary care provider. You should c.ontinue to improve but if you develop chest pain, shortness of breath, leg pain or swelling, excessive bleeding, or otherwise worsen please seek medical attention.     Diet    Follow this diet upon discharge: continue your regular diet     Hospital Follow-up with Existing Primary Care Provider  (PCP)            Significant Results and Procedures   Results for orders placed or performed during the hospital encounter of 07/07/25   IR Pulmonary Angiogram Bilateral    Narrative    Procedure 7/8/2025:  1. Ultrasound guidance for vascular access.  2. Diagnostic bilateral pulmonary angiogram.  3. Selective catheterization of the right main pulmonary artery with  suction pulmonary thrombectomy.   4. Selective catheterization of the left main pulmonary artery with  suction pulmonary thrombectomy.   5. Bilateral completion pulmonary arteriograms.     History: Saddle pulmonary embolism and left lower extremity deep vein  thrombosis     Comparison: CT CHEST PULMONARY EMBOLISM W CONTRAST 7/7/2025, US LOWER  EXTREMITY VENOUS DUPLEX BILATERAL  7/7/2025     Staff: Dr. Jigar Heredia    Fellow: Dr. Dayton Wright    Monitoring/Medications: Patient received moderate sedation and was  monitored by the nursing staff under the supervision of the higher  attending. Vital signs remained stable throughout the procedure.    150 mcg Fentanyl  3 mg Versed  1% lidocaine used for local analgesia.    Face to face sedation time: 60 minutes    Fluoro time: 18.0 mins    Findings/procedure:    Prior to the procedure, both verbal and written informed consent were  obtained. The patient was placed supine. The right groin was prepped  and draped in the usual sterile fashion. Preprocedure ultrasound  demonstrated a patent right great saphenous vein.    1% lidocaine was administered for local anesthesia. Under real-time  ultrasound guidance, a Ministick 4 Fr micropuncture needle was  advanced into the right great saphenous vein. Image documenting the  needle within the vein was archived. A microwire was advanced through  the needle, and the needle was exchanged for a Mini stick 4 Fr  micropuncture sheath.    The microwire and sheath were exchanged for a 180 cm angled glidewire,  which was advanced centrally across the right heart into the  main  pulmonary artery trunk.    Over the glidewire, a pigtail omniflush catheter was advanced into the  main pulmonary artery, and diagnostic pulmonary angiography was  performed, demonstrating bilateral embolic burden. Pulmonary artery  pressures were also obtained.    The glidewire was then exchanged for a 260 cm Superstiff Amplatz wire  with 1 cm tip for enhanced support.    The puncture site was serially dilated, and a Penumbra 17 Fr x 65 cm  Element sheath was advanced over the Superstiff wire into the  pulmonary artery.    Through the Penumbra sheath, a 100 cm Lightning Flash Indego  thrombectomy system was advanced into the central pulmonary arteries.  Mechanical suction thrombectomy was performed with retrieval of  visible thrombus and improved flow noted on follow-up angiography.    A repeat pigtail catheter was again advanced over the glidewire for  post-thrombectomy pressure assessment and angiography, confirming  improved central patency and resolution of most thrombus burden.    All wires, catheters, and sheaths were removed. Hemostasis was  achieved with manual compression. The patient tolerated the procedure  well without immediate complications.          Impression    IMPRESSION: Pulmonary thrombectomy performed yielding 75 mL.     PLAN:   1. Patient transferred to patient care unit.   2. Continued anticoagulation per primary team.      Echocardiogram Complete *Canceled*    Narrative    The following orders were created for panel order Echocardiogram Complete.  Procedure                               Abnormality         Status                     ---------                               -----------         ------                       Please view results for these tests on the individual orders.   Echocardiogram Complete *Canceled*    Narrative    The following orders were created for panel order Echocardiogram Complete.  Procedure                               Abnormality         Status                      ---------                               -----------         ------                       Please view results for these tests on the individual orders.       Discharge Medications      Review of your medicines        PAUSE taking these medications        Dose / Directions   aspirin 81 MG chewable tablet  Wait to take this until: October 7, 2025  Do not take aspirin while on apixaban (Eliquis)  Commonly known as: ASA      Dose: 81 mg  Take 1 tablet (81 mg) by mouth daily. Okay to resume when one week out from surgery  Refills: 0            START taking        Dose / Directions   apixaban ANTICOAGULANT 5 MG tablet  Commonly known as: ELIQUIS  Indication: DVT-PE Treatment      Start taking on: July 9, 2025  Take 2 tablets (10 mg) by mouth 2 times daily for 6 days, THEN 1 tablet (5 mg) 2 times daily.  Quantity: 190 tablet  Refills: 0            CONTINUE these medicines which have NOT CHANGED        Dose / Directions   baclofen 10 MG tablet  Commonly known as: LIORESAL  Used for: Multiple sclerosis (H)      Dose: 10 mg  Take 1 tablet (10 mg) by mouth 2 times daily. Okay to take one extra dose daily as needed for muscle spasms (up to three times daily)  Refills: 0     calcium carbonate 600 mg-vitamin D 400 units 600-400 MG-UNIT per tablet  Commonly known as: CALTRATE      Dose: 1 tablet  Take 1 tablet by mouth 2 times daily  Refills: 0     Catheters Misc  Used for: Multiple sclerosis (H), Neurogenic bladder      COLOPLAST 14FR/COUDE ITEM #814/OLIVE TIP W/GUIDEDX:N20.1/N31.9/N40.0/R330 [facility]:/#200EA/30DS **PICK-UP**  Quantity: 200 each  Refills: 12     diphenhydrAMINE 25 MG tablet  Commonly known as: BENADRYL      Dose: 50 mg  Take 50 mg by mouth nightly as needed for itching or allergies. 2 x 25mg =50mg  Refills: 0     lisinopril 10 MG tablet  Commonly known as: ZESTRIL  Used for: Essential (primary) hypertension      Dose: 10 mg  Take 1 tablet (10 mg) by mouth daily.  Quantity: 90 tablet  Refills: 4      meclizine 25 MG Chew      Dose: 25 mg  Take 25 mg by mouth as needed  Refills: 0     oxyBUTYnin ER 10 MG 24 hr tablet  Commonly known as: DITROPAN XL  Used for: Urinary urgency      Dose: 10 mg  Take 1 tablet (10 mg) by mouth every morning.  Quantity: 90 tablet  Refills: 4     senna-docusate 8.6-50 MG tablet  Commonly known as: SENOKOT-S/PERICOLACE  Used for: S/P laminectomy      Dose: 1-2 tablet  Take 1-2 tablets by mouth 2 times daily Take while on oral narcotics to prevent or treat constipation.  Quantity: 180 tablet  Refills: 3     surgical lubricant external gel  Used for: Multiple sclerosis (H), Neurogenic bladder      SURGILUBE 3GM PACKETS #[id number]/#200EA/600GMS DX:N20.1/N31.9/N40/R33 [facility]:  Quantity: 600 g  Refills: 4               Where to get your medicines        These medications were sent to [facility] [place], [place] - [address] [place] [address]      Phone: [phone]   apixaban ANTICOAGULANT 5 MG tablet       Allergies   No Known Allergies

## 2025-07-09 NOTE — PROGRESS NOTES
"   07/09/25 1039   Appointment Info   Signing Clinician's Name / Credentials (OT) Ramona Germain, OTR/L   Rehab Comments (OT) spine precautions   Living Environment   People in Home spouse   Current Living Arrangements house   Home Accessibility wheelchair accessible   Transportation Anticipated family or friend will provide   Living Environment Comments Pt lives with spouse in one level house. Ramps to enter. Has walk-in shower, shower chair, no grab bars. Has reacher.   Self-Care   Usual Activity Tolerance good   Current Activity Tolerance moderate   Regular Exercise Yes   Activity/Exercise Type other (see comments)  (OP PT)   Exercise Amount/Frequency other (see comments)  (5x/week)   Equipment Currently Used at Home walker, rolling   Fall history within last six months yes   Number of times patient has fallen within last six months 1   Activity/Exercise/Self-Care Comment IND in ADLs prior to admission, completing OP PT 5x/week.   Instrumental Activities of Daily Living (IADL)   Previous Responsibilities meal prep;housekeeping;laundry;shopping;medication management;driving   IADL Comments Spouse able to assist w/ IADLs prn, pt IND driving to/from therapy.   General Information   Onset of Illness/Injury or Date of Surgery 07/09/25   Referring Physician Mary Blankenship MD   Patient/Family Therapy Goal Statement (OT) Return home   Additional Occupational Profile Info/Pertinent History of Current Problem Per EMR, \"70 year old male admitted on 7/7/2025 who is being transferred out of the ICU on 7/8/2025. He has a history of HTN, BPH, MS c/b neurogenic bladder, recent T10-T12 revision of decompression and fusion. Originally underwent T10-T12 fusion revision, discharged home, but then presented to [place] on 7/7/25 following syncopal episode. He was found to have saddle PE w/ numerous segmental & subsegmental PE, a R femoral DVT and ultimately transferred and admitted to [facility] Cardiac ICU, underwent saddle " "thrombectomy 7/8/25. He remained hemodynamically stably in the post-operative setting and was ultimately transferred to the Medicine service 7/8/25.\"   Existing Precautions/Restrictions fall;spinal   Left Upper Extremity (Weight-bearing Status) partial weight-bearing (PWB)  (<10 lbs)   Right Upper Extremity (Weight-bearing Status) partial weight-bearing (PWB)  (<10 lbs)   General Observations and Info Activity: Adult ICU Early Mobility   Cognitive Status Examination   Orientation Status orientation to person, place and time   Visual Perception   Visual Impairment/Limitations corrective lenses full-time;WFL   Pain Assessment   Patient Currently in Pain Yes, see Vital Sign flowsheet  (back incisional)   Posture   Posture forward head position;protracted shoulders   Range of Motion Comprehensive   General Range of Motion bilateral upper extremity ROM WFL   Strength Comprehensive (MMT)   Comment, General Manual Muscle Testing (MMT) Assessment general weakness/deconditioning   Coordination   Upper Extremity Coordination No deficits were identified   Bed Mobility   Bed Mobility supine-sit   Supine-Sit [place] (Bed Mobility) minimum assist (75% patient effort)   Comment (Bed Mobility) cues for log roll   Transfers   Transfers sit-stand transfer   Transfer Comments CGA w/ FWW   Balance   Balance Assessment standing dynamic balance   Standing Balance: Dynamic contact guard   Position/Device Used, Standing Balance walker, front-wheeled   Activities of Daily Living   BADL Assessment/Intervention bathing;lower body dressing;grooming;toileting   Bathing Assessment/Intervention   [place] Level (Bathing) contact guard assist   Comment, (Bathing) per clinical judgement   Lower Body Dressing Assessment/Training   [place] Level (Lower Body Dressing) maximum assist (25% patient effort)   Comment, (Lower Body Dressing) per clinical judgement   Grooming Assessment/Training   [place] Level (Grooming) supervision;set " up   Comment, (Grooming) per clinical judgement   Toileting   [place] Level (Toileting) minimum assist (75% patient effort)   Comment, (Toileting) per clinical judgement   Clinical Impression   Criteria for Skilled Therapeutic Interventions Met (OT) Yes, treatment indicated   OT Diagnosis Dec IND in I/ADLs   OT Problem List-Impairments impacting ADL problems related to;activity tolerance impaired;balance;mobility;range of motion (ROM);pain;post-surgical precautions   Assessment of Occupational Performance 3-5 Performance Deficits   Identified Performance Deficits bathing, toileting, dressing, g/h, functional transfers and mobility   Planned Therapy Interventions (OT) ADL retraining;transfer training;bed mobility training;IADL retraining;ROM;strengthening;progressive activity/exercise;home program guidelines   Clinical Decision Making Complexity (OT) problem focused assessment/low complexity   Risk & Benefits of therapy have been explained evaluation/treatment results reviewed;care plan/treatment goals reviewed;risks/benefits reviewed;current/potential barriers reviewed;participants voiced agreement with care plan;participants included;patient;spouse/significant other   Clinical Impression Comments Pt below functional baseline. Would benefit from continued OT services to promote strength and IND in I/ADLs   OT Total Evaluation Time   OT Eval, Low Complexity Minutes (50180) 8   OT Goals   Therapy Frequency (OT) 6 times/week   OT Predicted Duration/Target Date for Goal Attainment 07/23/25   OT Goals Hygiene/Grooming;Lower Body Dressing;Lower Body Bathing;Toilet Transfer/Toileting;Aerobic Activity;Transfers   OT: Hygiene/Grooming modified independent   OT: Lower Body Dressing Supervision/stand-by assist  (w/ AE)   OT: Lower Body Bathing Modified independent   OT: Transfer Supervision/stand-by assist  (shower)   OT: Toilet Transfer/Toileting Supervision/stand-by assist   OT: Perform aerobic activity with stable  cardiovascular response 10 minutes;continuous activity;ambulation   OT Discharge Planning   OT Plan toileting, g/h at sink, shower transfer   OT Discharge Recommendation (DC Rec) home with assist  (cont OP PT)   OT Rationale for DC Rec Pt currently functioning below baseline d/t precautions and lack of mobility while IP. Anticipate pt will be safe for return home w/ assist from spouse and cont OP PT to promote strength and act cesar for I/ADLs.   OT Brief overview of current status CGA w/ FWW   Total Session Time   Timed Code Treatment Minutes 24   Total Session Time (sum of timed and untimed services) 32

## 2025-07-09 NOTE — PLAN OF CARE
Physical Therapy Discharge Summary    Reason for therapy discharge:    Discharged to home with outpatient therapy.    Progress towards therapy goal(s). See goals on Care Plan in [facility] electronic health record for goal details.  Goals partially met.  Barriers to achieving goals:   discharge on same date as initial evaluation.    Therapy recommendation(s):    Continued therapy is recommended.  Rationale/Recommendations:  OP PT to address deficits in function and improve strength/balance.

## 2025-07-09 NOTE — PLAN OF CARE
Discharged to: Home with wife   Belongings: Sent with patient  AVS (After Visit Summary) discussed with: Patient and wife    Goal Outcome Evaluation:      Plan of Care Reviewed With: patient    Overall Patient Progress: improvingOverall Patient Progress: improving    Outcome Evaluation: VSS on room air

## 2025-07-09 NOTE — PLAN OF CARE
ICU End of Shift Summary. See flowsheets for vital signs and detailed assessment.    This shift: Pt A/Ox4, able make needs known, neuro's intact. VSS on 2L NC while sleeping. 1 small BM, straight cath x1. BG WNL D50 shut off at 1am. Heparin gtt infusing.     Plan: Transfer to floor when bed available.         Goal Outcome Evaluation:      Plan of Care Reviewed With: patient    Overall Patient Progress: improvingOverall Patient Progress: improving    Outcome Evaluation: VSS, on 2L NC while sleeping

## 2025-07-10 LAB
BACTERIA SPEC CULT: NORMAL
BACTERIA SPEC CULT: NORMAL
GLUCOSE BLDC GLUCOMTR-MCNC: 83 MG/DL (ref 70–99)

## 2025-07-12 LAB
BACTERIA SPEC CULT: NO GROWTH
BACTERIA SPEC CULT: NO GROWTH

## 2025-07-14 ENCOUNTER — THERAPY VISIT (OUTPATIENT)
Dept: PHYSICAL THERAPY | Facility: OTHER | Age: 70
End: 2025-07-14
Attending: FAMILY MEDICINE
Payer: MEDICARE

## 2025-07-14 ENCOUNTER — OFFICE VISIT (OUTPATIENT)
Dept: FAMILY MEDICINE | Facility: OTHER | Age: 70
End: 2025-07-14
Attending: FAMILY MEDICINE
Payer: MEDICARE

## 2025-07-14 VITALS
DIASTOLIC BLOOD PRESSURE: 76 MMHG | HEART RATE: 80 BPM | TEMPERATURE: 97.3 F | RESPIRATION RATE: 20 BRPM | OXYGEN SATURATION: 96 % | SYSTOLIC BLOOD PRESSURE: 134 MMHG

## 2025-07-14 DIAGNOSIS — Z98.1 STATUS POST THORACIC SPINAL FUSION: Primary | ICD-10-CM

## 2025-07-14 DIAGNOSIS — Z98.1 STATUS POST THORACIC SPINAL FUSION: ICD-10-CM

## 2025-07-14 DIAGNOSIS — I10 PRIMARY HYPERTENSION: ICD-10-CM

## 2025-07-14 DIAGNOSIS — G35 MULTIPLE SCLEROSIS (H): ICD-10-CM

## 2025-07-14 DIAGNOSIS — I26.99 ACUTE PULMONARY EMBOLISM, UNSPECIFIED PULMONARY EMBOLISM TYPE, UNSPECIFIED WHETHER ACUTE COR PULMONALE PRESENT (H): ICD-10-CM

## 2025-07-14 PROCEDURE — G0463 HOSPITAL OUTPT CLINIC VISIT: HCPCS

## 2025-07-14 PROCEDURE — 99496 TRANSJ CARE MGMT HIGH F2F 7D: CPT | Performed by: FAMILY MEDICINE

## 2025-07-14 PROCEDURE — 97110 THERAPEUTIC EXERCISES: CPT | Mod: GP,KX

## 2025-07-14 PROCEDURE — 97162 PT EVAL MOD COMPLEX 30 MIN: CPT | Mod: GP,KX

## 2025-07-14 ASSESSMENT — ENCOUNTER SYMPTOMS
BACK PAIN: 0
SEIZURES: 0
ABDOMINAL PAIN: 0
FEVER: 0
COLOR CHANGE: 0
SHORTNESS OF BREATH: 0
CHILLS: 0
PALPITATIONS: 0
EYE PAIN: 0
HEMATURIA: 0
DYSURIA: 0
ARTHRALGIAS: 0
VOMITING: 0
COUGH: 0
SORE THROAT: 0

## 2025-07-14 ASSESSMENT — PATIENT HEALTH QUESTIONNAIRE - PHQ9
SUM OF ALL RESPONSES TO PHQ QUESTIONS 1-9: 0
SUM OF ALL RESPONSES TO PHQ QUESTIONS 1-9: 0
10. IF YOU CHECKED OFF ANY PROBLEMS, HOW DIFFICULT HAVE THESE PROBLEMS MADE IT FOR YOU TO DO YOUR WORK, TAKE CARE OF THINGS AT HOME, OR GET ALONG WITH OTHER PEOPLE: NOT DIFFICULT AT ALL

## 2025-07-14 ASSESSMENT — PAIN SCALES - GENERAL: PAINLEVEL_OUTOF10: MILD PAIN (3)

## 2025-07-14 NOTE — PROGRESS NOTES
Hospital Follow-up Visit:    Hospital/Nursing Home/IP Rehab Facility: [facility]  Most Recent Admission Date: 7/7/2025   Most Recent Admission Diagnosis: Pulmonary emboli (H) - I26.99     Most Recent Discharge Date: 7/9/2025   Most Recent Discharge Diagnosis: Acute saddle pulmonary embolism, unspecified whether acute cor pulmonale present (H) - I26.92   Do you have any other stressors you would like to discuss with your provider? No    Problems taking medications regularly:  None  Medication changes since discharge: None  Problems adhering to non-medication therapy:  None    Summary of hospitalization:  [facility] discharge summary reviewed  Diagnostic Tests/Treatments reviewed.  Follow up needed: none  Other Healthcare Providers Involved in Patient s Care:         None  Update since discharge: improved.         Plan of care communicated with patient               Assessment & Plan   Problem List Items Addressed This Visit       Multiple sclerosis (H)    Hypertension    Status post thoracic spinal fusion - Primary    Pulmonary emboli (H)         Diagnoses and all orders for this visit:  Status post thoracic spinal fusion  Multiple sclerosis (H)  Primary hypertension  Acute pulmonary embolism, unspecified pulmonary embolism type, unspecified whether acute cor pulmonale present (H)      Given the fact that he had a provoked PE he should be on anticoagulation therapy for 90 days.  I stated to him he needs to return for CT of the chest and ultrasound of the leg to ensure that there is resolution of both thrombi.  Starting lisinopril I left up to the patient.  If his blood pressure is closer to 140/90 he should restart the lisinopril if he remains 130/80 or less he can stay off the medication        Subjective   Jamir KELLY Ibrahim A 70 year old male    Patient comes today for a 7-day follow-up from hospitalization.  On July 1 he had thoracic spinal surgery.  He was discharged and  then had a follow-up in the emergency room here at [facility] where he was found to have a saddle emboli this was on 7 7.  He also had a fully mobile thrombus found in his femoral vein.  He was hospitalized.  Interventional radiology did a thrombectomy to remove the saddle emboli.  His blood pressure and his breathing returned to normal within hours.  He has been doing well.  He is on Eliquis 5 mg twice a day.  His back really does not hurt at all when he is laying down and only has 3 out of 10 pain when he is up and around.  His incision is healing well.  He was not restarted on his lisinopril 10 mg for his blood pressure    History of Present Illness       Hypertension: He presents for follow up of hypertension.  He does check blood pressure  regularly outside of the clinic. Outpatient blood pressures have not been over 140/90. He does not follow a low salt diet.      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 He has never been exposed to tobacco smoke. He has never used smokeless tobacco. He reports that he does not drink alcohol and does not use drugs.  Family History   Problem Relation Age of Onset    Other - See Comments Mother          with Parkinson's    Heart Disease Mother         Heart Disease,CHF for carditis    Prostate Cancer Father         Cancer-prostate    Other - See Comments Father         Alzheimer's/kidney failure     Family History Negative Brother         Good Health    Heart Disease Brother         Heart Disease,ASCAD with MI    Lung Cancer Brother         tobacco abuse     No Known Allergies    Current Outpatient Medications:     apixaban ANTICOAGULANT (ELIQUIS) 5 MG tablet, Take 2 tablets (10 mg) by mouth 2 times daily for 6 days, THEN 1 tablet (5 mg) 2 times daily., Disp: 190 tablet, Rfl: 0    baclofen (LIORESAL) 10 MG tablet, Take 1 tablet (10 mg) by mouth 2 times daily. Okay to take one extra dose daily as needed for muscle spasms (up to three times daily), Disp: , Rfl:     calcium carbonate 600 mg-vitamin D 400 units (CALTRATE) 600-400 MG-UNIT per tablet, Take 1 tablet by mouth 2 times daily, Disp: , Rfl:     diphenhydrAMINE (BENADRYL) 25 MG tablet, Take 50 mg by mouth nightly as needed for itching or allergies. 2 x 25mg =50mg, Disp: , Rfl:     meclizine 25 MG CHEW, Take 25 mg by mouth as needed, Disp: , Rfl:     oxyBUTYnin ER (DITROPAN XL) 10 MG 24 hr tablet, Take 1 tablet (10 mg) by mouth every morning., Disp: 90 tablet, Rfl: 4    senna-docusate (SENOKOT-S/PERICOLACE) 8.6-50 MG tablet, Take 1-2 tablets by mouth 2 times daily Take while on oral narcotics to prevent or treat constipation., Disp: 180 tablet, Rfl: 3    [Paused] aspirin (ASA) 81 MG chewable tablet, Take 1 tablet (81 mg) by mouth daily. Okay to resume when one week out from surgery, Disp: , Rfl:     Catheters MISC, COLOPLAST 14FR/COUDE ITEM #814/OLIVE TIP W/GUIDEDX:N20.1/N31.9/N40.0/R330 HCP:/#200EA/30DS **PICK-UP**, Disp: 200 each, Rfl: 12    [Paused] lisinopril (ZESTRIL) 10 MG tablet, Take 1 tablet (10 mg) by mouth daily., Disp: 90 tablet, Rfl: 4    Lubricants (SURGICAL LUBRICANT) external gel, SURGILUBE 3GM PACKETS #[id number]/#200EA/600GMS DX:N20.1/N31.9/N40/R33 HCPC:, Disp: 600 g, Rfl: 4  Review of Systems   Constitutional:  Negative for chills and fever.   HENT:  Negative for ear pain and sore throat.    Eyes:  Negative for pain and visual  disturbance.   Respiratory:  Negative for cough and shortness of breath.    Cardiovascular:  Negative for chest pain and palpitations.   Gastrointestinal:  Negative for abdominal pain and vomiting.   Genitourinary:  Negative for dysuria and hematuria.   Musculoskeletal:  Negative for arthralgias and back pain.   Skin:  Negative for color change and rash.   Neurological:  Negative for seizures and syncope.   All other systems reviewed and are negative.        Objective      /76   Pulse 80   Temp 97.3  F (36.3  C)   Resp 20   SpO2 96%   There is no height or weight on file to calculate BMI.  Physical Exam  Constitutional:       Appearance: Normal appearance.   HENT:      Head: Normocephalic.      Right Ear: External ear normal.      Left Ear: External ear normal.      Nose: Nose normal.      Mouth/Throat:      Mouth: Mucous membranes are moist.   Eyes:      Conjunctiva/sclera: Conjunctivae normal.   Cardiovascular:      Rate and Rhythm: Normal rate and regular rhythm.      Heart sounds: Murmur heard.   Pulmonary:      Effort: Pulmonary effort is normal.      Breath sounds: Normal breath sounds. No wheezing.   Abdominal:      General: Abdomen is flat.      Palpations: Abdomen is soft.   Musculoskeletal:         General: Normal range of motion.      Cervical back: Neck supple.   Skin:     General: Skin is warm and dry.      Capillary Refill: Capillary refill takes less than 2 seconds.   Neurological:      General: No focal deficit present.      Mental Status: He is alert and oriented to person, place, and time. Mental status is at baseline.   Psychiatric:         Mood and Affect: Mood normal.         Behavior: Behavior normal.         Lab Results   Component Value Date    WBC 7.0 07/09/2025    HGB 10.8 (L) 07/09/2025     (L) 07/09/2025    CHOL 150 05/13/2025    TRIG 71 05/13/2025    HDL 38 (L) 05/13/2025    ALT 33 07/09/2025    AST 40 07/09/2025     07/09/2025    BUN 19.8 07/09/2025    CO2 28  07/09/2025    TSH 2.87 07/07/2025    PSA 0.44 05/13/2025       Admission on 07/07/2025, Discharged on 07/09/2025   Component Date Value Ref Range Status    WBC Count 07/07/2025 9.1  4.0 - 11.0 10e3/uL Final    RBC Count 07/07/2025 4.24 (L)  4.40 - 5.90 10e6/uL Final    Hemoglobin 07/07/2025 12.2 (L)  13.3 - 17.7 g/dL Final    Hematocrit 07/07/2025 37.4 (L)  40.0 - 53.0 % Final    MCV 07/07/2025 88  78 - 100 fL Final    MCH 07/07/2025 28.8  26.5 - 33.0 pg Final    MCHC 07/07/2025 32.6  31.5 - 36.5 g/dL Final    RDW 07/07/2025 13.1  10.0 - 15.0 % Final    Platelet Count 07/07/2025 165  150 - 450 10e3/uL Final    Anti Xa Unfractionated Heparin 07/08/2025 >1.10 (HH)  For Reference Range, See Comment IU/mL Final    INR 07/07/2025 1.24 (H)  0.85 - 1.15 Final    PT 07/07/2025 15.9 (H)  11.8 - 14.8 Seconds Final    aPTT 07/07/2025 >240 (HH)  22 - 38 Seconds Final    Sodium 07/07/2025 139  135 - 145 mmol/L Final    Potassium 07/07/2025 4.8  3.4 - 5.3 mmol/L Final    Chloride 07/07/2025 103  98 - 107 mmol/L Final    Carbon Dioxide (CO2) 07/07/2025 25  22 - 29 mmol/L Final    Anion Gap 07/07/2025 11  7 - 15 mmol/L Final    Urea Nitrogen 07/07/2025 24.5 (H)  8.0 - 23.0 mg/dL Final    Creatinine 07/07/2025 1.05  0.67 - 1.17 mg/dL Final    GFR Estimate 07/07/2025 76  >60 mL/min/1.73m2 Final    eGFR calculated using 2021 CKD-EPI equation.    Calcium 07/07/2025 9.6  8.8 - 10.4 mg/dL Final    Glucose 07/07/2025 89  70 - 99 mg/dL Final    Troponin T, High Sensitivity 07/07/2025 167 (HH)  <=22 ng/L Final    Either a High Sensitivity Troponin T baseline (0 hours) value = 100 ng/L, or an increase in High Sensitivity Troponin T = 7 ng/L at 2 hours compared to 0 hours (2-0 hours), suggests myocardial injury, and urgent clinical attention is required.    If the 2-0 hours increase is <7 ng/L, a High Sensitivity Troponin T result above gender-specific reference ranges warrants further evaluation.   Recommendations for further evaluation  include correlation with clinical decision-making tool (e.g., HEART), a 3rd High Sensitivity Troponin T test 2 hours after the 2nd (a 20% change from baseline would represent concern), admission for observation, close PCC/cardiology follow-up, or urgent outpatient provocative testing.    Lactic Acid, Initial 07/07/2025 1.1  0.7 - 2.0 mmol/L Final    ABO/RH(D) 07/07/2025 O POS   Final    Antibody Screen 07/07/2025 Negative  Negative Final    SPECIMEN EXPIRATION DATE 07/07/2025 7/10/2025 11:59:00 PM CDT   Final    WBC Count 07/08/2025 8.4  4.0 - 11.0 10e3/uL Final    RBC Count 07/08/2025 4.12 (L)  4.40 - 5.90 10e6/uL Final    Hemoglobin 07/08/2025 11.8 (L)  13.3 - 17.7 g/dL Final    Hematocrit 07/08/2025 36.2 (L)  40.0 - 53.0 % Final    MCV 07/08/2025 88  78 - 100 fL Final    MCH 07/08/2025 28.6  26.5 - 33.0 pg Final    MCHC 07/08/2025 32.6  31.5 - 36.5 g/dL Final    RDW 07/08/2025 13.1  10.0 - 15.0 % Final    Platelet Count 07/08/2025 141 (L)  150 - 450 10e3/uL Final    Sodium 07/08/2025 136  135 - 145 mmol/L Final    Potassium 07/08/2025 4.3  3.4 - 5.3 mmol/L Final    Carbon Dioxide (CO2) 07/08/2025 23  22 - 29 mmol/L Final    Anion Gap 07/08/2025 8  7 - 15 mmol/L Final    Urea Nitrogen 07/08/2025 22.9  8.0 - 23.0 mg/dL Final    Creatinine 07/08/2025 0.97  0.67 - 1.17 mg/dL Final    GFR Estimate 07/08/2025 84  >60 mL/min/1.73m2 Final    eGFR calculated using 2021 CKD-EPI equation.    Calcium 07/08/2025 9.4  8.8 - 10.4 mg/dL Final    Chloride 07/08/2025 105  98 - 107 mmol/L Final    Glucose 07/08/2025 114 (H)  70 - 99 mg/dL Final    Alkaline Phosphatase 07/08/2025 92  40 - 150 U/L Final    AST 07/08/2025 32  0 - 45 U/L Final    ALT 07/08/2025 24  0 - 70 U/L Final    Protein Total 07/08/2025 5.6 (L)  6.4 - 8.3 g/dL Final    Albumin 07/08/2025 3.0 (L)  3.5 - 5.2 g/dL Final    Bilirubin Total 07/08/2025 0.4  <=1.2 mg/dL Final    GLUCOSE BY METER POCT 07/07/2025 55 (L)  70 - 99 mg/dL Final    GLUCOSE BY METER POCT  07/07/2025 60 (L)  70 - 99 mg/dL Final    GLUCOSE BY METER POCT 07/08/2025 139 (H)  70 - 99 mg/dL Final    GLUCOSE BY METER POCT 07/08/2025 120 (H)  70 - 99 mg/dL Final    Iron 07/07/2025 56 (L)  61 - 157 ug/dL Final    Iron Binding Capacity 07/07/2025 192 (L)  240 - 430 ug/dL Final    Iron Sat Index 07/07/2025 29  15 - 46 % Final    Ferritin 07/07/2025 217  31 - 409 ng/mL Final    Estimated Average Glucose 07/07/2025 108  <117 mg/dL Final    Hemoglobin A1C 07/07/2025 5.4  <5.7 % Final    Normal <5.7%   Prediabetes 5.7-6.4%    Diabetes 6.5% or higher     Note: Adopted from ADA consensus guidelines.    GLUCOSE BY METER POCT 07/08/2025 107 (H)  70 - 99 mg/dL Final    Anti Xa Unfractionated Heparin 07/08/2025 0.70  For Reference Range, See Comment IU/mL Final    GLUCOSE BY METER POCT 07/08/2025 83  70 - 99 mg/dL Final    Lactic Acid 07/08/2025 0.9  0.7 - 2.0 mmol/L Final    GLUCOSE BY METER POCT 07/08/2025 78  70 - 99 mg/dL Final    GLUCOSE BY METER POCT 07/08/2025 97  70 - 99 mg/dL Final    Anti Xa Unfractionated Heparin 07/08/2025 0.72  For Reference Range, See Comment IU/mL Final    GLUCOSE BY METER POCT 07/08/2025 78  70 - 99 mg/dL Final    GLUCOSE BY METER POCT 07/08/2025 83  70 - 99 mg/dL Final    GLUCOSE BY METER POCT 07/08/2025 104 (H)  70 - 99 mg/dL Final    Anti Xa Unfractionated Heparin 07/09/2025 0.45  For Reference Range, See Comment IU/mL Final    WBC Count 07/09/2025 7.0  4.0 - 11.0 10e3/uL Final    RBC Count 07/09/2025 3.69 (L)  4.40 - 5.90 10e6/uL Final    Hemoglobin 07/09/2025 10.8 (L)  13.3 - 17.7 g/dL Final    Hematocrit 07/09/2025 32.6 (L)  40.0 - 53.0 % Final    MCV 07/09/2025 88  78 - 100 fL Final    MCH 07/09/2025 29.3  26.5 - 33.0 pg Final    MCHC 07/09/2025 33.1  31.5 - 36.5 g/dL Final    RDW 07/09/2025 12.8  10.0 - 15.0 % Final    Platelet Count 07/09/2025 134 (L)  150 - 450 10e3/uL Final    Sodium 07/09/2025 137  135 - 145 mmol/L Final    Potassium 07/09/2025 4.2  3.4 - 5.3 mmol/L Final     Carbon Dioxide (CO2) 07/09/2025 28  22 - 29 mmol/L Final    Anion Gap 07/09/2025 8  7 - 15 mmol/L Final    Urea Nitrogen 07/09/2025 19.8  8.0 - 23.0 mg/dL Final    Creatinine 07/09/2025 0.95  0.67 - 1.17 mg/dL Final    GFR Estimate 07/09/2025 86  >60 mL/min/1.73m2 Final    eGFR calculated using 2021 CKD-EPI equation.    Calcium 07/09/2025 9.3  8.8 - 10.4 mg/dL Final    Chloride 07/09/2025 101  98 - 107 mmol/L Final    Glucose 07/09/2025 107 (H)  70 - 99 mg/dL Final    Alkaline Phosphatase 07/09/2025 94  40 - 150 U/L Final    AST 07/09/2025 40  0 - 45 U/L Final    ALT 07/09/2025 33  0 - 70 U/L Final    Protein Total 07/09/2025 5.2 (L)  6.4 - 8.3 g/dL Final    Albumin 07/09/2025 3.1 (L)  3.5 - 5.2 g/dL Final    Bilirubin Total 07/09/2025 0.3  <=1.2 mg/dL Final    GLUCOSE BY METER POCT 07/08/2025 122 (H)  70 - 99 mg/dL Final    GLUCOSE BY METER POCT 07/09/2025 109 (H)  70 - 99 mg/dL Final    GLUCOSE BY METER POCT 07/09/2025 88  70 - 99 mg/dL Final    LVEF  07/09/2025 60-65%   Final    GLUCOSE BY METER POCT 07/07/2025 83  70 - 99 mg/dL Final         Recent labs: (last 2 weeks)     07/07/25  1031   TSH 2.87

## 2025-07-14 NOTE — NURSING NOTE
Patient here for hospital follow up form 07/01/2025 for spine surgery and 07/07/2025 for PE. He is having 03/10 incision back pain and is not taking anything for the pain. Medication Reconciliation: complete.    Mariam Chapman LPN  7/14/2025 12:56 PM

## 2025-07-16 ENCOUNTER — MYC MEDICAL ADVICE (OUTPATIENT)
Dept: FAMILY MEDICINE | Facility: OTHER | Age: 70
End: 2025-07-16

## 2025-07-16 ENCOUNTER — THERAPY VISIT (OUTPATIENT)
Dept: PHYSICAL THERAPY | Facility: OTHER | Age: 70
End: 2025-07-16
Attending: FAMILY MEDICINE
Payer: MEDICARE

## 2025-07-16 DIAGNOSIS — I26.99 ACUTE PULMONARY EMBOLISM, UNSPECIFIED PULMONARY EMBOLISM TYPE, UNSPECIFIED WHETHER ACUTE COR PULMONALE PRESENT (H): Primary | ICD-10-CM

## 2025-07-16 DIAGNOSIS — G35 MULTIPLE SCLEROSIS (H): Primary | ICD-10-CM

## 2025-07-16 DIAGNOSIS — Z98.1 STATUS POST THORACIC SPINAL FUSION: ICD-10-CM

## 2025-07-16 PROCEDURE — 97110 THERAPEUTIC EXERCISES: CPT | Mod: GP,KX

## 2025-07-16 NOTE — TELEPHONE ENCOUNTER
Requesting orders placed prior to upcoming appt on 9/29 to check to see if 3 month course of blood thinners have eliminated remaining clots.     Per OV from 7/14:  Given the fact that he had a provoked PE he should be on anticoagulation therapy for 90 days. I stated to him he needs to return for CT of the chest and ultrasound of the leg to ensure that there is resolution of both thrombi. Starting lisinopril I left up to the patient. If his blood pressure is closer to 140/90 he should restart the lisinopril if he remains 130/80 or less he can stay off the medication     Ruben'd up orders for CT chest and US leg    Routing to provider to review and respond.  Iveth Devries RN on 7/16/2025 at 1:51 PM

## 2025-07-16 NOTE — PROGRESS NOTES
PHYSICAL THERAPY EVALUATION  Type of Visit: Evaluation       Fall Risk Screen:  Have you fallen 2 or more times in the past year?: Yes  Have you fallen and had an injury in the past year?: No  Is patient receiving Physical Therapy Services?: Yes  Fall screen comments: spinal precautions following his surgery    Subjective         Presenting condition or subjective complaint: Jamir is a 70-year-old male that presents to therapy following surgery of a fusion of his thoracic spine.  Jamir had a fusion of T10-T12.  After a few days he was discharged home where on 7/7 he had a syncopal episode and was short of breath at home.  Jamir presented to our emergency department where he was found to have a pulmonary embolism and mobile DVT in his leg. He was hypotensive in the ED.  He was transferred to the [facility] where intervention radiology performed a thrombectomy of the pulmonary embolism.  Jamir shortness of breath and heart rate as well as blood pressure return to more normal levels.  Jamir has multiple sclerosis which limits his mobility.  He feels like after his most recent procedure he is moving quite well.  His pain is well-controlled at the moment approximately 3 out of 10 when mobile.  Very minimal pain when supine.  Jamir returns to see Dr. Leija in approximately 1 week.  He is on standard spinal precautions of no bending lifting or twisting.  Weight restrictions no more than 10 pounds.  No lifting above his head  Date of onset: 07/01/25    Relevant medical history: Bladder or bowel problems; Neck injury; Multiple Sclerosis; History of fractures; Significant weakness; DVT (blood clot)   Dates & types of surgery: 7/1 multilevel thoracic spine fusion    Prior diagnostic imaging/testing results:       Prior therapy history for the same diagnosis, illness or injury: Yes (Chronic history of MS.  Acute surgery) numerous bouts of therapy    Prior Level of Function  Transfers: Assistive equipment  Ambulation: Assistive  equipment  ADL: Independent  IADL: Driving, Finances, Meal preparation, Medication management, Work    Living Environment  Social support: With a significant other or spouse   Type of home: House   Stairs to enter the home: Yes 3 Is there a railing: Yes     Ramp:     Stairs inside the home:         Help at home: Home management tasks (cooking, cleaning); Home and Yard maintenance tasks  Equipment owned: Straight Cane; Walker with wheels; Standard wheelchair     Employment: Yes resort owner  Hobbies/Interests: Catching big bass    Patient goals for therapy: prolonged sitting, tailor sit, ambulaltion with canes    Pain assessment: Pain present  Location: Mid back/Rating: 3/10     Objective      Cognitive Status Examination  Orientation: Oriented to person, place and time   Level of Consciousness: Alert  Follows Commands and Answers Questions: 100% of the time, Follows multi step instructions  Personal Safety and Judgement: Intact  Memory: Intact    OBSERVATION: Patient's voice hoarse today  INTEGUMENTARY: Impaired, incision is well-healing.  Incision length approximately 3 inches.  Sutures in place.  Minimal redness around border.  POSTURE: Flexed posture at times.  Does not achieve terminal knee extension  PALPATION: N/A  RANGE OF MOTION: Fair hip motion today.  Decreased hamstring length but approximately prior level of tightness.  STRENGTH: Jamir has weakness that it typically is presented like his MS.  No significant change in strength from today's visit to previous visit on 6/30.  Mild difficulty with ambulation, bed mobility, and leg raises    BED MOBILITY: Min Assist, logrolling technique    TRANSFERS: SBA, UE support needed    GAIT:   Level of Craig: SBA, UE support needed  Assistive Device(s): Walker (four wheeled)  Gait Deviations: Base of support decreased  Stride length decreased  Knee extension decreased L, Knee extension decreased R  Hip hiking R  Gait Distance: 200 feet today  Stairs: Not  completed    BALANCE: Static balance good.  Dynamic balance requires upper extremity support        SENSATION: Sensation appears at baseline level.    MUSCLE TONE: Hypertonic    Assessment & Plan   CLINICAL IMPRESSIONS  Medical Diagnosis: MS, s/p thoracic spine fusion    Treatment Diagnosis: s/p T10-T12 spinal fusion, MS,   Impression/Assessment: Patient is a 70 year old male with postoperative pain and mobility complaints.  The following significant findings have been identified: Pain, Decreased ROM/flexibility, Decreased strength, Impaired balance, Inflammation, Impaired gait, Impaired muscle performance, and Decreased activity tolerance. These impairments interfere with their ability to perform self care tasks, work tasks, recreational activities, household chores, driving , household mobility, and community mobility as compared to previous level of function.  Jamir continues to be below baseline levels of mobility secondary to his acute spinal surgery, pulmonary emboli and DVT, and weakness associated with his muscular sclerosis.  Jamir requires skilled physical therapy services for exercise progression, reinforcement of spinal precautions, and progression to highest level of function.    Clinical Decision Making (Complexity):  Clinical Presentation: Evolving/Changing  Clinical Presentation Rationale: based on medical and personal factors listed in PT evaluation  Clinical Decision Making (Complexity): Moderate complexity    PLAN OF CARE  Treatment Interventions:  Interventions: Gait Training, Manual Therapy, Neuromuscular Re-education, Therapeutic Activity, Therapeutic Exercise    Long Term Goals     PT Goal 1  Goal Identifier: Log roll  Goal Description: Jamir will be able to complete log roll IND either direction for improved independence with mobility  Target Date: 08/12/25  PT Goal 2  Goal Identifier: Aerobic activity  Goal Description: Jamir will be able to complete 6 minutes on nustep for improved aerobic  activity and return to prior level of function.  Target Date: 08/26/25  PT Goal 3  Goal Identifier: Stairs  Goal Description: Jamir will be able to navigate 4 steps with step to pattern for improved home mobiity and function.  Target Date: 08/26/25  PT Goal 4  Goal Identifier: Standing  Goal Description: Jamir will be able to stand for 2 minutes 30 seconds while complete UE task (such as rebounder) without losing balance for improved strength, endurance, and return to prior level of function.  Target Date: 10/07/25      Frequency of Treatment: 1-2x/week  Duration of Treatment: 12 weeks    Recommended Referrals to Other Professionals: At this time  Education Assessment:        Risks and benefits of evaluation/treatment have been explained.   Patient/Family/caregiver agrees with Plan of Care.     Evaluation Time:     PT Eval, Moderate Complexity Minutes (66631): 30       Signing Clinician: LES Marie [facility]                                                                                   OUTPATIENT PHYSICAL THERAPY      PLAN OF TREATMENT FOR OUTPATIENT REHABILITATION   Patient's Last Name, First Name, M.I.  Jamir Gill  KELLY YOB: 1955   Provider's Name   [facility]   Medical Record No.  [id number]     Onset Date: 07/01/25  Start of Care Date: 07/14/25     Medical Diagnosis:  MS, s/p thoracic spine fusion      PT Treatment Diagnosis:  s/p T10-T12 spinal fusion, MS, Plan of Treatment  Frequency/Duration: 1-2x/week/ 12 weeks    Certification date from 07/14/25 to 10/06/25         See note for plan of treatment details and functional goals     Manjit Pink, PT                         I CERTIFY THE NEED FOR THESE SERVICES FURNISHED UNDER        THIS PLAN OF TREATMENT AND WHILE UNDER MY CARE     (Physician attestation of this document indicates review and certification of the therapy plan).              Referring  Provider:  Melissa Rogers    Initial Assessment  See Epic Evaluation- Start of Care Date: 07/14/25

## 2025-07-21 ENCOUNTER — THERAPY VISIT (OUTPATIENT)
Dept: PHYSICAL THERAPY | Facility: OTHER | Age: 70
End: 2025-07-21
Attending: FAMILY MEDICINE
Payer: MEDICARE

## 2025-07-21 ENCOUNTER — ALLIED HEALTH/NURSE VISIT (OUTPATIENT)
Dept: NEUROSURGERY | Facility: CLINIC | Age: 70
End: 2025-07-21
Payer: COMMERCIAL

## 2025-07-21 VITALS — TEMPERATURE: 96.8 F

## 2025-07-21 DIAGNOSIS — G35 MULTIPLE SCLEROSIS (H): Primary | ICD-10-CM

## 2025-07-21 DIAGNOSIS — Z98.1 STATUS POST THORACIC SPINAL FUSION: ICD-10-CM

## 2025-07-21 DIAGNOSIS — Z98.1 S/P FUSION OF THORACIC SPINE: Primary | ICD-10-CM

## 2025-07-21 PROCEDURE — 97110 THERAPEUTIC EXERCISES: CPT | Mod: GP,KX

## 2025-07-21 PROCEDURE — 99207 PR NO CHARGE NURSE ONLY: CPT

## 2025-07-21 PROCEDURE — 1126F AMNT PAIN NOTED NONE PRSNT: CPT

## 2025-07-21 ASSESSMENT — PAIN SCALES - GENERAL: PAINLEVEL_OUTOF10: NO PAIN (0)

## 2025-07-21 NOTE — PROGRESS NOTES
Post-op Nurse Visit:    Patient seen today per the order of  Edson Leija MD .   DOS: 7/1/25  Procedure: T10-12 revision and decompression and fusion    Pain/Neuro Assessment  0/10    Numbness/tingling: baseline n/t to BLE   Strength: right hip flexion weak on right (baseline). Patient reports improvement in strength with the aide of PT.    Pain Relief Measures:  baclofen: daily  Apixaban: taking BID    Incision  Incision inspected. Edges well-approximated. No redness, swelling, drainage, or warmth noted. Incision prepped with betadine and suture(s) removed without difficulty. Steri-strips applied.    Activity  Following restrictions   Falls:  none  Patient is walking frequently without difficulty.   Denies redness, swelling, or warmth to bilateral calves.   PT: tolerating    GI/  Patient's appetite is normal  Bowel/bladder problems? No, straight cath baseline.  Taking stool softeners? Yes, miralax    Refills/x-rays/return to work  Refills given at this appointment? No  Sent for x-rays after this appointment? No  Ordered future x-rays? Yes  Return to work discussed at this appointment? N/A     All of patient's questions addressed today. Patient was instructed to call with any additional questions/concerns.     Lorie Mcgarry RN on 7/21/2025 at 1:47 PM

## 2025-07-21 NOTE — PATIENT INSTRUCTIONS
Instructions for Patient    Incision  Steri-strips were applied today, they will fall off in 7-10 days. Do not to pull them off.   Keep your incision clean and dry at all times.   It is okay to shower, just pat the incision dry   No submerging incision in water such as pools, hot tubs, or baths for at least 8 weeks and until the incision is healed  Do not apply lotions or ointments to incision    Activity  No lifting greater than 25 pounds. No bending, twisting, or overhead reaching.  Walking is the best way to start exercise after surgery. Take short frequent walks. You may gradually increase the distance as tolerated. If you feel pain, decrease your activity, but DO NOT stop walking. Walking will help you gain strength, prevent muscle soreness and spasms, and prevent blood clots.   Avoid bed rest and prolonged sitting for longer than 30 minutes (change positions frequently while awake)  No contact sports or high impact activities such as; snow removal, lawn mowing, running/jogging, snowmobile or 4 sarabia riding or any other recreational vehicles until after given clearance at your 3 month follow up visit    Medications   Refills of pain medication:   Please call the neurosurgery clinic to request 2-3 days before you run out. A nurse will call you back to obtain a pain assessment.   Weaning from narcotic pain medications  When it is time, start weaning by extending the time between doses.   For example, if you're taking 2 tabs every 4 hours, spread it out to 2 tabs over 4.5, 5, 6 hours. At that point you can certainly cut down to 1 tab, then wean to an as needed basis until completely done with them.  Don't take more than 3000mg of Acetaminophen in 24 hours  Avoid NSAIDS until after you 6 week postop visit.  Encouraged icing for at least 3-4 times throughout the day for 20-30 minutes at a time. Avoid heat to the incision area.   Taking stool softeners regularly can reduce constipation commonly caused by narcotic  pain medications.    Contact clinic or Emergency Room if you develop:   Infection (redness, swelling, warmth, drainage, fever over 101 F)  New injury  Bladder or bowel changes or loss of control    Signs of blood clot:  Swelling and/or warmth in one or both legs  Pain or tenderness in your leg, ankle, foot, or arm   Red or discolored skin     Go to the Emergency Room   If sudden onset of severe headache, weakness, confusion, change in level of consciousness, pain, or loss of movement.  Chest pain  Trouble breathing     Post-operative appointments  Arrive 30 minutes before your 6 week, 3 month, 6 month, and 1 year post-op appointments to allow time for an x-ray before each    [facility]  [place] [address] [place] [id number]  Telephone:  [phone]  Fax:  [phone]

## 2025-07-23 ENCOUNTER — THERAPY VISIT (OUTPATIENT)
Dept: PHYSICAL THERAPY | Facility: OTHER | Age: 70
End: 2025-07-23
Attending: FAMILY MEDICINE
Payer: MEDICARE

## 2025-07-23 DIAGNOSIS — G35 MULTIPLE SCLEROSIS (H): Primary | ICD-10-CM

## 2025-07-23 DIAGNOSIS — Z98.1 STATUS POST THORACIC SPINAL FUSION: ICD-10-CM

## 2025-07-23 PROCEDURE — 97110 THERAPEUTIC EXERCISES: CPT | Mod: GP,KX

## 2025-07-28 ENCOUNTER — THERAPY VISIT (OUTPATIENT)
Dept: PHYSICAL THERAPY | Facility: OTHER | Age: 70
End: 2025-07-28
Attending: FAMILY MEDICINE
Payer: MEDICARE

## 2025-07-28 DIAGNOSIS — G35 MULTIPLE SCLEROSIS (H): Primary | ICD-10-CM

## 2025-07-28 DIAGNOSIS — Z98.1 STATUS POST THORACIC SPINAL FUSION: ICD-10-CM

## 2025-07-28 PROCEDURE — 97110 THERAPEUTIC EXERCISES: CPT | Mod: GP,KX

## 2025-07-30 ENCOUNTER — THERAPY VISIT (OUTPATIENT)
Dept: PHYSICAL THERAPY | Facility: OTHER | Age: 70
End: 2025-07-30
Attending: FAMILY MEDICINE
Payer: MEDICARE

## 2025-07-30 DIAGNOSIS — Z98.1 STATUS POST THORACIC SPINAL FUSION: ICD-10-CM

## 2025-07-30 DIAGNOSIS — G35 MULTIPLE SCLEROSIS (H): Primary | ICD-10-CM

## 2025-07-30 PROCEDURE — 97110 THERAPEUTIC EXERCISES: CPT | Mod: GP,KX

## 2025-08-06 ENCOUNTER — THERAPY VISIT (OUTPATIENT)
Dept: PHYSICAL THERAPY | Facility: OTHER | Age: 70
End: 2025-08-06
Attending: PHYSICIAN ASSISTANT
Payer: MEDICARE

## 2025-08-06 DIAGNOSIS — G35 MULTIPLE SCLEROSIS (H): Primary | ICD-10-CM

## 2025-08-06 DIAGNOSIS — Z98.1 STATUS POST THORACIC SPINAL FUSION: ICD-10-CM

## 2025-08-06 PROCEDURE — 97110 THERAPEUTIC EXERCISES: CPT | Mod: GP,KX

## 2025-08-11 ENCOUNTER — THERAPY VISIT (OUTPATIENT)
Dept: PHYSICAL THERAPY | Facility: OTHER | Age: 70
End: 2025-08-11
Attending: PHYSICIAN ASSISTANT
Payer: MEDICARE

## 2025-08-11 DIAGNOSIS — Z98.1 STATUS POST THORACIC SPINAL FUSION: ICD-10-CM

## 2025-08-11 DIAGNOSIS — G35 MULTIPLE SCLEROSIS (H): Primary | ICD-10-CM

## 2025-08-11 PROCEDURE — 97110 THERAPEUTIC EXERCISES: CPT | Mod: GP,KX

## 2025-08-18 ENCOUNTER — THERAPY VISIT (OUTPATIENT)
Dept: PHYSICAL THERAPY | Facility: OTHER | Age: 70
End: 2025-08-18
Attending: PHYSICIAN ASSISTANT
Payer: MEDICARE

## 2025-08-18 DIAGNOSIS — Z98.1 STATUS POST THORACIC SPINAL FUSION: ICD-10-CM

## 2025-08-18 DIAGNOSIS — G35 MULTIPLE SCLEROSIS (H): Primary | ICD-10-CM

## 2025-08-18 PROCEDURE — 97140 MANUAL THERAPY 1/> REGIONS: CPT | Mod: GP,KX

## 2025-08-18 PROCEDURE — 97110 THERAPEUTIC EXERCISES: CPT | Mod: GP,KX

## 2025-08-20 ENCOUNTER — THERAPY VISIT (OUTPATIENT)
Dept: PHYSICAL THERAPY | Facility: OTHER | Age: 70
End: 2025-08-20
Attending: PHYSICIAN ASSISTANT
Payer: MEDICARE

## 2025-08-20 DIAGNOSIS — G35 MULTIPLE SCLEROSIS (H): Primary | ICD-10-CM

## 2025-08-20 DIAGNOSIS — Z98.1 STATUS POST THORACIC SPINAL FUSION: ICD-10-CM

## 2025-08-20 PROCEDURE — 97110 THERAPEUTIC EXERCISES: CPT | Mod: GP,CQ,KX

## 2025-08-21 ENCOUNTER — ANCILLARY PROCEDURE (OUTPATIENT)
Dept: GENERAL RADIOLOGY | Facility: CLINIC | Age: 70
End: 2025-08-21
Attending: NURSE PRACTITIONER
Payer: COMMERCIAL

## 2025-08-21 ENCOUNTER — OFFICE VISIT (OUTPATIENT)
Dept: NEUROSURGERY | Facility: CLINIC | Age: 70
End: 2025-08-21
Payer: COMMERCIAL

## 2025-08-21 VITALS
HEIGHT: 69 IN | SYSTOLIC BLOOD PRESSURE: 118 MMHG | DIASTOLIC BLOOD PRESSURE: 62 MMHG | BODY MASS INDEX: 25.79 KG/M2 | TEMPERATURE: 96.8 F | WEIGHT: 174.1 LBS

## 2025-08-21 DIAGNOSIS — Z98.1 S/P FUSION OF THORACIC SPINE: ICD-10-CM

## 2025-08-21 DIAGNOSIS — Z98.1 S/P FUSION OF THORACIC SPINE: Primary | ICD-10-CM

## 2025-08-21 PROCEDURE — 72070 X-RAY EXAM THORAC SPINE 2VWS: CPT | Mod: TC | Performed by: RADIOLOGY

## 2025-08-21 ASSESSMENT — PAIN SCALES - GENERAL: PAINLEVEL_OUTOF10: NO PAIN (0)

## 2025-08-25 ENCOUNTER — THERAPY VISIT (OUTPATIENT)
Dept: PHYSICAL THERAPY | Facility: OTHER | Age: 70
End: 2025-08-25
Attending: PHYSICIAN ASSISTANT
Payer: MEDICARE

## 2025-08-25 DIAGNOSIS — G35 MULTIPLE SCLEROSIS (H): Primary | ICD-10-CM

## 2025-08-25 DIAGNOSIS — Z98.1 STATUS POST THORACIC SPINAL FUSION: ICD-10-CM

## 2025-08-25 PROCEDURE — 97110 THERAPEUTIC EXERCISES: CPT | Mod: GP,KX

## 2025-08-27 ENCOUNTER — THERAPY VISIT (OUTPATIENT)
Dept: PHYSICAL THERAPY | Facility: OTHER | Age: 70
End: 2025-08-27
Attending: PHYSICIAN ASSISTANT
Payer: MEDICARE

## 2025-08-27 DIAGNOSIS — Z98.1 STATUS POST THORACIC SPINAL FUSION: ICD-10-CM

## 2025-08-27 DIAGNOSIS — G35 MULTIPLE SCLEROSIS (H): Primary | ICD-10-CM

## 2025-08-27 PROCEDURE — 97110 THERAPEUTIC EXERCISES: CPT | Mod: GP,KX

## 2025-09-02 ENCOUNTER — THERAPY VISIT (OUTPATIENT)
Dept: PHYSICAL THERAPY | Facility: OTHER | Age: 70
End: 2025-09-02
Attending: PHYSICIAN ASSISTANT
Payer: MEDICARE

## 2025-09-02 DIAGNOSIS — G35 MULTIPLE SCLEROSIS (H): Primary | ICD-10-CM

## 2025-09-02 DIAGNOSIS — Z98.1 STATUS POST THORACIC SPINAL FUSION: ICD-10-CM

## 2025-09-02 PROCEDURE — 97110 THERAPEUTIC EXERCISES: CPT | Mod: GP,KX

## 2025-09-04 ENCOUNTER — THERAPY VISIT (OUTPATIENT)
Dept: PHYSICAL THERAPY | Facility: OTHER | Age: 70
End: 2025-09-04
Attending: PHYSICIAN ASSISTANT
Payer: MEDICARE

## 2025-09-04 DIAGNOSIS — G35 MULTIPLE SCLEROSIS (H): Primary | ICD-10-CM

## 2025-09-04 DIAGNOSIS — Z98.1 STATUS POST THORACIC SPINAL FUSION: ICD-10-CM

## 2025-09-04 PROCEDURE — 97110 THERAPEUTIC EXERCISES: CPT | Mod: GP,KX

## (undated) DEVICE — DRAPE SHEET REV FOLD 3/4 9349

## (undated) DEVICE — NDL SPINAL 18GA 3.5" 405184

## (undated) DEVICE — SU VICRYL 0 CT-2 CR 8X18" J727D

## (undated) DEVICE — GUIDEWIRE AMPLATZ SUPER STIFF .038"X145CM M0066401061

## (undated) DEVICE — ADH LIQUID MASTISOL TOPICAL VIAL 2-3ML 0523-48

## (undated) DEVICE — CATH URETERAL 5FRX70CM OPEN END FLEX TIP G14521

## (undated) DEVICE — PREP DURAPREP 26ML APL 8630

## (undated) DEVICE — PAD CHUX UNDERPAD 30X36" P3036C

## (undated) DEVICE — SOL WATER 1500ML

## (undated) DEVICE — PACK UNIVERSAL SPLIT 29131

## (undated) DEVICE — ESU GROUND PAD UNIVERSAL W/O CORD

## (undated) DEVICE — SPONGE SURGIFOAM 100 1974

## (undated) DEVICE — CATH INTERMITTENT CLEAN-CATH 16FR 16" VINYL LF 421716

## (undated) DEVICE — IOM CASE FLAT FEE

## (undated) DEVICE — BASKET NITINOL TIPLESS HALO  1.5FRX120CM 554120

## (undated) DEVICE — ENDO KIT COMPLIANCE DYKENDOCMPLY

## (undated) DEVICE — DRAIN RESERVOIR 100ML JP 0070740

## (undated) DEVICE — LINEN TOWEL PACK X5 5464

## (undated) DEVICE — TOOL DISSECT MIDAS MR8 14CM MATCH HEAD 3MM MR8-14MH30

## (undated) DEVICE — TUBING SUCTION MEDI-VAC SOFT 3/16"X20' N520A

## (undated) DEVICE — GLOVE BIOGEL PI MICRO INDICATOR UNDERGLOVE SZ 8.0 48980

## (undated) DEVICE — GLOVE PROTEXIS POWDER FREE SMT 8.5 2D72PT85X

## (undated) DEVICE — ENDO BRUSH CHANNEL MASTER CLEANING 2-4.2MM BW-412T

## (undated) DEVICE — WIPES FOLEY CARE SURESTEP PROVON DFC100

## (undated) DEVICE — SPONGE SURGIFOAM 50

## (undated) DEVICE — SU VICRYL 2-0 CT-2 CR 8X18" J726D

## (undated) DEVICE — SUCTION MANIFOLD NEPTUNE 2 SYS 4 PORT 0702-020-000

## (undated) DEVICE — SPONGE COTTONOID 1/2X3" 80-1407

## (undated) DEVICE — IONM UP TO 7 HOURS

## (undated) DEVICE — Device

## (undated) DEVICE — IOM STANDARD SUPPLY FEE

## (undated) DEVICE — ESU ELEC BLADE 2.75" COATED/INSULATED E1455

## (undated) DEVICE — DRSG KERLIX FLUFFS X5

## (undated) DEVICE — GLOVE PROTEXIS W/NEU-THERA 7.5  2D73TE75

## (undated) DEVICE — CATH FOLEY COUDE 16FR 5ML LATEX

## (undated) DEVICE — ENDO TRAP POLYP E-TRAP 00711099

## (undated) DEVICE — SOL WATER IRRIG 1000ML BOTTLE 2F7114

## (undated) DEVICE — CATH TRAY FOLEY COUDE SURESTEP 16FR W/DRN BAG LATEX A304416A

## (undated) DEVICE — DRAIN JACKSON PRATT 07MM FLAT SU130-1310

## (undated) DEVICE — PACK CYSTO SBA15CSFCA

## (undated) DEVICE — SYR EAR BULB 3OZ 0035830

## (undated) DEVICE — TUBING SUCTION 10'X3/16" N510

## (undated) DEVICE — POSITIONER PT PRONESAFE HEAD REST W/DERMAPROX INSERT 40599

## (undated) DEVICE — DRAPE MAYO STAND 23X54 8337

## (undated) DEVICE — KIT PATIENT JACKSON 1 SPK10118

## (undated) DEVICE — GLOVE PROTEXIS BLUE W/NEU-THERA 8.0  2D73EB80

## (undated) DEVICE — IOM SUPPLIES

## (undated) DEVICE — SLEEVE COMPRESSION SCD KNEE MED 74022

## (undated) DEVICE — GUIDEWIRE SENSOR DUAL FLEX STR 0.035"X150CM M0066703080

## (undated) DEVICE — ESU PENCIL W/HOLSTER E2350H

## (undated) DEVICE — LASER FIBER RFID 200S RS HLDBXS200C

## (undated) DEVICE — RX SURGIFLO HEMOSTATIC MATRIX W/THROMBIN 8ML 2994

## (undated) DEVICE — MARKER SPHERES PASSIVE MEDT PACK 5 8801075

## (undated) DEVICE — DRAPE COVER C-ARM SEAMLESS SNAP-KAP 03-KP26 LATEX FREE

## (undated) DEVICE — SU MONOCRYL 4-0 PS-2 18" UND Y496G

## (undated) DEVICE — TUBING SUCTION SOFT 20'X3/16" 0036570

## (undated) DEVICE — DRAPE MICROSCOPE LEICA 54X150" AR8033650

## (undated) DEVICE — STPL SKIN 35W 6.9MM  PXW35

## (undated) DEVICE — SU VICRYL 0 CT-1 CR 8X18" J740D

## (undated) DEVICE — PACK LARGE SPINE SNE15LSFSE

## (undated) DEVICE — ENDO SNARE EXACTO COLD 9MM LOOP 2.4MMX230CM 00711115

## (undated) DEVICE — ESU ELEC BLADE 6" COATED/INSULATED E1455-6

## (undated) DEVICE — PACK SPINE SM CUSTOM SNE15SSFSK

## (undated) DEVICE — SPONGE COTTONOID 1/2X1/2" 80-1400

## (undated) RX ORDER — FENTANYL CITRATE 50 UG/ML
INJECTION, SOLUTION INTRAMUSCULAR; INTRAVENOUS
Status: DISPENSED
Start: 2018-04-10

## (undated) RX ORDER — PROPOFOL 10 MG/ML
INJECTION, EMULSION INTRAVENOUS
Status: DISPENSED
Start: 2018-04-10

## (undated) RX ORDER — PROPOFOL 10 MG/ML
INJECTION, EMULSION INTRAVENOUS
Status: DISPENSED
Start: 2018-04-03

## (undated) RX ORDER — IBUPROFEN 200 MG
TABLET ORAL
Status: DISPENSED
Start: 2018-04-03

## (undated) RX ORDER — PROPOFOL 10 MG/ML
INJECTION, EMULSION INTRAVENOUS
Status: DISPENSED
Start: 2025-07-01

## (undated) RX ORDER — FENTANYL CITRATE 0.05 MG/ML
INJECTION, SOLUTION INTRAMUSCULAR; INTRAVENOUS
Status: DISPENSED
Start: 2022-09-06

## (undated) RX ORDER — HEPARIN SODIUM 1000 [USP'U]/ML
INJECTION, SOLUTION INTRAVENOUS; SUBCUTANEOUS
Status: DISPENSED
Start: 2025-07-08

## (undated) RX ORDER — LIDOCAINE HYDROCHLORIDE 10 MG/ML
INJECTION, SOLUTION EPIDURAL; INFILTRATION; INTRACAUDAL; PERINEURAL
Status: DISPENSED
Start: 2020-09-01

## (undated) RX ORDER — HYDROMORPHONE HYDROCHLORIDE 1 MG/ML
INJECTION, SOLUTION INTRAMUSCULAR; INTRAVENOUS; SUBCUTANEOUS
Status: DISPENSED
Start: 2025-07-01

## (undated) RX ORDER — REMIFENTANIL HYDROCHLORIDE 1 MG/ML
INJECTION, POWDER, LYOPHILIZED, FOR SOLUTION INTRAVENOUS
Status: DISPENSED
Start: 2025-07-01

## (undated) RX ORDER — CIPROFLOXACIN 500 MG/1
TABLET, FILM COATED ORAL
Status: DISPENSED
Start: 2018-04-18

## (undated) RX ORDER — PROPOFOL 10 MG/ML
INJECTION, EMULSION INTRAVENOUS
Status: DISPENSED
Start: 2024-06-17

## (undated) RX ORDER — KETAMINE HYDROCHLORIDE 50 MG/ML
INJECTION, SOLUTION INTRAMUSCULAR; INTRAVENOUS
Status: DISPENSED
Start: 2018-04-10

## (undated) RX ORDER — KETOROLAC TROMETHAMINE 30 MG/ML
INJECTION, SOLUTION INTRAMUSCULAR; INTRAVENOUS
Status: DISPENSED
Start: 2018-04-10

## (undated) RX ORDER — PROPOFOL 10 MG/ML
INJECTION, EMULSION INTRAVENOUS
Status: DISPENSED
Start: 2021-06-17

## (undated) RX ORDER — PROPOFOL 10 MG/ML
INJECTION, EMULSION INTRAVENOUS
Status: DISPENSED
Start: 2022-09-06

## (undated) RX ORDER — REMIFENTANIL HYDROCHLORIDE 1 MG/ML
INJECTION, POWDER, LYOPHILIZED, FOR SOLUTION INTRAVENOUS
Status: DISPENSED
Start: 2022-09-06

## (undated) RX ORDER — LIDOCAINE HYDROCHLORIDE AND EPINEPHRINE 10; 10 MG/ML; UG/ML
INJECTION, SOLUTION INFILTRATION; PERINEURAL
Status: DISPENSED
Start: 2025-07-01

## (undated) RX ORDER — CEFTRIAXONE SODIUM 1 G/50ML
INJECTION, SOLUTION INTRAVENOUS
Status: DISPENSED
Start: 2018-04-03

## (undated) RX ORDER — BUPIVACAINE HYDROCHLORIDE AND EPINEPHRINE 5; 5 MG/ML; UG/ML
INJECTION, SOLUTION EPIDURAL; INTRACAUDAL; PERINEURAL
Status: DISPENSED
Start: 2025-07-01

## (undated) RX ORDER — DEXAMETHASONE SODIUM PHOSPHATE 4 MG/ML
INJECTION, SOLUTION INTRA-ARTICULAR; INTRALESIONAL; INTRAMUSCULAR; INTRAVENOUS; SOFT TISSUE
Status: DISPENSED
Start: 2022-09-06

## (undated) RX ORDER — FENTANYL CITRATE 50 UG/ML
INJECTION, SOLUTION INTRAMUSCULAR; INTRAVENOUS
Status: DISPENSED
Start: 2025-07-01

## (undated) RX ORDER — GABAPENTIN 100 MG/1
CAPSULE ORAL
Status: DISPENSED
Start: 2025-07-01

## (undated) RX ORDER — HEPARIN SODIUM 10000 [USP'U]/100ML
INJECTION, SOLUTION INTRAVENOUS
Status: DISPENSED
Start: 2025-07-07

## (undated) RX ORDER — ONDANSETRON 2 MG/ML
INJECTION INTRAMUSCULAR; INTRAVENOUS
Status: DISPENSED
Start: 2018-04-03

## (undated) RX ORDER — IBUPROFEN 400 MG/1
TABLET, FILM COATED ORAL
Status: DISPENSED
Start: 2018-04-03

## (undated) RX ORDER — EPHEDRINE SULFATE 50 MG/ML
INJECTION, SOLUTION INTRAVENOUS
Status: DISPENSED
Start: 2018-04-10

## (undated) RX ORDER — DEXAMETHASONE SODIUM PHOSPHATE 4 MG/ML
INJECTION, SOLUTION INTRA-ARTICULAR; INTRALESIONAL; INTRAMUSCULAR; INTRAVENOUS; SOFT TISSUE
Status: DISPENSED
Start: 2018-04-10

## (undated) RX ORDER — FENTANYL CITRATE 50 UG/ML
INJECTION, SOLUTION INTRAMUSCULAR; INTRAVENOUS
Status: DISPENSED
Start: 2025-07-08

## (undated) RX ORDER — LIDOCAINE HYDROCHLORIDE 20 MG/ML
INJECTION, SOLUTION EPIDURAL; INFILTRATION; INTRACAUDAL; PERINEURAL
Status: DISPENSED
Start: 2018-04-03

## (undated) RX ORDER — LIDOCAINE HYDROCHLORIDE 10 MG/ML
INJECTION, SOLUTION EPIDURAL; INFILTRATION; INTRACAUDAL; PERINEURAL
Status: DISPENSED
Start: 2025-07-08

## (undated) RX ORDER — CEFAZOLIN SODIUM/WATER 2 G/20 ML
SYRINGE (ML) INTRAVENOUS
Status: DISPENSED
Start: 2022-09-03

## (undated) RX ORDER — ACETAMINOPHEN 10 MG/ML
INJECTION, SOLUTION INTRAVENOUS
Status: DISPENSED
Start: 2018-04-10

## (undated) RX ORDER — CEFAZOLIN SODIUM 2 G/100ML
INJECTION, SOLUTION INTRAVENOUS
Status: DISPENSED
Start: 2018-04-10

## (undated) RX ORDER — EPHEDRINE SULFATE 50 MG/ML
INJECTION, SOLUTION INTRAVENOUS
Status: DISPENSED
Start: 2018-04-03

## (undated) RX ORDER — SODIUM CHLORIDE, SODIUM LACTATE, POTASSIUM CHLORIDE, CALCIUM CHLORIDE 600; 310; 30; 20 MG/100ML; MG/100ML; MG/100ML; MG/100ML
INJECTION, SOLUTION INTRAVENOUS
Status: DISPENSED
Start: 2018-04-10

## (undated) RX ORDER — HYDROMORPHONE HCL IN WATER/PF 6 MG/30 ML
PATIENT CONTROLLED ANALGESIA SYRINGE INTRAVENOUS
Status: DISPENSED
Start: 2022-09-06

## (undated) RX ORDER — LIDOCAINE HYDROCHLORIDE 20 MG/ML
INJECTION, SOLUTION EPIDURAL; INFILTRATION; INTRACAUDAL; PERINEURAL
Status: DISPENSED
Start: 2018-04-10

## (undated) RX ORDER — ONDANSETRON 2 MG/ML
INJECTION INTRAMUSCULAR; INTRAVENOUS
Status: DISPENSED
Start: 2018-04-10

## (undated) RX ORDER — SODIUM CHLORIDE 9 MG/ML
INJECTION, SOLUTION INTRAVENOUS
Status: DISPENSED
Start: 2018-04-03

## (undated) RX ORDER — METHYLPREDNISOLONE ACETATE 40 MG/ML
INJECTION, SUSPENSION INTRA-ARTICULAR; INTRALESIONAL; INTRAMUSCULAR; SOFT TISSUE
Status: DISPENSED
Start: 2022-09-06

## (undated) RX ORDER — FENTANYL CITRATE 50 UG/ML
INJECTION, SOLUTION INTRAMUSCULAR; INTRAVENOUS
Status: DISPENSED
Start: 2018-04-03

## (undated) RX ORDER — LIDOCAINE HYDROCHLORIDE 20 MG/ML
INJECTION, SOLUTION EPIDURAL; INFILTRATION; INTRACAUDAL; PERINEURAL
Status: DISPENSED
Start: 2021-06-17

## (undated) RX ORDER — FENTANYL CITRATE 50 UG/ML
INJECTION, SOLUTION INTRAMUSCULAR; INTRAVENOUS
Status: DISPENSED
Start: 2022-09-06

## (undated) RX ORDER — ACETAMINOPHEN 325 MG/1
TABLET ORAL
Status: DISPENSED
Start: 2025-07-01